# Patient Record
Sex: FEMALE | Race: WHITE | Employment: UNEMPLOYED | URBAN - METROPOLITAN AREA
[De-identification: names, ages, dates, MRNs, and addresses within clinical notes are randomized per-mention and may not be internally consistent; named-entity substitution may affect disease eponyms.]

---

## 2020-06-04 ENCOUNTER — APPOINTMENT (OUTPATIENT)
Dept: GENERAL RADIOLOGY | Age: 85
DRG: 689 | End: 2020-06-04
Payer: MEDICARE

## 2020-06-04 ENCOUNTER — HOSPITAL ENCOUNTER (INPATIENT)
Age: 85
LOS: 6 days | Discharge: HOME HEALTH | DRG: 689 | End: 2020-06-11
Attending: EMERGENCY MEDICINE | Admitting: INTERNAL MEDICINE
Payer: MEDICARE

## 2020-06-04 LAB
A/G RATIO: 1.3 (ref 1.1–2.2)
ALBUMIN SERPL-MCNC: 3.5 G/DL (ref 3.4–5)
ALP BLD-CCNC: 172 U/L (ref 40–129)
ALT SERPL-CCNC: 16 U/L (ref 10–40)
ANION GAP SERPL CALCULATED.3IONS-SCNC: 10 MMOL/L (ref 3–16)
AST SERPL-CCNC: 26 U/L (ref 15–37)
BACTERIA: ABNORMAL /HPF
BASOPHILS ABSOLUTE: 0 K/UL (ref 0–0.2)
BASOPHILS RELATIVE PERCENT: 0.6 %
BILIRUB SERPL-MCNC: 1 MG/DL (ref 0–1)
BILIRUBIN URINE: NEGATIVE
BLOOD, URINE: NEGATIVE
BUN BLDV-MCNC: 21 MG/DL (ref 7–20)
CALCIUM SERPL-MCNC: 9.6 MG/DL (ref 8.3–10.6)
CHLORIDE BLD-SCNC: 107 MMOL/L (ref 99–110)
CLARITY: ABNORMAL
CO2: 26 MMOL/L (ref 21–32)
COLOR: YELLOW
CREAT SERPL-MCNC: 1 MG/DL (ref 0.6–1.2)
EOSINOPHILS ABSOLUTE: 0.2 K/UL (ref 0–0.6)
EOSINOPHILS RELATIVE PERCENT: 2.5 %
EPITHELIAL CELLS, UA: 0 /HPF (ref 0–5)
GFR AFRICAN AMERICAN: >60
GFR NON-AFRICAN AMERICAN: 52
GLOBULIN: 2.7 G/DL
GLUCOSE BLD-MCNC: 106 MG/DL (ref 70–99)
GLUCOSE URINE: NEGATIVE MG/DL
HCT VFR BLD CALC: 39.4 % (ref 36–48)
HEMOGLOBIN: 12.4 G/DL (ref 12–16)
HYALINE CASTS: 7 /LPF (ref 0–8)
KETONES, URINE: NEGATIVE MG/DL
LACTIC ACID: 1.3 MMOL/L (ref 0.4–2)
LEUKOCYTE ESTERASE, URINE: ABNORMAL
LYMPHOCYTES ABSOLUTE: 1 K/UL (ref 1–5.1)
LYMPHOCYTES RELATIVE PERCENT: 15.3 %
MCH RBC QN AUTO: 27.7 PG (ref 26–34)
MCHC RBC AUTO-ENTMCNC: 31.6 G/DL (ref 31–36)
MCV RBC AUTO: 87.6 FL (ref 80–100)
MICROSCOPIC EXAMINATION: YES
MONOCYTES ABSOLUTE: 0.9 K/UL (ref 0–1.3)
MONOCYTES RELATIVE PERCENT: 13.7 %
NEUTROPHILS ABSOLUTE: 4.5 K/UL (ref 1.7–7.7)
NEUTROPHILS RELATIVE PERCENT: 67.9 %
NITRITE, URINE: NEGATIVE
PDW BLD-RTO: 18.6 % (ref 12.4–15.4)
PH UA: 7.5 (ref 5–8)
PLATELET # BLD: 218 K/UL (ref 135–450)
PMV BLD AUTO: 9.7 FL (ref 5–10.5)
POTASSIUM REFLEX MAGNESIUM: 4.1 MMOL/L (ref 3.5–5.1)
PRO-BNP: 4382 PG/ML (ref 0–449)
PROTEIN UA: NEGATIVE MG/DL
RBC # BLD: 4.5 M/UL (ref 4–5.2)
RBC UA: 3 /HPF (ref 0–4)
SODIUM BLD-SCNC: 143 MMOL/L (ref 136–145)
SPECIFIC GRAVITY UA: 1.02 (ref 1–1.03)
TOTAL PROTEIN: 6.2 G/DL (ref 6.4–8.2)
TROPONIN: <0.01 NG/ML
URINE REFLEX TO CULTURE: YES
URINE TYPE: ABNORMAL
UROBILINOGEN, URINE: 1 E.U./DL
WBC # BLD: 6.6 K/UL (ref 4–11)
WBC UA: 32 /HPF (ref 0–5)

## 2020-06-04 PROCEDURE — 71045 X-RAY EXAM CHEST 1 VIEW: CPT

## 2020-06-04 PROCEDURE — 99285 EMERGENCY DEPT VISIT HI MDM: CPT

## 2020-06-04 PROCEDURE — 87077 CULTURE AEROBIC IDENTIFY: CPT

## 2020-06-04 PROCEDURE — 84484 ASSAY OF TROPONIN QUANT: CPT

## 2020-06-04 PROCEDURE — U0002 COVID-19 LAB TEST NON-CDC: HCPCS

## 2020-06-04 PROCEDURE — 36415 COLL VENOUS BLD VENIPUNCTURE: CPT

## 2020-06-04 PROCEDURE — 83880 ASSAY OF NATRIURETIC PEPTIDE: CPT

## 2020-06-04 PROCEDURE — 96376 TX/PRO/DX INJ SAME DRUG ADON: CPT

## 2020-06-04 PROCEDURE — 96375 TX/PRO/DX INJ NEW DRUG ADDON: CPT

## 2020-06-04 PROCEDURE — 96374 THER/PROPH/DIAG INJ IV PUSH: CPT

## 2020-06-04 PROCEDURE — 87086 URINE CULTURE/COLONY COUNT: CPT

## 2020-06-04 PROCEDURE — 80053 COMPREHEN METABOLIC PANEL: CPT

## 2020-06-04 PROCEDURE — 81001 URINALYSIS AUTO W/SCOPE: CPT

## 2020-06-04 PROCEDURE — 6360000002 HC RX W HCPCS: Performed by: EMERGENCY MEDICINE

## 2020-06-04 PROCEDURE — 85025 COMPLETE CBC W/AUTO DIFF WBC: CPT

## 2020-06-04 PROCEDURE — 93005 ELECTROCARDIOGRAM TRACING: CPT | Performed by: EMERGENCY MEDICINE

## 2020-06-04 PROCEDURE — 83605 ASSAY OF LACTIC ACID: CPT

## 2020-06-04 PROCEDURE — 87186 SC STD MICRODIL/AGAR DIL: CPT

## 2020-06-04 PROCEDURE — U0003 INFECTIOUS AGENT DETECTION BY NUCLEIC ACID (DNA OR RNA); SEVERE ACUTE RESPIRATORY SYNDROME CORONAVIRUS 2 (SARS-COV-2) (CORONAVIRUS DISEASE [COVID-19]), AMPLIFIED PROBE TECHNIQUE, MAKING USE OF HIGH THROUGHPUT TECHNOLOGIES AS DESCRIBED BY CMS-2020-01-R: HCPCS

## 2020-06-04 PROCEDURE — 2580000003 HC RX 258: Performed by: EMERGENCY MEDICINE

## 2020-06-04 RX ORDER — FUROSEMIDE 20 MG/1
20 TABLET ORAL DAILY PRN
Status: ON HOLD | COMMUNITY
End: 2020-12-08 | Stop reason: SDUPTHER

## 2020-06-04 RX ORDER — FUROSEMIDE 10 MG/ML
40 INJECTION INTRAMUSCULAR; INTRAVENOUS ONCE
Status: COMPLETED | OUTPATIENT
Start: 2020-06-04 | End: 2020-06-04

## 2020-06-04 RX ORDER — MULTIVIT-MIN/IRON FUM/FOLIC AC 7.5 MG-4
1 TABLET ORAL DAILY
COMMUNITY

## 2020-06-04 RX ORDER — DIVALPROEX SODIUM 125 MG/1
125 CAPSULE, COATED PELLETS ORAL DAILY
Status: ON HOLD | COMMUNITY
End: 2020-11-28 | Stop reason: ALTCHOICE

## 2020-06-04 RX ORDER — CALCIUM CARBONATE 500(1250)
500 TABLET ORAL DAILY
COMMUNITY

## 2020-06-04 RX ORDER — METOPROLOL SUCCINATE 25 MG/1
25 TABLET, EXTENDED RELEASE ORAL DAILY
Status: ON HOLD | COMMUNITY
End: 2021-04-01 | Stop reason: HOSPADM

## 2020-06-04 RX ORDER — ALBUTEROL SULFATE 90 UG/1
2 AEROSOL, METERED RESPIRATORY (INHALATION) EVERY 6 HOURS PRN
COMMUNITY

## 2020-06-04 RX ORDER — LORAZEPAM 2 MG/ML
1 INJECTION INTRAMUSCULAR ONCE
Status: COMPLETED | OUTPATIENT
Start: 2020-06-04 | End: 2020-06-04

## 2020-06-04 RX ADMIN — LORAZEPAM 1 MG: 2 INJECTION INTRAMUSCULAR; INTRAVENOUS at 20:51

## 2020-06-04 RX ADMIN — LORAZEPAM 1 MG: 2 INJECTION INTRAMUSCULAR; INTRAVENOUS at 23:17

## 2020-06-04 RX ADMIN — CEFEPIME HYDROCHLORIDE 2 G: 2 INJECTION, POWDER, FOR SOLUTION INTRAVENOUS at 23:28

## 2020-06-04 RX ADMIN — FUROSEMIDE 40 MG: 10 INJECTION, SOLUTION INTRAMUSCULAR; INTRAVENOUS at 23:20

## 2020-06-04 NOTE — ED PROVIDER NOTES
eMERGENCY dEPARTMENT eNCOUnter      Pt Name: Contreras Murray  MRN: [id number]  [place] 5/9/1931  Date of evaluation: 6/4/2020  Provider: Kandis Castillo MD     [address] [place] [address]       Chief Complaint   Patient presents with    Shortness of Breath     Pt came in via EMS with \"shortness of breath for 3 days\". Patient's son reports that pt has been \"more lethargic than usual\". Pt has history of dementia.  Fatigue         HISTORY OF PRESENT ILLNESS   (Location/Symptom, Timing/Onset,Context/Setting, Quality, Duration, Modifying Factors, Severity) Note limiting factors. HPI    Contreras Murray is a 80 y.o. female who presents to the emergency department with shortness of breath and confusion. Has baseline dementia that is of son who called 911 tonight because she was getting more short of breath and fatigue. He has history of chronic A. fib with rate control and been short of breath today. There has been no chest pain. Patient has baseline dementia and getting more confused. There has been no fever. Patient has no cough according to the son. Patient is already had only 2 placed. Patient states she is in the hospital only but does not know where. Her son called 46 and her to be seen at [facility] ED however they EMS brought her to a [facility] facility. Patient son is very adamant that she needs to be transferred back to a [facility]-health facility. That is where her doctor and most of her care has been. Nursing Notes were reviewed. REVIEW OFSYSTEMS    (2+ for level 4; 10+ for level 5)   Review of Systems    Unable to obtain secondary to a patient's mental status. However son states there has been no fever. There has been no cough. Positive shortness of breath. No known exposure to COVID-19. PAST MEDICAL HISTORY   History reviewed. No pertinent past medical history. SURGICAL HISTORY     History reviewed. No pertinent surgical history.     CURRENT MEDICATIONS       Current Discharge Medication List CONTINUE these medications which have NOT CHANGED    Details   divalproex (DEPAKOTE SPRINKLES) 125 MG capsule Take 250 mg by mouth 2 times daily       albuterol sulfate HFA (VENTOLIN HFA) 108 (90 Base) MCG/ACT inhaler Inhale 2 puffs into the lungs every 6 hours as needed for Wheezing      furosemide (LASIX) 20 MG tablet Take 20 mg by mouth daily as needed      mometasone-formoterol (DULERA) 200-5 MCG/ACT inhaler Inhale 2 puffs into the lungs every 12 hours      metoprolol succinate (TOPROL XL) 50 MG extended release tablet Take 50 mg by mouth daily      Multiple Vitamins-Minerals (MULTIVITAMIN WITH MINERALS) tablet Take 1 tablet by mouth daily      rivaroxaban (XARELTO) 15 MG TABS tablet Take 15 mg by mouth daily (with breakfast)      Cholecalciferol (VITAMIN D3) 125 MCG (5000 UT) TABS Take 1 tablet by mouth daily      calcium carbonate (OSCAL) 500 MG TABS tablet Take 500 mg by mouth daily             ALLERGIES     Chlorpromazine and Hydrochlorothiazide    FAMILY HISTORY     No family history on file.      SOCIAL HISTORY       Social History     Socioeconomic History    Marital status: Single     Spouse name: None    Number of children: None    Years of education: None    Highest education level: None   Occupational History    None   Social Needs    Financial resource strain: None    Food insecurity     Worry: None     Inability: None    Transportation needs     Medical: None     Non-medical: None   Tobacco Use    Smoking status: None   Substance and Sexual Activity    Alcohol use: None    Drug use: None    Sexual activity: None   Lifestyle    Physical activity     Days per week: None     Minutes per session: None    Stress: None   Relationships    Social connections     Talks on phone: None     Gets together: None     Attends Mu-ism service: None     Active member of club or organization: None     Attends meetings of clubs or organizations: None     Relationship status: None    Intimate partner violence     Fear of current or ex partner: None     Emotionally abused: None     Physically abused: None     Forced sexual activity: None   Other Topics Concern    None   Social History Narrative    None       SCREENINGS    Ever Coma Scale  Eye Opening: Spontaneous  Best Verbal Response: Inappropriate words  Best Motor Response: Withdraws from pain  Ever Coma Scale Score: 11      PHYSICAL EXAM    (up to 7 for level 4, 8 or more for level 5)     ED Triage Vitals [06/04/20 1939]   BP Temp Temp src Pulse Resp SpO2 Height Weight   (!) 164/84 97.4 °F (36.3 °C) -- 73 28 95 % -- 218 lb 7.6 oz (99.1 kg)       Physical Exam    General: Alert chronically and pleasantly confused. Nontoxic appearance. Elderly 68-year-old white female in mild respiratory distress. HEENT: Normocephalic atraumatic. Neck is supple. Airway intact. No adenopathy  Cardiac: Regular rate and irregular rhythm with no murmurs rubs or gallops  Pulmonary: Lungs are clear in all lung fields. No wheezing. No Rales. However, there has been some crackles at the base of the lungs. Abdomen: Soft and nontender. Negative hepatosplenomegaly. Bowel sounds are active  Extremities: Moving all extremities. No calf tenderness. Peripheral pulses all intact  Skin: No skin lesions. No rashes  Neurologic: Cranial nerves II through XII was grossly intact. Nonfocal neurological exam  Psychiatric: Patient is pleasant. Slight confusion. Patient is unable to answer complicated questions. DIAGNOSTIC RESULTS     EKG (Per Emergency Physician):   Atrial fibrillation at a controlled rate of 73    RADIOLOGY (Per Emergency Physician):        Interpretation per the Radiologist below, if available at the time of this note:  Ct Head Wo Contrast    Result Date: 6/5/2020  EXAMINATION: CT OF THE HEAD WITHOUT CONTRAST  6/5/2020 11:39 am TECHNIQUE: CT of the head was performed without the administration of intravenous contrast. Dose modulation, iterative reconstruction, and/or weight based adjustment of the mA/kV was utilized to reduce the radiation dose to as low as reasonably achievable. COMPARISON: None. HISTORY: ORDERING SYSTEM PROVIDED HISTORY: acute encephalopathy TECHNOLOGIST PROVIDED HISTORY: Reason for exam:->acute encephalopathy Has a \"code stroke\" or \"stroke alert\" been called? ->No Reason for Exam: AMS FINDINGS: BRAIN/VENTRICLES: Mild generalized volume loss. Periventricular low-attenuation likely related to chronic small vessel disease. Ventricles are prominent, likely related to volume loss. Low-attenuation noted within the right parietal and right occipital lobe no significant sulcal effacement. No midline shift. No evidence of acute bleed. ORBITS: The visualized portion of the orbits demonstrate no acute abnormality. SINUSES: The visualized paranasal sinuses and mastoid air cells demonstrate no acute abnormality. SOFT TISSUES/SKULL:  No acute abnormality of the visualized skull or soft tissues. Generalized volume loss and scattered periventricular low-attenuation likely related to chronic small vessel disease. Wedge-shaped region of low-attenuation noted within the right parietal and right occipital lobe no significant sulcal effacement. Appearance is suggestive of subacute to remote infarct. If there are continued deficits, this can be further evaluated with MRI of the brain. Xr Chest Portable    Result Date: 6/4/2020  EXAMINATION: ONE XRAY VIEW OF THE CHEST 6/4/2020 5:32 pm COMPARISON: None. HISTORY: ORDERING SYSTEM PROVIDED HISTORY: Shortness of breath TECHNOLOGIST PROVIDED HISTORY: Reason for exam:->Shortness of breath Reason for Exam: sob FINDINGS: Cardial pericardial silhouette is prominent. There is increased central opacity, with pulmonary vascular congestion. Small bilateral pleural effusions are present, greater on the right. No pneumothorax is seen. No free air. No acute bony abnormality.      Findings most compatible with American 46 (*)     All other components within normal limits    Narrative:     Performed at:  [place] [address] [facility] [place] [employer] [address]   Phone [phone]   CBC WITH AUTO DIFFERENTIAL - Abnormal; Notable for the following components:    RDW 18.1 (*)     Lymphocytes Absolute 0.6 (*)     All other components within normal limits    Narrative:     Performed at:  [address] [facility] [place] [employer] [address]   Phone [phone]   BASIC METABOLIC PANEL - Abnormal; Notable for the following components:    Potassium 3.4 (*)     Glucose 102 (*)     GFR Non- 47 (*)     GFR  57 (*)     All other components within normal limits    Narrative:     Performed at:  [address] [facility] [place] [employer] [address]   Phone [phone]   VALPROIC ACID LEVEL, TOTAL - Abnormal; Notable for the following components:    Valproic Acid Lvl 22.7 (*)     All other components within normal limits    Narrative:     Performed at:  [address] [facility] [place] [employer] [address]   Phone [phone]   VITAMIN B12 & FOLATE - Abnormal; Notable for the following components:    Vitamin B-12 992 (*)     All other components within normal limits    Narrative:     Performed at:  [address] [facility] [place] [employer] [address]   Phone [phone]   CULTURE, URINE   CULTURE, BLOOD 1   CULTURE, BLOOD 2   CULTURE, URINE   TROPONIN    Narrative:     Performed at:  [address] [place] [address]   Phone [phone]   LACTIC ACID, PLASMA    Narrative:     Performed at:  [address] [facility] [place] [employer] [address]   Phone [phone]    Narrative:     Performed at:  [facility]  [address] [place], [place] [address]   Phone [phone]   MAGNESIUM    Narrative:     Performed at:  [facility]  [address] [place], [place] [address]   Phone [phone]   TSH WITHOUT REFLEX    Narrative:     Performed at:  [facility]  [address] [place], [place] [address]   Phone [phone]   T4, FREE    Narrative:     Performed at:  [facility]  [address] [place], [place] [address]   Phone [phone]   PROCALCITONIN    Narrative:     Performed at:  [facility]  [address] [place], [place] [address]   Phone [phone]   LACTIC ACID, PLASMA    Narrative:     Performed at:  [facility]  [address] [place], [place] [address]   Phone [phone]   BASIC METABOLIC PANEL   MAGNESIUM   LIPID PANEL   CBC WITH AUTO DIFFERENTIAL        All other labs were within normal range or not returned as of this dictation.       Procedures      EMERGENCY DEPARTMENT COURSE and DIFFERENTIAL DIAGNOSIS/MDM:   Vitals:    Vitals:    06/05/20 1500 06/05/20 1545 06/05/20 1745 06/05/20 1752   BP:    (!) 169/93   Pulse:    108   Resp:       Temp: 94.8 °F (34.9 °C) 94 °F (34.4 °C) 94.8 °F (34.9 °C)    TempSrc: Rectal Rectal Rectal    SpO2:   97% 95%   Weight:       Height:           Medications   ipratropium-albuterol (DUONEB) nebulizer solution 1 ampule (has no administration in time range)   calcium elemental (OSCAL) tablet 500 mg (500 mg Oral Given 6/5/20 0958)   vitamin D (CHOLECALCIFEROL) capsule 5,000 Units (5,000 Units Oral Given 6/5/20 1455)   divalproex (DEPAKOTE SPRINKLE) capsule 250 mg (250 mg Oral Given 6/5/20 0958)   metoprolol succinate (TOPROL XL) extended release tablet 50 mg (50 mg Oral Given 6/5/20 0958)   budesonide-formoterol (SYMBICORT) 80-4.5 MCG/ACT inhaler 2 puff (2 puffs Inhalation Not Given 6/5/20 0959)   therapeutic multivitamin-minerals 1 tablet (1 tablet Oral Given 6/5/20 0958)   rivaroxaban (XARELTO) tablet 15 mg (15 mg Oral Given 6/5/20 0957)   sodium chloride flush 0.9 % injection 10 mL (10 mLs Intravenous Given 6/5/20 1002)   sodium chloride flush 0.9 % injection 10 mL (has no administration in time range)   acetaminophen (TYLENOL) tablet 650 mg (has no administration in time range)     Or   acetaminophen (TYLENOL) suppository 650 mg (has no administration in time range)   magnesium hydroxide (MILK OF MAGNESIA) 400 MG/5ML suspension 30 mL (has no administration in time range)   promethazine (PHENERGAN) tablet 12.5 mg (has no administration in time range)     Or   ondansetron (ZOFRAN) injection 4 mg (has no administration in time range)   potassium chloride 10 mEq/100 mL IVPB (Peripheral Line) (has no administration in time range)   metOLazone (ZAROXOLYN) tablet 5 mg (5 mg Oral Not Given 6/5/20 0319)   ceFEPIme (MAXIPIME) 2 g in sterile water 20 mL IV syringe (2 g Intravenous Given 6/5/20 1747)   LORazepam (ATIVAN) injection 1 mg (1 mg Intravenous Given 6/4/20 2051)   LORazepam (ATIVAN) injection 1 mg (1 mg Intravenous Given 6/4/20 2317)   ceFEPIme (MAXIPIME) 2 g in sterile water 20 mL IV syringe (2 g Intravenous Given 6/4/20 2328)   furosemide (LASIX) injection 40 mg (40 mg Intravenous Given 6/4/20 2320)   furosemide (LASIX) injection 80 mg (80 mg Intravenous Given 6/5/20 0315)   OLANZapine (ZYPREXA) injection 10 mg (10 mg Intramuscular Given 6/5/20 0341)   potassium chloride 10 mEq/100 mL IVPB (Peripheral Line) (10 mEq Intravenous New Bag 6/5/20 1746)       MDM. This is a 71-year-old with altered mental status and congestive heart failure. Patient apparently has been progressively getting worse. At home. Patient has chronic A. fib rate is controlled. Also has a incidental UTI that is compounding her confusion. Patient got agitated throughout the night.   She required some Ativan for her

## 2020-06-05 ENCOUNTER — APPOINTMENT (OUTPATIENT)
Dept: CT IMAGING | Age: 85
DRG: 689 | End: 2020-06-05
Payer: MEDICARE

## 2020-06-05 PROBLEM — I10 ESSENTIAL HYPERTENSION: Chronic | Status: ACTIVE | Noted: 2020-06-05

## 2020-06-05 PROBLEM — Z79.01 CURRENT USE OF LONG TERM ANTICOAGULATION: Chronic | Status: ACTIVE | Noted: 2020-06-05

## 2020-06-05 PROBLEM — I50.43 ACUTE ON CHRONIC COMBINED SYSTOLIC AND DIASTOLIC CHF, NYHA CLASS 3 (HCC): Status: ACTIVE | Noted: 2020-06-05

## 2020-06-05 PROBLEM — F02.82 DEMENTIA IN ALZHEIMER'S DISEASE WITH DELIRIUM (HCC): Status: ACTIVE | Noted: 2020-06-05

## 2020-06-05 PROBLEM — I10 ESSENTIAL HYPERTENSION: Status: ACTIVE | Noted: 2020-06-05

## 2020-06-05 PROBLEM — N10 ACUTE PYELONEPHRITIS: Status: ACTIVE | Noted: 2020-06-05

## 2020-06-05 PROBLEM — G30.9 DEMENTIA IN ALZHEIMER'S DISEASE WITH DELIRIUM (HCC): Status: ACTIVE | Noted: 2020-06-05

## 2020-06-05 LAB
ANION GAP SERPL CALCULATED.3IONS-SCNC: 11 MMOL/L (ref 3–16)
ANION GAP SERPL CALCULATED.3IONS-SCNC: 15 MMOL/L (ref 3–16)
BASOPHILS ABSOLUTE: 0.1 K/UL (ref 0–0.2)
BASOPHILS RELATIVE PERCENT: 1.1 %
BUN BLDV-MCNC: 20 MG/DL (ref 7–20)
BUN BLDV-MCNC: 21 MG/DL (ref 7–20)
CALCIUM SERPL-MCNC: 10 MG/DL (ref 8.3–10.6)
CALCIUM SERPL-MCNC: 10.1 MG/DL (ref 8.3–10.6)
CHLORIDE BLD-SCNC: 102 MMOL/L (ref 99–110)
CHLORIDE BLD-SCNC: 105 MMOL/L (ref 99–110)
CO2: 25 MMOL/L (ref 21–32)
CO2: 32 MMOL/L (ref 21–32)
CREAT SERPL-MCNC: 1 MG/DL (ref 0.6–1.2)
CREAT SERPL-MCNC: 1.1 MG/DL (ref 0.6–1.2)
EKG ATRIAL RATE: 89 BPM
EKG DIAGNOSIS: NORMAL
EKG Q-T INTERVAL: 392 MS
EKG QRS DURATION: 100 MS
EKG QTC CALCULATION (BAZETT): 431 MS
EKG R AXIS: 19 DEGREES
EKG T AXIS: -27 DEGREES
EKG VENTRICULAR RATE: 73 BPM
EOSINOPHILS ABSOLUTE: 0.2 K/UL (ref 0–0.6)
EOSINOPHILS RELATIVE PERCENT: 3.4 %
FOLATE: >20 NG/ML (ref 4.78–24.2)
GFR AFRICAN AMERICAN: 57
GFR AFRICAN AMERICAN: >60
GFR NON-AFRICAN AMERICAN: 47
GFR NON-AFRICAN AMERICAN: 52
GLUCOSE BLD-MCNC: 102 MG/DL (ref 70–99)
GLUCOSE BLD-MCNC: 88 MG/DL (ref 70–99)
HCT VFR BLD CALC: 42.7 % (ref 36–48)
HEMOGLOBIN: 13.6 G/DL (ref 12–16)
LACTIC ACID: 1.6 MMOL/L (ref 0.4–2)
LYMPHOCYTES ABSOLUTE: 0.6 K/UL (ref 1–5.1)
LYMPHOCYTES RELATIVE PERCENT: 8.2 %
MAGNESIUM: 1.8 MG/DL (ref 1.8–2.4)
MCH RBC QN AUTO: 27.9 PG (ref 26–34)
MCHC RBC AUTO-ENTMCNC: 31.9 G/DL (ref 31–36)
MCV RBC AUTO: 87.3 FL (ref 80–100)
MONOCYTES ABSOLUTE: 1.1 K/UL (ref 0–1.3)
MONOCYTES RELATIVE PERCENT: 15.6 %
NEUTROPHILS ABSOLUTE: 5 K/UL (ref 1.7–7.7)
NEUTROPHILS RELATIVE PERCENT: 71.7 %
PDW BLD-RTO: 18.1 % (ref 12.4–15.4)
PLATELET # BLD: 230 K/UL (ref 135–450)
PMV BLD AUTO: 9.2 FL (ref 5–10.5)
POTASSIUM SERPL-SCNC: 3.4 MMOL/L (ref 3.5–5.1)
POTASSIUM SERPL-SCNC: 3.9 MMOL/L (ref 3.5–5.1)
PROCALCITONIN: 0.05 NG/ML (ref 0–0.15)
RBC # BLD: 4.89 M/UL (ref 4–5.2)
SARS-COV-2, PCR: NOT DETECTED
SODIUM BLD-SCNC: 145 MMOL/L (ref 136–145)
SODIUM BLD-SCNC: 145 MMOL/L (ref 136–145)
T4 FREE: 1.7 NG/DL (ref 0.9–1.8)
TSH SERPL DL<=0.05 MIU/L-ACNC: 2.67 UIU/ML (ref 0.27–4.2)
VALPROIC ACID LEVEL: 22.7 UG/ML (ref 50–100)
VITAMIN B-12: 992 PG/ML (ref 211–911)
WBC # BLD: 7 K/UL (ref 4–11)

## 2020-06-05 PROCEDURE — 80164 ASSAY DIPROPYLACETIC ACD TOT: CPT

## 2020-06-05 PROCEDURE — 83735 ASSAY OF MAGNESIUM: CPT

## 2020-06-05 PROCEDURE — 6370000000 HC RX 637 (ALT 250 FOR IP): Performed by: INTERNAL MEDICINE

## 2020-06-05 PROCEDURE — 6360000002 HC RX W HCPCS: Performed by: INTERNAL MEDICINE

## 2020-06-05 PROCEDURE — 93010 ELECTROCARDIOGRAM REPORT: CPT | Performed by: INTERNAL MEDICINE

## 2020-06-05 PROCEDURE — 87040 BLOOD CULTURE FOR BACTERIA: CPT

## 2020-06-05 PROCEDURE — 82746 ASSAY OF FOLIC ACID SERUM: CPT

## 2020-06-05 PROCEDURE — 84439 ASSAY OF FREE THYROXINE: CPT

## 2020-06-05 PROCEDURE — 84443 ASSAY THYROID STIM HORMONE: CPT

## 2020-06-05 PROCEDURE — 1200000000 HC SEMI PRIVATE

## 2020-06-05 PROCEDURE — 2580000003 HC RX 258: Performed by: INTERNAL MEDICINE

## 2020-06-05 PROCEDURE — 85025 COMPLETE CBC W/AUTO DIFF WBC: CPT

## 2020-06-05 PROCEDURE — 94640 AIRWAY INHALATION TREATMENT: CPT

## 2020-06-05 PROCEDURE — 36415 COLL VENOUS BLD VENIPUNCTURE: CPT

## 2020-06-05 PROCEDURE — 2500000003 HC RX 250 WO HCPCS: Performed by: INTERNAL MEDICINE

## 2020-06-05 PROCEDURE — 70450 CT HEAD/BRAIN W/O DYE: CPT

## 2020-06-05 PROCEDURE — 87086 URINE CULTURE/COLONY COUNT: CPT

## 2020-06-05 PROCEDURE — 84145 PROCALCITONIN (PCT): CPT

## 2020-06-05 PROCEDURE — 80048 BASIC METABOLIC PNL TOTAL CA: CPT

## 2020-06-05 PROCEDURE — 83605 ASSAY OF LACTIC ACID: CPT

## 2020-06-05 PROCEDURE — 82607 VITAMIN B-12: CPT

## 2020-06-05 RX ORDER — SODIUM CHLORIDE 0.9 % (FLUSH) 0.9 %
10 SYRINGE (ML) INJECTION PRN
Status: DISCONTINUED | OUTPATIENT
Start: 2020-06-05 | End: 2020-06-11 | Stop reason: HOSPADM

## 2020-06-05 RX ORDER — IPRATROPIUM BROMIDE AND ALBUTEROL SULFATE 2.5; .5 MG/3ML; MG/3ML
1 SOLUTION RESPIRATORY (INHALATION) EVERY 4 HOURS PRN
Status: DISCONTINUED | OUTPATIENT
Start: 2020-06-05 | End: 2020-06-11 | Stop reason: HOSPADM

## 2020-06-05 RX ORDER — DIVALPROEX SODIUM 125 MG/1
250 CAPSULE, COATED PELLETS ORAL 2 TIMES DAILY
Status: DISCONTINUED | OUTPATIENT
Start: 2020-06-05 | End: 2020-06-11 | Stop reason: HOSPADM

## 2020-06-05 RX ORDER — CALCIUM CARBONATE 500(1250)
500 TABLET ORAL DAILY
Status: DISCONTINUED | OUTPATIENT
Start: 2020-06-05 | End: 2020-06-11 | Stop reason: HOSPADM

## 2020-06-05 RX ORDER — ONDANSETRON 2 MG/ML
4 INJECTION INTRAMUSCULAR; INTRAVENOUS EVERY 6 HOURS PRN
Status: DISCONTINUED | OUTPATIENT
Start: 2020-06-05 | End: 2020-06-11 | Stop reason: HOSPADM

## 2020-06-05 RX ORDER — SODIUM CHLORIDE 0.9 % (FLUSH) 0.9 %
10 SYRINGE (ML) INJECTION EVERY 12 HOURS SCHEDULED
Status: DISCONTINUED | OUTPATIENT
Start: 2020-06-05 | End: 2020-06-11 | Stop reason: HOSPADM

## 2020-06-05 RX ORDER — PROMETHAZINE HYDROCHLORIDE 25 MG/1
12.5 TABLET ORAL EVERY 6 HOURS PRN
Status: DISCONTINUED | OUTPATIENT
Start: 2020-06-05 | End: 2020-06-11 | Stop reason: HOSPADM

## 2020-06-05 RX ORDER — ACETAMINOPHEN 650 MG/1
650 SUPPOSITORY RECTAL EVERY 6 HOURS PRN
Status: DISCONTINUED | OUTPATIENT
Start: 2020-06-05 | End: 2020-06-11 | Stop reason: HOSPADM

## 2020-06-05 RX ORDER — BUDESONIDE AND FORMOTEROL FUMARATE DIHYDRATE 80; 4.5 UG/1; UG/1
2 AEROSOL RESPIRATORY (INHALATION) 2 TIMES DAILY
Status: DISCONTINUED | OUTPATIENT
Start: 2020-06-05 | End: 2020-06-11 | Stop reason: HOSPADM

## 2020-06-05 RX ORDER — OLANZAPINE 10 MG/1
10 INJECTION, POWDER, LYOPHILIZED, FOR SOLUTION INTRAMUSCULAR
Status: COMPLETED | OUTPATIENT
Start: 2020-06-05 | End: 2020-06-05

## 2020-06-05 RX ORDER — POTASSIUM CHLORIDE 7.45 MG/ML
10 INJECTION INTRAVENOUS ONCE
Status: COMPLETED | OUTPATIENT
Start: 2020-06-05 | End: 2020-06-05

## 2020-06-05 RX ORDER — M-VIT,TX,IRON,MINS/CALC/FOLIC 27MG-0.4MG
1 TABLET ORAL DAILY
Status: DISCONTINUED | OUTPATIENT
Start: 2020-06-05 | End: 2020-06-11 | Stop reason: HOSPADM

## 2020-06-05 RX ORDER — METOLAZONE 5 MG/1
5 TABLET ORAL ONCE
Status: DISCONTINUED | OUTPATIENT
Start: 2020-06-05 | End: 2020-06-08

## 2020-06-05 RX ORDER — POTASSIUM CHLORIDE 7.45 MG/ML
10 INJECTION INTRAVENOUS PRN
Status: DISCONTINUED | OUTPATIENT
Start: 2020-06-05 | End: 2020-06-11 | Stop reason: HOSPADM

## 2020-06-05 RX ORDER — METOPROLOL SUCCINATE 50 MG/1
50 TABLET, EXTENDED RELEASE ORAL DAILY
Status: DISCONTINUED | OUTPATIENT
Start: 2020-06-05 | End: 2020-06-11 | Stop reason: HOSPADM

## 2020-06-05 RX ORDER — FUROSEMIDE 10 MG/ML
80 INJECTION INTRAMUSCULAR; INTRAVENOUS ONCE
Status: COMPLETED | OUTPATIENT
Start: 2020-06-05 | End: 2020-06-05

## 2020-06-05 RX ORDER — ACETAMINOPHEN 325 MG/1
650 TABLET ORAL EVERY 6 HOURS PRN
Status: DISCONTINUED | OUTPATIENT
Start: 2020-06-05 | End: 2020-06-11 | Stop reason: HOSPADM

## 2020-06-05 RX ADMIN — FUROSEMIDE 80 MG: 10 INJECTION, SOLUTION INTRAMUSCULAR; INTRAVENOUS at 03:15

## 2020-06-05 RX ADMIN — MULTIPLE VITAMINS W/ MINERALS TAB 1 TABLET: TAB at 09:58

## 2020-06-05 RX ADMIN — DIVALPROEX SODIUM 250 MG: 125 CAPSULE, COATED PELLETS ORAL at 09:58

## 2020-06-05 RX ADMIN — Medication 500 MG: at 09:58

## 2020-06-05 RX ADMIN — Medication 5000 UNITS: at 14:55

## 2020-06-05 RX ADMIN — CEFTRIAXONE 1 G: 1 INJECTION, POWDER, FOR SOLUTION INTRAMUSCULAR; INTRAVENOUS at 04:22

## 2020-06-05 RX ADMIN — OLANZAPINE 10 MG: 10 INJECTION, POWDER, FOR SOLUTION INTRAMUSCULAR at 03:41

## 2020-06-05 RX ADMIN — BUDESONIDE AND FORMOTEROL FUMARATE DIHYDRATE 2 PUFF: 80; 4.5 AEROSOL RESPIRATORY (INHALATION) at 21:01

## 2020-06-05 RX ADMIN — CEFEPIME HYDROCHLORIDE 2 G: 2 INJECTION, POWDER, FOR SOLUTION INTRAVENOUS at 17:47

## 2020-06-05 RX ADMIN — METOPROLOL SUCCINATE 50 MG: 50 TABLET, EXTENDED RELEASE ORAL at 09:58

## 2020-06-05 RX ADMIN — SODIUM CHLORIDE, PRESERVATIVE FREE 10 ML: 5 INJECTION INTRAVENOUS at 21:23

## 2020-06-05 RX ADMIN — Medication 10 MEQ: at 17:46

## 2020-06-05 RX ADMIN — SODIUM CHLORIDE, PRESERVATIVE FREE 10 ML: 5 INJECTION INTRAVENOUS at 10:02

## 2020-06-05 RX ADMIN — DIVALPROEX SODIUM 250 MG: 125 CAPSULE, COATED PELLETS ORAL at 21:22

## 2020-06-05 RX ADMIN — RIVAROXABAN 15 MG: 15 TABLET, FILM COATED ORAL at 09:57

## 2020-06-05 ASSESSMENT — PAIN SCALES - GENERAL
PAINLEVEL_OUTOF10: 0
PAINLEVEL_OUTOF10: 0

## 2020-06-05 NOTE — ED NOTES
Pt pulled her external catheter out and urinated all over bed. Pt was cleaned up changed adult diaper and replaced the external catheter. Put clean clothes on pt and changed sheets. Pt was put back into an adult diaper and covered up. External Catheter was working when pt was resting.      Bowden Ritchie  06/05/20 0101

## 2020-06-05 NOTE — CONSULTS
Neurology Consult Note  Reason for Consult: encephalopathy    Chief complaint: unable to obtain from the patient at this time    Dr Luli Arndt MD asked me to see Chloé Ross in consultation for evaluation of encephalopathy    History of Present Illness:  Chloé Ross is a 80 y.o. female who presents with altered mental status. I obtained my information via chart review and discussion w/ the patient's RN. The patient is unable to provide any information at this time. The patient has baseline dementia. Her day to day mental status is not well known at this time. Per chart, the patient had been having some shortness of breath for 3 days or so. She has become more lethargic and was not eating very much. Her confusion worsened. She ended up arriving to the ED early this morning in order to be evaluated. /84. No fever, though now hypothermic. CT head w/out any definitive acute abnormality (see below). She may have a UTI and some pulmonary edema. Currently, she is quite encephalopathic in bed w/ bear ciara applied.       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     Scheduled Meds:   calcium elemental  500 mg Oral Daily    vitamin D  5,000 Units Oral Daily    divalproex  250 mg Oral BID    metoprolol succinate  50 mg Oral Daily    budesonide-formoterol  2 puff Inhalation BID    therapeutic multivitamin-minerals  1 tablet Oral Daily    rivaroxaban  15 mg Oral Daily with breakfast    sodium chloride flush  10 mL Intravenous 2 times per day    metOLazone  5 mg Oral Once    cefTRIAXone (ROCEPHIN) IV  1 g Intravenous Q24H     Medications Prior to Admission:   divalproex (DEPAKOTE SPRINKLES) 125 MG capsule, Take 250 mg by mouth 2 times daily   albuterol sulfate HFA (VENTOLIN HFA) 108 (90 Base) MCG/ACT inhaler, Inhale 2 puffs into the lungs every 6 hours as needed for Wheezing  furosemide (LASIX) 20 MG tablet, Take 20 mg by mouth daily as needed  mometasone-formoterol (DULERA) 200-5 MCG/ACT inhaler  metoprolol succinate (TOPROL XL) 50 MG extended release tablet, Take 50 mg by mouth daily  Multiple Vitamins-Minerals (MULTIVITAMIN WITH MINERALS) tablet  rivaroxaban (XARELTO) 15 MG TABS tablet, Take 15 mg by mouth daily (with breakfast)  Cholecalciferol (VITAMIN D3) 125 MCG (5000 UT) TABS, Take 1 tablet by mouth daily  calcium carbonate (OSCAL) 500 MG TABS tablet, Take 500 mg by mouth daily    Allergies   Allergen Reactions    Chlorpromazine Other (See Comments)    Hydrochlorothiazide Other (See Comments)     Gout     No family history on file. Social History     Tobacco Use   Smoking Status Not on file     Social History     Substance and Sexual Activity   Drug Use Not on file     Social History     Substance and Sexual Activity   Alcohol Use None     ROS: unable to obtain from the patient at this time due to encephalopathy. Chart reviewed. Exam:  Blood pressure 130/84, pulse 71, temperature 94.2 °F (34.6 °C), temperature source Rectal, resp. rate 18, weight 208 lb 12.4 oz (94.7 kg), SpO2 96 %. Constitutional    Vital signs: BP, HR, and RR reviewed   General awake no distress  Eyes: unable to visualize the fundi  Cardiovascular: pulses symmetric in all 4 extremities. Psychiatric: uncooperative with examination  Neurologic  Mental status:   orientation vitor due to encephalopathy. General fund of knowledge dementia at baseline. Memory dementia at baseline. Attention poor   Language vitor due to encephalopathy. Comprehension not following commands well.     Cranial nerves:   CN2: blink to threat bilaterally. CN 3,4,6: extraocular muscles intact  CN5: facial sensation vitor  CN7: face symmetric, dysarthric. CN8: hearing grossly intact  CN9: palate elevation vitor. CN11: trap strength vitor. CN12: tongue midline with protrusion  Strength: no gross focal paresis w/in limitations of exam.    Deep tendon reflexes: diminished in all 4 extremities  Sensory: slight withdraw/grimace to nailbed pressure. Cerebellar/coordination: finger nose finger vitor  Tone: mildly increased tone throughout. Gait: deferred at this time due to encephalopathy. Labs  Glucose 88  Na 145  K 3.9   BUN 21  Creatinine 1.0  Mg 1.80  LFTs normal    BNP 4382    TSH 2.67  T4 Free 1.7    WBC 6.6K  Hg 12.4  Platelets 916    COVID negative  Blood cultures pending. UA moderate LE, 32 WBCs, 4+ bacteria. Culture pending. Studies  CT head w/o 6/5/20, independently reviewed  Wedge shaped region of low attenuation w/in the R parietal and R occipital lobe. Suggestive of subacute to remote infarct. EKG 6/4/20  AF. Impression  1. Acute encephalopathy in the setting of baseline dementia. Suspect metabolic related to UTI/sepsis. CT head findings appear to be chronic as they are previously reported, though I am unable to see those images. An acute component can't be fully excluded. 2.  Dementia. 3.  UTI. 4.  AF on anticoagulation. Recommendations  1. MRI brain w/o.    2.  EEG. 3.  VPA level. 4.  Treatment for UTI/sepsis underway.       Alena Driscoll NP  [address] [place] [address] [address] Neurology    A copy of this note was provided for Dr Ab Manning MD

## 2020-06-05 NOTE — PROGRESS NOTES
Rectal temp: 94.2. MD notified. Jacey urbina placed.      Electronically signed by Svetlana Romero RN on 6/5/2020 at 1:38 PM

## 2020-06-05 NOTE — CONSULTS
Nutrition Assessment    Type and Reason for Visit: Initial, Consult(per HF protocol)    Nutrition Recommendations:   Add Magic Cup tid to start  HF measures are in place along with written information, to be included in discharge paperwork, for son to reference    Nutrition Assessment: Pt with pmh of CHF, CKD, Dementia, adm r/t shortness of breath, increased confusion , lethargy with poor po x 3 days pta. Found to be with acute on chronic CHF as well as pyelonephritis. Neurology noted that curent mental status may be r/t sepsis. Diet adv to 2 gm Na/1800 ml per day. Intake currently poor. Will add Magic Cup tid to start. Unable to interview pt at this time.      Malnutrition Assessment:  · Malnutrition Status: Insufficient data    Nutrition Risk Level: High    Nutrient Needs:  · Estimated Daily Total Kcal: 4211-6539 (15-18 x ABW 95 kg)  · Estimated Daily Protein (g): 76-95 (.8-1 x ABW (adj for ckd)  · Estimated Daily Total Fluid (ml/day): 1800 per MD    Nutrition Diagnosis:   · Problem: Inadequate oral intake  · Etiology: related to Cognitive or neurological impairment     Signs and symptoms:  as evidenced by Intake 0-25%    Objective Information:  · Nutrition-Focused Physical Findings: Noted trace edema to BLE  · Wound Type: Skin Tears(left heel)  · Current Nutrition Therapies:  · Oral Diet Orders: 2gm Sodium, Fluid Restriction   · Oral Diet intake: 1-25%  · Anthropometric Measures:  · Ht: 5' 6\" (167.6 cm)(care everywhere dated 1/17/20)   · Current Body Wt: 209 lb (94.8 kg)(actual today)  · Ideal Body Wt: 130 lb (59 kg), % Ideal Body    · BMI Classification: BMI 30.0 - 34.9 Obese Class I    Nutrition Interventions:   Continue current diet, Start ONS  Continued Inpatient Monitoring, Education not appropriate at this time    Nutrition Evaluation:   · Evaluation: Goals set   · Goals: tolerate most appropriate form of nutrition    · Monitoring: Meal Intake, Supplement Intake, Skin Integrity, Mental Status/Confusion, Weight, Pertinent Labs, Diarrhea, Constipation      Electronically signed by Hector Gautam RD, CHIARA on 6/5/20 at 5:05 PM EDT    Contact Number: [phone]

## 2020-06-05 NOTE — PLAN OF CARE
Problem: OXYGENATION/RESPIRATORY FUNCTION  Goal: Patient will maintain patent airway  Outcome: Ongoing     Problem: OXYGENATION/RESPIRATORY FUNCTION  Goal: Patient will achieve/maintain normal respiratory rate/effort  Description: Respiratory rate and effort will be within normal limits for the patient  Outcome: Ongoing     Problem: HEMODYNAMIC STATUS  Goal: Patient has stable vital signs and fluid balance  Outcome: Ongoing     Problem: FLUID AND ELECTROLYTE IMBALANCE  Goal: Fluid and electrolyte balance are achieved/maintained  Outcome: Ongoing     Problem: ACTIVITY INTOLERANCE/IMPAIRED MOBILITY  Goal: Mobility/activity is maintained at optimum level for patient  Outcome: Ongoing     Problem: Nutrition  Goal: Optimal nutrition therapy  6/5/2020 1816 by Amrit Stephens RN  Outcome: Ongoing     Problem: Physical Regulation:  Goal: Ability to maintain a body temperature in the normal range will improve  Description: Ability to maintain a body temperature in the normal range will improve  Outcome: Ongoing     Problem: Physical Regulation:  Goal: Ability to maintain vital signs within normal range will improve  Description: Ability to maintain vital signs within normal range will improve  Outcome: Ongoing

## 2020-06-05 NOTE — H&P
Chlorpromazine and Hydrochlorothiazide    Social History:   TOBACCO:   has no history on file for tobacco.     ETOH:   has no history on file for alcohol. Family History:   No family history on file. ROS: As stated in the HPI , a complete 12 point review of system could not be obtained due to patient's abnormal mental status    PHYSICAL EXAM:  BP (!) 140/76   Pulse 84   Temp 97.6 °F (36.4 °C) (Axillary)   Resp 24   Wt 218 lb 7.6 oz (99.1 kg)   SpO2 96%     No results for input(s): POCGLU in the last 72 hours.     General appearance: Awake but withdrawn, moderate respiratory distress at rest with supplemental oxygen respiratory distress, obese body habitus  Eyes: Lids and conjunctiva normal, sclera anicteric, cornea clear, pupils equal and reactive to light,  Throat: Oral mucosa dry but pink and acyanotic,   Neck: JVD is present, trachea central, no goiter,  Lungs: Breath sounds symmetrically equal and bronchial in quality, expiratory wheezes are heard posteriorly, no crackles  Heart: Pulse rhythm is regular, volume and rate are normal, S1 and S2 are normal, no murmurs  Abdomen: soft, non-tender; bowel sounds normal; no masses,  no organomegaly  Extremities: Warm, 1+ leg edema, no clubbing, no cyanosis,  Skin: Warm, no jaundice, no rash, no wounds are ulcers  Lymph nodes: No cervical, axillary, or inguinal adenopathy  Neurologic: No dystonia, no dysarthria, her face is symmetric and she moves all 4 limbs with good strength, cannot assess further   MSK: No gross deformity of the spine or major joints, no effusions or tenderness,   Psychiatric: Fluctuating sensorium, her speech is impoverished and incoherent, cannot assess her mood,  no hallucinations evident      LABS:  Recent Labs     06/04/20 2009   WBC 6.6   HGB 12.4   HCT 39.4                                                                     Recent Labs     06/04/20 2009      K 4.1      CO2 26   BUN 21*   CREATININE 1.0   GLUCOSE 106*     Recent Labs     06/04/20 2009   AST 26   ALT 16   BILITOT 1.0   ALKPHOS 172*     Recent Labs     06/04/20 2009   TROPONINI <0.01     No results for input(s): BNP in the last 72 hours. No results found for: PHART, XKA4LGD, PO2ART  No results for input(s): INR in the last 72 hours. Recent Labs     06/04/20  2141   COLORU YELLOW   PHUR 7.5   WBCUA 32*   RBCUA 3   BACTERIA 4+*   CLARITYU CLOUDY*   SPECGRAV 1.023   LEUKOCYTESUR MODERATE*   UROBILINOGEN 1.0   BILIRUBINUR Negative   BLOODU Negative   GLUCOSEU Negative            PCXR Independently reviewed: Pulmonary edema          Assessment & Plan: Active Hospital Problems    Diagnosis Date Noted    Acute on chronic combined systolic and diastolic CHF, NYHA class 3 ([facility] [I50.43] 06/05/2020     Priority: High    Dementia in Alzheimer's disease with delirium ([facility] [G30.9, F02.80, F05] 06/05/2020    Acute pyelonephritis [N10] 06/05/2020    Essential hypertension [I10] 06/05/2020    Current use of long term anticoagulation [Z79.01] 06/05/2020           1. IV Lasix, topical nitrate, low-sodium diet, heart rate and blood pressure control, closely monitor I/O, body weight, serum electrolytes and creatinine  2. Blood and urine culture, empiric IV antibiotics, Rocephin  3. Continue inhaled and prn bronchodilators,  4. PRN Zyprexa, CT head without contrast stat, check TSH and B12  5. Continue Xarelto, pending CT head      The patient and / or the family were informed of the results of any tests, a time was given to answer questions, a plan was proposed and they agreed with plan. Thank you Dr. Martin primary care provider on file. for the opportunity to be involved in this patients care. If you have any questions or concerns please feel free to contact me at [phone].   Full Code    Jeffrey Ruvalcaba MD  6/5/2020

## 2020-06-05 NOTE — PROGRESS NOTES
Medication Reconciliation    List of medications patient is currently taking is complete. Source of information: 1. Conversation with patient's family [place]) over the phone                                      2. EPIC records         Notes regarding home medications:   1. Patient received all of her MORNING home medications prior to arrival to the emergency department. 2. I verified the medications with the patient's family who were at home and they deny any other OTC or herbal medication use.     Hillary Leal, Pharmacy Intern  6/4/2020 10:44 PM

## 2020-06-06 LAB
ANION GAP SERPL CALCULATED.3IONS-SCNC: 10 MMOL/L (ref 3–16)
BASOPHILS ABSOLUTE: 0 K/UL (ref 0–0.2)
BASOPHILS RELATIVE PERCENT: 0.4 %
BUN BLDV-MCNC: 24 MG/DL (ref 7–20)
CALCIUM SERPL-MCNC: 9.9 MG/DL (ref 8.3–10.6)
CHLORIDE BLD-SCNC: 103 MMOL/L (ref 99–110)
CHOLESTEROL, TOTAL: 124 MG/DL (ref 0–199)
CO2: 33 MMOL/L (ref 21–32)
CREAT SERPL-MCNC: 1.2 MG/DL (ref 0.6–1.2)
EOSINOPHILS ABSOLUTE: 0.2 K/UL (ref 0–0.6)
EOSINOPHILS RELATIVE PERCENT: 2.7 %
GFR AFRICAN AMERICAN: 51
GFR NON-AFRICAN AMERICAN: 42
GLUCOSE BLD-MCNC: 107 MG/DL (ref 70–99)
HCT VFR BLD CALC: 44 % (ref 36–48)
HDLC SERPL-MCNC: 59 MG/DL (ref 40–60)
HEMOGLOBIN: 13.8 G/DL (ref 12–16)
LDL CHOLESTEROL CALCULATED: 49 MG/DL
LYMPHOCYTES ABSOLUTE: 0.5 K/UL (ref 1–5.1)
LYMPHOCYTES RELATIVE PERCENT: 5.6 %
MAGNESIUM: 1.9 MG/DL (ref 1.8–2.4)
MCH RBC QN AUTO: 27.6 PG (ref 26–34)
MCHC RBC AUTO-ENTMCNC: 31.4 G/DL (ref 31–36)
MCV RBC AUTO: 87.8 FL (ref 80–100)
MONOCYTES ABSOLUTE: 1 K/UL (ref 0–1.3)
MONOCYTES RELATIVE PERCENT: 11.7 %
NEUTROPHILS ABSOLUTE: 6.5 K/UL (ref 1.7–7.7)
NEUTROPHILS RELATIVE PERCENT: 79.6 %
ORGANISM: ABNORMAL
PDW BLD-RTO: 18.1 % (ref 12.4–15.4)
PLATELET # BLD: 222 K/UL (ref 135–450)
PMV BLD AUTO: 9.4 FL (ref 5–10.5)
POTASSIUM SERPL-SCNC: 3.1 MMOL/L (ref 3.5–5.1)
RBC # BLD: 5.01 M/UL (ref 4–5.2)
SODIUM BLD-SCNC: 146 MMOL/L (ref 136–145)
TRIGL SERPL-MCNC: 82 MG/DL (ref 0–150)
URINE CULTURE, ROUTINE: ABNORMAL
URINE CULTURE, ROUTINE: NORMAL
VLDLC SERPL CALC-MCNC: 16 MG/DL
WBC # BLD: 8.2 K/UL (ref 4–11)

## 2020-06-06 PROCEDURE — 80048 BASIC METABOLIC PNL TOTAL CA: CPT

## 2020-06-06 PROCEDURE — 6370000000 HC RX 637 (ALT 250 FOR IP): Performed by: INTERNAL MEDICINE

## 2020-06-06 PROCEDURE — 94761 N-INVAS EAR/PLS OXIMETRY MLT: CPT

## 2020-06-06 PROCEDURE — 83735 ASSAY OF MAGNESIUM: CPT

## 2020-06-06 PROCEDURE — 6360000002 HC RX W HCPCS: Performed by: INTERNAL MEDICINE

## 2020-06-06 PROCEDURE — 2580000003 HC RX 258: Performed by: INTERNAL MEDICINE

## 2020-06-06 PROCEDURE — 36415 COLL VENOUS BLD VENIPUNCTURE: CPT

## 2020-06-06 PROCEDURE — 80061 LIPID PANEL: CPT

## 2020-06-06 PROCEDURE — 6360000002 HC RX W HCPCS: Performed by: HOSPITALIST

## 2020-06-06 PROCEDURE — 94640 AIRWAY INHALATION TREATMENT: CPT

## 2020-06-06 PROCEDURE — 1200000000 HC SEMI PRIVATE

## 2020-06-06 PROCEDURE — 85025 COMPLETE CBC W/AUTO DIFF WBC: CPT

## 2020-06-06 RX ORDER — POTASSIUM CHLORIDE 7.45 MG/ML
10 INJECTION INTRAVENOUS
Status: DISPENSED | OUTPATIENT
Start: 2020-06-06 | End: 2020-06-06

## 2020-06-06 RX ADMIN — BUDESONIDE AND FORMOTEROL FUMARATE DIHYDRATE 2 PUFF: 80; 4.5 AEROSOL RESPIRATORY (INHALATION) at 19:43

## 2020-06-06 RX ADMIN — POTASSIUM CHLORIDE 10 MEQ: 7.46 INJECTION, SOLUTION INTRAVENOUS at 17:34

## 2020-06-06 RX ADMIN — METOPROLOL SUCCINATE 50 MG: 50 TABLET, EXTENDED RELEASE ORAL at 09:27

## 2020-06-06 RX ADMIN — POTASSIUM CHLORIDE 10 MEQ: 7.46 INJECTION, SOLUTION INTRAVENOUS at 19:37

## 2020-06-06 RX ADMIN — CEFEPIME HYDROCHLORIDE 2 G: 2 INJECTION, POWDER, FOR SOLUTION INTRAVENOUS at 04:59

## 2020-06-06 RX ADMIN — RIVAROXABAN 15 MG: 15 TABLET, FILM COATED ORAL at 09:28

## 2020-06-06 RX ADMIN — Medication 500 MG: at 09:28

## 2020-06-06 RX ADMIN — DIVALPROEX SODIUM 250 MG: 125 CAPSULE, COATED PELLETS ORAL at 20:16

## 2020-06-06 RX ADMIN — PROMETHAZINE HYDROCHLORIDE 12.5 MG: 25 TABLET ORAL at 20:16

## 2020-06-06 RX ADMIN — CEFEPIME HYDROCHLORIDE 2 G: 2 INJECTION, POWDER, FOR SOLUTION INTRAVENOUS at 17:30

## 2020-06-06 RX ADMIN — SODIUM CHLORIDE, PRESERVATIVE FREE 10 ML: 5 INJECTION INTRAVENOUS at 09:29

## 2020-06-06 RX ADMIN — MULTIPLE VITAMINS W/ MINERALS TAB 1 TABLET: TAB at 09:27

## 2020-06-06 RX ADMIN — POTASSIUM CHLORIDE 10 MEQ: 7.46 INJECTION, SOLUTION INTRAVENOUS at 23:08

## 2020-06-06 RX ADMIN — Medication 5000 UNITS: at 09:27

## 2020-06-06 RX ADMIN — BUDESONIDE AND FORMOTEROL FUMARATE DIHYDRATE 2 PUFF: 80; 4.5 AEROSOL RESPIRATORY (INHALATION) at 08:12

## 2020-06-06 RX ADMIN — DIVALPROEX SODIUM 250 MG: 125 CAPSULE, COATED PELLETS ORAL at 09:27

## 2020-06-06 RX ADMIN — POTASSIUM CHLORIDE 10 MEQ: 7.46 INJECTION, SOLUTION INTRAVENOUS at 21:55

## 2020-06-06 SDOH — HEALTH STABILITY: MENTAL HEALTH: HOW OFTEN DO YOU HAVE A DRINK CONTAINING ALCOHOL?: NEVER

## 2020-06-06 ASSESSMENT — PAIN SCALES - GENERAL
PAINLEVEL_OUTOF10: 0
PAINLEVEL_OUTOF10: 0

## 2020-06-06 NOTE — PROGRESS NOTES
Hospitalist Progress Note  6/6/2020 12:17 PM    PCP: No primary care provider on file.     [id number]     Date of Admission: 6/4/2020                                                                                                                     HOSPITAL COURSE    Patient demographics:  The patient  Alessandro Herring is a 80 y.o. female     Significant past medical history:   Patient Active Problem List   Diagnosis    Acute on chronic combined systolic and diastolic [facility], [facility] class 3 [facility]    D[facility] in [facility] disease [facility]ium ([facility]    Acute pyelonephritis    Essential hypertension    Current use of long term anticoagulation         Presenting symptoms:      Diagnostic workup:      CONSULTS DURING ADMISSION :   IP CONSULT TO HEART FAILURE NURSE/COORDINATOR  IP CONSULT TO DIETITIAN  IP CONSULT TO NEUROLOGY      Patient was diagnosed with:        Treatment while inpatient:                                                                                         ----------------------------------------------------------      SUBJECTIVE COMPLAINTS- Shortness of breath    Diet: DIET LOW SODIUM 2 GM; 1800 ml  Dietary Nutrition Supplements: Frozen Oral Supplement      OBJECTIVE:   Patient Active Problem List   Diagnosis    Acute on chronic combined systolic and diastolic CHF, NYHA class 3 ([facility]    Dementia in Alzheimer's disease with delirium ([facility]    Acute pyelonephritis    Essential hypertension    Current use of long term anticoagulation       Allergies  Chlorpromazine and Hydrochlorothiazide    Medications    Scheduled Meds:   calcium elemental  500 mg Oral Daily    vitamin D  5,000 Units Oral Daily    divalproex  250 mg Oral BID    metoprolol succinate  50 mg Oral Daily    budesonide-formoterol  2 puff Inhalation BID    therapeutic multivitamin-minerals  1 tablet Oral Daily    rivaroxaban  15 mg Oral Daily with breakfast    sodium chloride flush  10 mL Intravenous 2 times per day    metOLazone  5 mg Oral Once    cefepime  2 g Intravenous Q12H     Continuous Infusions:  PRN Meds:  ipratropium-albuterol, sodium chloride flush, acetaminophen **OR** acetaminophen, magnesium hydroxide, promethazine **OR** ondansetron, potassium chloride    Vitals   Vitals /wt   Patient Vitals for the past 8 hrs:   BP Temp Temp src Pulse Resp SpO2 Weight   06/06/20 0816 97/63 97.4 °F (36.3 °C) -- 70 16 -- --   06/06/20 0815 -- -- -- -- 16 91 % --   06/06/20 0512 (!) 94/49 97.6 °F (36.4 °C) Axillary 74 16 91 % 210 lb 12.2 oz (95.6 kg)        72HR INTAKE/OUTPUT:      Intake/Output Summary (Last 24 hours) at 6/6/2020 1217  Last data filed at 6/6/2020 1051  Gross per 24 hour   Intake 460 ml   Output 700 ml   Net -240 ml       Exam:    Gen:   Alert and oriented ×1  Eyes: PERRL. No sclera icterus. No conjunctival injection. ENT: No discharge. Pharynx clear. External appearance of ears and nose normal.  Neck: Trachea midline. No obvious mass. Resp: No accessory muscle use. No crackles. No wheezes. No rhonchi. CV: Regular rate. Regular rhythm. No murmur or rub. No edema. GI: Non-tender. Non-distended. No hernia. Skin: Warm, dry, normal texture and turgor. Lymph: No cervical LAD. No supraclavicular LAD. M/S: / Ext. No cyanosis. No clubbing. No joint deformity. Neuro: CN 2-12 are intact,  no neurologic deficits noted. PT/INR: No results for input(s): PROTIME, INR in the last 72 hours. APTT: No results for input(s): APTT in the last 72 hours.     CBC:   Recent Labs     06/04/20 2009 06/05/20  1527 06/06/20  0603   WBC 6.6 7.0 8.2   HGB 12.4 13.6 13.8   HCT 39.4 42.7 44.0   MCV 87.6 87.3 87.8    230 222       BMP:   Recent Labs     06/04/20 2009 06/05/20  0310 06/05/20  1528 06/06/20  0603    145 145 146*   K 4.1 3.9 3.4* 3.1*    105 102 103   CO2 26 25 32 33*   BUN 21* 21* 20 24*   CREATININE 1.0 1.0 1.1 1.2       LIVER PROFILE:   Recent Labs     06/04/20 2009   ALKPHOS 172*   AST 26 ALT 16   BILITOT 1.0     No results for input(s): AMYLASE in the last 72 hours. No results for input(s): LIPASE in the last 72 hours. UA:  Recent Labs     06/04/20  2141   WBCUA 32*   RBCUA 3       TROPONIN:   Recent Labs     06/04/20 2009   TROPONINI <0.01       Lab Results   Component Value Date/Time    URRFLXCULT Yes 06/04/2020 09:41 PM       No results for input(s): TSHREFLEX in the last 72 hours. No components found for: [id number]  POC GLUCOSE:  No results for input(s): POCGLU in the last 72 hours. No results for input(s): LABA1C in the last 72 hours. No results found for: LABA1C      ASSESSMENT AND PLAN    Urinary tract infection  IV antibiotics and IV fluids    Acute metabolic encephalopathy  Likely due to infection    Essential primary hypertension I10  Continue current medication    Atrial fibrillation  Heart rate is controlled  Anticoagulation on Xarelto    Dementia    Chronic kidney disease Stage III  Continue to monitor          Code Status: Full Code        Dispo -cc        The patient and / or the family were informed of the results of any tests, a time was given to answer questions, a plan was proposed and they agreed with plan. Fred Fernandez MD    This note was transcribed using [id number] [employer]. Please disregard any translational errors.

## 2020-06-06 NOTE — CONSULTS
HF RN consult received from Dr Philly Berkowitz as part of HF order set. Chart reviewed. Pt presented to ED via EMS from home when son noted patient to be more lethargic. He reports a 3 day hx of increased SOB. Pt has dementia and currently altered. Pt's on upset that pt was not taken to a [facility] facility and is considering transfer. Pt follows with [facility] and was last seen in office on 1/14/2020 by Dr Lauren Portillo. He notes her AF and HTN but makes no specific mention of HF although pt had Rx for Lasix 10mg QD x 30 days then prn swelling. Weight at that time 215#. HF would be NEW diagnosis for pt. FProBNP on admission was 4382 with Cr 1.0. CXR showed pulm edema. Weight 218# (ED) and 208# (floor). Patient was treated with mulitple one time doses of IV bolus Lasix and dose of Metolazone on 6/5. Net ouput 1 liter to date. She is not on scheduled diuretic now. Further workup has revealed pt has UTI / pyelonephritis and is being treated with IV abx. She has acute encephalopathy \"likely infection driven\" and neuro consulted. Dr Gauri Anderson makes no mention of HF. HF measures were implemented based on H&P : daily weights, I/O, and sodium / fluid restricted diet (1800ml/day). HF careplan is current. HF instructions have been added to AVS (as pt does take Lasix prn). Pt is unable to participate in meaningful HF education and this writer unsure if diagnosis is even applicable at this point. Cardiology follow up was arranged for Tues June 16 at 4:45pm with Dr Lauren Portillo at the [place] office. I attempted to arrange a 7 day PCP appt but had considerable difficulty. Last PCP listed in Jan is a resident at [facility]. Their notes indicate pt's son wanted to continue care with NP Ifeanyi Myles. Contact number for LG NP was a Industrial Medicine and they did not have a provider by that name.  I googled  NP [place] and determined patient was a listed provider at [id number] PCP program. I called [phone] and had a projected wait time of 26 mins. I stayed on hold for 61 mins still with no answer and then call promptly disconnected. I attempted to call back but phones were off at that point. HF RNs will continue to follow peripherally but it appears HF is not issue for this patient. Please reconsult if situation changes.

## 2020-06-06 NOTE — PLAN OF CARE
Problem: OXYGENATION/RESPIRATORY FUNCTION  Goal: Patient will maintain patent airway  6/6/2020 1212 by Guadalupe Barraza RN  Outcome: Ongoing  6/6/2020 0152 by Luis A Abebe RN  Outcome: Met This Shift  Note: Has been wean of oxygen to RA. Saturations within normal limits. Problem: OXYGENATION/RESPIRATORY FUNCTION  Goal: Patient will maintain patent airway  6/6/2020 1212 by Guadalupe Barraza RN  Outcome: Ongoing  6/6/2020 0152 by Luis A Abebe RN  Outcome: Met This Shift  Note: Has been wean of oxygen to RA. Saturations within normal limits.      Problem: OXYGENATION/RESPIRATORY FUNCTION  Goal: Patient will achieve/maintain normal respiratory rate/effort  Description: Respiratory rate and effort will be within normal limits for the patient  6/6/2020 1212 by Guadalupe Barraza RN  Outcome: Ongoing  6/6/2020 0152 by Luis A Abebe RN  Outcome: Met This Shift     Problem: FLUID AND ELECTROLYTE IMBALANCE  Goal: Fluid and electrolyte balance are achieved/maintained  6/6/2020 1212 by Guadalupe Barraza RN  Outcome: Ongoing  6/6/2020 0152 by Luis A Abebe RN  Outcome: Ongoing     Problem: ACTIVITY INTOLERANCE/IMPAIRED MOBILITY  Goal: Mobility/activity is maintained at optimum level for patient  6/6/2020 1212 by Guadalupe Barraza RN  Outcome: Ongoing  6/6/2020 0152 by Luis A Abebe RN  Outcome: Ongoing

## 2020-06-07 ENCOUNTER — APPOINTMENT (OUTPATIENT)
Dept: GENERAL RADIOLOGY | Age: 85
DRG: 689 | End: 2020-06-07
Payer: MEDICARE

## 2020-06-07 LAB
AMMONIA: 48 UMOL/L (ref 11–51)
ANION GAP SERPL CALCULATED.3IONS-SCNC: 10 MMOL/L (ref 3–16)
BASE EXCESS VENOUS: 7.1 MMOL/L
BUN BLDV-MCNC: 23 MG/DL (ref 7–20)
CALCIUM SERPL-MCNC: 9.2 MG/DL (ref 8.3–10.6)
CARBOXYHEMOGLOBIN: 1.6 %
CHLORIDE BLD-SCNC: 105 MMOL/L (ref 99–110)
CO2: 28 MMOL/L (ref 21–32)
CREAT SERPL-MCNC: 1.1 MG/DL (ref 0.6–1.2)
GFR AFRICAN AMERICAN: 57
GFR NON-AFRICAN AMERICAN: 47
GLUCOSE BLD-MCNC: 91 MG/DL (ref 70–99)
HCO3 VENOUS: 32 MMOL/L (ref 23–29)
LACTIC ACID: 1.4 MMOL/L (ref 0.4–2)
MAGNESIUM: 1.9 MG/DL (ref 1.8–2.4)
METHEMOGLOBIN VENOUS: 0.4 %
O2 CONTENT, VEN: 15 ML/DL
O2 SAT, VEN: 79 %
O2 THERAPY: ABNORMAL
PCO2, VEN: 42.5 MMHG (ref 40–50)
PH VENOUS: 7.48 (ref 7.35–7.45)
PO2, VEN: 44 MMHG
POTASSIUM SERPL-SCNC: 3.6 MMOL/L (ref 3.5–5.1)
SODIUM BLD-SCNC: 143 MMOL/L (ref 136–145)
TCO2 CALC VENOUS: 33 MMOL/L

## 2020-06-07 PROCEDURE — 36415 COLL VENOUS BLD VENIPUNCTURE: CPT

## 2020-06-07 PROCEDURE — 6360000002 HC RX W HCPCS: Performed by: INTERNAL MEDICINE

## 2020-06-07 PROCEDURE — 82803 BLOOD GASES ANY COMBINATION: CPT

## 2020-06-07 PROCEDURE — 83735 ASSAY OF MAGNESIUM: CPT

## 2020-06-07 PROCEDURE — 82140 ASSAY OF AMMONIA: CPT

## 2020-06-07 PROCEDURE — 2580000003 HC RX 258: Performed by: HOSPITALIST

## 2020-06-07 PROCEDURE — 80048 BASIC METABOLIC PNL TOTAL CA: CPT

## 2020-06-07 PROCEDURE — 2580000003 HC RX 258: Performed by: INTERNAL MEDICINE

## 2020-06-07 PROCEDURE — 1200000000 HC SEMI PRIVATE

## 2020-06-07 PROCEDURE — 94640 AIRWAY INHALATION TREATMENT: CPT

## 2020-06-07 PROCEDURE — 71045 X-RAY EXAM CHEST 1 VIEW: CPT

## 2020-06-07 PROCEDURE — 83605 ASSAY OF LACTIC ACID: CPT

## 2020-06-07 PROCEDURE — 94761 N-INVAS EAR/PLS OXIMETRY MLT: CPT

## 2020-06-07 PROCEDURE — 6360000002 HC RX W HCPCS: Performed by: HOSPITALIST

## 2020-06-07 RX ADMIN — CEFTRIAXONE 1 G: 1 INJECTION, POWDER, FOR SOLUTION INTRAMUSCULAR; INTRAVENOUS at 23:40

## 2020-06-07 RX ADMIN — CEFEPIME HYDROCHLORIDE 2 G: 2 INJECTION, POWDER, FOR SOLUTION INTRAVENOUS at 05:52

## 2020-06-07 RX ADMIN — SODIUM CHLORIDE, PRESERVATIVE FREE 10 ML: 5 INJECTION INTRAVENOUS at 21:09

## 2020-06-07 RX ADMIN — BUDESONIDE AND FORMOTEROL FUMARATE DIHYDRATE 2 PUFF: 80; 4.5 AEROSOL RESPIRATORY (INHALATION) at 20:18

## 2020-06-07 RX ADMIN — CEFEPIME HYDROCHLORIDE 2 G: 2 INJECTION, POWDER, FOR SOLUTION INTRAVENOUS at 18:37

## 2020-06-07 ASSESSMENT — PAIN SCALES - WONG BAKER
WONGBAKER_NUMERICALRESPONSE: 0
WONGBAKER_NUMERICALRESPONSE: 0

## 2020-06-07 ASSESSMENT — PAIN SCALES - GENERAL: PAINLEVEL_OUTOF10: 0

## 2020-06-07 NOTE — PLAN OF CARE
Problem: OXYGENATION/RESPIRATORY FUNCTION  Goal: Patient will maintain patent airway  6/6/2020 2334 by Evelin Lyons RN  Outcome: Ongoing  6/6/2020 1212 by Gayle Bob RN  Outcome: Ongoing     Problem: HEMODYNAMIC STATUS  Goal: Patient has stable vital signs and fluid balance  6/6/2020 2334 by Evelin Lyons RN  Outcome: Ongoing  6/6/2020 1212 by Gayle Bob RN  Outcome: Ongoing     Problem: FLUID AND ELECTROLYTE IMBALANCE  Goal: Fluid and electrolyte balance are achieved/maintained  6/6/2020 2334 by Evelin Lyons RN  Outcome: Ongoing  6/6/2020 1212 by Gayle Bob RN  Outcome: Ongoing     Problem: ACTIVITY INTOLERANCE/IMPAIRED MOBILITY  Goal: Mobility/activity is maintained at optimum level for patient  6/6/2020 2334 by Evelin Lyons RN  Outcome: Ongoing  6/6/2020 1212 by Gayle Bob RN  Outcome: Ongoing

## 2020-06-07 NOTE — PROGRESS NOTES
Pt sleeping, combative during am care. Will defer meds until pt more alert. Son Araceli Coon called this am and update given.

## 2020-06-07 NOTE — PROGRESS NOTES
Hospitalist Progress Note  6/7/2020 8:52 AM    PCP: No primary care provider on file.     [id number]     Date of Admission: 6/4/2020                                                                                                                     HOSPITAL COURSE    Patient demographics:  The patient  Charlette Wade is a 80 y.o. female     Significant past medical history:   Patient Active Problem List   Diagnosis    Acute on chronic combined systolic and diastolic [facility], [facility] class [facility]    Dementia in [facility]'s disease with delirium ([facility]    Acute pyelonephritis    Essential hypertension    Current use of long term anticoagulation         Presenting symptoms:      Diagnostic workup:      CONSULTS DURING ADMISSION :   IP CONSULT TO HEART FAILURE NURSE/COORDINATOR  IP CONSULT TO DIETITIAN  IP CONSULT TO NEUROLOGY      Patient was diagnosed with:        Treatment while inpatient:                                                                                         ----------------------------------------------------------      SUBJECTIVE COMPLAINTS- Shortness of breath    Diet: DIET LOW SODIUM 2 GM; 1800 ml  Dietary Nutrition Supplements: Frozen Oral Supplement      OBJECTIVE:   Patient Active Problem List   Diagnosis    Acute on chronic combined systolic and diastolic CHF, NYHA class 3 ([facility]    Dementia in Alzheimer's disease with delirium ([facility]    Acute pyelonephritis    Essential hypertension    Current use of long term anticoagulation       Allergies  Chlorpromazine and Hydrochlorothiazide    Medications    Scheduled Meds:   calcium elemental  500 mg Oral Daily    vitamin D  5,000 Units Oral Daily    divalproex  250 mg Oral BID    metoprolol succinate  50 mg Oral Daily    budesonide-formoterol  2 puff Inhalation BID    therapeutic multivitamin-minerals  1 tablet Oral Daily    rivaroxaban  15 mg Oral Daily with breakfast    sodium chloride flush  10 mL Intravenous 2 times per day    metOLazone  5 mg Oral Once    cefepime  2 g Intravenous Q12H     Continuous Infusions:  PRN Meds:  ipratropium-albuterol, sodium chloride flush, acetaminophen **OR** acetaminophen, magnesium hydroxide, promethazine **OR** ondansetron, potassium chloride    Vitals   Vitals /wt   Patient Vitals for the past 8 hrs:   BP Temp Temp src Pulse Resp SpO2 Weight   06/07/20 0837 (!) 102/56 97.5 °F (36.4 °C) Axillary 71 16 94 % --   06/07/20 0457 -- -- -- -- -- -- 192 lb 3.9 oz (87.2 kg)        72HR INTAKE/OUTPUT:      Intake/Output Summary (Last 24 hours) at 6/7/2020 2036  Last data filed at 6/6/2020 1824  Gross per 24 hour   Intake 480 ml   Output --   Net 480 ml       Exam:    Gen:   Alert and oriented ×1  Eyes: PERRL. No sclera icterus. No conjunctival injection. ENT: No discharge. Pharynx clear. External appearance of ears and nose normal.  Neck: Trachea midline. No obvious mass. Resp: No accessory muscle use. No crackles. No wheezes. No rhonchi. CV: Regular rate. Regular rhythm. No murmur or rub. No edema. GI: Non-tender. Non-distended. No hernia. Skin: Warm, dry, normal texture and turgor. Lymph: No cervical LAD. No supraclavicular LAD. M/S: / Ext. No cyanosis. No clubbing. No joint deformity. Neuro: CN 2-12 are intact,  no neurologic deficits noted. PT/INR: No results for input(s): PROTIME, INR in the last 72 hours. APTT: No results for input(s): APTT in the last 72 hours. CBC:   Recent Labs     06/04/20 2009 06/05/20  1527 06/06/20  0603   WBC 6.6 7.0 8.2   HGB 12.4 13.6 13.8   HCT 39.4 42.7 44.0   MCV 87.6 87.3 87.8    230 222       BMP:   Recent Labs     06/04/20 2009 06/05/20  0310 06/05/20  1528 06/06/20  0603    145 145 146*   K 4.1 3.9 3.4* 3.1*    105 102 103   CO2 26 25 32 33*   BUN 21* 21* 20 24*   CREATININE 1.0 1.0 1.1 1.2       LIVER PROFILE:   Recent Labs     06/04/20 2009   ALKPHOS 172*   AST 26   ALT 16   BILITOT 1.0     No results for input(s):  AMYLASE in the last 72 hours. No results for input(s): LIPASE in the last 72 hours. UA:  Recent Labs     06/04/20  2141   WBCUA 32*   RBCUA 3       TROPONIN:   Recent Labs     06/04/20 2009   TROPONINI <0.01       Lab Results   Component Value Date/Time    URRFLXCULT Yes 06/04/2020 09:41 PM       No results for input(s): TSHREFLEX in the last 72 hours. No components found for: UTC2028  POC GLUCOSE:  No results for input(s): POCGLU in the last 72 hours. No results for input(s): LABA1C in the last 72 hours. No results found for: LABA1C      ASSESSMENT AND PLAN    Urinary tract infection  IV antibiotics and IV fluids  Change cefepime to ceftriaxone  E. Coli sensitive to ceftriaxone    Acute metabolic encephalopathy  Likely due to infection  Patient remains lethargic and confused    Essential primary hypertension I10  Continue current medication    Atrial fibrillation  Heart rate is controlled  Anticoagulation on Xarelto    Dementia    Chronic kidney disease Stage III  Continue to monitor          Code Status: Full Code        Dispo -cc        The patient and / or the family were informed of the results of any tests, a time was given to answer questions, a plan was proposed and they agreed with plan. Jaylen Tejeda MD    This note was transcribed using [id number] [employer]. Please disregard any translational errors.

## 2020-06-08 LAB
ANION GAP SERPL CALCULATED.3IONS-SCNC: 13 MMOL/L (ref 3–16)
BUN BLDV-MCNC: 28 MG/DL (ref 7–20)
CALCIUM SERPL-MCNC: 10 MG/DL (ref 8.3–10.6)
CHLORIDE BLD-SCNC: 106 MMOL/L (ref 99–110)
CO2: 27 MMOL/L (ref 21–32)
CREAT SERPL-MCNC: 1.1 MG/DL (ref 0.6–1.2)
GFR AFRICAN AMERICAN: 57
GFR NON-AFRICAN AMERICAN: 47
GLUCOSE BLD-MCNC: 92 MG/DL (ref 70–99)
MAGNESIUM: 1.9 MG/DL (ref 1.8–2.4)
POTASSIUM SERPL-SCNC: 3.7 MMOL/L (ref 3.5–5.1)
PRO-BNP: 3843 PG/ML (ref 0–449)
SODIUM BLD-SCNC: 146 MMOL/L (ref 136–145)

## 2020-06-08 PROCEDURE — 80048 BASIC METABOLIC PNL TOTAL CA: CPT

## 2020-06-08 PROCEDURE — 94760 N-INVAS EAR/PLS OXIMETRY 1: CPT

## 2020-06-08 PROCEDURE — 83880 ASSAY OF NATRIURETIC PEPTIDE: CPT

## 2020-06-08 PROCEDURE — 2580000003 HC RX 258: Performed by: INTERNAL MEDICINE

## 2020-06-08 PROCEDURE — 1200000000 HC SEMI PRIVATE

## 2020-06-08 PROCEDURE — 36415 COLL VENOUS BLD VENIPUNCTURE: CPT

## 2020-06-08 PROCEDURE — 83735 ASSAY OF MAGNESIUM: CPT

## 2020-06-08 PROCEDURE — 2580000003 HC RX 258: Performed by: NURSE PRACTITIONER

## 2020-06-08 RX ORDER — FUROSEMIDE 20 MG/1
20 TABLET ORAL DAILY
Status: DISCONTINUED | OUTPATIENT
Start: 2020-06-08 | End: 2020-06-08

## 2020-06-08 RX ORDER — FUROSEMIDE 20 MG/1
20 TABLET ORAL DAILY PRN
Status: DISCONTINUED | OUTPATIENT
Start: 2020-06-08 | End: 2020-06-11 | Stop reason: HOSPADM

## 2020-06-08 RX ORDER — TRAZODONE HYDROCHLORIDE 50 MG/1
50 TABLET ORAL NIGHTLY
Status: DISCONTINUED | OUTPATIENT
Start: 2020-06-08 | End: 2020-06-11 | Stop reason: HOSPADM

## 2020-06-08 RX ORDER — DEXTROSE, SODIUM CHLORIDE, SODIUM LACTATE, POTASSIUM CHLORIDE, AND CALCIUM CHLORIDE 5; .6; .31; .03; .02 G/100ML; G/100ML; G/100ML; G/100ML; G/100ML
INJECTION, SOLUTION INTRAVENOUS CONTINUOUS
Status: DISPENSED | OUTPATIENT
Start: 2020-06-08 | End: 2020-06-09

## 2020-06-08 RX ADMIN — SODIUM CHLORIDE, SODIUM LACTATE, POTASSIUM CHLORIDE, CALCIUM CHLORIDE AND DEXTROSE MONOHYDRATE: 5; 600; 310; 30; 20 INJECTION, SOLUTION INTRAVENOUS at 15:36

## 2020-06-08 RX ADMIN — SODIUM CHLORIDE, PRESERVATIVE FREE 10 ML: 5 INJECTION INTRAVENOUS at 23:51

## 2020-06-08 RX ADMIN — SODIUM CHLORIDE, PRESERVATIVE FREE 10 ML: 5 INJECTION INTRAVENOUS at 15:40

## 2020-06-08 ASSESSMENT — PAIN SCALES - WONG BAKER
WONGBAKER_NUMERICALRESPONSE: 0

## 2020-06-08 NOTE — PROGRESS NOTES
Started IVF on patient, tolerating well, son Dayna Martinez at bedside visiting with her, attempting to keep patient awake and watching tv so she can sleep tonight.  Electronically signed by Alaina Peralta RN on 6/8/2020 at 4:04 PM

## 2020-06-08 NOTE — CARE COORDINATION
INITIAL CASE MANAGEMENT ASSESSMENT    Reviewed chart, called patient's son Monisha Cooper ([phone])--patient sleeping/confused to assess possible discharge needs. Explained Case Management role/services. Son was receiving another call and plans to call Sw back; the following is also from chart review. Living Situation: Patient lives with son in one story with 4/5 steps to enter    ADLs: Son assists patient with adls; patient is able to feed herself. DME: 4 wheeled walker, cane, shower chair and grab bars in the bathroom    PT/OT Recs: pending/not ordered yet     Active Services: Patient recently at [facility] skilled (March 2020); Per [facility], son declined home care after dc due to not wanting others in the home due to COVID risks. Transportation: son transports     Medications: son gives to son    PCP: No primary care provider on file. PLAN/COMMENTS: Son was receiving a call and had to end call. He is to call Sw back/Sw will follow up with son regarding plan. SW/CM provided contact information for patient or family to call with any questions. SW/CM will follow and assist as needed.     Electronically signed by Fern Beth on 6/8/2020 at 3:26 PM

## 2020-06-08 NOTE — PLAN OF CARE
Problem: OXYGENATION/RESPIRATORY FUNCTION  Goal: Patient will maintain patent airway  Outcome: Ongoing  Goal: Patient will achieve/maintain normal respiratory rate/effort  Description: Respiratory rate and effort will be within normal limits for the patient  Outcome: Ongoing     Problem: HEMODYNAMIC STATUS  Goal: Patient has stable vital signs and fluid balance  Outcome: Ongoing     Problem: FLUID AND ELECTROLYTE IMBALANCE  Goal: Fluid and electrolyte balance are achieved/maintained  Outcome: Ongoing     Problem: ACTIVITY INTOLERANCE/IMPAIRED MOBILITY  Goal: Mobility/activity is maintained at optimum level for patient  Outcome: Ongoing     Problem: Nutrition  Goal: Optimal nutrition therapy  Outcome: Ongoing     Problem: Physical Regulation:  Goal: Ability to maintain a body temperature in the normal range will improve  Description: Ability to maintain a body temperature in the normal range will improve  Outcome: Ongoing  Goal: Ability to maintain vital signs within normal range will improve  Description: Ability to maintain vital signs within normal range will improve  Outcome: Ongoing     Problem: Falls - Risk of:  Goal: Will remain free from falls  Description: Will remain free from falls  Outcome: Ongoing  Goal: Absence of physical injury  Description: Absence of physical injury  Outcome: Ongoing     Problem: Confusion - Acute:  Goal: Absence of continued neurological deterioration signs and symptoms  Description: Absence of continued neurological deterioration signs and symptoms  Outcome: Ongoing  Goal: Mental status will be restored to baseline  Description: Mental status will be restored to baseline  Outcome: Ongoing     Problem: Discharge Planning:  Goal: Ability to perform activities of daily living will improve  Description: Ability to perform activities of daily living will improve  Outcome: Ongoing  Goal: Participates in care planning  Description: Participates in care planning  Outcome: Ongoing Problem: Injury - Risk of, Physical Injury:  Goal: Will remain free from falls  Description: Will remain free from falls  Outcome: Ongoing  Goal: Absence of physical injury  Description: Absence of physical injury  Outcome: Ongoing     Problem: Mood - Altered:  Goal: Mood stable  Description: Mood stable  Outcome: Ongoing  Goal: Absence of abusive behavior  Description: Absence of abusive behavior  Outcome: Ongoing  Goal: Verbalizations of feeling emotionally comfortable while being cared for will increase  Description: Verbalizations of feeling emotionally comfortable while being cared for will increase  Outcome: Ongoing     Problem: Psychomotor Activity - Altered:  Goal: Absence of psychomotor disturbance signs and symptoms  Description: Absence of psychomotor disturbance signs and symptoms  Outcome: Ongoing     Problem: Sensory Perception - Impaired:  Goal: Demonstrations of improved sensory functioning will increase  Description: Demonstrations of improved sensory functioning will increase  Outcome: Ongoing  Goal: Decrease in sensory misperception frequency  Description: Decrease in sensory misperception frequency  Outcome: Ongoing  Goal: Able to refrain from responding to false sensory perceptions  Description: Able to refrain from responding to false sensory perceptions  Outcome: Ongoing  Goal: Demonstrates accurate environmental perceptions  Description: Demonstrates accurate environmental perceptions  Outcome: Ongoing  Goal: Able to distinguish between reality-based and nonreality-based thinking  Description: Able to distinguish between reality-based and nonreality-based thinking  Outcome: Ongoing  Goal: Able to interrupt nonreality-based thinking  Description: Able to interrupt nonreality-based thinking  Outcome: Ongoing     Problem: Sleep Pattern Disturbance:  Goal: Appears well-rested  Description: Appears well-rested  Outcome: Ongoing

## 2020-06-08 NOTE — PROGRESS NOTES
Spoke with her son in law Keshia Biswas and was updated on the POC.     TASHI Ji - CNP 6/8/2020 1:33 PM

## 2020-06-08 NOTE — PROGRESS NOTES
Hospital Medicine Progress Note      Admit Date: 6/4/2020         Overnight Events: none    CC: F/U for confused state    Hospital Course: This is an 51-year-old female with a history of persistent atrial fibrillation, hypertension, Daphne dementia and gout who presented to the ED with complaints of shortness of breath. This was associated with increased lethargy and confusion. Patient found to have E. coli UTI. She was treated with 4 days of antibiotics. These were stopped. It was also thought to be in heart failure exacerbation. With patient's ongoing lethargy and confusion neurology was consulted. They thought that this confusion was related to possible infectious source. Recommending MRI if mentation does not improve. Still not improved today but has multiple reasons to be continued including hospital delirium, cefepime and mild azotemia. She has also stopped eating and drinking and refusing care from the nursing staff. Interval History/Subjective:   Unable to obtain d/t mentation    Review of Systems:     Comprehensive ROS negative except as mentioned above. Past Medical History:        Diagnosis Date    [facility]'s dementia ([facility] [address]     Atrial fibrillation ([facility] [address]     Benign essential HTN     Gout        Past Surgical History:    History reviewed. No pertinent surgical history. Allergies:  Chlorpromazine and Hydrochlorothiazide    Past medical and surgical history reviewed. Any changes have been noted.      Scheduled and prn Medications:    Scheduled Meds:   traZODone  50 mg Oral Nightly    calcium elemental  500 mg Oral Daily    vitamin D  5,000 Units Oral Daily    divalproex  250 mg Oral BID    metoprolol succinate  50 mg Oral Daily    budesonide-formoterol  2 puff Inhalation BID    therapeutic multivitamin-minerals  1 tablet Oral Daily    rivaroxaban  15 mg Oral Daily with breakfast    sodium chloride flush  10 mL Intravenous 2 times per day     Continuous Infusions:   dextrose 5% in lactated ringers 50 mL/hr at 06/08/20 1536     PRN Meds:.perflutren lipid microspheres, furosemide, ipratropium-albuterol, sodium chloride flush, acetaminophen **OR** acetaminophen, magnesium hydroxide, promethazine **OR** ondansetron, potassium chloride    PHYSICAL EXAM:  BP (!) 152/122   Pulse 86   Temp 96.6 °F (35.9 °C) (Oral)   Resp 16   Ht 5' 6\" (1.676 m) Comment: care everywhere dated 1/17/20  Wt 187 lb 6.4 oz (85 kg)   SpO2 94%   BMI 30.25 kg/m²       Intake/Output Summary (Last 24 hours) at 6/8/2020 1813  Last data filed at 6/8/2020 0430  Gross per 24 hour   Intake 10 ml   Output 0 ml   Net 10 ml       General: . Resting in bed in no apparent distress. Does not follow commands, HERRERA  HEENT: Normocephalic. Atraumatic. Pupils equal and reactive. EOM intact. Oral mucosa pink/moist/intact. Mouth is dry and has caked matter on her teeth and tongue- refusing mouth care  Neck: Supple. Symmetrical. Trachea midline. Lungs: Clear to auscultation bilaterally. Respirations even and unlabored. Chest: Exam unremarkable. Cardiac: S1/S2 noted. Regular Rhythm and rate. Abdomen/GI: Soft. Non-tender. Non-distended. BS+. Extremities: PP+. Atraumatic. No redness/cyanosis/edema noted. Brisk cap refill. Right knee with new bruising noted  Skin: Dry and intact. No lesions noted. Neuro: Grossly intact. No focal deficits noted.      LABS:    Lab Results   Component Value Date    WBC 8.2 06/06/2020    HGB 13.8 06/06/2020    HCT 44.0 06/06/2020    MCV 87.8 06/06/2020     06/06/2020    LYMPHOPCT 5.6 06/06/2020    RBC 5.01 06/06/2020    MCH 27.6 06/06/2020    MCHC 31.4 06/06/2020    RDW 18.1 (H) 06/06/2020       Lab Results   Component Value Date    CREATININE 1.1 06/08/2020    BUN 28 (H) 06/08/2020     (H) 06/08/2020    K 3.7 06/08/2020     06/08/2020    CO2 27 06/08/2020       Lab Results   Component Value Date    MG 1.90 06/08/2020       Lab Results   Component Value Date ALT 16 06/04/2020    AST 26 06/04/2020    ALKPHOS 172 (H) 06/04/2020    BILITOT 1.0 06/04/2020        No flowsheet data found. Imaging:  XR CHEST PORTABLE   Final Result   1. Constellation of findings appears similar to chest radiograph from June 4, 2020 and is favored to reflect pulmonary edema. Likely trace right effusion. CT HEAD WO CONTRAST   Final Result   Generalized volume loss and scattered periventricular low-attenuation likely   related to chronic small vessel disease. Wedge-shaped region of low-attenuation noted within the right parietal and   right occipital lobe no significant sulcal effacement. Appearance is   suggestive of subacute to remote infarct. If there are continued deficits,   this can be further evaluated with MRI of the brain. XR CHEST PORTABLE   Final Result   Findings most compatible with pulmonary edema. Correlate with any clinical   evidence of superimposed pneumonia.              Assessment & Plan:      E. coli UTI  -Had total of 4 days of IV antibiotics  -Mentation not improved but also did receive cefepime  -Infection treated, antibiotics stopped    Acute metabolic encephalopathy with underlying Alzheimer's dementia  -Likely an exacerbation of Alzheimer's secondary to acute infection related to #1  -Also received 2 doses of cefepime which likely did not help her mentation  -Lies calm in her bed per nursing staff but screams and yells when attempted to be changed  -We will discuss with family regarding her baseline mentation  -Canceled MRI for now and will follow closely for need to order this test further  - nd/w family, will hydrate for mild azotemia and attempt to sleep tonight to see if this improves    Acute diastolic heart failure- ruled out  -Likely related to persistent atrial fibrillation  -BNP slightly elevated  -Was hospitalized in February and had hydrochlorothiazide changed to Lasix daily, unsure if this was followed up on  -Resume Lasix 20 mg daily now prn only for increased weight gain  -Get echo  - she does not look fluid overloaded and actually looks dry    Persistent atrial fibrillation  -EKG reviewed  -Anticoagulated with Xarelto  -CT head negative  -Rate controlled with beta-blocker    Other comorbidities:  Alzheimer's dementia  Chronic anticoagulation  Gout  Essential hypertension  Obese  Body mass index is 30.25 kg/m². The patient and / or the family were informed of the results of any tests, a time was given to answer questions, a plan was proposed and they agreed with plan.     DVT prophylaxis: [] Lovenox  [] SQ Heparin  [] SCDs because of  [] warfarin/oral direct thrombin inhibitor [] Encourage ambulation    GI prophylaxis: [] PPI/[id number]  [x] not indicated    Probiotic if on abx: [] Yes [] No [x] Not Indicated    Diet: DIET LOW SODIUM 2 GM; 1800 ml  Dietary Nutrition Supplements: Frozen Oral Supplement    Consults:  IP CONSULT TO HEART FAILURE NURSE/COORDINATOR  IP CONSULT TO DIETITIAN  IP CONSULT TO NEUROLOGY    Disposition:  [] Home  [] Home with home health [] Rehab [] Psych [] SNF  [] LTAC  [] Long term nursing home or group home [] Transfer to ICU  [] Transfer to PCU [] Other:    Code Status: Full Code    ELOS: tbd      Comfort Brennan, TASHI - LAILA  06/08/20

## 2020-06-08 NOTE — PROGRESS NOTES
Pt not taking AM medications, attempted to do mouth care pt will not tolerate and closes mouth and turns away, attempted to crush pills and put in applesauce, pt would not take applesauce.  Electronically signed by Yolanda Arzate RN on 6/8/2020 at 4:03 PM

## 2020-06-08 NOTE — PLAN OF CARE
Mobility/activity is maintained at optimum level for patient  Outcome: Ongoing     Problem: Nutrition  Goal: Optimal nutrition therapy  Outcome: Ongoing     Problem: Physical Regulation:  Goal: Ability to maintain a body temperature in the normal range will improve  Description: Ability to maintain a body temperature in the normal range will improve  Outcome: Ongoing  Goal: Ability to maintain vital signs within normal range will improve  Description: Ability to maintain vital signs within normal range will improve  Outcome: Ongoing     Problem: Falls - Risk of:  Goal: Will remain free from falls  Description: Will remain free from falls  Outcome: Ongoing  Note: Fall risk assessment completed. Fall precautions in place. Call light within reach. Pt educated on calling for assistance before getting up. Walkway free of clutter. Will continue to monitor. Electronically signed by Trevor Eller RN on 6/8/2020 at 4:06 PM    Goal: Absence of physical injury  Description: Absence of physical injury  Outcome: Ongoing     Problem: Confusion - Acute:  Goal: Absence of continued neurological deterioration signs and symptoms  Description: Absence of continued neurological deterioration signs and symptoms  Outcome: Ongoing  Note: Pt continues to be confused not alert at all, responds to voice some.  Electronically signed by Trevor Eller RN on 6/8/2020 at 4:06 PM    Goal: Mental status will be restored to baseline  Description: Mental status will be restored to baseline  Outcome: Ongoing     Problem: Discharge Planning:  Goal: Ability to perform activities of daily living will improve  Description: Ability to perform activities of daily living will improve  Outcome: Ongoing  Goal: Participates in care planning  Description: Participates in care planning  Outcome: Ongoing     Problem: Injury - Risk of, Physical Injury:  Goal: Will remain free from falls  Description: Will remain free from falls  Outcome: Ongoing  Note: Fall risk assessment completed. Fall precautions in place. Call light within reach. Pt educated on calling for assistance before getting up. Walkway free of clutter. Will continue to monitor.    Electronically signed by Khadra Hong RN on 6/8/2020 at 4:06 PM    Goal: Absence of physical injury  Description: Absence of physical injury  Outcome: Ongoing     Problem: Mood - Altered:  Goal: Mood stable  Description: Mood stable  Outcome: Ongoing  Goal: Absence of abusive behavior  Description: Absence of abusive behavior  Outcome: Ongoing  Goal: Verbalizations of feeling emotionally comfortable while being cared for will increase  Description: Verbalizations of feeling emotionally comfortable while being cared for will increase  Outcome: Ongoing     Problem: Psychomotor Activity - Altered:  Goal: Absence of psychomotor disturbance signs and symptoms  Description: Absence of psychomotor disturbance signs and symptoms  Outcome: Ongoing     Problem: Sensory Perception - Impaired:  Goal: Demonstrations of improved sensory functioning will increase  Description: Demonstrations of improved sensory functioning will increase  Outcome: Ongoing  Goal: Decrease in sensory misperception frequency  Description: Decrease in sensory misperception frequency  Outcome: Ongoing  Goal: Able to refrain from responding to false sensory perceptions  Description: Able to refrain from responding to false sensory perceptions  Outcome: Ongoing  Goal: Demonstrates accurate environmental perceptions  Description: Demonstrates accurate environmental perceptions  Outcome: Ongoing  Goal: Able to distinguish between reality-based and nonreality-based thinking  Description: Able to distinguish between reality-based and nonreality-based thinking  Outcome: Ongoing  Goal: Able to interrupt nonreality-based thinking  Description: Able to interrupt nonreality-based thinking  Outcome: Ongoing     Problem: Sleep Pattern Disturbance:  Goal: Appears well-rested  Description: Appears well-rested  Outcome: Ongoing

## 2020-06-08 NOTE — PROGRESS NOTES
4 Eyes Skin Assessment     The patient is being assess for  Transfer to new unit    I agree that 2 RN's have performed a thorough Head to Toe Skin Assessment on the patient. ALL assessment sites listed below have been assessed. Areas assessed by both nurses:   [x]   Head, Face, and Ears   [x]   Shoulders, Back, and Chest  [x]   Arms, Elbows, and Hands   [x]   Coccyx, Sacrum, and IschIum  [x]   Legs, Feet, and Heels        Does the Patient have Skin Breakdown?   No         Eddie Prevention initiated:  Yes  Wound Care Orders initiated:  No      [facility] nurse consulted for Pressure Injury (Stage 3,4, Unstageable, DTI, NWPT, and Complex wounds), New and Established Ostomies:  No     Nurse 1 eSignature: Electronically signed by Kaleb Rosas RN on 8/0/7548 at 10:40 PM      **SHARE this note so that the co-signing nurse is able to place an eSignature**    Nurse 2 eSignature: Electronically signed by Concha George RN on 6/7/2020 at 10:40 PM

## 2020-06-08 NOTE — PROGRESS NOTES
Spoke to son Alona Barnhart over the phone about patient's baseline mentation status and abilities, Alona Barnhart reports that his mother can feed herself and walks with walker at home, she takes her pills well when placed in front of her, she normally knows her name and date of birth, and will hold conversations with them at home. Alona Barnhart is coming for lunch to see if he can help get his mother to eat.   Electronically signed by Les Severe, RN on 6/8/2020 at 9:38 AM

## 2020-06-09 PROBLEM — E44.1 MILD MALNUTRITION (HCC): Status: ACTIVE | Noted: 2020-06-09

## 2020-06-09 LAB
ALBUMIN SERPL-MCNC: 2.9 G/DL (ref 3.4–5)
ANION GAP SERPL CALCULATED.3IONS-SCNC: 14 MMOL/L (ref 3–16)
BACTERIA: ABNORMAL /HPF
BASOPHILS ABSOLUTE: 0.1 K/UL (ref 0–0.2)
BASOPHILS RELATIVE PERCENT: 0.9 %
BILIRUBIN URINE: ABNORMAL
BLOOD CULTURE, ROUTINE: NORMAL
BLOOD, URINE: ABNORMAL
BUN BLDV-MCNC: 29 MG/DL (ref 7–20)
CALCIUM SERPL-MCNC: 9.9 MG/DL (ref 8.3–10.6)
CHLORIDE BLD-SCNC: 106 MMOL/L (ref 99–110)
CLARITY: ABNORMAL
CO2: 25 MMOL/L (ref 21–32)
COLOR: ABNORMAL
COMMENT UA: ABNORMAL
CREAT SERPL-MCNC: 1 MG/DL (ref 0.6–1.2)
CRYSTALS, UA: ABNORMAL /HPF
CULTURE, BLOOD 2: NORMAL
EOSINOPHILS ABSOLUTE: 0.2 K/UL (ref 0–0.6)
EOSINOPHILS RELATIVE PERCENT: 2.4 %
EPITHELIAL CELLS, UA: 5 /HPF (ref 0–5)
GFR AFRICAN AMERICAN: >60
GFR NON-AFRICAN AMERICAN: 52
GLUCOSE BLD-MCNC: 103 MG/DL (ref 70–99)
GLUCOSE URINE: NEGATIVE MG/DL
HCT VFR BLD CALC: 43 % (ref 36–48)
HEMOGLOBIN: 13.8 G/DL (ref 12–16)
HYALINE CASTS: 0 /LPF (ref 0–8)
KETONES, URINE: 15 MG/DL
LEUKOCYTE ESTERASE, URINE: ABNORMAL
LYMPHOCYTES ABSOLUTE: 0.6 K/UL (ref 1–5.1)
LYMPHOCYTES RELATIVE PERCENT: 8.8 %
MAGNESIUM: 1.9 MG/DL (ref 1.8–2.4)
MCH RBC QN AUTO: 28 PG (ref 26–34)
MCHC RBC AUTO-ENTMCNC: 32 G/DL (ref 31–36)
MCV RBC AUTO: 87.5 FL (ref 80–100)
MICROSCOPIC EXAMINATION: YES
MONOCYTES ABSOLUTE: 1 K/UL (ref 0–1.3)
MONOCYTES RELATIVE PERCENT: 13.5 %
NEUTROPHILS ABSOLUTE: 5.3 K/UL (ref 1.7–7.7)
NEUTROPHILS RELATIVE PERCENT: 74.4 %
NITRITE, URINE: NEGATIVE
PDW BLD-RTO: 17.8 % (ref 12.4–15.4)
PH UA: 7.5 (ref 5–8)
PHOSPHORUS: 2.3 MG/DL (ref 2.5–4.9)
PLATELET # BLD: 206 K/UL (ref 135–450)
PMV BLD AUTO: 9.3 FL (ref 5–10.5)
POTASSIUM SERPL-SCNC: 3.1 MMOL/L (ref 3.5–5.1)
PROTEIN UA: 100 MG/DL
RBC # BLD: 4.91 M/UL (ref 4–5.2)
RBC UA: ABNORMAL /HPF (ref 0–4)
SODIUM BLD-SCNC: 145 MMOL/L (ref 136–145)
SPECIFIC GRAVITY UA: 1.02 (ref 1–1.03)
URINE TYPE: ABNORMAL
UROBILINOGEN, URINE: 1 E.U./DL
WBC # BLD: 7.2 K/UL (ref 4–11)
WBC UA: 4 /HPF (ref 0–5)

## 2020-06-09 PROCEDURE — 36415 COLL VENOUS BLD VENIPUNCTURE: CPT

## 2020-06-09 PROCEDURE — 1200000000 HC SEMI PRIVATE

## 2020-06-09 PROCEDURE — 2500000003 HC RX 250 WO HCPCS: Performed by: NURSE PRACTITIONER

## 2020-06-09 PROCEDURE — 6370000000 HC RX 637 (ALT 250 FOR IP): Performed by: INTERNAL MEDICINE

## 2020-06-09 PROCEDURE — 85025 COMPLETE CBC W/AUTO DIFF WBC: CPT

## 2020-06-09 PROCEDURE — 6370000000 HC RX 637 (ALT 250 FOR IP): Performed by: NURSE PRACTITIONER

## 2020-06-09 PROCEDURE — 94640 AIRWAY INHALATION TREATMENT: CPT

## 2020-06-09 PROCEDURE — 2580000003 HC RX 258: Performed by: NURSE PRACTITIONER

## 2020-06-09 PROCEDURE — 94760 N-INVAS EAR/PLS OXIMETRY 1: CPT

## 2020-06-09 PROCEDURE — 83735 ASSAY OF MAGNESIUM: CPT

## 2020-06-09 PROCEDURE — 2580000003 HC RX 258: Performed by: INTERNAL MEDICINE

## 2020-06-09 PROCEDURE — 80069 RENAL FUNCTION PANEL: CPT

## 2020-06-09 PROCEDURE — 81001 URINALYSIS AUTO W/SCOPE: CPT

## 2020-06-09 RX ORDER — DEXTROSE, SODIUM CHLORIDE, SODIUM LACTATE, POTASSIUM CHLORIDE, AND CALCIUM CHLORIDE 5; .6; .31; .03; .02 G/100ML; G/100ML; G/100ML; G/100ML; G/100ML
INJECTION, SOLUTION INTRAVENOUS CONTINUOUS
Status: DISPENSED | OUTPATIENT
Start: 2020-06-09 | End: 2020-06-09

## 2020-06-09 RX ADMIN — Medication 500 MG: at 12:59

## 2020-06-09 RX ADMIN — DIVALPROEX SODIUM 250 MG: 125 CAPSULE, COATED PELLETS ORAL at 21:09

## 2020-06-09 RX ADMIN — SODIUM CHLORIDE, SODIUM LACTATE, POTASSIUM CHLORIDE, CALCIUM CHLORIDE AND DEXTROSE MONOHYDRATE: 5; 600; 310; 30; 20 INJECTION, SOLUTION INTRAVENOUS at 10:35

## 2020-06-09 RX ADMIN — POTASSIUM PHOSPHATE, MONOBASIC AND POTASSIUM PHOSPHATE, DIBASIC 15 MMOL: 224; 236 INJECTION, SOLUTION, CONCENTRATE INTRAVENOUS at 10:35

## 2020-06-09 RX ADMIN — BUDESONIDE AND FORMOTEROL FUMARATE DIHYDRATE 2 PUFF: 80; 4.5 AEROSOL RESPIRATORY (INHALATION) at 19:45

## 2020-06-09 RX ADMIN — SODIUM CHLORIDE, PRESERVATIVE FREE 10 ML: 5 INJECTION INTRAVENOUS at 10:36

## 2020-06-09 RX ADMIN — METOPROLOL SUCCINATE 50 MG: 50 TABLET, EXTENDED RELEASE ORAL at 12:59

## 2020-06-09 RX ADMIN — TRAZODONE HYDROCHLORIDE 50 MG: 50 TABLET ORAL at 21:09

## 2020-06-09 RX ADMIN — DIVALPROEX SODIUM 250 MG: 125 CAPSULE, COATED PELLETS ORAL at 12:59

## 2020-06-09 RX ADMIN — SODIUM CHLORIDE, PRESERVATIVE FREE 10 ML: 5 INJECTION INTRAVENOUS at 21:09

## 2020-06-09 ASSESSMENT — PAIN SCALES - PAIN ASSESSMENT IN ADVANCED DEMENTIA (PAINAD)
TOTALSCORE: 0
FACIALEXPRESSION: 0
CONSOLABILITY: 0
BODYLANGUAGE: 0
NEGVOCALIZATION: 0
BREATHING: 0

## 2020-06-09 ASSESSMENT — PAIN SCALES - GENERAL
PAINLEVEL_OUTOF10: 0
PAINLEVEL_OUTOF10: 0

## 2020-06-09 NOTE — PROGRESS NOTES
Pt in bed confused. Pt was turned and cleaned up and was resisting this nurse and PCA at this time. Amaryllis Rack was placed in attempt to obtain a urine specimen, if doesn't work will straight cath. Not willing to open mouth to take pills or food. Cream was placed on patient's coccyx for redness and patient scratching at skin. Tele camera still in place. IV fluids are infusing now. Will call son about MRI. Will continue to monitor and reassess.   Electronically signed by Mili Logan RN on 6/9/2020 at 10:47 AM

## 2020-06-09 NOTE — PROGRESS NOTES
Nutrition Assessment    Type and Reason for Visit: Reassess    Nutrition Recommendations:   Continue Magic Cup tid  Add Ensure Enlive tid    Nutrition Assessment: Pt meets criteria for mild malnutrition AEB inadequate po intake & wt loss trend. Noted that mental status remains worse than usual, adversely affecting po. Noted some refusal of meals & meds. Family aware of inconsistent intake. Will add Ensure Enlive to every tray for hopeful improved po. Malnutrition Assessment:  · Malnutrition Status: Mild Malnutrition  · Context: Acute illness or injury  · Findings of the 6 clinical characteristics of malnutrition (Minimum of 2 out of 6 clinical characteristics is required to make the diagnosis of moderate or severe Protein Calorie Malnutrition based on AND/ASPEN Guidelines):  1. Energy Intake-Less than or equal to 50% of estimated energy requirement, Greater than or equal to 5 days    2. Weight Loss-5% loss or greater, unable to assess  3. Fat Loss-Unable to assess,    4. Muscle Loss-Unable to assess,    5. Fluid Accumulation-Mild fluid accumulation, Extremities  6.   Strength-Not measured    Nutrition Risk Level: High    Nutrient Needs:  · Estimated Daily Total Kcal: 2694-8462 (15-18 x ABW 95 kg)  · Estimated Daily Protein (g): 76-95 (.8-1 x ABW (adj for ckd)  · Estimated Daily Total Fluid (ml/day): 1800 per MD    Nutrition Diagnosis:   · Problem: Inadequate oral intake  · Etiology: related to Cognitive or neurological impairment     Signs and symptoms:  as evidenced by Intake 0-25%    Objective Information:  · Nutrition-Focused Physical Findings: Noted +1 edema to BLE  · Wound Type: Skin Tears(left heel)  · Current Nutrition Therapies:  · Oral Diet Orders: 2gm Sodium, Fluid Restriction   · Oral Diet intake: 0%, 1-25%  · Oral Nutrition Supplement (ONS) Orders: Frozen Oral Supplement  · Anthropometric Measures:  · Ht: 5' 6\" (167.6 cm)(care everywhere dated 1/17/20)   · Current Body Wt: 192 lb (87.1 kg)  · % Weight Change:  ,  8.1% loss in less than 1 week, likely due in part to fluid shifts as well as decreased po intake  · Ideal Body Wt: 130 lb (59 kg), % Ideal Body    · BMI Classification: BMI 30.0 - 34.9 Obese Class I    Nutrition Interventions:   Continue current diet, Modify current ONS  Continued Inpatient Monitoring    Nutrition Evaluation:   · Evaluation: No progress toward goals   · Goals: tolerate most appropriate form of nutrition    · Monitoring: Meal Intake, Supplement Intake, Skin Integrity, Mental Status/Confusion, Weight, Pertinent Labs, Diarrhea, Constipation      Electronically signed by Burt Gonzalez RD, LD on 6/9/20 at 3:01 PM EDT    Contact Number: [phone]

## 2020-06-09 NOTE — PROGRESS NOTES
Attempted to call [facility] to find out a time on when they will be getting the patient. No answer, will call back in a few. Will continue to monitor and reassess.   Electronically signed by Ade Wong RN on 6/9/2020 at 11:54 AM

## 2020-06-09 NOTE — PROGRESS NOTES
[facility] called and said that there were 2-3 people if front of patient and will be picked up this afternoon. Will update family on time once they arrive to hospital.   Will continue to monitor and reassess.   Electronically signed by eFlecia Quezada RN on 6/9/2020 at 12:17 PM

## 2020-06-09 NOTE — PLAN OF CARE
Problem: OXYGENATION/RESPIRATORY FUNCTION  Goal: Patient will maintain patent airway  6/9/2020 0737 by Torres Rodas RN  Outcome: Ongoing  Note: Patient has maintained a patent airway, repositions self, lung sounds bilaterally, cough noted, RT is involved in patient care and is providing care, patient has had adequate fluid intake, no need to suction airway at this time, educated patient to turn and cough and deep breathe every two hours and as needed, encouraged incentive spirometer and patient has been performing. Will continue to monitor and reassess. Problem: OXYGENATION/RESPIRATORY FUNCTION  Goal: Patient will achieve/maintain normal respiratory rate/effort  Description: Respiratory rate and effort will be within normal limits for the patient  6/9/2020 0737 by Torres Rodas RN  Outcome: Ongoing  Note: Patient will achieve/maintain normal respiratory rate/effort      Problem: HEMODYNAMIC STATUS  Goal: Patient has stable vital signs and fluid balance  6/9/2020 0737 by Torres Rodas RN  Outcome: Ongoing  Note: Monitoring pt's vital signs and fluid balance      Problem: FLUID AND ELECTROLYTE IMBALANCE  Goal: Fluid and electrolyte balance are achieved/maintained  6/9/2020 0737 by Torres Rodas RN  Outcome: Ongoing  Note: Fluid and electrolyte balance are achieved/maintained      Problem: ACTIVITY INTOLERANCE/IMPAIRED MOBILITY  Goal: Mobility/activity is maintained at optimum level for patient  6/9/2020 0737 by Torres Rodas RN  Outcome: Ongoing  Note: Mobility/activity being maintained at optimum level for patient   6/9/2020 0353 by Brandon Rogers RN  Outcome: Ongoing  Note: Early and frequent ambulqtion encouraged. Educated patient on importance of early ambulation. Patient assisted with ambulation. Will continue to monitor.          Problem: Nutrition  Goal: Optimal nutrition therapy  6/9/2020 0737 by Torres Rodas RN  Outcome: Ongoing  Note: Nutrition remains ongoing      Problem: Physical Regulation:  Goal: Ability to maintain a body temperature in the normal range will improve  Description: Ability to maintain a body temperature in the normal range will improve  6/9/2020 0737 by Vimal Zuleta RN  Outcome: Ongoing  Note: Ability to maintain a body temperature in the normal range will improve      Problem: Physical Regulation:  Goal: Ability to maintain vital signs within normal range will improve  Description: Ability to maintain vital signs within normal range will improve  6/9/2020 0737 by Vimal Zuleta RN  Outcome: Ongoing  Note: Ability to maintain vital signs within normal range will improve      Problem: Falls - Risk of:  Goal: Will remain free from falls  Description: Will remain free from falls  6/9/2020 0737 by Vimal Zuleta RN  Outcome: Ongoing  Note: Fall risk assessment completed. Fall precautions in place. Bed in lowest position, wheels locked, bed/chair exit alarm in place, call light within reach, and non skid footwear on. Walkway free of clutter. Pt alert and oriented and able to make needs known. Pt educated to use call light when needing to get up, and pt utilizes call light to make needs known. Will continue to monitor.         Problem: Falls - Risk of:  Goal: Absence of physical injury  Description: Absence of physical injury  6/9/2020 0737 by Vimal Zuleta RN  Outcome: Ongoing  Note: Pt absent from physical injury on this shift      Problem: Confusion - Acute:  Goal: Absence of continued neurological deterioration signs and symptoms  Description: Absence of continued neurological deterioration signs and symptoms  6/9/2020 0737 by Vimal Zuleta RN  Outcome: Ongoing  Note: Absence on continued neurological deterioration signs and symptoms      Problem: Confusion - Acute:  Goal: Mental status will be restored to baseline  Description: Mental status will be restored to baseline  6/9/2020 0737 by Vimal Zuleta RN  Outcome: Ongoing  Note: Monitoring pt for mental status to be restored     Problem: Discharge Planning:  Goal: Ability to perform activities of daily living will improve  Description: Ability to perform activities of daily living will improve  6/9/2020 0737 by Mary Dawkins RN  Outcome: Ongoing  Note: Ability to perform activities of daily living remain ongoing      Problem: Discharge Planning:  Goal: Participates in care planning  Description: Participates in care planning  6/9/2020 0737 by Mary Dawkins RN  Outcome: Ongoing  Note: Participat in care planning      Problem: Injury - Risk of, Physical Injury:  Goal: Will remain free from falls  Description: Will remain free from falls  6/9/2020 0737 by Mary Dawkins RN  Outcome: Ongoing  Note: Fall risk assessment completed. Fall precautions in place. Bed in lowest position, wheels locked, bed/chair exit alarm in place, call light within reach, and non skid footwear on. Walkway free of clutter. Pt alert and oriented and able to make needs known. Pt educated to use call light when needing to get up, and pt utilizes call light to make needs known. Will continue to monitor.         Problem: Injury - Risk of, Physical Injury:  Goal: Absence of physical injury  Description: Absence of physical injury  6/9/2020 0737 by Mary Dawkins RN  Outcome: Ongoing  Note: Pt absent from physical injury on this shift      Problem: Mood - Altered:  Goal: Mood stable  Description: Mood stable  6/9/2020 0737 by Mary Dawkins RN  Outcome: Ongoing  Note: Mood will stabilize      Problem: Mood - Altered:  Goal: Absence of abusive behavior  Description: Absence of abusive behavior  6/9/2020 0737 by Mary Dawkins RN  Outcome: Ongoing  Note: Monitoring pt for abusive behavior      Problem: Mood - Altered:  Goal: Verbalizations of feeling emotionally comfortable while being cared for will increase  Description: Verbalizations of feeling emotionally comfortable while being cared for will increase  6/9/2020 0737 by Paco Kincaid RN  Outcome: Ongoing  Note: Verbalizations of feeling emotionally comfortable while being cared for will increase      Problem: Psychomotor Activity - Altered:  Goal: Absence of psychomotor disturbance signs and symptoms  Description: Absence of psychomotor disturbance signs and symptoms  6/9/2020 0737 by Paco Kincaid RN  Outcome: Ongoing  Note: Pt absent from psychomotor disturbance signs and symptoms      Problem: Sensory Perception - Impaired:  Goal: Demonstrations of improved sensory functioning will increase  Description: Demonstrations of improved sensory functioning will increase  6/9/2020 0737 by Paco Kincaid RN  Outcome: Ongoing  Note: Remain ongoing      Problem: Sensory Perception - Impaired:  Goal: Decrease in sensory misperception frequency  Description: Decrease in sensory misperception frequency  6/9/2020 0737 by Paco Kincaid RN  Outcome: Ongoing  Note: Decrease in sensory misperception frequency      Problem: Sensory Perception - Impaired:  Goal: Able to refrain from responding to false sensory perceptions  Description: Able to refrain from responding to false sensory perceptions  6/9/2020 0737 by Paco Kincaid RN  Outcome: Ongoing  Note: Able to refrain from responding to false sensory perceptions      Problem: Sensory Perception - Impaired:  Goal: Demonstrates accurate environmental perceptions  Description: Demonstrates accurate environmental perceptions  6/9/2020 0737 by Paco Kincaid RN  Outcome: Ongoing  Note: Demonstrate accurate environmental perceptions remain ongoing      Problem: Sensory Perception - Impaired:  Goal: Able to distinguish between reality-based and nonreality-based thinking  Description: Able to distinguish between reality-based and nonreality-based thinking  6/9/2020 0737 by Paco Kincaid RN  Outcome: Ongoing  Note: Able to distinguish remains ongoing      Problem: Sensory Perception - Impaired:  Goal: Able to interrupt nonreality-based thinking  Description: Able to interrupt nonreality-based thinking  6/9/2020 0737 by Michel Mora RN  Outcome: Ongoing  Note: Remain ongoing      Problem: Sleep Pattern Disturbance:  Goal: Appears well-rested  Description: Appears well-rested  6/9/2020 0737 by Michel Mora RN  Outcome: Ongoing  Note: Appears well rested

## 2020-06-09 NOTE — CARE COORDINATION
Tay spoke with patient's son, Nancy Earl ([phone]). He confirmed that patient was able to walk around the house on her own; she used a walker occasionally. She was able to do things for herself, including take her own meds (family did set up the meds). Son reported patient's current condition is not how she usually is. He reported that she has just \"started to wake up and with a couple more days and richie therapy\" he is hopeful she will be able to return home. Tay spoke with son regarding snf verses home care. He confirmed patient was at [facility] in March but he does not want patient to go to snf due to [address] situation. He is also unsure of home care due to the same. Tay provided patient with  phone number.     Electronically signed by Moris Amador on 6/9/2020 at 3:17 PM

## 2020-06-09 NOTE — PLAN OF CARE
Nutrition Problem: Inadequate oral intake  Intervention: Food and/or Nutrient Delivery: Continue current diet, Modify current ONS  Nutritional Goals: tolerate most appropriate form of nutrition

## 2020-06-09 NOTE — PLAN OF CARE
Problem: OXYGENATION/RESPIRATORY FUNCTION  Goal: Patient will maintain patent airway  6/9/2020 0353 by Libia Jackson RN  Outcome: Met This Shift  Note: Patient remains free of significant airway secretions. Patient able to effectively cough and clear any secretions that need cleared. Will continue to monitor patient throughout shift. 6/8/2020 1604 by Trevor Eller RN  Outcome: Ongoing  Note: Patient has maintained a patent airway, repositions self, lung sounds bilaterally diminished, cough noted and is none, RT is involved in patient care and is providing care, patient has had adequate fluid intake and is on a 1800ml fluid restriction, no need to suction airway at this time, educated patient to turn and cough and deep breathe every two hours and as needed, encouraged incentive spirometer and patient has been performing. Will continue to monitor and reassess. Electronically signed by Trevor Eller RN on 6/8/2020 at 4:05 PM      Goal: Patient will achieve/maintain normal respiratory rate/effort  Description: Respiratory rate and effort will be within normal limits for the patient  6/9/2020 0353 by Libia Jackson RN  Outcome: Met This Shift  Note: Respiratory rate falls within specified parameters. Will continue to monitor patient throughout shift. 6/8/2020 1604 by Trevor Eller RN  Outcome: Ongoing     Problem: HEMODYNAMIC STATUS  Goal: Patient has stable vital signs and fluid balance  6/9/2020 0353 by Libia Jackson RN  Outcome: Met This Shift  Note:   Vitals:    06/08/20 2258   BP: (!) 141/64   Pulse: 81   Resp: 18   Temp: 96.5 °F (35.8 °C)   SpO2: 97%       6/8/2020 1604 by Lisa Cornell RN  Outcome: Ongoing  Note: Pt VSS and WDL. Pt is a daily weight, strict intake and output being monitored, pt extremity neuro checks WDL. Pt is encouraged to deep breathe and cough. Pt encouraged to monitor fluid intake to prevent fluid overload. Will continue to monitor and assess.    Electronically signed by Elie Hadley RN on 2020 at 4:05 PM       Problem: FLUID AND ELECTROLYTE IMBALANCE  Goal: Fluid and electrolyte balance are achieved/maintained  2020 0353 by Marjorie Frances RN  Outcome: Ongoing  Note: Patient monitored for fluid and electrolyte imbalance throughout the day by use of daily labs. Patient encouraged to drink fluids and eat a well balanced . Abnormal lab results will be reported to physician, per orders. Will continue to monitor. 2020 1604 by Elie Hadley RN  Outcome: Ongoing  Note: Educated to drink fluids within fluid restriction guidelines and to adequately hydrate, medications administered as ordered, abnormal lab values assessed and reported to physician if critical or pertinent to patient status, skin turgor, mucus membranes, and respiratory status assess this shift and lung sounds diminished. Patient and Family was included in plan of care. Will continue to monitor and reassess. Electronically signed by Elie Hadley RN on 2020 at 4:06 PM         Problem: ACTIVITY INTOLERANCE/IMPAIRED MOBILITY  Goal: Mobility/activity is maintained at optimum level for patient  2020 0353 by Marjorie Frances RN  Outcome: Ongoing  Note: Early and frequent ambulqtion encouraged. Educated patient on importance of early ambulation. Patient assisted with ambulation. Will continue to monitor. 2020 160 by Elie Hadley RN  Outcome: Ongoing     Problem: Nutrition  Goal: Optimal nutrition therapy  20203 by Marjorie Frances RN  Outcome: Not Met This Shift  Note: Patient educated on proper nutrition. Patients intake monitored and patient assessed to make sure no assistance with eating was required. Patient encouraged to eat a well balanced diet.      2020 1604 by Elie Hadley RN  Outcome: Ongoing     Problem: Physical Regulation:  Goal: Ability to maintain a body temperature in the normal range will improve  Description: Ability to maintain a body temperature in the normal range will improve  6/9/2020 0353 by Jero Simons RN  Outcome: Ongoing  Note:   Vitals:    06/08/20 2258   BP: (!) 141/64   Pulse: 81   Resp: 18   Temp: 96.5 °F (35.8 °C)   SpO2: 97%      6/8/2020 1604 by Ora Cornell RN  Outcome: Ongoing  Goal: Ability to maintain vital signs within normal range will improve  Description: Ability to maintain vital signs within normal range will improve  6/9/2020 0353 by Jero Simons RN  Outcome: Ongoing  Note: Patient has stable vital signs and fluid balance at this time. Will continue to monitor patient throughout shift and provide care for unstable vital signs or poor fluid balance as needed. 6/8/2020 1604 by Arcelia Young RN  Outcome: Ongoing     Problem: Falls - Risk of:  Goal: Will remain free from falls  Description: Will remain free from falls  6/9/2020 0353 by Jero Simons RN  Outcome: Met This Shift  Note: Patient educated on fall prevention. Call light is within reach, bed locked in lowest position, personal items within reach, and bed alarm is on. Will round on patient per unit guidelines. 6/8/2020 1604 by Arcelia Young RN  Outcome: Ongoing  Note: Fall risk assessment completed. Fall precautions in place. Call light within reach. Pt educated on calling for assistance before getting up. Walkway free of clutter. Will continue to monitor. Electronically signed by Arcelia Young RN on 6/8/2020 at 4:06 PM    Goal: Absence of physical injury  Description: Absence of physical injury  6/9/2020 0353 by Jero Simons RN  Outcome: Met This Shift  Note: Pt is free of injury. No injury noted. Fall precautions in place. Call light within reach. Will monitor.      6/8/2020 1604 by Arcelia Young RN  Outcome: Ongoing     Problem: Confusion - Acute:  Goal: Absence of continued neurological deterioration signs and symptoms  Description: Absence of continued neurological deterioration signs and symptoms  6/9/2020 0353 by Jero Simons RN  Outcome: Ongoing  6/8/2020 1604 by Duncan Rosenthal RN  Outcome: Ongoing  Note: Pt continues to be confused not alert at all, responds to voice some. Electronically signed by Duncan Rosenthal RN on 6/8/2020 at 4:06 PM    Goal: Mental status will be restored to baseline  Description: Mental status will be restored to baseline  6/9/2020 0353 by Chari Vang RN  Outcome: Ongoing  6/8/2020 1604 by Duncan Rosenthla RN  Outcome: Ongoing     Problem: Discharge Planning:  Goal: Ability to perform activities of daily living will improve  Description: Ability to perform activities of daily living will improve  6/9/2020 0353 by Chari Vang RN  Outcome: Met This Shift  Note: Checking on patient Q2H for nutrition needs, hygiene needs, comfort measures, mobility, fall risk interventions, and safe environment. All precautions and interventions in place. Educated patient on use of call light and telephone. Patient verbalizes understanding. Call light/telephone in reach. 6/8/2020 1604 by Duncan Rosenthal RN  Outcome: Ongoing  Goal: Participates in care planning  Description: Participates in care planning  6/9/2020 0353 by Chari Vang RN  Outcome: Not Met This Shift  Note: Patient not  involved in care planning. Patient not aware of diagnosis, problems, medications, and other factors that play an important role while in hospital. Will continue to involve patient throughout shift. 6/8/2020 1604 by Duncan Rosenthal RN  Outcome: Ongoing     Problem: Injury - Risk of, Physical Injury:  Goal: Will remain free from falls  Description: Will remain free from falls  6/9/2020 0353 by Chari Vang RN  Outcome: Met This Shift  Note: Patient educated on fall prevention. Call light is within reach, bed locked in lowest position, personal items within reach, and bed alarm is on. Will round on patient per unit guidelines. 6/8/2020 1604 by Dunacn Rosenthal RN  Outcome: Ongoing  Note: Fall risk assessment completed. Fall precautions in place.  Call light within reach. Pt educated on calling for assistance before getting up. Walkway free of clutter. Will continue to monitor. Electronically signed by Arcelia Young RN on 6/8/2020 at 4:06 PM    Goal: Absence of physical injury  Description: Absence of physical injury  6/9/2020 0353 by Jero Simons RN  Outcome: Met This Shift  Note: Pt is free of injury. No injury noted. Fall precautions in place. Call light within reach. Will monitor.      6/8/2020 1604 by Arcelia Young RN  Outcome: Ongoing     Problem: Sensory Perception - Impaired:  Goal: Demonstrations of improved sensory functioning will increase  Description: Demonstrations of improved sensory functioning will increase  6/9/2020 0353 by Jero Simons RN  Outcome: Ongoing  6/8/2020 1604 by Arcelia Young RN  Outcome: Ongoing  Goal: Decrease in sensory misperception frequency  Description: Decrease in sensory misperception frequency  6/9/2020 0353 by Jero Simons RN  Outcome: Ongoing  6/8/2020 1604 by Arcelia Young RN  Outcome: Ongoing  Goal: Able to refrain from responding to false sensory perceptions  Description: Able to refrain from responding to false sensory perceptions  6/9/2020 0353 by Jero Simons RN  Outcome: Ongoing  6/8/2020 1604 by Arcelia Young RN  Outcome: Ongoing  Goal: Demonstrates accurate environmental perceptions  Description: Demonstrates accurate environmental perceptions  6/9/2020 0353 by Jero Simons RN  Outcome: Ongoing  6/8/2020 1604 by Arcelia Young RN  Outcome: Ongoing  Goal: Able to distinguish between reality-based and nonreality-based thinking  Description: Able to distinguish between reality-based and nonreality-based thinking  6/9/2020 0353 by Jero Simons RN  Outcome: Ongoing  6/8/2020 1604 by Arcelia Young RN  Outcome: Ongoing  Goal: Able to interrupt nonreality-based thinking  Description: Able to interrupt nonreality-based thinking  6/9/2020 0353 by Jero Simons RN  Outcome: Ongoing  6/8/2020 1604 by Rosa Cornell RN  Outcome: Ongoing     Problem: Psychomotor Activity - Altered:  Goal: Absence of psychomotor disturbance signs and symptoms  Description: Absence of psychomotor disturbance signs and symptoms  6/9/2020 0353 by Gwendolyn Dominguez RN  Outcome: Ongoing  6/8/2020 1604 by Juanita Souza RN  Outcome: Ongoing

## 2020-06-09 NOTE — PROGRESS NOTES
Hospital Medicine Progress Note      Admit Date: 6/4/2020         Overnight Events: none    CC: F/U for confused state    Hospital Course: This is an 80-year-old female with a history of persistent atrial fibrillation, hypertension, Daphne dementia and gout who presented to the ED with complaints of shortness of breath. This was associated with increased lethargy and confusion. Patient found to have E. coli UTI. She was treated with 4 days of antibiotics. These were stopped. It was also thought to be in heart failure exacerbation. With patient's ongoing lethargy and confusion neurology was consulted. They thought that this confusion was related to possible infectious source. Recommending MRI if mentation does not improve. Discussed with nursing this morning as she was still confused however her mentation rapidly improved by the time I saw her around 1 PM.  Her nurse states that she was intermittently speaking however did not to me. She was feeding herself and able to follow simple commands. I called her son Florida Huston and updated him on the plan of care. Interval History/Subjective:   Did not speak to me and this was unable to be obtained. Review of Systems:     Comprehensive ROS negative except as mentioned above. Past Medical History:        Diagnosis Date    [facility]'s dementia ([facility] [address]     Atrial fibrillation ([facility] [address]     Benign essential HTN     Gout        Past Surgical History:    History reviewed. No pertinent surgical history. Allergies:  Chlorpromazine and Hydrochlorothiazide    Past medical and surgical history reviewed. Any changes have been noted.      Scheduled and prn Medications:    Scheduled Meds:   traZODone  50 mg Oral Nightly    calcium elemental  500 mg Oral Daily    vitamin D  5,000 Units Oral Daily    divalproex  250 mg Oral BID    metoprolol succinate  50 mg Oral Daily    budesonide-formoterol  2 puff Inhalation BID    therapeutic multivitamin-minerals  1 tablet Oral Daily    rivaroxaban  15 mg Oral Daily with breakfast    sodium chloride flush  10 mL Intravenous 2 times per day     Continuous Infusions:   dextrose 5% in lactated ringers 50 mL/hr at 06/09/20 1035     PRN Meds:.perflutren lipid microspheres, furosemide, ipratropium-albuterol, sodium chloride flush, acetaminophen **OR** acetaminophen, magnesium hydroxide, promethazine **OR** ondansetron, potassium chloride    PHYSICAL EXAM:  /78   Pulse 79   Temp 97.4 °F (36.3 °C) (Oral)   Resp 16   Ht 5' 6\" (1.676 m) Comment: care everywhere dated 1/17/20  Wt 192 lb 7.4 oz (87.3 kg)   SpO2 90%   BMI 31.06 kg/m²       Intake/Output Summary (Last 24 hours) at 6/9/2020 1533  Last data filed at 6/9/2020 1523  Gross per 24 hour   Intake 1264.17 ml   Output --   Net 1264.17 ml       General: . Resting in bed in no apparent distress. Follows simple commands today, being fed by nurse and then was eating by herself. nonverbal to me today  HEENT: Normocephalic. Atraumatic. Pupils equal and reactive. EOM intact. Oral mucosa pink/moist/intact. MOuth is less dry now that she is allowing mouth care  Neck: Supple. Symmetrical. Trachea midline. Lungs: Clear to auscultation bilaterally. Respirations even and unlabored. Chest: Exam unremarkable. Cardiac: S1/S2 noted. Irregular Rhythm and rate. Abdomen/GI: Soft. Non-tender. Non-distended. BS+. Extremities: PP+. Atraumatic. No redness/cyanosis/edema noted. Brisk cap refill. Right knee with new bruising noted  Skin: Dry and intact. No lesions noted. Neuro: Grossly intact. No focal deficits noted.      LABS:    Lab Results   Component Value Date    WBC 7.2 06/09/2020    HGB 13.8 06/09/2020    HCT 43.0 06/09/2020    MCV 87.5 06/09/2020     06/09/2020    LYMPHOPCT 8.8 06/09/2020    RBC 4.91 06/09/2020    MCH 28.0 06/09/2020    MCHC 32.0 06/09/2020    RDW 17.8 (H) 06/09/2020       Lab Results   Component Value Date    CREATININE 1.0 06/09/2020    BUN 29 (H) 06/09/2020     06/09/2020    K 3.1 (L) 06/09/2020     06/09/2020    CO2 25 06/09/2020       Lab Results   Component Value Date    MG 1.90 06/09/2020       Lab Results   Component Value Date    ALT 16 06/04/2020    AST 26 06/04/2020    ALKPHOS 172 (H) 06/04/2020    BILITOT 1.0 06/04/2020        No flowsheet data found. Imaging:  XR CHEST PORTABLE   Final Result   1. Constellation of findings appears similar to chest radiograph from June 4, 2020 and is favored to reflect pulmonary edema. Likely trace right effusion. CT HEAD WO CONTRAST   Final Result   Generalized volume loss and scattered periventricular low-attenuation likely   related to chronic small vessel disease. Wedge-shaped region of low-attenuation noted within the right parietal and   right occipital lobe no significant sulcal effacement. Appearance is   suggestive of subacute to remote infarct. If there are continued deficits,   this can be further evaluated with MRI of the brain. XR CHEST PORTABLE   Final Result   Findings most compatible with pulmonary edema. Correlate with any clinical   evidence of superimposed pneumonia.              Assessment & Plan:      Acute metabolic encephalopathy with underlying Alzheimer's dementia  -Likely an exacerbation of Alzheimer's secondary to acute infection related to #1  -Also received 2 doses of cefepime which likely did not help her mentation  -Lies calm in her bed per nursing staff but screams and yells when attempted to be changed  -We will discuss with family regarding her baseline mentation  -Canceled MRI for now and will follow closely for need to order this test further  - nd/w family, will hydrate for mild azotemia and attempt to sleep tonight to see if this improves    E. coli UTI  -Had total of 4 days of IV antibiotics  -Mentation not improved but also did receive cefepime  -Infection treated, antibiotics stopped    Persistent atrial fibrillation  -EKG reviewed  -Anticoagulated with Xarelto  -CT head negative  -Rate controlled with beta-blocker    Acute diastolic heart failure- ruled out  -Likely related to persistent atrial fibrillation  -BNP slightly elevated  -Was hospitalized in February and had hydrochlorothiazide changed to Lasix daily, unsure if this was followed up on  -Resume Lasix 20 mg daily now prn only for increased weight gain- as she is taking at home  - follow up with cards ouptpatient    Other comorbidities:  Alzheimer's dementia  Chronic anticoagulation  Gout  Essential hypertension  Obese  Body mass index is 31.06 kg/m². The patient and / or the family were informed of the results of any tests, a time was given to answer questions, a plan was proposed and they agreed with plan.     DVT prophylaxis: [] Lovenox  [] SQ Heparin  [] SCDs because of  [] warfarin/oral direct thrombin inhibitor [] Encourage ambulation    GI prophylaxis: [] PPI/[id number]  [x] not indicated    Probiotic if on abx: [] Yes [] No [x] Not Indicated    Diet: DIET LOW SODIUM 2 GM; 1800 ml  Dietary Nutrition Supplements: Frozen Oral Supplement  Dietary Nutrition Supplements: Standard High Calorie Oral Supplement    Consults:  IP CONSULT TO HEART FAILURE NURSE/COORDINATOR  IP CONSULT TO DIETITIAN  IP CONSULT TO NEUROLOGY    Disposition:  [] Home  [] Home with home health [] Rehab [] Psych [] SNF  [] LTAC  [] Long term nursing home or group home [] Transfer to ICU  [] Transfer to PCU [] Other:    Code Status: Full Code    ELOS: tbd      TASHI Tobias - LAILA  06/09/20

## 2020-06-10 PROCEDURE — 97162 PT EVAL MOD COMPLEX 30 MIN: CPT

## 2020-06-10 PROCEDURE — 94640 AIRWAY INHALATION TREATMENT: CPT

## 2020-06-10 PROCEDURE — 97166 OT EVAL MOD COMPLEX 45 MIN: CPT

## 2020-06-10 PROCEDURE — 2580000003 HC RX 258: Performed by: NURSE PRACTITIONER

## 2020-06-10 PROCEDURE — 2580000003 HC RX 258: Performed by: INTERNAL MEDICINE

## 2020-06-10 PROCEDURE — 6360000002 HC RX W HCPCS: Performed by: NURSE PRACTITIONER

## 2020-06-10 PROCEDURE — 97530 THERAPEUTIC ACTIVITIES: CPT

## 2020-06-10 PROCEDURE — 1200000000 HC SEMI PRIVATE

## 2020-06-10 PROCEDURE — 6370000000 HC RX 637 (ALT 250 FOR IP): Performed by: NURSE PRACTITIONER

## 2020-06-10 PROCEDURE — 6370000000 HC RX 637 (ALT 250 FOR IP): Performed by: INTERNAL MEDICINE

## 2020-06-10 RX ORDER — CEFUROXIME AXETIL 250 MG/1
250 TABLET ORAL 2 TIMES DAILY
Qty: 2 TABLET | Refills: 0 | Status: SHIPPED | OUTPATIENT
Start: 2020-06-10 | End: 2020-06-11

## 2020-06-10 RX ADMIN — SODIUM CHLORIDE, PRESERVATIVE FREE 10 ML: 5 INJECTION INTRAVENOUS at 08:16

## 2020-06-10 RX ADMIN — Medication 5000 UNITS: at 08:16

## 2020-06-10 RX ADMIN — Medication 500 MG: at 08:15

## 2020-06-10 RX ADMIN — BUDESONIDE AND FORMOTEROL FUMARATE DIHYDRATE 2 PUFF: 80; 4.5 AEROSOL RESPIRATORY (INHALATION) at 09:10

## 2020-06-10 RX ADMIN — CEFTRIAXONE 1 G: 1 INJECTION, POWDER, FOR SOLUTION INTRAMUSCULAR; INTRAVENOUS at 17:15

## 2020-06-10 RX ADMIN — METOPROLOL SUCCINATE 50 MG: 50 TABLET, EXTENDED RELEASE ORAL at 08:15

## 2020-06-10 RX ADMIN — DIVALPROEX SODIUM 250 MG: 125 CAPSULE, COATED PELLETS ORAL at 08:15

## 2020-06-10 RX ADMIN — SODIUM CHLORIDE, PRESERVATIVE FREE 10 ML: 5 INJECTION INTRAVENOUS at 20:15

## 2020-06-10 RX ADMIN — TRAZODONE HYDROCHLORIDE 50 MG: 50 TABLET ORAL at 20:15

## 2020-06-10 RX ADMIN — MULTIPLE VITAMINS W/ MINERALS TAB 1 TABLET: TAB at 08:16

## 2020-06-10 RX ADMIN — RIVAROXABAN 15 MG: 15 TABLET, FILM COATED ORAL at 08:15

## 2020-06-10 RX ADMIN — DIVALPROEX SODIUM 250 MG: 125 CAPSULE, COATED PELLETS ORAL at 20:15

## 2020-06-10 ASSESSMENT — PAIN SCALES - GENERAL
PAINLEVEL_OUTOF10: 0
PAINLEVEL_OUTOF10: 0

## 2020-06-10 ASSESSMENT — PAIN SCALES - WONG BAKER: WONGBAKER_NUMERICALRESPONSE: 0

## 2020-06-10 NOTE — PLAN OF CARE
Problem: OXYGENATION/RESPIRATORY FUNCTION  Goal: Patient will maintain patent airway  Outcome: Ongoing  Note: Patient remains free of significant airway secretions. Patient able to effectively cough and clear any secretions that need cleared. Will continue to monitor patient throughout shift. Goal: Patient will achieve/maintain normal respiratory rate/effort  Description: Respiratory rate and effort will be within normal limits for the patient  Outcome: Ongoing     Problem: HEMODYNAMIC STATUS  Goal: Patient has stable vital signs and fluid balance  Outcome: Ongoing  Note: Vitals signs are stable. Intake and output are being recorded, will continue to monitor       Problem: FLUID AND ELECTROLYTE IMBALANCE  Goal: Fluid and electrolyte balance are achieved/maintained  Outcome: Ongoing  Note: Vitals signs are stable. Intake and output are being recorded, will continue to monitor       Problem: ACTIVITY INTOLERANCE/IMPAIRED MOBILITY  Goal: Mobility/activity is maintained at optimum level for patient  Outcome: Ongoing  Note: Patient assisted with ambulation. Will continue to monitor. Problem: Nutrition  Goal: Optimal nutrition therapy  6/10/2020 0121 by Lachelle Miller RN  Outcome: Ongoing  6/9/2020 1503 by Shay Caballero RD, LD  Outcome: Ongoing     Problem: Physical Regulation:  Goal: Ability to maintain a body temperature in the normal range will improve  Description: Ability to maintain a body temperature in the normal range will improve  Outcome: Ongoing  Note: Vitals signs are stable. Intake and output are being recorded, will continue to monitor    Goal: Ability to maintain vital signs within normal range will improve  Description: Ability to maintain vital signs within normal range will improve  Outcome: Ongoing  Note: Vitals signs are stable.   Intake and output are being recorded, will continue to monitor       Problem: Falls - Risk of:  Goal: Will remain free from falls  Description: Will remain free from falls  Outcome: Ongoing  Note: Fall risk assessment completed . Fall precautions in place, bed/ chair alarm on, side rails 2/4 up, call light in reach, educated pt on calling for assistance when needed, room clear of clutter. Pt verbalized understanding. Goal: Absence of physical injury  Description: Absence of physical injury  Outcome: Ongoing     Problem: Confusion - Acute:  Goal: Absence of continued neurological deterioration signs and symptoms  Description: Absence of continued neurological deterioration signs and symptoms  Outcome: Ongoing  Note:   Promote rest  periods  Goal: Mental status will be restored to baseline  Description: Mental status will be restored to baseline  Outcome: Ongoing  Note:   Promote rest  periods     Problem: Discharge Planning:  Goal: Ability to perform activities of daily living will improve  Description: Ability to perform activities of daily living will improve  Outcome: Ongoing  Note: ADL's completed by patient to best of ability. Will continue to encourage patient to complete ADL's and offer and provide assistance when needed. Goal: Participates in care planning  Description: Participates in care planning  Outcome: Ongoing  Note: Assessed patients knowledge of discharge. Will continue to work with patient on discharge planning and answer patient questions. Will consult case management and  as necessary. Problem: Injury - Risk of, Physical Injury:  Goal: Will remain free from falls  Description: Will remain free from falls  Outcome: Ongoing  Note: Fall risk assessment completed . Fall precautions in place, bed/ chair alarm on, side rails 2/4 up, call light in reach, educated pt on calling for assistance when needed, room clear of clutter. Pt verbalized understanding.      Goal: Absence of physical injury  Description: Absence of physical injury  Outcome: Ongoing     Problem: Mood - Altered:  Goal: Mood stable  Description: Mood stable  Outcome: Ongoing  Note:   Assess signs of labile or escalating  emotions  Goal: Absence of abusive behavior  Description: Absence of abusive behavior  Outcome: Ongoing  Goal: Verbalizations of feeling emotionally comfortable while being cared for will increase  Description: Verbalizations of feeling emotionally comfortable while being cared for will increase  Outcome: Ongoing     Problem: Psychomotor Activity - Altered:  Goal: Absence of psychomotor disturbance signs and symptoms  Description: Absence of psychomotor disturbance signs and symptoms  Outcome: Ongoing     Problem: Sensory Perception - Impaired:  Goal: Demonstrations of improved sensory functioning will increase  Description: Demonstrations of improved sensory functioning will increase  Outcome: Ongoing  Goal: Decrease in sensory misperception frequency  Description: Decrease in sensory misperception frequency  Outcome: Ongoing  Goal: Able to refrain from responding to false sensory perceptions  Description: Able to refrain from responding to false sensory perceptions  Outcome: Ongoing  Goal: Demonstrates accurate environmental perceptions  Description: Demonstrates accurate environmental perceptions  Outcome: Ongoing  Goal: Able to distinguish between reality-based and nonreality-based thinking  Description: Able to distinguish between reality-based and nonreality-based thinking  Outcome: Ongoing  Goal: Able to interrupt nonreality-based thinking  Description: Able to interrupt nonreality-based thinking  Outcome: Ongoing     Problem: Sleep Pattern Disturbance:  Goal: Appears well-rested  Description: Appears well-rested  Outcome: Ongoing

## 2020-06-10 NOTE — DISCHARGE SUMMARY
Hospital Medicine Discharge Summary       Patient ID: Dave Feng      Patient's PCP: No primary care provider on file. Admit Date: 6/4/2020     Discharge Date: 6/11/2020      Admitting Physician: Magnolia Enciso MD     Discharge Provider: TASHI Daniels CNP     Consults: Neurology    Discharge Diagnoses: Active Hospital Problems    Diagnosis Date Noted    Mild malnutrition ([facility] [address] [E44.1] 06/09/2020    Acute on chronic combined systolic and diastolic CHF, NYHA class 3 ([facility] [address] [I50.43] 06/05/2020    Dementia in Alzheimer's disease with delirium ([facility] [address] [G30.9, F02.80, F05] 06/05/2020    Acute pyelonephritis [N10] 06/05/2020    Essential hypertension [I10] 06/05/2020    Current use of long term anticoagulation [Z79.01] 06/05/2020       Disposition:  [] Home  [] Home with home health [] Rehab [] Psych [x] SNF  [] LTAC  [] Long term nursing home or group home [] Transfer to ICU  [] Transfer to PCU [] Other:    Naveed Montanez MD    Go on 6/16/2020  Your hospital follow up is at 4:45pm with Dr Robina Shook at the [place] office.         Discharge Condition: stable      Code Status:  Full Code     Activity: activity as tolerated    Diet: DIET LOW SODIUM 2 GM; 1800 ml  Dietary Nutrition Supplements: Frozen Oral Supplement  Dietary Nutrition Supplements: Standard High Calorie Oral Supplement     Discharge Medications:     Discharge Medication List as of 6/11/2020  2:13 PM           Details   cefUROXime (CEFTIN) 250 MG tablet Take 1 tablet by mouth 2 times daily for 1 day, Disp-2 tablet, R-0Print              Details   divalproex (DEPAKOTE SPRINKLES) 125 MG capsule Take 250 mg by mouth 2 times daily Historical Med      albuterol sulfate HFA (VENTOLIN HFA) 108 (90 Base) MCG/ACT inhaler Inhale 2 puffs into the lungs every 6 hours as needed for WheezingHistorical Med      furosemide (LASIX) 20 MG tablet Take 20 mg by mouth daily as needed (for weight gain greater than 2 pounds in 2 days) Historical Med precert needed and family unable to take care of her at home. Exam:     BP (!) 122/56   Pulse 86   Temp 97.3 °F (36.3 °C) (Oral)   Resp 16   Ht 5' 6\" (1.676 m) Comment: care everywhere dated 1/17/20  Wt 198 lb 6.6 oz (90 kg)   SpO2 92%   BMI 32.02 kg/m²     General: Resting in chair in no apparent distress. follows commands, mentation remains stable  HEENT: Normocephalic. Atraumatic. Pupils equal and reactive. EOM intact. Oral mucosa pink/moist/intact. Neck: Supple. Symmetrical. Trachea midline. Lungs: Clear to auscultation bilaterally. Respirations even and unlabored. Chest: Exam unremarkable. Cardiac: S1/S2 noted. Iregular Rhythm and rate. Abdomen/GI: Soft. Non-tender. Non-distended. BS+. Extremities: PP+. Atraumatic. No redness/cyanosis/edema noted. Brisk cap refill. Skin: Dry and intact. No lesions noted. Neuro:  Unsteady, gait with some weakness, follows commands, HERRERA without difficulty, more verbal today        Significant Diagnostic Studies:   CT head, cxr      Labs: For convenience and continuity at follow-up the following most recent labs are provided:    CBC:    Lab Results   Component Value Date    WBC 7.2 06/09/2020    HGB 13.8 06/09/2020    HCT 43.0 06/09/2020     06/09/2020       Renal:    Lab Results   Component Value Date     06/09/2020    K 3.1 06/09/2020    K 4.1 06/04/2020     06/09/2020    CO2 25 06/09/2020    BUN 29 06/09/2020    CREATININE 1.0 06/09/2020    CALCIUM 9.9 06/09/2020    PHOS 2.3 06/09/2020       No future appointments. Time Spent on discharge is more than 30 minutes in the examination, evaluation, counseling and review of medications and discharge plan. RX monitoring reviewed     Signed:    TASHI Stevens - CNP   6/11/2020      Thank you No primary care provider on file. for the opportunity to be involved in this patient's care. If you have any questions or concerns please feel free to contact me at [phone].

## 2020-06-10 NOTE — PROGRESS NOTES
Occupational Therapy   Occupational Therapy Initial Assessment  Date: 6/10/2020   Patient Name: Kimberly Phillips  MRN: [id number]     : 1931    Date of Service: 6/10/2020    Assessment: Pt is 80 y.o. F who presents with worsening SOB, lethargy, and increased confusion. Pt with history of dementia. PTA pt lives with son, son reported to  that he assists with ADL needs, he completes homemaking responsibilities, and she completes functional mobility at baseline with occasional use of 4WW. Currently, pt presents with ROM/strength in [facility] for self-care and transfers. Pt completd bed mobility with min A and sit <>  diamond stedy with min A. Will trial ambulation next session. Anticipate pt is close to baseline with ADL tasks requiring assist for all - may require more physical assist or cueing than normal, unsure of specific PLOF. Continue to assess for appropriate d/c disposition. Son is hopeful for patient to return home - with continued progress pt may be safe to return home. Would require 24/ supervision/hands on assist.     Discharge Recommendations:  Continue to assess pending progress       Assessment   Performance deficits / Impairments: Decreased functional mobility ; Decreased safe awareness;Decreased strength;Decreased cognition;Decreased ADL status; Decreased endurance  Assessment: Pt is 80 y.o. F who presents with worsening SOB, lethargy, and increased confusion. Pt with history of dementia. PTA pt lives with son, son reported to  that he assists with ADL needs, he completes homemaking responsibilities, and she completes functional mobility at baseline with occasional use of 4WW. Currently, pt presents with ROM/strength in [facility] for self-care and transfers. Pt completd bed mobility with min A and sit <>  diamond stedy with min A. Will trial ambulation next session.  Anticipate pt is close to baseline with ADL tasks requiring assist for all - may require more physical assist or cueing than normal, unsure of specific PLOF. Continue to assess for appropriate d/c disposition. Son is hopeful for patient to return home - with continued progress pt may be safe to return home. Would require 24/7 supervision/hands on assist.   Prognosis: Fair  Decision Making: Medium Complexity  History: PMH: dementia, Afib  Exam: ADLs, transfers, bed mob  Assistance / Modification: min A for mobility, mod/max A for ADLs   OT Education: OT Role;Precautions;Plan of Care;ADL Adaptive Strategies;Transfer Training  Barriers to Learning: cognition  REQUIRES OT FOLLOW UP: Yes  Activity Tolerance  Activity Tolerance: Patient Tolerated treatment well;Treatment limited secondary to decreased cognition  Safety Devices  Safety Devices in place: Yes(RN [employer]) notified)  Type of devices: Left in chair;Call light within reach; Chair alarm in place;Gait belt;Nurse notified           Patient Diagnosis(es): The primary encounter diagnosis was Altered mental status, unspecified altered mental status type. Diagnoses of Acute congestive heart failure, unspecified heart failure type ([address] and Acute cystitis without hematuria were also pertinent to this visit. has a past medical history of Alzheimer's dementia ([facility] [address] Atrial fibrillation ([address] Benign essential HTN, and Gout.   has no past surgical history on file. Restrictions  Restrictions/Precautions  Restrictions/Precautions: Fall Risk    Subjective   General  Chart Reviewed: Yes  Patient assessed for rehabilitation services?: Yes  Additional Pertinent Hx: Raghav Mclaughlin, APRN - CNP: Pt is \"80year-old female with a history of persistent atrial fibrillation, hypertension, Daphne dementia and gout who presented to the ED with complaints of shortness of breath. This was associated with increased lethargy and confusion. Patient found to have E. coli UTI. She was treated with 4 days of antibiotics. These were stopped. It was also thought to be in heart failure exacerbation. With patient's ongoing lethargy and confusion neurology was consulted. They thought that this confusion was related to possible infectious source. Recommending MRI if mentation does not improve. Discussed with nursing this morning as she was still confused however her mentation rapidly improved by the time I saw her around 1 PM.  Her nurse states that she was intermittently speaking however did not to me. She was feeding herself and able to follow simple commands. I called her son Kristi Loza and updated him on the plan of care. \"  Family / Caregiver Present: No  Referring Practitioner: TASHI Casper CNP  Diagnosis: UTI, CHF   Subjective  Subjective: Pt met bedside, very [place] and typically reads lips. Pt with minimal verbal interactions with therapists however visually tracked and followed cues with prompting. Patient Currently in Pain: (No complaints of pain )  Pain Assessment  Pain Assessment: Advanced Dementia  Pain Level: 0  Patient's Stated Pain Goal: No pain  Vital Signs  Temp: 97.3 °F (36.3 °C)  Temp Source: Axillary  Pulse: 85  Heart Rate Source: Monitor  Resp: 18  BP: 109/73  BP Location: Right Arm  BP Upper/Lower: Upper  MAP (mmHg): 85  Level of Consciousness: Alert  MEWS Score: 1  Patient Currently in Pain: (No complaints of pain )  Oxygen Therapy  SpO2: 96 %  O2 Device: None (Room air)     Social/Functional History  Social/Functional History  Lives With: Son(Simultaneous filing. User may not have seen previous data.)  Type of Home: House(Simultaneous filing. User may not have seen previous data.)  Home Layout: One level  Home Access: Stairs to enter with rails(Simultaneous filing.  User may not have seen previous data.)  Entrance Stairs - Number of Steps: 4-5  Bathroom Toilet: Standard  Bathroom Equipment: Shower chair, Grab bars in shower  Home Equipment: 4 wheeled walker, Cane  ADL Assistance: (Son assists with bathing/dressing/toileting - patient feeds self)  Homemaking Assistance: (Son completes)  Ambulation Assistance: (occasional use of 4BO)  Transfer Assistance: Independent  Active : No       Objective        Orientation  Overall Orientation Status: Impaired(minimally verbal )     Balance  Sitting Balance: Stand by assistance  Standing Balance: Contact guard assistance(in stedy)  Standing Balance  Time: ~15 seconds  Activity: Transfers    Functional Mobility  Functional Mobility Comments: Use of diamond stedy for safety on first attempt d/t decreased cognition    ADL  Grooming: Setup  Toileting: (Depends were not soiled)  Additional Comments: PTA pt son assists with bathing/dressing/toileting needs. Anticipate pt is close to baseline - requiring mod/max A with cueing. Tone RUE  RUE Tone: Normotonic  Tone LUE  LUE Tone: Normotonic  Coordination  Movements Are Fluid And Coordinated: Yes     Bed mobility  Supine to Sit: Minimal assistance(HOB elevated, max cues )  Sit to Supine: Unable to assess(Up in chair at end of session)     Transfers  Sit to stand: Minimal assistance  Stand to sit: Minimal assistance  Transfer Comments: Min A for sit <> stand from EOB to diamond stedy and sit to recliner chair      Cognition  Overall Cognitive Status: Exceptions  Arousal/Alertness: Delayed responses to stimuli  Following Commands: Follows one step commands with increased time; Follows one step commands with repetition  Memory: Decreased recall of recent events;Decreased short term memory  Safety Judgement: Decreased awareness of need for assistance;Decreased awareness of need for safety  Problem Solving: Assistance required to generate solutions;Assistance required to implement solutions  Insights: Decreased awareness of deficits  Initiation: Requires cues for some  Sequencing: Requires cues for some        Sensation  Overall Sensation Status: (Appears WFL to light touch )        LUE AROM (degrees)  LUE AROM : WFL  Left Hand AROM (degrees)  Left Hand AROM: WFL  RUE AROM (degrees)  RUE AROM : WFL  Right Hand AROM (degrees)  Right Hand AROM: WFL     LUE Strength  Gross LUE Strength: WFL  RUE Strength  Gross RUE Strength: WFL          Plan   Plan  Times per week: 3-5  Current Treatment Recommendations: Strengthening, Functional Mobility Training, Endurance Training, Balance Training, Safety Education & Training, Equipment Evaluation, Education, & procurement, Patient/Caregiver Education & Training, Self-Care / ADL    AM-PAC Score   Continue to assess for appropriate discharge disposition. AM-PAC Inpatient Daily Activity Raw Score: 13 (06/10/20 0837)  AM-PAC Inpatient ADL T-Scale Score : 32.03 (06/10/20 0837)  ADL Inpatient CMS 0-100% Score: 63.03 (06/10/20 6397)  ADL Inpatient CMS G-Code Modifier : CL (06/10/20 3009)    Goals  Short term goals  Time Frame for Short term goals: prior to d/c  Short term goal 1: Pt will complete sit <> stand transfers with SBA   Short term goal 2: Pt will complete functional mobility with/without device with SBA   Short term goal 3: Pt will complete toileting with SBA   Short term goal 4: Pt will complete grooming with setup   Short term goal 5: Pt will tolerate 4+ minutes of standing activity to increase strength and activity tolerance for self-care tasks. Patient Goals   Patient goals : Pt unable to formulate goal        Therapy Time   Individual Concurrent Group Co-treatment   Time In       0800   Time Out       0830   Minutes       30   Timed Code Treatment Minutes: 15 Minutes(15 min eval )     If pt is discharged prior to next OT session, this note will serve as the discharge summary.     BILLIE Sauer/MELITON#[id number]

## 2020-06-10 NOTE — DISCHARGE INSTR - COC
Continuity of Care Form    Patient Name: Juan R Sidhu   :  1931  MRN:  [id number]    Admit date:  2020  Discharge date:  20    Code Status Order: Full Code   Advance Directives:   [facility] Documentation     Date/Time Healthcare Directive Type of Healthcare Directive Copy in [facility] [address] Agent's Name Healthcare Agent's Phone Number    [phone]  No, patient does not have an advance directive for healthcare treatment -- -- -- -- --          Admitting Physician:  Jeffrey Ruvalcaba MD  PCP: No primary care provider on file. Discharging Nurse: [facility] Unit/Room#: F9K-8323/3130-01  Discharging Unit Phone Number: ***    Emergency Contact:   Extended Emergency Contact Information  Primary Emergency Contact: Nagi Martinez  Home Phone: [phone]  Mobile Phone: [phone]  Relation: Child   needed? No  Secondary Emergency Contact: Kelle [place]  Mobile Phone: [phone]  Relation: Child    Past Surgical History:  History reviewed. No pertinent surgical history. Immunization History: There is no immunization history on file for this patient.     Active Problems:  Patient Active Problem List   Diagnosis Code    Acute on chronic combined systolic and diastolic CHF, NYHA class 3 ([facility]) I50.43    Dementia in Alzheimer's disease with delirium ([facility] [address] G30.9, F02.80, F05    Acute pyelonephritis N10    Essential hypertension I10    Current use of long term anticoagulation Z79.01    Mild malnutrition ([facility] [address] E44.1       Isolation/Infection:   Isolation          No Isolation        Patient Infection Status     Infection Onset Added Last Indicated Last Indicated By Review Planned Expiration Resolved Resolved By    None active    Resolved    COVID-19 Rule Out 20 COVID-19 (Ordered)   20 Rule-Out Test Resulted          Nurse Assessment:  Last Vital Signs: BP 99/60   Pulse 83   Temp 97.3 °F (36.3 °C) (Oral)   Resp 18   Ht 5' 6\" (1.676 m) Comment: care everywhere dated 1/17/20  Wt 194 lb 10.7 oz (88.3 kg)   SpO2 96%   BMI 31.42 kg/m²     Last documented pain score (0-10 scale): Pain Level: 0  Last Weight:   Wt Readings from Last 1 Encounters:   06/10/20 194 lb 10.7 oz (88.3 kg)     Mental Status:  alert    IV Access:  - None    Nursing Mobility/ADLs:  Walking   Dependent  Transfer  Dependent  Bathing  Dependent  Dressing  Dependent  Toileting  Dependent  Feeding  Assisted  Med Admin  Dependent  Med Delivery   crushed and with applesauce    Wound Care Documentation and Therapy:        Elimination:  Continence:   · Bowel: No  · Bladder: No  Urinary Catheter: None   Colostomy/Ileostomy/Ileal Conduit: No       Date of Last BM: ***    Intake/Output Summary (Last 24 hours) at 6/10/2020 1235  Last data filed at 6/10/2020 1000  Gross per 24 hour   Intake 850 ml   Output --   Net 850 ml     I/O last 3 completed shifts: In: 1264.2 [P.O.:240; I.V.:774.2; IV Piggyback:250]  Out: -     Safety Concerns: At Risk for Falls    Impairments/Disabilities:      [address] [place] [address] Impairments/Disabilities:[id number]}    Nutrition Therapy:  Current Nutrition Therapy:   - Oral Diet:  Low Sodium (2gm)    Routes of Feeding: Oral  Liquids: No Restrictions  Daily Fluid Restriction: yes - amount 1800 ml  Last Modified Barium Swallow with Video (Video Swallowing Test): not done    Treatments at the Time of Hospital Discharge:   Respiratory Treatments:   Oxygen Therapy:  is not on home oxygen therapy. Ventilator:    - No ventilator support     Heart Failure Instructions for Daily Management  Patient was treated for acute on chronic diastolic heart failure. she  will require the following:     Please weigh daily on the same scale and approximately the same time of day. Report weight gain of 3 pounds/day or 5 pounds/week to : [facility] [phone].  Please use hospital discharge weight as baseline reference.     Please monitor for signs and symptoms of and report to MD:  o Worsening Heart Failure: sudden weight gain, shortness of breath, lower extremity or general edema/swelling, abdominal bloating/swelling, inability to lie flat, intolerance to usual activity, or cough (especially at night). Report these finding even if no increase in weight.  o Dehydration:  having difficulty or a decrease in urination, dizziness, worsening fatigue, or new onset/worsening of generalized weakness.  Please continue a LOW SODIUM diet and LIMIT fluid intake to 48 - 64 ounces ( 1.5 - 2 liters) per day. Tino Morrison Call [facility] Cardiology [phone] and/or Patsy Carpenter @ [phone] with any questions or concerns.  Please continue heart failure education to patient and family/support system.  See After Visit Summary for hospital follow up appointment details.  Consider spiritual care referral for support and/or completion of advance directives [phone].  Consider: [place] [address] telehealth program if patient agreeable and able to participate, palliative care consult for ongoing goals of care, end of life, and/or chronic disease management discussions and referral to [facility] [phone]) once SNF/HHC complete.       Rehab Therapies: {THERAPEUTIC INTERVENTION:[id number]}  Weight Bearing Status/Restrictions: [address] [facility] Weight Bearin}  Other Medical Equipment (for information only, NOT a DME order):  {EQUIPMENT:[id number]}  Other Treatments: ***    Patient's personal belongings (please select all that are sent with patient):  {[facility] DME Belongings:[id number]}    RN SIGNATURE:  Electronically signed by Mando Sanabria RN on 2020 at 9:39 AM    CASE MANAGEMENT/SOCIAL WORK SECTION    Inpatient Status Date: 20    Readmission Risk Assessment Score:15%  Readmission Risk              Risk of Unplanned Readmission:        14           Discharging to Facility/ Agency   · Name: [facility]  · Address:  · Phone:[phone]  · Fax:    Dialysis Facility (if applicable)   · Name:  · Address:  · Dialysis Schedule:  · Phone:  · Fax:    / signature:Electronically signed by Chinedu Carr on 6/11/2020 at 9:52 AM     PHYSICIAN SECTION    Prognosis: Fair    Condition at Discharge: Stable    Rehab Potential (if transferring to Rehab): Good    Recommended Labs or Other Treatments After Discharge: pt/ot    Physician Certification: I certify the above information and transfer of Joann Moncada  is necessary for the continuing treatment of the diagnosis listed and that she requires [place] for less 30 days.      Update Admission H&P: No change in H&P    PHYSICIAN SIGNATURE:  Electronically signed by TASHI Ward CNP on 6/11/20 at 11:50 AM EDT/Dr. Lindsay Arroyo

## 2020-06-10 NOTE — PLAN OF CARE
Problem: OXYGENATION/RESPIRATORY FUNCTION  Goal: Patient will maintain patent airway  6/10/2020 0713 by Shannan Anthony RN  Outcome: Ongoing  Note: Patient will maintain patent airway      Problem: OXYGENATION/RESPIRATORY FUNCTION  Goal: Patient will achieve/maintain normal respiratory rate/effort  Description: Respiratory rate and effort will be within normal limits for the patient  6/10/2020 3733 by Shannan Anthony RN  Outcome: Ongoing  Note: Patient will achieve/maintain normal respiratory rate/effort        Problem: HEMODYNAMIC STATUS  Goal: Patient has stable vital signs and fluid balance  6/10/2020 0713 by Shannan Anthony RN  Outcome: Ongoing  Note: Patient will have stable vital signs and fluid balance      Problem: FLUID AND ELECTROLYTE IMBALANCE  Goal: Fluid and electrolyte balance are achieved/maintained  6/10/2020 0713 by Shannan Anthony RN  Outcome: Ongoing  Note: Fluid and electrolyte balance are achieved/maintained      Problem: ACTIVITY INTOLERANCE/IMPAIRED MOBILITY  Goal: Mobility/activity is maintained at optimum level for patient  6/10/2020 9497 by Shannan Anthony RN  Outcome: Ongoing  Note: Mobility remains ongoing      Problem: Nutrition  Goal: Optimal nutrition therapy  6/10/2020 0713 by Shannan Anthony RN  Outcome: Ongoing  Note: Nutrition remains ongoing      Problem: Physical Regulation:  Goal: Ability to maintain a body temperature in the normal range will improve  Description: Ability to maintain a body temperature in the normal range will improve  6/10/2020 0713 by Shannan Anthony RN  Outcome: Ongoing  Note: Ability to maintain a body temperature in the normal range will improve      Problem: Physical Regulation:  Goal: Ability to maintain vital signs within normal range will improve  Description: Ability to maintain vital signs within normal range will improve  6/10/2020 0713 by Shannan Anthony RN  Outcome: Ongoing  Note: Ability to maintain vital signs within normal falls  Description: Will remain free from falls  6/10/2020 0713 by Ginette Opitz, RN  Outcome: Ongoing  Note: Fall risk assessment completed. Fall precautions in place. Bed in lowest position, wheels locked, bed/chair exit alarm in place, call light within reach, and non skid footwear on. Walkway free of clutter. Pt alert and oriented and able to make needs known. Pt educated to use call light when needing to get up, and pt utilizes call light to make needs known. Will continue to monitor.         Problem: Injury - Risk of, Physical Injury:  Goal: Absence of physical injury  Description: Absence of physical injury  6/10/2020 0713 by Ginette Opitz, RN  Outcome: Ongoing  Note: Pt absent from physical injury on this shift      Problem: Mood - Altered:  Goal: Mood stable  Description: Mood stable  6/10/2020 0713 by Ginette Opitz, RN  Outcome: Ongoing  Note: Mood remains stable on this shift      Problem: Mood - Altered:  Goal: Absence of abusive behavior  Description: Absence of abusive behavior  6/10/2020 0713 by Ginette Opitz, RN  Outcome: Ongoing  Note: Monitoring pt for absence of abusive behavior      Problem: Mood - Altered:  Goal: Verbalizations of feeling emotionally comfortable while being cared for will increase  Description: Verbalizations of feeling emotionally comfortable while being cared for will increase  6/10/2020 0713 by Ginette Opitz, RN  Outcome: Ongoing  Note: Verbalization of feeling emotionally comfortable while being cared for will increase      Problem: Psychomotor Activity - Altered:  Goal: Absence of psychomotor disturbance signs and symptoms  Description: Absence of psychomotor disturbance signs and symptoms  6/10/2020 0713 by Ginette Opitz, RN  Outcome: Ongoing  Note: Monitoring pt for psychomotor disturbance signs      Problem: Sensory Perception - Impaired:  Goal: Demonstrations of improved sensory functioning will increase  Description: Demonstrations of improved sensory functioning will increase  6/10/2020 0713 by Shannan Anthony RN  Outcome: Ongoing  Note: Pt will demonstration improved sensory functioning will increase      Problem: Sensory Perception - Impaired:  Goal: Decrease in sensory misperception frequency  Description: Decrease in sensory misperception frequency  6/10/2020 0713 by Shannan Anthony RN  Outcome: Ongoing  Note: Decrease in sensory misperception frequency      Problem: Sensory Perception - Impaired:  Goal: Able to refrain from responding to false sensory perceptions  Description: Able to refrain from responding to false sensory perceptions  6/10/2020 0713 by Shannan Anthony RN  Outcome: Ongoing  Note: Able to refrain from responding to false sensory perceptions      Problem: Sensory Perception - Impaired:  Goal: Demonstrates accurate environmental perceptions  Description: Demonstrates accurate environmental perceptions  6/10/2020 0713 by Shannan Anthony RN  Outcome: Ongoing  Note: Pt will demonstrate accurate environmental perceptions      Problem: Sensory Perception - Impaired:  Goal: Able to distinguish between reality-based and nonreality-based thinking  Description: Able to distinguish between reality-based and nonreality-based thinking  6/10/2020 0713 by Shannan Anthony RN  Outcome: Ongoing  Note: Remains ongoing     Problem: Sensory Perception - Impaired:  Goal: Able to interrupt nonreality-based thinking  Description: Able to interrupt nonreality-based thinking  6/10/2020 0713 by Shannan Anthony RN  Outcome: Ongoing  Note: Remains ongoing      Problem: Sleep Pattern Disturbance:  Goal: Appears well-rested  Description: Appears well-rested  6/10/2020 0713 by Shannan Anthony RN  Outcome: Ongoing  Note: Pt appears well rested

## 2020-06-10 NOTE — PROGRESS NOTES
Unable to give symbicort inhaler at this time. When I explained to pt she was getting her tx she moved her head abruptly and shook her head \"no\". Pt on RA sat 97%.  Will continue to monitor    Electronically signed by Kade Burton RCP on 6/10/2020 at 7:51 PM

## 2020-06-10 NOTE — PLAN OF CARE
Problem: OXYGENATION/RESPIRATORY FUNCTION  Goal: Patient will maintain patent airway  6/10/2020 1949 by Cathy Olivo RN  Outcome: Ongoing  Note: Will monitor airway. 6/10/2020 0713 by Sommer Young RN  Outcome: Ongoing  Note: Patient will maintain patent airway   Goal: Patient will achieve/maintain normal respiratory rate/effort  Description: Respiratory rate and effort will be within normal limits for the patient  6/10/2020 1949 by Cathy Olivo RN  Outcome: Ongoing  6/10/2020 0713 by Sommer Young RN  Outcome: Ongoing  Note: Patient will achieve/maintain normal respiratory rate/effort        Problem: HEMODYNAMIC STATUS  Goal: Patient has stable vital signs and fluid balance  6/10/2020 1949 by Cathy Olivo RN  Outcome: Ongoing  Note: Will monitor VS.  6/10/2020 0713 by Sommer Young RN  Outcome: Ongoing  Note: Patient will have stable vital signs and fluid balance      Problem: FLUID AND ELECTROLYTE IMBALANCE  Goal: Fluid and electrolyte balance are achieved/maintained  6/10/2020 1949 by Cathy Olivo RN  Outcome: Ongoing  Note: Will monitor labs.   6/10/2020 0713 by Sommer Young RN  Outcome: Ongoing  Note: Fluid and electrolyte balance are achieved/maintained      Problem: ACTIVITY INTOLERANCE/IMPAIRED MOBILITY  Goal: Mobility/activity is maintained at optimum level for patient  6/10/2020 1949 by Cathy Olivo RN  Outcome: Ongoing  6/10/2020 0713 by Sommer Young RN  Outcome: Ongoing  Note: Mobility remains ongoing      Problem: Nutrition  Goal: Optimal nutrition therapy  6/10/2020 1949 by Cathy Olivo RN  Outcome: Ongoing  6/10/2020 0713 by Sommer Young RN  Outcome: Ongoing  Note: Nutrition remains ongoing      Problem: Physical Regulation:  Goal: Ability to maintain a body temperature in the normal range will improve  Description: Ability to maintain a body temperature in the normal range will improve  6/10/2020 1949 by Cathy Olivo RN  Outcome: Ongoing  Note: Will monitor temperature. 6/10/2020 0713 by Sommer Young RN  Outcome: Ongoing  Note: Ability to maintain a body temperature in the normal range will improve   Goal: Ability to maintain vital signs within normal range will improve  Description: Ability to maintain vital signs within normal range will improve  6/10/2020 1949 by Cathy Olivo RN  Outcome: Ongoing  6/10/2020 0713 by Sommer Young RN  Outcome: Ongoing  Note: Ability to maintain vital signs within normal range will improve      Problem: Falls - Risk of:  Goal: Will remain free from falls  Description: Will remain free from falls  6/10/2020 1949 by Cathy Olivo RN  Outcome: Ongoing  Note: Fall risk assessment completed. Fall precautions in place. Call light within reach. Pt educated on calling for assistance before getting up. Walkway free of clutter. Will continue to monitor. 6/10/2020 0713 by Sommer Young RN  Outcome: Ongoing  Note: Fall risk assessment completed. Fall precautions in place. Bed in lowest position, wheels locked, bed/chair exit alarm in place, call light within reach, and non skid footwear on. Walkway free of clutter. Pt alert and oriented and able to make needs known. Pt educated to use call light when needing to get up, and pt utilizes call light to make needs known. Will continue to monitor. Goal: Absence of physical injury  Description: Absence of physical injury  6/10/2020 1949 by Cathy Olivo RN  Outcome: Ongoing  6/10/2020 0713 by Sommer Young RN  Outcome: Ongoing  Note: Pt absent from physical injury on this shift      Problem: Confusion - Acute:  Goal: Absence of continued neurological deterioration signs and symptoms  Description: Absence of continued neurological deterioration signs and symptoms  6/10/2020 1949 by Cathy Olivo RN  Outcome: Ongoing  Note: Will monitor neuro status.   6/10/2020 0713 by Sommer Young RN  Outcome: Ongoing  Note: Monitoring pt for neurological deterioration signs   Goal: Mental status will be restored to baseline  Description: Mental status will be restored to baseline  6/10/2020 1949 by Karli Lozano RN  Outcome: Ongoing  6/10/2020 0713 by Katie Blevins RN  Outcome: Ongoing  Note: Mental status remains ongoing      Problem: Discharge Planning:  Goal: Ability to perform activities of daily living will improve  Description: Ability to perform activities of daily living will improve  6/10/2020 1949 by Karli Lozano RN  Outcome: Ongoing  6/10/2020 0713 by Katie Blevins RN  Outcome: Ongoing  Note: Ability to perform activities of daily living remain ongoing   Goal: Participates in care planning  Description: Participates in care planning  6/10/2020 1949 by Karli Lozano RN  Outcome: Ongoing  6/10/2020 0713 by Katie Blevins RN  Outcome: Ongoing  Note: Pt and family will participates in care planning      Problem: Injury - Risk of, Physical Injury:  Goal: Will remain free from falls  Description: Will remain free from falls  6/10/2020 1949 by Karli Lozano RN  Outcome: Ongoing  Note: Fall risk assessment completed. Fall precautions in place. Call light within reach. Pt educated on calling for assistance before getting up. Walkway free of clutter. Will continue to monitor. 6/10/2020 0713 by Katie Blevins RN  Outcome: Ongoing  Note: Fall risk assessment completed. Fall precautions in place. Bed in lowest position, wheels locked, bed/chair exit alarm in place, call light within reach, and non skid footwear on. Walkway free of clutter. Pt alert and oriented and able to make needs known. Pt educated to use call light when needing to get up, and pt utilizes call light to make needs known. Will continue to monitor.      Goal: Absence of physical injury  Description: Absence of physical injury  6/10/2020 1949 by Karli Lozano RN  Outcome: Ongoing  6/10/2020 0713 by Katie Blevins RN  Outcome: Ongoing  Note: Pt absent from physical injury on this shift      Problem: Mood - Altered:  Goal: Mood stable  Description: Mood stable  6/10/2020 1949 by Alek Maldonado RN  Outcome: Ongoing  6/10/2020 0713 by Katerin Stevens RN  Outcome: Ongoing  Note: Mood remains stable on this shift   Goal: Absence of abusive behavior  Description: Absence of abusive behavior  6/10/2020 1949 by Alek Maldonado RN  Outcome: Ongoing  6/10/2020 0713 by Katerin Stevens RN  Outcome: Ongoing  Note: Monitoring pt for absence of abusive behavior   Goal: Verbalizations of feeling emotionally comfortable while being cared for will increase  Description: Verbalizations of feeling emotionally comfortable while being cared for will increase  6/10/2020 1949 by Alek Maldonado RN  Outcome: Ongoing  6/10/2020 0713 by Katerin Stevens RN  Outcome: Ongoing  Note: Verbalization of feeling emotionally comfortable while being cared for will increase      Problem: Psychomotor Activity - Altered:  Goal: Absence of psychomotor disturbance signs and symptoms  Description: Absence of psychomotor disturbance signs and symptoms  6/10/2020 1949 by Alek Maldonado RN  Outcome: Ongoing  6/10/2020 0713 by Katerin Stevens RN  Outcome: Ongoing  Note: Monitoring pt for psychomotor disturbance signs      Problem: Sensory Perception - Impaired:  Goal: Demonstrations of improved sensory functioning will increase  Description: Demonstrations of improved sensory functioning will increase  6/10/2020 1949 by Alek Maldonado RN  Outcome: Ongoing  6/10/2020 0713 by Katerin Stevens RN  Outcome: Ongoing  Note: Pt will demonstration improved sensory functioning will increase   Goal: Decrease in sensory misperception frequency  Description: Decrease in sensory misperception frequency  6/10/2020 1949 by Alek Maldonado RN  Outcome: Ongoing  6/10/2020 0713 by Katerin Stevens RN  Outcome: Ongoing  Note: Decrease in sensory misperception frequency   Goal: Able to refrain from responding to false sensory perceptions  Description: Able to refrain from responding to false sensory perceptions  6/10/2020 1949 by Dave Pierson RN  Outcome: Ongoing  6/10/2020 0713 by Tri Arndt RN  Outcome: Ongoing  Note: Able to refrain from responding to false sensory perceptions   Goal: Demonstrates accurate environmental perceptions  Description: Demonstrates accurate environmental perceptions  6/10/2020 1949 by Dave Pierson RN  Outcome: Ongoing  6/10/2020 0713 by Tri Arndt RN  Outcome: Ongoing  Note: Pt will demonstrate accurate environmental perceptions   Goal: Able to distinguish between reality-based and nonreality-based thinking  Description: Able to distinguish between reality-based and nonreality-based thinking  6/10/2020 1949 by Dave Pierson RN  Outcome: Ongoing  6/10/2020 0713 by Tri Arndt RN  Outcome: Ongoing  Note: Remains ongoing  Goal: Able to interrupt nonreality-based thinking  Description: Able to interrupt nonreality-based thinking  6/10/2020 1949 by Dave Pierson RN  Outcome: Ongoing  6/10/2020 0713 by Tri Arndt RN  Outcome: Ongoing  Note: Remains ongoing      Problem: Sleep Pattern Disturbance:  Goal: Appears well-rested  Description: Appears well-rested  6/10/2020 1949 by Dave Pierson RN  Outcome: Ongoing  6/10/2020 0713 by Tri Arndt RN  Outcome: Ongoing  Note: Pt appears well rested

## 2020-06-10 NOTE — CARE COORDINATION
Noted d/c order and the fact that patient is currently unable to ambulate on her own as prior to admit. Son is having outpatient surgery in the am and will not be able to help much with her mobility. He is now in agreement with SNF for rehab an prefers [address] [place] [address] where she has been several times before. Referred via epic; awaiting call back re: bed availability and patients acceptance for admit.  Will arrange for transfer in the am.   Electronically signed by Marissa Jorge on 6/10/2020 at 5:41 PM

## 2020-06-10 NOTE — PROGRESS NOTES
Physical Therapy    Facility/Department: [address] [facility]  Initial Assessment    NAME: Joel Granados  : 1931  MRN: [id number]    Date of Service: 6/10/2020    Assessment / Discharge Recommendations:  -continue to assess  -if she has strict 24 hour assist and supervision may be able to manage at home     Body structures, Functions, Activity limitations: Decreased functional mobility ; Decreased endurance;Decreased cognition  Prognosis: Good  Decision Making: Medium Complexity  REQUIRES PT FOLLOW UP: Yes  Activity Tolerance  Activity Tolerance: Patient Tolerated treatment well;Patient limited by cognitive status       Patient Diagnosis(es): The primary encounter diagnosis was Altered mental status, unspecified altered mental status type. Diagnoses of Acute congestive heart failure, unspecified heart failure type ([facility] and Acute cystitis without hematuria were also pertinent to this visit. has a past medical history of Alzheimer's dementia ([facility] Atrial fibrillation ([facility] Benign essential HTN, and Gout.   has no past surgical history on file.     Restrictions  Restrictions/Precautions  Restrictions/Precautions: Fall Risk  Vision/Hearing  Vision: Within Functional Limits  Hearing: Exceptions to [facility]  Hearing Exceptions: Hard of hearing/hearing concerns     Subjective  General  Chart Reviewed: Yes  Patient assessed for rehabilitation services?: Yes  Additional Pertinent Hx: here due to AMS from baseline dementia functioning at home with assist of her son  Response To Previous Treatment: Not applicable  Family / Caregiver Present: No  Follows Commands: (overall yes with some repetition due to [address])  Subjective  Subjective: arrived to room along with OT- nursing in room completing medications - patient sitting up in bed eating breakfast-   Pain Screening  Patient Currently in Pain: (No complaints of pain )  Orientation  Orientation  Overall Orientation Status: Impaired  Social/Functional History  Social/Functional History  Lives With: Son(Simultaneous filing. User may not have seen previous data.)  Type of Home: House(Simultaneous filing. User may not have seen previous data.)  Home Layout: One level  Home Access: Stairs to enter with rails(Simultaneous filing. User may not have seen previous data.)  Entrance Stairs - Number of Steps: 4-5  Bathroom Toilet: Standard  Bathroom Equipment: Shower chair, Grab bars in shower  Home Equipment: 4 wheeled walker, Cane  ADL Assistance: (Son assists with bathing/dressing/toileting - patient feeds self)  Homemaking Assistance: (Son completes)  Ambulation Assistance: (occasional use of 1PF)  Transfer Assistance: Independent  Active : No  Cognition   Cognition  Overall Cognitive Status: (impaired)  Objective  ROM and strength grossly functional  Motor Control  Gross Motor?: WFL     Bed mobility  Supine to Sit: Minimal assistance  Sit to Supine: Unable to assess  Transfers  Sit to Stand: Minimal Assistance  Stand to sit: Minimal Assistance  Ambulation  Ambulation?: No  Stairs/Curb  Stairs?: No  Balance  Comments: midline in sitting at the EOB and in static stance in the stedy  Plan   Plan  Times per week: 3-5  Current Treatment Recommendations: Functional Mobility Training  Safety Devices  Type of devices:  All fall risk precautions in place, Call light within reach, Chair alarm in place, Left in chair, Nurse notified(bette)    AM-PAC Score  AM-PAC Inpatient Mobility Raw Score : 13 (06/10/20 0842)  AM-PAC Inpatient T-Scale Score : 36.74 (06/10/20 0842)  Mobility Inpatient CMS 0-100% Score: 64.91 (06/10/20 6523)  Mobility Inpatient CMS G-Code Modifier : CL (06/10/20 0788)    Goals  Short term goals  Time Frame for Short term goals: 1-2 days   Short term goal 1: bed mobility at [address] [place] [address] term goal 2: transfers at [address] [place] [address] term goal 3: ambulation at [facility] rolling walker or hand in hand for room distances   Patient Goals   Patient goals : not able to assess       Therapy Time

## 2020-06-11 VITALS
WEIGHT: 198.41 LBS | BODY MASS INDEX: 31.89 KG/M2 | HEART RATE: 86 BPM | TEMPERATURE: 97.3 F | OXYGEN SATURATION: 92 % | RESPIRATION RATE: 16 BRPM | SYSTOLIC BLOOD PRESSURE: 122 MMHG | HEIGHT: 66 IN | DIASTOLIC BLOOD PRESSURE: 56 MMHG

## 2020-06-11 PROCEDURE — 6360000002 HC RX W HCPCS: Performed by: NURSE PRACTITIONER

## 2020-06-11 PROCEDURE — 6370000000 HC RX 637 (ALT 250 FOR IP): Performed by: INTERNAL MEDICINE

## 2020-06-11 PROCEDURE — 2580000003 HC RX 258: Performed by: INTERNAL MEDICINE

## 2020-06-11 PROCEDURE — 2580000003 HC RX 258: Performed by: NURSE PRACTITIONER

## 2020-06-11 PROCEDURE — 94760 N-INVAS EAR/PLS OXIMETRY 1: CPT

## 2020-06-11 RX ADMIN — METOPROLOL SUCCINATE 50 MG: 50 TABLET, EXTENDED RELEASE ORAL at 09:05

## 2020-06-11 RX ADMIN — CEFTRIAXONE 1 G: 1 INJECTION, POWDER, FOR SOLUTION INTRAMUSCULAR; INTRAVENOUS at 11:59

## 2020-06-11 RX ADMIN — SODIUM CHLORIDE, PRESERVATIVE FREE 10 ML: 5 INJECTION INTRAVENOUS at 09:06

## 2020-06-11 RX ADMIN — RIVAROXABAN 15 MG: 15 TABLET, FILM COATED ORAL at 09:05

## 2020-06-11 RX ADMIN — DIVALPROEX SODIUM 250 MG: 125 CAPSULE, COATED PELLETS ORAL at 09:05

## 2020-06-11 RX ADMIN — MULTIPLE VITAMINS W/ MINERALS TAB 1 TABLET: TAB at 09:05

## 2020-06-11 RX ADMIN — Medication 5000 UNITS: at 09:05

## 2020-06-11 RX ADMIN — Medication 500 MG: at 09:05

## 2020-06-11 ASSESSMENT — PAIN SCALES - PAIN ASSESSMENT IN ADVANCED DEMENTIA (PAINAD)
CONSOLABILITY: 0
BREATHING: 0
FACIALEXPRESSION: 0
BODYLANGUAGE: 0
NEGVOCALIZATION: 0
TOTALSCORE: 0

## 2020-06-11 ASSESSMENT — PAIN SCALES - WONG BAKER: WONGBAKER_NUMERICALRESPONSE: 0

## 2020-06-11 ASSESSMENT — PAIN SCALES - GENERAL: PAINLEVEL_OUTOF10: 0

## 2020-06-11 NOTE — CARE COORDINATION
Patient accepted for admit by [address] [place] [address] Arranged for transfer at 2pm via [employer]. Son aware and in agreement with plan. Hens completed.    Electronically signed by Jean Garcia on 6/11/2020 at 11:46 AM

## 2020-06-11 NOTE — PROGRESS NOTES
Pharmacy Heart Failure Medication Reconciliation Note    Pt discharged from [facility] today after admission for SOB and fatigue for 3 days. Jay Hadley had a diagnosis of acute heart failure ruled out this admission (last EF = 60-65% on 4/1/19). Pertinent Labs:  BMP:  Lab Results   Component Value Date     06/09/2020    K 3.1 06/09/2020    K 4.1 06/04/2020     06/09/2020    CO2 25 06/09/2020    BUN 29 06/09/2020    MG 1.90 06/09/2020     BNP: No components found for: PRO-BNP    Patient taking an ACEI / ARB / Entresto:  No: EF is over 40%  Patient taking a BETA BLOCKER:  Yes. Toprol XL 50 mg  Patient taking a LOOP DIURETIC: Yes. Lasix 20 mg daily as needed for weight gain > 2 lbs in 2 days, home medication prescribed prior to admission  Patient taking a ALDOSTERONE RECEPTOR ANTAGONIST: No: EF is greater than 35%    Patient has a diagnosis of Atrial Fibrillation: Yes. If yes, patient is on appropriate anticoagulation: Yes. Xarelto 15 mg    Patient has a diagnosis of type 2 diabetes:  No. If yes, patient prescribed the following anti-hyperglycemic medication at discharge: n/a      Corrections to discharge medications include: Added as needed indication for Lasix.     Discharge Medications:         Medication List        START taking these medications      cefUROXime 250 MG tablet  Commonly known as:  Ceftin  Take 1 tablet by mouth 2 times daily for 1 day            CONTINUE taking these medications      calcium carbonate 500 MG Tabs tablet  Commonly known as:  OSCAL     Depakote Sprinkles 125 MG capsule  Generic drug:  divalproex     Dulera 200-5 MCG/ACT inhaler  Generic drug:  mometasone-formoterol     furosemide 20 MG tablet  Commonly known as:  LASIX     metoprolol succinate 50 MG extended release tablet  Commonly known as:  TOPROL XL     multivitamin with minerals tablet     rivaroxaban 15 MG Tabs tablet  Commonly known as:  XARELTO     Ventolin  (90 Base) MCG/ACT inhaler  Generic drug:  albuterol sulfate HFA     Vitamin D3 125 MCG (5000 UT) Tabs               Where to Get Your Medications        You can get these medications from any pharmacy    Bring a paper prescription for each of these medications  cefUROXime 250 MG tablet         Celia Contreras, [address]  Heart Failure Discharge Medication Reconciliation Program  [phone]

## 2020-06-11 NOTE — PROGRESS NOTES
Patient in bed, A&O to self and place only. Tele camera in place. Patient tolerating PO intake well. PM medications given without complications. Patient denies pain, nausea or vomiting. Call light within reach, able to make needs known, fall precautions in place. Will monitor.       Electronically signed by Teresita Fregoso RN on 6/10/2020 at 8:27 PM

## 2020-06-11 NOTE — PROGRESS NOTES
Patient resting comfortably in bed at this time. Tolerating PO. Tele cam in place. Call light within reach. Will continue to monitor and reassess.  Electronically signed by Yaquelin Johnson RN on 6/11/2020

## 2020-06-11 NOTE — PROGRESS NOTES
Pt discharged to [facility]. Transportation here with stretcher. Transported in ambulance. Discharge instructions and personal belongings given to transport. Explanation of discharge medications and instructions understood by verbal statement given via report to GO Rey, at [facility]. No questions, comments or concerns at this time.  Electronically signed by Patrick Peng RN on 6/11/2020

## 2020-06-11 NOTE — PLAN OF CARE
neurological deterioration signs and symptoms  6/11/2020 0935 by Sherlyn Mulligan RN  Outcome: Ongoing     Problem: Confusion - Acute:  Goal: Mental status will be restored to baseline  Description: Mental status will be restored to baseline  6/11/2020 0935 by Sherlyn Mulligan RN  Outcome: Ongoing     Problem: Discharge Planning:  Goal: Ability to perform activities of daily living will improve  Description: Ability to perform activities of daily living will improve  6/11/2020 0935 by Sherlyn Mulligan RN  Outcome: Ongoing     Problem: Discharge Planning:  Goal: Participates in care planning  Description: Participates in care planning  6/11/2020 0935 by Shrelyn Mulligan RN  Outcome: Ongoing     Problem: Injury - Risk of, Physical Injury:  Goal: Will remain free from falls  Description: Will remain free from falls  6/11/2020 0935 by Sherlyn Mulligan RN  Outcome: Ongoing     Problem: Injury - Risk of, Physical Injury:  Goal: Absence of physical injury  Description: Absence of physical injury  6/11/2020 0935 by Sherlyn Mulligan RN  Outcome: Ongoing     Problem: Mood - Altered:  Goal: Mood stable  Description: Mood stable  6/11/2020 0935 by Sherlyn Mulligan RN  Outcome: Ongoing     Problem: Mood - Altered:  Goal: Absence of abusive behavior  Description: Absence of abusive behavior  6/11/2020 0935 by Sherlyn Mulligan RN  Outcome: Ongoing     Problem: Mood - Altered:  Goal: Verbalizations of feeling emotionally comfortable while being cared for will increase  Description: Verbalizations of feeling emotionally comfortable while being cared for will increase  6/11/2020 0935 by Sherlyn Mulligan RN  Outcome: Ongoing     Problem: Psychomotor Activity - Altered:  Goal: Absence of psychomotor disturbance signs and symptoms  Description: Absence of psychomotor disturbance signs and symptoms  6/11/2020 0935 by Sherlyn Mulligan RN  Outcome: Ongoing     Problem: Sensory Perception - Impaired:  Goal: Demonstrations of improved sensory functioning will increase  Description: Demonstrations of improved sensory functioning will increase  6/11/2020 0935 by Sherlyn Mulligan RN  Outcome: Ongoing     Problem: Sensory Perception - Impaired:  Goal: Decrease in sensory misperception frequency  Description: Decrease in sensory misperception frequency  6/11/2020 0935 by Sherlyn Mulligan RN  Outcome: Ongoing     Problem: Sensory Perception - Impaired:  Goal: Able to refrain from responding to false sensory perceptions  Description: Able to refrain from responding to false sensory perceptions  6/11/2020 0935 by Sherlyn Mulligan RN  Outcome: Ongoing     Problem: Sensory Perception - Impaired:  Goal: Able to distinguish between reality-based and nonreality-based thinking  Description: Able to distinguish between reality-based and nonreality-based thinking  6/11/2020 0935 by Sherlyn Mulligan RN  Outcome: Ongoing     Problem: Sensory Perception - Impaired:  Goal: Able to interrupt nonreality-based thinking  Description: Able to interrupt nonreality-based thinking  6/11/2020 0935 by Sherlyn Mulligan RN  Outcome: Ongoing     Problem: Sleep Pattern Disturbance:  Goal: Appears well-rested  Description: Appears well-rested  6/11/2020 0935 by Sherlyn Mulligan RN  Outcome: Ongoing

## 2020-11-28 ENCOUNTER — APPOINTMENT (OUTPATIENT)
Dept: CT IMAGING | Age: 85
DRG: 291 | End: 2020-11-28
Payer: MEDICARE

## 2020-11-28 ENCOUNTER — HOSPITAL ENCOUNTER (INPATIENT)
Age: 85
LOS: 8 days | Discharge: HOME | DRG: 291 | End: 2020-12-08
Attending: STUDENT IN AN ORGANIZED HEALTH CARE EDUCATION/TRAINING PROGRAM | Admitting: INTERNAL MEDICINE
Payer: MEDICARE
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
## 2020-11-28 LAB
A/G RATIO: 1.6 (ref 1.1–2.2)
ALBUMIN SERPL-MCNC: 3.8 G/DL (ref 3.4–5)
ALP BLD-CCNC: 180 U/L (ref 40–129)
ALT SERPL-CCNC: 22 U/L (ref 10–40)
ANION GAP SERPL CALCULATED.3IONS-SCNC: 10 MMOL/L (ref 3–16)
AST SERPL-CCNC: 29 U/L (ref 15–37)
BASOPHILS ABSOLUTE: 0 K/UL (ref 0–0.2)
BASOPHILS RELATIVE PERCENT: 0.9 %
BILIRUB SERPL-MCNC: 0.8 MG/DL (ref 0–1)
BILIRUBIN URINE: NEGATIVE
BLOOD, URINE: NEGATIVE
BUN BLDV-MCNC: 22 MG/DL (ref 7–20)
CALCIUM SERPL-MCNC: 10.1 MG/DL (ref 8.3–10.6)
CHLORIDE BLD-SCNC: 109 MMOL/L (ref 99–110)
CLARITY: CLEAR
CO2: 25 MMOL/L (ref 21–32)
COLOR: YELLOW
CREAT SERPL-MCNC: 0.8 MG/DL (ref 0.6–1.2)
EOSINOPHILS ABSOLUTE: 0.1 K/UL (ref 0–0.6)
EOSINOPHILS RELATIVE PERCENT: 2.3 %
EPITHELIAL CELLS, UA: 0 /HPF (ref 0–5)
GFR AFRICAN AMERICAN: >60
GFR NON-AFRICAN AMERICAN: >60
GLOBULIN: 2.4 G/DL
GLUCOSE BLD-MCNC: 104 MG/DL (ref 70–99)
GLUCOSE URINE: NEGATIVE MG/DL
HCT VFR BLD CALC: 39.5 % (ref 36–48)
HEMOGLOBIN: 12.4 G/DL (ref 12–16)
HYALINE CASTS: 4 /LPF (ref 0–8)
KETONES, URINE: NEGATIVE MG/DL
LEUKOCYTE ESTERASE, URINE: NEGATIVE
LYMPHOCYTES ABSOLUTE: 0.7 K/UL (ref 1–5.1)
LYMPHOCYTES RELATIVE PERCENT: 14 %
MCH RBC QN AUTO: 28.1 PG (ref 26–34)
MCHC RBC AUTO-ENTMCNC: 31.5 G/DL (ref 31–36)
MCV RBC AUTO: 89.1 FL (ref 80–100)
MICROSCOPIC EXAMINATION: YES
MONOCYTES ABSOLUTE: 0.9 K/UL (ref 0–1.3)
MONOCYTES RELATIVE PERCENT: 16.9 %
NEUTROPHILS ABSOLUTE: 3.4 K/UL (ref 1.7–7.7)
NEUTROPHILS RELATIVE PERCENT: 65.9 %
NITRITE, URINE: NEGATIVE
PDW BLD-RTO: 15.6 % (ref 12.4–15.4)
PH UA: 6.5 (ref 5–8)
PLATELET # BLD: 190 K/UL (ref 135–450)
PMV BLD AUTO: 10.1 FL (ref 5–10.5)
POTASSIUM REFLEX MAGNESIUM: 4.1 MMOL/L (ref 3.5–5.1)
PRO-BNP: 2601 PG/ML (ref 0–449)
PROTEIN UA: 30 MG/DL
RBC # BLD: 4.43 M/UL (ref 4–5.2)
RBC UA: 1 /HPF (ref 0–4)
SODIUM BLD-SCNC: 144 MMOL/L (ref 136–145)
SPECIFIC GRAVITY UA: >1.03 (ref 1–1.03)
TOTAL PROTEIN: 6.2 G/DL (ref 6.4–8.2)
TROPONIN: <0.01 NG/ML
URINE REFLEX TO CULTURE: YES
URINE TYPE: ABNORMAL
UROBILINOGEN, URINE: 1 E.U./DL
WBC # BLD: 5.1 K/UL (ref 4–11)
WBC UA: 45 /HPF (ref 0–5)

## 2020-11-28 PROCEDURE — G0378 HOSPITAL OBSERVATION PER HR: HCPCS

## 2020-11-28 PROCEDURE — 36415 COLL VENOUS BLD VENIPUNCTURE: CPT

## 2020-11-28 PROCEDURE — 80053 COMPREHEN METABOLIC PANEL: CPT

## 2020-11-28 PROCEDURE — 87077 CULTURE AEROBIC IDENTIFY: CPT

## 2020-11-28 PROCEDURE — 74177 CT ABD & PELVIS W/CONTRAST: CPT

## 2020-11-28 PROCEDURE — 72125 CT NECK SPINE W/O DYE: CPT

## 2020-11-28 PROCEDURE — 6360000004 HC RX CONTRAST MEDICATION: Performed by: STUDENT IN AN ORGANIZED HEALTH CARE EDUCATION/TRAINING PROGRAM

## 2020-11-28 PROCEDURE — 84484 ASSAY OF TROPONIN QUANT: CPT

## 2020-11-28 PROCEDURE — 87186 SC STD MICRODIL/AGAR DIL: CPT

## 2020-11-28 PROCEDURE — 87086 URINE CULTURE/COLONY COUNT: CPT

## 2020-11-28 PROCEDURE — 81001 URINALYSIS AUTO W/SCOPE: CPT

## 2020-11-28 PROCEDURE — 93005 ELECTROCARDIOGRAM TRACING: CPT | Performed by: STUDENT IN AN ORGANIZED HEALTH CARE EDUCATION/TRAINING PROGRAM

## 2020-11-28 PROCEDURE — 72131 CT LUMBAR SPINE W/O DYE: CPT

## 2020-11-28 PROCEDURE — 70450 CT HEAD/BRAIN W/O DYE: CPT

## 2020-11-28 PROCEDURE — 72128 CT CHEST SPINE W/O DYE: CPT

## 2020-11-28 PROCEDURE — 85025 COMPLETE CBC W/AUTO DIFF WBC: CPT

## 2020-11-28 PROCEDURE — 83880 ASSAY OF NATRIURETIC PEPTIDE: CPT

## 2020-11-28 PROCEDURE — 99284 EMERGENCY DEPT VISIT MOD MDM: CPT

## 2020-11-28 RX ORDER — ONDANSETRON 2 MG/ML
4 INJECTION INTRAMUSCULAR; INTRAVENOUS EVERY 6 HOURS PRN
Status: DISCONTINUED | OUTPATIENT
Start: 2020-11-28 | End: 2020-12-08 | Stop reason: HOSPADM

## 2020-11-28 RX ORDER — DIVALPROEX SODIUM 500 MG/1
500 TABLET, DELAYED RELEASE ORAL NIGHTLY
Status: DISCONTINUED | OUTPATIENT
Start: 2020-11-28 | End: 2020-11-30

## 2020-11-28 RX ORDER — METOPROLOL SUCCINATE 50 MG/1
50 TABLET, EXTENDED RELEASE ORAL DAILY
Status: DISCONTINUED | OUTPATIENT
Start: 2020-11-29 | End: 2020-12-08 | Stop reason: HOSPADM

## 2020-11-28 RX ORDER — ACETAMINOPHEN 325 MG/1
650 TABLET ORAL EVERY 6 HOURS PRN
Status: DISCONTINUED | OUTPATIENT
Start: 2020-11-28 | End: 2020-12-08 | Stop reason: HOSPADM

## 2020-11-28 RX ORDER — DIVALPROEX SODIUM 500 MG/1
500 TABLET, DELAYED RELEASE ORAL NIGHTLY
Status: ON HOLD | COMMUNITY
End: 2020-11-29

## 2020-11-28 RX ORDER — BUDESONIDE AND FORMOTEROL FUMARATE DIHYDRATE 160; 4.5 UG/1; UG/1
2 AEROSOL RESPIRATORY (INHALATION) 2 TIMES DAILY
Status: DISCONTINUED | OUTPATIENT
Start: 2020-11-28 | End: 2020-12-01

## 2020-11-28 RX ORDER — PROMETHAZINE HYDROCHLORIDE 25 MG/1
12.5 TABLET ORAL EVERY 6 HOURS PRN
Status: DISCONTINUED | OUTPATIENT
Start: 2020-11-28 | End: 2020-12-08 | Stop reason: HOSPADM

## 2020-11-28 RX ORDER — SODIUM CHLORIDE 0.9 % (FLUSH) 0.9 %
10 SYRINGE (ML) INJECTION PRN
Status: DISCONTINUED | OUTPATIENT
Start: 2020-11-28 | End: 2020-12-08 | Stop reason: HOSPADM

## 2020-11-28 RX ORDER — ACETAMINOPHEN 650 MG/1
650 SUPPOSITORY RECTAL EVERY 6 HOURS PRN
Status: DISCONTINUED | OUTPATIENT
Start: 2020-11-28 | End: 2020-12-08 | Stop reason: HOSPADM

## 2020-11-28 RX ORDER — DIVALPROEX SODIUM 125 MG/1
125 CAPSULE, COATED PELLETS ORAL DAILY
Status: DISCONTINUED | OUTPATIENT
Start: 2020-11-29 | End: 2020-11-28 | Stop reason: ALTCHOICE

## 2020-11-28 RX ORDER — SODIUM CHLORIDE 0.9 % (FLUSH) 0.9 %
10 SYRINGE (ML) INJECTION EVERY 12 HOURS SCHEDULED
Status: DISCONTINUED | OUTPATIENT
Start: 2020-11-28 | End: 2020-12-08 | Stop reason: HOSPADM

## 2020-11-28 RX ADMIN — IOPAMIDOL 75 ML: 755 INJECTION, SOLUTION INTRAVENOUS at 18:55

## 2020-11-28 ASSESSMENT — PAIN SCALES - WONG BAKER: WONGBAKER_NUMERICALRESPONSE: 0

## 2020-11-28 NOTE — ED NOTES
Patient had large BM prior to writer taking patient to CT. Writer attempted to clean patient up, however patient continuing to scream and yell. New attends placed on patient at this time. Spoke with Dr. Elva Gutierrez regarding taking patient to CT prior to labs resulting d/t trauma. Per Dr. Elva Gutierrez, do not wait on the labs for CT d/t trauma and patient being on blood thinners.       Jacquelyn Tavarez, DANIA  11/28/20 2073

## 2020-11-28 NOTE — ED NOTES
Bed: A-14  Expected date: 11/28/20  Expected time: 5:25 PM  Means of arrival: [facility]  Comments:  [address] RN  11/28/20 1629

## 2020-11-28 NOTE — ED NOTES
Patient taken to CT on stretcher and cardiac monitor assisted by writer. Patient continuing to yell \"hurry up! \" and \"Get this off of me! \" patient refusing to answer questions asked by Nellie Jolley.       Philippe Jackson RN  11/28/20 1991

## 2020-11-28 NOTE — ED PROVIDER NOTES
Primary Care Physician: No primary care provider on file. Attending Physician: Deandre Roman MD     History   Chief Complaint   Patient presents with   Ghassan Jackson     pt brought to ED via EMS from home after falling down 2 stairs backwards. + LOC per bystanders. pt. has hx dementia with baseline confusion. per EMS pt. is on blood thinners (per family). LATOYA Sidhu is a 80 y.o. female of history Alzheimer's, A. fib on Xarelto, hypertension presenting this evening brought by EMS after a fall. She cannot provide history but per EMS she fell 2 flights of steps landing on her head with LOC and was actively vomiting when she presented. Events leading to her fall at this time unknown. Patient was moaning in pain when she presented with a large bowel movement all over her rectum. Past Medical History:   Diagnosis Date    [facility]     Atrial fibrillation [facility]     Benign essential HTN     Gout         History reviewed. No pertinent surgical history. History reviewed. No pertinent family history.      Social History     Socioeconomic History    Marital status: Single     Spouse name: None    Number of children: None    Years of education: None    Highest education level: None   Occupational History    None   Social Needs    Financial resource strain: None    Food insecurity     Worry: None     Inability: None    Transportation needs     Medical: None     Non-medical: None   Tobacco Use    Smoking status: Unknown If Ever Smoked    Smokeless tobacco: Never Used   Substance and Sexual Activity    Alcohol use: Never     Frequency: Never     Binge frequency: Never    Drug use: Never    Sexual activity: Not Currently   Lifestyle    Physical activity     Days per week: None     Minutes per session: None    Stress: None   Relationships    Social connections     Talks on phone: None     Gets together: None     Attends Restorationism service: None     Active member of club or organization: None     Attends meetings of clubs or organizations: None     Relationship status: None    Intimate partner violence     Fear of current or ex partner: None     Emotionally abused: None     Physically abused: None     Forced sexual activity: None   Other Topics Concern    None   Social History Narrative    None        Review of Systems   10 total systems reviewed but unable to obtain secondary to mental status    Physical Exam   BP (!) 162/74   Pulse 75   Temp 97.4 °F (36.3 °C) (Oral)   Resp 18   Ht 5' 6\" (1.676 m)   Wt 208 lb 1.8 oz (94.4 kg)   SpO2 96%   BMI 33.59 kg/m²      CONSTITUTIONAL: Well appearing, in no acute distress   HEAD: atraumatic, normocephalic   EYES: PERRL, No injection, discharge or scleral icterus. ENT: Moist mucous membranes. NECK: Normal ROM, NO LAD   CARDIOVASCULAR: Regular rate and rhythm. No murmurs or gallop. PULMONARY/CHEST: Airway patent. No retractions. Breath sounds clear with good air entry bilaterally. ABDOMEN: Soft, Non-distended and non-tender, without guarding or rebound. SKIN: Acyanotic, warm, dry   MUSCULOSKELETAL: No swelling, tenderness or deformity   NEUROLOGICAL: Awake and alert. Pulses intact. Grossly nonfocal   Nursing note and vitals reviewed.      ED Course & Medical Decision Making   Medications   budesonide-formoterol (SYMBICORT) 160-4.5 MCG/ACT inhaler 2 puff (2 puffs Inhalation Given 11/29/20 2048)   metoprolol succinate (TOPROL XL) extended release tablet 50 mg (50 mg Oral Given 11/29/20 0936)   sodium chloride flush 0.9 % injection 10 mL (10 mLs Intravenous Given 11/29/20 2033)   sodium chloride flush 0.9 % injection 10 mL (has no administration in time range)   acetaminophen (TYLENOL) tablet 650 mg (has no administration in time range)     Or   acetaminophen (TYLENOL) suppository 650 mg (has no administration in time range)   promethazine (PHENERGAN) tablet 12.5 mg (has no administration in time range)     Or   ondansetron [facility]) injection 4 mg (has no administration in time range)   enoxaparin (LOVENOX) injection 40 mg (40 mg Subcutaneous Given 11/29/20 0936)   divalproex (DEPAKOTE) DR tablet 500 mg (500 mg Oral Not Given 11/29/20 2034)   cefTRIAXone (ROCEPHIN) 1 g IVPB in 50 mL D5W minibag (0 g Intravenous Stopped 11/29/20 1041)   iopamidol (ISOVUE-370) 76 % injection 75 mL (75 mLs Intravenous Given 11/28/20 1855)      Labs Reviewed   CBC WITH AUTO DIFFERENTIAL - Abnormal; Notable for the following components:       Result Value    RDW 15.6 (*)     Lymphocytes Absolute 0.7 (*)     All other components within normal limits    Narrative:     Performed at:  [address] [place] [address]   Phone [phone]   COMPREHENSIVE METABOLIC PANEL W/ REFLEX TO MG FOR LOW K - Abnormal; Notable for the following components:    Glucose 104 (*)     BUN 22 (*)     Total Protein 6.2 (*)     Alkaline Phosphatase 180 (*)     All other components within normal limits    Narrative:     Performed at:  [address] [place] [address]   Phone [phone]   URINE RT REFLEX TO CULTURE - Abnormal; Notable for the following components:    Protein, UA 30 (*)     All other components within normal limits    Narrative:     Performed at:  [address] [place] [address]   Phone [phone]   BRAIN NATRIURETIC PEPTIDE - Abnormal; Notable for the following components:    Pro-BNP 2,601 (*)     All other components within normal limits    Narrative:     Performed at:  [address] [place] [address]   Phone [phone]   MICROSCOPIC URINALYSIS - Abnormal; Notable for the following components:    WBC, UA 45 (*)     All other components within normal limits    Narrative:     Performed at:  [facility]  [address] [facility]) [place], [place] [address]   Phone [phone]   VALPROIC ACID LEVEL, TOTAL - Abnormal; Notable for the following components:    Valproic Acid Lvl 8.2 (*)     All other components within normal limits    Narrative:     Performed at:  [facility]  [address] [place], [place] [address]   Phone [phone]   CULTURE, URINE   TROPONIN    Narrative:     Performed at:  [facility]  [address] [place], [place] [address]   Phone [phone]   COVID-19      CT Head WO Contrast   Final Result   1. No evidence of acute intracranial trauma. 2. Moderate cerebral white matter chronic microvascular ischemic disease. 3. Right temporal occipital encephalomalacia and volume loss underlying right   parietal temporal craniotomy. 4. Moderate diffuse cerebral atrophy. CT Cervical Spine WO Contrast   Final Result   1. No evidence of acute intracranial trauma. 2. Moderate cerebral white matter chronic microvascular ischemic disease. 3. Right temporal occipital encephalomalacia and volume loss underlying right   parietal temporal craniotomy. 4. Moderate diffuse cerebral atrophy. CT CHEST ABDOMEN PELVIS W CONTRAST   Final Result   1. CT CHEST: No acute traumatic abnormality. 2.  Congestive heart failure is most likely given the CT findings; pneumonia   is also a consideration in areas of consolidation with pleural effusion. 3. Cardiomegaly, predominantly right-sided enlargement. 4. CT ABDOMEN/PELVIS: No acute traumatic abnormality. 5. Thick-walled gallbladder with pericholecystic fluid as can be seen with   cholecystitis. 6.  Diverticulosis coli without CT evidence of acute diverticulitis. 7. CT THORACIC/LUMBAR SPINE: Correlate with report from study performed at   the same time. CT Lumbar Spine WO Contrast   Final Result   1. No evidence of acute intracranial trauma.    2. Moderate cerebral white matter chronic microvascular ischemic disease. 3. Right temporal occipital encephalomalacia and volume loss underlying right   parietal temporal craniotomy. 4. Moderate diffuse cerebral atrophy. CT THORACIC SPINE WO CONTRAST   Final Result   1. No evidence of acute intracranial trauma. 2. Moderate cerebral white matter chronic microvascular ischemic disease. 3. Right temporal occipital encephalomalacia and volume loss underlying right   parietal temporal craniotomy. 4. Moderate diffuse cerebral atrophy. No results found. EKG INTERPRETATION:  EKG by my preliminary interpretation shows for with a rate of 59, normal axis, normal intervals, with no ST changes indicative of ischemia at this time. PROCEDURES:   Procedures    ASSESSMENT AND PLAN:  Hema Cuellar is a 80 y.o. female of history Alzheimer's, A. fib on Xarelto, hypertension presenting this evening brought by EMS after a fall. She cannot provide history but per EMS she fell 2 flights of steps landing on her head with LOC and was actively vomiting when she presented. Events leading to her fall at this time unknown. On exam patient actively vomiting and moaning in pain as well as confused. Her vitals were within normal limits and afebrile. Given her fall 2 flights of steps CTs head, spine, and chest abdomen pelvic were obtained and results showed no abnormal traumatic findings. However, CT abdomen and pelvic concerning for cholecystitis. Labs with no significant abnormalities apart from elevated alk phos. because of a fall at this time is unknown. She will need at least a syncope evaluation. Given the findings of cholecystitis in the abdomen, surgery was consulted. At this time, they are recommending no emergent intervention. When the patient is medically cleared she can be seen by them and will probably do an outpatient follow-up. Thank you. [facility] [id number]. ClINICAL IMPRESSION:  1. Fall, initial encounter    2.

## 2020-11-29 LAB
EKG ATRIAL RATE: 82 BPM
EKG DIAGNOSIS: NORMAL
EKG Q-T INTERVAL: 520 MS
EKG QRS DURATION: 90 MS
EKG QTC CALCULATION (BAZETT): 514 MS
EKG R AXIS: 55 DEGREES
EKG T AXIS: 17 DEGREES
EKG VENTRICULAR RATE: 59 BPM
VALPROIC ACID LEVEL: 8.2 UG/ML (ref 50–100)

## 2020-11-29 PROCEDURE — 6370000000 HC RX 637 (ALT 250 FOR IP): Performed by: INTERNAL MEDICINE

## 2020-11-29 PROCEDURE — 96372 THER/PROPH/DIAG INJ SC/IM: CPT

## 2020-11-29 PROCEDURE — 96365 THER/PROPH/DIAG IV INF INIT: CPT

## 2020-11-29 PROCEDURE — 2580000003 HC RX 258: Performed by: INTERNAL MEDICINE

## 2020-11-29 PROCEDURE — 2580000003 HC RX 258: Performed by: HOSPITALIST

## 2020-11-29 PROCEDURE — 6360000002 HC RX W HCPCS: Performed by: INTERNAL MEDICINE

## 2020-11-29 PROCEDURE — 94760 N-INVAS EAR/PLS OXIMETRY 1: CPT

## 2020-11-29 PROCEDURE — U0002 COVID-19 LAB TEST NON-CDC: HCPCS

## 2020-11-29 PROCEDURE — 6360000002 HC RX W HCPCS: Performed by: HOSPITALIST

## 2020-11-29 PROCEDURE — 80164 ASSAY DIPROPYLACETIC ACD TOT: CPT

## 2020-11-29 PROCEDURE — G0378 HOSPITAL OBSERVATION PER HR: HCPCS

## 2020-11-29 PROCEDURE — U0003 INFECTIOUS AGENT DETECTION BY NUCLEIC ACID (DNA OR RNA); SEVERE ACUTE RESPIRATORY SYNDROME CORONAVIRUS 2 (SARS-COV-2) (CORONAVIRUS DISEASE [COVID-19]), AMPLIFIED PROBE TECHNIQUE, MAKING USE OF HIGH THROUGHPUT TECHNOLOGIES AS DESCRIBED BY CMS-2020-01-R: HCPCS

## 2020-11-29 PROCEDURE — 94640 AIRWAY INHALATION TREATMENT: CPT

## 2020-11-29 PROCEDURE — 93010 ELECTROCARDIOGRAM REPORT: CPT | Performed by: INTERNAL MEDICINE

## 2020-11-29 RX ORDER — DIVALPROEX SODIUM 125 MG/1
125 TABLET, DELAYED RELEASE ORAL NIGHTLY
COMMUNITY
End: 2021-03-29

## 2020-11-29 RX ADMIN — BUDESONIDE AND FORMOTEROL FUMARATE DIHYDRATE 2 PUFF: 160; 4.5 AEROSOL RESPIRATORY (INHALATION) at 08:45

## 2020-11-29 RX ADMIN — METOPROLOL SUCCINATE 50 MG: 50 TABLET, EXTENDED RELEASE ORAL at 09:36

## 2020-11-29 RX ADMIN — CEFTRIAXONE 1 G: 1 INJECTION, POWDER, FOR SOLUTION INTRAMUSCULAR; INTRAVENOUS at 09:35

## 2020-11-29 RX ADMIN — ENOXAPARIN SODIUM 40 MG: 40 INJECTION SUBCUTANEOUS at 09:36

## 2020-11-29 RX ADMIN — SODIUM CHLORIDE, PRESERVATIVE FREE 10 ML: 5 INJECTION INTRAVENOUS at 20:33

## 2020-11-29 RX ADMIN — SODIUM CHLORIDE, PRESERVATIVE FREE 10 ML: 5 INJECTION INTRAVENOUS at 09:37

## 2020-11-29 RX ADMIN — BUDESONIDE AND FORMOTEROL FUMARATE DIHYDRATE 2 PUFF: 160; 4.5 AEROSOL RESPIRATORY (INHALATION) at 20:48

## 2020-11-29 ASSESSMENT — PAIN SCALES - GENERAL
PAINLEVEL_OUTOF10: 0
PAINLEVEL_OUTOF10: 0

## 2020-11-29 NOTE — ED NOTES
Patient transferred back to room from radiology at this time via stretcher accompanied by Roxann Borjas.       Jose Encinas RN  11/28/20 7164

## 2020-11-29 NOTE — H&P
[address] [place] [address]                              HISTORY AND PHYSICAL    PATIENT NAME: Murali Aragon                       :        1931  MED REC NO:   [id number]                          ROOM:       3130  ACCOUNT NO:   [de-identified]                           ADMIT DATE: 2020  PROVIDER:     Alisson Wynn MD    I obtained the history and performed the physical exam on the patient on  the medical floor on 2020. SOURCE OF HISTORY:  ER documentation. The patient has got significantly  advanced dementia and is unable to provide any history. CHIEF COMPLAINT:  \"Shut the windows down. \"    HISTORY OF PRESENT ILLNESS:  An 80-year-old  female with  advanced dementia of Alzheimer's type who is unable to provide any  history, keeps repeating the same thing again and again and asked me to  shut the windows down, presents to the hospital with what appears to be  a fall at home with two flights of stairs. Patient apparently is  actively vomiting and had lots of consciousness when she presented to  the hospital.  At the time of my exam, the patient is awake but not  oriented to place or person. PAST MEDICAL/PAST SURGICAL HISTORY:  1. Dementia. 2.  Atrial fibrillation. 3.  Hypertension. 4.  Gout. PAST SURGICAL HISTORY:  No major surgical procedures. ALLERGIC HISTORY:  The patient is allergic to HYDROCHLOROTHIAZIDE and  CHLORPROMAZINE. FAMILY HISTORY:  Reviewed by me and is currently noncontributory. SOCIAL HISTORY:  Lives at home. No illicit substance use. MEDICATIONS:  Home medication list reviewed. It includes Depakote,  Toprol XL, Dulera, vitamin D3, Os-Raji, Lasix, albuterol, and  rivaroxaban. REVIEW OF SYSTEMS:  Unable to provide a good review of systems because  of her mentation.     PHYSICAL EXAMINATION:  VITAL SIGNS:  Temperature is 97.2, respiratory rate 18, pulse is 54,  blood pressure 185/94, saturating 99%. CNS:  Patient is awake, but orientation to time, place, and person  cannot be tested. PSYCH:  The patient is mildly agitated but is not trying to climb out of  the bed. EYES:  Pupils are bilaterally equal.  ENT:  No oral mucosal lesions. RESPIRATORY SYSTEM:  No obvious rales or rhonchi are audible. CVS:  Nontachycardic. ABDOMEN:  Nondistended. MUSCULOSKELETAL EXAM:  No acute obvious deformities. SKIN:  Without obvious rashes or lesions. DIAGNOSTIC DATA:  UA negative for infection. BNP 2601. CT chest abdomen and pelvis shows no acute traumatic abnormality with  possibility of congestive heart failure. CT C-spine shows no evidence  of acute trauma. Thoracic spine does not show any evidence of trauma. CT head does not show any intracranial anomalies. CBC shows a white count of 5.1, platelets 562, lymphocytes 0.7. Comprehensive metabolic panel showed a BUN of 22, creatinine 0.8. CONSULTATIONS:  ER requested a consultation to General Surgery. ASSESSMENT:  1. Syncope. 2.  Advanced dementia. 3.  Lymphopenia. 4.  Paroxysmal atrial fibrillation. PLAN OF CARE:  The patient is admitted to Observation. Telemetry will  be continued. COVID testing has been requested given the lymphopenia  with the advanced age and the fall. The patient's rivaroxaban will be  withheld at this point in time. N.p.o. status will be continued until  the patient's mentation can be more carefully assess to prevent the risk  of aspiration. CODE STATUS:  Full. EXPECTED LENGTH OF STAY:  Less than two midnights based on the plan of  care above. RISK:  High due to the patient's presentation with the fall which could  be because of a syncopal event. DISPOSITION:  Observation telemetry.         Franck Carpenter MD    D: 11/29/2020 5:45:31       T: 11/29/2020 6:27:54     [email or web address]  Job#: [id number]     Doc#: [id number]    CC:

## 2020-11-29 NOTE — PROGRESS NOTES
[place]   HEENT: Pupils equal, round, and reactive to light. anicteric  . Neck: Supple, with full range of motion. No jugular venous distention. Trachea midline. Respiratory:  Normal respiratory effort. Clear to auscultation,   Cardiovascular: Regular rate and rhythm  S1/S2 without murmurs, rubs or gallops. Abdomen: Soft, non-tender, non-distended with normal bowel sounds. Musculoskeletal: No clubbing, cyanosis, trace edema bilaterally. Onychomycosis   Skin: Skin color, texture, turgor normal.  No rashes or lesions. Neurologic: nonfocal Cranial nerves: II-XII intact, except [place] grossly non-focal.  Psychiatric: Alert disoriented wide eyed doesn't know why here or where she is. Peripheral Pulses: +2 palpable, equal bilaterally onychomycosis trace LE edema bilaterally       Labs:   Recent Labs     11/28/20 1823   WBC 5.1   HGB 12.4   HCT 39.5        Recent Labs     11/28/20  1823      K 4.1      CO2 25   BUN 22*   CREATININE 0.8   CALCIUM 10.1     Recent Labs     11/28/20  1823   AST 29   ALT 22   BILITOT 0.8   ALKPHOS 180*     No results for input(s): INR in the last 72 hours. Recent Labs     11/28/20  1823   TROPONINI <0.01       Urinalysis:      Lab Results   Component Value Date    NITRU Negative 11/28/2020    WBCUA 45 11/28/2020    BACTERIA RARE 06/09/2020    RBCUA 1 11/28/2020    BLOODU Negative 11/28/2020    SPECGRAV >1.030 11/28/2020    GLUCOSEU Negative 11/28/2020       Radiology:  CT Head WO Contrast   Final Result   1. No evidence of acute intracranial trauma. 2. Moderate cerebral white matter chronic microvascular ischemic disease. 3. Right temporal occipital encephalomalacia and volume loss underlying right   parietal temporal craniotomy. 4. Moderate diffuse cerebral atrophy. CT Cervical Spine WO Contrast   Final Result   1. No evidence of acute intracranial trauma. 2. Moderate cerebral white matter chronic microvascular ischemic disease.    3. Right temporal --follow up urine cx  --start rocephin 1Gm IV daily     CT suggests some pericholecystic fluid possible cholecystitis  --general surgeon was consulted . She is asking about eating. Still on resp iso until CV19 test returns neg     DVT Prophylaxis: lovenox 40mg subq daily  Diet: Diet NPO Effective Now  Code Status: Full Code     PT/OT Eval Status: PT to see     Dispo - return home with family. CM to see ?  Home situation   45 minutes time spent   Miguelina Hassan MD

## 2020-11-29 NOTE — PROGRESS NOTES
4 Eyes Admission Assessment     I agree as the admission nurse that 2 RN's have performed a thorough Head to Toe Skin Assessment on the patient. ALL assessment sites listed below have been assessed on admission. Areas assessed by both nurses:   [x]   Head, Face, and Ears   [x]   Shoulders, Back, and Chest  [x]   Arms, Elbows, and Hands   [x]   Coccyx, Sacrum, and Ischum  [x]   Legs, Feet, and Heels        Does the Patient have Skin Breakdown?   No         Eddie Prevention initiated:  No   Wound Care Orders initiated:  No      [facility] nurse consulted for Pressure Injury (Stage 3,4, Unstageable, DTI, NWPT, and Complex wounds):  No      Nurse 1 eSignature: Electronically signed by Rachna Green RN on 11/28/20 at 11:17 PM EST    **SHARE this note so that the co-signing nurse is able to place an eSignature**    Nurse 2 eSignature: Electronically signed by Janetta Cogan, RN on 11/29/20 at 4:44 AM EST

## 2020-11-29 NOTE — PROGRESS NOTES
H/p dictation id [id number]. Date of service 11/29/20. Fall. Advanced dementia. Lymphopenia. Paf. Htn. COVID testing ordered.

## 2020-11-29 NOTE — PROGRESS NOTES
Pt to room 3130 from ED. Oriented to room and call light. No complaints at this time. Fall precautions in place, call light and bedside table within reach.

## 2020-11-29 NOTE — PROGRESS NOTES
Patient alert to self only. Denies pain. Morning medications given without difficulty. Had large urine occurrence. Complete bed change done. Patient with temperature of 94.2. Tolerating room air. PCR Covid swab done and sent to lab. Assessment completed. Patient follows some commands. Spoke with son, Shellie Doe, via phone. Requesting Dr. Jane Galindo to call him. MD made aware. Bed alarm on. Call light and belongings within reach. Will continue to monitor.      Electronically signed by Asuncion Bell RN on 11/29/2020 at 10:33 AM

## 2020-11-29 NOTE — PLAN OF CARE
Problem: Pain:  Goal: Pain level will decrease  Description: Pain level will decrease  Outcome: Ongoing  Note: Pt assessed for pain. Pt denies any pain at this time. Will continue to monitor pt and assess for pain throughout rest of shift. Goal: Control of acute pain  Description: Control of acute pain  Outcome: Ongoing  Note: Pt assessed for pain. Pt denies any pain at this time. Will continue to monitor pt and assess for pain throughout rest of shift. Goal: Control of chronic pain  Description: Control of chronic pain  Outcome: Ongoing  Note: Pt assessed for pain. Pt denies any pain at this time. Will continue to monitor pt and assess for pain throughout rest of shift. Problem: Skin Integrity:  Goal: Will show no infection signs and symptoms  Description: Will show no infection signs and symptoms  Outcome: Ongoing  Note: Pt is free of signs and symptoms of infection. Incision and dressing are clean, dry and intact. Vital signs stable. Will monitor. Goal: Absence of new skin breakdown  Description: Absence of new skin breakdown  Outcome: Ongoing     Problem: Falls - Risk of:  Goal: Will remain free from falls  Description: Will remain free from falls  Outcome: Ongoing  Note: Fall risk assessment completed. Fall precautions in place. Call light within reach. Pt educated on calling for assistance before getting up. Walkway free of clutter. Will continue to monitor. Goal: Absence of physical injury  Description: Absence of physical injury  Outcome: Ongoing  Note: Pt is free of injury. No injury noted. Fall precautions in place. Call light within reach. Will monitor.

## 2020-11-29 NOTE — PLAN OF CARE
Problem: Pain:  Goal: Pain level will decrease  Description: Pain level will decrease  11/29/2020 1030 by Sanket Echevarria RN  Outcome: Ongoing  Note: Educated patient on pain management. Will assess patients pain level per unit protocol, and provide pain management measures as needed. Electronically signed by Sanket Echevarria RN on 11/29/2020 at 10:30 AM      Problem: Skin Integrity:  Goal: Will show no infection signs and symptoms  Description: Will show no infection signs and symptoms  11/29/2020 1030 by Sanket Echevarria RN  Outcome: Ongoing  Note: Will monitor skin and mucous membranes. Will turn patient every 2 hours, monitor for friction and sheering, and change dressings as needed. Will preform skin assessment every shift. Electronically signed by Sanket Echevarria RN on 11/29/2020 at 10:30 AM      Problem: Falls - Risk of:  Goal: Will remain free from falls  Description: Will remain free from falls  11/29/2020 1030 by Sanket Echevarria RN  Outcome: Ongoing  Note: Fall risk assessment completed. Fall precautions in place. Call light within reach. Pt educated on calling for assistance before getting up. Walkway free of clutter. Will continue to monitor.

## 2020-11-29 NOTE — PROGRESS NOTES
General Surgery    Asked to see for gallbladder wall thickening and pericholecystic edema    Case was discussed with ER team last night and no treatment or intervention was recommended    Patient presented after falling down steps    No complaints of abdominal pain prior to her presentation or during her evaluation    CT shows bilateral pleural effusions from CHF    Her gallbladder findings are almost certainly secondary to her other medical issues    Low probability this is actually cholecystitis    No treatment required from my standpoint   At most a trial of antibiotics could be considered but I doubt this will change anything    OK to resume PO    No surgical plans.  Will stop by in AM if patient is still admitted    Electronically signed by Carli Mirza MD on 11/29/2020 at 12:09 PM

## 2020-11-30 PROBLEM — G93.41 ACUTE METABOLIC ENCEPHALOPATHY: Status: ACTIVE | Noted: 2020-11-30

## 2020-11-30 LAB
LV EF: 50 %
LVEF MODALITY: NORMAL
SARS-COV-2, PCR: NOT DETECTED

## 2020-11-30 PROCEDURE — 2580000003 HC RX 258: Performed by: INTERNAL MEDICINE

## 2020-11-30 PROCEDURE — 96372 THER/PROPH/DIAG INJ SC/IM: CPT

## 2020-11-30 PROCEDURE — 94640 AIRWAY INHALATION TREATMENT: CPT

## 2020-11-30 PROCEDURE — 1200000000 HC SEMI PRIVATE

## 2020-11-30 PROCEDURE — 93306 TTE W/DOPPLER COMPLETE: CPT

## 2020-11-30 PROCEDURE — 6370000000 HC RX 637 (ALT 250 FOR IP): Performed by: HOSPITALIST

## 2020-11-30 PROCEDURE — 96366 THER/PROPH/DIAG IV INF ADDON: CPT

## 2020-11-30 PROCEDURE — 94760 N-INVAS EAR/PLS OXIMETRY 1: CPT

## 2020-11-30 RX ORDER — QUETIAPINE FUMARATE 25 MG/1
25 TABLET, FILM COATED ORAL NIGHTLY
Status: DISCONTINUED | OUTPATIENT
Start: 2020-11-30 | End: 2020-12-03

## 2020-11-30 RX ORDER — DIVALPROEX SODIUM 125 MG/1
125 CAPSULE, COATED PELLETS ORAL
Status: DISCONTINUED | OUTPATIENT
Start: 2020-11-30 | End: 2020-12-08 | Stop reason: HOSPADM

## 2020-11-30 RX ORDER — HALOPERIDOL 5 MG/ML
2 INJECTION INTRAMUSCULAR EVERY 6 HOURS PRN
Status: DISCONTINUED | OUTPATIENT
Start: 2020-11-30 | End: 2020-12-05

## 2020-11-30 RX ADMIN — SODIUM CHLORIDE, PRESERVATIVE FREE 10 ML: 5 INJECTION INTRAVENOUS at 22:43

## 2020-11-30 RX ADMIN — DIVALPROEX SODIUM 125 MG: 125 CAPSULE ORAL at 17:31

## 2020-11-30 RX ADMIN — BUDESONIDE AND FORMOTEROL FUMARATE DIHYDRATE 2 PUFF: 160; 4.5 AEROSOL RESPIRATORY (INHALATION) at 08:58

## 2020-11-30 ASSESSMENT — PAIN SCALES - PAIN ASSESSMENT IN ADVANCED DEMENTIA (PAINAD)
BODYLANGUAGE: 0
BREATHING: 0
TOTALSCORE: 0
FACIALEXPRESSION: 0
CONSOLABILITY: 0
NEGVOCALIZATION: 0

## 2020-11-30 ASSESSMENT — PAIN SCALES - GENERAL
PAINLEVEL_OUTOF10: 0

## 2020-11-30 NOTE — PROGRESS NOTES
General and Vascular Surgery                                                           Daily Progress Note                                                             Vic Lux PA-C     Pt Name: Contreras Murray  Medical Record Number: [id number]  Date of Birth 5/9/1931   Today's Date: 11/30/2020    ASSESSMENT/PLAN  1. Gallbladder wall thickening and pericholecystic edema incidental finding when getting a CT of her chest and abdomen yesterday. No abdominal pain prior to admission or during evaluation. Findings likely associated with her other medical issues  2. WBC count WNL  3. CT also showed Bilateral pleural effusions from CHF. 4. Present to hospital after falling at home  5. Alzheimer's dementia  6. Paroxysmal A. Fib  7. Continue general diet as tolerated  8. No general surgery needs. Will sign off    EDUCATION  Patient educated about their illness/diagnosis, stated above, and all questions answered. We discussed the importance of nutrition, medications they are taking, and healthy lifestyle. OBJECTIVE  VITALS:  height is 5' 6\" (1.676 m) and weight is 216 lb 14.9 oz (98.4 kg). Her oral temperature is 97.3 °F (36.3 °C). Her blood pressure is 153/87 (abnormal) and her pulse is 80. Her respiration is 18 and oxygen saturation is 91%. VITALS:  BP (!) 153/87   Pulse 80   Temp 97.3 °F (36.3 °C) (Oral)   Resp 18   Ht 5' 6\" (1.676 m)   Wt 216 lb 14.9 oz (98.4 kg)   SpO2 91%   BMI 35.01 kg/m²   GENERAL: confused  ABDOMEN: soft, non-tender   No intake/output data recorded. No intake/output data recorded.     LABS  Recent Labs     11/28/20 1823 11/28/20 2152   WBC 5.1  --    HGB 12.4  --    HCT 39.5  --      --      --    K 4.1  --      --    CO2 25  --    BUN 22*  --    CREATININE 0.8  --    CALCIUM 10.1  --    AST 29  --    ALT 22  --    BILITOT 0.8  --    NITRU  --  Negative   COLORU  --  YELLOW     CBC:   Lab Results

## 2020-11-30 NOTE — PROGRESS NOTES
Patient disoriented x4. Pulls at tubes, IV removed by pt w/o injury. IV replaced for AM IVPB ceftriaxone. Pt in bed, resting quietly, pleasant demeanor this shift.  Electronically signed by Lala Agustin RN on 11/30/2020 at 4:41 AM

## 2020-11-30 NOTE — PROGRESS NOTES
4 Eyes Skin Assessment     NAME:  Contreras Murray  YOB: 1931  MEDICAL RECORD NUMBER:  [id number]    The patient is being assess for  Admission    I agree that 2 RN's have performed a thorough Head to Toe Skin Assessment on the patient. ALL assessment sites listed below have been assessed. Areas assessed by both nurses:    Head, Face, Ears, Shoulders, Back, Chest, Arms, Elbows, Hands, Sacrum. Buttock, Coccyx, Ischium and Legs. Feet and Heels        Does the Patient have a Wound? Yes wound(s) were present on assessment.  LDA wound assessment was Initiated and completed        Eddie Prevention initiated:  Yes   Wound Care Orders initiated:  Yes    Pressure Injury (Stage 3,4, Unstageable, DTI, NWPT, and Complex wounds) if present place consult order under [de-identified] No    New and Established Ostomies if present place consult order under : No      Nurse 1 eSignature: Electronically signed by Chelsea Drummond RN on 11/29/20 at 7:52 PM EST    **SHARE this note so that the co-signing nurse is able to place an eSignature**    Nurse 2 eSignature: Electronically signed by Bradly Dugan RN on 11/30/20 at 2:13 AM EST

## 2020-11-30 NOTE — PLAN OF CARE
Problem: Pain:  Goal: Pain level will decrease  Description: Pain level will decrease  11/30/2020 0215 by Yvrose Brown RN  Outcome: Ongoing  11/30/2020 0215 by Yvrose Brown RN  Outcome: Ongoing  Goal: Control of acute pain  Description: Control of acute pain  11/30/2020 0215 by Yvrose Brown RN  Outcome: Ongoing  11/30/2020 0215 by Yvrose Brown RN  Outcome: Ongoing  Goal: Control of chronic pain  Description: Control of chronic pain  11/30/2020 0215 by Yvrose Brown RN  Outcome: Ongoing  11/30/2020 0215 by Yvrose Brown RN  Outcome: Ongoing     Problem: Skin Integrity:  Goal: Will show no infection signs and symptoms  Description: Will show no infection signs and symptoms  11/30/2020 0215 by Yvrose Brown RN  Outcome: Ongoing  11/30/2020 0215 by Yvrose Brown RN  Outcome: Ongoing  Goal: Absence of new skin breakdown  Description: Absence of new skin breakdown  11/30/2020 0215 by Yvrose Brown RN  Outcome: Ongoing  11/30/2020 0215 by Yvrose Brown RN  Outcome: Ongoing     Problem: Falls - Risk of:  Goal: Will remain free from falls  Description: Will remain free from falls  11/30/2020 0215 by Yvrose Brown RN  Outcome: Ongoing  11/30/2020 0215 by Yvrose Brown RN  Outcome: Ongoing  Goal: Absence of physical injury  Description: Absence of physical injury  11/30/2020 0215 by Yvrose Brown RN  Outcome: Ongoing  11/30/2020 0215 by Yvrose Brown RN  Outcome: Ongoing     Problem: Mental Status - Impaired:  Goal: Mental status will improve  Description: Mental status will improve  11/30/2020 0215 by Yvrose Brown RN  Outcome: Ongoing  11/30/2020 0215 by Yvrose Brown RN  Outcome: Ongoing

## 2020-11-30 NOTE — PROGRESS NOTES
Report called to [facility], RN. Awaiting transport to move pt to [address] [place] [address]. Pt's son Lissa Vaughn updated.   Electronically signed by Ranjeet Juárez RN on 11/30/2020 at 3:07 PM

## 2020-11-30 NOTE — PROGRESS NOTES
Hospitalist Progress Note      PCP: No primary care provider on file. Date of Admission: 11/28/2020    Chief Complaint:   advanced dementia and is unable to provide any history. Hospital Course: 79-year-old white female with  advanced Alzheimer's dementia atrial fibrillation, HTN and gout who is unable to provide any  history, keeps repeating the same thing again and again and asked me to shut the windows down, presents to the hospital with a fall at home with two flights of stairs. Patient is actively vomiting and had loss of consciousness when she presented to  the hospital.  At the time of my exam, the patient is awake but not  oriented to place or person. CT brain no acute changes old right temporoparietal craniotomy. LFT alk phos 180. CT abdomen GB thick walled with trace pericholecystic fluid. Kept NPO consult to general surgery not suggestive of acute cholecystitis . No abd pain, diet started.      Subjective: very [place], confused took a few sips orange juice All other systems neg         Medications:  Reviewed    Infusion Medications   Scheduled Medications    divalproex  125 mg Oral Dinner    cefTRIAXone (ROCEPHIN) IV  1 g Intravenous Daily    budesonide-formoterol  2 puff Inhalation BID    metoprolol succinate  50 mg Oral Daily    sodium chloride flush  10 mL Intravenous 2 times per day    enoxaparin  40 mg Subcutaneous Daily     PRN Meds: haloperidol lactate, sodium chloride flush, acetaminophen **OR** acetaminophen, promethazine **OR** ondansetron      Intake/Output Summary (Last 24 hours) at 11/30/2020 1616  Last data filed at 11/30/2020 1449  Gross per 24 hour   Intake 0 ml   Output --   Net 0 ml       Physical Exam Performed:    BP (!) 149/75   Pulse 83   Temp 98.1 °F (36.7 °C) (Oral)   Resp 18   Ht 5' 6\" (1.676 m)   Wt 216 lb 14.9 oz (98.4 kg)   SpO2 92%   BMI 35.01 kg/m²     General appearance: [place]   Very [place] refusing treatments   HEENT: Pupils equal, round, and reactive to light.anicteric  . Neck: Supple, with full range of motion. No jugular venous distention. Trachea midline. Respiratory:  Clear to auscultation,   Cardiovascular: Regular rate and rhythm  S1/S2 without murmurs, rubs or gallops. Abdomen: Soft, non-tender, non-distended with normal bowel sounds. Musculoskeletal: No clubbing, cyanosis, trace edema bilaterally. Onychomycosis   Skin: Skin color, texture, turgor normal.  No rashes or lesions. Neurologic: nonfocal Cranial nerves: II-XII intact, except [place] grossly non-focal.  Psychiatric: Alert disoriented to place time easily agitated   Peripheral Pulses: +2 palpable, equal bilaterally onychomycosis trace LE edema bilaterally       Labs:   Recent Labs     11/28/20 1823   WBC 5.1   HGB 12.4   HCT 39.5        Recent Labs     11/28/20 1823      K 4.1      CO2 25   BUN 22*   CREATININE 0.8   CALCIUM 10.1     Recent Labs     11/28/20 1823   AST 29   ALT 22   BILITOT 0.8   ALKPHOS 180*     No results for input(s): INR in the last 72 hours. Recent Labs     11/28/20 1823   TROPONINI <0.01       Urinalysis:      Lab Results   Component Value Date    NITRU Negative 11/28/2020    WBCUA 45 11/28/2020    BACTERIA RARE 06/09/2020    RBCUA 1 11/28/2020    BLOODU Negative 11/28/2020    SPECGRAV >1.030 11/28/2020    GLUCOSEU Negative 11/28/2020       Radiology:  CT Head WO Contrast   Final Result   1. No evidence of acute intracranial trauma. 2. Moderate cerebral white matter chronic microvascular ischemic disease. 3. Right temporal occipital encephalomalacia and volume loss underlying right   parietal temporal craniotomy. 4. Moderate diffuse cerebral atrophy. CT Cervical Spine WO Contrast   Final Result   1. No evidence of acute intracranial trauma. 2. Moderate cerebral white matter chronic microvascular ischemic disease. 3. Right temporal occipital encephalomalacia and volume loss underlying right   parietal temporal craniotomy.    4.

## 2020-11-30 NOTE — PLAN OF CARE
Problem: Pain:  Goal: Pain level will decrease  Description: Pain level will decrease  11/30/2020 1308 by Nemo Brunson RN  Outcome: Ongoing     Problem: Pain:  Goal: Control of acute pain  Description: Control of acute pain  11/30/2020 1308 by Nemo Brunson RN  Outcome: Ongoing     Problem: Pain:  Goal: Control of chronic pain  Description: Control of chronic pain  11/30/2020 1308 by Nemo Brunson RN  Outcome: Ongoing     Problem: Skin Integrity:  Goal: Will show no infection signs and symptoms  Description: Will show no infection signs and symptoms  11/30/2020 1308 by Nemo Brunson RN  Outcome: Ongoing     Problem: Skin Integrity:  Goal: Absence of new skin breakdown  Description: Absence of new skin breakdown  11/30/2020 1308 by Nemo Brunson RN  Outcome: Ongoing     Problem: Falls - Risk of:  Goal: Will remain free from falls  Description: Will remain free from falls  11/30/2020 1308 by Nemo Brunson RN  Outcome: Ongoing     Problem: Falls - Risk of:  Goal: Absence of physical injury  Description: Absence of physical injury  11/30/2020 1308 by Nemo Brunson RN  Outcome: Ongoing     Problem: Mental Status - Impaired:  Goal: Mental status will improve  Description: Mental status will improve  11/30/2020 1308 by Nemo Brunson RN  Outcome: Ongoing

## 2020-12-01 LAB
ORGANISM: ABNORMAL
URINE CULTURE, ROUTINE: ABNORMAL

## 2020-12-01 PROCEDURE — 97116 GAIT TRAINING THERAPY: CPT

## 2020-12-01 PROCEDURE — 6370000000 HC RX 637 (ALT 250 FOR IP): Performed by: INTERNAL MEDICINE

## 2020-12-01 PROCEDURE — 97530 THERAPEUTIC ACTIVITIES: CPT

## 2020-12-01 PROCEDURE — 94760 N-INVAS EAR/PLS OXIMETRY 1: CPT

## 2020-12-01 PROCEDURE — 6360000002 HC RX W HCPCS: Performed by: INTERNAL MEDICINE

## 2020-12-01 PROCEDURE — 6370000000 HC RX 637 (ALT 250 FOR IP): Performed by: HOSPITALIST

## 2020-12-01 PROCEDURE — 97162 PT EVAL MOD COMPLEX 30 MIN: CPT

## 2020-12-01 PROCEDURE — 97166 OT EVAL MOD COMPLEX 45 MIN: CPT

## 2020-12-01 PROCEDURE — 1200000000 HC SEMI PRIVATE

## 2020-12-01 PROCEDURE — 92610 EVALUATE SWALLOWING FUNCTION: CPT

## 2020-12-01 PROCEDURE — 97535 SELF CARE MNGMENT TRAINING: CPT

## 2020-12-01 RX ADMIN — QUETIAPINE FUMARATE 25 MG: 25 TABLET ORAL at 20:23

## 2020-12-01 RX ADMIN — ENOXAPARIN SODIUM 40 MG: 40 INJECTION SUBCUTANEOUS at 10:26

## 2020-12-01 RX ADMIN — DIVALPROEX SODIUM 125 MG: 125 CAPSULE ORAL at 18:33

## 2020-12-01 ASSESSMENT — PAIN SCALES - WONG BAKER
WONGBAKER_NUMERICALRESPONSE: 0

## 2020-12-01 NOTE — PROGRESS NOTES
Pt awake in bed, restless. Attempted to recheck BP. Pt states \"you already checked it\" and is uncooperative. No acute signs of distress noted Bed alarm on, call light within reach.  Will continue to monitor

## 2020-12-01 NOTE — PROGRESS NOTES
Speech Language Pathology  Facility/Department: [facility] MED SURG   CLINICAL BEDSIDE SWALLOW EVALUATION    NAME: Hema Cuellar  : 1931  MRN: [id number]    ADMISSION DATE: 2020  ADMITTING DIAGNOSIS: has Acute on chronic combined systolic and diastolic CHF, NYHA class 3 ([address] Dementia in Alzheimer's disease with delirium ([address] Acute pyelonephritis; Essential hypertension; Current use of long term anticoagulation; Mild malnutrition ([address] Fall at home, initial encounter; and Acute metabolic encephalopathy on their problem list.   PMHX:  Alzheimer's dementia ([address] Atrial fibrillation ([address] Benign essential HTN, and Gout. Has no past surgical history on file. ONSET DATE: 2020    Recent CT of Chest/abdomen/pelvis 2020  Impression    1.  CT CHEST: No acute traumatic abnormality. 2.  Congestive heart failure is most likely given the CT findings; pneumonia    is also a consideration in areas of consolidation with pleural effusion. 3. Cardiomegaly, predominantly right-sided enlargement. 4. CT ABDOMEN/PELVIS: No acute traumatic abnormality. 5. Thick-walled gallbladder with pericholecystic fluid as can be seen with    cholecystitis. 6.  Diverticulosis coli without CT evidence of acute diverticulitis. 7. CT THORACIC/LUMBAR SPINE: Correlate with report from study performed at    the same time. Hospital Course: 42-year-old white female with advanced Alzheimer's dementia atrial fibrillation, HTN and gout who is unable to provide any history, keeps repeating the same thing again and again and asked me to shut the windows down, presents to the hospital with a fall at home with two flights of stairs.  Patient is actively vomiting and had loss of consciousness when she presented to the hospital.  At the time of my exam, the patient is awake but not oriented to place or person. CT brain no acute changes old right temporoparietal craniotomy. LFT alk phos 180.  CT abdomen GB thick walled with trace pericholecystic fluid. Kept NPO consult to general surgery not suggestive of acute cholecystitis . No abd pain, diet started. Admitted with fall, CHF, UTI    Date of Eval: 12/1/2020  Evaluating Therapist: Emile Bianchi    Current Diet level:  Current Diet : Regular  Current Liquid Diet : Thin    Primary Complaint  Patient Complaint: nurse reports coughing with AM meds with water; decreased cognition    Pain:  Pain Assessment  Pain Assessment: Advanced Dementia  Pain Level: 0  Purdy-Baker Pain Rating: No hurt  Patient's Stated Pain Goal: No pain  PAINAD (Pain Assessment in Advance Dementia)  Breathing: normal  Negative Vocalization: none  Facial Expression: smiling or inexpressive  Body Language: relaxed  Consolability: no need to console  PAINAD Score: 0    Reason for Referral  Cindy Quiroz was referred for a bedside swallow evaluation to assess the efficiency of her swallow function, identify signs and symptoms of aspiration and make recommendations regarding safe dietary consistencies, effective compensatory strategies, and safe eating environment. Impression  Dysphagia Diagnosis: Moderate oral stage dysphagia;Mild to moderate pharyngeal stage dysphagia  Dysphagia Impression :   Pt cooperative, alert, but requires consistent cueing for task recognition and functional participation with PO. Verbally responds to simple questions <20% of trials, and follows simple directions with cueing ~25% of trials. Pt indicates desire to self-feed but needs assist.    Moderate oropharyngeal dysphagia characterized by prolonged perseverative mastication with poor oral clearance of soft solid and minced/moist texture; lingual discoordination affecting bolus formation and control; disorganized AP transit; delayed swallow initiation; decreased laryngeal elevation. Suspected premature bolus loss with liquids, potentially with solids.     · Trace oral residue with puree, increased to mod/severe with minced/moist and soft/bite size texture. · Cough post swallow with thin liquid via cup. No overt s/s of aspiration with mildly nectar thick via cup/straw across multiple trials, including consecutive boluses, although multiple swallows noted with all liquid viscosities. · Recommend downgrade to puree diet texture with mildly nectar thick liquids; crush meds or whole 1 at a time in puree if pt does not tolerate meds crushed; assist feed with close monitor, feed only when awake/alert to PO.    · Hopeful for diet upgrade as cognition stabilizes. Dysphagia Outcome Severity Scale: Level 3: Moderate dysphagia- Total assisstance, supervision or strategies. Two or more diet consistencies restricted     Treatment Plan  Requires SLP Intervention: Yes  Duration/Frequency of Treatment: 3-5x/wk while on acute unit  D/C Recommendations: To be determined     Recommended Diet and Intervention  Diet Solids Recommendation: Dysphagia Pureed (Dysphagia I)  Liquid Consistency Recommendation: Mildly Thick (Nectar)  Recommended Form of Meds: (crushed v whole one at a time with puree)  Recommendations: Dysphagia treatment  Therapeutic Interventions: Diet tolerance monitoring;Patient/Family education; Therapeutic PO trials with SLP    Compensatory Swallowing Strategies  Compensatory Swallowing Strategies: Upright as possible for all oral intake;Remain upright for 30-45 minutes after meals;Eat/Feed slowly; Assist feed;Small bites/sips    Treatment/Goals  Dysphagia Goals: The patient will tolerate recommended diet without observed clinical signs of aspiration; The patient/caregiver will demonstrate understanding of compensatory strategies for improved swallowing safety. (Pt will tolerate skilled trials of minced/moist texture and/or thin liquids 10/10 without overt s/s)    General  Chart Reviewed: Yes  Behavior/Cognition: Alert; Requires cueing;Doesn't follow directions  Respiratory Status: Room air  Breath Sounds: Wheezing  Communication Observation: (minimal response to simple questions; does respond when asked name, birthdate, location)  Follows Directions: (max cues ~50%)  Dentition: Poor dental/oral hygeine  Patient Positioning: Upright in bed  Baseline Vocal Quality: Normal  Volitional Cough: Absent  Volitional Swallow: Absent  Consistencies Administered: Dysphagia Soft and Bite-Sized (Dysphagia III); Dysphagia Minced and Moist (Dysphagia II); Dysphagia Pureed (Dysphagia I); Honey - cup;Nectar - cup;Nectar - straw; Thin - cup; Thin - straw     Vision/Hearing  Vision  Vision: Within Functional Limits  Hearing  Hearing: Exceptions to [facility]  Hearing Exceptions: Hard of hearing/hearing concerns    Oral Motor Deficits  Oral/Motor  Oral Motor: Exceptions to WFL(limited 2/2 inconsistent ability to follow commands)  Labial Symmetry: (symmetrical at rest)  Lingual ROM: Reduced left; Reduced right  Lingual Strength: (DNT)  Lingual Coordination: (DNT)  Gag: Unable to assess    Oral Phase Dysfunction  Oral Phase  Oral Phase: Exceptions  Oral Phase Dysfunction  Impaired Mastication: Soft Solid;Mechanical soft;Puree  Decreased Anterior to Posterior Transit: All  Suspected Premature Bolus Loss: All  Lingual/Palatal Residue: Soft solid;Mechanical soft;Puree     Indicators of Pharyngeal Phase Dysfunction   Pharyngeal Phase  Pharyngeal Phase: Exceptions  Indicators of Pharyngeal Phase Dysfunction  Delayed Swallow: All  Decreased Laryngeal Elevation: All  Cough - Immediate:  Thin - cup    Prognosis  Prognosis  Prognosis for safe diet advancement: fair  Barriers to reach goals: age;cognitive deficits  Individuals consulted  Consulted and agree with results and recommendations: Patient;RN    Education  Patient Education: results, recommendations  Patient Education Response: No evidence of learning        Therapy Time  SLP Individual Minutes  Time In: 1330  Time Out: 4309  Minutes: 45     This note serves as a D/C Summary in the event that this patient is discharged prior to the next therapy session.     Zay Marquez MS, CCC-SLP #[id number]  Speech Language Pathologist  12/1/2020 2:18 PM

## 2020-12-01 NOTE — PROGRESS NOTES
Occupational Therapy   Occupational Therapy Initial Assessment  Date: 2020   Patient Name: Joce Gutierrez  MRN: [id number]     : 1931    Date of Service: 2020    Discharge Recommendations:  3-5 sessions per week, Patient would benefit from continued therapy after discharge     Joce Gutierrez scored a  on the  [address] form. Current research shows that an AM-PAC score of 17 or less is typically not associated with a discharge to the patient's home setting. Based on the patient's AM-PAC score and their current ADL deficits, it is recommended that the patient have 3-5 sessions per week of Occupational Therapy at d/c to increase the patient's independence. Please see assessment section for further patient specific details. If patient discharges prior to next session this note will serve as a discharge summary. Please see below for the latest assessment towards goals. Assessment   Performance deficits / Impairments: Decreased functional mobility ; Decreased ADL status; Decreased cognition;Decreased safe awareness;Decreased balance;Decreased endurance;Decreased strength  Assessment: Pt is an 80 y.o. female admitted with fall, CHF, UTI. At baseline, pt lives with son who assists pt with ADLs and pt completes fxl mobility with walker. Pt currently functioning below baseline d/t the above deficits, today requiring min/mod A fxl transfers, mod A fxl mobility with walker limited to short distances, total A toileting and LB dressing, min A feeding, and anticipate pt would require overall max A for ADLs. At current status, pt's AM-PAC score is not associated with homebound d/c and indicates need for ongoing skilled OT at d/c to maximize pt's safety, independence, and to decrease caregiver burden.   Prognosis: Fair  Decision Making: Medium Complexity  History: see above  Exam: self-care, ROM, cognition, fxl mobility/balance  Assistance / Modification: anticipate overall max A for ADLs  OT Education: OT Role;Plan of Care;ADL Adaptive Strategies;Orientation;Transfer Training  Barriers to Learning: cognition, hearing  REQUIRES OT FOLLOW UP: Yes  Activity Tolerance  Activity Tolerance: Treatment limited secondary to decreased cognition;Patient limited by fatigue  Safety Devices  Safety Devices in place: Yes  Type of devices: Call light within reach; Bed alarm in place;Gait belt;Left in bed;Nurse notified           Patient Diagnosis(es): The primary encounter diagnosis was Fall, initial encounter. A diagnosis of Syncope and collapse was also pertinent to this visit. has a past medical history of Alzheimer's dementia ([facility] [address] Atrial fibrillation ([facility] [address] Benign essential HTN, and Gout.   has no past surgical history on file. Restrictions  Restrictions/Precautions  Restrictions/Precautions: Fall Risk  Position Activity Restriction  Other position/activity restrictions: very [place] and h/o of advanced dementia    Subjective   General  Chart Reviewed: Yes  Additional Pertinent Hx: Pt is an 80 y.o. female presenting to ED after falling down steps. CT shows bilateral pleural effusions from CHF. Family / Caregiver Present: No  Referring Practitioner: Chandler Machuca MD  Diagnosis: fall, CHF, UTI  Subjective  Subjective: Pt met b/s for OT eval/cotx with PT. Pt restless in bed on arrival, agreeable to participate in therapy. Pt did not report or indicate any pain, but appears to be distressed.     Social/Functional History  Social/Functional History  Lives With: Son  Type of Home: House  Home Layout: One level  Home Access: Stairs to enter with rails  Entrance Stairs - Number of Steps: 4-5  Bathroom Toilet: Standard  Bathroom Equipment: Shower chair, Grab bars in shower  Home Equipment: 4 wheeled walker, Cane  ADL Assistance: Needs assistance(Son assists with bathing/dressing/toileting - patient feeds self)  Homemaking Assistance: (son does all)  Homemaking Responsibilities: No  Ambulation Assistance: Independent(occasionally uses rollator)  Transfer Assistance: Independent  Active : No  Additional Comments: above info per last admission in June, 2020; the pt unable to confirm or add to info due to level of dementia       Objective   Vision: Within Functional Limits  Hearing: Exceptions to [facility]  Hearing Exceptions: Hard of hearing/hearing concerns      Orientation  Overall Orientation Status: Impaired  Orientation Level: Oriented to person;Oriented to place; Disoriented to time;Disoriented to situation     Balance  Sitting Balance: (seated EOB ~10 minutes with SBA)  Standing Balance: Minimal assistance  Functional Mobility  Functional - Mobility Device: Rolling Walker  Assist Level: Moderate assistance  Functional Mobility Comments: Pt completed fxl mobility ~10 ft to BR with RW and Mod A with chair follow for safety. Cortéslatia davis used BR-bed d/t fatigue. ADL  Feeding: Minimal assistance(min A initilaly progressing to SBA)  Grooming: Setup;Stand by assistance(seated EOB to wash face/hands)  LE Dressing: Dependent/Total(depends saturated with urine, total A to change depends)  Toileting: Dependent/Total(to void urine at toilet, assist to manage depends and hygiene)  Additional Comments: Anticipate pt is max A bathing and dressing based on ROM, balance, endurance, cognition. Bed mobility  Supine to Sit: Moderate assistance  Sit to Supine: Moderate assistance     Transfers  Sit to stand: Minimal assistance; Moderate assistance(min A EOB>RW, mod A toilet>diamond stedy)  Stand to sit: Minimal assistance   Toilet Transfers  Equipment Used: Grab bars(comfort height toilet)  Toilet Transfer: Moderate assistance  Toilet Transfers Comments: min A RW>toilet, mod A toilet>diamond stedy    Cognition  Overall Cognitive Status: Exceptions  Following Commands: Follows one step commands with repetition; Follows one step commands with increased time  Attention Span: Difficulty attending to directions; Difficulty dividing attention  Memory: Decreased short term memory;Decreased recall of recent events;Decreased long term memory  Safety Judgement: Decreased awareness of need for safety  Problem Solving: Decreased awareness of errors;Assistance required to identify errors made;Assistance required to generate solutions  Insights: Decreased awareness of deficits  Initiation: Requires cues for all  Sequencing: Requires cues for all     LUE AROM (degrees)  LUE AROM : WFL  LUE General AROM: not formally assessed, but observed to be grossly [facility] for purpose of ADLs as observed when using diamond stedy, able to reach/grasp bar without difficulty  RUE AROM (degrees)  RUE AROM : WFL  RUE General AROM: not formally assessed, but observed to be grossly [facility] for purpose of ADLs as observed when using diamond stedy, able to reach/grasp bar without difficulty  LUE Strength  LUE Strength Comment: pt unable to follow commands for MMT  RUE Strength  RUE Strength Comment: pt unable to follow commands for MMT                   Plan   Plan  Times per week: 3-5  Times per day: Daily  Current Treatment Recommendations: Balance Training, Functional Mobility Training, Safety Education & Training, Strengthening, Endurance Training, Cognitive Reorientation, Cognitive/Perceptual Training, Self-Care / ADL, ROM      AM-PAC Score        AM-[facility] Inpatient Daily Activity Raw Score: 12 (12/01/20 1014)  AM-PAC Inpatient ADL T-Scale Score : 30.6 (12/01/20 1014)  ADL Inpatient CMS 0-100% Score: 66.57 (12/01/20 1014)  ADL Inpatient CMS G-Code Modifier : CL (12/01/20 1014)    Goals  Short term goals  Time Frame for Short term goals: prior to d/c:  Short term goal 1: Pt will complete UB bathing/dressing with min A. Short term goal 2: Pt will complete fxl mobility and fxl transfers to/from ADL surfaces with SBA using AD. Short term goal 3: Pt will complete bed mobility with SBA in prep for ADL task. Short term goal 4: Pt will complete grooming in stance at sink with SBA.   Short term goal 5: Pt will tolerate standing >3 minutes for functional task with SBA. Long term goals  Time Frame for Long term goals : STG=LTG  Patient Goals   Patient goals : pt unable to verbalize personal therapy goal d/t decreased cognition.        Therapy Time   Individual Concurrent Group Co-treatment   Time In 0920         Time Out 1010         Minutes 50         Timed Code Treatment Minutes: [address] [facility], OTR/L 0224

## 2020-12-01 NOTE — PROGRESS NOTES
Pt lethargic, responds to verbal stimuli with movement of body and moaning. No acute signs of distress. Respirations even and unlabored. Unable to give night time meds due to being lethargic. Bed alarm on, call light within reach. Will continue to monitor.

## 2020-12-01 NOTE — PROGRESS NOTES
Physical Therapy    Facility/Department: [address] MED SURG  Initial Assessment  (co-eval)  If patient discharges prior to next session this note will serve as a discharge summary. Please see below for the latest assessment towards goals. NAME: Cecile Peck  : 1931  MRN: [id number]    Date of Service: 2020    Discharge Recommendations:  Patient would benefit from continued therapy after discharge, 3-5 sessions per week   Cecile Peck scored a  on the AM-PAC short mobility form. Current research shows that an AM-PAC score of 17 or less is typically not associated with a discharge to the patient's home setting. Based on the patient's AM-PAC score and their current functional mobility deficits, it is recommended that the patient have 3-5 sessions per week of Physical Therapy at d/c to increase the patient's independence. Please see assessment section for further patient specific details. PT Equipment Recommendations  Equipment Needed: No  Other: defer to next level of care    Assessment   Assessment: The pt is an 79 yo female who presented to the ED after a fall with nausea and vomiting  and LOC. The pt has advanced dementia at baseline and is a poor historian and per RN reports can become agitated. Current level of assist is unknown due to the pt being unable to give any info due to her dementia. From past admission, the pt was living with her son who is her caregiver. He was asissting with all self-care other than feeding and assisting her with mobility with a wheeled walker. PMHx: dementia,a-fib, HTN, gout     Today, the pt presented as confused but not agitated. She had difficulty following directions due to being very [place]. She required at least mod A for bed mobility and ambulation to the commode with a walker and min A for sit <> stand transfers. The pt seems restless today and most likely functioning below her baseline.  She would benefit from con't skilled PT while on acute floor and at d/c. At last admission the pt went for rehab at [facility] ([facility]). Will con't to follow. Prognosis: Fair  Decision Making: Medium Complexity  History: see above  Exam: see above  Clinical Presentation: evolving  PT Education: PT Role;Orientation;General Safety  Barriers to Learning: cog, [place]  REQUIRES PT FOLLOW UP: Yes  Activity Tolerance  Activity Tolerance: Patient limited by fatigue;Patient limited by endurance; Patient limited by cognitive status       Patient Diagnosis(es): The primary encounter diagnosis was Fall, initial encounter. A diagnosis of Syncope and collapse was also pertinent to this visit. has a past medical history of Alzheimer's dementia ([facility] [address] Atrial fibrillation ([facility] [address] Benign essential HTN, and Gout.   has no past surgical history on file. Restrictions  Restrictions/Precautions  Restrictions/Precautions: Fall Risk  Position Activity Restriction  Other position/activity restrictions: very [place] and h/o of advanced dementia  Vision/Hearing  Vision: Within Functional Limits  Hearing: Exceptions to [facility]  Hearing Exceptions: Hard of hearing/hearing concerns     Subjective  General  Chart Reviewed: Yes  Additional Pertinent Hx: Per H&P on 11- of Corry Wilkins MD: The pt is an 81 yo female who presented to the ED after a fall with nausea and vomiting  and LOC. The pt has advanced dementia at baseline and is a poor historian and per RN reports can become agitated.        PMHx: dementia,a-fib, HTN, gout  Response To Previous Treatment: Not applicable  Family / Caregiver Present: No  Referring Practitioner: Georgi Camejo MD  Referral Date : 11/30/20  Diagnosis: fall, syncope, Acute metabolic encephalopathy  Follows Commands: Impaired  Subjective  Subjective: the pt was found to be in the bed; she was fidgety but was agreeable to movement; the pt very [place] and was unable to follow some directions; unable to vocalize pain but appeared to be in distress

## 2020-12-01 NOTE — PROGRESS NOTES
Hospitalist Progress Note      PCP: No primary care provider on file. Date of Admission: 11/28/2020    Chief Complaint:   advanced dementia and is unable to provide any history. Hospital Course: 40-year-old white female with  advanced Alzheimer's dementia atrial fibrillation, HTN and gout who is unable to provide any  history, keeps repeating the same thing again and again and asked me to shut the windows down, presents to the hospital with a fall at home with two flights of stairs. Patient is actively vomiting and had loss of consciousness when she presented to  the hospital.  At the time of my exam, the patient is awake but not  oriented to place or person. CT brain no acute changes old right temporoparietal craniotomy. LFT alk phos 180. CT abdomen GB thick walled with trace pericholecystic fluid. Kept NPO consult to general surgery not suggestive of acute cholecystitis . No abd pain, diet started.      Subjective: very [place], confused took a few sips orange juice All other systems neg         Medications:  Reviewed    Infusion Medications   Scheduled Medications    divalproex  125 mg Oral Dinner    QUEtiapine  25 mg Oral Nightly    cefTRIAXone (ROCEPHIN) IV  1 g Intravenous Daily    metoprolol succinate  50 mg Oral Daily    sodium chloride flush  10 mL Intravenous 2 times per day    enoxaparin  40 mg Subcutaneous Daily     PRN Meds: haloperidol lactate, sodium chloride flush, acetaminophen **OR** acetaminophen, promethazine **OR** ondansetron      Intake/Output Summary (Last 24 hours) at 12/1/2020 1750  Last data filed at 12/1/2020 1629  Gross per 24 hour   Intake 360 ml   Output --   Net 360 ml       Physical Exam Performed:    BP (!) 155/63   Pulse 75   Temp 97.9 °F (36.6 °C) (Axillary)   Resp 22   Ht 5' 6\" (1.676 m)   Wt 208 lb 15.9 oz (94.8 kg)   SpO2 90%   BMI 33.73 kg/m²     General appearance: Benedetta Ou  Very [place] refusing treatments   HEENT: Pupils equal, round, and reactive to light.anicteric  . Neck: Supple, with full range of motion. No jugular venous distention. Trachea midline. Respiratory:  Clear to auscultation,   Cardiovascular: Regular rate and rhythm  S1/S2 without murmurs, rubs or gallops. Abdomen: Soft, non-tender, non-distended with normal bowel sounds. Musculoskeletal: No clubbing, cyanosis, trace edema bilaterally. Onychomycosis   Skin: Skin color, texture, turgor normal.  No rashes or lesions. Neurologic: nonfocal Cranial nerves: II-XII intact, except [place] grossly non-focal.  Psychiatric: Alert disoriented to place time easily agitated   Peripheral Pulses: +2 palpable, equal bilaterally onychomycosis trace LE edema bilaterally       Labs:   Recent Labs     11/28/20 1823   WBC 5.1   HGB 12.4   HCT 39.5        Recent Labs     11/28/20 1823      K 4.1      CO2 25   BUN 22*   CREATININE 0.8   CALCIUM 10.1     Recent Labs     11/28/20 1823   AST 29   ALT 22   BILITOT 0.8   ALKPHOS 180*     No results for input(s): INR in the last 72 hours. Recent Labs     11/28/20 1823   TROPONINI <0.01       Urinalysis:      Lab Results   Component Value Date    NITRU Negative 11/28/2020    WBCUA 45 11/28/2020    BACTERIA RARE 06/09/2020    RBCUA 1 11/28/2020    BLOODU Negative 11/28/2020    SPECGRAV >1.030 11/28/2020    GLUCOSEU Negative 11/28/2020       Radiology:  CT Head WO Contrast   Final Result   1. No evidence of acute intracranial trauma. 2. Moderate cerebral white matter chronic microvascular ischemic disease. 3. Right temporal occipital encephalomalacia and volume loss underlying right   parietal temporal craniotomy. 4. Moderate diffuse cerebral atrophy. CT Cervical Spine WO Contrast   Final Result   1. No evidence of acute intracranial trauma. 2. Moderate cerebral white matter chronic microvascular ischemic disease. 3. Right temporal occipital encephalomalacia and volume loss underlying right   parietal temporal craniotomy.    4. Moderate diffuse cerebral atrophy. CT CHEST ABDOMEN PELVIS W CONTRAST   Final Result   1. CT CHEST: No acute traumatic abnormality. 2.  Congestive heart failure is most likely given the CT findings; pneumonia   is also a consideration in areas of consolidation with pleural effusion. 3. Cardiomegaly, predominantly right-sided enlargement. 4. CT ABDOMEN/PELVIS: No acute traumatic abnormality. 5. Thick-walled gallbladder with pericholecystic fluid as can be seen with   cholecystitis. 6.  Diverticulosis coli without CT evidence of acute diverticulitis. 7. CT THORACIC/LUMBAR SPINE: Correlate with report from study performed at   the same time. CT Lumbar Spine WO Contrast   Final Result   1. No evidence of acute intracranial trauma. 2. Moderate cerebral white matter chronic microvascular ischemic disease. 3. Right temporal occipital encephalomalacia and volume loss underlying right   parietal temporal craniotomy. 4. Moderate diffuse cerebral atrophy. CT THORACIC SPINE WO CONTRAST   Final Result   1. No evidence of acute intracranial trauma. 2. Moderate cerebral white matter chronic microvascular ischemic disease. 3. Right temporal occipital encephalomalacia and volume loss underlying right   parietal temporal craniotomy. 4. Moderate diffuse cerebral atrophy. Assessment/Plan:    Active Hospital Problems    Diagnosis    Acute metabolic encephalopathy [V55.25]    Fall at home, initial encounter [W19. Florence Padilla, Y04.151]     Syncope  Fall  --PT   --treating for possible UTI but no other obvious cause  Need to mobilize her OOB recommend chair for meals     Atrial fibrillation  --held rivaroxaban on admit due to fall with LOC we can probably resume will restart 15mg daily tomorrow   --continue toprol 50mg daily     Chronic combined Congestive heart failure  -per CT chest . BNP 2601,  trop neg   --2D echo severe NEHEMIAH severe TVR severe LAE EF 50    Summary   Ejection fraction is visually estimated to be   50%. Moderate to severe mitral regurgitation. The left atrium is severely dilated. Moderate aortic regurgitation. Severly dilated right ventricle. Right ventricular systolic function is mildly reduced . Severe tricuspid regurgitation. The right atrium is severely dilated. Acute urinary tract infection  --UA 45 WBC   --urine cx 50,000 staph   -- rocephin 1Gm IV daily     CT suggests some pericholecystic fluid possible cholecystitis. general surgeon was consulted . not acute cholecystitis . CV19 neg dc'd isolation     Dementia with behavioral disturbance  --takes depakote for mood stabilization per son  --has prn haldol and will try a seroquel tonight   --her prior dose was BID they decreased it to daily then even changed it to HS he felt like she was sleeping better through night   Was off the med Sat Sun ? Mon  --CT brain non acute     Dysphagia  --seen by speech therapy  Downgrade diet to pureed with nectar thick     She is still delirous have added Hs seroquel reassess tomorrow reorient her. Encourage OOB   Not sure what elese to try to get her mentation to improve     DVT Prophylaxis: johanna olga restart her apixaban   Diet: DIET GENERAL; Dysphagia Pureed; Mildly Thick (Nectar)  Code Status: Full Code     PT/OT Eval Status: PT to see . Uses a walker per son     Dispo - return home with family.    45 minutes time spent   Miguelina Hassan MD

## 2020-12-02 LAB
A/G RATIO: 1.7 (ref 1.1–2.2)
ALBUMIN SERPL-MCNC: 3.3 G/DL (ref 3.4–5)
ALP BLD-CCNC: 148 U/L (ref 40–129)
ALT SERPL-CCNC: 20 U/L (ref 10–40)
ANION GAP SERPL CALCULATED.3IONS-SCNC: 9 MMOL/L (ref 3–16)
AST SERPL-CCNC: 29 U/L (ref 15–37)
BILIRUB SERPL-MCNC: 1.7 MG/DL (ref 0–1)
BUN BLDV-MCNC: 24 MG/DL (ref 7–20)
CALCIUM SERPL-MCNC: 9.2 MG/DL (ref 8.3–10.6)
CHLORIDE BLD-SCNC: 110 MMOL/L (ref 99–110)
CO2: 24 MMOL/L (ref 21–32)
CREAT SERPL-MCNC: 1 MG/DL (ref 0.6–1.2)
GFR AFRICAN AMERICAN: >60
GFR NON-AFRICAN AMERICAN: 52
GLOBULIN: 1.9 G/DL
GLUCOSE BLD-MCNC: 124 MG/DL (ref 70–99)
HCT VFR BLD CALC: 37.1 % (ref 36–48)
HEMOGLOBIN: 11.7 G/DL (ref 12–16)
MCH RBC QN AUTO: 27.8 PG (ref 26–34)
MCHC RBC AUTO-ENTMCNC: 31.5 G/DL (ref 31–36)
MCV RBC AUTO: 88.1 FL (ref 80–100)
PDW BLD-RTO: 15.7 % (ref 12.4–15.4)
PLATELET # BLD: 170 K/UL (ref 135–450)
PMV BLD AUTO: 10.2 FL (ref 5–10.5)
POTASSIUM SERPL-SCNC: 4 MMOL/L (ref 3.5–5.1)
PRO-BNP: 3974 PG/ML (ref 0–449)
RBC # BLD: 4.22 M/UL (ref 4–5.2)
SODIUM BLD-SCNC: 143 MMOL/L (ref 136–145)
TOTAL PROTEIN: 5.2 G/DL (ref 6.4–8.2)
WBC # BLD: 9.2 K/UL (ref 4–11)

## 2020-12-02 PROCEDURE — 6370000000 HC RX 637 (ALT 250 FOR IP): Performed by: HOSPITALIST

## 2020-12-02 PROCEDURE — 83880 ASSAY OF NATRIURETIC PEPTIDE: CPT

## 2020-12-02 PROCEDURE — 80053 COMPREHEN METABOLIC PANEL: CPT

## 2020-12-02 PROCEDURE — 97535 SELF CARE MNGMENT TRAINING: CPT

## 2020-12-02 PROCEDURE — 6370000000 HC RX 637 (ALT 250 FOR IP): Performed by: INTERNAL MEDICINE

## 2020-12-02 PROCEDURE — 97530 THERAPEUTIC ACTIVITIES: CPT

## 2020-12-02 PROCEDURE — 85027 COMPLETE CBC AUTOMATED: CPT

## 2020-12-02 PROCEDURE — 1200000000 HC SEMI PRIVATE

## 2020-12-02 PROCEDURE — 92526 ORAL FUNCTION THERAPY: CPT

## 2020-12-02 PROCEDURE — 6360000002 HC RX W HCPCS: Performed by: HOSPITALIST

## 2020-12-02 PROCEDURE — 2580000003 HC RX 258: Performed by: HOSPITALIST

## 2020-12-02 PROCEDURE — 94760 N-INVAS EAR/PLS OXIMETRY 1: CPT

## 2020-12-02 PROCEDURE — 2580000003 HC RX 258: Performed by: INTERNAL MEDICINE

## 2020-12-02 RX ORDER — FUROSEMIDE 10 MG/ML
20 INJECTION INTRAMUSCULAR; INTRAVENOUS ONCE
Status: COMPLETED | OUTPATIENT
Start: 2020-12-02 | End: 2020-12-02

## 2020-12-02 RX ADMIN — FUROSEMIDE 20 MG: 10 INJECTION, SOLUTION INTRAMUSCULAR; INTRAVENOUS at 18:36

## 2020-12-02 RX ADMIN — FUROSEMIDE 20 MG: 10 INJECTION, SOLUTION INTRAMUSCULAR; INTRAVENOUS at 15:37

## 2020-12-02 RX ADMIN — CEFTRIAXONE 1 G: 1 INJECTION, POWDER, FOR SOLUTION INTRAMUSCULAR; INTRAVENOUS at 16:57

## 2020-12-02 RX ADMIN — SODIUM CHLORIDE, PRESERVATIVE FREE 10 ML: 5 INJECTION INTRAVENOUS at 21:35

## 2020-12-02 RX ADMIN — DIVALPROEX SODIUM 125 MG: 125 CAPSULE ORAL at 18:35

## 2020-12-02 RX ADMIN — QUETIAPINE FUMARATE 25 MG: 25 TABLET ORAL at 21:33

## 2020-12-02 RX ADMIN — RIVAROXABAN 15 MG: 15 TABLET, FILM COATED ORAL at 18:35

## 2020-12-02 RX ADMIN — METOPROLOL SUCCINATE 50 MG: 50 TABLET, EXTENDED RELEASE ORAL at 09:36

## 2020-12-02 ASSESSMENT — PAIN SCALES - WONG BAKER
WONGBAKER_NUMERICALRESPONSE: 0
WONGBAKER_NUMERICALRESPONSE: 2
WONGBAKER_NUMERICALRESPONSE: 0

## 2020-12-02 ASSESSMENT — PAIN SCALES - GENERAL
PAINLEVEL_OUTOF10: 0
PAINLEVEL_OUTOF10: 3

## 2020-12-02 NOTE — PROGRESS NOTES
OCCUPATIONAL THERAPY  Progress Note   Second Session    Patient Name: Hernesto Peralta  Medical Record Number: [id number]     D/C recommendations remain for 3-5 sessions per week. Son and son-in-law may reconsider taking pt home and to consider ECF now, based on observations from today's session. General  Chart Reviewed: Yes, Orders, Progress Notes  Additional Pertinent Hx: Pt is an 80 y.o. female presenting to ED after falling down steps. CT shows bilateral pleural effusions from CHF. Family / Caregiver Present: No  Referring Practitioner: Linn Andrew MD  Diagnosis: fall, CHF, UTI     Restrictions/Precautions  Restrictions/Precautions: Fall Risk        Position Activity Restriction  Other position/activity restrictions: very [place] and h/o of advanced dementia    Subjective: Returned to room for attempt. Pt in bed, still less alert, eyes closed majority of tx and easily agitated, especially with attempts to move her in bed for hygiene needs. Pt's son and son-in-law present to observe session. Objective/Asssessment:  Pt refusing to attempt sitting up to EOB, and resistive to rolling in bed for hygiene needs (noted to be incontinent of BM and Max A x2 for bed mobility again, dependent for all hygiene). Son-in-law attempting talk w/pt to attempt sitting up also, and pt continued to refuse. Pt assisted for bed mob, hygiene needs and repositioning up in bed, Max A x2. Pt resistive to all movements and especially appeared in pain, crying out, when donna/posterior hygiene care completed (pt attempting to swat at [place]). Pt very SOB, wheezing with all tasks. 02 sats varied from 91% to 93% on room air. Initially son stating wanting to take pt home (stating they assisted her with all ADL's and mobility at times, stating pt used a rollator type walker and that pt would sometimes walk without a device).  Pt's son did state he would have difficulty being able to help pt in her current functional status, due to his \"bad back\". Pt's son-in-law stated he would be able to assist pt(yet not successful as stated above, to attempt to have pt sit up, or to assist pt with bed mobility). RN notified of pt's wheezing and in to see pt, talk with son, son-in-law.     See recommendations for d/c above        Safety Device - Type of devices:  [x]  All fall risk precautions in place [x] Bed alarm in place  [x] Call light within reach [] Chair alarm in place [] Positioning belt [] Gait belt [] Patient at risk for falls [x] Left in bed [] Left in chair [] Telesitter in use [] Sitter present [x] Nurse notified []  None      Therapy Time   Individual Co-treatment   Time In 1314     Time Out 1352     Minutes [address] [id number]

## 2020-12-02 NOTE — CARE COORDINATION
INITIAL CASE MANAGEMENT ASSESSMENT    Reviewed chart, met with patient to assess possible discharge needs. Explained Case Management role/services.      Living Situation: Patient lives with son Betsey Gifford) in one story with 4/5 steps to enter     ADLs: Son assists patient with adls; patient is able to feed herself.      DME: 4 wheeled walker, cane, shower chair and grab bars in the bathroom     PT/OT Recs:    Discharge Recommendations:  Rosanne Coyle scored a 6/24 on the AM-PAC short mobility form. Current research shows that an AM-PAC score of 17 or less is typically not associated with a discharge to the patient's home setting. Based on the patient's AM-PAC score and their current functional mobility deficits, it is recommended that the patient have 3-5 sessions per week of Physical Therapy  And Occupational therapy at d/c to increase the patient's independence     Active Services:Lalit Pearson is her primary caregiver     Transportation: son transports     Medications: CVS/No barriers     PCP: TASHI Sahu CNP     PLAN/COMMENTS: Call placed to Catrachitaleonarda Barnes. He wants his Mother to return to home. He states that he can continue to care for her and refuses SNF and HHC. SW/CM provided contact information for patient or family to call with any questions. SW/CM will follow and assist as needed. Electronically signed by Obi Rogers RN on 12/2/2020 at 12:38 PM

## 2020-12-02 NOTE — PROGRESS NOTES
Hospitalist Progress Note      PCP: No primary care provider on file. Date of Admission: 11/28/2020    Chief Complaint:   advanced dementia and is unable to provide any history. Hospital Course: 72-year-old white female with  advanced Alzheimer's dementia atrial fibrillation, HTN and gout who is unable to provide any  history, keeps repeating the same thing again and again and asked me to shut the windows down, presents to the hospital with a fall at home with two flights of stairs. Patient is actively vomiting and had loss of consciousness when she presented to  the hospital.  At the time of my exam, the patient is awake but not  oriented to place or person. CT brain no acute changes old right temporoparietal craniotomy. LFT alk phos 180. CT abdomen GB thick walled with trace pericholecystic fluid. Kept NPO consult to general surgery not suggestive of acute cholecystitis . No abd pain, diet started.      Subjective: very [place], confused took a few sips orange juice All other systems neg         Medications:  Reviewed    Infusion Medications   Scheduled Medications    rivaroxaban  15 mg Oral Daily    divalproex  125 mg Oral Dinner    QUEtiapine  25 mg Oral Nightly    cefTRIAXone (ROCEPHIN) IV  1 g Intravenous Daily    metoprolol succinate  50 mg Oral Daily    sodium chloride flush  10 mL Intravenous 2 times per day     PRN Meds: haloperidol lactate, sodium chloride flush, acetaminophen **OR** acetaminophen, promethazine **OR** ondansetron      Intake/Output Summary (Last 24 hours) at 12/2/2020 1808  Last data filed at 12/1/2020 1850  Gross per 24 hour   Intake 120 ml   Output --   Net 120 ml       Physical Exam Performed:    BP (!) 148/78   Pulse 70   Temp 98.2 °F (36.8 °C) (Oral)   Resp 20   Ht 5' 6\" (1.676 m)   Wt 198 lb 3.1 oz (89.9 kg)   SpO2 95%   BMI 31.99 kg/m²     General appearance: Archie Rathke Very [place] refusing treatments   HEENT: Pupils equal, round, and reactive to light. anicteric  .   Neck: Supple, with full range of motion. No jugular venous distention. Trachea midline. Respiratory:  Clear to auscultation,   Cardiovascular: Regular rate and rhythm  S1/S2 without murmurs, rubs or gallops. Abdomen: Soft, non-tender, non-distended with normal bowel sounds. Musculoskeletal: No clubbing, cyanosis, trace edema bilaterally. Onychomycosis   Skin: Skin color, texture, turgor normal.  No rashes or lesions. Neurologic: nonfocal Cranial nerves: II-XII intact, except [place] grossly non-focal.  Psychiatric: Alert disoriented to place time easily agitated   Peripheral Pulses: +2 palpable, equal bilaterally onychomycosis trace LE edema bilaterally       Labs:   Recent Labs     12/02/20  1200   WBC 9.2   HGB 11.7*   HCT 37.1        Recent Labs     12/02/20  1200      K 4.0      CO2 24   BUN 24*   CREATININE 1.0   CALCIUM 9.2     Recent Labs     12/02/20  1200   AST 29   ALT 20   BILITOT 1.7*   ALKPHOS 148*     No results for input(s): INR in the last 72 hours. No results for input(s): [place] in the last 72 hours. Urinalysis:      Lab Results   Component Value Date    NITRU Negative 11/28/2020    WBCUA 45 11/28/2020    BACTERIA RARE 06/09/2020    RBCUA 1 11/28/2020    BLOODU Negative 11/28/2020    SPECGRAV >1.030 11/28/2020    GLUCOSEU Negative 11/28/2020       Radiology:  CT Head WO Contrast   Final Result   1. No evidence of acute intracranial trauma. 2. Moderate cerebral white matter chronic microvascular ischemic disease. 3. Right temporal occipital encephalomalacia and volume loss underlying right   parietal temporal craniotomy. 4. Moderate diffuse cerebral atrophy. CT Cervical Spine WO Contrast   Final Result   1. No evidence of acute intracranial trauma. 2. Moderate cerebral white matter chronic microvascular ischemic disease. 3. Right temporal occipital encephalomalacia and volume loss underlying right   parietal temporal craniotomy.    4. Moderate diffuse cerebral atrophy. CT CHEST ABDOMEN PELVIS W CONTRAST   Final Result   1. CT CHEST: No acute traumatic abnormality. 2.  Congestive heart failure is most likely given the CT findings; pneumonia   is also a consideration in areas of consolidation with pleural effusion. 3. Cardiomegaly, predominantly right-sided enlargement. 4. CT ABDOMEN/PELVIS: No acute traumatic abnormality. 5. Thick-walled gallbladder with pericholecystic fluid as can be seen with   cholecystitis. 6.  Diverticulosis coli without CT evidence of acute diverticulitis. 7. CT THORACIC/LUMBAR SPINE: Correlate with report from study performed at   the same time. CT Lumbar Spine WO Contrast   Final Result   1. No evidence of acute intracranial trauma. 2. Moderate cerebral white matter chronic microvascular ischemic disease. 3. Right temporal occipital encephalomalacia and volume loss underlying right   parietal temporal craniotomy. 4. Moderate diffuse cerebral atrophy. CT THORACIC SPINE WO CONTRAST   Final Result   1. No evidence of acute intracranial trauma. 2. Moderate cerebral white matter chronic microvascular ischemic disease. 3. Right temporal occipital encephalomalacia and volume loss underlying right   parietal temporal craniotomy. 4. Moderate diffuse cerebral atrophy. Assessment/Plan:    Active Hospital Problems    Diagnosis    Acute metabolic encephalopathy [S86.67]    Fall at home, initial encounter [W19. [place], T97.502]     Syncope  Fall  --PT   --treating for possible UTI as cause     Atrial fibrillation  --rivaroxaban 15mg daily   --continue toprol 50mg daily     Acute on Chronic combined Congestive heart failure  -per CT chest . BNP 2601,  trop neg   --2D echo severe NEHEMIAH severe TVR severe LAE EF 50    Summary   Ejection fraction is visually estimated to be   50%. Moderate to severe mitral regurgitation. The left atrium is severely dilated. Moderate aortic regurgitation. Severly dilated right ventricle. Right ventricular systolic function is mildly reduced . Severe tricuspid regurgitation. The right atrium is severely dilated    She was very winded today even with speech will dilizette gave lasix 20mg IV this am. Little urien output recorded so will repeat dose tonight and reassess mobility tolerance tomorrow  She was more alert and knew in hospital today seemed almost more frustrated     If not SOB and if able to ambulate then could consider dc. I dont think son would want to consider SNF but still needing assist I see OT note today that family may be realizing she at least at this stage needs some rehab        Acute urinary tract infection  --UA 45 WBC   --urine cx 50,000 staph   -- rocephin 1Gm IV daily day 4 will dc after tomorrow am dose     CT suggests some pericholecystic fluid possible cholecystitis. general surgeon was consulted . not acute cholecystitis .     CV19 neg dc'd isolation     Dementia with behavioral disturbance  --depakote for mood stabilization   --has prn haldol   --seroquel HS once mentation improves could dc this    --CT brain non acute     Dysphagia  --seen by speech therapy pureed with nectar thick    Her mentation was better today seroquel was ordered last night try again tonight      DVT Prophylaxis: apixaban   Diet: DIET GENERAL; Dysphagia Pureed; Mildly Thick (Nectar)  Code Status: Full Code     PT/OT Eval Status:  Uses a walker per son     Dispo - return home with family versus SNF for rehab   45 minutes time spent   Negro Chinchilla MD

## 2020-12-02 NOTE — PLAN OF CARE
Problem: Pain:  Goal: Pain level will decrease  Description: Pain level will decrease  Outcome: Ongoing  Goal: Control of acute pain  Description: Control of acute pain  Outcome: Ongoing  Goal: Control of chronic pain  Description: Control of chronic pain  Outcome: Ongoing     Problem: Skin Integrity:  Goal: Will show no infection signs and symptoms  Description: Will show no infection signs and symptoms  Outcome: Ongoing  Goal: Absence of new skin breakdown  Description: Absence of new skin breakdown  Outcome: Ongoing     Problem: Falls - Risk of:  Goal: Will remain free from falls  Description: Will remain free from falls  Outcome: Ongoing  Goal: Absence of physical injury  Description: Absence of physical injury  Outcome: Ongoing     Problem: Mental Status - Impaired:  Goal: Mental status will improve  Description: Mental status will improve  Outcome: Ongoing

## 2020-12-02 NOTE — PLAN OF CARE
Problem: Pain:  Goal: Pain level will decrease  Description: Pain level will decrease  Outcome: Ongoing     Problem: Pain:  Goal: Control of acute pain  Description: Control of acute pain  Outcome: Ongoing     Problem: Pain:  Goal: Control of chronic pain  Description: Control of chronic pain  Outcome: Ongoing     Problem: Skin Integrity:  Goal: Will show no infection signs and symptoms  Description: Will show no infection signs and symptoms  Outcome: Ongoing     Problem: Skin Integrity:  Goal: Absence of new skin breakdown  Description: Absence of new skin breakdown  Outcome: Ongoing     Problem: Falls - Risk of:  Goal: Will remain free from falls  Description: Will remain free from falls  Outcome: Ongoing     Problem: Falls - Risk of:  Goal: Absence of physical injury  Description: Absence of physical injury  Outcome: Ongoing     Problem: Mental Status - Impaired:  Goal: Mental status will improve  Description: Mental status will improve  Outcome: Ongoing

## 2020-12-02 NOTE — PROGRESS NOTES
Physical Therapy  Facility/Department: [address] MED SURG  Daily Treatment Note (See Second Session below)  NAME: [place]  : 1931  MRN: [id number]    Date of Service: 2020    Discharge Recommendations:  [place] scored a 6/24 on the AM-PAC short mobility form. Current research shows that an AM-PAC score of 17 or less is typically not associated with a discharge to the patient's home setting. Based on the patient's AM-PAC score and their current functional mobility deficits, it is recommended that the patient have 3-5 sessions per week of Physical Therapy at d/c to increase the patient's independence. Please see assessment section for further patient specific details. If patient discharges prior to next session this note will serve as a discharge summary. Please see below for the latest assessment towards goals. Patient would benefit from continued therapy after discharge, 3-5 sessions per week   PT Equipment Recommendations  Equipment Needed: No  Other: defer to next level of care    Assessment   Body structures, Functions, Activity limitations: Decreased functional mobility ; Increased pain;Decreased balance;Decreased ROM; Decreased strength;Decreased safe awareness;Decreased cognition;Decreased endurance  Assessment: Pt limited this session by agitation and cognitive status. Pt found to be saturated with urine upon PT/OT arrival.  Pt required MaxAx2 for rolling in bed and pericare, with little initiation noted due to agitation. Pt will continue to benefit from skilled PT while on acute floor and at d/c. Will continue to follow. Patient Education: Importance of being cleaned up after having an accident, will require reinforcement  REQUIRES PT FOLLOW UP: Yes  Activity Tolerance  Activity Tolerance: Patient limited by fatigue;Patient limited by pain; Patient limited by cognitive status  Activity Tolerance: Per PCA, pt is more alert and cooperative in the afternoons vs mornings     Patient Diagnosis(es): The primary encounter diagnosis was Fall, initial encounter. A diagnosis of Syncope and collapse was also pertinent to this visit. has a past medical history of Alzheimer's dementia ([address] Atrial fibrillation ([address] Benign essential HTN, and Gout.   has no past surgical history on file. Restrictions  Restrictions/Precautions  Restrictions/Precautions: Fall Risk  Position Activity Restriction  Other position/activity restrictions: very [place] and h/o of advanced dementia  Subjective   General  Chart Reviewed: Yes  Additional Pertinent Hx: Per H&P on 11- of Jj Boss MD: The pt is an 81 yo female who presented to the ED after a fall with nausea and vomiting  and LOC. The pt has advanced dementia at baseline and is a poor historian and per RN reports can become agitated. PMHx: dementia,a-fib, HTN, gout  Family / Caregiver Present: No  Referring Practitioner: Negro Chinchilla MD  Subjective  Subjective: \"Leave me alone! \"  General Comment  Comments: Pt found supine in bed upon PT arrival.  Pt asking to be left alone, however pt was found to be saturated with urine. PT/OT assisting with pericare and brief/sheet management. Pt extremely hard of hearing. Pt becoming agitated throughout session, requiring redirection and education of importance of being cleaned up. Per PCA, pt tends to be more alert and more participatory in the afternoons.   Pain Screening  Patient Currently in Pain: (pt yelling out during session, appearing to be in pain, however pt not providing rating or pain location)  Vital Signs  Patient Currently in Pain: (pt yelling out during session, appearing to be in pain, however pt not providing rating or pain location)       Orientation     Cognition      Objective   Bed mobility  Rolling to Left: Dependent/Total(MaxAx2 for multiple trials, for pericare, brief/sheet management, pt with little initiation)  Rolling to Right: Dependent/Total(MaxAx2 for multiple trials, for pericare, brief/sheet management, pt with little initiation)  Scooting: Dependent/Total(MaxAx2 to scoot up in bed in supine for repositioning)  Transfers  Comment: pt not agreeable to OOB activity this session  Ambulation  Ambulation?: No(unsafe to attempt)  Stairs/Curb  Stairs?: No                  Comment: Pt required totalA for pericare and brief management. Pt requiring frequent redirection as she became increasingly agitated while being assisted with cleanup. Attempted to assist pt with eating breakfast at end of session. Pt becoming agitated and closing eyes, refusing to eat despite telling PT/OT that she would like to. PCA notified and stated that he will assist pt with feeding. 2nd Session: Pt found supine in bed upon PT arrival.  Pt's son and son-in-law present upon arrival.  Pt's son stating that he would like to take the pt home vs SNF. Family members stating that prior to admission, pt was intermittently incontinent and would require assist for pericare. Pt uses a rollator for mobility in home, but occasionally \"will move around without anything. \"  Pt's son stating that the pt looked like she was doing well when he last saw her on Monday, but seems different now. PT asking pt's son if they are able to transfer her throughout her home with use of a w/c or rollator. They do not have a w/c, but state they will use a rollator intermittently to assist with transfers. PT asking how they plan to assist pt on stairs. Pt's son stating that he is concerned about the stairs at this time. Family may need to consider a ramp if d/c home. PT/OT asking if pt or son in law is able to demonstrate a safe transfer at this time. Son in law agreeable to attempt, but son stating that he would like to protect his back at this time. Pt's son in law attempting to encourage pt to sit EOB, however pt repeating that she \"just went to sleep. \"  Son in law repeatedly reorienting pt to time of day, however he was unable to get pt to sit EOB. Pt noted to be incontinent of stool. MaxAx2 to roll to R and L for pericare. TotalA for pericare. Pt becoming agitated during pericare, nearly striking AMEZCUA, requiring frequent redirection from PT/AMEZCUA/family. MaxAx2 to scoot up in bed in supine for repositioning. Pt's son stating that pt \"gets like this during this time of the year because of her seasonal depression. \"  PT/OT opening blinds in pt's room, elevating pt's HOB, and turning on lights in room. Family educated to attempt to keep room lit during the day to maintain pt's circadian rhythm. At end of session, son and son-in-law expressing that they do not feel they will be able to take the pt home at this current functional level. PT/OT noting pt with increased wheezing and labored breathing throughout session. SpO2 91-93% on room air throughout session. RN notified. Pt left supine in bed at end of session with bed alarm set and call light/needs within reach. G-Code     OutComes Score                                                     AM-PAC Score  AM-PAC Inpatient Mobility Raw Score : 6 (12/02/20 0847)  AM-PAC Inpatient T-Scale Score : 23.55 (12/02/20 0847)  Mobility Inpatient CMS 0-100% Score: 100 (12/02/20 0847)  Mobility Inpatient CMS G-Code Modifier : CN (12/02/20 0847)          Goals  Short term goals  Time Frame for Short term goals: upon d/c  Short term goal 1: Bed mobility with min A. -ongoing  Short term goal 2: Transfer sit <> stand with CGA -ongoing  Short term goal 3: Ambulate with wheeled walker 25 feet with CGA/min A. -ongoing  Patient Goals   Patient goals : thept unable to give a goal    Plan    Plan  Times per week: 3-5x/week  Current Treatment Recommendations: Functional Mobility Training, Transfer Training, Gait Training, ROM, Strengthening  Safety Devices  Type of devices:  All fall risk precautions in place, Bed alarm in place, Call light within reach, Left in bed, Nurse notified Therapy Time   Individual Concurrent Group Co-treatment   Time In 0811         Time Out 0834         Minutes 23            Second Session Therapy Time:   Individual Concurrent Group Co-treatment   Time In 1314         Time Out 1352         Minutes 38           Timed Code Treatment Minutes:  23 + 38    Total Treatment Minutes:  Zach 68, PT    This note to serve as discharge summary if patient discharged before next session.

## 2020-12-02 NOTE — PROGRESS NOTES
Occupational Therapy  Facility/Department: [facility] MED SURG  Daily Treatment Note  NAME: Mick Colindres  : 1931  MRN: [id number]    Date of Service: 2020    Discharge Recommendations:  3-5 sessions per week, Patient would benefit from continued therapy after discharge     Mick Colindres scored a 11 on the AM-PAC ADL Inpatient form. Current research shows that an AM-PAC score of 17 or less is typically not associated with a discharge to the patient's home setting. Based on the patient's AM-PAC score and their current ADL deficits, it is recommended that the patient have 3-5 sessions per week of Occupational Therapy at d/c to increase the patient's independence. Please see assessment section for further patient specific details. If patient discharges prior to next session this note will serve as a discharge summary. Please see below for the latest assessment towards goals. Assessment   Performance deficits / Impairments: Decreased functional mobility ; Decreased ADL status; Decreased cognition;Decreased safe awareness;Decreased balance;Decreased endurance;Decreased strength  Assessment: Discussed with OTR: Pt seen in am and less alert, cooperative for tx. Pt seen with PT and required up to max A x2 for bed mob, rolling side/side for hygiene needs after noted to be incontinent of urine in and thru depends onto bed sheet. Pt Dep x2 for repositioning up in bed. Pt declining OOB this session. Pt's [facility] refects non homebound d/c. Cont poc and re-attempt later as schedule permits, to see if pt more alert, cooperative. Prognosis: Fair  History: Pt is an 80 y.o. female admitted with fall, CHF, UTI. At baseline, pt lives with son who assists pt with ADLs and pt completes fxl mobility with walker.   OT Education: OT Role;Plan of Care  Barriers to Learning: cognition, hearing  REQUIRES OT FOLLOW UP: Yes  Activity Tolerance  Activity Tolerance: Treatment limited secondary to decreased cognition;Patient limited by fatigue;Treatment limited secondary to agitation  Activity Tolerance: Lethargic, declining OOB. Per PCA, pt more alert in pm  Safety Devices  Safety Devices in place: Yes  Type of devices: Call light within reach; Bed alarm in place;Gait belt;Left in bed;Nurse notified         Patient Diagnosis(es): The primary encounter diagnosis was Fall, initial encounter. A diagnosis of Syncope and collapse was also pertinent to this visit. has a past medical history of Alzheimer's dementia ([facility] Atrial fibrillation ([facility] Benign essential HTN, and Gout.   has no past surgical history on file. Restrictions  Restrictions/Precautions  Restrictions/Precautions: Fall Risk  Position Activity Restriction  Other position/activity restrictions: very [place] and h/o of advanced dementia  Subjective   General  Chart Reviewed: Yes, Orders, Progress Notes  Additional Pertinent Hx: Pt is an 80 y.o. female presenting to ED after falling down steps. CT shows bilateral pleural effusions from CHF. Family / Caregiver Present: No  Referring Practitioner: Hi Steward MD  Diagnosis: fall, CHF, UTI  Subjective  Subjective: kPt met BS, seen with PT for co-tx. Pt lethargic, declining to sit up to EOB. Pt noted to be incontinent of urine in depends and onto sheet. Per PCA, pt more alert in pm.  Pt without complaints of pain, except with movement (bed mob)-not specified and no pain number given. Orientation  Orientation  Overall Orientation Status: Impaired  Orientation Level: Oriented to person;Oriented to place; Disoriented to time;Disoriented to situation  Objective    ADL  Toileting: Dependent/Total(Pt incontinent of urine in and thru depends.  Dependent x2 for hygiene, changing depends, bed sheet, pads.)           Bed mobility  Bridging: Maximum assistance;2 Person assistance  Rolling to Left: Maximum assistance;2 Person assistance      Cognition  Overall Cognitive Status: Exceptions  Cognition Comment: Lethargic today, easily agitated and not wanting to get OOB. Plan   Plan  Times per week: 3-5  Times per day: Daily  Current Treatment Recommendations: Balance Training, Functional Mobility Training, Safety Education & Training, Strengthening, Endurance Training, Cognitive Reorientation, Cognitive/Perceptual Training, Self-Care / ADL, ROM    AM-PAC Score        AM-PAC Inpatient Daily Activity Raw Score: 11 (12/02/20 0845)  AM-PAC Inpatient ADL T-Scale Score : 29.04 (12/02/20 0845)  ADL Inpatient CMS 0-100% Score: 70.42 (12/02/20 0845)  ADL Inpatient CMS G-Code Modifier : CL (12/02/20 0845)    Goals  Short term goals  Time Frame for Short term goals: prior to d/c:  Status:  Short term goal 1: Pt will complete UB bathing/dressing with min A. Short term goal 2: Pt will complete fxl mobility and fxl transfers to/from ADL surfaces with SBA using AD. Short term goal 3: Pt will complete bed mobility with SBA in prep for ADL task. Short term goal 4: Pt will complete grooming in stance at sink with SBA. Short term goal 5: Pt will tolerate standing >3 minutes for functional task with SBA. Long term goals  Time Frame for Long term goals : STG=LTG  Patient Goals   Patient goals : pt unable to verbalize personal therapy goal d/t decreased cognition.        Therapy Time   Individual Concurrent Group Co-treatment   Time In 0811         Time Out 0834         Minutes [address] [facility] [id number]

## 2020-12-02 NOTE — PROGRESS NOTES
[facility]   Speech Therapy  Daily Dysphagia Treatment Note        Rayne Mendiola  AGE: 80 y.o. GENDER: female  : 1931  [id number]  EPISODE DATE:  2020  Patient Active Problem List   Diagnosis    Acute on chronic combined systolic and diastolic CHF, NYHA class 3 ([address]    Dementia in Alzheimer's disease with delirium ([address]    Acute pyelonephritis    Essential hypertension    Current use of long term anticoagulation    Mild malnutrition ([address]    Fall at home, initial encounter    Acute metabolic encephalopathy     Allergies   Allergen Reactions    Chlorpromazine Other (See Comments)    Hydrochlorothiazide Other (See Comments)     Gout     Treatment Diagnosis: Dysphagia       Chart review:   Recent CT of Chest/abdomen/pelvis 2020  Impression    1.  CT CHEST: No acute traumatic abnormality. 2.  Congestive heart failure is most likely given the CT findings; pneumonia    is also a consideration in areas of consolidation with pleural effusion. 3. Cardiomegaly, predominantly right-sided enlargement. 4. CT ABDOMEN/PELVIS: No acute traumatic abnormality. 5. Thick-walled gallbladder with pericholecystic fluid as can be seen with    cholecystitis. 6.  Diverticulosis coli without CT evidence of acute diverticulitis. 7. CT THORACIC/LUMBAR SPINE: Correlate with report from study performed at    the same time.       Hospital Course: 19-year-old white female with advanced Alzheimer's dementia atrial fibrillation, HTN and gout who is unable to provide any history, keeps repeating the same thing again and again and asked me to shut the windows down, presents to the hospital with a fall at home with two flights of stairs.  Patient is actively vomiting and had loss of consciousness when she presented to the hospital.  At the time of my exam, the patient is awake but not oriented to place or person. CT brain no acute changes old right temporoparietal craniotomy.  LFT alk phos 180. CT abdomen GB thick walled with trace pericholecystic fluid. Kept NPO consult to general surgery not suggestive of acute cholecystitis . No abd pain, diet started. Admitted with fall, CHF, UTI    Subjective:     Current diet  Dysphagia Pureed  Mildly thick nectar liquids    Comments regarding tolerating Current Diet:   Nurse reports good toleration of AM meds and breakfast meal w assist feed    Objective:     Pain   Patient Currently in Pain: none stated    Cognitive/Behavior   Behavior/Cognition: Alert, Requires cueing, Doesn't follow directions    Presentations   Consistencies Administered: Dysphagia Soft and Bite-Sized (Dysphagia III), Dysphagia Minced and Moist (Dysphagia II), Dysphagia Pureed (Dysphagia I), Honey - cup, Nectar - cup, Nectar - straw, Thin - cup, Thin - straw    Positioning   Upright in bed    PO Trials: seen w lunch tray of puree beef/gravy, potatoes/gravy, peas; trial of minced cracker in berries; thin and nectar via cup  · Thin Liquids cup x4:  lingual discoordination affecting bolus formation and control; disorganized AP transit; delayed swallow initiation; decreased laryngeal elevation. Suspected premature bolus loss. Increased wheeze post swallow, but no cough or throat clear   · Nectar thick liquids cup: lingual discoordination affecting bolus formation and control; disorganized AP transit; delayed swallow initiation; decreased laryngeal elevation. Suspected premature bolus loss. No change in breathing quality post swallow  · Honey Thick liquids NT  · Puree prolonged oral transit; lingual discoordination affecting bolus formation and control; disorganized AP transit; delayed swallow initiation; decreased laryngeal elevation. Suspected premature bolus loss.    · Minced/moist x1: prolonged mastication with reduced oral clearance; lingual discoordination affecting bolus formation and control; disorganized AP transit; delayed swallow initiation; decreased laryngeal elevation. Concern for fatigue and reduced intake  · Soft food NT  · Regular food NT    Dysphagia Tx:  · Alert, cooperative for PO with lunch tray; assist feed for solids with utensils, but able to hold cup and self-administer liquids. · Minimally verbal, and inconsistently follows simple commands. Does request to be repositioned x1 when uncomfortable. · PO trials: tolerating current diet of puree and mildly thick liquids via cup without overt s/s. Thin trials result in increased wheeze post swallow but no cough/throat clear. Textured solids result in prolonged mastication and oral residue. · Cue dependent for small sips and general swallow precautions. Goals:   Dysphagia Goals: The patient will tolerate recommended diet without observed clinical signs of aspiration continue   The patient/caregiver will demonstrate understanding of compensatory strategies for improved swallowing safety. Continue; cue dependent   Pt will tolerate skilled trials of minced/moist texture and/or thin liquids 10/10 without overt s/s continue, prolonged mastication with oral residue    Assessment:   Impressions:   Dysphagia Diagnosis: Moderate oral stage dysphagia, Mild to moderate pharyngeal stage dysphagia   Moderate oropharyngeal dysphagia characterized by prolonged perseverative mastication with poor oral clearance of soft solid and minced/moist texture; lingual discoordination affecting bolus formation and control; disorganized AP transit; delayed swallow initiation; decreased laryngeal elevation. Suspected premature bolus loss with liquids, potentially with solids. · Trace oral residue with puree, increased to mod with minced/moist   · Increased wheeze post swallow with thin liquid via cup, although no cough or throat clear. · Multiple swallows noted with all liquid viscosities; no wheeze after nectar liquids.     · Continue puree diet texture with mildly nectar thick liquids; crush meds or whole 1 at a time in puree if pt does not tolerate meds crushed; assist feed with close monitor, feed only when awake/alert to PO.    · Hopeful for diet upgrade as cognition stabilizes. Diet Recommendations:  Puree diet texture  Mildly nectar thick liquids   Recommended Form of Meds: (crushed v whole one at a time with puree)    Strategies:   Compensatory Swallowing Strategies: Upright as possible for all oral intake, Remain upright for 30-45 minutes after meals, Eat/Feed slowly, Assist feed, Small bites/sips    Education:  Consulted and agree with results and recommendations: Patient, RN  Patient Education: results, recommendations  Patient Education Response: No evidence of learning    Prognosis:   Good for diet tolerance; fair for advancement    Plan:     Continue Dysphagia Therapy: yes  Interventions: Therapeutic Interventions: Diet tolerance monitoring, Patient/Family education, Therapeutic PO trials with SLP  Duration/Frequency of therapy while on unit: Duration/Frequency of Treatment  Duration/Frequency of Treatment: 3-5x/wk while on acute unit  Discharge Instructions:   Anticipate Yes for further skilled Speech Therapy for Dysphagia at discharge    This note serves as a D/C Summary in the event that this patient is discharged prior to the next therapy session.     Coded treatment time:0  Total treatment time: 30    Electronically signed by  Rama Delaney MS, CCC-SLP #[id number]  Speech Language Pathologist   on 12/2/2020 at 12:30 PM

## 2020-12-03 PROCEDURE — 97129 THER IVNTJ 1ST 15 MIN: CPT

## 2020-12-03 PROCEDURE — 97530 THERAPEUTIC ACTIVITIES: CPT

## 2020-12-03 PROCEDURE — 2580000003 HC RX 258: Performed by: HOSPITALIST

## 2020-12-03 PROCEDURE — 1200000000 HC SEMI PRIVATE

## 2020-12-03 PROCEDURE — 97116 GAIT TRAINING THERAPY: CPT

## 2020-12-03 PROCEDURE — 6370000000 HC RX 637 (ALT 250 FOR IP): Performed by: HOSPITALIST

## 2020-12-03 PROCEDURE — 2580000003 HC RX 258: Performed by: INTERNAL MEDICINE

## 2020-12-03 PROCEDURE — 6370000000 HC RX 637 (ALT 250 FOR IP): Performed by: INTERNAL MEDICINE

## 2020-12-03 PROCEDURE — 92526 ORAL FUNCTION THERAPY: CPT

## 2020-12-03 PROCEDURE — 6360000002 HC RX W HCPCS: Performed by: HOSPITALIST

## 2020-12-03 PROCEDURE — 94760 N-INVAS EAR/PLS OXIMETRY 1: CPT

## 2020-12-03 RX ORDER — FUROSEMIDE 10 MG/ML
40 INJECTION INTRAMUSCULAR; INTRAVENOUS ONCE
Status: COMPLETED | OUTPATIENT
Start: 2020-12-04 | End: 2020-12-04

## 2020-12-03 RX ADMIN — SODIUM CHLORIDE, PRESERVATIVE FREE 10 ML: 5 INJECTION INTRAVENOUS at 20:43

## 2020-12-03 RX ADMIN — CEFTRIAXONE 1 G: 1 INJECTION, POWDER, FOR SOLUTION INTRAMUSCULAR; INTRAVENOUS at 10:53

## 2020-12-03 RX ADMIN — METOPROLOL SUCCINATE 50 MG: 50 TABLET, EXTENDED RELEASE ORAL at 10:44

## 2020-12-03 RX ADMIN — DIVALPROEX SODIUM 125 MG: 125 CAPSULE ORAL at 17:19

## 2020-12-03 RX ADMIN — SODIUM CHLORIDE, PRESERVATIVE FREE 10 ML: 5 INJECTION INTRAVENOUS at 10:45

## 2020-12-03 RX ADMIN — RIVAROXABAN 15 MG: 15 TABLET, FILM COATED ORAL at 17:19

## 2020-12-03 ASSESSMENT — PAIN SCALES - GENERAL
PAINLEVEL_OUTOF10: 0

## 2020-12-03 ASSESSMENT — PAIN SCALES - WONG BAKER
WONGBAKER_NUMERICALRESPONSE: 0

## 2020-12-03 NOTE — PROGRESS NOTES
[facility]   Speech Therapy  Daily Dysphagia Treatment Note        Joce Gutierrez  AGE: 80 y.o. 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  CT CHEST: No acute traumatic abnormality. 2.  Congestive heart failure is most likely given the CT findings; pneumonia    is also a consideration in areas of consolidation with pleural effusion. 3. Cardiomegaly, predominantly right-sided enlargement. 4. CT ABDOMEN/PELVIS: No acute traumatic abnormality. 5. Thick-walled gallbladder with pericholecystic fluid as can be seen with    cholecystitis. 6.  Diverticulosis coli without CT evidence of acute diverticulitis. 7. CT THORACIC/LUMBAR SPINE: Correlate with report from study performed at    the same time.       Hospital Course: 77-year-old white female with advanced Alzheimer's dementia atrial fibrillation, HTN and gout who is unable to provide any history, keeps repeating the same thing again and again and asked me to shut the windows down, presents to the hospital with a fall at home with two flights of stairs.  Patient is actively vomiting and had loss of consciousness when she presented to the hospital.  At the time of my exam, the patient is awake but not oriented to place or person. CT brain no acute changes old right temporoparietal craniotomy.  LFT alk phos 180. CT abdomen GB thick walled with trace pericholecystic fluid. Kept NPO consult to general surgery not suggestive of acute cholecystitis . No abd pain, diet started. Admitted with fall, CHF, UTI    Subjective:     Current diet  Dysphagia Pureed  Mildly thick nectar liquids    Comments regarding tolerating Current Diet:   Nurse reports good toleration of AM meds when crushed; difficulty taking whole pill, which is reportedly the family's preference    Objective:     Pain   Patient Currently in Pain: none stated    Cognitive/Behavior   Behavior/Cognition: Alert, Requires cueing, confused, rarely follows directions    Presentations   Consistencies Administered: Dysphagia Soft and Bite-Sized (Dysphagia III), Dysphagia Minced and Moist (Dysphagia II), Dysphagia Pureed (Dysphagia I), Honey - cup, Nectar - cup, Nectar - straw, Thin - cup, Thin - straw    Positioning   Upright in chair after assist from PT/OT    PO Trials:   · Thin Liquids straw:  lingual discoordination affecting bolus formation and control; disorganized AP transit; delayed swallow initiation; decreased laryngeal elevation. Suspected premature bolus loss. Delayed cough. · Nectar thick liquids straw: lingual discoordination affecting bolus formation and control; disorganized AP transit; delayed swallow initiation; decreased laryngeal elevation. Suspected premature bolus loss. No change in breathing quality post swallow, no overt s/s  · Honey Thick liquids NT  · Puree prolonged oral transit; lingual discoordination affecting bolus formation and control; disorganized AP transit; delayed swallow initiation; decreased laryngeal elevation. Suspected premature bolus loss. · Minced/moist x5: prolonged mastication with variable oral clearance, increasing in severity with fatigue; lingual discoordination affecting bolus formation and control; disorganized AP transit; delayed swallow initiation; decreased laryngeal elevation.   Concern for fatigue and reduced intake  · Soft food NT  · Regular food NT    Dysphagia Tx:  · Alert, cooperative for PO upright in chair. Self-feed with S/U assist and occasional feed with spoon as fatigue increases with PO trial progression  · Minimally verbal, and inconsistently follows simple commands. · PO trials: tolerating current diet of puree and mildly thick liquids via straw without overt s/s. Thin trials result in delayed cough. Textured solids result in prolonged mastication and increasing oral residue as pt fatigues, though improved compared to 12/2  · Cue dependent for small sips and general swallow precautions; variable task recognition for self-feeding, with increased need for assist as PO progresses and she fatigues. Goals:   Dysphagia Goals: The patient will tolerate recommended diet without observed clinical signs of aspiration continue   The patient/caregiver will demonstrate understanding of compensatory strategies for improved swallowing safety. Continue; cue dependent   Pt will tolerate skilled trials of minced/moist texture and/or thin liquids 10/10 without overt s/s continue, prolonged mastication with oral residue    Assessment:   Impressions:   Dysphagia Diagnosis: Moderate oral stage dysphagia, Mild to moderate pharyngeal stage dysphagia   Moderate oropharyngeal dysphagia characterized by prolonged perseverative mastication with poor oral clearance of soft solid and variable oral clearance with minced/moist texture which increases in severity with fatigue of mechanism; lingual discoordination affecting bolus formation and control; disorganized AP transit; delayed swallow initiation; decreased laryngeal elevation. Suspected premature bolus loss with liquids, potentially with solids.     · Mild increasing to mod oral residue with minced/moist   · Cough thin liquid via straw  · Multiple swallows noted with all liquid viscosities  · Continue puree diet texture with mildly nectar thick liquids; crush meds or whole 1 at a time in puree if pt does not tolerate meds crushed; assist feed with close monitor, feed only when awake/alert to PO.    · Potential for diet upgrade as cognition stabilizes. Diet Recommendations:  Puree diet texture  Mildly nectar thick liquids   Recommended Form of Meds: (crushed v whole one at a time with puree)    Strategies:   Compensatory Swallowing Strategies: Upright as possible for all oral intake, Remain upright for 30-45 minutes after meals, Eat/Feed slowly, Assist feed, Small bites/sips    Education:  Consulted and agree with results and recommendations: Patient, RN  Patient Education: results, recommendations  Patient Education Response: No evidence of learning    Prognosis:   Good for diet tolerance; fair for advancement    Plan:     Continue Dysphagia Therapy: yes  Interventions: Therapeutic Interventions: Diet tolerance monitoring, Patient/Family education, Therapeutic PO trials with SLP  Duration/Frequency of therapy while on unit: Duration/Frequency of Treatment  Duration/Frequency of Treatment: 3-5x/wk while on acute unit  Discharge Instructions:   Anticipate Yes for further skilled Speech Therapy for Dysphagia at discharge    This note serves as a D/C Summary in the event that this patient is discharged prior to the next therapy session.     Coded treatment time:10  Total treatment time: 25    Electronically signed by  Ina Arriola MS, CCC-SLP #[id number]  Speech Language Pathologist   on 12/3/2020 at 12:30 PM

## 2020-12-03 NOTE — PROGRESS NOTES
Hospitalist Progress Note      PCP: No primary care provider on file. Date of Admission: 11/28/2020    Chief Complaint:   advanced dementia and is unable to provide any history. Hospital Course: 59-year-old white female with  advanced Alzheimer's dementia atrial fibrillation, HTN and gout who is unable to provide any  history, keeps repeating the same thing again and again and asked me to shut the windows down, presents to the hospital with a fall at home with two flights of stairs. Patient is actively vomiting and had loss of consciousness when she presented to  the hospital.  At the time of my exam, the patient is awake but not  oriented to place or person. CT brain no acute changes old right temporoparietal craniotomy. LFT alk phos 180. CT abdomen GB thick walled with trace pericholecystic fluid. Kept NPO consult to general surgery not suggestive of acute cholecystitis . No abd pain, diet started. She has been slow to progress due to agitation confusion underlying dementia and some CHF exacerbation. Dysphagia seen by speech and weak needing PT OT but family has planned to take her home      Subjective: jesus manuel MCINTOSH knew in hospital when I tried to ask her year she got aggravated and said Oh dont do that to me . Seen by speech pathology still needs pills crushed they tried yesterday with whole pill and pt wasn't able to process what to do with it .  Reportedly family didn't want us to crush the pills All other systems neg         Medications:  Reviewed    Infusion Medications   Scheduled Medications    rivaroxaban  15 mg Oral Daily    divalproex  125 mg Oral Dinner    QUEtiapine  25 mg Oral Nightly    cefTRIAXone (ROCEPHIN) IV  1 g Intravenous Daily    metoprolol succinate  50 mg Oral Daily    sodium chloride flush  10 mL Intravenous 2 times per day     PRN Meds: haloperidol lactate, sodium chloride flush, acetaminophen **OR** acetaminophen, promethazine **OR** ondansetron      Intake/Output Summary (Last 24 hours) at 12/3/2020 1251  Last data filed at 12/3/2020 0947  Gross per 24 hour   Intake 240 ml   Output 1200 ml   Net -960 ml       Physical Exam Performed:    BP (!) 157/84   Pulse 86   Temp 97.5 °F (36.4 °C) (Axillary)   Resp 24   Ht 5' 6\" (1.676 m)   Wt 189 lb 9.5 oz (86 kg)   SpO2 93%   BMI 30.60 kg/m²     General appearance: . [place] then  dozes off    HEENT: Pupils equal, round, and reactive to light. anicteric  . Neck: Supple, with full range of motion. No jugular venous distention. Trachea midline. Respiratory:  Clear to auscultation,on RA but she just looks winded    Cardiovascular: Regular rate and rhythm  S1/S2 without murmurs, rubs or gallops. Abdomen: Soft, non-tender, non-distended with normal bowel sounds. Musculoskeletal: No clubbing, cyanosis, trace edema bilaterally. Onychomycosis   Skin: Skin color, texture, turgor normal.  No rashes or lesions. Neurologic: nonfocal Cranial nerves: II-XII intact, except [place] grossly non-focal.  Psychiatric: Alert arousable knows in a hospital easily agitated   Peripheral Pulses: +2 palpable, equal bilaterally onychomycosis trace LE edema bilaterally       Labs:   Recent Labs     12/02/20  1200   WBC 9.2   HGB 11.7*   HCT 37.1        Recent Labs     12/02/20  1200      K 4.0      CO2 24   BUN 24*   CREATININE 1.0   CALCIUM 9.2     Recent Labs     12/02/20  1200   AST 29   ALT 20   BILITOT 1.7*   ALKPHOS 148*     No results for input(s): INR in the last 72 hours. No results for input(s): Philly Soumya in the last 72 hours. Urinalysis:      Lab Results   Component Value Date    NITRU Negative 11/28/2020    WBCUA 45 11/28/2020    BACTERIA RARE 06/09/2020    RBCUA 1 11/28/2020    BLOODU Negative 11/28/2020    SPECGRAV >1.030 11/28/2020    GLUCOSEU Negative 11/28/2020       Radiology:  CT Head WO Contrast   Final Result   1. No evidence of acute intracranial trauma.    2. Moderate cerebral white matter chronic microvascular ischemic disease. 3. Right temporal occipital encephalomalacia and volume loss underlying right   parietal temporal craniotomy. 4. Moderate diffuse cerebral atrophy. CT Cervical Spine WO Contrast   Final Result   1. No evidence of acute intracranial trauma. 2. Moderate cerebral white matter chronic microvascular ischemic disease. 3. Right temporal occipital encephalomalacia and volume loss underlying right   parietal temporal craniotomy. 4. Moderate diffuse cerebral atrophy. CT CHEST ABDOMEN PELVIS W CONTRAST   Final Result   1. CT CHEST: No acute traumatic abnormality. 2.  Congestive heart failure is most likely given the CT findings; pneumonia   is also a consideration in areas of consolidation with pleural effusion. 3. Cardiomegaly, predominantly right-sided enlargement. 4. CT ABDOMEN/PELVIS: No acute traumatic abnormality. 5. Thick-walled gallbladder with pericholecystic fluid as can be seen with   cholecystitis. 6.  Diverticulosis coli without CT evidence of acute diverticulitis. 7. CT THORACIC/LUMBAR SPINE: Correlate with report from study performed at   the same time. CT Lumbar Spine WO Contrast   Final Result   1. No evidence of acute intracranial trauma. 2. Moderate cerebral white matter chronic microvascular ischemic disease. 3. Right temporal occipital encephalomalacia and volume loss underlying right   parietal temporal craniotomy. 4. Moderate diffuse cerebral atrophy. CT THORACIC SPINE WO CONTRAST   Final Result   1. No evidence of acute intracranial trauma. 2. Moderate cerebral white matter chronic microvascular ischemic disease. 3. Right temporal occipital encephalomalacia and volume loss underlying right   parietal temporal craniotomy. 4. Moderate diffuse cerebral atrophy.                  Assessment/Plan:    Active Hospital Problems    Diagnosis    Acute metabolic encephalopathy [W59.27]    Fall at home, initial

## 2020-12-03 NOTE — PROGRESS NOTES
Occupational Therapy  Facility/Department: [facility] MED SURG  Daily Treatment Note  NAME: Megan Tracy  : 1931  MRN: [id number]    Date of Service: 12/3/2020    Discharge Recommendations:  3-5 sessions per week, Patient would benefit from continued therapy after discharge    Megan Tracy scored a  on the AM-PAC ADL Inpatient form. Current research shows that an AM-PAC score of 17 or less is typically not associated with a discharge to the patient's home setting. Based on the patient's AM-PAC score and their current ADL deficits, it is recommended that the patient have 3-5 sessions per week of Occupational Therapy at d/c to increase the patient's independence. Please see assessment section for further patient specific details. If patient discharges prior to next session this note will serve as a discharge summary. Please see below for the latest assessment towards goals. Assessment   Performance deficits / Impairments: Decreased functional mobility ; Decreased ADL status; Decreased cognition;Decreased safe awareness;Decreased balance;Decreased endurance;Decreased strength  Assessment: Discussed with OTR: Pt seen in late am today, more alert and able to participate in therapy, requiring up to Mod A x2 for bed mob, supine to sit to EOB. Pt Mod A for sit><stands and stand-pivot txf bed to chair (hand held assist, Mod A x2). Recommend further therapy at d/c at low-mod frequency, prior to returning home, as requiring up to 2 person assist for transers, bed mob. Cont poc. Prognosis: Fair  History: Pt is an 80 y.o. female admitted with fall, CHF, UTI. At baseline, pt lives with son who assists pt with ADLs and pt completes fxl mobility with walker.   OT Education: OT Role;Plan of Care  Barriers to Learning: cognition, hearing  REQUIRES OT FOLLOW UP: Yes  Activity Tolerance  Activity Tolerance: Treatment limited secondary to decreased cognition  Activity Tolerance: More alert, able to be directed to participate in tasks, not agitated today. Safety Devices  Safety Devices in place: Yes(RN aware)  Type of devices: Call light within reach;Gait belt;Nurse notified; Left in chair;Chair alarm in place(Speech therapy with pt)         Patient Diagnosis(es): The primary encounter diagnosis was Fall, initial encounter. A diagnosis of Syncope and collapse was also pertinent to this visit. has a past medical history of Alzheimer's dementia ([facility] [address] Atrial fibrillation ([address] Benign essential HTN, and Gout.   has no past surgical history on file. Restrictions  Restrictions/Precautions  Restrictions/Precautions: Fall Risk  Position Activity Restriction  Other position/activity restrictions: very [place] and h/o of advanced dementia; IV  Subjective   General  Chart Reviewed: Yes, Orders, Progress Notes  Patient assessed for rehabilitation services?: Yes  Additional Pertinent Hx: Pt is an 80 y.o. female presenting to ED after falling down steps. CT shows bilateral pleural effusions from CHF. Family / Caregiver Present: No  Referring Practitioner: Nico Dawson MD  Diagnosis: fall, CHF, UTI  Subjective  Subjective: Pt met BS, in bed. Pt initially a little groggy, and restless, shaking, jerking movement. Pt seen with PT for co-tx for OOB to chair and Speech therapy persent to work with pt. Orientation  Orientation  Orientation Level: Oriented to person;Oriented to place; Disoriented to time;Disoriented to situation  Objective    ADL  Feeding: (Pt observed with speech therapy, holding pudding cup, using utensil to feed self; and observed pt holding cup to take drink, with cues to initiate task)        Balance  Sitting Balance: Contact guard assistance(at EOB)  Standing Balance  Activity: Mod a x2 for static balance (hand held assistance) and pivot transfer bed to chair  Bed mobility  Supine to Sit: Moderate assistance;2 Person assistance(HOB raised)  Transfers  Sit to stand:  Moderate assistance;2 Person pt unable to verbalize personal therapy goal d/t decreased cognition.        Therapy Time     Individual Co-treatment   Time In 1145     Time Out 1209     Minutes [address] [id number]

## 2020-12-03 NOTE — PROGRESS NOTES
Physical Therapy  Facility/Department: [address] MED SURG  Daily Treatment Note  NAME: Tiffany Ponce  : 1931  MRN: [id number]    Date of Service: 12/3/2020    Discharge Recommendations:  Patient would benefit from continued therapy after discharge, 3-5 sessions per week   PT Equipment Recommendations  Equipment Needed: No  Tiffany Ponce scored a  on the AM-PAC short mobility form. Current research shows that an AM-PAC score of 17 or less is typically not associated with a discharge to the patient's home setting. Based on the patient's AM-PAC score and their current functional mobility deficits, it is recommended that the patient have 3-5 sessions per week of Physical Therapy at d/c to increase the patient's independence. Please see assessment section for further patient specific details. If patient discharges prior to next session this note will serve as a discharge summary. Please see below for the latest assessment towards goals. Assessment   Body structures, Functions, Activity limitations: Decreased functional mobility ; Increased pain;Decreased balance;Decreased ROM; Decreased strength;Decreased safe awareness;Decreased cognition;Decreased endurance  Assessment: Pt limited this session by cognitive status. Pt participated in bed mobility and transfer to chair without resisting but could not follow cues for exercises. Pt will continue to benefit from skilled PT while on acute floor and at d/c. Will continue to follow. Prognosis: Fair  PT Education: PT Role;Orientation;General Safety  REQUIRES PT FOLLOW UP: Yes  Activity Tolerance  Activity Tolerance: Patient limited by fatigue;Patient limited by pain; Patient limited by cognitive status  Activity Tolerance: Per PCA, pt is more alert and cooperative in the afternoons vs mornings     Patient Diagnosis(es): The primary encounter diagnosis was Fall, initial encounter. A diagnosis of Syncope and collapse was also pertinent to this visit.      has a

## 2020-12-03 NOTE — PLAN OF CARE
Problem: Pain:  Goal: Pain level will decrease  Description: Pain level will decrease  12/2/2020 2354 by Fab Mccoy RN  Outcome: Ongoing  12/2/2020 1631 by Gage Stokes RN  Outcome: Ongoing  Goal: Control of acute pain  Description: Control of acute pain  12/2/2020 2354 by Fab Mccoy RN  Outcome: Ongoing  12/2/2020 1631 by Gage Stokes RN  Outcome: Ongoing  Goal: Control of chronic pain  Description: Control of chronic pain  12/2/2020 2354 by Fab Mccoy RN  Outcome: Ongoing  12/2/2020 1631 by Gage Stokes RN  Outcome: Ongoing     Problem: Skin Integrity:  Goal: Will show no infection signs and symptoms  Description: Will show no infection signs and symptoms  12/2/2020 2354 by Fab Mccoy RN  Outcome: Ongoing  12/2/2020 1631 by Gage Stokes RN  Outcome: Ongoing  Goal: Absence of new skin breakdown  Description: Absence of new skin breakdown  12/2/2020 2354 by Fab Mccoy RN  Outcome: Ongoing  12/2/2020 1631 by Gage Stokes RN  Outcome: Ongoing     Problem: Falls - Risk of: Bed alarm in place and call light within reach  Goal: Will remain free from falls  Description: Will remain free from falls  12/2/2020 2354 by Fab Mccoy RN  Outcome: Ongoing  12/2/2020 1631 by Gage Stokes RN  Outcome: Ongoing  Goal: Absence of physical injury  Description: Absence of physical injury  12/2/2020 2354 by Fab Mccoy RN  Outcome: Ongoing  12/2/2020 1631 by Gage Stokes RN  Outcome: Ongoing

## 2020-12-04 LAB
ANION GAP SERPL CALCULATED.3IONS-SCNC: 9 MMOL/L (ref 3–16)
BUN BLDV-MCNC: 27 MG/DL (ref 7–20)
CALCIUM SERPL-MCNC: 9.5 MG/DL (ref 8.3–10.6)
CHLORIDE BLD-SCNC: 105 MMOL/L (ref 99–110)
CO2: 27 MMOL/L (ref 21–32)
CREAT SERPL-MCNC: 0.9 MG/DL (ref 0.6–1.2)
GFR AFRICAN AMERICAN: >60
GFR NON-AFRICAN AMERICAN: 59
GLUCOSE BLD-MCNC: 90 MG/DL (ref 70–99)
POTASSIUM SERPL-SCNC: 3.6 MMOL/L (ref 3.5–5.1)
SODIUM BLD-SCNC: 141 MMOL/L (ref 136–145)

## 2020-12-04 PROCEDURE — 36415 COLL VENOUS BLD VENIPUNCTURE: CPT

## 2020-12-04 PROCEDURE — 97530 THERAPEUTIC ACTIVITIES: CPT

## 2020-12-04 PROCEDURE — 6370000000 HC RX 637 (ALT 250 FOR IP): Performed by: INTERNAL MEDICINE

## 2020-12-04 PROCEDURE — 92526 ORAL FUNCTION THERAPY: CPT

## 2020-12-04 PROCEDURE — 97129 THER IVNTJ 1ST 15 MIN: CPT

## 2020-12-04 PROCEDURE — 97116 GAIT TRAINING THERAPY: CPT

## 2020-12-04 PROCEDURE — 1200000000 HC SEMI PRIVATE

## 2020-12-04 PROCEDURE — 97535 SELF CARE MNGMENT TRAINING: CPT

## 2020-12-04 PROCEDURE — 6360000002 HC RX W HCPCS: Performed by: HOSPITALIST

## 2020-12-04 PROCEDURE — 80048 BASIC METABOLIC PNL TOTAL CA: CPT

## 2020-12-04 PROCEDURE — 6370000000 HC RX 637 (ALT 250 FOR IP): Performed by: HOSPITALIST

## 2020-12-04 PROCEDURE — 2580000003 HC RX 258: Performed by: INTERNAL MEDICINE

## 2020-12-04 RX ORDER — QUETIAPINE FUMARATE 25 MG/1
25 TABLET, FILM COATED ORAL NIGHTLY
Status: DISCONTINUED | OUTPATIENT
Start: 2020-12-04 | End: 2020-12-05

## 2020-12-04 RX ADMIN — METOPROLOL SUCCINATE 50 MG: 50 TABLET, EXTENDED RELEASE ORAL at 08:24

## 2020-12-04 RX ADMIN — FUROSEMIDE 40 MG: 10 INJECTION, SOLUTION INTRAMUSCULAR; INTRAVENOUS at 04:51

## 2020-12-04 RX ADMIN — SODIUM CHLORIDE, PRESERVATIVE FREE 10 ML: 5 INJECTION INTRAVENOUS at 22:47

## 2020-12-04 RX ADMIN — SODIUM CHLORIDE, PRESERVATIVE FREE 10 ML: 5 INJECTION INTRAVENOUS at 08:25

## 2020-12-04 RX ADMIN — RIVAROXABAN 15 MG: 15 TABLET, FILM COATED ORAL at 18:10

## 2020-12-04 RX ADMIN — DIVALPROEX SODIUM 125 MG: 125 CAPSULE ORAL at 18:10

## 2020-12-04 ASSESSMENT — PAIN SCALES - GENERAL: PAINLEVEL_OUTOF10: 0

## 2020-12-04 NOTE — PLAN OF CARE
Family came to visit and were encouraged by her progress towards baseline. Patient had two large bowel movements during shift.        Problem: Pain:  Goal: Pain level will decrease  Description: Pain level will decrease  12/4/2020 1817 by Allison Ann RN  Outcome: Ongoing  12/4/2020 0549 by Nikolas Bertrand RN  Outcome: Ongoing  Goal: Control of acute pain  Description: Control of acute pain  12/4/2020 1817 by Allison Ann RN  Outcome: Ongoing  12/4/2020 0549 by Nikolas Bertrand RN  Outcome: Ongoing  Goal: Control of chronic pain  Description: Control of chronic pain  12/4/2020 1817 by Allison Ann RN  Outcome: Ongoing  12/4/2020 0549 by Nikolas Bertrand RN  Outcome: Ongoing     Problem: Skin Integrity:  Goal: Will show no infection signs and symptoms  Description: Will show no infection signs and symptoms  12/4/2020 1817 by Allison Ann RN  Outcome: Ongoing  12/4/2020 0549 by Nikolas Bertrand RN  Outcome: Ongoing  Goal: Absence of new skin breakdown  Description: Absence of new skin breakdown  12/4/2020 1817 by Allison Ann RN  Outcome: Ongoing  12/4/2020 0549 by Nikolas Bertrand RN  Outcome: Ongoing     Problem: Falls - Risk of:  Goal: Will remain free from falls  Description: Will remain free from falls  12/4/2020 1817 by Allison Ann RN  Outcome: Ongoing  12/4/2020 0549 by Nikolas Bertrand RN  Outcome: Ongoing  Goal: Absence of physical injury  Description: Absence of physical injury  12/4/2020 1817 by Allison Ann RN  Outcome: Ongoing  12/4/2020 0549 by Nikolas Bertrand RN  Outcome: Ongoing     Problem: Mental Status - Impaired:  Goal: Mental status will improve  Description: Mental status will improve  12/4/2020 1817 by Allison Ann RN  Outcome: Ongoing  12/4/2020 0549 by Nikolas Bertrand RN  Outcome: Ongoing

## 2020-12-04 NOTE — PROGRESS NOTES
Patient alert to self only. Pt assessed for pain. Pt denies any pain at this time. Will continue to monitor pt and assess for pain throughout rest of shift. Patient incontent of both urine and stool, complete linen changed performed. Patient on Room air. Pure wick in place.  Electronically signed by Nae Gallardo RN on 12/4/2020 at 5:44 AM

## 2020-12-04 NOTE — PROGRESS NOTES
Impaired  Orientation Level: Disoriented to time;Disoriented to situation;Oriented to person;Disoriented to place(able to state name, said \"hell\" for place, and \"I don't give a damn\" for date)  Objective    ADL  Feeding: Contact guard assistance; Thickened liquids(set up, positioning, occasional scoop assist)  Grooming: Minimal assistance(OT combed pt's hair)  LE Dressing: Maximum assistance(required max A of 2 to don pants; heavy posterior lean)  Additional Comments: Anticipate pt is max A bathing and dressing based on ROM, balance, endurance, cognition. Total A w/ toileting, using pure wick        Balance  Sitting Balance: Stand by assistance(close SBA to sit on EOB x 5-8)  Standing Balance: Moderate assistance  Standing Balance  Time: @ 1 minute  Activity: Mod a x2 for static balance (hand held assistance) and transfer bed to chair  Comment: given mod A x 2, posterior leaning  Functional Mobility  Functional - Mobility Device: No device  Activity: Other  Assist Level: Moderate assistance  Functional Mobility Comments: completed fxl mob @ 4-5' using HHA/mod A of 2, heavy posterior lean, given tactile cues for wt shifting  Wheelchair Bed Transfers  Wheelchair/Bed - Technique: Ambulating  Equipment Used: Bed;Other  Level of Asssistance: Moderate assistance;2 Person assistance  Wheelchair Transfers Comments: given HHA/mod A of 2 from bed to recliner, poor motor planning, fearful of falling  Bed mobility  Supine to Sit: Moderate assistance;2 Person assistance(HOB elevated, rails; poor initiation)  Sit to Supine: Unable to assess(pt in recliner at end of session.)  Scooting: Moderate assistance(scoot forward to EOB)  Comment: semi reclined hospital bed, attempted to guide hand to rail but wouldn't use  Transfers  Sit to stand: Moderate assistance;2 Person assistance  Stand to sit: Moderate assistance;2 Person assistance  Transfer Comments: sit>stand and pivot transfer, bed to chair with Mod A x2.  Pt cued, assisted for hand placement (reaching for chair)                    Vision  Patient Visual Report: No visual complaint reported. Vision Comment: unable to accurately assess d/t cognitive impairments  Cognition  Overall Cognitive Status: Exceptions  Following Commands: Inconsistently follows commands; Follows one step commands with repetition  Attention Span: Difficulty attending to directions  Memory: Decreased recall of recent events;Decreased short term memory;Decreased long term memory  Safety Judgement: Decreased awareness of need for safety  Problem Solving: Decreased awareness of errors;Assistance required to identify errors made;Assistance required to generate solutions  Insights: Not aware of deficits  Initiation: Requires cues for all  Sequencing: Requires cues for all  Cognition Comment: pt is [place],  limited verbalizations, easily agitated                                         Plan   Plan  Times per week: 3-5  Times per day: Daily  Specific instructions for Next Treatment: co-tx  Current Treatment Recommendations: Balance Training, Functional Mobility Training, Safety Education & Training, Strengthening, Endurance Training, Cognitive Reorientation, Cognitive/Perceptual Training, Self-Care / ADL, ROM  Plan Comment: will need 24 hr care upon DC        AM-PAC Score        AM-[facility] Inpatient Daily Activity Raw Score: 12 (12/04/20 09)  AM-PAC Inpatient ADL T-Scale Score : 30.6 (12/04/20 0935)  ADL Inpatient CMS 0-100% Score: 66.57 (12/04/20 09)  ADL Inpatient CMS G-Code Modifier : CL (12/04/20 0935)    Goals  Short term goals  Time Frame for Short term goals: prior to d/c:  Status: goals ongoing  Short term goal 1: Pt will complete UB bathing/dressing with min A. Short term goal 2: Pt will complete fxl mobility and fxl transfers to/from ADL surfaces with SBA using AD. Short term goal 3: Pt will complete bed mobility with SBA in prep for ADL task.   Short term goal 4: Pt will complete grooming in stance at sink with SBA.  Short term goal 5: Pt will tolerate standing >3 minutes for functional task with SBA. Long term goals  Time Frame for Long term goals : STG=LTG  Patient Goals   Patient goals : pt unable to verbalize personal therapy goal d/t decreased cognition.        Therapy Time   Individual Concurrent Group Co-treatment   Time In 0830         Time Out 0925         Minutes 55         Timed Code Treatment Minutes: MELITON Bains [address] [place], [place] [address]

## 2020-12-04 NOTE — PROGRESS NOTES
Purewick tolerating well by patient. Bed alarm on. Patient resting with eyes closed, no signs of distress or discomfort. Blood pressure (!) 141/62, pulse 81, temperature 98 °F (36.7 °C), temperature source Oral, resp. rate 17, height 5' 6\" (1.676 m), weight 194 lb 14.2 oz (88.4 kg), SpO2 93 %.   Electronically signed by Ry Ramírez RN on 12/4/2020 at 5:44 AM

## 2020-12-04 NOTE — PROGRESS NOTES
Physician Progress Note      PATIENTMaudmarky Banegas  CSN #:                  [id number]  :                       1931  ADMIT DATE:       2020 5:43 PM  [address] [place] [place] DATE:  RESPONDING  PROVIDER #:        Armstead Siemens MD          QUERY TEXT:    Pt admitted with UTI and possible syncope after fall down stairs and has CHF   documented. No hx prior CHF noted in [address]. If possible, please document in the   progress notes and discharge summary further specificity regarding the type   and acuity of CHF:    The medical record reflects the following:  Risk Factors: mitral, aortic, and tricuspid regurg noted on ECHO, HTN, afib  Clinical Indicators: BNP 2601, CT of chest shows possible CHF, ECHO with EF   50%  Treatment: Metoprolol, list of home meds includes Lasix prn    Thank you,  Jil Loja RN, BSN, CCDS  [email or web address]  Options provided:  -- Acute on Chronic Diastolic CHF/HFpEF  -- Acute on Chronic Systolic and Diastolic CHF  -- Acute Diastolic CHF/HFpEF  -- Acute Systolic and Diastolic CHF  -- Chronic Diastolic CHF/HFpEF  -- Chronic Systolic and Diastolic CHF  -- Other - I will add my own diagnosis  -- Disagree - Not applicable / Not valid  -- Disagree - Clinically unable to determine / Unknown  -- Refer to Clinical Documentation Reviewer    PROVIDER RESPONSE TEXT:    This patient is in acute on chronic systolic and diastolic CHF.     Query created by: Rosemary Lema on 2020 1:04 PM      Electronically signed by:  Armstead Siemens MD 2020 12:58 PM

## 2020-12-04 NOTE — PROGRESS NOTES
Physical Therapy  Facility/Department: [facility]  Daily Treatment Note  NAME: Karlos Kuar  : 1931  MRN: [id number]    Date of Service: 2020    Discharge Recommendations:  Patient would benefit from continued therapy after discharge, 3-5 sessions per week   PT Equipment Recommendations  Equipment Needed: No  Other: defer to next level of care    Karlos Kaur scored a 8/24 on the AM-PAC short mobility form. Current research shows that an AM-PAC score of 17 or less is typically not associated with a discharge to the patient's home setting. Based on the patient's AM-PAC score and their current functional mobility deficits, it is recommended that the patient have 3-5 sessions per week of Physical Therapy at d/c to increase the patient's independence. Please see assessment section for further patient specific details. If patient discharges prior to next session this note will serve as a discharge summary. Please see below for the latest assessment towards goals. Assessment   Body structures, Functions, Activity limitations: Decreased functional mobility ; Increased pain;Decreased balance;Decreased ROM; Decreased strength;Decreased safe awareness;Decreased cognition;Decreased endurance  Assessment: Today pt continued to be limited by cognitive status, gave minimal verbal responses throughout session and was hallucinating- reported seeing someone outside her window walking. Pt participated in bed mobility and transfers with encouragement, and mod-max cues. She required modAx2 for bed mobility and static standing balance. She was able to ambulate approx 5' to the recliner with HHA and modAx2- pt had heavy UE reliance on therapists hand held assist and did have some SOB after ambulation that resolved with seated rest. Pt was unable to follow cues for exercises this date.  Pt continues to be limited by endurance and cognition, and would benefit from continued skilled therapy to improve activity tolerance at 3-5x/week at low-mod intensity upon d/c. Will cont to follow. Prognosis: Fair  PT Education: PT Role;Orientation;General Safety;Gait Training  Barriers to Learning: cog, [place]  REQUIRES PT FOLLOW UP: Yes  Activity Tolerance  Activity Tolerance: Patient limited by cognitive status; Patient limited by endurance  Activity Tolerance: Per PCA, pt is more alert and cooperative in the afternoons vs mornings (pt limited by fear (per pt) this session-12/4)     Patient Diagnosis(es): The primary encounter diagnosis was Fall, initial encounter. A diagnosis of Syncope and collapse was also pertinent to this visit. has a past medical history of Alzheimer's dementia ([facility] [address] [facility] Atrial fibrillation ([facility] [address] Benign essential HTN, and Gout.   has no past surgical history on file. Restrictions  Restrictions/Precautions  Restrictions/Precautions: Fall Risk(Simultaneous filing. User may not have seen previous data.)  Position Activity Restriction  Other position/activity restrictions: very [place] and h/o of advanced dementia,     Social/Functional History  Lives With: Son  Type of Home: House  Home Layout: One level  Home Access: Stairs to enter with rails  Entrance Stairs - Number of Steps: 4-5  Bathroom Toilet: Standard  Bathroom Equipment: Shower chair, Grab bars in [address] [place]: 4 wheeled walker, Cane  ADL Assistance: Needs assistance(Son assists with bathing/dressing/toileting - patient feeds self)  Homemaking Assistance: (son does all)  Homemaking Responsibilities: No  Ambulation Assistance: Independent(occasionally uses rollator)  Transfer Assistance: Independent  Active : No  Additional Comments: above info per last admission in June, 2020; the pt unable to confirm or add to info due to level of dementia    Subjective   General  Chart Reviewed:  Yes  Additional Pertinent Hx: Per H&P on 11- of Renaye Meigs, MD: The pt is an 79 yo female who presented to the ED after a fall with nausea and vomiting  and LOC. The pt has advanced dementia at baseline and is a poor historian and per RN reports can become agitated. PMHx: dementia,a-fib, HTN, gout  Response To Previous Treatment: Patient unable to report, no changes reported from family or staff  Family / Caregiver Present: No  Referring Practitioner: Cher Montez MD  Subjective  Subjective: Pt in bed eating breakfast upon arrival, says she is \"tired of being checked\". Orientation  Orientation  Overall Orientation Status: Impaired  Orientation Level: Oriented to person;Disoriented to place; Disoriented to time;Disoriented to situation     Objective   Bed mobility  Supine to Sit: Moderate assistance;2 Person assistance(HOB elevated, rails; poor initiation)  Sit to Supine: Unable to assess(pt in recliner at end of session.)  Scooting: Moderate assistance(scoot forward to EOB)     Transfers  Sit to Stand: Moderate Assistance;2 Person Assistance  Stand to sit: Moderate Assistance;2 Person Assistance     Ambulation  Ambulation?: Yes  More Ambulation?: No  Ambulation 1  Surface: level tile  Device: No Device;Hand-Held Assist  Assistance: Moderate assistance;2 Person assistance  Quality of Gait: flexed posture, posterior lean, heavy reliance on hand held assist  Gait Deviations: Decreased step length;Decreased step height  Distance: 5' bed to chair  Comments: pt was SOB but recovered with seated rest  Stairs/Curb  Stairs?: No     Balance  Posture: Good  Sitting - Static: Good  Sitting - Dynamic: Good  Standing - Static: Poor  Standing - Dynamic: Poor  Comments: Pt sat EOB for 5-8 min with SBA while OT worked with pt on putting on lounge pants (maxA), and then student PTA attempted exercises EOB but pt unable to follow commands. Static standing balance required mod-maxAx2 for less than 60sec due to posterior lean and general unsteadiness.      Exercises  Knee Long Arc Quad: attemped, pt unable to follow commands/initiate this date (seated EOB) Other Activities:   Pt performed feeding with OT during session. See OT note for assist levels. *Session took extra time due to pt cognition and poor initiation. AM-PAC Score  AM-PAC Inpatient Mobility Raw Score : 8 (12/04/20 0945)  AM-PAC Inpatient T-Scale Score : 28.52 (12/04/20 0945)  Mobility Inpatient CMS 0-100% Score: 86.62 (12/04/20 0945)  Mobility Inpatient CMS G-Code Modifier : CM (12/04/20 0945)          Goals  Short term goals  Time Frame for Short term goals: upon d/c (goals ongoing as of 12/4 unless noted below)  Short term goal 1: Bed mobility with min A. -ongoing  Short term goal 2: Transfer sit <> stand with CGA -ongoing  Short term goal 3: Ambulate with wheeled walker 25 feet with CGA/min A. -ongoing  Patient Goals   Patient goals : the pt unable to give a goal    Plan    Plan  Times per week: 3-5x/week  Current Treatment Recommendations: Functional Mobility Training, Transfer Training, Gait Training, ROM, Strengthening  Safety Devices  Type of devices: All fall risk precautions in place, Call light within reach, Chair alarm in place, Gait belt, Left in chair(DANIA Gallego notified)     Therapy Time   Individual Concurrent Group Co-treatment   Time In 0830         Time Out 0925         Minutes 55         Timed Code Treatment Minutes: [address], Student PTA  Therapist was present, directed the patient's care, made skilled judgement, and was responsible for assessment and treatment of the patient.          Electronically signed by Alonso Wolfe, PT [id number] on 12/4/2020 at 11:44 AM

## 2020-12-04 NOTE — PROGRESS NOTES
Hospitalist Progress Note      PCP: No primary care provider on file. Date of Admission: 11/28/2020    Chief Complaint:   advanced dementia and is unable to provide any history. Hospital Course: 80-year-old white female with  advanced Alzheimer's dementia atrial fibrillation, HTN and gout who is unable to provide any  history, keeps repeating the same thing again and again and asked me to shut the windows down, presents to the hospital with a fall at home with two flights of stairs. Patient is actively vomiting and had loss of consciousness when she presented to  the hospital.  At the time of my exam, the patient is awake but not  oriented to place or person. CT brain no acute changes old right temporoparietal craniotomy. LFT alk phos 180. CT abdomen GB thick walled with trace pericholecystic fluid. Kept NPO consult to general surgery not suggestive of acute cholecystitis . No abd pain, diet started. She has been slow to progress due to agitation confusion underlying dementia and some CHF exacerbation. Dysphagia seen by speech and weak needing PT OT but family has planned to take her home      Subjective: jesus manuel MCINTOSH knew in hospital when I tried to ask her year she got aggravated and said Oh dont do that to me . Seen by speech pathology still needs pills crushed they tried yesterday with whole pill and pt wasn't able to process what to do with it .  Reportedly family didn't want us to crush the pills All other systems neg         Medications:  Reviewed    Infusion Medications   Scheduled Medications    QUEtiapine  25 mg Oral Nightly    rivaroxaban  15 mg Oral Daily    divalproex  125 mg Oral Dinner    metoprolol succinate  50 mg Oral Daily    sodium chloride flush  10 mL Intravenous 2 times per day     PRN Meds: haloperidol lactate, sodium chloride flush, acetaminophen **OR** acetaminophen, promethazine **OR** ondansetron      Intake/Output Summary (Last 24 hours) at 12/4/2020 1621  Last data filed at 12/4/2020 0704  Gross per 24 hour   Intake --   Output 475 ml   Net -475 ml       Physical Exam Performed:    BP (!) 103/54   Pulse 83   Temp 97.5 °F (36.4 °C) (Oral)   Resp 18   Ht 5' 6\" (1.676 m)   Wt 194 lb 14.2 oz (88.4 kg)   SpO2 94%   BMI 31.46 kg/m²     General appearance: . [place] was more awake but grabbing at covers thinking there is a pack of gum   HEENT: Pupils equal, round, and reactive to light. anicteric  . Neck: Supple, with full range of motion. No jugular venous distention. Trachea midline. Respiratory:  Clear auscultation,on RA    Cardiovascular: Regular rate and rhythm  S1/S2 without murmurs, rubs or gallops. Abdomen: Soft, non-tender, non-distended with normal bowel sounds. Musculoskeletal: No clubbing, cyanosis, trace edema bilaterally. Onychomycosis   Skin: Skin pink warm dry   Neurologic: nonfocal Cranial nerves: II-XII intact, except [place] grossly non-focal.  Psychiatric: Alert made eye contact knows in hospital   Peripheral Pulses: +2 palpable, equal bilaterally onychomycosis no edema         Labs:   Recent Labs     12/02/20  1200   WBC 9.2   HGB 11.7*   HCT 37.1        Recent Labs     12/02/20  1200 12/04/20  0529    141   K 4.0 3.6    105   CO2 24 27   BUN 24* 27*   CREATININE 1.0 0.9   CALCIUM 9.2 9.5     Recent Labs     12/02/20  1200   AST 29   ALT 20   BILITOT 1.7*   ALKPHOS 148*     No results for input(s): INR in the last 72 hours. No results for input(s): Lauren Gillespie in the last 72 hours. Urinalysis:      Lab Results   Component Value Date    NITRU Negative 11/28/2020    WBCUA 45 11/28/2020    BACTERIA RARE 06/09/2020    RBCUA 1 11/28/2020    BLOODU Negative 11/28/2020    SPECGRAV >1.030 11/28/2020    GLUCOSEU Negative 11/28/2020       Radiology:  CT Head WO Contrast   Final Result   1. No evidence of acute intracranial trauma. 2. Moderate cerebral white matter chronic microvascular ischemic disease.    3. Right temporal occipital encephalomalacia and volume loss underlying right   parietal temporal craniotomy. 4. Moderate diffuse cerebral atrophy. CT Cervical Spine WO Contrast   Final Result   1. No evidence of acute intracranial trauma. 2. Moderate cerebral white matter chronic microvascular ischemic disease. 3. Right temporal occipital encephalomalacia and volume loss underlying right   parietal temporal craniotomy. 4. Moderate diffuse cerebral atrophy. CT CHEST ABDOMEN PELVIS W CONTRAST   Final Result   1. CT CHEST: No acute traumatic abnormality. 2.  Congestive heart failure is most likely given the CT findings; pneumonia   is also a consideration in areas of consolidation with pleural effusion. 3. Cardiomegaly, predominantly right-sided enlargement. 4. CT ABDOMEN/PELVIS: No acute traumatic abnormality. 5. Thick-walled gallbladder with pericholecystic fluid as can be seen with   cholecystitis. 6.  Diverticulosis coli without CT evidence of acute diverticulitis. 7. CT THORACIC/LUMBAR SPINE: Correlate with report from study performed at   the same time. CT Lumbar Spine WO Contrast   Final Result   1. No evidence of acute intracranial trauma. 2. Moderate cerebral white matter chronic microvascular ischemic disease. 3. Right temporal occipital encephalomalacia and volume loss underlying right   parietal temporal craniotomy. 4. Moderate diffuse cerebral atrophy. CT THORACIC SPINE WO CONTRAST   Final Result   1. No evidence of acute intracranial trauma. 2. Moderate cerebral white matter chronic microvascular ischemic disease. 3. Right temporal occipital encephalomalacia and volume loss underlying right   parietal temporal craniotomy. 4. Moderate diffuse cerebral atrophy. Assessment/Plan:    Active Hospital Problems    Diagnosis    Acute metabolic encephalopathy [U84.90]    Fall at home, initial encounter [W19. 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 BNP 2601,  trop neg   --2D echo severe NEHEMIAH severe TVR severe LAE EF 50    Summary   Ejection fraction is visually estimated to be   50%. Moderate to severe mitral regurgitation. The left atrium is severely dilated. Moderate aortic regurgitation. Severly dilated right ventricle. Right ventricular systolic function is mildly reduced . Severe tricuspid regurgitation. The right atrium is severely dilated    Zuly given lasix at least 2 doses  her resp status appears less labored today     Acute urinary tract infection  --UA 45 WBC   --urine cx 50,000 staph. Dc rocephin received 4 days      Seen by surgery no cholecystitis. CV19 neg dc'd isolation     Dementia with behavioral disturbance  Psychosis, visual hallucinations   --depakote sprinkle 125mg daily for mood stabilization  Son had changed her dose I will increase her to BID dosing , her level was low so there is room to increase  --has prn haldol   --CT brain non acute  I had held the seroquel to try to make sure pt maximally awake and could assess her mentation but clearly nursing documenting hallucinations  --restart seroquel 25mg po QHS    Dysphagia  --speech therapy pureed with nectar thick      DVT Prophylaxis: apixaban   Diet: DIET DYSPHAGIA MINCED AND MOIST; Dysphagia Pureed; Mildly Thick (Nectar)  Code Status: Full Code     PT/OT Eval Status:  Uses a walker per son . She is still needing max assist 2 people . Check with PT OT tomorrow do they feel she is capable of cooperating at SNF level for therapy. Need to talk with son ?  If they want her home can they really handle her in this state     Dispo - return home with family versus SNF for rehab   45 minutes time spent   Peggy Pelayo MD

## 2020-12-04 NOTE — PROGRESS NOTES
[facility]   Speech Therapy  Daily Dysphagia Treatment Note        [place]  AGE: 80 y.o. GENDER: female  : 1931  [id number]  EPISODE DATE:  2020  Patient Active Problem List   Diagnosis    Acute on chronic combined systolic and diastolic CHF, NYHA class 3 ([address]    Dementia in Alzheimer's disease with delirium ([address]    Acute pyelonephritis    Essential hypertension    Current use of long term anticoagulation    Mild malnutrition ([address]    Fall at home, initial encounter    Acute metabolic encephalopathy     Allergies   Allergen Reactions    Chlorpromazine Other (See Comments)    Hydrochlorothiazide Other (See Comments)     Gout     Treatment Diagnosis: Dysphagia       Chart review:   Recent CT of Chest/abdomen/pelvis 2020  Impression    1.  CT CHEST: No acute traumatic abnormality. 2.  Congestive heart failure is most likely given the CT findings; pneumonia    is also a consideration in areas of consolidation with pleural effusion. 3. Cardiomegaly, predominantly right-sided enlargement. 4. CT ABDOMEN/PELVIS: No acute traumatic abnormality. 5. Thick-walled gallbladder with pericholecystic fluid as can be seen with    cholecystitis. 6.  Diverticulosis coli without CT evidence of acute diverticulitis. 7. CT THORACIC/LUMBAR SPINE: Correlate with report from study performed at    the same time.       Hospital Course: 63-year-old white female with advanced Alzheimer's dementia atrial fibrillation, HTN and gout who is unable to provide any history, keeps repeating the same thing again and again and asked me to shut the windows down, presents to the hospital with a fall at home with two flights of stairs.  Patient is actively vomiting and had loss of consciousness when she presented to the hospital.  At the time of my exam, the patient is awake but not oriented to place or person. CT brain no acute changes old right temporoparietal craniotomy.  LFT alk phos 180. CT abdomen GB thick walled with trace pericholecystic fluid. Kept NPO consult to general surgery not suggestive of acute cholecystitis . No abd pain, diet started. Admitted with fall, CHF, UTI    Subjective:     Current diet  Dysphagia Pureed  Mildly thick nectar liquids    Comments regarding tolerating Current Diet:   Nurse reports good toleration of AM med taken whole; improved ability to self-feed with assist d/t cognition  Son reports to this SLP via phone concern re: puree diet and that pt is now requesting textured/'normal' food; son desires upgrade     Objective:     Pain   Patient Currently in Pain: none stated    Cognitive/Behavior   Behavior/Cognition: Alert, Requires cueing, confused, intermittently follows directions    Presentations   Consistencies Administered: Dysphagia Soft and Bite-Sized (Dysphagia III), Dysphagia Minced and Moist (Dysphagia II), Dysphagia Pureed (Dysphagia I), Honey - cup, Nectar - cup, Nectar - straw, Thin - cup, Thin - straw    Positioning   Upright in chair     PO Trials:   · Thin Liquids straw:  lingual discoordination affecting bolus formation and control; disorganized AP transit; delayed swallow initiation; decreased laryngeal elevation. Suspected premature bolus loss. Immediate prolonged cough. · Nectar thick liquids straw: lingual discoordination affecting bolus formation and control; disorganized AP transit; delayed swallow initiation; decreased laryngeal elevation. Suspected premature bolus loss. No change in breathing quality post swallow, no overt s/s  · Honey Thick liquids NT  · Puree prolonged oral transit; lingual discoordination affecting bolus formation and control; disorganized AP transit; delayed swallow initiation; decreased laryngeal elevation. Suspected premature bolus loss.    · Minced/moist (grahams in pudding): prolonged but effective mastication; trace oral residue; lingual discoordination affecting bolus formation and control; disorganized AP transit; delayed swallow initiation; decreased laryngeal elevation. Improved endurance noted  · Soft food (turkey/pina on bun, grahams): prolonged but effective mastication; variable mild-mod oral residue; lingual discoordination affecting bolus formation and control; disorganized AP transit; delayed swallow initiation; decreased laryngeal elevation. Improved endurance noted, but concern for fatigue with larger sample/full meal  · Regular food NT    Dysphagia Tx:  · Alert, cooperative for PO upright in chair. Self-feed with S/U assist and occasional tactile/visual cues  · Minimally verbal, and inconsistently follows simple commands. · PO trials: tolerating current diet of puree and mildly thick liquids via straw without overt s/s. · Thin liquid trials result in immediate prolonged cough. · Minced/moist solids result in prolonged but effective mastication and only trace oral residue with larger PO sample (improved from 12/3). · Soft/bite size solids result in prolonged but effective mastication and variable mild-moderate oral residue. · Cue dependent for small sips and general swallow precautions; improved task recognition for self-feeding, though remains variable but slowly and steadily improving each day. · SLP spoke with son prior to PO trials re: rationale for diet recommendations and impact of cognition/medical status on PO intake and safety, as well as suspected improvement with swallowing as pt medically/cognitively clears. Son stated he planned to be here for lunch session today, but was not present at time of treatment/lunch. · Educated nurse on recommendation to upgrade to minced/moist and continue nectar liquids. Goals:   Dysphagia Goals:    The patient will tolerate recommended diet without observed clinical signs of aspiration continue; recommend upgrade to minced/moist texture; continue nectar liquids   The patient/caregiver will demonstrate understanding of compensatory strategies for improved swallowing safety. Continue; cue dependent but improved self-feeding  Pt will tolerate skilled trials of minced/moist texture and/or thin liquids 10/10 without overt s/s continue, upgrade to minced moist; pt has overt s/s with thin liquids. Assessment:   Impressions:   Dysphagia Diagnosis: Moderate oral stage dysphagia, Mild to moderate pharyngeal stage dysphagia   Mild-moderate oropharyngeal dysphagia characterized by prolonged but effective mastication with minced and soft textures and increased oral residue with soft/bite size texture, with concern for fatigue of mechanism; lingual discoordination affecting bolus formation and control; disorganized AP transit; delayed swallow initiation; decreased laryngeal elevation. Suspected premature bolus loss with liquids, potentially with solids. · Trace oral residue with minced/moist; mild-mod with soft/bite sized. This is improved from previous sessions this week  · Persistent cough thin liquid via straw  · Multiple swallows noted with all liquid viscosities  · Recommend upgrade to minced/moist texture. Continue mildly nectar thick liquids; crush meds or whole 1 at a time in puree depending on cognition/task recognition; assist feed with close monitor, upright in chair. Encourage self-feeding. · Potential for additional diet upgrade as cognition stabilizes.     Diet Recommendations:  UPGRADE TO MINCED/MOIST TEXTURE  Mildly nectar thick liquids   Recommended Form of Meds: (crushed v whole one at a time with puree)    Strategies:   Compensatory Swallowing Strategies: Upright as possible for all oral intake, Remain upright for 30-45 minutes after meals, Eat/Feed slowly, Assist feed, Small bites/sips    Education:  Consulted and agree with results and recommendations: Patient, RN, son via phone  Patient Education: results, recommendations  Patient Education Response: No evidence of learning    Prognosis:   Good for diet tolerance; fair for advancement    Plan:     Continue Dysphagia Therapy: yes  Interventions: Therapeutic Interventions: Diet tolerance monitoring, Patient/Family education, Therapeutic PO trials with SLP  Duration/Frequency of therapy while on unit: Duration/Frequency of Treatment  Duration/Frequency of Treatment: 3-5x/wk while on acute unit  Discharge Instructions:   Anticipate Yes for further skilled Speech Therapy for Dysphagia at discharge    This note serves as a D/C Summary in the event that this patient is discharged prior to the next therapy session.     Coded treatment time:10  Total treatment time: 36    Electronically signed by  Carlos Gonzalez MS, CCC-SLP #[id number]  Speech Language Pathologist   on 12/4/2020 at 200 PM

## 2020-12-05 LAB — PRO-BNP: 2919 PG/ML (ref 0–449)

## 2020-12-05 PROCEDURE — 6370000000 HC RX 637 (ALT 250 FOR IP): Performed by: HOSPITALIST

## 2020-12-05 PROCEDURE — 1200000000 HC SEMI PRIVATE

## 2020-12-05 PROCEDURE — 6370000000 HC RX 637 (ALT 250 FOR IP): Performed by: INTERNAL MEDICINE

## 2020-12-05 PROCEDURE — 2580000003 HC RX 258: Performed by: INTERNAL MEDICINE

## 2020-12-05 PROCEDURE — 83880 ASSAY OF NATRIURETIC PEPTIDE: CPT

## 2020-12-05 PROCEDURE — 36415 COLL VENOUS BLD VENIPUNCTURE: CPT

## 2020-12-05 RX ADMIN — RIVAROXABAN 15 MG: 15 TABLET, FILM COATED ORAL at 17:52

## 2020-12-05 RX ADMIN — SODIUM CHLORIDE, PRESERVATIVE FREE 10 ML: 5 INJECTION INTRAVENOUS at 23:39

## 2020-12-05 RX ADMIN — SODIUM CHLORIDE, PRESERVATIVE FREE 10 ML: 5 INJECTION INTRAVENOUS at 12:20

## 2020-12-05 RX ADMIN — METOPROLOL SUCCINATE 50 MG: 50 TABLET, EXTENDED RELEASE ORAL at 12:20

## 2020-12-05 RX ADMIN — DIVALPROEX SODIUM 125 MG: 125 CAPSULE ORAL at 17:52

## 2020-12-05 RX ADMIN — QUETIAPINE FUMARATE 25 MG: 25 TABLET ORAL at 00:23

## 2020-12-05 ASSESSMENT — PAIN SCALES - GENERAL: PAINLEVEL_OUTOF10: 0

## 2020-12-05 NOTE — PROGRESS NOTES
Hospitalist Progress Note      PCP: No primary care provider on file. Date of Admission: 11/28/2020    Chief Complaint:   advanced dementia and is unable to provide any history. Hospital Course: 63-year-old white female with  advanced Alzheimer's dementia atrial fibrillation, HTN and gout who is unable to provide any  history, keeps repeating the same thing again and again and asked me to shut the windows down, presents to the hospital with a fall at home with two flights of stairs. Patient is actively vomiting and had loss of consciousness when she presented to the hospital.  At the time of my exam, the patient is awake but not oriented to place or person. CT brain no acute changes old right temporoparietal craniotomy. LFT alk phos 180. CT abdomen GB thick walled with trace pericholecystic fluid. Kept NPO consult to general surgery not suggestive of acute cholecystitis . No abd pain, diet started. She has been slow to progress due to agitation confusion underlying dementia and some CHF exacerbation. Dysphagia seen by speech and weak needing PT OT but family has planned to take her home      Subjective: [place], arousable but confused drowsy ate breakbreakfast  All other systems neg         Medications:  Reviewed    Infusion Medications   Scheduled Medications    rivaroxaban  15 mg Oral Daily    divalproex  125 mg Oral Dinner    metoprolol succinate  50 mg Oral Daily    sodium chloride flush  10 mL Intravenous 2 times per day     PRN Meds: sodium chloride flush, acetaminophen **OR** acetaminophen, promethazine **OR** ondansetron    No intake or output data in the 24 hours ending 12/05/20 1202    Physical Exam Performed:    /69   Pulse 85   Temp 98.2 °F (36.8 °C) (Oral)   Resp 15   Ht 5' 6\" (1.676 m)   Wt 188 lb 4.4 oz (85.4 kg)   SpO2 92%   BMI 30.39 kg/m²     General appearance: . [place] opened eyes to touch but then back to sleep  HEENT: Pupils equal, round, and reactive to light. anicteric .  Neck: Supple, full range of motion. No JVD. Trachea midline. Respiratory:  Clear auscultation,on RA    Cardiovascular: Regular rate and rhythm  S1/S2 without murmurs, rubs or gallops. Abdomen: Soft, non-tender, non-distended with normal bowel sounds. Musculoskeletal: No clubbing, cyanosis, trace edema bilaterally. Onychomycosis   Skin: Skin pink warm dry   Neurologic: nonfocal Cranial nerves: II-XII intact, except [place]   Psychiatric: Aroused to touch then back to sleep not highly alerti interactive responsive    Peripheral Pulses: +2 palpable, equal bilaterally onychomycosis no edema         Labs:   No results for input(s): WBC, HGB, HCT, PLT in the last 72 hours. Recent Labs     12/04/20  0529      K 3.6      CO2 27   BUN 27*   CREATININE 0.9   CALCIUM 9.5     No results for input(s): AST, ALT, BILIDIR, BILITOT, ALKPHOS in the last 72 hours. No results for input(s): INR in the last 72 hours. No results for input(s): Filipe Seed in the last 72 hours. Urinalysis:      Lab Results   Component Value Date    NITRU Negative 11/28/2020    WBCUA 45 11/28/2020    BACTERIA RARE 06/09/2020    RBCUA 1 11/28/2020    BLOODU Negative 11/28/2020    SPECGRAV >1.030 11/28/2020    GLUCOSEU Negative 11/28/2020       Radiology:  CT Head WO Contrast   Final Result   1. No evidence of acute intracranial trauma. 2. Moderate cerebral white matter chronic microvascular ischemic disease. 3. Right temporal occipital encephalomalacia and volume loss underlying right   parietal temporal craniotomy. 4. Moderate diffuse cerebral atrophy. CT Cervical Spine WO Contrast   Final Result   1. No evidence of acute intracranial trauma. 2. Moderate cerebral white matter chronic microvascular ischemic disease. 3. Right temporal occipital encephalomalacia and volume loss underlying right   parietal temporal craniotomy. 4. Moderate diffuse cerebral atrophy.          CT CHEST ABDOMEN PELVIS W CONTRAST   Final severe TVR severe LAE EF 50  On BB defer ACEI as mild CHF and dont want to lower BP     Acute urinary tract infection  --UA 45 WBC . urine cx 50,000 staph. txd rocephin  4 days      Seen by surgery no cholecystitis. CV19 neg dc'd isolation     Dementia with behavioral disturbance  Psychosis, visual hallucinations   --CT brain non acute  --depakote sprinkle 125mg increased to BID due to hallucinations   she seems overly sedated today so stop seroquel  Dc prn haldol   Reassess mentation tomorrow     Dysphagia  --speech therapy pureed with nectar thick      DVT Prophylaxis: apixaban   Diet: DIET DYSPHAGIA MINCED AND MOIST; Dysphagia Pureed; Mildly Thick (Nectar)  Code Status: Full Code     PT/OT Eval Status:  Uses a walker per son . I see nurses note family was in and seemed pleased with her progress .  Likely they will want her home    Reassess mentation tomorrow am off all the other psych meds except the depakote BID   Dispo - return home with family   35 minutes time spent   Rossi Alaniz MD

## 2020-12-06 PROCEDURE — 6370000000 HC RX 637 (ALT 250 FOR IP): Performed by: HOSPITALIST

## 2020-12-06 PROCEDURE — 1200000000 HC SEMI PRIVATE

## 2020-12-06 PROCEDURE — 2580000003 HC RX 258: Performed by: INTERNAL MEDICINE

## 2020-12-06 PROCEDURE — 94760 N-INVAS EAR/PLS OXIMETRY 1: CPT

## 2020-12-06 PROCEDURE — 6370000000 HC RX 637 (ALT 250 FOR IP): Performed by: INTERNAL MEDICINE

## 2020-12-06 RX ORDER — FUROSEMIDE 20 MG/1
10 TABLET ORAL DAILY
Status: DISCONTINUED | OUTPATIENT
Start: 2020-12-07 | End: 2020-12-07

## 2020-12-06 RX ADMIN — CARBAMIDE PEROXIDE 6.5% 5 DROP: 6.5 LIQUID AURICULAR (OTIC) at 21:51

## 2020-12-06 RX ADMIN — RIVAROXABAN 15 MG: 15 TABLET, FILM COATED ORAL at 17:46

## 2020-12-06 RX ADMIN — SODIUM CHLORIDE, PRESERVATIVE FREE 10 ML: 5 INJECTION INTRAVENOUS at 10:43

## 2020-12-06 RX ADMIN — DIVALPROEX SODIUM 125 MG: 125 CAPSULE ORAL at 17:46

## 2020-12-06 RX ADMIN — SODIUM CHLORIDE, PRESERVATIVE FREE 10 ML: 5 INJECTION INTRAVENOUS at 21:51

## 2020-12-06 RX ADMIN — METOPROLOL SUCCINATE 50 MG: 50 TABLET, EXTENDED RELEASE ORAL at 09:37

## 2020-12-06 NOTE — PLAN OF CARE
Denies pain. Cooperative and pleasantly confused. Up to chair with min assist FWW. Meds given whole with puree, patient chewed medications. Able to feed self with supervision. No sign of aspiration.

## 2020-12-06 NOTE — PLAN OF CARE
Problem: Pain:  Goal: Pain level will decrease  Description: Pain level will decrease  Outcome: Ongoing  Goal: Control of acute pain  Description: Control of acute pain  Outcome: Ongoing  Goal: Control of chronic pain  Description: Control of chronic pain  Outcome: Ongoing   Patient denies pain    Problem: Skin Integrity:  Goal: Will show no infection signs and symptoms  Description: Will show no infection signs and symptoms  Outcome: Ongoing  Goal: Absence of new skin breakdown  Description: Absence of new skin breakdown  Outcome: Ongoing   Skin assessment performed each shift per protocol. Patient turned and repositioned every two hours and prn with pillow support. Patient checked for incontence every two hours. Problem: Falls - Risk of:  Goal: Will remain free from falls  Description: Will remain free from falls  Outcome: Ongoing  Goal: Absence of physical injury  Description: Absence of physical injury  Outcome: Ongoing   Patient free from falls this shift. Fall precautions in place at all times. Bed alarm set. Bed in lowest position with two side rails up and wheels locked. Call light within reach. Problem: Mental Status - Impaired:  Goal: Mental status will improve  Description: Mental status will improve  Outcome: Ongoing   Patient alert to self. Believes she is at home in her bedroom. Princess Acosta at staff to get out of her room or she will call police. Patient in bed watching TV and calm when observed through window.

## 2020-12-06 NOTE — PROGRESS NOTES
Shift assessment completed. VSS. Alert to self only. She did allow this RN to assess her but became increasingly agitated and asking why was I in her room then yelled \" get out of my room. \"  Bed in lowest position with brakes locked. Bed alarm set. Call light and personal belongings within reach.

## 2020-12-06 NOTE — PROGRESS NOTES
Hospitalist Progress Note      PCP: No primary care provider on file. Date of Admission: 11/28/2020    Chief Complaint:   advanced dementia and is unable to provide any history. Hospital Course: 29-year-old white female with  advanced Alzheimer's dementia atrial fibrillation, HTN and gout who is unable to provide any  history, keeps repeating the same thing again and again and asked me to shut the windows down, presents to the hospital with a fall at home with two flights of stairs. Patient is actively vomiting and had loss of consciousness when she presented to the hospital.  At the time of my exam, the patient is awake but not oriented to place or person. CT brain no acute changes old right temporoparietal craniotomy. LFT alk phos 180. CT abdomen GB thick walled with trace pericholecystic fluid. Kept NPO consult to general surgery not suggestive of acute cholecystitis . No abd pain, diet started. She has been slow to progress due to agitation confusion underlying dementia and some CHF exacerbation. Dysphagia seen by speech and weak needing PT OT but family has planned to take her home      Subjective: [place], sons are at bedside we agree can go home tomorrow they need to find a ride bc currently only have truck she cant step in to. She is up in chair. Per nursing she walked with [address] standby assist all she needed was some help initially to get OOB.  Sons now stste she takes 0.5 tab lasix daily they are wondering if she has wax in her ears [facility] we dont have otoscope at bedside so we sill debrox tonight  All other systems neg         Medications:  Reviewed    Infusion Medications   Scheduled Medications    [START ON 12/7/2020] furosemide  10 mg Oral Daily    rivaroxaban  15 mg Oral Daily    divalproex  125 mg Oral Dinner    metoprolol succinate  50 mg Oral Daily    sodium chloride flush  10 mL Intravenous 2 times per day     PRN Meds: sodium chloride flush, acetaminophen **OR** acetaminophen, promethazine **OR** ondansetron      Intake/Output Summary (Last 24 hours) at 12/6/2020 1832  Last data filed at 12/6/2020 1439  Gross per 24 hour   Intake 320 ml   Output --   Net 320 ml       Physical Exam Performed:    /63   Pulse 79   Temp 97.6 °F (36.4 °C) (Oral)   Resp 18   Ht 5' 6\" (1.676 m)   Wt 180 lb 8.9 oz (81.9 kg)   SpO2 97%   BMI 29.14 kg/m²     General appearance: . [place] up in chair   HEENT: Pupils equal, round, and reactive to light. anicteric  . Neck: Supple, full range of motion. No JVD. Trachea midline. Respiratory:  Clear auscultation,on RA    Cardiovascular: Regular rate and rhythm  S1/S2 without murmurs, rubs or gallops. Abdomen: Soft, non-tender, non-distended with normal bowel sounds. Musculoskeletal: No clubbing, cyanosis, trace edema bilaterally. Onychomycosis   Skin: Skin pink warm dry   Neurologic: nonfocal Cranial nerves: II-XII intact, except [place]   Psychiatric: responds to sons every time I try talk to her (loudly) she makes face like 'what\"  Peripheral Pulses: +2 palpable, equal bilaterally       Labs:   No results for input(s): WBC, HGB, HCT, PLT in the last 72 hours. Recent Labs     12/04/20  0529      K 3.6      CO2 27   BUN 27*   CREATININE 0.9   CALCIUM 9.5     No results for input(s): AST, ALT, BILIDIR, BILITOT, ALKPHOS in the last 72 hours. No results for input(s): INR in the last 72 hours. No results for input(s): Jonah Oiler in the last 72 hours. Urinalysis:      Lab Results   Component Value Date    NITRU Negative 11/28/2020    WBCUA 45 11/28/2020    BACTERIA RARE 06/09/2020    RBCUA 1 11/28/2020    BLOODU Negative 11/28/2020    SPECGRAV >1.030 11/28/2020    GLUCOSEU Negative 11/28/2020       Radiology:  CT Head WO Contrast   Final Result   1. No evidence of acute intracranial trauma. 2. Moderate cerebral white matter chronic microvascular ischemic disease.    3. Right temporal occipital encephalomalacia and volume loss underlying right   parietal temporal craniotomy. 4. Moderate diffuse cerebral atrophy. CT Cervical Spine WO Contrast   Final Result   1. No evidence of acute intracranial trauma. 2. Moderate cerebral white matter chronic microvascular ischemic disease. 3. Right temporal occipital encephalomalacia and volume loss underlying right   parietal temporal craniotomy. 4. Moderate diffuse cerebral atrophy. CT CHEST ABDOMEN PELVIS W CONTRAST   Final Result   1. CT CHEST: No acute traumatic abnormality. 2.  Congestive heart failure is most likely given the CT findings; pneumonia   is also a consideration in areas of consolidation with pleural effusion. 3. Cardiomegaly, predominantly right-sided enlargement. 4. CT ABDOMEN/PELVIS: No acute traumatic abnormality. 5. Thick-walled gallbladder with pericholecystic fluid as can be seen with   cholecystitis. 6.  Diverticulosis coli without CT evidence of acute diverticulitis. 7. CT THORACIC/LUMBAR SPINE: Correlate with report from study performed at   the same time. CT Lumbar Spine WO Contrast   Final Result   1. No evidence of acute intracranial trauma. 2. Moderate cerebral white matter chronic microvascular ischemic disease. 3. Right temporal occipital encephalomalacia and volume loss underlying right   parietal temporal craniotomy. 4. Moderate diffuse cerebral atrophy. CT THORACIC SPINE WO CONTRAST   Final Result   1. No evidence of acute intracranial trauma. 2. Moderate cerebral white matter chronic microvascular ischemic disease. 3. Right temporal occipital encephalomalacia and volume loss underlying right   parietal temporal craniotomy. 4. Moderate diffuse cerebral atrophy. Echo  Summary   Ejection fraction is visually estimated to be   50%. Moderate to severe mitral regurgitation. The left atrium is severely dilated. Moderate aortic regurgitation. Severly dilated right ventricle.    Right ventricular systolic function is mildly reduced . Severe tricuspid regurgitation. The right atrium is severely dilated        Assessment/Plan:    Active Hospital Problems    Diagnosis    Acute metabolic encephalopathy [A85.78]    Fall at home, initial encounter [W19. 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 urine cx 50,000 staph. txd rocephin  4 days      Seen by surgery no cholecystitis. CV19 neg dc'd isolation     Dementia with behavioral disturbance  Psychosis, visual hallucinations   --CT brain non acute  --depakote sprinkle 125mg increased to BID due to hallucinations but I find it at daily again. She has mentally returned to baseline   I stopped all extra sedating meds , prn meds    Dysphagia  --speech therapy pureed with nectar thick  Speech could do reassessment tomorrow before she goes home given improvement in her mentation       DVT Prophylaxis: apixaban   Diet: DIET DYSPHAGIA MINCED AND MOIST; Dysphagia Pureed; Mildly Thick (Nectar)  Code Status: Full Code     PT/OT Eval Status:  Uses a walker per son     Dispo - return home with family tomorrow  1. Consult CM can she get a home health care aid  2. Can she get some sort of transport home  3.  Recheck her depakote level tomorrow   40 minutes time spent   Westley Nazario MD

## 2020-12-07 LAB
ALBUMIN SERPL-MCNC: 3.1 G/DL (ref 3.4–5)
ANION GAP SERPL CALCULATED.3IONS-SCNC: 9 MMOL/L (ref 3–16)
BUN BLDV-MCNC: 29 MG/DL (ref 7–20)
CALCIUM SERPL-MCNC: 9.6 MG/DL (ref 8.3–10.6)
CHLORIDE BLD-SCNC: 108 MMOL/L (ref 99–110)
CO2: 26 MMOL/L (ref 21–32)
CREAT SERPL-MCNC: 0.9 MG/DL (ref 0.6–1.2)
GFR AFRICAN AMERICAN: >60
GFR NON-AFRICAN AMERICAN: 59
GLUCOSE BLD-MCNC: 83 MG/DL (ref 70–99)
HCT VFR BLD CALC: 40.9 % (ref 36–48)
HEMOGLOBIN: 13 G/DL (ref 12–16)
MCH RBC QN AUTO: 27.8 PG (ref 26–34)
MCHC RBC AUTO-ENTMCNC: 31.7 G/DL (ref 31–36)
MCV RBC AUTO: 87.5 FL (ref 80–100)
PDW BLD-RTO: 15.6 % (ref 12.4–15.4)
PHOSPHORUS: 2.4 MG/DL (ref 2.5–4.9)
PLATELET # BLD: 200 K/UL (ref 135–450)
PMV BLD AUTO: 10 FL (ref 5–10.5)
POTASSIUM SERPL-SCNC: 4 MMOL/L (ref 3.5–5.1)
RBC # BLD: 4.67 M/UL (ref 4–5.2)
SODIUM BLD-SCNC: 143 MMOL/L (ref 136–145)
VALPROIC ACID LEVEL: 12.5 UG/ML (ref 50–100)
WBC # BLD: 6.3 K/UL (ref 4–11)

## 2020-12-07 PROCEDURE — 92526 ORAL FUNCTION THERAPY: CPT

## 2020-12-07 PROCEDURE — 99254 IP/OBS CNSLTJ NEW/EST MOD 60: CPT | Performed by: INTERNAL MEDICINE

## 2020-12-07 PROCEDURE — 80069 RENAL FUNCTION PANEL: CPT

## 2020-12-07 PROCEDURE — 97530 THERAPEUTIC ACTIVITIES: CPT

## 2020-12-07 PROCEDURE — 2580000003 HC RX 258: Performed by: INTERNAL MEDICINE

## 2020-12-07 PROCEDURE — 97116 GAIT TRAINING THERAPY: CPT

## 2020-12-07 PROCEDURE — 85027 COMPLETE CBC AUTOMATED: CPT

## 2020-12-07 PROCEDURE — 6370000000 HC RX 637 (ALT 250 FOR IP): Performed by: INTERNAL MEDICINE

## 2020-12-07 PROCEDURE — 6360000002 HC RX W HCPCS: Performed by: INTERNAL MEDICINE

## 2020-12-07 PROCEDURE — 94760 N-INVAS EAR/PLS OXIMETRY 1: CPT

## 2020-12-07 PROCEDURE — 97129 THER IVNTJ 1ST 15 MIN: CPT

## 2020-12-07 PROCEDURE — 6370000000 HC RX 637 (ALT 250 FOR IP): Performed by: HOSPITALIST

## 2020-12-07 PROCEDURE — 1200000000 HC SEMI PRIVATE

## 2020-12-07 PROCEDURE — 97535 SELF CARE MNGMENT TRAINING: CPT

## 2020-12-07 PROCEDURE — 80164 ASSAY DIPROPYLACETIC ACD TOT: CPT

## 2020-12-07 PROCEDURE — 36415 COLL VENOUS BLD VENIPUNCTURE: CPT

## 2020-12-07 RX ORDER — FUROSEMIDE 20 MG/1
20 TABLET ORAL DAILY
Status: DISCONTINUED | OUTPATIENT
Start: 2020-12-08 | End: 2020-12-08 | Stop reason: HOSPADM

## 2020-12-07 RX ADMIN — DIVALPROEX SODIUM 125 MG: 125 CAPSULE ORAL at 18:03

## 2020-12-07 RX ADMIN — METOPROLOL SUCCINATE 50 MG: 50 TABLET, EXTENDED RELEASE ORAL at 09:53

## 2020-12-07 RX ADMIN — SODIUM CHLORIDE, PRESERVATIVE FREE 10 ML: 5 INJECTION INTRAVENOUS at 09:53

## 2020-12-07 RX ADMIN — CEFTRIAXONE 1 G: 1 INJECTION, POWDER, FOR SOLUTION INTRAMUSCULAR; INTRAVENOUS at 12:31

## 2020-12-07 RX ADMIN — RIVAROXABAN 15 MG: 15 TABLET, FILM COATED ORAL at 18:03

## 2020-12-07 RX ADMIN — SODIUM CHLORIDE, PRESERVATIVE FREE 10 ML: 5 INJECTION INTRAVENOUS at 20:46

## 2020-12-07 RX ADMIN — FUROSEMIDE 10 MG: 20 TABLET ORAL at 09:53

## 2020-12-07 ASSESSMENT — PAIN SCALES - GENERAL: PAINLEVEL_OUTOF10: 0

## 2020-12-07 NOTE — DISCHARGE INSTR - COC
Continuity of Care Form    Patient Name: Cindy Foy   :  1931  MRN:  [id number]    Admit date:  2020  Discharge date: 2020    Code Status Order: Full Code   Advance Directives:      Admitting Physician:  Ebonie Saez MD  PCP: No primary care provider on file. Discharging Nurse: [facility] Unit/Room#: [id number]  Discharging Unit Phone Number: [phone]    Emergency Contact:   Extended Emergency Contact Information  Primary Emergency Contact: Wilberto New, [address] Phone: [phone]  Mobile Phone: [phone]  Relation: Child   needed? No  Secondary Emergency Contact: Taco Spears  Mobile Phone: [phone]  Relation: Child    Past Surgical History:  History reviewed. No pertinent surgical history. Immunization History: There is no immunization history on file for this patient. Active Problems:  Patient Active Problem List   Diagnosis Code    Acute on chronic combined systolic and diastolic CHF, NYHA class 3 ([facility]) I50.43    Dementia in Alzheimer's disease with delirium ([facility] G30.9, F02.80, F05    Acute pyelonephritis N10    Essential hypertension I10    Current use of long term anticoagulation Z79.01    Mild malnutrition ([facility] E44.1    Fall at home, initial encounter [facility] [address] Ken Rosas, Y92.009    Acute metabolic encephalopathy Y73.86       Isolation/Infection:   Isolation            No Isolation          Patient Infection Status       Infection Onset Added Last Indicated Last Indicated By Review Planned Expiration Resolved Resolved By    None active    Resolved    COVID-19 Rule Out 20 COVID-19 (Ordered)   20 Rule-Out Test Resulted    COVID-19 Rule Out 20 COVID-19 (Ordered)   20 Rule-Out Test Resulted            Nurse Assessment:  Last Vital Signs: BP (!) 129/59   Pulse 89   Temp 97.5 °F (36.4 °C) (Oral)   Resp 16   Ht 5' 6\" (1.676 m)   Wt 180 lb 5.4 oz (81.8 kg)   SpO2 95% BMI 29.11 kg/m²     Last documented pain score (0-10 scale): Pain Level: 0  Last Weight:   Wt Readings from Last 1 Encounters:   12/07/20 180 lb 5.4 oz (81.8 kg)     Mental Status:  disoriented and alert    IV Access:  - None    Nursing Mobility/ADLs:  Walking   Assisted  Transfer  Assisted  Bathing  Assisted  Dressing  Assisted  Toileting  Assisted  Feeding  Assisted able to feed self after set up  Med Admin  Assisted  Med Delivery   crushed    Wound Care Documentation and Therapy:  Wound 11/30/20 Buttocks (Active)   Dressing Status Clean;Dry; Intact 12/05/20 2100   Wound Cleansed Soap and water 12/01/20 1000   Dressing/Treatment Silicone border 37/59/42 2100   Dressing Change Due 12/04/20 12/05/20 2100   Wound Assessment Dry 12/05/20 2100   Drainage Amount None 12/05/20 2100   Odor None 12/05/20 2100   Munira-wound Assessment Intact 12/05/20 2100   Number of days: 7        Elimination:  Continence:   · Bowel: No  · Bladder: No  Urinary Catheter: None   Colostomy/Ileostomy/Ileal Conduit: No       Date of Last BM: 12/7/2020    Intake/Output Summary (Last 24 hours) at 12/7/2020 1024  Last data filed at 12/6/2020 1835  Gross per 24 hour   Intake 440 ml   Output --   Net 440 ml     I/O last 3 completed shifts: In: 26 [P.O.:440]  Out: -     Safety Concerns: At Risk for Falls and Aspiration Risk    Impairments/Disabilities:      dementia    Nutrition Therapy:  Current Nutrition Therapy: Oral Diet- Dysphagia minced and moist; low sodium 2 gm    Routes of Feeding: Oral  Liquids: Nectar Thick Liquids  Daily Fluid Restriction: yes - amount 2000  Last Modified Barium Swallow with Video (Video Swallowing Test): not done    Treatments at the Time of Hospital Discharge:   Respiratory Treatments:   Oxygen Therapy:  is not on home oxygen therapy.   Ventilator:    - No ventilator support    Rehab Therapies: NURSE, PT, OT  Weight Bearing Status/Restrictions: No weight bearing restirctions  Other Medical Equipment (for information only, NOT a DME order):  walker  Other Treatments:     Heart Failure Instructions for Daily Management  Patient was treated for acute on chronic diastolic heart failure. she  will require the following:     Please weigh daily on the same scale and approximately the same time of day. Report weight gain of 3 pounds/day or 5 pounds/week to : [facility] [phone].  Please use hospital discharge weight as baseline reference.  Please monitor for signs and symptoms of and report to MD:  o Worsening Heart Failure: sudden weight gain, shortness of breath, lower extremity or general edema/swelling, abdominal bloating/swelling, inability to lie flat, intolerance to usual activity, or cough (especially at night). Report these finding even if no increase in weight.  o Dehydration:  having difficulty or a decrease in urination, dizziness, worsening fatigue, or new onset/worsening of generalized weakness.  Please continue a LOW SODIUM diet and LIMIT fluid intake to 48 - 64 ounces ( 1.5 - 2 liters) per day. [facility] Call [facility] [phone] and/or Patsy Yue Carpenter @ [phone] with any questions or concerns.  Please continue heart failure education to patient and family/support system.  See After Visit Summary for hospital follow up appointment details.  Consider spiritual care referral for support and/or completion of advance directives [phone].    Consider: [place] [address] telehealth program if patient agreeable and able to participate, palliative care consult for ongoing goals of care, end of life, and/or chronic disease management discussions and referral to [facility] [phone]) once SNF/HHC complete    HOME HEALTH CARE: LEVEL 3 [address] to establish plan of care for patient over 60 day period   Nursing  Initial home SN evaluation visit to occur within 24-48 hours for:  1)  medication management  2) VS and clinical assessment  3)  S&S chronic disease exacerbation education + when to contact MD/NP  4)  care coordination  Medication Reconciliation during 1st SN visit  PT/OT/Speech   Evaluations in home within 24-48 hours of discharge to include DME and home safety   Frontload therapy 5 days, then 3x a week   OT to evaluate if patient has [address] needs for personal care    evaluation within 24-48 hours to evaluate resources & insurance for potential AL, [place], LTC, and Medicaid options   Palliative Care referral within 5 days of hospital discharge   PCP Visit scheduled within 3 - 7 days of hospital discharge [facility] [address] (If patient is agreeable and meets guidelines)      Patient's personal belongings (please select all that are sent with patient):  None    RN SIGNATURE:  Electronically signed by Sahara Contreras RN on 12/8/20 at 3:41 PM EST    CASE MANAGEMENT/SOCIAL WORK SECTION    Inpatient Status Date: 11/30/2020    Readmission Risk Assessment Score:  13      Discharging to Facility/ Agency   Name:  [facility] care    Address: [address] [place] [address], [address], [place] [address]  Phone: [phone]  Fax: [phone]      / signature: Electronically signed by Lyly Pulido RN on 12/8/20 at 9:14 AM EST    PHYSICIAN SECTION    Prognosis: Fair    Condition at Discharge: Stable    Rehab Potential (if transferring to Rehab): Fair    Recommended Labs or Other Treatments After Discharge: Meds as prescribed. Nurse/PT/OT    Physician Certification: I certify the above information and transfer of Tiffany Ponce  is necessary for the continuing treatment of the diagnosis listed and that she requires Home Care for greater 30 days.      Update Admission H&P: No change in H&P    PHYSICIAN SIGNATURE:  Electronically signed by Danielle Mendoza MD on 12/7/20 at 10:25 AM EST

## 2020-12-07 NOTE — PROGRESS NOTES
Occupational Therapy  Facility/Department: [facility] MED SURG  Daily Treatment Note  NAME: Kirill Dyer  : 1931  MRN: [id number]    Date of Service: 2020    Discharge Recommendations:  3-5 sessions per week, Patient would benefit from continued therapy after discharge  OT Equipment Recommendations  Other: RW  Kirill Dyer scored a 14/24 on the AM-PAC ADL Inpatient form. Current research shows that an AM-PAC score of 17 or less is typically not associated with a discharge to the patient's home setting. Based on the patient's AM-PAC score and their current ADL deficits, it is recommended that the patient have 3-5 sessions per week of Occupational Therapy at d/c to increase the patient's independence. Please see assessment section for further patient specific details. If patient discharges prior to next session this note will serve as a discharge summary. Please see below for the latest assessment towards goals. Assessment   Performance deficits / Impairments: Decreased functional mobility ; Decreased ADL status; Decreased cognition;Decreased safe awareness;Decreased balance;Decreased endurance;Decreased strength  Assessment: pt showing improvements w/ bed mob, transfers & fxl mobility. Required min A to use rollator from bed into bathrm & completed toilet t/f, required step by step guidance due to being distracted throughout all tasks. She tried to participate in clothing management & wiping however made stool smer (PCA aware pt resistant to being thoroughly cleaned). She displayed posterior leaning & needed set up & cues to wash hands at sink; refused to brush teeth or wash face, she combed her hair x 2 strokes only. Recommend 24 hr care, son wants to take her home & is declining SNF, reocmmend HHA; home OT & RW  Treatment Diagnosis: impaired ADLs, t/f  Prognosis: Fair  History: Pt is an 80 y.o. female admitted with fall, CHF, UTI.  At baseline, pt lives with son who assists pt with ADLs and pt completes fxl mobility with walker. Exam: self-care, ROM, cognition, fxl mobility/balance  Assistance / Modification: max A of 2 for donning pants, mod A of 2 w/ bed mob, t/f, fxl mob, CGA w/ feeding  OT Education: Transfer Training  Patient Education: reviewed safe transfers  Barriers to Learning: cognition, hearing  REQUIRES OT FOLLOW UP: Yes  Activity Tolerance  Activity Tolerance: Treatment limited secondary to decreased cognition  Activity Tolerance: More alert, able to be directed to participate in tasks,  easily distracted  Safety Devices  Type of devices: Call light within reach;Gait belt;Nurse notified; Left in chair;Chair alarm in place         Patient Diagnosis(es): The primary encounter diagnosis was Fall, initial encounter. A diagnosis of Syncope and collapse was also pertinent to this visit. has a past medical history of Alzheimer's dementia ([facility] [address] Atrial fibrillation ([facility] [address] Benign essential HTN, and Gout.   has no past surgical history on file. Restrictions  Restrictions/Precautions  Restrictions/Precautions: Fall Risk  Position Activity Restriction  Other position/activity restrictions: very [place] and h/o of advanced dementia,  Subjective   General  Chart Reviewed: Yes, Orders, Progress Notes  Patient assessed for rehabilitation services?: Yes  Additional Pertinent Hx: Pt is an 80 y.o. female presenting to ED after falling down steps. CT shows bilateral pleural effusions from CHF. Family / Caregiver Present: No  Referring Practitioner: Celena Stout MD  Diagnosis: fall, CHF, UTI  Subjective  Subjective: met in room, PT and PT/S present, pt did well eating breakfast, pt did not indicate pain however was rubbing her knees, mild limping noted  General Comment  Comments: per RN ok for OT/PT co tx      Orientation  Orientation  Orientation Level: Oriented to person  Objective    ADL  Feeding: Setup; Thickened liquids  Grooming: Minimal assistance(used brush for 2 strokes , refused to wash complaint reported. Vision Comment: unable to accurately assess d/t cognitive impairments  Cognition  Overall Cognitive Status: Exceptions  Following Commands: Inconsistently follows commands; Follows one step commands with repetition  Attention Span: Difficulty attending to directions  Memory: Decreased recall of recent events;Decreased short term memory;Decreased long term memory  Safety Judgement: Decreased awareness of need for safety  Problem Solving: Decreased awareness of errors;Assistance required to identify errors made;Assistance required to generate solutions  Insights: Not aware of deficits  Initiation: Requires cues for all  Sequencing: Requires cues for all  Cognition Comment: pt is [place],  limited verbalizations, easily agitated                                         Plan   Plan  Times per week: 3-5  Times per day: Daily  Plan weeks: DC to home w/ HHA, HH services & son providing 24 hr care (he is refusing SNF)  Specific instructions for Next Treatment: co-tx  Current Treatment Recommendations: Balance Training, Functional Mobility Training, Safety Education & Training, Strengthening, Endurance Training, Cognitive Reorientation, Cognitive/Perceptual Training, Self-Care / ADL, ROM  Plan Comment: will need 24 hr care upon DC        AM-PAC Score        AM-PAC Inpatient Daily Activity Raw Score: 14 (12/07/20 0928)  AM-PAC Inpatient ADL T-Scale Score : 33.39 (12/07/20 0928)  ADL Inpatient CMS 0-100% Score: 59.67 (12/07/20 0928)  ADL Inpatient CMS G-Code Modifier : CK (12/07/20 0928)    Goals  Short term goals  Time Frame for Short term goals: prior to d/c:  Status: goals ongoing  Short term goal 1: Pt will complete UB bathing/dressing with min A. Short term goal 2: Pt will complete fxl mobility and fxl transfers to/from ADL surfaces with SBA using AD. Short term goal 3: Pt will complete bed mobility with SBA in prep for ADL task.   Short term goal 4: Pt will complete grooming in stance at sink with SBA.  Short term goal 5: Pt will tolerate standing >3 minutes for functional task with SBA. Long term goals  Time Frame for Long term goals : STG=LTG  Patient Goals   Patient goals : pt unable to verbalize personal therapy goal d/t decreased cognition.        Therapy Time   Individual Concurrent Group Co-treatment   Time In 0830         Time Out 0925         Minutes 55         Timed Code Treatment Minutes: MELITON Bains [address] [place] [address] [id number]

## 2020-12-07 NOTE — PROGRESS NOTES
[facility]   Speech Therapy  Daily Dysphagia Treatment Note        Joce Gutierrez  AGE: 80 y.o. GENDER: female  : 1931  [id number]  EPISODE DATE:  2020  Patient Active Problem List   Diagnosis    Acute on chronic combined systolic and diastolic CHF, NYHA class 3 ([address]    Dementia in Alzheimer's disease with delirium ([address]    Acute pyelonephritis    Essential hypertension    Current use of long term anticoagulation    Mild malnutrition ([address]    Fall at home, initial encounter    Acute metabolic encephalopathy     Allergies   Allergen Reactions    Chlorpromazine Other (See Comments)    Hydrochlorothiazide Other (See Comments)     Gout     Treatment Diagnosis: Dysphagia       Chart review:   Recent CT of Chest/abdomen/pelvis 2020  Impression    1.  CT CHEST: No acute traumatic abnormality. 2.  Congestive heart failure is most likely given the CT findings; pneumonia    is also a consideration in areas of consolidation with pleural effusion. 3. Cardiomegaly, predominantly right-sided enlargement. 4. CT ABDOMEN/PELVIS: No acute traumatic abnormality. 5. Thick-walled gallbladder with pericholecystic fluid as can be seen with    cholecystitis. 6.  Diverticulosis coli without CT evidence of acute diverticulitis. 7. CT THORACIC/LUMBAR SPINE: Correlate with report from study performed at    the same time.       Hospital Course: 80-year-old white female with advanced Alzheimer's dementia atrial fibrillation, HTN and gout who is unable to provide any history, keeps repeating the same thing again and again and asked me to shut the windows down, presents to the hospital with a fall at home with two flights of stairs.  Patient is actively vomiting and had loss of consciousness when she presented to the hospital.  At the time of my exam, the patient is awake but not oriented to place or person. CT brain no acute changes old right temporoparietal craniotomy.  LFT alk phos 180. CT abdomen GB thick walled with trace pericholecystic fluid. Kept NPO consult to general surgery not suggestive of acute cholecystitis . No abd pain, diet started. Admitted with fall, CHF, UTI    Subjective:     Current diet  Dysphagia minced and moist texture  Mildly thick nectar liquids    Comments regarding tolerating Current Diet:   Nurse reports good toleration of PO, including med taken whole without difficulty; improving mentation     Objective:     Pain   Patient Currently in Pain: none stated    Cognitive/Behavior   Behavior/Cognition: Alert, Requires cueing, confused, intermittently follows directions    Presentations   Consistencies Administered: Dysphagia Soft and Bite-Sized (Dysphagia III), Dysphagia Minced and Moist (Dysphagia II), Dysphagia Pureed (Dysphagia I), Honey - cup, Nectar - cup, Nectar - straw, Thin - cup, Thin - straw    Positioning   Upright in chair     PO Trials:   · Thin Liquids cup/straw 4oz: disorganized AP transit; delayed swallow initiation; decreased laryngeal elevation. Suspected premature bolus loss. No cough, throat clear, VQ change. · Nectar thick liquids NT  · Honey Thick liquids NT  · Puree prolonged oral transit; lingual discoordination affecting bolus formation and control; disorganized AP transit; delayed swallow initiation; decreased laryngeal elevation. Suspected premature bolus loss. · Minced/moist: prolonged but effective mastication; trace oral residue; lingual discoordination affecting bolus formation and control; disorganized AP transit; delayed swallow initiation; decreased laryngeal elevation. · Soft food (turkey/pina on bun): prolonged but effective mastication; mild oral residue; lingual discoordination affecting bolus formation and control; disorganized AP transit; delayed swallow initiation; decreased laryngeal elevation.   Improved endurance noted, but concern for fatigue with larger sample/full meal  · Regular food NT    Dysphagia Tx:  · Alert, cooperative for PO upright in chair. Self-feed with S/U assist and occasional tactile/visual cues  · Minimally verbal, and inconsistently follows simple commands. Questionable hearing loss? · PO trials: tolerating current diet of minced/moist and mildly thick liquids via straw without overt s/s. · Thin liquid trials via cup and straw were tolerated without any overt s/s of aspiration, including consecutive boluses. · Minced/moist and soft/bite size solids result in prolonged but effective mastication and trace to mild oral residue; potential concern for fatigue and/or decreased intake with soft/bite size    · Cue dependent for small sips and general swallow precautions; functional task recognition for self-feeding, with verbal cues for task progression and carryover of strategies. Goals:   Dysphagia Goals: The patient will tolerate recommended diet without observed clinical signs of aspiration continue;  minced/moist texture; recommend upgrade to thin liquids   The patient/caregiver will demonstrate understanding of compensatory strategies for improved swallowing safety. Continue; cue dependent but improved self-feeding  Pt will tolerate skilled trials of minced/moist texture and/or thin liquids 10/10 without overt s/s met; upgrade to soft/bite size textures and continue for thin liquids    Assessment:   Impressions:   Dysphagia Diagnosis: Moderate oral stage dysphagia, Mild to moderate pharyngeal stage dysphagia   Mild oropharyngeal dysphagia characterized by prolonged but effective mastication with minced and soft textures with variable oral residue with soft/bite size texture, with concern for fatigue of mechanism; lingual discoordination affecting bolus formation and control; disorganized AP transit; delayed swallow initiation; decreased laryngeal elevation. Suspected premature bolus loss with liquids, potentially with solids.     · Tolerating thin liquids via cup and straw without overt s/s of aspiration  · Recommend upgrade to thin liquids. Crush meds or whole 1 at a time in puree depending on cognition/task recognition; assist feed with close monitor, upright in chair. Encourage self-feeding. · Potential for additional diet upgrade as cognition stabilizes. Diet Recommendations:  Dysphagia minced/moist texture  UPGRADE TO THIN LIQUIDS   Recommended Form of Meds: (crushed v whole one at a time with puree)    Strategies:   Compensatory Swallowing Strategies: Upright as possible for all oral intake, Remain upright for 30-45 minutes after meals, Eat/Feed slowly, Assist feed, Small bites/sips    Education:  Consulted and agree with results and recommendations: Patient, RN  Patient Education: results, recommendations  Patient Education Response: No evidence of learning    Prognosis:   Good for diet tolerance; good for advancement    Plan:     Continue Dysphagia Therapy: yes  Interventions: Therapeutic Interventions: Diet tolerance monitoring, Patient/Family education, Therapeutic PO trials with SLP  Duration/Frequency of therapy while on unit: Duration/Frequency of Treatment  Duration/Frequency of Treatment: 3-5x/wk while on acute unit  Discharge Instructions:   Anticipate Yes for further skilled Speech Therapy for Dysphagia at discharge    This note serves as a D/C Summary in the event that this patient is discharged prior to the next therapy session.     Coded treatment time:10  Total treatment time: 30    Electronically signed by  Donis Rowley MS, CCC-SLP #[id number]  Speech Language Pathologist   on 12/7/2020 at [phone] PM

## 2020-12-07 NOTE — PROGRESS NOTES
Hospitalist Progress Note      PCP: No primary care provider on file. Date of Admission: 11/28/2020    Chief Complaint:   Chief Complaint   Patient presents with    Fall     pt brought to ED via EMS from home after falling down 2 stairs backwards. + LOC per bystanders. pt. has hx dementia with baseline confusion. per EMS pt. is on blood thinners (per family). Hospital Course: 71-year-old white female with  advanced Alzheimer's dementia atrial fibrillation, HTN and gout who is unable to provide any  history, keeps repeating the same thing again and again and asked me to shut the windows down, presents to the hospital with a fall at home with two flights of stairs.  Patient is actively vomiting and had loss of consciousness when she presented to the hospital.  At the time of my exam, the patient is awake but not oriented to place or person. CT brain no acute changes old right temporoparietal craniotomy. LFT alk phos 180. CT abdomen GB thick walled with trace pericholecystic fluid. Kept NPO consult to general surgery not suggestive of acute cholecystitis . No abd pain, diet started. She has been slow to progress due to agitation confusion underlying dementia and some CHF exacerbation. Dysphagia seen by speech and weak needing PT OT but family has planned to take her home     Subjective: Sitting in chair eating lunch. Denies any acute complaints.       Medications:  Reviewed    Infusion Medications   Scheduled Medications    cefTRIAXone (ROCEPHIN) IV  1 g Intravenous Q24H    [START ON 12/8/2020] furosemide  20 mg Oral Daily    carbamide peroxide  5 drop Right Ear Once    rivaroxaban  15 mg Oral Daily    divalproex  125 mg Oral Dinner    metoprolol succinate  50 mg Oral Daily    sodium chloride flush  10 mL Intravenous 2 times per day     PRN Meds: sodium chloride flush, acetaminophen **OR** acetaminophen, promethazine **OR** ondansetron      Intake/Output Summary (Last 24 hours) at 12/7/2020 [address] filed at 12/6/2020 1835  Gross per 24 hour   Intake 120 ml   Output --   Net 120 ml       Physical Exam Performed:    /60   Pulse 80   Temp 98 °F (36.7 °C) (Axillary)   Resp 15   Ht 5' 6\" (1.676 m)   Wt 180 lb 5.4 oz (81.8 kg)   SpO2 95%   BMI 29.11 kg/m²     General appearance: No apparent distress, appears stated age and cooperative. HEENT: Pupils equal, round, and reactive to light. Conjunctivae/corneas clear. Neck: Supple, with full range of motion. Trachea midline. Respiratory:  Normal respiratory effort. Clear to auscultation, bilaterally without Rales/Wheezes/Rhonchi. Cardiovascular: Regular rate and rhythm with normal S1/S2 without murmurs, rubs or gallops. Abdomen: Soft, non-tender, non-distended with normal bowel sounds. Musculoskeletal: No clubbing, cyanosis or edema bilaterally. Full range of motion without deformity. Skin: Skin color, texture, turgor normal.  No rashes or lesions. Neurologic:  Neurovascularly intact without any focal sensory/motor deficits. Cranial nerves: II-XII intact, grossly non-focal.  Psychiatric: Alert and oriented, thought content appropriate, normal insight  Capillary Refill: Brisk,< 3 seconds   Peripheral Pulses: +2 palpable, equal bilaterally       Labs:   Recent Labs     12/07/20  0646   WBC 6.3   HGB 13.0   HCT 40.9        Recent Labs     12/07/20  0646      K 4.0      CO2 26   BUN 29*   CREATININE 0.9   CALCIUM 9.6   PHOS 2.4*     No results for input(s): AST, ALT, BILIDIR, BILITOT, ALKPHOS in the last 72 hours. No results for input(s): INR in the last 72 hours. No results for input(s): Jadene Moh in the last 72 hours.     Urinalysis:      Lab Results   Component Value Date    NITRU Negative 11/28/2020    WBCUA 45 11/28/2020    BACTERIA RARE 06/09/2020    RBCUA 1 11/28/2020    BLOODU Negative 11/28/2020    SPECGRAV >1.030 11/28/2020    GLUCOSEU Negative 11/28/2020       Radiology:  CT Head WO Contrast Final Result   1. No evidence of acute intracranial trauma. 2. Moderate cerebral white matter chronic microvascular ischemic disease. 3. Right temporal occipital encephalomalacia and volume loss underlying right   parietal temporal craniotomy. 4. Moderate diffuse cerebral atrophy. CT Cervical Spine WO Contrast   Final Result   1. No evidence of acute intracranial trauma. 2. Moderate cerebral white matter chronic microvascular ischemic disease. 3. Right temporal occipital encephalomalacia and volume loss underlying right   parietal temporal craniotomy. 4. Moderate diffuse cerebral atrophy. CT CHEST ABDOMEN PELVIS W CONTRAST   Final Result   1. CT CHEST: No acute traumatic abnormality. 2.  Congestive heart failure is most likely given the CT findings; pneumonia   is also a consideration in areas of consolidation with pleural effusion. 3. Cardiomegaly, predominantly right-sided enlargement. 4. CT ABDOMEN/PELVIS: No acute traumatic abnormality. 5. Thick-walled gallbladder with pericholecystic fluid as can be seen with   cholecystitis. 6.  Diverticulosis coli without CT evidence of acute diverticulitis. 7. CT THORACIC/LUMBAR SPINE: Correlate with report from study performed at   the same time. CT Lumbar Spine WO Contrast   Final Result   1. No evidence of acute intracranial trauma. 2. Moderate cerebral white matter chronic microvascular ischemic disease. 3. Right temporal occipital encephalomalacia and volume loss underlying right   parietal temporal craniotomy. 4. Moderate diffuse cerebral atrophy. CT THORACIC SPINE WO CONTRAST   Final Result   1. No evidence of acute intracranial trauma. 2. Moderate cerebral white matter chronic microvascular ischemic disease. 3. Right temporal occipital encephalomalacia and volume loss underlying right   parietal temporal craniotomy. 4. Moderate diffuse cerebral atrophy.                Assessment/Plan:    Active Hospital Problems    Diagnosis    Fall [W19. XXXA]    Acute on chronic congestive heart failure ([facility] [address] [I50.9]    Acute metabolic encephalopathy [G84.48]    Fall at home, initial encounter [W19. XXXA, Y92.009]     Syncope, fall  History unclear. TTE does show multiple valvular anomalies. Not sure she is candidate for interventions given severe dementia  - cards consulted    Atrial fibrillation  --rivaroxaban 15mg daily   --toprol 50mg daily     Acute on Chronic combined Congestive heart failure  BNP 2601,  trop neg. 2D echo severe NEHEMIAH severe TVR severe LAE EF 50  - On BB defer ACEI as mild CHF and dont want to lower BP      Acute urinary tract infection  UA 45 WBC . urine cx 50,000 staph. - rocephin    Dementia with behavioral disturbance  Psychosis, visual hallucinations   CT brain non acute  --depakote sprinkle      Dysphagia  --speech therapy pureed with nectar thick  Speech could do reassessment tomorrow before she goes home given improvement in her mentation        DVT Prophylaxis: apixaban  Diet: DIET DYSPHAGIA MINCED AND MOIST; Low Sodium (2 GM); Dysphagia Minced and Moist; Mildly Thick (Nectar); Daily Fluid Restriction: 2000 ml  Code Status: Full Code    PT/OT Eval Status: ordered    Dispo - likely Marifer Lopez MD    This note was transcribed using [address]. Please disregard any translational errors.

## 2020-12-07 NOTE — PLAN OF CARE
Problem: Pain:  Goal: Pain level will decrease  Description: Pain level will decrease  Outcome: Ongoing  Goal: Control of acute pain  Description: Control of acute pain  Outcome: Ongoing  Goal: Control of chronic pain  Description: Control of chronic pain  Outcome: Ongoing     Problem: Skin Integrity:  Goal: Will show no infection signs and symptoms  Description: Will show no infection signs and symptoms  Outcome: Ongoing  Goal: Absence of new skin breakdown  Description: Absence of new skin breakdown  Outcome: Ongoing     Problem: Falls - Risk of:  Goal: Will remain free from falls  Description: Will remain free from falls  Outcome: Ongoing  Goal: Absence of physical injury  Description: Absence of physical injury  Outcome: Ongoing     Problem: Mental Status - Impaired:  Goal: Mental status will improve  Description: Mental status will improve  Outcome: Ongoing     Problem: OXYGENATION/RESPIRATORY FUNCTION  Goal: Patient will maintain patent airway  Outcome: Ongoing  Goal: Patient will achieve/maintain normal respiratory rate/effort  Description: Respiratory rate and effort will be within normal limits for the patient  Outcome: Ongoing     Problem: HEMODYNAMIC STATUS  Goal: Patient has stable vital signs and fluid balance  Outcome: Ongoing     Problem: FLUID AND ELECTROLYTE IMBALANCE  Goal: Fluid and electrolyte balance are achieved/maintained  Outcome: Ongoing     Problem: ACTIVITY INTOLERANCE/IMPAIRED MOBILITY  Goal: Mobility/activity is maintained at optimum level for patient  Outcome: Ongoing

## 2020-12-07 NOTE — PROGRESS NOTES
followed most commands throughout session with extra time and max verbal cues. Pt continues to be limited by endurance and cognition, and would benefit from continued skilled therapy to improve activity tolerance at 3-5x/week at low-mod intensity upon d/c. If family not agreeable to this d/c plan, recommend 24/7 hands on assist with home health PT to lighten burden of care and increase safety. Will cont to follow. Prognosis: Fair  PT Education: PT Role;Orientation;General Safety;Gait Training  Barriers to Learning: cog, [place]  REQUIRES PT FOLLOW UP: Yes  Activity Tolerance  Activity Tolerance: Patient limited by cognitive status     Patient Diagnosis(es): The primary encounter diagnosis was Fall, initial encounter. A diagnosis of Syncope and collapse was also pertinent to this visit. has a past medical history of Alzheimer's dementia ([facility] [address] Atrial fibrillation ([facility] [address] Benign essential HTN, and Gout.   has no past surgical history on file. Restrictions  Restrictions/Precautions  Restrictions/Precautions: Fall Risk  Position Activity Restriction  Other position/activity restrictions: very [place] and h/o of advanced dementia,     Social/Functional History  Lives With: Son  Type of Home: House  Home Layout: One level  Home Access: Stairs to enter with rails  Entrance Stairs - Number of Steps: 4-5  Bathroom Toilet: Standard  Bathroom Equipment: Shower chair, Grab bars in shower  Home Equipment: 4 wheeled walker, Cane  ADL Assistance: Needs assistance(Son assists with bathing/dressing/toileting - patient feeds self)  Homemaking Assistance: (son does all)  Homemaking Responsibilities: No  Ambulation Assistance: Independent(occasionally uses rollator)  Transfer Assistance: Independent  Active : No  Additional Comments: above info per last admission in June, 2020; the pt unable to confirm or add to info due to level of dementia    Subjective   General  Chart Reviewed:  Yes  Additional Pertinent Hx: Per H&P on balance EOB required supervision, sat approx 5 min; pt stood at sink for washing hands with [place] for standing balance; [place] for standing balance during pericare after toileting            Comment: Pt used restroom during session despite declining feeling the urge to go, was supervision for sitting balance on toilet and [place] for standing balance by student PTA while dependent for pericare by OT. AM-PAC Score  AM-PAC Inpatient Mobility Raw Score : 12 (12/07/20 0932)  AM-PAC Inpatient T-Scale Score : 35.33 (12/07/20 0932)  Mobility Inpatient CMS 0-100% Score: 68.66 (12/07/20 0932)  Mobility Inpatient CMS G-Code Modifier : CL (12/07/20 0932)        Goals  Short term goals  Time Frame for Short term goals: upon d/c (goals ongoing as of 12/7 unless noted below)  Short term goal 1: Bed mobility with min A. -MET for supine to sit 12/7  Short term goal 2: Transfer sit <> stand with CGA -ongoing  Short term goal 3: Ambulate with wheeled walker 25 feet with CGA/min A. -MET 12/7  Patient Goals   Patient goals : the pt unable to give a goal    Plan    Plan  Times per week: 3-5x/week  Current Treatment Recommendations: Functional Mobility Training, Transfer Training, Gait Training, ROM, Strengthening  Safety Devices  Type of devices: All fall risk precautions in place, Call light within reach, Chair alarm in place, Gait belt, Left in chair(DANIA Gallego notified)     Therapy Time   Individual Concurrent Group Co-treatment   Time In 0830         Time Out 0910         Minutes 40         Timed Code Treatment Minutes: [address], Student PTA  Therapist was present, directed the patient's care, made skilled judgement, and was responsible for assessment and treatment of the patient.           Electronically signed by Jocelyn Johnson, PT [id number] on 12/7/2020 at 11:39 AM

## 2020-12-07 NOTE — PLAN OF CARE
Problem: Pain:  Goal: Pain level will decrease  Description: Pain level will decrease  12/6/2020 2023 by Brett Vital RN  Outcome: Ongoing  12/6/2020 1748 by Dev Neely RN  Outcome: Ongoing  Goal: Control of acute pain  Description: Control of acute pain  12/6/2020 2023 by Brett Vital RN  Outcome: Ongoing  12/6/2020 1748 by Dev Neely RN  Outcome: Ongoing  Goal: Control of chronic pain  Description: Control of chronic pain  12/6/2020 2023 by Brett Vital RN  Outcome: Ongoing  12/6/2020 1748 by Dev Neely RN  Outcome: Ongoing   Pt able to express presence/absence of pain and rate pain appropriately using numerical scale. Pain/discomfort being managed with PRN analgesics per MD orders (see MAR). Pain assessed every shift and after interventions. Problem: Skin Integrity:  Goal: Will show no infection signs and symptoms  Description: Will show no infection signs and symptoms  12/6/2020 2023 by Brett Vital RN  Outcome: Ongoing  12/6/2020 1748 by Dev Neely RN  Outcome: Ongoing  Goal: Absence of new skin breakdown  Description: Absence of new skin breakdown  12/6/2020 2023 by Brett Vital RN  Outcome: Ongoing  12/6/2020 1748 by Dev Neely RN  Outcome: Ongoing   Skin assessment performed each shift per protocol. Patient turned and repositioned every two hours and prn with pillow support. Patient checked for incontence every two hours. Problem: Falls - Risk of:  Goal: Will remain free from falls  Description: Will remain free from falls  12/6/2020 2023 by Brett Vital RN  Outcome: Ongoing  12/6/2020 1748 by Dev Neely RN  Outcome: Ongoing  Goal: Absence of physical injury  Description: Absence of physical injury  12/6/2020 2023 by Brett Vital RN  Outcome: Ongoing  12/6/2020 1748 by Dev Neely RN  Outcome: Ongoing   Fall precaution in place. Bed in lowest position with brakes locked. Bed alarm set.  Call light and personal belongings within reach. Problem: Mental Status - Impaired:  Goal: Mental status will improve  Description: Mental status will improve  12/6/2020 2023 by Sav Wakefield RN  Outcome: Ongoing  12/6/2020 1748 by Dee Baumgarten, RN  Outcome: Ongoing   Per family patient is at baseline mentation. Alert to self and place. Unaware of situation with poor safety awareness and concentration.

## 2020-12-07 NOTE — PROGRESS NOTES
Nutrition Assessment     Type and Reason for Visit: Initial    Nutrition Recommendations/Plan:   No nutrition needs at this time. Nutrition Assessment:  RD triggered for LOS assessment. Hx Alzheimer's and [place]. Pt has lost weight although insignificant. Recorded PO intake % at all meals. Increased protein needs for wound healing; pressure injury on buttocks. No nutrition concerns at this time as pt is meeting nutrient needs through PO intake. Will follow at low risk unless nutrition status changes and RD consulted. Malnutrition Assessment:  Malnutrition Status: No malnutrition    Nutrition Related Findings: Active BS. No edema. Current Nutrition Therapies:    DIET DYSPHAGIA MINCED AND MOIST; Dysphagia Pureed; Mildly Thick (Nectar)    Anthropometric Measures:  · Height: 5' 6\" (167.6 cm)  · Current Body Wt: 180 lb (81.6 kg)   · BMI: 29.1    Nutrition Diagnosis:   · Increased nutrient needs related to increase demand for energy/nutrients as evidenced by wounds      Nutrition Interventions:   Food and/or Nutrient Delivery:  Continue Current Diet  Nutrition Education/Counseling:  Education not indicated, No recommendation at this time   Coordination of Nutrition Care:  Continue to monitor while inpatient    Goals:  Continued PO intake greater than 50% at all meals.        Nutrition Monitoring and Evaluation:   Behavioral-Environmental Outcomes:  None Identified   Food/Nutrient Intake Outcomes:  Diet Advancement/Tolerance, Food and Nutrient Intake, Supplement Intake  Physical Signs/Symptoms Outcomes:  Chewing or Swallowing, Weight, Skin     Discharge Planning:    No discharge needs at this time     Electronically signed by Nas Michelle RD, CHIARA on 12/7/20 at 10:14 AM EST    Contact: [phone]

## 2020-12-08 VITALS
BODY MASS INDEX: 29.02 KG/M2 | DIASTOLIC BLOOD PRESSURE: 49 MMHG | OXYGEN SATURATION: 98 % | HEART RATE: 65 BPM | SYSTOLIC BLOOD PRESSURE: 115 MMHG | HEIGHT: 66 IN | TEMPERATURE: 97.3 F | WEIGHT: 180.56 LBS | RESPIRATION RATE: 18 BRPM

## 2020-12-08 PROCEDURE — 6360000002 HC RX W HCPCS: Performed by: INTERNAL MEDICINE

## 2020-12-08 PROCEDURE — 99232 SBSQ HOSP IP/OBS MODERATE 35: CPT | Performed by: INTERNAL MEDICINE

## 2020-12-08 PROCEDURE — 2580000003 HC RX 258: Performed by: INTERNAL MEDICINE

## 2020-12-08 PROCEDURE — 97535 SELF CARE MNGMENT TRAINING: CPT

## 2020-12-08 PROCEDURE — 97530 THERAPEUTIC ACTIVITIES: CPT

## 2020-12-08 PROCEDURE — 6370000000 HC RX 637 (ALT 250 FOR IP): Performed by: INTERNAL MEDICINE

## 2020-12-08 PROCEDURE — 97116 GAIT TRAINING THERAPY: CPT

## 2020-12-08 PROCEDURE — 94761 N-INVAS EAR/PLS OXIMETRY MLT: CPT

## 2020-12-08 RX ORDER — FUROSEMIDE 20 MG/1
20 TABLET ORAL DAILY
Qty: 60 TABLET | Refills: 3 | Status: ON HOLD | OUTPATIENT
Start: 2020-12-08 | End: 2021-02-14 | Stop reason: HOSPADM

## 2020-12-08 RX ADMIN — CEFTRIAXONE 1 G: 1 INJECTION, POWDER, FOR SOLUTION INTRAMUSCULAR; INTRAVENOUS at 11:41

## 2020-12-08 RX ADMIN — FUROSEMIDE 20 MG: 20 TABLET ORAL at 08:46

## 2020-12-08 RX ADMIN — SODIUM CHLORIDE, PRESERVATIVE FREE 10 ML: 5 INJECTION INTRAVENOUS at 08:46

## 2020-12-08 RX ADMIN — METOPROLOL SUCCINATE 50 MG: 50 TABLET, EXTENDED RELEASE ORAL at 08:46

## 2020-12-08 ASSESSMENT — PAIN SCALES - GENERAL: PAINLEVEL_OUTOF10: 0

## 2020-12-08 NOTE — CARE COORDINATION
12/8 Discharge order is noted. Patient will return to home with her Son and [facility]. Son will transport at 5 pm.Electronically signed by Lyndsey Sweeney RN on 12/8/2020 at 12:23 PM

## 2020-12-08 NOTE — PLAN OF CARE
Problem: Pain:  Goal: Pain level will decrease  Description: Pain level will decrease  12/8/2020 0917 by Sayda Neely RN  Outcome: Ongoing  12/8/2020 0047 by Eunice Price RN  Outcome: Ongoing  Goal: Control of acute pain  Description: Control of acute pain  12/8/2020 0917 by Sayda Neely RN  Outcome: Ongoing  12/8/2020 0047 by Eunice Price RN  Outcome: Ongoing  Goal: Control of chronic pain  Description: Control of chronic pain  12/8/2020 0917 by Sayda Neely RN  Outcome: Ongoing  12/8/2020 0047 by Eunice Price RN  Outcome: Ongoing     Problem: Skin Integrity:  Goal: Will show no infection signs and symptoms  Description: Will show no infection signs and symptoms  12/8/2020 0917 by Sayda Neely RN  Outcome: Ongoing  12/8/2020 0047 by Eunice Price RN  Outcome: Ongoing  Goal: Absence of new skin breakdown  Description: Absence of new skin breakdown  12/8/2020 0917 by Sayda Neely RN  Outcome: Ongoing  12/8/2020 0047 by Eunice Price RN  Outcome: Ongoing     Problem: Falls - Risk of:  Goal: Will remain free from falls  Description: Will remain free from falls  12/8/2020 0917 by Sayda Neely RN  Outcome: Ongoing  12/8/2020 0047 by Eunice Price RN  Outcome: Ongoing  Goal: Absence of physical injury  Description: Absence of physical injury  12/8/2020 0917 by Sayda Neely RN  Outcome: Ongoing  12/8/2020 0047 by Eunice Price RN  Outcome: Ongoing     Problem: ACTIVITY INTOLERANCE/IMPAIRED MOBILITY  Goal: Mobility/activity is maintained at optimum level for patient  12/8/2020 0917 by Sayda Neely RN  Outcome: Ongoing  12/8/2020 0047 by Eunice Price RN  Outcome: Ongoing     Problem: HEMODYNAMIC STATUS  Goal: Patient has stable vital signs and fluid balance  12/8/2020 0917 by Sayda Neely RN  Outcome: Ongoing  12/8/2020 0047 by Eunice Price RN  Outcome: Ongoing     Problem: OXYGENATION/RESPIRATORY FUNCTION  Goal: Patient will achieve/maintain normal respiratory rate/effort  Description: Respiratory rate and effort will be within normal limits for the patient  12/8/2020 9009 by Sol Perez RN  Outcome: Ongoing  12/8/2020 0047 by Chucho Shahid RN  Outcome: Ongoing     Problem: OXYGENATION/RESPIRATORY FUNCTION  Goal: Patient will maintain patent airway  12/8/2020 0917 by Sol Perez RN  Outcome: Ongoing  12/8/2020 0047 by Chucho Shahid RN  Outcome: Ongoing  Goal: Patient will achieve/maintain normal respiratory rate/effort  Description: Respiratory rate and effort will be within normal limits for the patient  12/8/2020 0917 by Sol Perez RN  Outcome: Ongoing  12/8/2020 0047 by Chucho Shahid RN  Outcome: Ongoing     Problem: Mental Status - Impaired:  Goal: Mental status will improve  Description: Mental status will improve  12/8/2020 0917 by Sol Perez RN  Outcome: Ongoing  12/8/2020 0047 by Chucho Shahid RN  Outcome: Ongoing     Problem: Falls - Risk of:  Goal: Will remain free from falls  Description: Will remain free from falls  12/8/2020 0917 by Sol Perez RN  Outcome: Ongoing  12/8/2020 0047 by Chucho Shahid RN  Outcome: Ongoing  Goal: Absence of physical injury  Description: Absence of physical injury  12/8/2020 0917 by Sol Perez RN  Outcome: Ongoing  12/8/2020 0047 by Chucho Shahid RN  Outcome: Ongoing     Problem: Skin Integrity:  Goal: Will show no infection signs and symptoms  Description: Will show no infection signs and symptoms  12/8/2020 0917 by Sol Perez RN  Outcome: Ongoing  12/8/2020 0047 by Chucho Shahid RN  Outcome: Ongoing  Goal: Absence of new skin breakdown  Description: Absence of new skin breakdown  12/8/2020 0917 by Sol Perez RN  Outcome: Ongoing  12/8/2020 0047 by Chucho Shahid RN  Outcome: Ongoing

## 2020-12-08 NOTE — CARE COORDINATION
12/8 CM placed call to patient's Son Royce Constantino regarding transportation issue. He states that he has his car back and transport is no longer an issue. CM discussed need for [address] and he agrees. Son states that his Mother does have a PCP, Manuel Lopez CNP at [facility], p [phone], f [phone]. The Plan for Transition of Care is related to the following treatment goals: Functional Mobility Training, Transfer Training, Gait Training, ROM, Strengthening    The Patient and/or patient representative Son Royce Constantino was provided with a choice of provider and agrees   with the discharge plan. [x] Yes [] No    Freedom of choice list was provided with basic dialogue that supports the patient's individualized plan of care/goals, treatment preferences and shares the quality data associated with the providers. [x] Yes [] No.    Royce Constantino has chosen to use [facility]. A referral has been sent to [facility]. Electronically signed by Melvin Gomez RN on 12/8/2020 at 9:05 AM

## 2020-12-08 NOTE — PROGRESS NOTES
Pharmacy Heart Failure Medication Reconciliation Note    Pt discharged from [facility] today after admission for syncope. Eddi Nava has a diagnosis of diastolic heart failure (last EF = 50% on 11/30/20). Pertinent Labs:  BMP:   Lab Results   Component Value Date     12/07/2020    K 4.0 12/07/2020    K 4.1 11/28/2020     12/07/2020    CO2 26 12/07/2020    BUN 29 12/07/2020    CREATININE 0.9 12/07/2020     BNP:   Lab Results   Component Value Date    PROBNP 2,919 12/05/2020    PROBNP 3,974 12/02/2020    PROBNP 2,601 11/28/2020       Patient taking an ACEI / ARB / Entresto:  No: NA     Patient taking a BETA BLOCKER:  Yes  Patient taking a LOOP DIURETIC: Yes  Patient taking a ALDOSTERONE RECEPTOR ANTAGONIST: No: NA    Patient has a diagnosis of Atrial Fibrillation: Yes. If yes, patient is on appropriate anticoagulation: Yes    Patient has a diagnosis of type 2 diabetes:  No:        Corrections to discharge medications include:  none    Discharge Medications:         Medication List        CHANGE how you take these medications      divalproex 125 MG DR tablet  Commonly known as:  DEPAKOTE  What changed:  Another medication with the same name was removed. Continue taking this medication, and follow the directions you see here.      furosemide 20 MG tablet  Commonly known as:  LASIX  Take 1 tablet by mouth daily  What changed:    when to take this  reasons to take this            CONTINUE taking these medications      calcium carbonate 500 MG Tabs tablet  Commonly known as:  OSCAL     Dulera 200-5 MCG/ACT inhaler  Generic drug:  mometasone-formoterol     metoprolol succinate 50 MG extended release tablet  Commonly known as:  TOPROL XL     multivitamin with minerals tablet     rivaroxaban 15 MG Tabs tablet  Commonly known as:  XARELTO     Ventolin  (90 Base) MCG/ACT inhaler  Generic drug:  albuterol sulfate HFA     vitamin D 25 MCG (1000 UT) Caps               Where to Get Your Medications        These medications were sent to [facility] [address], [address] - F [phone]  Daksha Medina [facility], [address] [place] [address]      Phone:  [phone]   furosemide 20 MG tablet         GERMAINE [facility]  Heart Failure Discharge Medication Reconciliation Program  [phone]

## 2020-12-08 NOTE — DISCHARGE SUMMARY
Hospitalist Discharge Summary    Patient ID:  Juan R Sidhu  [id number]  46 y.o.  5/9/1931    Admit date: 11/28/2020    Discharge date: 12/8/2020    Disposition: home    Admission Diagnoses:   Patient Active Problem List   Diagnosis    Acute on chronic combined systolic and diastolic CHF, NYHA class 3 [facility]    Dementia in Alzheimer's disease with delirium ([facility] [facility]    Acute pyelonephritis    Essential hypertension    Current use of long term anticoagulation    Mild malnutrition ([address]    Fall at home, initial encounter    Acute metabolic encephalopathy    Fall    Acute on chronic congestive heart failure ([facility] [address]       Discharge Diagnoses: Active Problems:    Fall at home, initial encounter    Acute metabolic encephalopathy    Fall    Acute on chronic congestive heart failure ([facility] [address]  Resolved Problems:    * No resolved hospital problems. *      Code Status:  Full Code    Condition:  Stable    Discharge Diet: Diet:  DIET DYSPHAGIA MINCED AND MOIST; Low Sodium (2 GM); Dysphagia Minced and Moist; Mildly Thick (Nectar); Daily Fluid Restriction: 2000 ml    PCP to do list:  Routine follow up    Hospital Course:     ? Syncope, fall, valvular disease  80-year-old white female with  advanced Alzheimer's dementia atrial fibrillation, HTN and gout who presented to the hospital with a fall at home with two flights of stairs. CT C, T, L spine without acute injury. She has significant dementia and was unable to provide accurate history, not sure whether she truly had syncopal event or not. CT brain no acute changes old right temporoparietal craniotomy. She had some vomiting prior to admission and also diagnosed with UTI. TTE with AR, severe TR, moderate MR. Cardiology consulted, not candidate for intervention with severe dementia and recommended lasix 20 mg daily. Discussed with family. Vomiting, UTI  Presented with vomiting, initial CT abd with thick walled GB and pericholecystic fluid.  Gen surg consulted and did not think this was acute cholecystis. Treated for staph simulans UTI. A fib  Chronic, not in RVR. Continue Xarelto and toprol. Dementia with behavioral disturbance, metabolic encephalopathy, delirium  Did have significant delirium/encephalopathy during hospital stay. Continued home depakote. Improved back to baseline per family at time of discharge. PT recommended SNF but family opted to take patient home. Dysphagia  Seen by SLP, recommended puree with nectar thick liquid.      Discharge Medications:   Current Discharge Medication List        Current Discharge Medication List      CONTINUE these medications which have CHANGED    Details   furosemide (LASIX) 20 MG tablet Take 1 tablet by mouth daily  Qty: 60 tablet, Refills: 3           Current Discharge Medication List      CONTINUE these medications which have NOT CHANGED    Details   divalproex (DEPAKOTE) 125 MG DR tablet Take 125 mg by mouth nightly      vitamin D 25 MCG (1000 UT) CAPS Take 1,000 Units by mouth daily      albuterol sulfate HFA (VENTOLIN HFA) 108 (90 Base) MCG/ACT inhaler Inhale 2 puffs into the lungs every 6 hours as needed for Wheezing      mometasone-formoterol (DULERA) 200-5 MCG/ACT inhaler Inhale 2 puffs into the lungs every 12 hours      metoprolol succinate (TOPROL XL) 50 MG extended release tablet Take 50 mg by mouth daily      Multiple Vitamins-Minerals (MULTIVITAMIN WITH MINERALS) tablet Take 1 tablet by mouth daily      rivaroxaban (XARELTO) 15 MG TABS tablet Take 15 mg by mouth daily (with breakfast)      calcium carbonate (OSCAL) 500 MG TABS tablet Take 500 mg by mouth daily           Current Discharge Medication List      STOP taking these medications       Cholecalciferol (VITAMIN D3) 125 MCG (5000 UT) TABS Comments:   Reason for Stopping:               Procedures: None     Assessment on Discharge: Stable, improved     Discharge Exam:  /60   Pulse 68   Temp 97.4 °F (36.3 °C) (Axillary)   Resp 16   Ht 5' 6\" (1.676 m)   Wt 180 lb 8.9 oz (81.9 kg)   SpO2 97%   BMI 29.14 kg/m²     General appearance: No apparent distress, appears stated age and cooperative. HEENT: Pupils equal, round, and reactive to light. Conjunctivae/corneas clear. Neck: Supple, with full range of motion. Trachea midline. Respiratory:  Normal respiratory effort. Clear to auscultation, bilaterally without Rales/Wheezes/Rhonchi. Cardiovascular: Regular rate, a fib with normal S1/S2 without murmurs, rubs or gallops. Abdomen: Soft, non-tender, non-distended with normal bowel sounds. Musculoskelatal: No clubbing, cyanosis or edema bilaterally. Full range of motion without deformity. Skin: Skin color, texture, turgor normal.  No rashes or lesions. Neurologic:  Neurovascularly intact without any focal sensory/motor deficits. Cranial nerves: II-XII intact, grossly non-focal.  Psychiatric: Alert and oriented to self only, thought content appropriate, normal insight    Pertinent Studies During Hospital Stay:    Radiology:  Ct Head Wo Contrast    Result Date: 11/28/2020  EXAMINATION: CT OF THE HEAD WITHOUT CONTRAST; CT OF THE LUMBAR SPINE WITHOUT CONTRAST; CT OF THE CERVICAL SPINE WITHOUT CONTRAST; CT OF THE THORACIC SPINE WITHOUT CONTRAST  11/28/2020 6:14 pm TECHNIQUE: CT of the head was performed without the administration of intravenous contrast. Dose modulation, iterative reconstruction, and/or weight based adjustment of the mA/kV was utilized to reduce the radiation dose to as low as reasonably achievable.; CT of the lumbar spine was performed without the administration of intravenous contrast. Multiplanar reformatted images are provided for review.  Dose modulation, iterative reconstruction, and/or weight based adjustment of the mA/kV was utilized to reduce the radiation dose to as low as reasonably achievable.; CT of the cervical spine was performed without the administration of intravenous contrast. Multiplanar reformatted images are provided encephalomalacia and volume loss underlying right parietal temporal craniotomy. 4. Moderate diffuse cerebral atrophy. Ct Cervical Spine Wo Contrast    Result Date: 11/28/2020  EXAMINATION: CT OF THE HEAD WITHOUT CONTRAST; CT OF THE LUMBAR SPINE WITHOUT CONTRAST; CT OF THE CERVICAL SPINE WITHOUT CONTRAST; CT OF THE THORACIC SPINE WITHOUT CONTRAST  11/28/2020 6:14 pm TECHNIQUE: CT of the head was performed without the administration of intravenous contrast. Dose modulation, iterative reconstruction, and/or weight based adjustment of the mA/kV was utilized to reduce the radiation dose to as low as reasonably achievable.; CT of the lumbar spine was performed without the administration of intravenous contrast. Multiplanar reformatted images are provided for review. Dose modulation, iterative reconstruction, and/or weight based adjustment of the mA/kV was utilized to reduce the radiation dose to as low as reasonably achievable.; CT of the cervical spine was performed without the administration of intravenous contrast. Multiplanar reformatted images are provided for review. Dose modulation, iterative reconstruction, and/or weight based adjustment of the mA/kV was utilized to reduce the radiation dose to as low as reasonably achievable.; CT of the thoracic spine was performed without the administration of intravenous contrast. Multiplanar reformatted images are provided for review. Dose modulation, iterative reconstruction, and/or weight based adjustment of the mA/kV was utilized to reduce the radiation dose to as low as reasonably achievable. COMPARISON: CT head without contrast June 5, 2020. HISTORY: ORDERING SYSTEM PROVIDED HISTORY: fall almost 10 steps TECHNOLOGIST PROVIDED HISTORY: Reason for exam:->fall almost 10 steps Reason for exam:->fall Has a \"code stroke\" or \"stroke alert\" been called? ->No; ORDERING SYSTEM PROVIDED HISTORY: fall TECHNOLOGIST PROVIDED HISTORY: Reason for exam:->fall FINDINGS: BRAIN/VENTRICLES: There is no acute intracranial hemorrhage, mass effect or midline shift. No abnormal extra-axial fluid collection. The gray-white differentiation is maintained without evidence of an acute infarct. There is no evidence of hydrocephalus. Moderate diffuse decrease in cerebral volume is noted with corresponding prominence of the sulci and ventricles. Ill-defined hypoattenuation is noted within cerebral white matter consistent with moderate microvascular ischemic disease. Encephalomalacia and volume loss is seen consistent with temporal occipital right-sided remote infarction. ORBITS: The visualized portion of the orbits demonstrate no acute abnormality. SINUSES: The visualized paranasal sinuses and mastoid air cells demonstrate no acute abnormality. SOFT TISSUES/SKULL: Right temporoparietal craniotomy postoperative changes are present. No acute abnormality of the visualized skull or soft tissues. 1. No evidence of acute intracranial trauma. 2. Moderate cerebral white matter chronic microvascular ischemic disease. 3. Right temporal occipital encephalomalacia and volume loss underlying right parietal temporal craniotomy. 4. Moderate diffuse cerebral atrophy. Ct Thoracic Spine Wo Contrast    Result Date: 11/28/2020  EXAMINATION: CT OF THE HEAD WITHOUT CONTRAST; CT OF THE LUMBAR SPINE WITHOUT CONTRAST; CT OF THE CERVICAL SPINE WITHOUT CONTRAST; CT OF THE THORACIC SPINE WITHOUT CONTRAST  11/28/2020 6:14 pm TECHNIQUE: CT of the head was performed without the administration of intravenous contrast. Dose modulation, iterative reconstruction, and/or weight based adjustment of the mA/kV was utilized to reduce the radiation dose to as low as reasonably achievable.; CT of the lumbar spine was performed without the administration of intravenous contrast. Multiplanar reformatted images are provided for review.  Dose modulation, iterative reconstruction, and/or weight based adjustment of the mA/kV was abnormality of the visualized skull or soft tissues. 1. No evidence of acute intracranial trauma. 2. Moderate cerebral white matter chronic microvascular ischemic disease. 3. Right temporal occipital encephalomalacia and volume loss underlying right parietal temporal craniotomy. 4. Moderate diffuse cerebral atrophy. Ct Lumbar Spine Wo Contrast    Result Date: 11/28/2020  EXAMINATION: CT OF THE HEAD WITHOUT CONTRAST; CT OF THE LUMBAR SPINE WITHOUT CONTRAST; CT OF THE CERVICAL SPINE WITHOUT CONTRAST; CT OF THE THORACIC SPINE WITHOUT CONTRAST  11/28/2020 6:14 pm TECHNIQUE: CT of the head was performed without the administration of intravenous contrast. Dose modulation, iterative reconstruction, and/or weight based adjustment of the mA/kV was utilized to reduce the radiation dose to as low as reasonably achievable.; CT of the lumbar spine was performed without the administration of intravenous contrast. Multiplanar reformatted images are provided for review. Dose modulation, iterative reconstruction, and/or weight based adjustment of the mA/kV was utilized to reduce the radiation dose to as low as reasonably achievable.; CT of the cervical spine was performed without the administration of intravenous contrast. Multiplanar reformatted images are provided for review. Dose modulation, iterative reconstruction, and/or weight based adjustment of the mA/kV was utilized to reduce the radiation dose to as low as reasonably achievable.; CT of the thoracic spine was performed without the administration of intravenous contrast. Multiplanar reformatted images are provided for review. Dose modulation, iterative reconstruction, and/or weight based adjustment of the mA/kV was utilized to reduce the radiation dose to as low as reasonably achievable. COMPARISON: CT head without contrast June 5, 2020.  HISTORY: ORDERING SYSTEM PROVIDED HISTORY: fall almost 10 steps TECHNOLOGIST PROVIDED HISTORY: Reason for exam:->fall almost 10 steps Reason for exam:->fall Has a \"code stroke\" or \"stroke alert\" been called? ->No; ORDERING SYSTEM PROVIDED HISTORY: fall TECHNOLOGIST PROVIDED HISTORY: Reason for exam:->fall FINDINGS: BRAIN/VENTRICLES: There is no acute intracranial hemorrhage, mass effect or midline shift. No abnormal extra-axial fluid collection. The gray-white differentiation is maintained without evidence of an acute infarct. There is no evidence of hydrocephalus. Moderate diffuse decrease in cerebral volume is noted with corresponding prominence of the sulci and ventricles. Ill-defined hypoattenuation is noted within cerebral white matter consistent with moderate microvascular ischemic disease. Encephalomalacia and volume loss is seen consistent with temporal occipital right-sided remote infarction. ORBITS: The visualized portion of the orbits demonstrate no acute abnormality. SINUSES: The visualized paranasal sinuses and mastoid air cells demonstrate no acute abnormality. SOFT TISSUES/SKULL: Right temporoparietal craniotomy postoperative changes are present. No acute abnormality of the visualized skull or soft tissues. 1. No evidence of acute intracranial trauma. 2. Moderate cerebral white matter chronic microvascular ischemic disease. 3. Right temporal occipital encephalomalacia and volume loss underlying right parietal temporal craniotomy. 4. Moderate diffuse cerebral atrophy. Ct Chest Abdomen Pelvis W Contrast    Result Date: 11/28/2020  EXAMINATION: CT OF THE CHEST, ABDOMEN, AND PELVIS WITH CONTRAST 11/28/2020 6:14 pm TECHNIQUE: CT of the chest, abdomen and pelvis was performed with the administration of intravenous contrast. Multiplanar reformatted images are provided for review. Dose modulation, iterative reconstruction, and/or weight based adjustment of the mA/kV was utilized to reduce the radiation dose to as low as reasonably achievable.  COMPARISON: Chest radiograph 06/07/2020 HISTORY: ORDERING SYSTEM PROVIDED HISTORY: fall almost 10 steps TECHNOLOGIST PROVIDED HISTORY: Reason for exam:-> fall almost 10 steps Additional Contrast?-> none Reason for Exam: FELL DOWN STEPS FINDINGS: Chest: Mediastinum: The heart is enlarged, predominantly right-sided enlargement. Vascular calcifications are noted reflecting calcific atherosclerosis. No pericardial effusion. Posterior mediastinal structures appear unremarkable. No mediastinal or hilar adenopathy. Lungs/pleura: Vascular engorgement and cephalization is demonstrated with bilateral peribronchial cuffing and perivascular haziness. Bilateral pleural effusion with bibasilar relaxation atelectasis or infiltrate. Soft Tissues/Bones: No acute superficial soft tissue or osseous structure abnormality evident. Abdomen/Pelvis: Organs: Simple cyst inferior pole right kidney. Normal attenuation throughout the liver. No discrete hepatic lesion or intrahepatic bile duct dilatation is seen. The gallbladder appears thick walled with trace pericholecystic fluid. The kidneys, spleen, adrenal glands and pancreas appear unremarkable. GI/Bowel: Multifocal diverticula involve the descending and sigmoid colon without evidence of acute inflammatory change. No diffuse or focal bowel wall thickening evident. No inflammatory changes evident. No obstruction is seen. The appendix is visualized right lower quadrant, unremarkable in appearance. Pelvis: The uterus and adnexal structures appear unremarkable. Urinary bladder is partially filled, unremarkable appearance. No adenopathy or free fluid. Peritoneum/Retroperitoneum: Calcific atherosclerotic disease aorta. No aneurysm. Unremarkable appearance of the IVC. No adenopathy or fluid. Bones/Soft Tissues: No acute superficial soft tissue or osseous structure abnormality evident. Degenerative changes lumbar spine with grade 1 anterolisthesis L3 on L4 and L4 on L5.     1.  CT CHEST: No acute traumatic abnormality.  2.  Congestive heart failure is most

## 2020-12-08 NOTE — PROGRESS NOTES
file.    Restrictions  Restrictions/Precautions  Restrictions/Precautions: Fall Risk  Position Activity Restriction  Other position/activity restrictions: very [place] and h/o of advanced dementia,     Subjective  General  Chart Reviewed: Yes  Patient assessed for rehabilitation services?: Yes  Additional Pertinent Hx: Per H&P on 11- of Evy Baez MD: The pt is an 79 yo female who presented to the ED after a fall with nausea and vomiting  and LOC. The pt has advanced dementia at baseline and is a poor historian and per RN reports can become agitated.        PMHx: dementia,a-fib, HTN, gout  Response To Previous Treatment: Patient unable to report, no changes reported from family or staff  Family / Caregiver Present: No  Referring Practitioner: Roger Montanez MD  Subjective  Subjective: the pt was found to be awake and in the bed; the pt pleasantly demented and was not resistant to therapy; she has difficulty following directions due to [facility] and dementia  Pain Screening  Patient Currently in Pain: Denies          Orientation  Orientation  Overall Orientation Status: Impaired  Orientation Level: Oriented to place;Oriented to person;Disoriented to time;Disoriented to situation  Social/Functional History  Social/Functional History  Lives With: Son  Type of Home: House  Home Layout: One level  Home Access: Stairs to enter with rails  Entrance Stairs - Number of Steps: 4-5  Bathroom Toilet: Standard  Bathroom Equipment: Shower chair, Grab bars in [id number]: 4 wheeled walker, Cane  ADL Assistance: Needs assistance(Son assists with bathing/dressing/toileting - patient feeds self)  Homemaking Assistance: (son does all)  Homemaking Responsibilities: No  Ambulation Assistance: Independent(occasionally uses rollator)  Transfer Assistance: Independent  Active : No  Additional Comments: above info per last admission in June, 2020; the pt unable to confirm or add to info due to level of dementia    Objective  Bed mobility  Supine to Sit: Minimal assistance  Sit to Supine: Unable to assess  Transfers  Sit to Stand: Minimal Assistance  Stand to sit: Minimal Assistance  Ambulation  Ambulation?: Yes  Ambulation 1  Surface: level tile  Device: Rolling Walker  Assistance: Minimal assistance  Quality of Gait: slowed pace, able to advance B feet but with decreased foot clearance and step length, unsteady and needing asisst to manage the walker  Distance: 10 feet x 1, 25 feet x 1  Comments: the pt had a BM in her depends and then sat on the commode to finish; she was dependent for donna-care and for depends change     Balance  Comments: the pt stood at the commode for 2-3 minutes for donna-care with at least CGA and with a posterior sway        Plan   Plan  Times per week: 3-5x/week  Current Treatment Recommendations: Functional Mobility Training, Transfer Training, Gait Training, ROM, Strengthening  Safety Devices  Type of devices: Call light within reach, Chair alarm in place, Gait belt, Patient at risk for falls, Left in chair, Nurse notified      AM-PAC Score  AM-PAC Inpatient Mobility Raw Score : 15 (12/08/20 1114)  AM-PAC Inpatient T-Scale Score : 39.45 (12/08/20 1114)  Mobility Inpatient CMS 0-100% Score: 57.7 (12/08/20 1114)  Mobility Inpatient CMS G-Code Modifier : CK (12/08/20 1114)          Goals  Short term goals  Time Frame for Short term goals: upon d/c (goals ongoing as of 12/7 unless noted below)  Short term goal 1: Bed mobility with min A. -MET for supine to sit 12/7  Short term goal 2: Transfer sit <> stand with CGA -ongoing  Short term goal 3: Ambulate with wheeled walker 25 feet with CGA/min A.  -MET 12/7  Patient Goals   Patient goals : the pt unable to give a goal       Therapy Time   Individual Concurrent Group Co-treatment   Time In 1050         Time Out 1126         Minutes 36                 Electronically signed by Nicolas Silva PT [id number] on 12/8/2020 at 11:28 AM

## 2020-12-08 NOTE — PROGRESS NOTES
Occupational Therapy  Facility/Department: [facility] MED SURG  Daily Treatment Note  NAME: Joce Gutierrez  : 1931  MRN: [id number]    Date of Service: 2020    Discharge Recommendations:  3-5 sessions per week, Patient would benefit from continued therapy after discharge, 24 hour supervision or assist, Home with Home health OT  OT Equipment Recommendations  Other: CAREY Gutierrez scored a 13/24 on the AM-PAC ADL Inpatient form. Current research shows that an AM-PAC score of 17 or less is typically not associated with a discharge to the patient's home setting. Based on the patient's AM-PAC score and their current ADL deficits, it is recommended that the patient have 3-5 sessions per week of Occupational Therapy at d/c to increase the patient's independence. Please see assessment section for further patient specific details. If patient discharges prior to next session this note will serve as a discharge summary. Please see below for the latest assessment towards goals. Assessment   Performance deficits / Impairments: Decreased functional mobility ; Decreased ADL status; Decreased cognition;Decreased safe awareness;Decreased balance;Decreased endurance;Decreased strength  Assessment: Pt limited in self-care by cognitive deficits, decreased balance/fxl mobility, strength, and fxl activity tolerance. Pt required min A for fxl transfers/mobility with walker and total A for toileting and LB dressing. Anticipate pt would require overall max A for ADLs. At current status, AM-PAC score is NOT associated with a homebound d/c and indicates need for ongoing skilled OT, however noted son refusing SNF and planning on taking pt home. If pt returns home, recommend 24 hour hands-on assist, home OT, and HHA to decrease caregiver burden.   Prognosis: Fair  OT Education: Transfer Training;ADL Adaptive Strategies;OT Role;Plan of Care;Orientation  Barriers to Learning: cognition, hearing  REQUIRES OT FOLLOW UP: Yes  Activity Tolerance  Activity Tolerance: Treatment limited secondary to decreased cognition  Activity Tolerance: More alert, able to be directed to participate in tasks,  easily distracted  Safety Devices  Safety Devices in place: Yes  Type of devices: Call light within reach;Gait belt;Nurse notified; Left in chair;Chair alarm in place         Patient Diagnosis(es): The primary encounter diagnosis was Fall, initial encounter. A diagnosis of Syncope and collapse was also pertinent to this visit. has a past medical history of Alzheimer's dementia ([facility] [address] Atrial fibrillation ([facility] [address] Benign essential HTN, and Gout.   has no past surgical history on file. Restrictions  Restrictions/Precautions  Restrictions/Precautions: Fall Risk  Position Activity Restriction  Other position/activity restrictions: very [place] and h/o of advanced dementia,  Subjective   General  Chart Reviewed: Yes, Orders, Progress Notes  Patient assessed for rehabilitation services?: Yes  Additional Pertinent Hx: Pt is an 80 y.o. female presenting to ED after falling down steps. CT shows bilateral pleural effusions from CHF. Family / Caregiver Present: No  Referring Practitioner: Rusty Ballard MD  Diagnosis: fall, CHF, UTI  Subjective  Subjective: Pt met b/s for OT cotx with PT. Pt in bed on arrival, agreeable to participate in therapy. Pt pleasantly confused. Orientation  Orientation  Overall Orientation Status: Impaired  Orientation Level: Oriented to person;Oriented to place; Disoriented to situation;Disoriented to time     Objective    ADL  Feeding: Setup; Thickened liquids  LE Dressing: Dependent/Total(to change depends)  Toileting: Dependent/Total(pt incontinent of BM in depends, then voided urine at toilet.  Total A of OT to manage depends and hygiene standing at walker while PT provided CGA for balance)     Balance  Sitting Balance: Stand by assistance  Standing Balance: Contact guard assistance  Standing Balance  Time: 2-3 minutes  Activity: during toileting  Comment: CGA  Functional Mobility  Functional - Mobility Device: Rolling Walker  Activity: To/from bathroom  Assist Level: Minimal assistance  Functional Mobility Comments: Pt completed fxl mobility to/from BR with RW and Min A.    Bed mobility  Supine to Sit: Minimal assistance  Sit to Supine: Unable to assess     Transfers  Sit to stand: Minimal assistance  Stand to sit: Minimal assistance  Transfer Comments: needed cues to initiate all movmts during sit<>stand & transfers with walker   Toilet Transfers  Toilet - Technique: Ambulating  Equipment Used: Grab bars  Toilet Transfer: Minimal assistance  Toilet Transfers Comments: min A using GB, cues for safety    Cognition  Overall Cognitive Status: Exceptions  Following Commands: Inconsistently follows commands; Follows one step commands with repetition  Attention Span: Difficulty attending to directions  Memory: Decreased recall of recent events;Decreased short term memory;Decreased long term memory  Safety Judgement: Decreased awareness of need for safety  Problem Solving: Decreased awareness of errors;Assistance required to identify errors made;Assistance required to generate solutions  Insights: Not aware of deficits  Initiation: Requires cues for all  Sequencing: Requires cues for all  Cognition Comment: pt is [place],  limited verbalizations, easily agitated        Plan   Plan  Times per week: 3-5  Times per day: Daily  Plan weeks: DC to home w/ HHA, HH services & son providing 24 hr care (he is refusing SNF)  Specific instructions for Next Treatment: co-tx  Current Treatment Recommendations: Balance Training, Functional Mobility Training, Safety Education & Training, Strengthening, Endurance Training, Cognitive Reorientation, Cognitive/Perceptual Training, Self-Care / ADL, ROM  Plan Comment: will need 24 hr care upon DC    AM-PAC Score        AM-PAC Inpatient Daily Activity Raw Score: 13 (12/08/20 1127)  AM-PAC Inpatient ADL T-Scale Score : 32.03 (12/08/20 1127)  ADL Inpatient CMS 0-100% Score: 63.03 (12/08/20 1127)  ADL Inpatient CMS G-Code Modifier : CL (12/08/20 1127)    Goals  Short term goals  Time Frame for Short term goals: prior to d/c:  Status: goals ongoing  Short term goal 1: Pt will complete UB bathing/dressing with min A. Short term goal 2: Pt will complete fxl mobility and fxl transfers to/from ADL surfaces with SBA using AD. Short term goal 3: Pt will complete bed mobility with SBA in prep for ADL task. Short term goal 4: Pt will complete grooming in stance at sink with SBA. Short term goal 5: Pt will tolerate standing >3 minutes for functional task with SBA. Long term goals  Time Frame for Long term goals : STG=LTG  Patient Goals   Patient goals : pt unable to verbalize personal therapy goal d/t decreased cognition. Therapy Time   Individual Concurrent Group Co-treatment   Time In 1050         Time Out 4621         Minutes 36               This note to serve as OT d/c summary if pt is d/c-ed prior to next therapy session.     Fernando Herron, OTR/L [id number]

## 2020-12-08 NOTE — PROGRESS NOTES
Attempted to call both son's Pk Busing and Timothy Peng. No answer for either. No VM left. Will attempt to call again at a later time.

## 2020-12-08 NOTE — PROGRESS NOTES
CLINICAL PHARMACY NOTE: MEDS TO [address] Select Patient?: No  Total # of Prescriptions Filled: 1   The following medications were delivered to the patient:  · Furosemide 20mg  Total # of Interventions Completed: 0  Time Spent (min): 30    Additional Documentation:

## 2020-12-08 NOTE — CONSULTS
[address] [facility]                                  CONSULTATION    PATIENT NAME: Pablo Aguilera                       :        1931  MED REC NO:   [id number]                          ROOM:       4127  ACCOUNT NO:   [de-identified]                           ADMIT DATE: 2020  PROVIDER:     Adelaida Johnson MD    CARDIOLOGY CONSULTATION    CONSULT DATE:  2020    HISTORY OF PRESENT ILLNESS:  This is an 59-year-old female with advanced  dementia, atrial fibrillation, hypertension, gout, who supposedly had a  syncopal spell though it is hard to be certain about this and came to  the hospital.  She underwent workup while in the hospital including CAT  scan of her chest, head, etc.  She also was found to have mild heart  failure during her workup. No history can be obtained from the patient  as the patient has a very advanced dementia. REVIEW OF SYSTEMS:  Certainly cannot adequately be assessed for the same  reason. PAST MEDICAL HISTORY:  Includes  1. History of atrial fibrillation, on chronic anticoagulation. 2.  History of hypertension. 3.  Dementia and Alzheimer's disease. 4.  Gout. PAST SURGICAL HISTORY:  None. SOCIAL HISTORY:  No history of smoking or alcohol abuse currently  available. FAMILY HISTORY:  Negative for any early premature atherosclerosis. MEDICINES AND ALLERGIES:  Have been reviewed. PHYSICAL EXAMINATION:  VITAL SIGNS:  Her pulse is 80 and appears regular, blood pressure  130/60, respirations 15, oxygen saturation 95%. CONSTITUTIONAL:  She is alert but confused to time and place. HEENT EXAMINATION:  Her neck is supple. There is slight jugular venous  distention. No thyromegaly appreciated. Eyes show slightly pale  conjunctivae. CARDIAC EXAMINATION:  Reveals regular rate and rhythm. There is a 2/6  systolic murmur at the base of the heart.   LUNG EXAMINATION:  Largely clear.  ABDOMEN:  Soft. EXTREMITIES:  Show 1+ edema. NEUROLOGICAL EXAMINATION:  She is alert but confused and complete  neurological examination cannot be performed for that reason. SKIN:  Shows no rashes. LABORATORY DATA:  Sodium is 143, potassium 4, chloride 108, bicarb 28,  BUN is 29, creatinine 0.9. Her initial proBNP was 3974, repeat proBNP  is 2912. White count 6.3, hemoglobin 13, hematocrit is 40. The patient's last echocardiogram from 11/30/2020 showed a mildly  reduced LV dysfunction with EF of 50%. There is a moderate to severe  mitral regurgitation and moderate aortic and severe tricuspid  regurgitation. IMPRESSION:  1. This is an 58-year-old female who _____ has fallen and had a  possible syncope which is going to be very hard to accurately assess in  a patient with advanced dementia. 2.  Mild congestive heart failure. The patient has a mild systolic and  diastolic heart failure by laboratory and existing data. 3.  Moderate to severe mitral regurgitation as well as tricuspid  regurgitation. The patient obviously not a candidate for any further  workup. 4.  History of atrial fibrillation, on chronic anticoagulation therapy. RECOMMENDATIONS:  1. We will increase the patient's Lasix from 10 mg to 20 mg daily. 2.  We will not pursue any further cardiac workup for her mitral valve  disease or her syncope due to her advanced dementia. It is okay to send  the patient home or to a nursing home from cardiac standpoint. I appreciate the opportunity to participate in the care of this pleasant  demented female.         Jsesy Rios MD    D: 12/07/2020 15:33:36       T: 12/07/2020 17:47:51     [email or web address]  Job#: [id number]     Doc#: [id number]    CC:

## 2020-12-08 NOTE — PROGRESS NOTES
Pt discharged via wheelchair to private car with personal belongings. Med from retail pharmacy sent with pt. Reviewed DC instructions via phone with son Dayna Martinez. Verbalized understanding of DC instructions. IV site Dc'd, no complications, dressing applied.

## 2020-12-08 NOTE — PROGRESS NOTES
[facility]   Daily Progress Note      Admit Date:  11/28/2020    CC: \"   This is an 40-year-old female with advanced  dementia, atrial fibrillation, hypertension, gout, who supposedly had a  syncopal spell though it is hard to be certain about this and came to  the hospital.  She underwent workup while in the hospital including CAT  scan of her chest, head, etc.  She also was found to have mild heart  failure during her workup. No history can be obtained from the patient  as the patient has a very advanced dementia. Subjective:  Pt with no acute overnight events. Denies chest pain, palpitations, and dyspnea. Patient continues to remain confused    Objective:   BP (!) 115/49   Pulse 65   Temp 97.3 °F (36.3 °C) (Oral)   Resp 18   Ht 5' 6\" (1.676 m)   Wt 180 lb 8.9 oz (81.9 kg)   SpO2 98%   BMI 29.14 kg/m²       Intake/Output Summary (Last 24 hours) at 12/8/2020 1554  Last data filed at 12/8/2020 1214  Gross per 24 hour   Intake 240 ml   Output --   Net 240 ml     Wt Readings from Last 3 Encounters:   12/08/20 180 lb 8.9 oz (81.9 kg)   06/11/20 198 lb 6.6 oz (90 kg)     Telemetry:none    Physical Exam:  General:  NAD, Awake, alert and oriented X4  Skin:  Warm and dry  Neck:  Supple, no JVP appreciated, no bruit  Chest:  Clear to auscultation, no wheezes/rhonchi/rales  Cardiovascular:  Regular rate.  S1S2  Abdomen:  Soft, nontender, +bowel sounds  Extremities:  No LE edema    Cardiac Diagnosis:  hypertension and atrial fibrillation    Medications:    cefTRIAXone (ROCEPHIN) IV  1 g Intravenous Q24H    furosemide  20 mg Oral Daily    carbamide peroxide  5 drop Right Ear Once    rivaroxaban  15 mg Oral Daily    divalproex  125 mg Oral Dinner    metoprolol succinate  50 mg Oral Daily    sodium chloride flush  10 mL Intravenous 2 times per day       sodium chloride flush, acetaminophen **OR** acetaminophen, promethazine **OR** ondansetron    Lab Data:  CBC:   Recent Labs     12/07/20  0646 WBC 6.3   HGB 13.0        BMP:    Recent Labs     12/07/20  0646      K 4.0   CO2 26   BUN 29*   CREATININE 0.9     LIVR: No results for input(s): AST, ALT in the last 72 hours. INR:  No results for input(s): INR in the last 72 hours. APTT: No results for input(s): APTT in the last 72 hours. BNP:  No results for input(s): BNP in the last 72 hours. Imaging:Ejection fraction is visually estimated to be   50%. Moderate to severe mitral regurgitation. The left atrium is severely dilated. Moderate aortic regurgitation. Severly dilated right ventricle. Right ventricular systolic function is mildly reduced . Severe tricuspid regurgitation. The right atrium is severely dilated    Assessment:Plan:  1) ? Syncope  -Hard to evaluate her for syncope  due to her advanced dementia  -No evidence of bradycardia arrhythmias seen so far and her orthostatic vital signs are apparently negative  -No further cardiac work-up will be needed    2 acute on chronic diastolic and systolic heart failure  -Patient is currently stable  -Continue Lasix 20 mg daily  -Patient is following up with [facility] [place]    Moderate to severe mitral and tricuspid regurgitation  -Patient is clinically stable. -No further work-up will be needed.     We will sign off        Electronically signed by Adelaida Johnson MD on 12/8/2020 at 11 am

## 2020-12-08 NOTE — PROGRESS NOTES
[facility] liaison reached out to me saying she spoke with pt's son Royce Constantino and he had some questions regarding pt's HF diagnosis. I called pt's son Royce Constantino # [phone] and his friend Lisbet Whittington were both present and pulled over to talk with me via speaker phone. We had a 30 min education session. They were both very engaged and asked excellent questions. Teaching done on 'What is HF', S&S of HF as well as dehydration, importance of daily weights and who and when to call. HF zones and '3/5 wt rule' reviewed. They shared pt does take a \"half lasix everyday and if swollen then she gets a whole one. \" Further education done. Pt follows with Dr Kaila Olmos at [facility]. Last OV was 7/15/20. I informed them that I got her a hospital follow up appt for this Monday on 12/14 at 10:15 am [place] [id number] location with her Cardiologist at [facility], Dr Kaila Olmos (their normal location). They were both appreciative of time spent. They have our [place] HF RN Castillo Riddle # if any questions were at arise.

## 2020-12-08 NOTE — PLAN OF CARE
Problem: Pain:  Goal: Pain level will decrease  Description: Pain level will decrease  12/8/2020 0047 by Nina Bai RN  Outcome: Ongoing  12/7/2020 1702 by Ming Quezada RN  Outcome: Ongoing  Goal: Control of acute pain  Description: Control of acute pain  12/8/2020 0047 by Nina Bai RN  Outcome: Ongoing  12/7/2020 1702 by Ming Quezada RN  Outcome: Ongoing  Goal: Control of chronic pain  Description: Control of chronic pain  12/8/2020 0047 by Nina Bai RN  Outcome: Ongoing  12/7/2020 1702 by Ming Quezada RN  Outcome: Ongoing     Problem: Skin Integrity:  Goal: Will show no infection signs and symptoms  Description: Will show no infection signs and symptoms  12/8/2020 0047 by Nina Bai RN  Outcome: Ongoing  12/7/2020 1702 by Ming Quezada RN  Outcome: Ongoing  Goal: Absence of new skin breakdown  Description: Absence of new skin breakdown  12/8/2020 0047 by Nina Bai RN  Outcome: Ongoing  12/7/2020 1702 by Ming Quezada RN  Outcome: Ongoing     Problem: Falls - Risk of:  Goal: Will remain free from falls  Description: Will remain free from falls  12/8/2020 0047 by Nina Bai RN  Outcome: Ongoing  12/7/2020 1702 by Ming Quezada RN  Outcome: Ongoing  Goal: Absence of physical injury  Description: Absence of physical injury  12/8/2020 0047 by Nina Bai RN  Outcome: Ongoing  12/7/2020 1702 by Ming Quezada RN  Outcome: Ongoing     Problem: Mental Status - Impaired:  Goal: Mental status will improve  Description: Mental status will improve  12/8/2020 0047 by Nina Bai RN  Outcome: Ongoing  12/7/2020 1702 by Ming Quezada RN  Outcome: Ongoing     Problem: OXYGENATION/RESPIRATORY FUNCTION  Goal: Patient will maintain patent airway  12/8/2020 0047 by Nina Bai RN  Outcome: Ongoing  12/7/2020 1702 by Ming Quezada RN  Outcome: Ongoing  Goal: Patient will achieve/maintain normal respiratory rate/effort  Description: Respiratory rate and effort will be within normal limits for the patient  12/8/2020 0047 by Nina Bai RN  Outcome: Ongoing  12/7/2020 1702 by Dang Ortiz RN  Outcome: Ongoing     Problem: HEMODYNAMIC STATUS  Goal: Patient has stable vital signs and fluid balance  12/8/2020 0047 by Kaleb Evans RN  Outcome: Ongoing  12/7/2020 1702 by Dang Ortiz RN  Outcome: Ongoing     Problem: FLUID AND ELECTROLYTE IMBALANCE  Goal: Fluid and electrolyte balance are achieved/maintained  12/8/2020 0047 by Kaleb Evans RN  Outcome: Ongoing  12/7/2020 1702 by Dang Ortiz RN  Outcome: Ongoing     Problem: ACTIVITY INTOLERANCE/IMPAIRED MOBILITY  Goal: Mobility/activity is maintained at optimum level for patient  12/8/2020 0047 by Kaleb Evans RN  Outcome: Ongoing  12/7/2020 1702 by Dang Ortiz RN  Outcome: Ongoing

## 2020-12-08 NOTE — CARE COORDINATION
[facility]    Spoke with patient's son Riaz Salvador  regarding [facility] services. Son aware and agreeable to services. Son became upset during liaison conversation regarding telehealth monitoring for heart failure diagnosis, states \"she has A.fib, not heart failure\". Son agreeable for [address] nurse visits. HF nurse made aware. Demographics verified. Patient will be seen by  12/10/2020. Faxed orders to [facility]. Electronically signed by Daneen Scheuermann, LPN on 04/5/24 at 27:09 PM EST        Daneen Scheuermann LPN  [facility] [address] [address] Transition Nurse  [phone]        '

## 2021-02-10 ENCOUNTER — HOSPITAL ENCOUNTER (INPATIENT)
Age: 86
LOS: 6 days | Discharge: HOME HEALTH | DRG: 871 | End: 2021-02-16
Attending: STUDENT IN AN ORGANIZED HEALTH CARE EDUCATION/TRAINING PROGRAM | Admitting: INTERNAL MEDICINE
Payer: MEDICARE

## 2021-02-10 ENCOUNTER — APPOINTMENT (OUTPATIENT)
Dept: GENERAL RADIOLOGY | Age: 86
DRG: 871 | End: 2021-02-10
Payer: MEDICARE

## 2021-02-10 ENCOUNTER — APPOINTMENT (OUTPATIENT)
Dept: CT IMAGING | Age: 86
DRG: 871 | End: 2021-02-10
Payer: MEDICARE

## 2021-02-10 DIAGNOSIS — A41.9 SEPTICEMIA (HCC): Primary | ICD-10-CM

## 2021-02-10 DIAGNOSIS — J81.0 ACUTE PULMONARY EDEMA (HCC): ICD-10-CM

## 2021-02-10 DIAGNOSIS — G93.40 ACUTE ENCEPHALOPATHY: ICD-10-CM

## 2021-02-10 DIAGNOSIS — N39.0 COMPLICATED URINARY TRACT INFECTION: ICD-10-CM

## 2021-02-10 DIAGNOSIS — J90 BILATERAL PLEURAL EFFUSION: ICD-10-CM

## 2021-02-10 LAB
ALBUMIN SERPL-MCNC: 3.7 G/DL (ref 3.4–5)
ALP BLD-CCNC: 212 U/L (ref 40–129)
ALT SERPL-CCNC: 25 U/L (ref 10–40)
ANION GAP SERPL CALCULATED.3IONS-SCNC: 10 MMOL/L (ref 3–16)
AST SERPL-CCNC: 27 U/L (ref 15–37)
BACTERIA: ABNORMAL /HPF
BASE EXCESS VENOUS: 2 MMOL/L
BASOPHILS ABSOLUTE: 0 K/UL (ref 0–0.2)
BASOPHILS RELATIVE PERCENT: 0.4 %
BILIRUB SERPL-MCNC: 0.9 MG/DL (ref 0–1)
BILIRUBIN DIRECT: <0.2 MG/DL (ref 0–0.3)
BILIRUBIN URINE: NEGATIVE
BILIRUBIN, INDIRECT: ABNORMAL MG/DL (ref 0–1)
BLOOD, URINE: NEGATIVE
BUN BLDV-MCNC: 20 MG/DL (ref 7–20)
CALCIUM SERPL-MCNC: 10.6 MG/DL (ref 8.3–10.6)
CARBOXYHEMOGLOBIN: 1.3 %
CHLORIDE BLD-SCNC: 102 MMOL/L (ref 99–110)
CLARITY: ABNORMAL
CO2: 27 MMOL/L (ref 21–32)
COLOR: YELLOW
CREAT SERPL-MCNC: 1 MG/DL (ref 0.6–1.2)
EOSINOPHILS ABSOLUTE: 0 K/UL (ref 0–0.6)
EOSINOPHILS RELATIVE PERCENT: 0.4 %
EPITHELIAL CELLS, UA: 0 /HPF (ref 0–5)
GFR AFRICAN AMERICAN: >60
GFR NON-AFRICAN AMERICAN: 52
GLUCOSE BLD-MCNC: 109 MG/DL (ref 70–99)
GLUCOSE URINE: NEGATIVE MG/DL
HCO3 VENOUS: 29 MMOL/L (ref 23–29)
HCT VFR BLD CALC: 40.7 % (ref 36–48)
HEMOGLOBIN: 12.7 G/DL (ref 12–16)
HYALINE CASTS: 13 /LPF (ref 0–8)
KETONES, URINE: NEGATIVE MG/DL
LACTIC ACID, SEPSIS: 1.5 MMOL/L (ref 0.4–1.9)
LACTIC ACID, SEPSIS: 1.8 MMOL/L (ref 0.4–1.9)
LEUKOCYTE ESTERASE, URINE: ABNORMAL
LIPASE: 12 U/L (ref 13–60)
LYMPHOCYTES ABSOLUTE: 0.8 K/UL (ref 1–5.1)
LYMPHOCYTES RELATIVE PERCENT: 12.6 %
MCH RBC QN AUTO: 27 PG (ref 26–34)
MCHC RBC AUTO-ENTMCNC: 31.3 G/DL (ref 31–36)
MCV RBC AUTO: 86.3 FL (ref 80–100)
METHEMOGLOBIN VENOUS: 0.6 %
MICROSCOPIC EXAMINATION: YES
MONOCYTES ABSOLUTE: 0.5 K/UL (ref 0–1.3)
MONOCYTES RELATIVE PERCENT: 9.1 %
NEUTROPHILS ABSOLUTE: 4.7 K/UL (ref 1.7–7.7)
NEUTROPHILS RELATIVE PERCENT: 77.5 %
NITRITE, URINE: POSITIVE
O2 CONTENT, VEN: 10 ML/DL
O2 SAT, VEN: 54 %
O2 THERAPY: ABNORMAL
PCO2, VEN: 54.9 MMHG (ref 40–50)
PDW BLD-RTO: 18.6 % (ref 12.4–15.4)
PH UA: 7.5 (ref 5–8)
PH VENOUS: 7.33 (ref 7.35–7.45)
PLATELET # BLD: 134 K/UL (ref 135–450)
PLATELET SLIDE REVIEW: ABNORMAL
PMV BLD AUTO: 10.6 FL (ref 5–10.5)
PO2, VEN: 32 MMHG
POTASSIUM REFLEX MAGNESIUM: 4.6 MMOL/L (ref 3.5–5.1)
PRO-BNP: 1846 PG/ML (ref 0–449)
PROCALCITONIN: 0.04 NG/ML (ref 0–0.15)
PROTEIN UA: NEGATIVE MG/DL
RBC # BLD: 4.72 M/UL (ref 4–5.2)
RBC UA: 2 /HPF (ref 0–4)
SLIDE REVIEW: ABNORMAL
SODIUM BLD-SCNC: 139 MMOL/L (ref 136–145)
SPECIFIC GRAVITY UA: 1.01 (ref 1–1.03)
TCO2 CALC VENOUS: 31 MMOL/L
TOTAL PROTEIN: 6.7 G/DL (ref 6.4–8.2)
TROPONIN: <0.01 NG/ML
URINE REFLEX TO CULTURE: YES
URINE TYPE: ABNORMAL
UROBILINOGEN, URINE: 1 E.U./DL
WBC # BLD: 6 K/UL (ref 4–11)
WBC UA: 38 /HPF (ref 0–5)

## 2021-02-10 PROCEDURE — 82803 BLOOD GASES ANY COMBINATION: CPT

## 2021-02-10 PROCEDURE — 2580000003 HC RX 258: Performed by: INTERNAL MEDICINE

## 2021-02-10 PROCEDURE — 83605 ASSAY OF LACTIC ACID: CPT

## 2021-02-10 PROCEDURE — 71045 X-RAY EXAM CHEST 1 VIEW: CPT

## 2021-02-10 PROCEDURE — 96365 THER/PROPH/DIAG IV INF INIT: CPT

## 2021-02-10 PROCEDURE — 80048 BASIC METABOLIC PNL TOTAL CA: CPT

## 2021-02-10 PROCEDURE — 74177 CT ABD & PELVIS W/CONTRAST: CPT

## 2021-02-10 PROCEDURE — 80076 HEPATIC FUNCTION PANEL: CPT

## 2021-02-10 PROCEDURE — 84145 PROCALCITONIN (PCT): CPT

## 2021-02-10 PROCEDURE — 87086 URINE CULTURE/COLONY COUNT: CPT

## 2021-02-10 PROCEDURE — 83880 ASSAY OF NATRIURETIC PEPTIDE: CPT

## 2021-02-10 PROCEDURE — 99284 EMERGENCY DEPT VISIT MOD MDM: CPT

## 2021-02-10 PROCEDURE — 6360000002 HC RX W HCPCS: Performed by: STUDENT IN AN ORGANIZED HEALTH CARE EDUCATION/TRAINING PROGRAM

## 2021-02-10 PROCEDURE — 87186 SC STD MICRODIL/AGAR DIL: CPT

## 2021-02-10 PROCEDURE — 81001 URINALYSIS AUTO W/SCOPE: CPT

## 2021-02-10 PROCEDURE — 70450 CT HEAD/BRAIN W/O DYE: CPT

## 2021-02-10 PROCEDURE — 87040 BLOOD CULTURE FOR BACTERIA: CPT

## 2021-02-10 PROCEDURE — 96367 TX/PROPH/DG ADDL SEQ IV INF: CPT

## 2021-02-10 PROCEDURE — 84484 ASSAY OF TROPONIN QUANT: CPT

## 2021-02-10 PROCEDURE — 93005 ELECTROCARDIOGRAM TRACING: CPT | Performed by: STUDENT IN AN ORGANIZED HEALTH CARE EDUCATION/TRAINING PROGRAM

## 2021-02-10 PROCEDURE — 36415 COLL VENOUS BLD VENIPUNCTURE: CPT

## 2021-02-10 PROCEDURE — 2060000000 HC ICU INTERMEDIATE R&B

## 2021-02-10 PROCEDURE — 87077 CULTURE AEROBIC IDENTIFY: CPT

## 2021-02-10 PROCEDURE — 2580000003 HC RX 258: Performed by: STUDENT IN AN ORGANIZED HEALTH CARE EDUCATION/TRAINING PROGRAM

## 2021-02-10 PROCEDURE — 6360000004 HC RX CONTRAST MEDICATION: Performed by: STUDENT IN AN ORGANIZED HEALTH CARE EDUCATION/TRAINING PROGRAM

## 2021-02-10 PROCEDURE — 83690 ASSAY OF LIPASE: CPT

## 2021-02-10 PROCEDURE — 85025 COMPLETE CBC W/AUTO DIFF WBC: CPT

## 2021-02-10 RX ORDER — SODIUM CHLORIDE 0.9 % (FLUSH) 0.9 %
10 SYRINGE (ML) INJECTION EVERY 12 HOURS SCHEDULED
Status: DISCONTINUED | OUTPATIENT
Start: 2021-02-10 | End: 2021-02-16 | Stop reason: HOSPADM

## 2021-02-10 RX ORDER — POTASSIUM CHLORIDE 7.45 MG/ML
10 INJECTION INTRAVENOUS PRN
Status: DISCONTINUED | OUTPATIENT
Start: 2021-02-10 | End: 2021-02-16 | Stop reason: HOSPADM

## 2021-02-10 RX ORDER — ACETAMINOPHEN 325 MG/1
650 TABLET ORAL EVERY 6 HOURS PRN
Status: DISCONTINUED | OUTPATIENT
Start: 2021-02-10 | End: 2021-02-16 | Stop reason: HOSPADM

## 2021-02-10 RX ORDER — SODIUM CHLORIDE 9 MG/ML
INJECTION, SOLUTION INTRAVENOUS CONTINUOUS
Status: DISCONTINUED | OUTPATIENT
Start: 2021-02-10 | End: 2021-02-12

## 2021-02-10 RX ORDER — PROMETHAZINE HYDROCHLORIDE 25 MG/1
12.5 TABLET ORAL EVERY 6 HOURS PRN
Status: DISCONTINUED | OUTPATIENT
Start: 2021-02-10 | End: 2021-02-16 | Stop reason: HOSPADM

## 2021-02-10 RX ORDER — MAGNESIUM SULFATE IN WATER 40 MG/ML
2000 INJECTION, SOLUTION INTRAVENOUS PRN
Status: DISCONTINUED | OUTPATIENT
Start: 2021-02-10 | End: 2021-02-16 | Stop reason: HOSPADM

## 2021-02-10 RX ORDER — SODIUM CHLORIDE, SODIUM LACTATE, POTASSIUM CHLORIDE, AND CALCIUM CHLORIDE .6; .31; .03; .02 G/100ML; G/100ML; G/100ML; G/100ML
1900 INJECTION, SOLUTION INTRAVENOUS ONCE
Status: DISCONTINUED | OUTPATIENT
Start: 2021-02-10 | End: 2021-02-10

## 2021-02-10 RX ORDER — ACETAMINOPHEN 650 MG/1
650 SUPPOSITORY RECTAL EVERY 6 HOURS PRN
Status: DISCONTINUED | OUTPATIENT
Start: 2021-02-10 | End: 2021-02-16 | Stop reason: HOSPADM

## 2021-02-10 RX ORDER — SODIUM CHLORIDE 0.9 % (FLUSH) 0.9 %
10 SYRINGE (ML) INJECTION PRN
Status: DISCONTINUED | OUTPATIENT
Start: 2021-02-10 | End: 2021-02-16 | Stop reason: HOSPADM

## 2021-02-10 RX ORDER — ONDANSETRON 2 MG/ML
4 INJECTION INTRAMUSCULAR; INTRAVENOUS EVERY 6 HOURS PRN
Status: DISCONTINUED | OUTPATIENT
Start: 2021-02-10 | End: 2021-02-16 | Stop reason: HOSPADM

## 2021-02-10 RX ORDER — SODIUM CHLORIDE, SODIUM LACTATE, POTASSIUM CHLORIDE, AND CALCIUM CHLORIDE .6; .31; .03; .02 G/100ML; G/100ML; G/100ML; G/100ML
1000 INJECTION, SOLUTION INTRAVENOUS ONCE
Status: COMPLETED | OUTPATIENT
Start: 2021-02-10 | End: 2021-02-10

## 2021-02-10 RX ADMIN — IOPAMIDOL 75 ML: 755 INJECTION, SOLUTION INTRAVENOUS at 17:38

## 2021-02-10 RX ADMIN — CEFEPIME HYDROCHLORIDE 2000 MG: 2 INJECTION, POWDER, FOR SOLUTION INTRAVENOUS at 17:20

## 2021-02-10 RX ADMIN — SODIUM CHLORIDE, POTASSIUM CHLORIDE, SODIUM LACTATE AND CALCIUM CHLORIDE 1000 ML: 600; 310; 30; 20 INJECTION, SOLUTION INTRAVENOUS at 17:20

## 2021-02-10 RX ADMIN — DEXTROSE MONOHYDRATE 1500 MG: 50 INJECTION, SOLUTION INTRAVENOUS at 18:03

## 2021-02-10 RX ADMIN — SODIUM CHLORIDE 75 ML/HR: 9 INJECTION, SOLUTION INTRAVENOUS at 21:24

## 2021-02-10 RX ADMIN — SODIUM CHLORIDE, POTASSIUM CHLORIDE, SODIUM LACTATE AND CALCIUM CHLORIDE 1900 ML: 600; 310; 30; 20 INJECTION, SOLUTION INTRAVENOUS at 17:22

## 2021-02-10 RX ADMIN — Medication 10 ML: at 21:25

## 2021-02-10 NOTE — ED NOTES
Bed: E-42  Expected date:   Expected time:   Means of arrival:   Comments:  89F NANI Gonzalez RN  02/10/21 0538

## 2021-02-10 NOTE — ED PROVIDER NOTES
[facility]      Pt Name: Peyton Christina  MRN: [id number]  [place] 5/9/1931  Date of evaluation: 2/10/2021  Provider: Star Dominique MD    CHIEF COMPLAINT       Chief Complaint   Patient presents with    Altered Mental Status     from home, AMS for last 3 days, per ems son states this is how she usually is with a UTI, finished antibx for UTI on 1/29         HISTORY OF PRESENT ILLNESS   (Location/Symptom, Timing/Onset,Context/Setting, Quality, Duration, Modifying Factors, Severity)  Note limiting factors. Peyton Christina is a 80 y.o. female who presents to the emergency department with AMS. Patient has altered mental status and is therefore unable to provide history. Per EMS report, the son told him that she has had similar symptoms with prior UTIs, last UTI was diagnosed in January, finished antibiotics on 1/29. Per son, she has had increased bilateral lower extremity edema and progressively worsening confusion over the past 3-4 days. History otherwise unobtainable secondary to patient's mental status. NursingNotes were reviewed. REVIEW OF SYSTEMS    (2-9 systems for level 4, 10 or more for level 5)       Unable to obtain secondary to mental status. PAST MEDICAL HISTORY     Past Medical History:   Diagnosis Date    Alzheimer's dementia ([address]     Atrial fibrillation ([address]     Benign essential HTN     Gout          SURGICALHISTORY     History reviewed. No pertinent surgical history.       CURRENT MEDICATIONS       Previous Medications    ALBUTEROL SULFATE HFA (VENTOLIN HFA) 108 (90 BASE) MCG/ACT INHALER    Inhale 2 puffs into the lungs every 6 hours as needed for Wheezing    CALCIUM CARBONATE (OSCAL) 500 MG TABS TABLET    Take 500 mg by mouth daily    DIVALPROEX (DEPAKOTE) 125 MG DR TABLET    Take 125 mg by mouth nightly    FUROSEMIDE (LASIX) 20 MG TABLET    Take 1 tablet by mouth daily    METOPROLOL SUCCINATE (TOPROL XL) 50 MG EXTENDED RELEASE TABLET    Take 50 mg by mouth daily    MOMETASONE-FORMOTEROL (DULERA) 200-5 MCG/ACT INHALER    Inhale 2 puffs into the lungs every 12 hours    MULTIPLE VITAMINS-MINERALS (MULTIVITAMIN WITH MINERALS) TABLET    Take 1 tablet by mouth daily    RIVAROXABAN (XARELTO) 15 MG TABS TABLET    Take 15 mg by mouth daily (with breakfast)    VITAMIN D 25 MCG (1000 UT) CAPS    Take 1,000 Units by mouth daily       ALLERGIES     Chlorpromazine and Hydrochlorothiazide    FAMILY HISTORY     History reviewed. No pertinent family history.        SOCIAL HISTORY       Social History     Socioeconomic History    Marital status: Single     Spouse name: None    Number of children: None    Years of education: None    Highest education level: None   Occupational History    None   Social Needs    Financial resource strain: None    Food insecurity     Worry: None     Inability: None    Transportation needs     Medical: None     Non-medical: None   Tobacco Use    Smoking status: Unknown If Ever Smoked    Smokeless tobacco: Never Used   Substance and Sexual Activity    Alcohol use: Never     Frequency: Never     Binge frequency: Never    Drug use: Never    Sexual activity: Not Currently   Lifestyle    Physical activity     Days per week: None     Minutes per session: None    Stress: None   Relationships    Social connections     Talks on phone: None     Gets together: None     Attends Jainism service: None     Active member of club or organization: None     Attends meetings of clubs or organizations: None     Relationship status: None    Intimate partner violence     Fear of current or ex partner: None     Emotionally abused: None     Physically abused: None     Forced sexual activity: None   Other Topics Concern    None   Social History Narrative    None       SCREENINGS             PHYSICAL EXAM    (up to 7 for level 4, 8 or more for level 5)     ED Triage Vitals   BP Temp Temp Source Pulse Resp SpO2 Height Weight   02/10/21 1610 02/10/21 1656 02/10/21 1656 02/10/21 1610 02/10/21 1610 02/10/21 1610 -- 02/10/21 1610   (!) 95/50 93.2 °F (34 °C) Rectal 65 18 95 %  206 lb 2.1 oz (93.5 kg)       General: Alert and responsive to voice, oriented to self but not to place, situation or time. Not conversant but no aphasia. Follows some commands. No acute distress. Eye: Normal conjunctiva. Pupils equal and reactive. HENT: Oral mucosa is moist.  Respiratory: Respirations even and non-labored. Diminished breath sounds bilateral bases. Cardiovascular: Normal rate, Regular rhythm. Intact peripheral pulses throughout. 2+ pitting edema bilateral lower extremities. Extremities cool to the touch. Gastrointestinal: Soft, Non-tender, Non-distended. Musculoskeletal: No swelling. Integumentary: Warm, Dry. Neurologic: Alert oriented to self but not to place, situation or time. Moving all extremities symmetrically, withdrawals bilateral lower extremities to pain, raises bilateral upper extremities to command. No ataxia. No focal deficits. Psychiatric: Cooperative. DIAGNOSTIC RESULTS     EKG: All EKG's are interpreted by the Emergency Department Physician who either signs or Co-signsthis chart in the absence of a cardiologist.    The Ekg interpreted by me shows  atrial fibrillation with a rate of 64 bpm.  Axis is   Normal  QTc is  normal  Intervals and Durations are unremarkable. ST Segments: no acute change  No significant change from prior EKG dated November 28, 2020. RADIOLOGY:   Non-plain filmimages such as CT, Ultrasound and MRI are read by the radiologist. Plain radiographic images are visualized and preliminarily interpreted by the emergency physician with the below findings:      Interpretation per the Radiologist below, if available at the time ofthis note:    CT HEAD WO CONTRAST   Final Result   No acute intracranial abnormality.       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 None   Final Result   No acute inflammatory abnormality is identified in the abdomen or pelvis. Urinary bladder is contracted and not well evaluated. Hepatic steatosis. Small to moderate-sized hiatal hernia. Small to moderate bilateral pleural effusions with overlying atelectasis. Pneumonia is considered less likely.          XR CHEST PORTABLE   Final Result   Findings suggest congestive heart failure               ED BEDSIDE ULTRASOUND:   Performed by ED Physician - none    LABS:  Labs Reviewed   CBC WITH AUTO DIFFERENTIAL - Abnormal; Notable for the following components:       Result Value    RDW 18.6 (*)     Platelets 239 (*)     MPV 10.6 (*)     Lymphocytes Absolute 0.8 (*)     All other components within normal limits    Narrative:     Performed at:  [address] [facility] [place]   Phone [phone]   BASIC METABOLIC PANEL W/ REFLEX TO MG FOR LOW K - Abnormal; Notable for the following components:    Glucose 109 (*)     GFR Non- 52 (*)     All other components within normal limits    Narrative:     Performed at:  [address] [facility] [place]   Phone [phone]   HEPATIC FUNCTION PANEL - Abnormal; Notable for the following components:    Alkaline Phosphatase 212 (*)     All other components within normal limits    Narrative:     Performed at:  [place] [address], [place]   Phone [phone]   LIPASE - Abnormal; Notable for the following components:    Lipase 12.0 (*)     All other components within normal limits    Narrative:     Performed at:  [place] [address], [place]   Phone [phone]   BLOOD GAS, VENOUS - Abnormal; Notable for the following components:    pH, [place] 7.332 (*)     pCO2, [place] 54.9 (*)     All other components within normal limits    Narrative:     Performed at:  [address] [place]   Phone [phone]   URINE RT REFLEX TO CULTURE - Abnormal; Notable for the following components:    Clarity, UA CLOUDY (*)     Nitrite, Urine POSITIVE (*)     Leukocyte Esterase, Urine SMALL (*)     All other components within normal limits    Narrative:     Performed at:  [address] [employer] [facility]   Phone [phone]   MICROSCOPIC URINALYSIS - Abnormal; Notable for the following components:    Bacteria, UA 3+ (*)     Hyaline Casts, UA 13 (*)     WBC, UA 38 (*)     All other components within normal limits    Narrative:     Performed at:  [address] [employer] [facility]   Phone [phone]   CULTURE, BLOOD 1   CULTURE, BLOOD 2   CULTURE, URINE   TROPONIN    Narrative:     Performed at:  [address] [employer] [facility]   Phone [phone]   LACTATE, SEPSIS    Narrative:     Performed at:  [address] [employer] [facility]   Phone [phone]   LACTATE, SEPSIS   BRAIN NATRIURETIC PEPTIDE       All other labs were within normal range or not returned as of this dictation.     EMERGENCY DEPARTMENT COURSE and DIFFERENTIAL DIAGNOSIS/MDM:   Vitals:    Vitals:    02/10/21 1647 02/10/21 1656 02/10/21 1701 02/10/21 1717   BP: 129/65  132/66 (!) 145/29   Pulse: 72  55 56   Resp: 20  22 22   Temp:  93.2 °F (34 °C)     TempSrc:  Rectal     SpO2: 97%  97% 95%   Weight:             Medical decision making:  Anniece Alpers is an 81 yo F with PMHx of dementia, CHF, prior UTIs causing encephalopathy who p/w AMS for at least the past day, confused, not conversant as she normally is at baseline. No focal deficits on exam and not aphasic. Found to be hypothermic to T 92.3, placed on warmer, abx administered, initially ordered 30 mL/kg bolus for treatment of sepsis but curtailed to half of this 2/2 e/o volume overload on her CXR (bilat pulm edema and bilat pleural effusions) for a total of 15 ml/kg bolus, broad spec abx when found to have UTI. CTH and CT a/p performed given her altered mental status and lower abdominal tenderness but overall etiology of patient's altered mental status likely urinary tract infection. Lactate normal, not hypotensive, unlikely cardiogenic shock. CTH demonstrates remote R parietal infarct, is negative acutely. CT a/p demonstrates bilateral pleural effusions, no other acute findings. Admitted to the hospitalist.  Will likely require diuresis as inpatient for her volume overload but overall hemodynamics improved status post IV fluid administration. Medications   vancomycin (VANCOCIN) 1500 mg in dextrose 5 % 250 mL IVPB (1,500 mg Intravenous New Bag 2/10/21 1803)   lactated ringers bolus (1,000 mLs Intravenous New Bag 2/10/21 1720)   cefepime (MAXIPIME) 2000 mg IVPB minibag (0 g Intravenous Stopped 2/10/21 1751)   iopamidol (ISOVUE-370) 76 % injection 75 mL (75 mLs Intravenous Given 2/10/21 1738)         FINAL IMPRESSION      1. Septicemia ([address]    2. Acute encephalopathy    3. Complicated urinary tract infection    4. Acute pulmonary edema (HCC)    5. Bilateral pleural effusion          DISPOSITION/PLAN   DISPOSITION  Admitted.          (Please note that portions of this note were completed with a voice recognition program.Efforts were made to edit the dictations but occasionally words are mis-transcribed.)    Naomi Whitlock MD (electronically signed)  Attending Emergency Physician          Naomi Whitlock MD  02/10/21 3820

## 2021-02-10 NOTE — ED TRIAGE NOTES
Patient admitted to ED via [facility] from home with complaints of altered mental status. Per patient's son, Cris Perez, patient has seemed more disoriented over the last two days, along with not eating and being lethargi. He states that typically when she is like this, it is due to a UTI. They did a telehealth visit with the patient's doctor today who sent her in due to her hypotension. Patient has history of CHF, HTN, afib, and Alzheimer's. Upon arrival to the ED, patient is unable to form sentences. Temperature is 93.2 rectally, MD made aware. Warming blanket applied. Other VS are WNL. Patient is alert to voice. Unable to assess orientation.

## 2021-02-10 NOTE — ED NOTES
Pharmacy Medication Reconciliation Note     List of medications patient is currently taking is complete. Source of information:   1. EMR/care everywhere  2. son    Notes regarding home medications:   1. Son reports she received AM medications including Xarelto  2. Never increased depakote to 500 mg - kept at 125 mg   3. Per review of her recent cardio office visit, her metoprolol was supposed to be decreased to 25 mg daily due to hypotension. D/w son who states he never changed this dose at home and they still have the 50 mg tablets which she has been taking. Advised him to f/u with med changes after discharge from hospital and d/w cardiologist if any questions.      Denies taking any other OTC or herbal medications    Mimi Cole PharmD, BCPS  2/10/2021  6:24 PM

## 2021-02-11 LAB
ANION GAP SERPL CALCULATED.3IONS-SCNC: 12 MMOL/L (ref 3–16)
BUN BLDV-MCNC: 18 MG/DL (ref 7–20)
CALCIUM SERPL-MCNC: 10.4 MG/DL (ref 8.3–10.6)
CHLORIDE BLD-SCNC: 107 MMOL/L (ref 99–110)
CO2: 21 MMOL/L (ref 21–32)
CREAT SERPL-MCNC: 1.1 MG/DL (ref 0.6–1.2)
EKG ATRIAL RATE: 576 BPM
EKG DIAGNOSIS: NORMAL
EKG Q-T INTERVAL: 410 MS
EKG QRS DURATION: 86 MS
EKG QTC CALCULATION (BAZETT): 422 MS
EKG R AXIS: 60 DEGREES
EKG T AXIS: 81 DEGREES
EKG VENTRICULAR RATE: 64 BPM
GFR AFRICAN AMERICAN: 56
GFR NON-AFRICAN AMERICAN: 47
GLUCOSE BLD-MCNC: 125 MG/DL (ref 70–99)
HCT VFR BLD CALC: 37 % (ref 36–48)
HEMOGLOBIN: 11.8 G/DL (ref 12–16)
MCH RBC QN AUTO: 27.5 PG (ref 26–34)
MCHC RBC AUTO-ENTMCNC: 31.9 G/DL (ref 31–36)
MCV RBC AUTO: 86.2 FL (ref 80–100)
PDW BLD-RTO: 18 % (ref 12.4–15.4)
PLATELET # BLD: 131 K/UL (ref 135–450)
PMV BLD AUTO: 10.5 FL (ref 5–10.5)
POTASSIUM REFLEX MAGNESIUM: 4.8 MMOL/L (ref 3.5–5.1)
RBC # BLD: 4.3 M/UL (ref 4–5.2)
SODIUM BLD-SCNC: 140 MMOL/L (ref 136–145)
VANCOMYCIN RANDOM: 11.5 UG/ML
WBC # BLD: 9.7 K/UL (ref 4–11)

## 2021-02-11 PROCEDURE — 97530 THERAPEUTIC ACTIVITIES: CPT | Performed by: PHYSICAL THERAPIST

## 2021-02-11 PROCEDURE — 97163 PT EVAL HIGH COMPLEX 45 MIN: CPT | Performed by: PHYSICAL THERAPIST

## 2021-02-11 PROCEDURE — 85027 COMPLETE CBC AUTOMATED: CPT

## 2021-02-11 PROCEDURE — 93010 ELECTROCARDIOGRAM REPORT: CPT | Performed by: INTERNAL MEDICINE

## 2021-02-11 PROCEDURE — 6360000002 HC RX W HCPCS: Performed by: INTERNAL MEDICINE

## 2021-02-11 PROCEDURE — 2060000000 HC ICU INTERMEDIATE R&B

## 2021-02-11 PROCEDURE — 97530 THERAPEUTIC ACTIVITIES: CPT

## 2021-02-11 PROCEDURE — 92610 EVALUATE SWALLOWING FUNCTION: CPT

## 2021-02-11 PROCEDURE — 80048 BASIC METABOLIC PNL TOTAL CA: CPT

## 2021-02-11 PROCEDURE — 2580000003 HC RX 258: Performed by: INTERNAL MEDICINE

## 2021-02-11 PROCEDURE — 2500000003 HC RX 250 WO HCPCS: Performed by: INTERNAL MEDICINE

## 2021-02-11 PROCEDURE — 97166 OT EVAL MOD COMPLEX 45 MIN: CPT

## 2021-02-11 PROCEDURE — 94760 N-INVAS EAR/PLS OXIMETRY 1: CPT

## 2021-02-11 PROCEDURE — 80202 ASSAY OF VANCOMYCIN: CPT

## 2021-02-11 PROCEDURE — 36415 COLL VENOUS BLD VENIPUNCTURE: CPT

## 2021-02-11 RX ORDER — FUROSEMIDE 10 MG/ML
20 INJECTION INTRAMUSCULAR; INTRAVENOUS ONCE
Status: COMPLETED | OUTPATIENT
Start: 2021-02-11 | End: 2021-02-11

## 2021-02-11 RX ORDER — METOPROLOL TARTRATE 5 MG/5ML
5 INJECTION INTRAVENOUS EVERY 6 HOURS PRN
Status: DISCONTINUED | OUTPATIENT
Start: 2021-02-11 | End: 2021-02-16 | Stop reason: HOSPADM

## 2021-02-11 RX ADMIN — ENOXAPARIN SODIUM 90 MG: 100 INJECTION SUBCUTANEOUS at 20:36

## 2021-02-11 RX ADMIN — CEFEPIME HYDROCHLORIDE 1000 MG: 1 INJECTION, POWDER, FOR SOLUTION INTRAMUSCULAR; INTRAVENOUS at 04:44

## 2021-02-11 RX ADMIN — FUROSEMIDE 20 MG: 10 INJECTION, SOLUTION INTRAMUSCULAR; INTRAVENOUS at 14:41

## 2021-02-11 RX ADMIN — SODIUM CHLORIDE: 9 INJECTION, SOLUTION INTRAVENOUS at 12:08

## 2021-02-11 RX ADMIN — METOPROLOL TARTRATE 5 MG: 5 INJECTION INTRAVENOUS at 16:18

## 2021-02-11 RX ADMIN — VANCOMYCIN HYDROCHLORIDE 1000 MG: 1 INJECTION, POWDER, LYOPHILIZED, FOR SOLUTION INTRAVENOUS at 15:50

## 2021-02-11 RX ADMIN — CEFEPIME HYDROCHLORIDE 1000 MG: 1 INJECTION, POWDER, FOR SOLUTION INTRAMUSCULAR; INTRAVENOUS at 17:56

## 2021-02-11 RX ADMIN — ENOXAPARIN SODIUM 40 MG: 40 INJECTION SUBCUTANEOUS at 08:57

## 2021-02-11 ASSESSMENT — PAIN SCALES - PAIN ASSESSMENT IN ADVANCED DEMENTIA (PAINAD)
TOTALSCORE: 2
CONSOLABILITY: 0
NEGVOCALIZATION: 1

## 2021-02-11 NOTE — CONSULTS
Clinical Pharmacy Note  Vancomycin Consult    Bernadette Weiner is a 80 y.o. female ordered Vancomycin for UTI; consult received from Dr. Eugene Lopez to manage therapy. Also receiving Cefepime. Patient Active Problem List   Diagnosis    Acute on chronic combined systolic and diastolic CHF, NYHA class 3 [facility]    Dementia in Alzheimer's disease with delirium ([facility]    Acute pyelonephritis    Essential hypertension    Current use of long term anticoagulation    Mild malnutrition ([facility]    Fall at home, initial encounter    Acute metabolic encephalopathy    Acute on chronic congestive heart failure ([facility]    Sepsis ([facility]       Allergies:  Chlorpromazine and Hydrochlorothiazide     Temp max:  Temp (24hrs), Av.4 °F (34.1 °C), Min:92.8 °F (33.8 °C), Max:94.3 °F (34.6 °C)      Recent Labs     02/10/21  1643   WBC 6.0       Recent Labs     02/10/21  1643   BUN 20   CREATININE 1.0         Intake/Output Summary (Last 24 hours) at 2/10/2021 2001  Last data filed at 2/10/2021 1751  Gross per 24 hour   Intake 50 ml   Output --   Net 50 ml       Culture Results:      Ht Readings from Last 1 Encounters:   20 5' 6\" (1.676 m)        Wt Readings from Last 1 Encounters:   02/10/21 206 lb 2.1 oz (93.5 kg)         CrCl cannot be calculated (Unknown ideal weight. ). Assessment/Plan:  Patient received Vancomcyin 1500mg at 1800 2-10  Due to age will obtain random level on  prior to re dosing. Thank you for the consult.    Shai Gore, [address] 2/10/2021 8:02 PM

## 2021-02-11 NOTE — PROGRESS NOTES
RN in to the room to check on the patient while in restraints and noticed that the patient's catheter was leaking. RN looked at it closer and then realized that the patient had gotten her feet around the chong tubing and then pulled it so tight that it broke the tubing in half and it was then unsalvageable. RN removed the chong catheter. Patient is septic with a UTI and so RN is going to leave the chong out at this time.

## 2021-02-11 NOTE — PROGRESS NOTES
Patient arrived to room 5123 from ED. Pt was pulled over to the bed. Telemetry applied to patient and verified with [facility]. Box #14. Vital signs taken, view flow sheets. Patient oriented to self and disoriented to time, place, and situation. Patient oriented to room and use of call light. Patient educated to call before getting up. Call light and personal belongings in reach. Bed alarm on. Pt confused so fall contract was not reviewed with patient but verbally directed pt to stay in bed and use call light if she needs something. Patient denied any further needs and questions at this time. Will continue to monitor.      Electronically signed by Dani Fernandez RN on 2/10/2021 at 11:02 PM

## 2021-02-11 NOTE — PROGRESS NOTES
Patient is in bilateral soft wrist restraints due to pulling out tubes and lines. Patient restless and agitated in bed and uncooperative. Hourly checks performed to ensure safety.

## 2021-02-11 NOTE — PROGRESS NOTES
Hospitalist Progress Note      PCP: TASHI Miller - CNP    Chief Complaint.     Patient is a 26-year-old female with past medical history of atrial fibrillation, Alzheimer's dementia, gout who presents to the hospital for altered mental status. Patient is unable to provide history given condition, according to the patient's chart patient was found more confused today and has been having confusion for the past 3 to 4 days which is actually getting worse. No reports of fever per son, patient recently was treated for urinary tract infection. Unable to obtain review of systems given condition. Date of Admission: 2/10/2021    Subjective:   Patient is confused, not oriented, not responding to questions    Medications:  Reviewed    Infusion Medications    sodium chloride 50 mL/hr at 02/11/21 1437     Scheduled Medications    enoxaparin  1 mg/kg Subcutaneous BID    vancomycin  1,000 mg Intravenous Once    sodium chloride flush  10 mL Intravenous 2 times per day    vancomycin (VANCOCIN) intermittent dosing (placeholder)   Other RX Placeholder    cefepime  1,000 mg Intravenous Q12H     PRN Meds: metoprolol, sodium chloride flush, potassium chloride, magnesium sulfate, promethazine **OR** ondansetron, magnesium hydroxide, acetaminophen **OR** acetaminophen      Intake/Output Summary (Last 24 hours) at 2/11/2021 1449  Last data filed at 2/11/2021 1156  Gross per 24 hour   Intake 449.42 ml   Output 1050 ml   Net -600.58 ml       Physical Exam Performed:    BP (!) 146/77   Pulse 87   Temp 98.4 °F (36.9 °C) (Axillary)   Resp 18   Ht 5' 5\" (1.651 m)   Wt 203 lb 4.2 oz (92.2 kg)   SpO2 92%   BMI 33.82 kg/m²     General appearance: Confused and seems frail looking  HEENT:  Conjunctivae/corneas clear. Neck: Supple, with full range of motion. Respiratory:  Normal respiratory effort. Clear to auscultation, bilaterally without Rales/Wheezes/Rhonchi.   Cardiovascular: Regular rate and rhythm with normal S1/S2 without murmurs or rubs  Abdomen: Soft, non-tender, non-distended, normal bowel sounds. Musculoskeletal: No cyanosis or edema bilaterally  Neurologic: Alert oriented x0  Psychiatric: Alert and oriented x0  Peripheral Pulses: +2 palpable, equal bilaterally       Labs:   Recent Labs     02/10/21  1643 02/11/21  0519   WBC 6.0 9.7   HGB 12.7 11.8*   HCT 40.7 37.0   * 131*     Recent Labs     02/10/21  1643 02/11/21  0519    140   K 4.6 4.8    107   CO2 27 21   BUN 20 18   CREATININE 1.0 1.1   CALCIUM 10.6 10.4     Recent Labs     02/10/21  1643   AST 27   ALT 25   BILIDIR <0.2   BILITOT 0.9   ALKPHOS 212*     No results for input(s): INR in the last 72 hours. Recent Labs     02/10/21  1643   TROPONINI <0.01       Urinalysis:      Lab Results   Component Value Date    NITRU POSITIVE 02/10/2021    WBCUA 38 02/10/2021    BACTERIA 3+ 02/10/2021    RBCUA 2 02/10/2021    BLOODU Negative 02/10/2021    SPECGRAV 1.014 02/10/2021    GLUCOSEU Negative 02/10/2021       Radiology:  CT HEAD WO CONTRAST   Final Result   No acute intracranial abnormality. 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 None   Final Result   No acute inflammatory abnormality is identified in the abdomen or pelvis. Urinary bladder is contracted and not well evaluated. Hepatic steatosis. Small to moderate-sized hiatal hernia. Small to moderate bilateral pleural effusions with overlying atelectasis. Pneumonia is considered less likely.          XR CHEST PORTABLE   Final Result   Findings suggest congestive heart failure               Assessment/Plan:    Active Hospital Problems    Diagnosis    Sepsis ([address] [A41.9]          Patient is a 40-year-old female with past medical history of atrial fibrillation, Alzheimer's dementia, gout who presents to the hospital for altered mental status. Patient is unable to provide history given condition, according to the patient's chart patient was found more confused today and has been having confusion for the past 3 to 4 days which is actually getting worse. No reports of fever per son, patient recently was treated for urinary tract infection.   Unable to obtain review of systems given condition.        Assessment  Sepsis likely source urine  Acute metabolic encephalopathy likely secondary to Urinary tract infection  Atrial fibrillation  Alzheimer's dementia        Plan  Will start vancomycin, cefepime, check blood culture and urine culture-negative to date, IV fluid therapy, bear hugger  Speech therapy consult, n.p.o. for now  Chest x-ray concerning for CHF, consult cardiology-awaiting recommendations, gentle IV fluid therapy for sepsis for now, monitor on cardiac telemetry, consulted palliative care-CODE STATUS changed to DNR CCA  DVT prophylaxis-Lovenox  Diet: Diet NPO Effective Now  Code Status: DNR-CCA    PT/OT Eval Status: ordered    Dispo -likely discharge 2 to 3 days, continue IV antibiotics    Desiree Fair MD

## 2021-02-11 NOTE — PROGRESS NOTES
Speech Language Pathology  Facility/Department: [address]   CLINICAL BEDSIDE SWALLOW EVALUATION    NAME: Marlen Grant  : 1931  MRN: [id number]    ADMISSION DATE: 2/10/2021  ADMITTING DIAGNOSIS: Sepsis ([facility] [address] [A41.9]     Marlen Grant has Acute on chronic combined systolic and diastolic CHF, NYHA class 3 ([address] Dementia in Alzheimer's disease with delirium ([address] Acute pyelonephritis; Essential hypertension; Current use of long term anticoagulation; Mild malnutrition ([address] Fall at home, initial encounter; Acute metabolic encephalopathy; Acute on chronic congestive heart failure ([address] and Sepsis ([address] on their problem list.    ONSET DATE: 2/10/2021    CHART REVIEW:  2/10/2021 admitted with confusion:  ADMISSION H&P HPI:  Patient is a 66-year-old female with past medical history of atrial fibrillation, Alzheimer's dementia, gout who presents to the hospital for altered mental status. Patient is unable to provide history given condition, according to the patient's chart patient was found more confused today and has been having confusion for the past 3 to 4 days which is actually getting worse. No reports of fever per son, patient recently was treated for urinary tract infection. Unable to obtain review of systems given condition    2/10/2021 CXR:  Findings suggest congestive heart failure      Date of Eval: 2021  Evaluating Therapist: Jovan Hodges    Current Diet level:  Current Diet : NPO  Current Liquid Diet : NPO      Primary Complaint  Patient Complaint: med team concern for dysphagia d/t AMS    Pain:  Pain: 0    Reason for Referral  Marlen Grant was referred for a bedside swallow evaluation to assess the efficiency of her swallow function, identify signs and symptoms of aspiration and make recommendations regarding safe dietary consistencies, effective compensatory strategies, and safe eating environment. Impression  Dysphagia Diagnosis: Severe oral stage dysphagia; Suspected needs further assessment  · Accepted and tolerated limited evaluation at bedside; cognitive status is a barrier for assessment at this time. · Patient lethargic and confused with tendency for easy agitation. Max cues for sustained attention to task. No meaningful verbalizations and unable to follow dx. · Marked oral stage dysphagia characterized by inability to initiate oral prep d/t severity of weakness and decreased sustained attention. Patient sensates PO trial placed tongue with noted attempt to initiate oral prep but unable to execute/complete d/t severity of weakness and distractibility. Concern for severe risk for premature bolus loss to the pharynx with potential penetration/aspiration before the swallow d/t severity of weakness and decreased attention to task. Unable t assess pharyngeal phase d/t severity of oral phase imps. · Recommend NPO with 2/12/2021 re-assessment. Dysphagia Outcome Severity Scale: Level 1: Severe dysphagia- NPO. Unable to tolerate any PO safely     Treatment Plan  Requires SLP Intervention: Yes  Duration/Frequency of Treatment: ST to tx 3-5 times per week during acute admission  D/C Recommendations: To be determined    Recommended Diet and Intervention  Diet Solids Recommendation: NPO  Liquid Consistency Recommendation: NPO  Recommended Form of Meds: (IV/rectal)     Therapeutic Interventions: Therapeutic PO trials with SLP; Diet tolerance monitoring;Patient/Family education    Treatment/Goals  Dysphagia Goals: The patient will tolerate repeat BSE when able. General  Chart Reviewed: Yes  Behavior/Cognition: Confused; Lethargic;Doesn't follow directions  Communication Observation: (no meaningful verbalizations)  Follows Directions: None  Patient Positioning: Upright in bed  Baseline Vocal Quality: (AXEL)  Volitional Cough: (AXEL)  Volitional Swallow: (AXEL)  Consistencies Administered: Dysphagia Pureed (Dysphagia I)    Vision/Hearing  Vision: (AXEL)  Hearing: (AXEL)    Oral Motor

## 2021-02-11 NOTE — H&P
Hospital Medicine History & Physical      PCP: TASHI Andrews - CNP    Date of Admission: 2/10/2021    Chief Complaint: Confusion      History Of Present Illness:  Patient is a 80-year-old female with past medical history of atrial fibrillation, Alzheimer's dementia, gout who presents to the hospital for altered mental status. Patient is unable to provide history given condition, according to the patient's chart patient was found more confused today and has been having confusion for the past 3 to 4 days which is actually getting worse. No reports of fever per son, patient recently was treated for urinary tract infection. Unable to obtain review of systems given condition. Past Medical History:          Diagnosis Date    Alzheimer's dementia ([facility] [address]     Atrial fibrillation ([facility] [address]     Benign essential HTN     Gout        Past Surgical History:      History reviewed. No pertinent surgical history. Medications Prior to Admission:      Prior to Admission medications    Medication Sig Start Date End Date Taking?  Authorizing Provider   furosemide (LASIX) 20 MG tablet Take 1 tablet by mouth daily 12/8/20  Yes Stefanie Jimenez MD   divalproex (DEPAKOTE) 125 MG DR tablet Take 125 mg by mouth nightly    Yes Historical Provider, MD   vitamin D 25 MCG (1000 UT) CAPS Take 1,000 Units by mouth daily   Yes Historical Provider, MD   mometasone-formoterol (DULERA) 200-5 MCG/ACT inhaler Inhale 2 puffs into the lungs 2 times daily as needed (shortness of breath)    Yes Historical Provider, MD   metoprolol succinate (TOPROL XL) 25 MG extended release tablet Take 25 mg by mouth daily    Yes Historical Provider, MD   Multiple Vitamins-Minerals (MULTIVITAMIN WITH MINERALS) tablet Take 1 tablet by mouth daily   Yes Historical Provider, MD   rivaroxaban (XARELTO) 15 MG TABS tablet Take 15 mg by mouth daily (with breakfast)   Yes Historical Provider, MD   calcium carbonate (OSCAL) 500 MG TABS tablet Take 500 mg by mouth daily   Yes Historical Provider, MD   albuterol sulfate HFA (VENTOLIN HFA) 108 (90 Base) MCG/ACT inhaler Inhale 2 puffs into the lungs every 6 hours as needed for Wheezing    Historical Provider, MD       Allergies:  Chlorpromazine and Hydrochlorothiazide    Social History:      TOBACCO:   has an unknown smoking status. She has never used smokeless tobacco.  ETOH:   reports no history of alcohol use. Family History:       Reviewed in detail and non contributory      History reviewed. No pertinent family history. REVIEW OF SYSTEMS:   Pertinent positives as noted in the HPI. All other systems reviewed and negative. PHYSICAL EXAM PERFORMED:    /72   Pulse 87   Temp 92.8 °F (33.8 °C) (Rectal)   Resp 19   Wt 206 lb 2.1 oz (93.5 kg)   SpO2 100%   BMI 33.27 kg/m²     General appearance: Not following commands, confused  HEENT:  Normal cephalic, atraumatic without obvious deformity. Conjunctivae/corneas clear. Neck: Supple, with full range of motion. No cervical lymphadenopathy  Respiratory: Decreased breathing sounds  Cardiovascular:  Regular rate and rhythm with normal S1/S2 without murmurs, rubs or gallops. Abdomen: Soft, non-tender, non-distended, normal bowel sounds. Musculoskeletal:  No edema noted bilaterally. No tenderness on palpation   Skin: no rash visible  Neurologic: Not following commands, confused  Psychiatric: Not following commands  Peripheral Pulses: +2 palpable, equal bilaterally       Labs:     Recent Labs     02/10/21  1643   WBC 6.0   HGB 12.7   HCT 40.7   *     Recent Labs     02/10/21  1643      K 4.6      CO2 27   BUN 20   CREATININE 1.0   CALCIUM 10.6     Recent Labs     02/10/21  1643   AST 27   ALT 25   BILIDIR <0.2   BILITOT 0.9   ALKPHOS 212*     No results for input(s): INR in the last 72 hours.   Recent Labs     02/10/21  1643   TROPONINI <0.01       Urinalysis:      Lab Results   Component Value Date    NITRU POSITIVE 02/10/2021    WBCUA 38 02/10/2021    BACTERIA 3+ 02/10/2021    RBCUA 2 02/10/2021    BLOODU Negative 02/10/2021    SPECGRAV 1.014 02/10/2021    GLUCOSEU Negative 02/10/2021       Radiology:       CT HEAD WO CONTRAST   Final Result   No acute intracranial abnormality. 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 None   Final Result   No acute inflammatory abnormality is identified in the abdomen or pelvis. Urinary bladder is contracted and not well evaluated. Hepatic steatosis. Small to moderate-sized hiatal hernia. Small to moderate bilateral pleural effusions with overlying atelectasis. Pneumonia is considered less likely. XR CHEST PORTABLE   Final Result   Findings suggest congestive heart failure                 Active Hospital Problems    Diagnosis Date Noted    Sepsis [facility]) [A41.9] 02/10/2021     Patient is a 51-year-old female with past medical history of atrial fibrillation, Alzheimer's dementia, gout who presents to the hospital for altered mental status. Patient is unable to provide history given condition, according to the patient's chart patient was found more confused today and has been having confusion for the past 3 to 4 days which is actually getting worse. No reports of fever per son, patient recently was treated for urinary tract infection. Unable to obtain review of systems given condition.       Assessment  Sepsis likely source urine  Acute metabolic encephalopathy likely secondary to Urinary tract infection  Atrial fibrillation  Alzheimer's dementia      Plan  Will start vancomycin, cefepime, check blood culture, urine culture, IV fluid therapy, bear hugger  Speech therapy consult, n.p.o. for now  Chest x-ray concerning for CHF, consult cardiology, gentle IV fluid therapy for sepsis for now, monitor on cardiac telemetry, consulted palliative care  DVT prophylaxis-Lovenox  Diet: N.p.o.  Code Status: Full code, consult palliative care    PT/OT Eval Status: ordered    Dispo - pending clinical improvement       Sabra Sanders MD    The note was completed using EMR and Dragon dictation system. Every effort was made to ensure accuracy; however, inadvertent computerized transcription errors may be present. Thank you TASHI Lea CNP for the opportunity to be involved in this patient's care. If you have any questions or concerns please feel free to contact me at [phone].     Sabra Sanders MD

## 2021-02-11 NOTE — PROGRESS NOTES
Speech Language Pathology    8:35 AM:  Evaluation attempted. Patient with limited level of alertness for assessment at this time per RN. ST to re-attempt as schedule permits unless otherwise notified. 10:50 AM: Evaluation re-attempted. Patient with continued limited level of alertness for assessment at this time per RN. ST to re-attempt as schedule permits unless otherwise notified. Thank you. Israel Ring, #[id number]  Speech-Language Pathologist  Portable phone: [phone]

## 2021-02-11 NOTE — PROGRESS NOTES
Occupational Therapy   Occupational Therapy Initial Assessment  Date: 2021   Patient Name: Diana Rico  MRN: [id number]     : 1931    Date of Service: 2021    Discharge Recommendations:  Continue to assess pending progress  OT Equipment Recommendations  Other: will continue to assess    Assessment   Performance deficits / Impairments: Decreased functional mobility ; Decreased endurance;Decreased strength;Decreased ADL status; Decreased safe awareness;Decreased high-level IADLs;Decreased balance;Decreased cognition  Assessment: 79 y/o female admitted 2/10/2021 with AMS. Pt being treated for sepsis, acute metabolic encephalopathy likely secondary to Urinary tract infection, afib, and Alzheimer's dementia. Today, pt presents with eyes closed and does not follow commands. Dependently sat pt EOB in attempts to engage/awake pt to participate in therapy. Pt tolerated sitting EOB ~ 1 min with min-max A as pt pushing back and attempting to lay back down. Pt positioned in bed for comfort and wrist retraints tied at end of session. Pt will benefit from skilled therapy while in hospital. Will continue to assess for best discharge plan as pt tolerates therapy and family support/assistance available at home. Prognosis: Fair  Decision Making: Medium Complexity  OT Education: OT Role;Transfer Training;Plan of Care  Barriers to Learning: dementia/cognition  REQUIRES OT FOLLOW UP: Yes  Activity Tolerance  Activity Tolerance: Treatment limited secondary to decreased cognition  Activity Tolerance: eyes closed, does not follow commands  Safety Devices  Safety Devices in place: Yes  Type of devices: Nurse notified; Bed alarm in place; Left in bed;Call light within reach           Patient Diagnosis(es): The primary encounter diagnosis was Septicemia [facility]). Diagnoses of Acute encephalopathy, Complicated urinary tract infection, Acute pulmonary edema ([address] and Bilateral pleural effusion were also pertinent to this visit. has a past medical history of Alzheimer's dementia ([facility] [address] Atrial fibrillation ([facility] [address] Benign essential HTN, and Gout.   has no past surgical history on file. Restrictions  Restrictions/Precautions  Restrictions/Precautions: Fall Risk  Position Activity Restriction  Other position/activity restrictions: hx Dementia    Subjective   General  Chart Reviewed: Yes  Patient assessed for rehabilitation services?: Yes  Additional Pertinent Hx: 79 y/o female admitted 2/10/2021 with AMS. Pt being treated for sepsis, acute metabolic encephalopathy likely secondary to Urinary tract infection, afib, and Alzheimer's dementia. Family / Caregiver Present: No  Referring Practitioner: Dr. Le Marinelli: Pt seen bedside. Pt with eyes closed and does not follow commands.   General Comment  Comments: Per RN ok for therapy  Vital Signs  Temp: 98.4 °F (36.9 °C)  Temp Source: Oral  Pulse: 109  Heart Rate Source: Monitor  BP: 119/81  BP Location: Left Arm  MAP (mmHg): 94  Oxygen Therapy  SpO2: 93 %  O2 Device: None (Room air)     Social/Functional History  Social/Functional History  Lives With: Son  Type of Home: House  Home Layout: One level  Home Access: Stairs to enter with rails  Entrance Stairs - Number of Steps: 4-5  Bathroom Shower/Tub: Walk-in shower  Bathroom Toilet: Standard  Bathroom Equipment: Grab bars in shower, Shower chair  Home Equipment: 4 wheeled walker, Cane  ADL Assistance: Needs assistance(Son assists with bathing/dressing/toileting - patient feeds self)  Homemaking Assistance: (son completes all IADLs)  Ambulation Assistance: Independent(occasionally with 6LC)  Transfer Assistance: Independent  Active : No  Additional Comments: above info per last admission in Dec, 2020; the pt unable to confirm or add to info due to level of dementia       Objective   Hearing: Exceptions to [facility]  Hearing Exceptions: Hard of hearing/hearing concerns    Orientation  Overall Orientation Status: Impaired  Orientation Level: Disoriented X4     Balance  Sitting Balance: (sat EOB ~ 1 min in attempts to awake/engage patient. Brief periods of CGA/min A but mostly max/dependent to maintain static sitting- Pt attempting to lay back down)  Functional Mobility  Functional Mobility Comments: anticipate pt will require maxi move for OOB transfers based on cognition     ADL  Toileting: Dependent/Total(chong)  Additional Comments: Anticipate pt will be dependent for all ADLs based on cognition this date        Bed mobility  Supine to Sit: Dependent/Total;2 Person assistance  Sit to Supine: 2 Person assistance;Dependent/Total  Scooting: Dependent/Total;2 Person assistance        Cognition  Overall Cognitive Status: Exceptions  Arousal/Alertness: Inconsistent responses to stimuli  Following Commands: Does not follow commands  Attention Span: Unable to maintain attention        Sensation  Overall Sensation Status: (N/T)         Plan   Plan  Times per week: 3-5  Current Treatment Recommendations: Strengthening, Endurance Training, Neuromuscular Re-education, Balance Training, Gait Training, Functional Mobility Training, Self-Care / ADL    AM-PAC Score  AM-[facility] Inpatient Daily Activity Raw Score: 9 (02/11/21 0812)  AM-PAC Inpatient ADL T-Scale Score : 25.33 (02/11/21 0812)  ADL Inpatient CMS 0-100% Score: 79.59 (02/11/21 5837)  ADL Inpatient CMS G-Code Modifier : CL (02/11/21 3059)    Goals  Short term goals  Time Frame for Short term goals: Prior to DC:   Short term goal 1: Pt will complete bed mobility in prep for ADL transfers with min A  Short term goal 2: Pt will complete ADL transfers wth min A  Short term goal 3: Pt will complete functional mobility with min A  Short term goal 4: Pt will tolerate standing > 2 min for functional task with min A  Patient Goals   Patient goals : no goal stated       Therapy Time   Individual Concurrent Group Co-treatment   Time In 0737         Time Out 0811         Minutes 34         Timed Code Treatment Minutes: 15 Minutes     This note to serve as OT d/c summary if pt is d/c-ed prior to next therapy session.     Jefry Hall, OTR/L

## 2021-02-11 NOTE — CARE COORDINATION
INITIAL CASE MANAGEMENT ASSESSMENT    Reviewed chart, met with patient and son Murphy Arrington to assess possible discharge needs. Explained Case Management role/services. Living Situation: lives with son Murphy Arrington and his partner Cecilia Krishna, 4-5 steps to enter    ADLs: son assist with all, pt normally walks on her own with a walker     DME: 4 wheeled walker, cane, shower chair and grab bars in the bathroom     PT/OT Recs: not yet seeing     Active Services: none active with wants [facility] at [place]. Has had in the past     Transportation: son transports     Medications: son administers, uses [facility] pharmacy    PCP: [facility]      PLAN/COMMENTS: may want a transport chair for [place] if pt having difficulty walking. Son plans to bring pt home at [place] with [facility]. SW made referral to [address] [place] [address] for Transition of Care is related to the following treatment goals: home with home care  The Patient and/or patient representative Murphy Arrington was provided with a choice of provider and agrees   with the discharge plan. [x] Yes [] No    Freedom of choice list was provided with basic dialogue that supports the patient's individualized plan of care/goals, treatment preferences and shares the quality data associated with the providers. [x] Yes [] No    SW/CM provided contact information for patient or family to call with any questions. SW/CM will follow and assist as needed.   Electronically signed by [id number] LE Mckeon on 2/11/2021 at 2:55 PM

## 2021-02-11 NOTE — PROGRESS NOTES
Physical Therapy    Facility/Department: [address] PROGRESSIVE CARE  Initial Assessment    NAME: Claudine Galeazzi  : 1931  MRN: [id number]    Date of Service: 2021    Discharge Recommendations:  Patient would benefit from continued therapy after discharge, 24 hour supervision or assist   PT Equipment Recommendations  Equipment Needed: No  Claudine Galeazzi scored a 7/24 on the AM-PAC short mobility form. Current research shows that an AM-PAC score of 17 or less is typically not associated with a discharge to the patient's home setting. If patient discharges prior to next session this note will serve as a discharge summary. Please see below for the latest assessment towards goals. Assessment   Body structures, Functions, Activity limitations: Decreased functional mobility   Assessment: pt is an 79 yo female who was adm to hosp with complicated UTI, sepsis and metabolic encephalopathy. At baseline pt has dementia but lives with son who is able to provide assist pt needs. Currently pt is not following any commands and needing max/dep A of 2 for bed mob. Pt in restraints due to pulling at lines. Will continue to assess for discharge needs. Anticipate if pt's confusion diminishes and son able to provide assist she needs then home with 24/7 assist and home PT however if son unable to provide assist she needs than an alternate discharge disposition will be needed  Prognosis: Guarded; Fair  Decision Making: High Complexity  PT Education: PT Role  Patient Education: pt too confused to complete education  Barriers to Learning: cognition and metabolic encephalopathy  REQUIRES PT FOLLOW UP: Yes  Activity Tolerance  Activity Tolerance: Patient limited by cognitive status  Activity Tolerance: pt very confused and restless today       Patient Diagnosis(es): The primary encounter diagnosis was Septicemia ([facility] [address]  Diagnoses of Acute encephalopathy, Complicated urinary tract infection, Acute pulmonary edema ([facility] [address] and walker, Cane  ADL Assistance: Needs assistance(Son assists with bathing/dressing/toileting - patient feeds self)  Homemaking Assistance: (son completes all IADLs)  Ambulation Assistance: Independent(occasionally with 3QX)  Transfer Assistance: Independent  Active : No  Additional Comments: above info per last admission in Dec, 2020; the pt unable to confirm or add to info due to level of dementia  Cognition   Cognition  Overall Cognitive Status: Exceptions  Arousal/Alertness: Inconsistent responses to stimuli  Following Commands: Does not follow commands  Attention Span: Unable to maintain attention    Objective          AROM RLE (degrees)  RLE AROM: WFL  RLE General AROM: AAROM  AROM LLE (degrees)  LLE AROM : WFL  LLE General AROM: AAROM  Strength RLE  Comment: unable to assess as pt not following any commands  Strength LLE  Comment: unable to assess as pt not following any commands     Sensation  Overall Sensation Status: (N/T)  Bed mobility  Supine to Sit: Dependent/Total;2 Person assistance  Sit to Supine: 2 Person assistance;Dependent/Total  Scooting: Dependent/Total;2 Person assistance  Transfers  Sit to Stand: Unable to assess  Stand to sit: Unable to assess  Comment: pt restless and not following any commands        Balance  Comments: pt had varied sitting balance from max A to brief periods of min but pt too confused to process what tasks therapist were trying to complete        Plan   Plan  Times per week: 3-5  Current Treatment Recommendations: Functional Mobility Training  Safety Devices  Type of devices: Call light within reach, Bed alarm in place, Left in bed, Nurse notified, All fall risk precautions in place  Restraints  Initially in place: Yes  Restraints: wrist restraints      AM-PAC Score  AM-PAC Inpatient Mobility Raw Score : 7 (02/11/21 0815)  AM-PAC Inpatient T-Scale Score : 26.42 (02/11/21 0815)  Mobility Inpatient CMS 0-100% Score: 92.36 (02/11/21 0815)  Mobility Inpatient CMS G-Code Modifier : CM (02/11/21 0815)          Goals  Short term goals  Time Frame for Short term goals: by discharge  Short term goal 1: bed mob min A  Short term goal 2: transfers with min A  Short term goal 3: amb 22' with or without AD min/mod A  Patient Goals   Patient goals : pt unable to state a goal       Therapy Time   Individual Concurrent Group Co-treatment   Time In 0740         Time Out 0815         Minutes 35                 CELIA LEOS, PT    Celia Leos, PT, 1720

## 2021-02-11 NOTE — CONSULTS
PALLIATIVE MEDICINE CONSULTATION     Patient name:Shima Adorno   LVE:[id number]    :1931  Room/Bed:Q6E-2958/5123-01   LOS: 1 day         Date of consult:2021    Consult Information  Palliative Medicine Consult performed by: Joanne Cervantes CNP  Requested by: Dr Stas Umana  Reason for consult: Melany Mtz, code status    Number of admissions past 12 months: 3    ASSESSMENT/RECOMMENDATIONS     80 y.o. female with AMS started 3 days ago and debilitated. Symptom Management:  1. AMS- likely related to acute UTI on top of chronic dementia  2. Debility- pt having more difficulty walking the last several weeks  3. Goals of Care- talked to son Frederick Pérez regarding code status and C    Patient/Family Goals of Care :    Educated patient and family on CODE STATUS: Although in some cases it does save lives, CPR (cardiopulmonary resuscitation) frequently is not successful or does not benefit those who receive it, especially elderly people or those with serious medical conditions. Even if revived, the person can be left with painful injuries, or in a debilitated state, or with brain damage resulting from oxygen deprivation. Resuscitation can involve such things as drugs, forcefully pressing on the chest, giving electric shocks to restart the heart or placing a tube down the nose or throat to provide artificial breathing. People with terminal illnesses or other serious health conditions may prefer not to be resuscitated. [facility] has established two standardized DNR orders. DNR Comfort Care-Arrest Order you will receive all the appropriate medical treatment, including resuscitation, until the patient has a cardiac arrest (heart has stopped beating) or pulmonary arrest (breathing has stopped), at which point comfort care will be provided. DNR Comfort Care Order [facility]), a patient chooses other measures such as drugs to correct abnormal heart rhythms.  With this order, comfort care or other requested treatment is provided at a point before the heart or breathing stops. Comfort care involves keeping the patient comfortable with pain medication and providing palliative (supportive medical) care. A DNR-CC does not mean do not treat.      Talked to anna Duenas he wants pt to be [address]. He has HCPOA paperwork naming him as her decision maker he will try to find it and bring it in. We discussed downward trajectory of dementia and frequent UTIs each time pt does not return to prior level of function as being the course of the disease. Disposition/Discharge Plan:   pending    Advance Directives:  · Surrogate Decision Maker: Patricia Geovanni  · Code status:  DNR-CCA    Case discussed with: patient, floor RN  Thank you for allowing us to participate in the care of this patient. HISTORY     CC: AMS  HPI: The patient is a 80 y.o. female with past medical history of atrial fibrillation, Alzheimer's dementia, gout who presents to the hospital for altered mental status. Palliative Medicine SymptomScreening/ROS:  Review of Systems - History obtained from child, the patient and unobtainable from patient due to mental status  *  Patient unable to complete full ROS due to current cognitive status. Information that is obtained from nursing and chart.      Pain:  None     Home med list and hospital medications reviewed in chart as of 2/11/2021     EXAM     Vitals:    02/11/21 0744   BP: 119/81   Pulse: 109   Resp:    Temp: 98.4 °F (36.9 °C)   SpO2: 93%       Physical Examination: General appearance - chronically ill appearing and uncooperative  Mental status - drowsy  Mouth - dry mucous membranes  Neck - supple, no significant adenopathy  Chest - clear to auscultation, no wheezes, rales or rhonchi, symmetric air entry  Abdomen - soft, nontender, nondistended, no masses or organomegaly  Musculoskeletal - full range of motion without pain  Extremities - peripheral pulses normal, no pedal edema, no clubbing or cyanosis       Current labs in the epic chart reviewed as of 2/11/2021   Review of previous notes, admits, labs, radiology and testing relevant to this consult done in this chart today 2/11/2021    Signed By: Electronically signed by TASHI Cordova CNP on 2/11/2021 at 10:27 AM  Palliative Medicine   [phone]    February 11, 2021

## 2021-02-11 NOTE — PROGRESS NOTES
Clinical Pharmacy Note  Vancomycin Consult    Anoop Rivera is a 80 y.o. female ordered Vancomycin for UTI; consult received from Dr. Elie Meyer to manage therapy. Also receiving Cefepime. Patient Active Problem List   Diagnosis    Acute on chronic combined systolic and diastolic CHF, NYHA class 3 ([facility]    Dementia in Alzheimer's disease with delirium ([facility] [address] [facility]    Acute pyelonephritis    Essential hypertension    Current use of long term anticoagulation    Mild malnutrition ([facility] [address]    Fall at home, initial encounter    Acute metabolic encephalopathy    Acute on chronic congestive heart failure ([facility] [address]    Sepsis ([facility] [address]       Allergies:  Chlorpromazine and Hydrochlorothiazide     Temp max:  Temp (24hrs), Av.9 °F (35.5 °C), Min:92.8 °F (33.8 °C), Max:98.4 °F (36.9 °C)      Recent Labs     02/10/21  1643 21  0519   WBC 6.0 9.7       Recent Labs     02/10/21  1643 21  0519   BUN 20 18   CREATININE 1.0 1.1         Intake/Output Summary (Last 24 hours) at 2021 1418  Last data filed at 2021 1156  Gross per 24 hour   Intake 449.42 ml   Output 1050 ml   Net -600.58 ml       Culture Results:  Pending    Ht Readings from Last 1 Encounters:   21 5' 5\" (1.651 m)        Wt Readings from Last 1 Encounters:   21 203 lb 4.2 oz (92.2 kg)         Estimated Creatinine Clearance: 39 mL/min (based on SCr of 1.1 mg/dL). Assessment/Plan:  Vancomycin day #2. Random level today=11.5 mg/L post 1500 mg dose. Will give vancomycin 1000 mg x 1 today. Random 24 hour level to be drawn 21 at 1500. Thank you for the consult.    Chaney Severance, RPh 2021 2:18 PM

## 2021-02-11 NOTE — PROGRESS NOTES
4 Eyes Skin Assessment     NAME:  Paradise Fonseca  YOB: 1931  MEDICAL RECORD NUMBER:  [id number]    The patient is being assess for  Admission    I agree that 2 RN's have performed a thorough Head to Toe Skin Assessment on the patient. ALL assessment sites listed below have been assessed. Areas assessed by both nurses:    Head, Face, Ears, Shoulders, Back, Chest, Arms, Elbows, Hands, Sacrum. Buttock, Coccyx, Ischium and Legs. Feet and Heels        Does the Patient have a Wound?  No noted wound(s)       Eddie Prevention initiated:  Yes   Wound Care Orders initiated:  NA    Pressure Injury (Stage 3,4, Unstageable, DTI, NWPT, and Complex wounds) if present place consult order under [de-identified] No    New and Established Ostomies if present place consult order under : NA      Nurse 1 eSignature: Electronically signed by Bartolo Landeros RN on 2/10/21 at 11:02 PM EST    **SHARE this note so that the co-signing nurse is able to place an eSignature**    Nurse 2 eSignature: Electronically signed by Antonella Driscoll RN on 2/10/21 at 11:06 PM EST

## 2021-02-12 LAB
ANION GAP SERPL CALCULATED.3IONS-SCNC: 15 MMOL/L (ref 3–16)
BUN BLDV-MCNC: 18 MG/DL (ref 7–20)
CALCIUM SERPL-MCNC: 10.3 MG/DL (ref 8.3–10.6)
CHLORIDE BLD-SCNC: 105 MMOL/L (ref 99–110)
CO2: 21 MMOL/L (ref 21–32)
CREAT SERPL-MCNC: 1.2 MG/DL (ref 0.6–1.2)
GFR AFRICAN AMERICAN: 51
GFR NON-AFRICAN AMERICAN: 42
GLUCOSE BLD-MCNC: 95 MG/DL (ref 70–99)
HCT VFR BLD CALC: 40.3 % (ref 36–48)
HEMOGLOBIN: 12.6 G/DL (ref 12–16)
MCH RBC QN AUTO: 26.9 PG (ref 26–34)
MCHC RBC AUTO-ENTMCNC: 31.2 G/DL (ref 31–36)
MCV RBC AUTO: 86.3 FL (ref 80–100)
ORGANISM: ABNORMAL
PDW BLD-RTO: 18.4 % (ref 12.4–15.4)
PLATELET # BLD: 125 K/UL (ref 135–450)
PMV BLD AUTO: 10.7 FL (ref 5–10.5)
POTASSIUM REFLEX MAGNESIUM: 4.3 MMOL/L (ref 3.5–5.1)
POTASSIUM SERPL-SCNC: 4.3 MMOL/L (ref 3.5–5.1)
RBC # BLD: 4.67 M/UL (ref 4–5.2)
SODIUM BLD-SCNC: 141 MMOL/L (ref 136–145)
URINE CULTURE, ROUTINE: ABNORMAL
VANCOMYCIN RANDOM: 12.5 UG/ML
WBC # BLD: 9 K/UL (ref 4–11)

## 2021-02-12 PROCEDURE — 94760 N-INVAS EAR/PLS OXIMETRY 1: CPT

## 2021-02-12 PROCEDURE — 97530 THERAPEUTIC ACTIVITIES: CPT

## 2021-02-12 PROCEDURE — 80048 BASIC METABOLIC PNL TOTAL CA: CPT

## 2021-02-12 PROCEDURE — 36415 COLL VENOUS BLD VENIPUNCTURE: CPT

## 2021-02-12 PROCEDURE — 2060000000 HC ICU INTERMEDIATE R&B

## 2021-02-12 PROCEDURE — 2580000003 HC RX 258: Performed by: INTERNAL MEDICINE

## 2021-02-12 PROCEDURE — 85027 COMPLETE CBC AUTOMATED: CPT

## 2021-02-12 PROCEDURE — 6360000002 HC RX W HCPCS: Performed by: INTERNAL MEDICINE

## 2021-02-12 PROCEDURE — 99232 SBSQ HOSP IP/OBS MODERATE 35: CPT | Performed by: INTERNAL MEDICINE

## 2021-02-12 PROCEDURE — 97129 THER IVNTJ 1ST 15 MIN: CPT

## 2021-02-12 PROCEDURE — 80202 ASSAY OF VANCOMYCIN: CPT

## 2021-02-12 PROCEDURE — 97535 SELF CARE MNGMENT TRAINING: CPT

## 2021-02-12 PROCEDURE — 92526 ORAL FUNCTION THERAPY: CPT

## 2021-02-12 RX ORDER — FUROSEMIDE 10 MG/ML
20 INJECTION INTRAMUSCULAR; INTRAVENOUS ONCE
Status: COMPLETED | OUTPATIENT
Start: 2021-02-12 | End: 2021-02-12

## 2021-02-12 RX ADMIN — VANCOMYCIN HYDROCHLORIDE 1000 MG: 1 INJECTION, POWDER, LYOPHILIZED, FOR SOLUTION INTRAVENOUS at 15:43

## 2021-02-12 RX ADMIN — ENOXAPARIN SODIUM 90 MG: 100 INJECTION SUBCUTANEOUS at 20:54

## 2021-02-12 RX ADMIN — ENOXAPARIN SODIUM 90 MG: 100 INJECTION SUBCUTANEOUS at 08:31

## 2021-02-12 RX ADMIN — CEFEPIME HYDROCHLORIDE 1000 MG: 1 INJECTION, POWDER, FOR SOLUTION INTRAMUSCULAR; INTRAVENOUS at 04:30

## 2021-02-12 RX ADMIN — Medication 10 ML: at 20:54

## 2021-02-12 RX ADMIN — SODIUM CHLORIDE: 9 INJECTION, SOLUTION INTRAVENOUS at 12:23

## 2021-02-12 RX ADMIN — FUROSEMIDE 20 MG: 10 INJECTION, SOLUTION INTRAMUSCULAR; INTRAVENOUS at 12:20

## 2021-02-12 RX ADMIN — CEFEPIME HYDROCHLORIDE 1000 MG: 1 INJECTION, POWDER, FOR SOLUTION INTRAMUSCULAR; INTRAVENOUS at 16:51

## 2021-02-12 ASSESSMENT — PAIN SCALES - PAIN ASSESSMENT IN ADVANCED DEMENTIA (PAINAD)
BODYLANGUAGE: 1
NEGVOCALIZATION: 1
BODYLANGUAGE: 0
TOTALSCORE: 0
BREATHING: 0
NEGVOCALIZATION: 0
NEGVOCALIZATION: 0
FACIALEXPRESSION: 0
BODYLANGUAGE: 0
BODYLANGUAGE: 0

## 2021-02-12 NOTE — PROGRESS NOTES
Patient's son's partner is at the bedside and showing the patient pictures and the patient instantly opened her eyes and smiled at them and took them from his hands. Patient is still not speaking but is able to respond to the patient's family with her eyes.

## 2021-02-12 NOTE — PLAN OF CARE
Problem: Restraint Use - Nonviolent/Non-Self-Destructive Behavior:  Goal: Absence of restraint-related injury  Description: Absence of restraint-related injury  2/11/2021 2321 by Haider Dickson RN  Outcome: Ongoing     Problem: Restraint Use - Nonviolent/Non-Self-Destructive Behavior:  Goal: Absence of restraint indications  Description: Absence of restraint indications  Outcome: Ongoing     Problem: Skin Integrity:  Goal: Absence of new skin breakdown  Description: Absence of new skin breakdown  2/11/2021 2321 by Haider Dickson RN  Outcome: Ongoing     Problem: Falls - Risk of:  Goal: Will remain free from falls  Description: Will remain free from falls  2/11/2021 2321 by Haider Dickson RN  Outcome: Ongoing     Problem: Falls - Risk of:  Goal: Absence of physical injury  Description: Absence of physical injury  Outcome: Ongoing

## 2021-02-12 NOTE — PROGRESS NOTES
Physical Therapy  Facility/Department: [address] PROGRESSIVE CARE  Daily Treatment Note- COTX with OT for safety  NAME: Mirlande Newberry  : 1931  MRN: [id number]    Date of Service: 2021    Discharge Recommendations:  3-5 sessions per week, Patient would benefit from continued therapy after discharge   PT Equipment Recommendations  Equipment Needed: No  Other: will continue to assess     Mirlande Newberry scored a 7 on the AM-PAC short mobility form. Current research shows that an AM-PAC score of 17 or less is typically not associated with a discharge to the patient's home setting. Based on the patient's AM-PAC score and their current functional mobility deficits, it is recommended that the patient have 3-5 sessions per week of Physical Therapy at d/c to increase the patient's independence. Please see assessment section for further patient specific details. If patient discharges prior to next session this note will serve as a discharge summary. Please see below for the latest assessment towards goals. Assessment   Body structures, Functions, Activity limitations: Decreased functional mobility   Assessment: pt is an 79 yo female who was adm to hosp with complicated UTI, sepsis and metabolic encephalopathy. At baseline pt has dementia but lives with son who is able to provide assist pt needs. Currently pt is not following any commands and resisted therapist throughout the session. She was very restless and required total assist of 2 for bed mobility and for changing soiled brief and pericare in bed. Pt is far below her functional baseline. Will continue to assess pending progress, but pt is currently unsafe to return home and will benefit from continued skilled inpatient PT at a low-moderate intensity upon d/c. Prognosis: Guarded; Fair  PT Education: PT Role  REQUIRES PT FOLLOW UP: Yes  Activity Tolerance  Activity Tolerance: Patient limited by cognitive status;Treatment limited secondary to agitation     Patient Diagnosis(es): The primary encounter diagnosis was Septicemia ([facility] [address] Diagnoses of Acute encephalopathy, Complicated urinary tract infection, Acute pulmonary edema ([facility] [address] and Bilateral pleural effusion were also pertinent to this visit. has a past medical history of Alzheimer's dementia ([facility] [address] Atrial fibrillation ([facility] [address] Benign essential HTN, and Gout.   has no past surgical history on file. Restrictions  Restrictions/Precautions  Restrictions/Precautions: Fall Risk  Position Activity Restriction  Other position/activity restrictions: hx Dementia     Social/Functional History  Lives With: Son  Type of Home: House  Home Layout: One level  Home Access: Stairs to enter with rails  Entrance Stairs - Number of Steps: 4-5  Bathroom Shower/Tub: Walk-in shower  Bathroom Toilet: Standard  Bathroom Equipment: Grab bars in shower, Shower chair  Home Equipment: 4 wheeled walker, Cane  ADL Assistance: Needs assistance(Son assists with bathing/dressing/toileting - patient feeds self)  Homemaking Assistance: (son completes all IADLs)  Ambulation Assistance: Independent(occasionally with 8JR)  Transfer Assistance: Independent  Active : No  Additional Comments: above info per last admission in Dec, 2020; the pt unable to confirm or add to info due to level of dementia    Subjective   General  Chart Reviewed: Yes  Additional Pertinent Hx: Patient is a 59-year-old female with past medical history of atrial fibrillation, Alzheimer's dementia, gout who presents to the hospital for altered mental status. Patient is unable to provide history given condition, according to the patient's chart patient was found more confused today and has been having confusion for the past 3 to 4 days which is actually getting worse. No reports of fever per son, patient recently was treated for urinary tract infection. Unable to obtain review of systems given condition.   Response To Previous Treatment: Patient unable to report, no changes reported from family or staff  Family / Caregiver Present: No  Subjective  Subjective: Pt with eyes closed and non-verbal throughout session. Very restless in bed upon arrival putting her legs over the bed rail. In diandra wrist restraints.                 Objective   Bed mobility  Rolling to Left: Dependent/Total;2 Person assistance  Rolling to Right: Dependent/Total;2 Person assistance  Supine to Sit: Dependent/Total;2 Person assistance  Sit to Supine: 2 Person assistance;Dependent/Total  Scooting: Dependent/Total;2 Person assistance  Comment: pt resistant throughout     Transfers  Sit to Stand: Unable to assess  Stand to sit: Unable to assess  Bed to Chair: Unable to assess  Comment: pt restless and not following any commands, would require maxi-move     Ambulation  Ambulation?: No     Balance  Sitting - Static: Good;-  Comments: sitting balance EOB SBA-CGA; pt sat EOB for approx 5 min; did not follow cues to lift head and did not open eyes            Other Activities: Other (see comment)  Comment: total assist for brushing hair sitting EOB; total assist for pericare and changing soiled brief rolling back and forth in bed; pt resisting throughout; pt positioned for comfort in bed at end of session with wedge and pillow placed for pressure relief            AM-PAC Score  AM-PAC Inpatient Mobility Raw Score : 7 (02/12/21 1034)  AM-PAC Inpatient T-Scale Score : 26.42 (02/12/21 1034)  Mobility Inpatient CMS 0-100% Score: 92.36 (02/12/21 1034)  Mobility Inpatient CMS G-Code Modifier : CM (02/12/21 1034)          Goals  Short term goals  Time Frame for Short term goals: by discharge (goals ongoing as of 2/12)  Short term goal 1: bed mob min A  Short term goal 2: transfers with min A  Short term goal 3: amb 22' with or without AD min/mod A  Patient Goals   Patient goals : pt unable to state a goal    Plan    Plan  Times per week: 3-5  Current Treatment Recommendations: Functional Mobility Training  Safety Devices  Type of devices: Call light within reach, Bed alarm in place, Left in bed, Nurse notified, All fall risk precautions in place, Telesitter in use(RN Caio notified)  Restraints  Initially in place: Yes  Restraints: wrist restraints in place at beginning and end of session     Therapy Time   Individual Concurrent Group Co-treatment   Time In 0955         Time Out 1035         Minutes 40         Timed Code Treatment Minutes: 40 Minutes         Electronically signed by Rome Dominguez on 2/12/2021 at 10:38 AM

## 2021-02-12 NOTE — PROGRESS NOTES
Patient continues to be restless and confused in the bed. Patient is in bilateral wrist restraints and continues to kick her feet around in the bed. Safety camera is also in the room to monitor and make sure patient does not hurt herself. Patient has a UTI and is being treated with antibiotics. Patient no longer has chong catheter in and is incontinent with a depend diaper on. Son Cheril Gosselin has been spoken with on the phone and given update on patient. All questions answered at this time. Bed in lowest position, call light within reach. Bed alarm is on.

## 2021-02-12 NOTE — PLAN OF CARE
Problem: Skin Integrity:  Goal: Will show no infection signs and symptoms  Description: Will show no infection signs and symptoms  2/12/2021 1023 by Cris Souza RN  Outcome: Ongoing   Patient's skin has been assessed per unit protocol and the patient is being repositioned or encouraged to turn every two hours to prevent skin breakdown and promote healing. Problem: Falls - Risk of:  Goal: Will remain free from falls  Description: Will remain free from falls  2/12/2021 1023 by Cris Souza RN  Outcome: Ongoing   Fall risk assessment completed per unit protocol. Patient's bed is in the lowest position, call light is within reach and the patient's room is free of clutter. The patient has been instructed to call for assistance before getting out of bed or the chair.      Problem: OXYGENATION/RESPIRATORY FUNCTION  Goal: Patient will maintain patent airway  2/12/2021 1023 by Cris Souza RN  Outcome: Ongoing   Patient is able to breathe comfortably on room air     Problem: HEMODYNAMIC STATUS  Goal: Patient has stable vital signs and fluid balance  2/12/2021 1023 by Cris Souza RN  Outcome: Ongoing   VSS

## 2021-02-12 NOTE — PROGRESS NOTES
Hospitalist Progress Note      PCP: TASHI Friend - CNP    Chief Complaint.     Patient is a 70-year-old female with past medical history of atrial fibrillation, Alzheimer's dementia, gout who presents to the hospital for altered mental status. Patient is unable to provide history given condition, according to the patient's chart patient was found more confused today and has been having confusion for the past 3 to 4 days which is actually getting worse. No reports of fever per son, patient recently was treated for urinary tract infection. Unable to obtain review of systems given condition. Date of Admission: 2/10/2021    Subjective: Patient seen and evaluated at the bedside, patient is confused, not oriented, not responding to questions however opens her eyes and is spontaneously moving in bed    Medications:  Reviewed    Infusion Medications    sodium chloride 50 mL/hr at 02/12/21 1223     Scheduled Medications    [START ON 2/13/2021] cefTRIAXone (ROCEPHIN) IV  1,000 mg Intravenous Q24H    enoxaparin  1 mg/kg Subcutaneous BID    sodium chloride flush  10 mL Intravenous 2 times per day     PRN Meds: metoprolol, sodium chloride flush, potassium chloride, magnesium sulfate, promethazine **OR** ondansetron, magnesium hydroxide, acetaminophen **OR** acetaminophen      Intake/Output Summary (Last 24 hours) at 2/12/2021 1829  Last data filed at 2/12/2021 1223  Gross per 24 hour   Intake 1226 ml   Output --   Net 1226 ml       Physical Exam Performed:    BP (!) 163/77   Pulse 80   Temp 93.2 °F (34 °C) (Oral)   Resp 18   Ht 5' 5\" (1.651 m)   Wt 195 lb 5.2 oz (88.6 kg)   SpO2 95%   BMI 32.50 kg/m²     General appearance: Confused and seems frail looking, lying in bed comfortably, confused-moving all over spontaneously  HEENT:  Conjunctivae/corneas clear. Neck: Supple, with full range of motion. Respiratory:  Normal respiratory effort.  Clear to auscultation, bilaterally without Rales/Wheezes/Rhonchi. Cardiovascular: Regular rate and rhythm with normal S1/S2 without murmurs or rubs  Abdomen: Soft, non-tender, non-distended, normal bowel sounds. Musculoskeletal: No cyanosis or edema bilaterally  Neurologic: Alert oriented x0  Psychiatric: Alert and oriented x0  Peripheral Pulses: +2 palpable, equal bilaterally       Labs:   Recent Labs     02/10/21  1643 02/11/21  0519 02/12/21  0457   WBC 6.0 9.7 9.0   HGB 12.7 11.8* 12.6   HCT 40.7 37.0 40.3   * 131* 125*     Recent Labs     02/10/21  1643 02/11/21  0519 02/12/21  0457    140 141   K 4.6 4.8 4.3  4.3    107 105   CO2 27 21 21   BUN 20 18 18   CREATININE 1.0 1.1 1.2   CALCIUM 10.6 10.4 10.3     Recent Labs     02/10/21  1643   AST 27   ALT 25   BILIDIR <0.2   BILITOT 0.9   ALKPHOS 212*     No results for input(s): INR in the last 72 hours. Recent Labs     02/10/21  1643   TROPONINI <0.01       Urinalysis:      Lab Results   Component Value Date    NITRU POSITIVE 02/10/2021    WBCUA 38 02/10/2021    BACTERIA 3+ 02/10/2021    RBCUA 2 02/10/2021    BLOODU Negative 02/10/2021    SPECGRAV 1.014 02/10/2021    GLUCOSEU Negative 02/10/2021       Radiology:  CT HEAD WO CONTRAST   Final Result   No acute intracranial abnormality. 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 None   Final Result   No acute inflammatory abnormality is identified in the abdomen or pelvis. Urinary bladder is contracted and not well evaluated. Hepatic steatosis. Small to moderate-sized hiatal hernia. Small to moderate bilateral pleural effusions with overlying atelectasis. Pneumonia is considered less likely.          XR CHEST PORTABLE   Final Result   Findings suggest congestive heart failure               Assessment/Plan:    Active Hospital Problems    Diagnosis    Sepsis ([address] [A41.9]          Patient is a 66-year-old female with past medical history of atrial fibrillation, Alzheimer's dementia, gout who presents to the hospital for altered mental status. Patient is unable to provide history given condition, according to the patient's chart patient was found more confused today and has been having confusion for the past 3 to 4 days which is actually getting worse. No reports of fever per son, patient recently was treated for urinary tract infection.   Unable to obtain review of systems given condition.        Assessment  Sepsis likely source urine  Acute metabolic encephalopathy likely secondary to Urinary tract infection  Atrial fibrillation on Xarelto  Alzheimer's dementia        Plan  Urine culture positive for E. coli sensitive to Rocephin, will discontinue vancomycin, cefepime, start Rocephin  Speech therapy consult, n.p.o. for now  Chest x-ray concerning for CHF, consult cardiology-awaiting recommendations, gentle IV fluid therapy for sepsis for now, monitor on cardiac telemetry, consulted palliative care-CODE STATUS changed to DNR CCA  Cardiology was consulted for suspected CHF, pleural effusions-recommends 1 dose of Lasix, patient's Xarelto on hold, bridging with therapeutic dose of Lovenox  DVT prophylaxis-Lovenox  Diet: Diet NPO Effective Now  Code Status: DNR-CCA    PT/OT Eval Status: ordered    Dispo -likely discharge 2 to 3 days, continue IV antibiotics    Nicola Hubbard MD

## 2021-02-12 NOTE — PROGRESS NOTES
[facility]   Speech Therapy  Daily Dysphagia Treatment Note    Mirlande Newberry  AGE: 80 y.o. 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 ([address]    Acute pyelonephritis    Essential hypertension    Current use of long term anticoagulation    Mild malnutrition ([address]    Fall at home, initial encounter    Acute metabolic encephalopathy    Acute on chronic congestive heart failure (HCC)    Sepsis ([address]     Allergies   Allergen Reactions    Chlorpromazine Other (See Comments)    Hydrochlorothiazide Other (See Comments)     Gout     Treatment Diagnosis: Dysphagia     Chart review:   2/10/2021 admitted with confusion:  ADMISSION H&P HPI:  Patient is a 70-year-old female with past medical history of atrial fibrillation, Alzheimer's dementia, gout who presents to the hospital for altered mental status.  Patient is unable to provide history given condition, according to the patient's chart patient was found more confused today and has been having confusion for the past 3 to 4 days which is actually getting worse. No reports of fever per son, patient recently was treated for urinary tract infection.  Unable to obtain review of systems given condition     2/10/2021 CXR:  Findings suggest congestive heart failure    Subjective:     Current diet  Current Diet : NPO  Current Liquid Diet : NPO    Comments regarding tolerating Current Diet:   RN reports suspicion for decreased PO readiness d/t persistent altered mental status      Objective:     Pain   Patient Currently in Pain: No    Cognitive/Behavior   Behavior/Cognition: Confused, Lethargic, Doesn't follow directions    Presentations   Consistencies Administered: Honey - teaspoon    Positioning   Upright in bed    PO Trials:  · Thin Liquids  · Nectar thick liquids  · Honey Thick liquids: removes PO via teaspoon with decreased but adequate labial closure; after initiation of oral prep, patient unable to continue to execute oral prep d/t severity of lingual weakness; patient noted to attempt to execute bolus prep without success  · Puree   · Soft food  · Regular food    Dysphagia Tx:   JEFFERY trials: remains unsafe for PO d/t severity of weakness; poor sustained attention to tasks compounds dysphagia imps    Goals:   Dysphagia Goals: The patient will tolerate repeat BSE when able. continue NPO    Assessment:   Impressions:   Dysphagia Diagnosis: Severe oral stage dysphagia, Suspected needs further assessment   · Accepted and tolerated limited re-evaluation at bedside; cognitive status remains a barrier for assessment at this time. · Patient lethargic and confused with tendency for easy agitation. Max cues for sustained attention to task. No meaningful verbalizations and unable to follow dx. · Marked oral stage dysphagia characterized by poor ability to complete oral prep d/t severity of weakness and decreased sustained attention. Patient sensates PO trial placed tongue with noted attempt to initiate oral prep but unable to execute/complete d/t severity of weakness and distractibility. Concern for severe risk for premature bolus loss to the pharynx with potential penetration/aspiration before the swallow d/t severity of weakness and decreased attention to task. Unable t assess pharyngeal phase d/t severity of oral phase imps. · Recommend NPO with continued  re-assessment as tolerated.     Diet Recommendations:  NPO  Recommended Form of Meds: (IV/rectal)    Strategies:    routine oral care    Education:  Consulted and agree with results and recommendations: Patient, RN, Family member  Patient Education: Attempted on resutls/recs/plan  Patient Education Response: No evidence of learning    Prognosis:   Poor-guarded for dysphagia d/t severity of imps and comorbidities    Plan:     Continue Dysphagia Therapy: YES  Interventions: Therapeutic Interventions: Therapeutic PO trials with SLP, Diet tolerance monitoring, Patient/Family education  Duration/Frequency of therapy while on unit: Duration/Frequency of Treatment  Duration/Frequency of Treatment: ST to tx 3-5 times per week during acute admission  Discharge Instructions:   Anticipate potential need for further skilled Speech Therapy for Dysphagia at discharge    This note serves as a D/C Summary in the event that this patient is discharged prior to the next therapy session.     Coded treatment time: 10  Total treatment time: 25    Electronically signed by RALF Maguire on 2/12/2021 at 2:30 PM

## 2021-02-12 NOTE — DISCHARGE INSTR - COC
Continuity of Care Form    Patient Name: Padmaja Odom   :  1931  MRN:  [id number]    Admit date:  2/10/2021  Discharge date: 21    Code Status Order: DNR-CCA   Advance Directives:   [place] Directive Type of Healthcare Directive Copy in [address] Agent's Name Healthcare Agent's Phone Number    02/10/21 4619  Other (Comment) pt confused -- -- -- -- --            Admitting Physician:  Reyna Nyhan, MD  PCP: TASHI Jacob CNP    Discharging Nurse: [facility]IR[facility] Unit/Room#: F8F-1116/5123-01  Discharging Unit Phone Number: [phone]    Emergency Contact:   Extended Emergency Contact Information  Primary Emergency Contact: Isis Zafar, [address] Phone: [phone]  Mobile Phone: [phone]  Relation: Child   needed? No  Secondary Emergency Contact: Lamont Perez  Mobile Phone: [phone]  Relation: Child    Past Surgical History:  History reviewed. No pertinent surgical history. Immunization History: There is no immunization history on file for this patient. 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 Poonam Mcwilliams, Y92.009    Acute metabolic encephalopathy D22.68    Acute on chronic congestive heart failure (HCC) I50.9    Sepsis ([address] A41.9       Isolation/Infection:   Isolation            No Isolation          Patient Infection Status       Infection Onset Added Last Indicated Last Indicated By Review Planned Expiration Resolved Resolved By    None active    Resolved    COVID-19 Rule Out 20 COVID-19 (Ordered)   11/30/20 Rule-Out Test Resulted    COVID-19 Rule Out 06/04/20 06/04/20 06/04/20 COVID-19 (Ordered)   06/05/20 Rule-Out Test Resulted            Nurse Assessment:  Last Vital Signs: BP (!) 163/77   Pulse 80   Temp 93.2 °F (34 °C) (Oral)   Resp 18   Ht 5' 5\" (1.651 m)   Wt 195 lb 5.2 oz (88.6 kg)   SpO2 95%   BMI 32.50 kg/m²     Last documented pain score (0-10 scale):    Last Weight:   Wt Readings from Last 1 Encounters:   02/12/21 195 lb 5.2 oz (88.6 kg)     Mental Status:  disoriented and alert    IV Access:  - None    Nursing Mobility/ADLs:  Walking   Assisted  Transfer  Assisted  Bathing Dependent  Dressing  Dependent  Toileting  Dependent  Feeding  Assisted  Med Admin  Dependent  Med Delivery   crushed and mixed with applesauce or pudding    Wound Care Documentation and Therapy:  Wound 11/30/20 Buttocks (Active)   Number of days: 74        Elimination:  Continence:   · Bowel: No  · Bladder: No  Urinary Catheter: Insertion Date: 2/10 and Removal Date 2/11   Colostomy/Ileostomy/Ileal Conduit: No       Date of Last BM: 2/16/21    Intake/Output Summary (Last 24 hours) at 2/12/2021 1755  Last data filed at 2/12/2021 1223  Gross per 24 hour   Intake 1226 ml   Output 500 ml   Net 726 ml     I/O last 3 completed shifts: In: 2158.8 [I.V.:2108.8; IV Piggyback:50]  Out: 1350 [JVZ:4921]    Safety Concerns: At Risk for Falls and Aspiration Risk    Impairments/Disabilities:      None    Nutrition Therapy:  Current Nutrition Therapy:   - Oral Diet:  Low Sodium (2gm); Dysphagia Minced and Moist    Routes of Feeding: Oral  Liquids: Thin Liquids  Daily Fluid Restriction: yes - amount 2,000  Last Modified Barium Swallow with Video (Video Swallowing Test): not done    Treatments at the Time of Hospital Discharge:   Respiratory Treatments:   Oxygen Therapy:  is not on home oxygen therapy.   Ventilator:    - No ventilator support     Heart Failure Instructions for Daily Management  Patient was treated for chronic diastolic heart failure. she  will require the following:     Please weigh daily on the same scale and approximately the same time of day. Report weight gain of 3 pounds/day or 5 pounds/week to : [facility] [phone].  Please use hospital discharge weight as baseline reference.  Please monitor for signs and symptoms of and report to MD:  o Worsening Heart Failure: sudden weight gain, shortness of breath, lower extremity or general edema/swelling, abdominal bloating/swelling, inability to lie flat, intolerance to usual activity, or cough (especially at night). Report these finding even if no increase in weight.  o Dehydration:  having difficulty or a decrease in urination, dizziness, worsening fatigue, or new onset/worsening of generalized weakness.  Please continue a LOW SODIUM diet and LIMIT fluid intake to 48 - 64 ounces ( 1.5 - 2 liters) per day. [place] Re Call [facility] [phone] and/or Patsy Carpenter @ [phone] with any questions or concerns.  Please continue heart failure education to patient and family/support system.  See After Visit Summary for hospital follow up appointment details.  Consider spiritual care referral for support and/or completion of advance directives [phone].  Consider: [place] [address] telehealth program if patient agreeable and able to participate, palliative care consult for ongoing goals of care, end of life, and/or chronic disease management discussions and referral to [facility] [phone]) once SNF/HHC complete.   Dr Kesha Hernandez is pt's primary cardiologist.       Rehab Therapies: Physical Therapy, Occupational Therapy, Skilled Nurse, HHA, MSW  Weight Bearing Status/Restrictions: No weight bearing restirctions  Other Medical Equipment (for information only, NOT a DME order):  walker  Other Treatments:     Patient's personal belongings (please select all that are sent with patient):  Dentures upper and lower, Footwear, Pants, Sweater    RN SIGNATURE:  Electronically signed by Melly Cerrato RN on 2/16/21 at 10:11 AM EST    CASE MANAGEMENT/SOCIAL WORK SECTION    Inpatient Status Date: 2/10/21    Readmission Risk Assessment Score:  Readmission Risk              Risk of Unplanned Readmission:        17           Discharging to Facility/ Agency   · Name:  [facility]    · Address: [address] [place] [address], [address] [place] [address]., [place] [address]  · Phone: [phone]  · Fax: [phone]          / signature: Electronically signed by Claire Max on 2/16/2021 at 9:36 AM      PHYSICIAN SECTION    Prognosis: Poor    Condition at Discharge: Stable    Rehab Potential (if transferring to Rehab): Poor    Recommended Labs or Other Treatments After Discharge:     Home with Home care services. I think she will need 24hr care. Family refused placement - please arrange as much assistance as we can for home care. I think she will benefit from ongoing PT efforts. VNS. Home health aid. Home social work services and COA may be helpful as well. Physician Certification: I certify the above information and transfer of Anoop Rivera  is necessary for the continuing treatment of the diagnosis listed and that she requires [facility] for greater 30 days.      Update Admission H&P: No change in H&P    PHYSICIAN SIGNATURE:  Electronically signed by Surjit Jacques MD on 2/14/21 at 9:31 AM EST, addendum Surjit Jacques 2/16/2021 8:37 AM

## 2021-02-12 NOTE — PROGRESS NOTES
Clinical Pharmacy Note  Vancomycin Consult    Sylvia Neely is a 80 y.o. female ordered Vancomycin for UTI; consult received from Dr. Antonella Russ to manage therapy. Also receiving Cefepime. 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 ([facility]    Acute pyelonephritis    Essential hypertension    Current use of long term anticoagulation    Mild malnutrition ([facility]    Fall at home, initial encounter    Acute metabolic encephalopathy    Acute on chronic congestive heart failure (HCC)    Sepsis ([facility]       Allergies:  Chlorpromazine and Hydrochlorothiazide     Temp max:  Temp (24hrs), Av °F (36.7 °C), Min:97.7 °F (36.5 °C), Max:98.4 °F (36.9 °C)      Recent Labs     02/10/21  1643 21  0519 21  0457   WBC 6.0 9.7 9.0       Recent Labs     02/10/21  1643 21  0519 21  0457   BUN 20 18 18   CREATININE 1.0 1.1 1.2         Intake/Output Summary (Last 24 hours) at 2021 1532  Last data filed at 2021 1223  Gross per 24 hour   Intake 2158.8 ml   Output 1350 ml   Net 808.8 ml       Culture Results:  Pending    Ht Readings from Last 1 Encounters:   21 5' 5\" (1.651 m)        Wt Readings from Last 1 Encounters:   21 195 lb 5.2 oz (88.6 kg)         Estimated Creatinine Clearance: 35 mL/min (based on SCr of 1.2 mg/dL). Assessment/Plan:  Vancomycin day #3. Random level today=12.5 ug/mL. Will order Vancomycin 1,000 mg IVPB Q24H at this time. Thank you for the consult. Will continue to follow.     Alley Prater RPh 2021 3:32 PM

## 2021-02-12 NOTE — PROGRESS NOTES
PALLIATIVE MEDICINE PROGRESS NOTE     Patient name:Shima Adorno    XKF:[id number] :1931  Room/Bed:J8P-6074/5123-01    LOS: 2 days        ASSESSMENT/RECOMMENDATIONS   80 y.o. female with AMS started 3 days ago and debilitated.         Symptom Management:  1. AMS- likely related to acute UTI on top of chronic dementia  2. Debility- pt having more difficulty walking the last several weeks  3. Goals of Care- talked to son Naseem Davison regarding code status and [facility]     Patient/Family Goals of Care :    Educated patient and family on CODE STATUS: Although in some cases it does save lives, CPR (cardiopulmonary resuscitation) frequently is not successful or does not benefit those who receive it, especially elderly people or those with serious medical conditions. Even if revived, the person can be left with painful injuries, or in a debilitated state, or with brain damage resulting from oxygen deprivation. Resuscitation can involve such things as drugs, forcefully pressing on the chest, giving electric shocks to restart the heart or placing a tube down the nose or throat to provide artificial breathing. People with terminal illnesses or other serious health conditions may prefer not to be resuscitated.   [address] has established two standardized DNR orders. DNR Comfort Care-Arrest Order you will receive all the appropriate medical treatment, including resuscitation, until the patient has a cardiac arrest (heart has stopped beating) or pulmonary arrest (breathing has stopped), at which point comfort care will be provided. DNR Comfort Care Order [facility]), a patient chooses other measures such as drugs to correct abnormal heart rhythms. With this order, comfort care or other requested treatment is provided at a point before the heart or breathing stops. Comfort care involves keeping the patient comfortable with pain medication and providing palliative (supportive medical) care.  A DNR-CC does not mean do not treat.       Talked to son Aaron Officer he wants pt to be [facility]. He has HCPOA paperwork naming him as her decision maker he will try to find it and bring it in. We discussed downward trajectory of dementia and frequent UTIs each time pt does not return to prior level of function as being the course of the disease. 2/12  Family meeting planned with Aaron Officer at 3pm. Waited until 46 and son did not come for meeting. Pt would benefit from Hospice support at home with her frequent UTIs and dementia. SUBJECTIVE     Chief Complaint: AMS    Last 24 hours:   Pt increasingly confused, not responding and restless. ROS:  Review of Systems -   History obtained from child, the patient and unobtainable from patient due to mental status    Patient unable to complete full ROS due to current cognitive status. Information that is obtained from nursing and chart. OBJECTIVE   /62   Pulse 86   Temp 98.4 °F (36.9 °C) (Axillary)   Resp 18   Ht 5' 5\" (1.651 m)   Wt 195 lb 5.2 oz (88.6 kg)   SpO2 92%   BMI 32.50 kg/m²   I/O last 3 completed shifts: In: 1582.6 [I.V.:1532.6; IV Piggyback:50]  Out: 1700 [Urine:1700]  No intake/output data recorded.       Physical Examination:   General appearance - chronically ill appearing and uncooperative  Mental status - drowsy  Mouth - dry mucous membranes  Neck - supple, no significant adenopathy  Chest - clear to auscultation, no wheezes, rales or rhonchi, symmetric air entry  Abdomen - soft, nontender, nondistended, no masses or organomegaly  Musculoskeletal - full range of motion without pain  Extremities - peripheral pulses normal, no pedal edema, no clubbing or cyanosis          Signed By: Electronically signed by TASHI Rivera CNP on 2/12/2021 at 12:18 PM   Palliative Medicine   [phone]    February 12, 2021

## 2021-02-12 NOTE — PROGRESS NOTES
Occupational Therapy  Facility/Department: [facility] PROGRESSIVE CARE  Daily Treatment Note  NAME: Caleb Apple  : 1931  MRN: [id number]    Date of Service: 2021    Discharge Recommendations:  Continue to assess pending progress, 3-5 sessions per week     Caleb Apple scored a 9/24 on the AM-PAC ADL Inpatient form. Current research shows that an AM-PAC score of 17 or less is typically not associated with a discharge to the patient's home setting. Based on the patient's AM-PAC score and their current ADL deficits, it is recommended that the patient have 3-5 sessions per week of Occupational Therapy at d/c to increase the patient's independence. Please see assessment section for further patient specific details. If patient discharges prior to next session this note will serve as a discharge summary. Please see below for the latest assessment towards goals. Assessment: Discussed with OTR am pac score is 9 which indicates need for continued skilled OT to increase [place] and decrease caregiver burden. Patient is unable to follow directions, dependent for all BADL tasks. Sat on edge of bed for 5 minutes with SBA to CGA. Rolling right and left, sit<>supine dependent. Patient is unable to return home at this time due to assist level requirements. Will continue with POC. Patient Diagnosis(es): The primary encounter diagnosis was Septicemia ([address] Diagnoses of Acute encephalopathy, Complicated urinary tract infection, Acute pulmonary edema ([address] and Bilateral pleural effusion were also pertinent to this visit. has a past medical history of Alzheimer's dementia ([address] Atrial fibrillation ([address] Benign essential HTN, and Gout.   has no past surgical history on file.     Restrictions  Restrictions/Precautions  Restrictions/Precautions: Fall Risk  Position Activity Restriction  Other position/activity restrictions: hx Dementia  Subjective   General  Chart Reviewed: Yes  Patient assessed for rehabilitation services?: Yes  Additional Pertinent Hx: 81 y/o female admitted 2/10/2021 with AMS. Pt being treated for sepsis, acute metabolic encephalopathy likely secondary to Urinary tract infection, afib, and Alzheimer's dementia. Response to previous treatment: Patient with no complaints from previous session  Family / Caregiver Present: No  Referring Practitioner: Dr. Nesha Cox: Pt seen bedside. Pt with eyes closed and does not follow commands.   General Comment  Comments: Per RN ok for therapy      Orientation  Orientation  Overall Orientation Status: Impaired  Orientation Level: Disoriented X4  Objective    ADL  Grooming: Dependent/Total(dependent to brush hair, pushed this writer's hand away when attempting to wash face)  LE Dressing: Dependent/Total(change briefs)  Toileting: Dependent/Total(clean donna area and buttocks)        Balance  Sitting Balance: Contact guard assistance(seated on edge of bed for 5 minutes with SBA to CGA for balance)  Standing Balance: Unable to assess(comment)  Standing Balance  Comment: unsafe to attempt due to decreased cog  Functional Mobility  Functional Mobility Comments: unsafe to attempt due to decreased cog  Bed mobility  Rolling to Left: Dependent/Total;2 Person assistance  Rolling to Right: Dependent/Total;2 Person assistance  Supine to Sit: Dependent/Total;2 Person assistance  Sit to Supine: 2 Person assistance;Dependent/Total  Scooting: Dependent/Total;2 Person assistance  Comment: patient resistant throughout  Transfers  Transfer Comments: unsafe to attempt due to decreased cog, unable to follow directions       Cognition  Overall Cognitive Status: Exceptions  Arousal/Alertness: Inconsistent responses to stimuli  Following Commands: Does not follow commands  Attention Span: Unable to maintain attention  Insights: Not aware of deficits  Initiation: Requires cues for all  Sequencing: Requires cues for all     Assessment   Performance deficits / Co-treatment   Time In 5436         Time Out 1035         Minutes 40               Electronically signed by Marcus Yang, [id number] on 2/12/2021 at 10:38 AM

## 2021-02-12 NOTE — PROGRESS NOTES
[facility] 81   Daily Progress Note      Admit Date:  2/10/2021    CC: Patient remains confused    Subjective:    Patient is restless and confused. Does not appear to have significant shortness of breath and oxygenating well on room air  Objective:   /62   Pulse 86   Temp 98.4 °F (36.9 °C) (Axillary)   Resp 18   Ht 5' 5\" (1.651 m)   Wt 195 lb 5.2 oz (88.6 kg)   SpO2 92%   BMI 32.50 kg/m²       Intake/Output Summary (Last 24 hours) at 2/12/2021 1131  Last data filed at 2/12/2021 0920  Gross per 24 hour   Intake 1582.6 ml   Output 1700 ml   Net -117.4 ml     Wt Readings from Last 3 Encounters:   02/12/21 195 lb 5.2 oz (88.6 kg)   12/08/20 180 lb 8.9 oz (81.9 kg)   06/11/20 198 lb 6.6 oz (90 kg)     Telemetry A fib    Physical Exam:  General: Confused disoriented  Skin:  Warm and dry  Neck:  Supple, no JVP appreciated, no bruit  Chest:  Clear to auscultation, no wheezes/rhonchi/rales  Cardiovascular: Irregularly irregular.  S1S2  Abdomen:  Soft, nontender, +bowel sounds  Extremities:  No LE edema    Cardiac Diagnosis:  hypertension, CHF and atrial fibrillation    Medications:    enoxaparin  1 mg/kg Subcutaneous BID    sodium chloride flush  10 mL Intravenous 2 times per day    vancomycin (VANCOCIN) intermittent dosing (placeholder)   Other RX Placeholder    cefepime  1,000 mg Intravenous Q12H      sodium chloride 50 mL/hr at 02/11/21 1437     metoprolol, sodium chloride flush, potassium chloride, magnesium sulfate, promethazine **OR** ondansetron, magnesium hydroxide, acetaminophen **OR** acetaminophen    Lab Data:  CBC:   Recent Labs     02/10/21  1643 02/11/21  0519 02/12/21  0457   WBC 6.0 9.7 9.0   HGB 12.7 11.8* 12.6   * 131* 125*     BMP:    Recent Labs     02/10/21  1643 02/11/21  0519 02/12/21  0457    140 141   K 4.6 4.8 4.3  4.3   CO2 27 21 21   BUN 20 18 18   CREATININE 1.0 1.1 1.2     LIVR:   Recent Labs     02/10/21  1643   AST 27   ALT 25     INR:  No results for input(s): INR in the last 72 hours. APTT: No results for input(s): APTT in the last 72 hours. BNP:  No results for input(s): BNP in the last 72 hours. Imaging:    Assessment:Plan:  1)Acute on chronic diastolic HF. NYHA class III  -Patient had good diuresis and has lost 8 pounds since yesterday  -She appears compensated from cardiac standpoint  -Since she is unable to take oral medications, keep her on Lasix 20 mg IV daily and switch her to Lasix 40 mg daily when she is able to tolerate p.o. A fib  -Rate is currently controlled  -Currently on therapeutic Lovenox since she is unable to take her oral anticoagulation  -Switch her to Xarelto 15 mg daily when she is able to to tolerate p.o.    UTI  -Being managed by  primary team      We will sign off.   Call us if you have any further cardiac issues      Electronically signed by Monty Riggs MD on 2/12/2021 at 11:31 AM

## 2021-02-12 NOTE — CONSULTS
[address] [facility]                                  CONSULTATION    PATIENT NAME: Lisandro Vargas                       :        1931  MED REC NO:   [id number]                          ROOM:       5  ACCOUNT NO:   [de-identified]                           ADMIT DATE: 02/10/2021  PROVIDER:     Ratna Benjamin MD    CARDIAC CONSULTATION    CONSULT DATE:  2021    HISTORY OF PRESENT ILLNESS:  This is an 51-year-old female with a  history of chronic atrial fibrillation, advanced dementia from Alzheimer  disease, hypertension, who presented to the hospital with further  deterioration of her mental status. According to the patient's son, she  was more confused and was brought to the hospital.  In the emergency  room, her workup indicated urinary tract infection, and also x-ray and  the blood test suggested heart failure which led to this cardiac  consultation. She does not appear to be in significant respiratory  distress, but her chest x-ray and BNP are suggestive of heart failure. REVIEW OF SYSTEMS:  Cannot adequately be assessed due to the patient's  altered mental status. PAST MEDICAL HISTORY:  1. History of atrial fibrillation, chronic, on chronic anticoagulation  therapy. 2.  Hypertension. 3.  History of heart failure. Last echocardiogram from 2020 showed  LVEF of 50%, moderately high; dilated left and right atrium. 4.  History of dementia. 5.  History of gout. SOCIAL HISTORY:  She lives with her son. Denied any alcohol usage or  smoking. FAMILY HISTORY:  Noncontributory. PAST SURGICAL HISTORY:  No surgical history on file. MEDICATIONS:  Have been reviewed. ALLERGIES:  Have been reviewed. PHYSICAL EXAMINATION:  VITAL SIGNS:  Her pulse is 86 and irregular, blood pressure is 118/62,  respirations are 18, oxygen saturation is 92%. CONSTITUTIONAL:  She is confused and nonverbal though she is alert. HEENT:  Her neck is supple. I could not appreciate any jugular venous  distention. No carotid bruits appreciated. No thyromegaly. Eyes show  slightly pale conjunctivae. CARDIAC:  Irregularly irregular heart rhythm. No gallop, murmur, or rub  appreciated. LUNGS:  A few crackles. ABDOMEN:  Soft, nontender. No organomegaly appreciated. EXTREMITIES:  No edema. NEUROLOGICAL:  Examination cannot be performed due to the patient's  current neurological status. LABORATORY DATA:  Her sodium is 140, potassium 4.8, chloride 107, bicarb  21, BUN is 18, creatinine 1.1, glucose is 125. ProBNP was 1846. Troponin less than 0.01. White count 9.7, hemoglobin 11.3, hematocrit  is 37. Blood cultures are pending. Urinalysis raises the possibility  of urinary tract infection. Her chest x-ray was consistent with pulmonary congestion. EKG shows chronic atrial fibrillation. IMPRESSION:  1. An 59-year-old female who has presented with more confused mental  status, likely due to UTI and also advanced dementia. 2.  Chronic diastolic heart failure per BNP and the chest x-ray. The  patient does not seem to be in significant distress. This is secondary  to her AFib, longstanding hypertension. 3.  AFib. Controlled on Eliquis therapy, currently on hold since the  patient is n.p.o.  4.  Advanced dementia. RECOMMENDATION:  1. I will give the patient 1 dose of IV Lasix and follow her I's and  O's, daily weight, electrolytes closely. 2.  We will institute fluid and sodium restriction whenever she is able  to take things orally. 3.  Due to her advanced dementia, no aggressive measurements will be  required at the present time. I appreciate the opportunity to participate in the care of this elderly  female.         Danny Hall MD    D: 02/12/2021 11:53:07       T: 02/12/2021 16:09:21     [email or web address]  Job#: [id number]     Doc#: [id number]    CC:

## 2021-02-13 LAB
ANION GAP SERPL CALCULATED.3IONS-SCNC: 12 MMOL/L (ref 3–16)
BUN BLDV-MCNC: 18 MG/DL (ref 7–20)
CALCIUM SERPL-MCNC: 9.9 MG/DL (ref 8.3–10.6)
CHLORIDE BLD-SCNC: 106 MMOL/L (ref 99–110)
CO2: 25 MMOL/L (ref 21–32)
CREAT SERPL-MCNC: 1.1 MG/DL (ref 0.6–1.2)
GFR AFRICAN AMERICAN: 56
GFR NON-AFRICAN AMERICAN: 47
GLUCOSE BLD-MCNC: 122 MG/DL (ref 70–99)
GLUCOSE BLD-MCNC: 128 MG/DL (ref 70–99)
GLUCOSE BLD-MCNC: 157 MG/DL (ref 70–99)
GLUCOSE BLD-MCNC: 59 MG/DL (ref 70–99)
GLUCOSE BLD-MCNC: 62 MG/DL (ref 70–99)
GLUCOSE BLD-MCNC: 89 MG/DL (ref 70–99)
HCT VFR BLD CALC: 41.5 % (ref 36–48)
HEMOGLOBIN: 13.3 G/DL (ref 12–16)
MAGNESIUM: 1.6 MG/DL (ref 1.8–2.4)
MCH RBC QN AUTO: 27.4 PG (ref 26–34)
MCHC RBC AUTO-ENTMCNC: 32.1 G/DL (ref 31–36)
MCV RBC AUTO: 85.6 FL (ref 80–100)
PDW BLD-RTO: 17.6 % (ref 12.4–15.4)
PERFORMED ON: ABNORMAL
PERFORMED ON: NORMAL
PLATELET # BLD: 104 K/UL (ref 135–450)
PMV BLD AUTO: 10.2 FL (ref 5–10.5)
POTASSIUM REFLEX MAGNESIUM: 3.5 MMOL/L (ref 3.5–5.1)
RBC # BLD: 4.85 M/UL (ref 4–5.2)
SODIUM BLD-SCNC: 143 MMOL/L (ref 136–145)
WBC # BLD: 6.9 K/UL (ref 4–11)

## 2021-02-13 PROCEDURE — 6360000002 HC RX W HCPCS: Performed by: INTERNAL MEDICINE

## 2021-02-13 PROCEDURE — 2580000003 HC RX 258: Performed by: INTERNAL MEDICINE

## 2021-02-13 PROCEDURE — 36415 COLL VENOUS BLD VENIPUNCTURE: CPT

## 2021-02-13 PROCEDURE — 2060000000 HC ICU INTERMEDIATE R&B

## 2021-02-13 PROCEDURE — 97129 THER IVNTJ 1ST 15 MIN: CPT

## 2021-02-13 PROCEDURE — 83735 ASSAY OF MAGNESIUM: CPT

## 2021-02-13 PROCEDURE — 80048 BASIC METABOLIC PNL TOTAL CA: CPT

## 2021-02-13 PROCEDURE — 6370000000 HC RX 637 (ALT 250 FOR IP): Performed by: INTERNAL MEDICINE

## 2021-02-13 PROCEDURE — 92526 ORAL FUNCTION THERAPY: CPT

## 2021-02-13 PROCEDURE — 85027 COMPLETE CBC AUTOMATED: CPT

## 2021-02-13 RX ORDER — DEXTROSE MONOHYDRATE 25 G/50ML
12.5 INJECTION, SOLUTION INTRAVENOUS PRN
Status: DISCONTINUED | OUTPATIENT
Start: 2021-02-13 | End: 2021-02-16 | Stop reason: HOSPADM

## 2021-02-13 RX ORDER — QUETIAPINE FUMARATE 25 MG/1
25 TABLET, FILM COATED ORAL EVERY EVENING
Status: DISCONTINUED | OUTPATIENT
Start: 2021-02-13 | End: 2021-02-15

## 2021-02-13 RX ORDER — DEXTROSE MONOHYDRATE 50 MG/ML
100 INJECTION, SOLUTION INTRAVENOUS PRN
Status: DISCONTINUED | OUTPATIENT
Start: 2021-02-13 | End: 2021-02-16 | Stop reason: HOSPADM

## 2021-02-13 RX ORDER — NICOTINE POLACRILEX 4 MG
15 LOZENGE BUCCAL PRN
Status: DISCONTINUED | OUTPATIENT
Start: 2021-02-13 | End: 2021-02-16 | Stop reason: HOSPADM

## 2021-02-13 RX ADMIN — ENOXAPARIN SODIUM 90 MG: 100 INJECTION SUBCUTANEOUS at 07:52

## 2021-02-13 RX ADMIN — MAGNESIUM SULFATE HEPTAHYDRATE 2000 MG: 40 INJECTION, SOLUTION INTRAVENOUS at 07:53

## 2021-02-13 RX ADMIN — DEXTROSE MONOHYDRATE 12.5 G: 25 INJECTION, SOLUTION INTRAVENOUS at 07:52

## 2021-02-13 RX ADMIN — APIXABAN 2.5 MG: 2.5 TABLET, FILM COATED ORAL at 21:14

## 2021-02-13 RX ADMIN — CEFTRIAXONE 1000 MG: 1 INJECTION, POWDER, FOR SOLUTION INTRAMUSCULAR; INTRAVENOUS at 03:47

## 2021-02-13 RX ADMIN — Medication 10 ML: at 21:14

## 2021-02-13 RX ADMIN — QUETIAPINE FUMARATE 25 MG: 25 TABLET ORAL at 18:22

## 2021-02-13 RX ADMIN — Medication 10 ML: at 07:53

## 2021-02-13 ASSESSMENT — PAIN SCALES - PAIN ASSESSMENT IN ADVANCED DEMENTIA (PAINAD)
BREATHING: 0
FACIALEXPRESSION: 0
CONSOLABILITY: 0
BODYLANGUAGE: 0
NEGVOCALIZATION: 0

## 2021-02-13 NOTE — PROGRESS NOTES
Hospitalist Progress Note      PCP: TASHI Santiago - CNP    Chief Complaint.     Patient is a 78-year-old female with past medical history of atrial fibrillation, Alzheimer's dementia, gout who presents to the hospital for altered mental status. Patient is unable to provide history given condition, according to the patient's chart patient was found more confused today and has been having confusion for the past 3 to 4 days which is actually getting worse. No reports of fever per son, patient recently was treated for urinary tract infection. Unable to obtain review of systems given condition. Date of Admission: 2/10/2021    Subjective:     Awake alert and disoriented. Offers on complaint today. Medications:  Reviewed    Infusion Medications    dextrose       Scheduled Medications    apixaban  2.5 mg Oral BID    QUEtiapine  25 mg Oral QPM    cefTRIAXone (ROCEPHIN) IV  1,000 mg Intravenous Q24H    sodium chloride flush  10 mL Intravenous 2 times per day     PRN Meds: glucose, dextrose, glucagon (rDNA), dextrose, metoprolol, sodium chloride flush, potassium chloride, magnesium sulfate, promethazine **OR** ondansetron, magnesium hydroxide, acetaminophen **OR** acetaminophen      Intake/Output Summary (Last 24 hours) at 2/13/2021 1331  Last data filed at 2/12/2021 2200  Gross per 24 hour   Intake 10 ml   Output 0 ml   Net 10 ml       Physical Exam Performed:    /87   Pulse 71   Temp 94.5 °F (34.7 °C) (Axillary)   Resp 20   Ht 5' 5\" (1.651 m)   Wt 187 lb 9.8 oz (85.1 kg)   SpO2 92%   BMI 31.22 kg/m²     General appearance: Confused and seems frail looking, lying in bed comfortably, confused-moving all over spontaneously  HEENT:  Conjunctivae/corneas clear. Neck: Supple, with full range of motion. Respiratory:  Normal respiratory effort. Clear to auscultation, bilaterally without Rales/Wheezes/Rhonchi.   Cardiovascular: Regular rate and rhythm with normal S1/S2 without murmurs or rubs  Abdomen: Soft, non-tender, non-distended, normal bowel sounds. Musculoskeletal: No cyanosis or edema bilaterally  Neurologic: Alert oriented x0  Psychiatric: Alert and oriented x0  Peripheral Pulses: +2 palpable, equal bilaterally       Labs:   Recent Labs     02/11/21  0519 02/12/21  0457 02/13/21  0447   WBC 9.7 9.0 6.9   HGB 11.8* 12.6 13.3   HCT 37.0 40.3 41.5   * 125* 104*     Recent Labs     02/11/21  0519 02/12/21  0457 02/13/21  0447    141 143   K 4.8 4.3  4.3 3.5    105 106   CO2 21 21 25   BUN 18 18 18   CREATININE 1.1 1.2 1.1   CALCIUM 10.4 10.3 9.9     Recent Labs     02/10/21  1643   AST 27   ALT 25   BILIDIR <0.2   BILITOT 0.9   ALKPHOS 212*     No results for input(s): INR in the last 72 hours. Recent Labs     02/10/21  1643   TROPONINI <0.01       Urinalysis:      Lab Results   Component Value Date    NITRU POSITIVE 02/10/2021    WBCUA 38 02/10/2021    BACTERIA 3+ 02/10/2021    RBCUA 2 02/10/2021    BLOODU Negative 02/10/2021    SPECGRAV 1.014 02/10/2021    GLUCOSEU Negative 02/10/2021       Radiology:  CT HEAD WO CONTRAST   Final Result   No acute intracranial abnormality. 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 None   Final Result   No acute inflammatory abnormality is identified in the abdomen or pelvis. Urinary bladder is contracted and not well evaluated. Hepatic steatosis. Small to moderate-sized hiatal hernia. Small to moderate bilateral pleural effusions with overlying atelectasis. Pneumonia is considered less likely.          XR CHEST PORTABLE   Final Result   Findings suggest congestive heart failure               Assessment/Plan:    Active Hospital Problems    Diagnosis    Sepsis ([facility] [address] [A41.9]          Patient is a 49-year-old female with past medical history of atrial fibrillation, Alzheimer's dementia, gout who presents to the hospital for altered mental status. Patient is unable to provide history given condition, according to the patient's chart patient was found more confused today and has been having confusion for the past 3 to 4 days which is actually getting worse. No reports of fever per son, patient recently was treated for urinary tract infection. Unable to obtain review of systems given condition.        Assessment  Sepsis likely source urine  Acute metabolic encephalopathy likely secondary to Urinary tract infection  Atrial fibrillation on Xarelto  Alzheimer's dementia        Plan  Urine culture positive for E. coli sensitive to Rocephin    Speech therapy consult - advance diet. Consulted palliative care-CODE STATUS changed to DNR CCA    Cardiology was consulted for suspected CHF, pleural effusions-recommends 1 dose of Lasix, patient's Xarelto on hold, bridging with therapeutic dose of   Lovenox      DVT prophylaxis-Lovenox  Diet: DIET DYSPHAGIA PUREED; Dysphagia Pureed;  Extremely Thick (Spoon-Pudding)  Code Status: DNR-CCA    PT/OT Eval Status: ordered    Dispo - likely discharge 2 to 3 days, continue IV antibiotics    Zacarias Callahan MD

## 2021-02-13 NOTE — PLAN OF CARE
Problem: Skin Integrity:  Goal: Will show no infection signs and symptoms  Description: Will show no infection signs and symptoms  Outcome: Ongoing  Goal: Absence of new skin breakdown  Description: Absence of new skin breakdown  Outcome: Ongoing     Problem: Restraint Use - Nonviolent/Non-Self-Destructive Behavior:  Goal: Absence of restraint indications  Description: Absence of restraint indications  Outcome: Ongoing  Goal: Absence of restraint-related injury  Description: Absence of restraint-related injury  Outcome: Ongoing     Problem: Discharge Planning:  Goal: Discharged to appropriate level of care  Description: Discharged to appropriate level of care  Outcome: Ongoing     Problem: Gas Exchange - Impaired:  Goal: Levels of oxygenation will improve  Description: Levels of oxygenation will improve  Outcome: Ongoing     Problem: Infection, Septic Shock:  Goal: Will show no infection signs and symptoms  Description: Will show no infection signs and symptoms  Outcome: Ongoing     Problem: Infection - Ventilator-Associated Pneumonia:  Goal: Absence of pulmonary infection  Description: Absence of pulmonary infection  Outcome: Ongoing     Problem: Serum Glucose Level - Abnormal:  Goal: Ability to maintain appropriate glucose levels will improve  Description: Ability to maintain appropriate glucose levels will improve  Outcome: Ongoing     Problem: Tissue Perfusion, Altered:  Goal: Circulatory function within specified parameters  Description: Circulatory function within specified parameters  Outcome: Ongoing     Problem: Venous Thromboembolism:  Goal: Will show no signs or symptoms of venous thromboembolism  Description: Will show no signs or symptoms of venous thromboembolism  Outcome: Ongoing  Goal: Absence of signs or symptoms of impaired coagulation  Description: Absence of signs or symptoms of impaired coagulation  Outcome: Ongoing     Problem: Falls - Risk of:  Goal: Will remain free from falls  Description: Will remain free from falls  Outcome: Ongoing  Goal: Absence of physical injury  Description: Absence of physical injury  Outcome: Ongoing     Problem: OXYGENATION/RESPIRATORY FUNCTION  Goal: Patient will maintain patent airway  Outcome: Ongoing  Goal: Patient will achieve/maintain normal respiratory rate/effort  Description: Respiratory rate and effort will be within normal limits for the patient  Outcome: Ongoing     Problem: HEMODYNAMIC STATUS  Goal: Patient has stable vital signs and fluid balance  Outcome: Ongoing     Problem: FLUID AND ELECTROLYTE IMBALANCE  Goal: Fluid and electrolyte balance are achieved/maintained  Outcome: Ongoing     Problem: ACTIVITY INTOLERANCE/IMPAIRED MOBILITY  Goal: Mobility/activity is maintained at optimum level for patient  Outcome: Ongoing     Problem: Skin Integrity:  Goal: Will show no infection signs and symptoms  Description: Will show no infection signs and symptoms  Outcome: Ongoing  Goal: Absence of new skin breakdown  Description: Absence of new skin breakdown  Outcome: Ongoing     Problem: Restraint Use - Nonviolent/Non-Self-Destructive Behavior:  Goal: Absence of restraint indications  Description: Absence of restraint indications  Outcome: Ongoing  Goal: Absence of restraint-related injury  Description: Absence of restraint-related injury  Outcome: Ongoing     Problem: Discharge Planning:  Goal: Discharged to appropriate level of care  Description: Discharged to appropriate level of care  Outcome: Ongoing     Problem: Gas Exchange - Impaired:  Goal: Levels of oxygenation will improve  Description: Levels of oxygenation will improve  Outcome: Ongoing     Problem: Infection, Septic Shock:  Goal: Will show no infection signs and symptoms  Description: Will show no infection signs and symptoms  Outcome: Ongoing     Problem: Infection - Ventilator-Associated Pneumonia:  Goal: Absence of pulmonary infection  Description: Absence of pulmonary infection  Outcome: Ongoing

## 2021-02-13 NOTE — PLAN OF CARE
Problem: Skin Integrity:  Goal: Will show no infection signs and symptoms  Description: Will show no infection signs and symptoms  Outcome: Ongoing  Goal: Absence of new skin breakdown  Description: Absence of new skin breakdown  Outcome: Ongoing     Problem: Restraint Use - Nonviolent/Non-Self-Destructive Behavior:  Goal: Absence of restraint indications  Description: Absence of restraint indications  Outcome: Ongoing  Goal: Absence of restraint-related injury  Description: Absence of restraint-related injury  Outcome: Ongoing

## 2021-02-13 NOTE — PROGRESS NOTES
[facility]   Speech Therapy  Daily Dysphagia Treatment Note    Mike Trinidad  AGE: 80 y.o. GENDER: female  : 1931  [id number]  EPISODE DATE:  2/10/2021     Patient Active Problem List   Diagnosis    Acute on chronic combined systolic and diastolic CHF, NYHA class 3 ([address]    Dementia in Alzheimer's disease with delirium ([address]    Acute pyelonephritis    Essential hypertension    Current use of long term anticoagulation    Mild malnutrition ([address]    Fall at home, initial encounter    Acute metabolic encephalopathy    Acute on chronic congestive heart failure (HCC)    Sepsis ([address]     Allergies   Allergen Reactions    Chlorpromazine Other (See Comments)    Hydrochlorothiazide Other (See Comments)     Gout     Treatment Diagnosis: Dysphagia     Chart review:   2/10/2021 admitted with confusion:  ADMISSION H&P HPI:  Patient is a 69-year-old female with past medical history of atrial fibrillation, Alzheimer's dementia, gout who presents to the hospital for altered mental status.  Patient is unable to provide history given condition, according to the patient's chart patient was found more confused today and has been having confusion for the past 3 to 4 days which is actually getting worse. No reports of fever per son, patient recently was treated for urinary tract infection.  Unable to obtain review of systems given condition     2/10/2021 CXR:  Findings suggest congestive heart failure    Subjective:     Current diet  Current Diet : NPO  Current Liquid Diet : NPO    Comments regarding tolerating Current Diet:   RN reports pt did arouse appropriately to name this AM and said 'Hi'      Objective:     Pain   Patient Currently in Pain: No    Cognitive/Behavior   Behavior/Cognition: Confused, poor attention, Doesn't follow directions    Presentations   Consistencies Administered: Honey - teaspoon, cup; puree    Positioning   Upright in bed    PO Trials:  · Thin Liquids DNT  · Nectar thick liquids DNT  · Honey Thick liquids tsp x1, cup x5: functional lip rounding for bolus removal from tsp and cup; decreased lingual coordination; decreased bolus control; decreased AP transit with high suspicion for premature bolus loss; delayed swallow; decreased laryngeal elevation; inconsistent cough; multiple swallows   · Puree 4oz: functional lip rounding for bolus removal from tsp; decreased lingual coordination; decreased bolus control; decreased AP transit with suspicion for premature bolus loss; delayed swallow; decreased laryngeal elevation; no immediate or delayed overt s/s of aspiration  · Soft food DNT  · Regular food DNT    Dysphagia Tx:   Pt asleep upon arrival but arouses and maintains alertness and attention for safe PO trials. Nonverbal but follows procedural commands for PO. PO trials: tolerated 4oz applesauce without immediate or delayed overt s/s of aspiration; no cueing necessary for task recognition or swallow initiation. Honey liquid trials via tsp and cup resulted in inconsistent weak cough. Goals:   Dysphagia Goals: The patient will tolerate repeat BSE when able.  met; recommend initiating puree/pudding liquids  New Goals: The pt will tolerate recommended diet without overt s/s of aspiration  The pt will tolerate honey liquids 10/10 via cup without overt s/s   The pt will tolerate minced/moist texture 10/10 without difficulty    Assessment:   Impressions:   Dysphagia Diagnosis: Severe oral stage dysphagia, Suspected needs further assessment   · Accepted and tolerated bedside treatment feed/PO reassessment. No meaningful verbalizations but follows simple procedural commands for PO acceptance.    · Moderate oral stage dysphagia characterized by functional lip rounding for bolus removal from tsp and cup; decreased lingual coordination; decreased bolus control; decreased AP transit with high suspicion for premature bolus loss  · Moderate pharyngeal phase dysphagia characterized by delayed swallow; decreased laryngeal elevation; inconsistent cough with moderately honey thick liquids; multiple swallows with all; no overt s/s with 4oz puree. · Recommend initiating puree diet texture with spoon/pudding thick liquids; only feed when awake/alert and accepting of PO. Diet Recommendations:  Initiate puree diet texture with spoon/pudding thick liquids  Recommended Form of Meds: crushed in puree    Strategies:    routine oral care; total feed; fully upright during and 30 min after PO intake; small bites/sips    Education:  Consulted and agree with results and recommendations: Patient, RN, Family member  Patient Education: Attempted on resutls/recs/plan  Patient Education Response: No evidence of learning    Prognosis:   guarded for dysphagia d/t severity of imps and comorbidities    Plan:     Continue Dysphagia Therapy: YES  Interventions: Therapeutic Interventions: Therapeutic PO trials with SLP, Diet tolerance monitoring, Patient/Family education  Duration/Frequency of therapy while on unit: Duration/Frequency of Treatment  Duration/Frequency of Treatment: ST to tx 3-5 times per week during acute admission  Discharge Instructions:   Anticipate potential need for further skilled Speech Therapy for Dysphagia at discharge    This note serves as a D/C Summary in the event that this patient is discharged prior to the next therapy session.     Coded treatment time: 10  Total treatment time: 25    Electronically signed by  Kanu Salas MS, CCC-SLP #[id number]  Speech Language Pathologist   on 2/13/2021 at 5AM

## 2021-02-14 LAB
ANION GAP SERPL CALCULATED.3IONS-SCNC: 8 MMOL/L (ref 3–16)
BLOOD CULTURE, ROUTINE: NORMAL
BUN BLDV-MCNC: 25 MG/DL (ref 7–20)
CALCIUM SERPL-MCNC: 9.3 MG/DL (ref 8.3–10.6)
CHLORIDE BLD-SCNC: 109 MMOL/L (ref 99–110)
CO2: 27 MMOL/L (ref 21–32)
CREAT SERPL-MCNC: 1 MG/DL (ref 0.6–1.2)
CULTURE, BLOOD 2: NORMAL
GFR AFRICAN AMERICAN: >60
GFR NON-AFRICAN AMERICAN: 52
GLUCOSE BLD-MCNC: 129 MG/DL (ref 70–99)
GLUCOSE BLD-MCNC: 142 MG/DL (ref 70–99)
GLUCOSE BLD-MCNC: 68 MG/DL (ref 70–99)
GLUCOSE BLD-MCNC: 75 MG/DL (ref 70–99)
GLUCOSE BLD-MCNC: 78 MG/DL (ref 70–99)
GLUCOSE BLD-MCNC: 81 MG/DL (ref 70–99)
GLUCOSE BLD-MCNC: 89 MG/DL (ref 70–99)
HCT VFR BLD CALC: 38.9 % (ref 36–48)
HEMOGLOBIN: 12.5 G/DL (ref 12–16)
MAGNESIUM: 2 MG/DL (ref 1.8–2.4)
MCH RBC QN AUTO: 27.2 PG (ref 26–34)
MCHC RBC AUTO-ENTMCNC: 32 G/DL (ref 31–36)
MCV RBC AUTO: 85.2 FL (ref 80–100)
PDW BLD-RTO: 18 % (ref 12.4–15.4)
PERFORMED ON: ABNORMAL
PERFORMED ON: NORMAL
PLATELET # BLD: 108 K/UL (ref 135–450)
PMV BLD AUTO: 9.9 FL (ref 5–10.5)
POTASSIUM REFLEX MAGNESIUM: 3.3 MMOL/L (ref 3.5–5.1)
POTASSIUM SERPL-SCNC: 3.9 MMOL/L (ref 3.5–5.1)
RBC # BLD: 4.57 M/UL (ref 4–5.2)
SODIUM BLD-SCNC: 144 MMOL/L (ref 136–145)
WBC # BLD: 6.6 K/UL (ref 4–11)

## 2021-02-14 PROCEDURE — 84132 ASSAY OF SERUM POTASSIUM: CPT

## 2021-02-14 PROCEDURE — 2060000000 HC ICU INTERMEDIATE R&B

## 2021-02-14 PROCEDURE — 6360000002 HC RX W HCPCS: Performed by: INTERNAL MEDICINE

## 2021-02-14 PROCEDURE — 6370000000 HC RX 637 (ALT 250 FOR IP): Performed by: INTERNAL MEDICINE

## 2021-02-14 PROCEDURE — 36415 COLL VENOUS BLD VENIPUNCTURE: CPT

## 2021-02-14 PROCEDURE — 85027 COMPLETE CBC AUTOMATED: CPT

## 2021-02-14 PROCEDURE — 80048 BASIC METABOLIC PNL TOTAL CA: CPT

## 2021-02-14 PROCEDURE — 83735 ASSAY OF MAGNESIUM: CPT

## 2021-02-14 PROCEDURE — 2580000003 HC RX 258: Performed by: INTERNAL MEDICINE

## 2021-02-14 RX ORDER — CEPHALEXIN 500 MG/1
500 CAPSULE ORAL 3 TIMES DAILY
Qty: 15 CAPSULE | Refills: 0 | Status: SHIPPED | OUTPATIENT
Start: 2021-02-14 | End: 2021-02-16 | Stop reason: SDUPTHER

## 2021-02-14 RX ORDER — QUETIAPINE FUMARATE 25 MG/1
25 TABLET, FILM COATED ORAL EVERY EVENING
Qty: 60 TABLET | Refills: 3 | Status: SHIPPED | OUTPATIENT
Start: 2021-02-14 | End: 2021-02-16

## 2021-02-14 RX ADMIN — POTASSIUM CHLORIDE 10 MEQ: 7.46 INJECTION, SOLUTION INTRAVENOUS at 07:51

## 2021-02-14 RX ADMIN — CEFTRIAXONE 1000 MG: 1 INJECTION, POWDER, FOR SOLUTION INTRAMUSCULAR; INTRAVENOUS at 04:18

## 2021-02-14 RX ADMIN — APIXABAN 2.5 MG: 2.5 TABLET, FILM COATED ORAL at 12:05

## 2021-02-14 RX ADMIN — POTASSIUM CHLORIDE 10 MEQ: 7.46 INJECTION, SOLUTION INTRAVENOUS at 12:05

## 2021-02-14 RX ADMIN — Medication 10 ML: at 21:00

## 2021-02-14 RX ADMIN — Medication 10 ML: at 08:05

## 2021-02-14 RX ADMIN — POTASSIUM CHLORIDE 10 MEQ: 7.46 INJECTION, SOLUTION INTRAVENOUS at 13:18

## 2021-02-14 RX ADMIN — POTASSIUM CHLORIDE 10 MEQ: 7.46 INJECTION, SOLUTION INTRAVENOUS at 10:25

## 2021-02-14 RX ADMIN — APIXABAN 2.5 MG: 2.5 TABLET, FILM COATED ORAL at 20:59

## 2021-02-14 RX ADMIN — DEXTROSE MONOHYDRATE 12.5 G: 25 INJECTION, SOLUTION INTRAVENOUS at 08:05

## 2021-02-14 ASSESSMENT — PAIN SCALES - PAIN ASSESSMENT IN ADVANCED DEMENTIA (PAINAD)
TOTALSCORE: 0
BODYLANGUAGE: 0
NEGVOCALIZATION: 0
CONSOLABILITY: 0

## 2021-02-14 NOTE — CARE COORDINATION
02/14/21 1436   Documentation for Discharge Appeal   Discharge Appealed by Family   Date notifed by FARZANEH of appeal request: 02/14/21   Time notified by Ruby Albert of appeal request: [phone]   Detailed Notice of Discharge given to: Family   Date Notice of Discharge given: 02/14/21   Time Notice of Discharge given: [phone]   Date records sent to Ruby Albert 02/14/21   Time records sent to [facility] appeal documents submitted and saved per protocol. Appeal #[id number].    Electronically signed by Sonal Garcia RN  Case Management on 2/14/2021 at 2:37 PM

## 2021-02-14 NOTE — PROGRESS NOTES
[A41.9]          Patient is a 51-year-old female with past medical history of atrial fibrillation, End Stage Alzheimer's dementia, gout who presents to the hospital for altered mental status. Patient is unable to provide history given condition, according to the patient's chart patient was found more confused today and has been having confusion for the past 3 to 4 days which is actually getting worse. No reports of fever per son, patient recently was treated for urinary tract infection. Unable to obtain review of systems given condition.        Assessment  Sepsis likely source - urine - sepsis now resolved. Acute metabolic encephalopathy - in setting of advanced Alzheimer's Dementia. Possibly due to Urinary tract infection, though I am concerned about progressing dementia. Atrial fibrillation on Xarelto  Alzheimer's dementia - severe end stage.         Plan  Urine culture positive for E. coli sensitive to Rocephin    Speech therapy consult - advance diet. Consulted palliative care - CODE STATUS changed to DNR CCA    Cardiology was consulted for suspected CHF, pleural effusions-recommends 1 dose of Lasix, patient's Xarelto on hold, bridging with therapeutic dose of   Lovenox      DVT prophylaxis - Lovenox to eliquis BID  Diet: DIET DYSPHAGIA PUREED; Dysphagia Pureed; Extremely Thick (Spoon-Pudding)  Code Status: DNR-CCA      PT/OT Eval Status: ordered      Dispo - she is medically stable for discharge. Son appealed discharge.        Tarun Marti MD

## 2021-02-14 NOTE — CARE COORDINATION
SW received phone call from [facility] regarding appeal. It has been filed. Appeal number is [id number]. They will fax us momentarily regarding instructions on how to submit paperwork for their review. Respectfully submitted,    ZECHARIAH Guillory  [facility]   [phone]    Electronically signed by ZECHARIAH Singh on 2/14/2021 at 12:00 PM

## 2021-02-14 NOTE — CARE COORDINATION
Discharge noted. Spoke to patient's son Ricky Marco about PT/OT recs for SNF. He initially mentioned [address] at discharge but if not visitation would want to bring her home vs going to SNF. Discussed that no nursing facilities are allowing in person visitation at this time. He feels like this discharge is being brought on too soon. Wants to speak with physician in person. He will be here around noon. Messaged Dr Mary Blair to see if he could speak with Ricky Cordovao around then. MD says he'll talk with son then.  Electronically signed by Merlinda Endow, RN Case Management [phone] on 2/14/2021 at 9:44 AM

## 2021-02-14 NOTE — CARE COORDINATION
SW received phone call back from patient's son stating that he was uncomfortable with discharge to home today. He stated that he felt mom needs more time to recover at the hospital.     He stated that he thought about SNF placment at [facility] ([facility]), however, visitors are not allowed so he really doesn't want her to go there. He stated that he did want us to know that supervision can be provided in the home setting and she used to work with [employer] in the summer of 2020. He stated that he would be comfortable with her going back to them for PT/OT needs. SW asked about equipment needs and he stated that there aren't any at this time. He stated that she has most everything she needs at home. SW transferred son to Medicare appeal line so he could start the process and thanked him for calling in. We will follow up as more info comes available regarding the care of the patient. Respectfully submitted,    ZECHARIAH Calles  [facility]   [phone]    Electronically signed by ZECHARIAH Ford on 2/14/2021 at 11:13 AM

## 2021-02-15 LAB
ANION GAP SERPL CALCULATED.3IONS-SCNC: 8 MMOL/L (ref 3–16)
BUN BLDV-MCNC: 26 MG/DL (ref 7–20)
CALCIUM SERPL-MCNC: 9.5 MG/DL (ref 8.3–10.6)
CHLORIDE BLD-SCNC: 111 MMOL/L (ref 99–110)
CO2: 25 MMOL/L (ref 21–32)
CREAT SERPL-MCNC: 0.9 MG/DL (ref 0.6–1.2)
GFR AFRICAN AMERICAN: >60
GFR NON-AFRICAN AMERICAN: 59
GLUCOSE BLD-MCNC: 100 MG/DL (ref 70–99)
GLUCOSE BLD-MCNC: 91 MG/DL (ref 70–99)
GLUCOSE BLD-MCNC: 93 MG/DL (ref 70–99)
GLUCOSE BLD-MCNC: 96 MG/DL (ref 70–99)
HCT VFR BLD CALC: 39.4 % (ref 36–48)
HEMOGLOBIN: 12.6 G/DL (ref 12–16)
MCH RBC QN AUTO: 27.4 PG (ref 26–34)
MCHC RBC AUTO-ENTMCNC: 32 G/DL (ref 31–36)
MCV RBC AUTO: 85.5 FL (ref 80–100)
PDW BLD-RTO: 18.5 % (ref 12.4–15.4)
PERFORMED ON: ABNORMAL
PERFORMED ON: NORMAL
PERFORMED ON: NORMAL
PLATELET # BLD: 123 K/UL (ref 135–450)
PMV BLD AUTO: 9.8 FL (ref 5–10.5)
POTASSIUM REFLEX MAGNESIUM: 3.9 MMOL/L (ref 3.5–5.1)
RBC # BLD: 4.61 M/UL (ref 4–5.2)
SODIUM BLD-SCNC: 144 MMOL/L (ref 136–145)
WBC # BLD: 6.4 K/UL (ref 4–11)

## 2021-02-15 PROCEDURE — 2580000003 HC RX 258: Performed by: INTERNAL MEDICINE

## 2021-02-15 PROCEDURE — 97535 SELF CARE MNGMENT TRAINING: CPT

## 2021-02-15 PROCEDURE — 36415 COLL VENOUS BLD VENIPUNCTURE: CPT

## 2021-02-15 PROCEDURE — 80048 BASIC METABOLIC PNL TOTAL CA: CPT

## 2021-02-15 PROCEDURE — 99232 SBSQ HOSP IP/OBS MODERATE 35: CPT | Performed by: NURSE PRACTITIONER

## 2021-02-15 PROCEDURE — 6360000002 HC RX W HCPCS: Performed by: INTERNAL MEDICINE

## 2021-02-15 PROCEDURE — 97129 THER IVNTJ 1ST 15 MIN: CPT

## 2021-02-15 PROCEDURE — 92526 ORAL FUNCTION THERAPY: CPT

## 2021-02-15 PROCEDURE — 2060000000 HC ICU INTERMEDIATE R&B

## 2021-02-15 PROCEDURE — 94760 N-INVAS EAR/PLS OXIMETRY 1: CPT

## 2021-02-15 PROCEDURE — 97530 THERAPEUTIC ACTIVITIES: CPT | Performed by: PHYSICAL THERAPIST

## 2021-02-15 PROCEDURE — 85027 COMPLETE CBC AUTOMATED: CPT

## 2021-02-15 PROCEDURE — 97530 THERAPEUTIC ACTIVITIES: CPT

## 2021-02-15 PROCEDURE — 97116 GAIT TRAINING THERAPY: CPT | Performed by: PHYSICAL THERAPIST

## 2021-02-15 PROCEDURE — 6370000000 HC RX 637 (ALT 250 FOR IP): Performed by: INTERNAL MEDICINE

## 2021-02-15 RX ORDER — QUETIAPINE FUMARATE 25 MG/1
25 TABLET, FILM COATED ORAL NIGHTLY PRN
Status: DISCONTINUED | OUTPATIENT
Start: 2021-02-15 | End: 2021-02-16 | Stop reason: HOSPADM

## 2021-02-15 RX ADMIN — CEFTRIAXONE 1000 MG: 1 INJECTION, POWDER, FOR SOLUTION INTRAMUSCULAR; INTRAVENOUS at 04:48

## 2021-02-15 RX ADMIN — Medication 10 ML: at 20:45

## 2021-02-15 RX ADMIN — APIXABAN 2.5 MG: 2.5 TABLET, FILM COATED ORAL at 20:45

## 2021-02-15 ASSESSMENT — PAIN SCALES - PAIN ASSESSMENT IN ADVANCED DEMENTIA (PAINAD)
BREATHING: 0
FACIALEXPRESSION: 0
BODYLANGUAGE: 0

## 2021-02-15 NOTE — PROGRESS NOTES
Pt in bed, sitting. Vitals, shift assessment and medications completed. Pt refusing to take PO eliquis at this time. Pt not answering questions or following commands. Pt remains in bed, in lowest position, call light within reach and bed alarm on. Speech in room to evaluate.

## 2021-02-15 NOTE — PROGRESS NOTES
RN updated Son Angel Reed) on pt's care. Son expressed that he wanted to change attending physicians. He also does not want seroquel to be given to his mother, but rather is requesting depakote. Dr. Edi Mcclellan MD messaged about son's wishes. Awaiting response.

## 2021-02-15 NOTE — CARE COORDINATION
[facility] decision received via fax agreeing that patient no longer needs to be inpatient. Services are no longer covered beginning 2/16/2021 at noon. Spoke with patient's son, Clancy Osler, they were not aware of the decision. They requested transportation established for tomorrow afternoon.      Gerardo Petersen RN, BSN, Case Management  Phone: [phone]  Electronically signed by Gerardo Petersen RN on 2/15/2021 at 4:11 PM

## 2021-02-15 NOTE — PROGRESS NOTES
PALLIATIVE MEDICINE PROGRESS NOTE     Patient name:Shima Adorno    SIB:[id number] :1931  Room/Bed:H2F-0744/5123-01    LOS: 5 days        ASSESSMENT/RECOMMENDATIONS     80 y.o. female with AMS started 3 days ago and debilitated.         Symptom Management:  1. AMS- likely related to acute UTI on top of chronic dementia  2. Debility- pt having more difficulty walking the last several weeks  3. Goals of Care- talked to anna Starr regarding code status and [facility]     Patient/Family Goals of Care :    Educated patient and family on CODE STATUS: Although in some cases it does save lives, CPR (cardiopulmonary resuscitation) frequently is not successful or does not benefit those who receive it, especially elderly people or those with serious medical conditions. Even if revived, the person can be left with painful injuries, or in a debilitated state, or with brain damage resulting from oxygen deprivation. Resuscitation can involve such things as drugs, forcefully pressing on the chest, giving electric shocks to restart the heart or placing a tube down the nose or throat to provide artificial breathing. People with terminal illnesses or other serious health conditions may prefer not to be resuscitated.   [address] has established two standardized DNR orders. DNR Comfort Care-Arrest Order you will receive all the appropriate medical treatment, including resuscitation, until the patient has a cardiac arrest (heart has stopped beating) or pulmonary arrest (breathing has stopped), at which point comfort care will be provided. DNR Comfort Care Order [facility]), a patient chooses other measures such as drugs to correct abnormal heart rhythms. With this order, comfort care or other requested treatment is provided at a point before the heart or breathing stops. Comfort care involves keeping the patient comfortable with pain medication and providing palliative (supportive medical) care.  A DNR-CC does not mean do not treat.       Talked to son Hair Amaya he wants pt to be [facility]. He has HCPOA paperwork naming him as her decision maker he will try to find it and bring it in. We discussed downward trajectory of dementia and frequent UTIs each time pt does not return to prior level of function as being the course of the disease. 2/15  Left message for Hair Amaya that Will be asking out patient palliative care to get involved to help monitor for UTIs and manage her care at home.      Disposition/Discharge Plan:   Referral to outpt [facility] faxed to follow-up with pt at home.      Advance Directives:  · Surrogate Decision Maker: Izabella Castanonkirill  · Code status:  DNR-CCA     Case discussed with: patient, floor RN  Thank you for allowing us to participate in the care of this patient. SUBJECTIVE     Chief Complaint: AMS    Last 24 hours:   Pt is more awake and alert today. Attempted to call Hair Amaya with update had to leave a message. ROS:  Review of Systems -   History obtained from child, the patient and unobtainable from patient due to mental status  Patient unable to complete full ROS due to current cognitive status. Information that is obtained from nursing and chart. OBJECTIVE   BP (!) 95/58   Pulse 62   Temp 97.5 °F (36.4 °C) (Oral)   Resp 18   Ht 5' 5\" (1.651 m)   Wt 190 lb 0.6 oz (86.2 kg)   SpO2 93%   BMI 31.62 kg/m²   I/O last 3 completed shifts: In: 410 [P.O.:10; IV Piggyback:400]  Out: 200 [Urine:200]  No intake/output data recorded.       Physical Examination:  General appearance - chronically ill appearing and uncooperative  Mental status - drowsy  Mouth - dry mucous membranes  Neck - supple, no significant adenopathy  Chest - clear to auscultation, no wheezes, rales or rhonchi, symmetric air entry  Abdomen - soft, nontender, nondistended, no masses or organomegaly  Musculoskeletal - full range of motion without pain  Extremities - peripheral pulses normal, no pedal edema, no clubbing or cyanosis Signed By: Electronically signed by TASHI Christine CNP on 2/15/2021 at 10:51 AM   Palliative Medicine   [phone]    February 15, 2021

## 2021-02-15 NOTE — PROGRESS NOTES
[facility]   Speech Therapy  Daily Dysphagia Treatment Note    Danny Cee  AGE: 80 y.o. GENDER: female  : 1931  [id number]  EPISODE DATE:  2/10/2021     Patient Active Problem List   Diagnosis    Acute on chronic combined systolic and diastolic CHF, NYHA class 3 ([address]    Dementia in Alzheimer's disease with delirium ([address]    Acute pyelonephritis    Essential hypertension    Current use of long term anticoagulation    Mild malnutrition ([address]    Fall at home, initial encounter    Acute metabolic encephalopathy    Acute on chronic congestive heart failure (HCC)    Sepsis ([address]     Allergies   Allergen Reactions    Chlorpromazine Other (See Comments)    Hydrochlorothiazide Other (See Comments)     Gout     Treatment Diagnosis: Dysphagia     Chart review:   2/10/2021 admitted with confusion:  ADMISSION H&P HPI:  Patient is a 80-year-old female with past medical history of atrial fibrillation, Alzheimer's dementia, gout who presents to the hospital for altered mental status.  Patient is unable to provide history given condition, according to the patient's chart patient was found more confused today and has been having confusion for the past 3 to 4 days which is actually getting worse. No reports of fever per son, patient recently was treated for urinary tract infection.  Unable to obtain review of systems given condition     2/10/2021 CXR:  Findings suggest congestive heart failure    Subjective:     Current diet  Current Diet : NPO  Current Liquid Diet : NPO    Comments regarding tolerating Current Diet:   RN reports pt did arouse appropriately to name this AM and said 'Hi'      Objective:     Pain   Patient Currently in Pain: No    Cognitive/Behavior   Behavior/Cognition: Confused, poor attention, Doesn't follow directions    Presentations   Consistencies Administered: Honey - teaspoon, cup; puree    Positioning   Upright in bed    PO Trials:  · Thin Liquids - straw:  decreased bolus control; decreased AP transit with high suspicion for premature bolus loss; delayed swallow; decreased laryngeal elevation; no overt sign/symptoms of penetration/aspiration  · Nectar thick liquids - straw:  decreased bolus control; decreased AP transit with high suspicion for premature bolus loss; delayed swallow; decreased laryngeal elevation; no overt sign/symptoms of penetration/aspiration  · Honey Thick liquids - straw: decreased bolus control; decreased AP transit with high suspicion for premature bolus loss; delayed swallow; decreased laryngeal elevation; no overt sign/symptoms of penetration/aspiration  · Puree:  decreased bolus control; decreased AP transit with high suspicion for premature bolus loss; delayed swallow; decreased laryngeal elevation; no overt sign/symptoms of penetration/aspiration  · Moist food: prolonged but adequate mastication; decreased bolus control; decreased AP transit with high suspicion for premature bolus loss; delayed swallow; decreased laryngeal elevation; no overt sign/symptoms of penetration/aspiration  · Soft food: prolonged, labored mastication; decreased bolus control; decreased AP transit with high suspicion for premature bolus loss; delayed swallow; decreased laryngeal elevation; no overt sign/symptoms of penetration/aspiration  · Regular food: DNT    Dysphagia Tx:   Patient alert and agreeable to PO trials when presented with opportunity for self-administration. Noted to prefer to self-administer via straw as patient noted to not initiate self-feeding when given cup only  PO trials: appears safe to consider upgrade to minced/thin; will continue monitor for tolerance and continued upgrade readiness. Goals:   Dysphagia Goals: The patient will tolerate repeat BSE when able.   met  The pt will tolerate recommended diet without overt s/s of aspiration met/maintain  The pt will tolerate honey liquids 10/10 via cup without overt s/s met/ exceeded - advance goal to thin  The pt will tolerate minced/moist texture 10/10 without difficulty  met - advance goal to soft    Assessment:   Impressions:   Dysphagia Diagnosis: Moderate oral stage dysphagia, Mild pharyngeal stage dysphagia  · Accepted and tolerated bedside treatment feed/PO reassessment. Occasional meaningful verbalizations but follows simple procedural commands for PO acceptance. Noted to be most agreeable to self-feeding PO trials with preference for liquids via straw. · Moderate oral stage dysphagia characterized by decreased mastication; decreased lingual coordination; decreased bolus control; decreased AP transit with high suspicion for premature bolus loss. Concern for excess labor with soft solid prep. · Mild pharyngeal phase dysphagia characterized by delayed swallow; decreased laryngeal elevation; no overt signs/symptoms of penetration/aspiration  · Dysphagia status appears to be improving/resolving as cog status improves.     Diet Recommendations:  Upgrade to minced/moist  Upgrade to thin  Recommended Form of Meds: crushed in pudding  Strategies: fully upright during and 30 min after PO intake; small bites/sips    Education:  Consulted and agree with results and recommendations: Patient, RN, Family member  Patient Education: Attempted on resutls/recs/plan  Patient Education Response: No evidence of learning    Prognosis:   Good for dysphagia as cognitive status continues to improve    Plan:     Continue Dysphagia Therapy: YES  Interventions: Therapeutic Interventions: Therapeutic PO trials with SLP, Diet tolerance monitoring, Patient/Family education  Duration/Frequency of therapy while on unit: Duration/Frequency of Treatment  Duration/Frequency of Treatment: ST to tx 3-5 times per week during acute admission  Discharge Instructions:   Anticipate potential need for further skilled Speech Therapy for Dysphagia at discharge    This note serves as a D/C Summary in the event that this patient is discharged prior to the next therapy session. Coded treatment time: 15  Total treatment time: 45    Electronically signed by  Ginna Goldberg.  Jairo Barr, [id number] [facility], [id number]  Speech-Language Pathologist  on 2/15/2021 at 9:55 AM

## 2021-02-15 NOTE — PROGRESS NOTES
Occupational Therapy  Facility/Department: [facility] 5W PROGRESSIVE CARE  Daily Treatment Note  NAME: Yulissa Boland  : 1931  MRN: [id number]    Date of Service: 2/15/2021    Discharge Recommendations:  Continue to assess pending progress, 3-5 sessions per week  OT Equipment Recommendations  Other: will continue to assess    Yulissa Boland scored a  on the AM-PAC ADL Inpatient form. Current research shows that an AM-PAC score of 17 or less is typically not associated with a discharge to the patient's home setting. Based on the patient's AM-PAC score and their current ADL deficits, it is recommended that the patient have 3-5 sessions per week of Occupational Therapy at d/c to increase the patient's independence. Please see assessment section for further patient specific details. If patient discharges prior to next session this note will serve as a discharge summary. Please see below for the latest assessment towards goals. Assessment   Performance deficits / Impairments: Decreased functional mobility ; Decreased endurance;Decreased strength;Decreased ADL status; Decreased safe awareness;Decreased high-level IADLs;Decreased balance;Decreased cognition  Assessment: pt is an 81 yo female who was adm to [facility] with complicated UTI, sepsis and metabolic encephalopathy. At baseline pt has dementia but lives with son who is able to provide assist pt needs. Pt is very [place] and inconsistently follows commands. Today, pt required min/mod A x 2 to stand and ambulate to bathroom with RW. Pt incontinent of stool and required 2 assist to cleanse 2/2 posterior lean when standing. pt is functioning below baseline and benefit from skilled therapy.   Prognosis: Fair  OT Education: OT Role;Transfer Training;Plan of Care  Barriers to Learning: dementia/cognition  REQUIRES OT FOLLOW UP: Yes  Activity Tolerance  Activity Tolerance: Treatment limited secondary to decreased cognition  Safety Devices  Safety Devices in place: Yes  Type of devices: Nurse notified;Call light within reach; Left in chair;Chair alarm in place;Gait belt         Patient Diagnosis(es): The primary encounter diagnosis was Septicemia ([facility] Diagnoses of Acute encephalopathy, Complicated urinary tract infection, Acute pulmonary edema ([facility] and Bilateral pleural effusion were also pertinent to this visit. has a past medical history of Alzheimer's dementia ([facility] Atrial fibrillation ([facility] Benign essential HTN, and Gout.   has no past surgical history on file. Restrictions  Restrictions/Precautions  Restrictions/Precautions: Fall Risk  Position Activity Restriction  Other position/activity restrictions: hx Dementia, very [place]     Subjective   General  Chart Reviewed: Yes  Patient assessed for rehabilitation services?: Yes  Additional Pertinent Hx: 79 y/o female admitted 2/10/2021 with AMS. Pt being treated for sepsis, acute metabolic encephalopathy likely secondary to Urinary tract infection, afib, and Alzheimer's dementia. Response to previous treatment: Patient with no complaints from previous session  Family / Caregiver Present: No  Referring Practitioner: Dr. Cathleen Vázquez: Pt seen bedside. pt very [place]; she will follow simple commands if she can hear well enough  General Comment  Comments: Per RN ok for therapy      Orientation  Orientation  Orientation Level: Disoriented X4     Objective    ADL  Toileting: Dependent/Total(incontinent of stool- 2 assist to cleanse and change depends d/t posterior lean standing)        Balance  Standing Balance: Moderate assistance  Standing Balance  Time: stood ~3-4 for hygiene 2x. Posterior lean  Functional Mobility  Functional Mobility Comments: few steps bed > chair. Chair <> bathroom with Rw and min/mod A x 2  Toilet Transfers  Toilet - Technique: Ambulating  Equipment Used: Standard toilet  Toilet Transfer:  Moderate assistance;2 Person assistance  Toilet Transfers Comments: posterior lean when standing     Bed mobility  Supine to Sit: Moderate assistance  Sit to Supine: Moderate assistance     Transfers  Sit to stand: Minimal assistance; Moderate assistance;2 Person assistance  Stand to sit: Minimal assistance; Moderate assistance;2 Person assistance  Transfer Comments: to/from RW        Cognition  Overall Cognitive Status: Exceptions  Arousal/Alertness: Inconsistent responses to stimuli  Following Commands: Follows one step commands with repetition; Follows one step commands with increased time  Insights: Decreased awareness of deficits  Initiation: Requires cues for all  Sequencing: Requires cues for all  Cognition Comment: very [address]  Times per week: 3-5  Current Treatment Recommendations: Strengthening, Endurance Training, Neuromuscular Re-education, Balance Training, Gait Training, Functional Mobility Training, Self-Care / ADL    AM-PAC Score  AM-PAC Inpatient Daily Activity Raw Score: 9 (02/15/21 1318)  AM-PAC Inpatient ADL T-Scale Score : 25.33 (02/15/21 1318)  ADL Inpatient CMS 0-100% Score: 79.59 (02/15/21 1318)  ADL Inpatient CMS G-Code Modifier : CL (02/15/21 1318)    Goals  Short term goals  Time Frame for Short term goals: Prior to DC: all goals ongoing  Short term goal 1: Pt will complete bed mobility in prep for ADL transfers with min A  Short term goal 2: Pt will complete ADL transfers wth min A  Short term goal 3: Pt will complete functional mobility with min A  Short term goal 4: Pt will tolerate standing > 2 min for functional task with min A  Patient Goals   Patient goals : no goal stated       Therapy Time   Individual Concurrent Group Co-treatment   Time In 1220         Time Out 1300         Minutes 40         Timed Code Treatment Minutes: 40 Minutes      This note to serve as OT d/c summary if pt is d/c-ed prior to next therapy session.       Krish Setting, OTR/L

## 2021-02-15 NOTE — PROGRESS NOTES
Hospitalist Progress Note      PCP: TASHI Garay - CNP    Chief Complaint.     Patient is a 71-year-old female with past medical history of atrial fibrillation, Alzheimer's dementia, gout who presents to the hospital for altered mental status. Patient is unable to provide history given condition, according to the patient's chart patient was found more confused today and has been having confusion for the past 3 to 4 days which is actually getting worse. No reports of fever per son, patient recently was treated for urinary tract infection. Unable to obtain review of systems given condition. Date of Admission: 2/10/2021    Subjective:     Awake and disoriented this am.     She is calm and cooperative. Family refused seroquel last night. Discharge appeal in process. Medications:  Reviewed    Infusion Medications    dextrose       Scheduled Medications    apixaban  2.5 mg Oral BID    cefTRIAXone (ROCEPHIN) IV  1,000 mg Intravenous Q24H    sodium chloride flush  10 mL Intravenous 2 times per day     PRN Meds: QUEtiapine, glucose, dextrose, glucagon (rDNA), dextrose, metoprolol, sodium chloride flush, potassium chloride, magnesium sulfate, promethazine **OR** ondansetron, magnesium hydroxide, acetaminophen **OR** acetaminophen      Intake/Output Summary (Last 24 hours) at 2/15/2021 1315  Last data filed at 2/14/2021 1355  Gross per 24 hour   Intake 110 ml   Output 200 ml   Net -90 ml       Physical Exam Performed:    BP (!) 109/53   Pulse 79   Temp 97.3 °F (36.3 °C) (Oral)   Resp 18   Ht 5' 5\" (1.651 m)   Wt 190 lb 0.6 oz (86.2 kg)   SpO2 94%   BMI 31.62 kg/m²     General appearance: Confused and seems frail looking, lying in bed comfortably, confused-moving all over spontaneously  HEENT:  Conjunctivae/corneas clear. Neck: Supple, with full range of motion. Respiratory:  Normal respiratory effort.  Clear to auscultation, bilaterally without Rales/Wheezes/Rhonchi. Cardiovascular: Regular rate and rhythm with normal S1/S2 without murmurs or rubs  Abdomen: Soft, non-tender, non-distended, normal bowel sounds. Musculoskeletal: No cyanosis or edema bilaterally  Neurologic: Alert oriented x0  Psychiatric: Alert and oriented x0  Peripheral Pulses: +2 palpable, equal bilaterally       Labs:   Recent Labs     02/13/21 0447 02/14/21  0436 02/15/21  0525   WBC 6.9 6.6 6.4   HGB 13.3 12.5 12.6   HCT 41.5 38.9 39.4   * 108* 123*     Recent Labs     02/13/21 0447 02/14/21  0436 02/14/21  1753 02/15/21  0525    144  --  144   K 3.5 3.3* 3.9 3.9    109  --  111*   CO2 25 27  --  25   BUN 18 25*  --  26*   CREATININE 1.1 1.0  --  0.9   CALCIUM 9.9 9.3  --  9.5     No results for input(s): AST, ALT, BILIDIR, BILITOT, ALKPHOS in the last 72 hours. No results for input(s): INR in the last 72 hours. No results for input(s): Lukasz Jamila in the last 72 hours. Urinalysis:      Lab Results   Component Value Date    NITRU POSITIVE 02/10/2021    WBCUA 38 02/10/2021    BACTERIA 3+ 02/10/2021    RBCUA 2 02/10/2021    BLOODU Negative 02/10/2021    SPECGRAV 1.014 02/10/2021    GLUCOSEU Negative 02/10/2021       Radiology:  CT HEAD WO CONTRAST   Final Result   No acute intracranial abnormality. 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 None   Final Result   No acute inflammatory abnormality is identified in the abdomen or pelvis. Urinary bladder is contracted and not well evaluated. Hepatic steatosis. Small to moderate-sized hiatal hernia. Small to moderate bilateral pleural effusions with overlying atelectasis. Pneumonia is considered less likely.          XR CHEST PORTABLE   Final Result   Findings suggest congestive heart failure               Assessment/Plan:    Active Hospital Problems    Diagnosis    Bilateral pleural effusion [J90]    Chronic atrial fibrillation (HCC) [I48.20]    [facility] [A41.9]          Patient is a 80-year-old female with past medical history of atrial fibrillation, End Stage Alzheimer's dementia, gout who presents to the hospital for altered mental status. Patient is unable to provide history given condition, according to the patient's chart patient was found more confused today and has been having confusion for the past 3 to 4 days which is actually getting worse. No reports of fever per son, patient recently was treated for urinary tract infection. Unable to obtain review of systems given condition.        Assessment  Sepsis likely source - urine - sepsis now resolved. Ecoli sensitive to rocephin on culture. Acute metabolic encephalopathy - in setting of advanced Alzheimer's Dementia. Possibly due to Urinary tract infection, though I am concerned about progressing dementia. - stopped her depakote due to oversedation. - started nightly seroquel - with very good effect.     - family concerned about seroquel. I will switch this to as needed dosing. She is calm and cooperative this morning. Atrial fibrillation on eliquis. Alzheimer's dementia - severe end stage. See med discussion as above.        Consulted palliative care - CODE STATUS changed to DNR CCA      Cardiology was consulted for suspected CHF - diastolic with severe valvular disease. She has mod to severe MR, mod AR and severe TR. She is a very poor candidate for aggressive intervention. Cardiology team signed off. Euvolemic. DVT prophylaxis - Lovenox to eliquis BID  Diet: DIET GENERAL; Dysphagia Minced and Moist  Code Status: DNR-CCA      PT/OT Eval Status: ordered      Dispo -     - she remains medically stable for discharge. Son appealed discharge.     - son requested a different attending since I will not prescribe his mother medications that he wants her to have. - I advised family that we would not switch attendings at this time, as this patient has been discharged, and is in the middle of the appeal process. - family has every right and is welcome to request and obtain second medical opinion on Ms Adorno's condition and prognosis after she has been discharged.        Rachna Sow MD

## 2021-02-15 NOTE — PROGRESS NOTES
Physical Therapy  Facility/Department: [facility] 5W PROGRESSIVE CARE  Daily Treatment Note  NAME: Joel Hoyt  : 1931  MRN: [id number]    Date of Service: 2/15/2021    Discharge Recommendations:  3-5 sessions per week, Patient would benefit from continued therapy after discharge   PT Equipment Recommendations  Equipment Needed: No  Other: will continue to assess  Joel Hoyt scored a  on the AM-PAC short mobility form. Current research shows that an AM-PAC score of 17 or less is typically not associated with a discharge to the patient's home setting. Based on the patient's AM-PAC score and their current functional mobility deficits, it is recommended that the patient have 3-5 sessions per week of Physical Therapy at d/c to increase the patient's independence. Please see assessment section for further patient specific details. If patient discharges prior to next session this note will serve as a discharge summary. Please see below for the latest assessment towards goals. Assessment   Body structures, Functions, Activity limitations: Decreased functional mobility   Assessment: pt is an 79 yo female who was adm to hosp with complicated UTI, sepsis and metabolic encephalopathy. At baseline pt has dementia but lives with son who is able to provide assist pt needs. Currently pt needing mod A for bed mob and min/mod A of 2 for transfers and gait; pt has posterior lean with standing needing mod A for balance while getting cleansed after being incontinent of stool and assist to cleanse her.   Pt currently needing more assist than son can likely provide at home however continues to slowly improve  Prognosis: Fair  PT Education: General Safety  Patient Education: reviewed call light  Barriers to Learning: very [place]; cognition  REQUIRES PT FOLLOW UP: Yes  Activity Tolerance  Activity Tolerance: Patient limited by endurance  Activity Tolerance: limited by being very [place]     Patient Diagnosis(es): The primary encounter diagnosis was Septicemia ([facility] Diagnoses of Acute encephalopathy, Complicated urinary tract infection, Acute pulmonary edema ([facility] and Bilateral pleural effusion were also pertinent to this visit. has a past medical history of Alzheimer's dementia ([facility] Atrial fibrillation ([facility] Benign essential HTN, and Gout.   has no past surgical history on file. Social/Functional History  Lives With: Son  Type of Home: House  Home Layout: One level  Home Access: Stairs to enter with rails  Entrance Stairs - Number of Steps: 4-5  Bathroom Shower/Tub: Walk-in shower  Bathroom Toilet: Standard  Bathroom Equipment: Grab bars in shower, Shower chair  Home Equipment: 4 wheeled walker, Cane  ADL Assistance: Needs assistance(Son assists with bathing/dressing/toileting - patient feeds self)  Homemaking Assistance: (son completes all IADLs)  Ambulation Assistance: Independent(occasionally with 0VK)  Transfer Assistance: Independent  Active : No  Additional Comments: above info per last admission in Dec, 2020; the pt unable to confirm or add to info due to level of dementia  Restrictions  Restrictions/Precautions  Restrictions/Precautions: Fall Risk  Position Activity Restriction  Other position/activity restrictions: hx Dementia  Subjective   General  Chart Reviewed: Yes  Additional Pertinent Hx: Patient is a 27-year-old female with past medical history of atrial fibrillation, Alzheimer's dementia, gout who presents to the hospital for altered mental status. Patient is unable to provide history given condition, according to the patient's chart patient was found more confused today and has been having confusion for the past 3 to 4 days which is actually getting worse. No reports of fever per son, patient recently was treated for urinary tract infection. Unable to obtain review of systems given condition.   Response To Previous Treatment: Patient unable to report, no changes reported from family or staff  Family / A  Short term goal 2: transfers with min A  Short term goal 3: amb 22' with or without AD min/mod A  Patient Goals   Patient goals : pt unable to state a goal    Plan    Plan  Times per week: 3-5  Current Treatment Recommendations: Functional Mobility Training  Safety Devices  Type of devices: Call light within reach, Nurse notified, All fall risk precautions in place, Telesitter in use, Chair alarm in place, Left in chair, Gait belt  Restraints  Initially in place: Yes  Restraints: wrist restraints in place at beginning and end of session     Therapy Time   Individual Concurrent Group Co-treatment   Time In 1220         Time Out 1304         Minutes 44                 EUSEBIO DUKE, PT    [address] [place], [place], [address]

## 2021-02-15 NOTE — PLAN OF CARE
Problem: Skin Integrity:  Goal: Will show no infection signs and symptoms  Description: Will show no infection signs and symptoms  Outcome: Ongoing  Goal: Absence of new skin breakdown  Description: Absence of new skin breakdown  Outcome: Ongoing     Problem: Restraint Use - Nonviolent/Non-Self-Destructive Behavior:  Goal: Absence of restraint indications  Description: Absence of restraint indications  Outcome: Ongoing  Goal: Absence of restraint-related injury  Description: Absence of restraint-related injury  Outcome: Ongoing     Problem: Discharge Planning:  Goal: Discharged to appropriate level of care  Description: Discharged to appropriate level of care  Outcome: Ongoing     Problem: Gas Exchange - Impaired:  Goal: Levels of oxygenation will improve  Description: Levels of oxygenation will improve  Outcome: Ongoing     Problem: Infection, Septic Shock:  Goal: Will show no infection signs and symptoms  Description: Will show no infection signs and symptoms  Outcome: Ongoing     Problem: Infection - Ventilator-Associated Pneumonia:  Goal: Absence of pulmonary infection  Description: Absence of pulmonary infection  Outcome: Ongoing     Problem: Tissue Perfusion, Altered:  Goal: Circulatory function within specified parameters  Description: Circulatory function within specified parameters  Outcome: Ongoing     Problem: Venous Thromboembolism:  Goal: Will show no signs or symptoms of venous thromboembolism  Description: Will show no signs or symptoms of venous thromboembolism  Outcome: Ongoing  Goal: Absence of signs or symptoms of impaired coagulation  Description: Absence of signs or symptoms of impaired coagulation  Outcome: Ongoing     Problem: Falls - Risk of:  Goal: Will remain free from falls  Description: Will remain free from falls  Outcome: Ongoing  Goal: Absence of physical injury  Description: Absence of physical injury  Outcome: Ongoing     Problem: OXYGENATION/RESPIRATORY FUNCTION  Goal: Patient will maintain patent airway  Outcome: Ongoing  Goal: Patient will achieve/maintain normal respiratory rate/effort  Description: Respiratory rate and effort will be within normal limits for the patient  Outcome: Ongoing     Problem: HEMODYNAMIC STATUS  Goal: Patient has stable vital signs and fluid balance  Outcome: Ongoing     Problem: FLUID AND ELECTROLYTE IMBALANCE  Goal: Fluid and electrolyte balance are achieved/maintained  Outcome: Ongoing     Problem: ACTIVITY INTOLERANCE/IMPAIRED MOBILITY  Goal: Mobility/activity is maintained at optimum level for patient  Outcome: Ongoing
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
## 2021-02-16 VITALS
RESPIRATION RATE: 18 BRPM | DIASTOLIC BLOOD PRESSURE: 78 MMHG | BODY MASS INDEX: 31.33 KG/M2 | WEIGHT: 188.05 LBS | HEART RATE: 80 BPM | SYSTOLIC BLOOD PRESSURE: 146 MMHG | HEIGHT: 65 IN | OXYGEN SATURATION: 94 % | TEMPERATURE: 97.4 F

## 2021-02-16 LAB
GLUCOSE BLD-MCNC: 109 MG/DL (ref 70–99)
GLUCOSE BLD-MCNC: 74 MG/DL (ref 70–99)
PERFORMED ON: ABNORMAL
PERFORMED ON: NORMAL

## 2021-02-16 PROCEDURE — 97530 THERAPEUTIC ACTIVITIES: CPT

## 2021-02-16 PROCEDURE — 2580000003 HC RX 258: Performed by: INTERNAL MEDICINE

## 2021-02-16 PROCEDURE — 97129 THER IVNTJ 1ST 15 MIN: CPT

## 2021-02-16 PROCEDURE — 6360000002 HC RX W HCPCS: Performed by: INTERNAL MEDICINE

## 2021-02-16 PROCEDURE — 94760 N-INVAS EAR/PLS OXIMETRY 1: CPT

## 2021-02-16 PROCEDURE — 97535 SELF CARE MNGMENT TRAINING: CPT

## 2021-02-16 PROCEDURE — 6370000000 HC RX 637 (ALT 250 FOR IP): Performed by: INTERNAL MEDICINE

## 2021-02-16 PROCEDURE — 97530 THERAPEUTIC ACTIVITIES: CPT | Performed by: PHYSICAL THERAPIST

## 2021-02-16 PROCEDURE — 92526 ORAL FUNCTION THERAPY: CPT

## 2021-02-16 PROCEDURE — 97116 GAIT TRAINING THERAPY: CPT | Performed by: PHYSICAL THERAPIST

## 2021-02-16 RX ORDER — QUETIAPINE FUMARATE 25 MG/1
25 TABLET, FILM COATED ORAL NIGHTLY PRN
Qty: 60 TABLET | Refills: 1 | Status: ON HOLD | OUTPATIENT
Start: 2021-02-16 | End: 2021-04-01 | Stop reason: HOSPADM

## 2021-02-16 RX ORDER — CEPHALEXIN 500 MG/1
500 CAPSULE ORAL 3 TIMES DAILY
Qty: 15 CAPSULE | Refills: 0 | Status: SHIPPED | OUTPATIENT
Start: 2021-02-16 | End: 2021-02-21

## 2021-02-16 RX ADMIN — QUETIAPINE FUMARATE 25 MG: 25 TABLET ORAL at 01:51

## 2021-02-16 RX ADMIN — APIXABAN 2.5 MG: 2.5 TABLET, FILM COATED ORAL at 08:58

## 2021-02-16 RX ADMIN — Medication 10 ML: at 08:58

## 2021-02-16 RX ADMIN — CEFTRIAXONE 1000 MG: 1 INJECTION, POWDER, FOR SOLUTION INTRAMUSCULAR; INTRAVENOUS at 03:20

## 2021-02-16 ASSESSMENT — PAIN SCALES - PAIN ASSESSMENT IN ADVANCED DEMENTIA (PAINAD)
NEGVOCALIZATION: 0
CONSOLABILITY: 0
FACIALEXPRESSION: 0
TOTALSCORE: 0
TOTALSCORE: 0
BODYLANGUAGE: 0
BREATHING: 0
BREATHING: 0
TOTALSCORE: 0
BREATHING: 0
NEGVOCALIZATION: 0
CONSOLABILITY: 0
BODYLANGUAGE: 0
CONSOLABILITY: 0

## 2021-02-16 NOTE — CARE COORDINATION
Discharge Planning:  YFN received a call from Celia with [employer]. Celia stated that she sent a crew out to the sons home and no shoveling had been attempted. Celia stated that without any shoveling, the transportation cannot take place as it would be too dangerous.    Melody Santiago, [employer]  [phone]  Electronically signed by Jyoti Law on 2/16/2021 at 2:28 PM

## 2021-02-16 NOTE — PROGRESS NOTES
Physical Therapy  Facility/Department: [facility] 5 PROGRESSIVE CARE  Daily Treatment Note  NAME: Rodolfo Peterson  : 1931  MRN: [id number]    Date of Service: 2021    Discharge Recommendations:  3-5 sessions per week, Patient would benefit from continued therapy after discharge   PT Equipment Recommendations  Equipment Needed: No  Other: will continue to assess  Rodolfo Peterson scored a  on the AM-PAC short mobility form. Current research shows that an AM-PAC score of 17 or less is typically not associated with a discharge to the patient's home setting. Based on the patient's AM-PAC score and their current functional mobility deficits, it is recommended that the patient have 3-5 sessions per week of Physical Therapy at d/c to increase the patient's independence. Please see assessment section for further patient specific details. If patient discharges prior to next session this note will serve as a discharge summary. Please see below for the latest assessment towards goals. Assessment   Body structures, Functions, Activity limitations: Decreased functional mobility   Assessment: pt is an 81 yo female who was adm to hosp with complicated UTI, sepsis and metabolic encephalopathy. At baseline pt has dementia but lives with son who is able to provide assist pt needs. Currently pt needing mod A of 1-2 for bed mob and min/mod A of 2 for transfers and gait; pt has posterior lean with standing needing mod A for balance while getting cleansed after being incontinent of stool and assist to cleanse her. Pt currently appears to be needing more assist than son can likely provide at home however pt may do better in her own environment with her family?  Continue to recommend continued therapy in whatever setting family decides  Prognosis: Fair  Decision Making: High Complexity  Patient Education: reviewed call light  Barriers to Learning: very [place]; cognition  REQUIRES PT FOLLOW UP: Yes  Activity Tolerance  Activity Tolerance: Patient limited by cognitive status  Activity Tolerance: limited by being very [place]     Patient Diagnosis(es): The primary encounter diagnosis was Septicemia ([facility] Diagnoses of Acute encephalopathy, Complicated urinary tract infection, Acute pulmonary edema ([facility] and Bilateral pleural effusion were also pertinent to this visit. has a past medical history of Alzheimer's dementia ([facility] Atrial fibrillation ([facility] Benign essential HTN, and Gout.   has no past surgical history on file. Social/Functional History  Lives With: Son  Type of Home: House  Home Layout: One level  Home Access: Stairs to enter with rails  Entrance Stairs - Number of Steps: 4-5  Bathroom Shower/Tub: Walk-in shower  Bathroom Toilet: Standard  Bathroom Equipment: Grab bars in shower, Shower chair  Home Equipment: 4 wheeled walker, Cane  ADL Assistance: Needs assistance(Son assists with bathing/dressing/toileting - patient feeds self)  Homemaking Assistance: (son completes all IADLs)  Ambulation Assistance: Independent(occasionally with 6SD)  Transfer Assistance: Independent  Active : No  Additional Comments: above info per last admission in Dec, 2020; the pt unable to confirm or add to info due to level of dementia  Restrictions  Restrictions/Precautions  Restrictions/Precautions: Fall Risk  Position Activity Restriction  Other position/activity restrictions: hx Dementia, very [place]  Subjective   General  Chart Reviewed: Yes  Additional Pertinent Hx: Patient is a 80-year-old female with past medical history of atrial fibrillation, Alzheimer's dementia, gout who presents to the hospital for altered mental status. Patient is unable to provide history given condition, according to the patient's chart patient was found more confused today and has been having confusion for the past 3 to 4 days which is actually getting worse. No reports of fever per son, patient recently was treated for urinary tract infection.   Unable to obtain review of systems given condition. Response To Previous Treatment: Patient unable to report, no changes reported from family or staff  Family / Caregiver Present: No  Subjective  Subjective: pt very [place]; she  will follow simple commands if she can hear well enough          Orientation  Orientation  Overall Orientation Status: Impaired  Orientation Level: Oriented to person;Disoriented to place; Disoriented to time;Disoriented to situation  Cognition   Cognition  Overall Cognitive Status: Exceptions(Simultaneous filing. User may not have seen previous data.)  Arousal/Alertness: Inconsistent responses to stimuli(Simultaneous filing. User may not have seen previous data.)  Following Commands: Follows one step commands with repetition; Follows one step commands with increased time(Simultaneous filing. User may not have seen previous data.)  Insights: Decreased awareness of deficits(Simultaneous filing. User may not have seen previous data.)  Initiation: Requires cues for all(Simultaneous filing. User may not have seen previous data.)  Sequencing: Requires cues for all(Simultaneous filing. User may not have seen previous data.)  Cognition Comment: very [place](Simultaneous filing. User may not have seen previous data.)  Objective   Bed mobility  Supine to Sit: Moderate assistance;2 Person assistance(Simultaneous filing. User may not have seen previous data.)  Sit to Supine: Moderate assistance;2 Person assistance(Simultaneous filing. User may not have seen previous data.)  Transfers  Sit to Stand: Moderate Assistance;2 Person Assistance  Stand to sit: Moderate Assistance;Minimal Assistance;2 Person Assistance  Ambulation  Ambulation?: Yes  Ambulation 1  Surface: level tile  Device: Rolling Walker  Assistance: Minimal assistance; Moderate assistance;2 Person assistance  Quality of Gait: slow small steps with assist for balance and for maneuvering walker  Distance: 10' and 25'     Balance  Comments: CGA/SBA for sitting balance;

## 2021-02-16 NOTE — PROGRESS NOTES
Received a call from patient's son Cole Bosworth), and he will be coming to  the patient with his partner Lizette Tai). Per Isha Loza will come up to the patient's room to assist in getting her ready for discharge. Notified [facility] pharmacy tech Shahida Anne that patient would in fact be discharged today. Shahida Anne called back saying the patient has a large co-pay for her meds at discharge.  Shahida Anne will call  [facility] to discuss potential options

## 2021-02-16 NOTE — CARE COORDINATION
Rcvd call from [facility] 2/15/21 at 3:32 pm.  Physician reviewer agrees with termination of services. Leo Redding Sr.  Administrative Assist, Case Management  [phone]  Electronically signed by Leo Redding on 2/16/2021 at 7:54 AM

## 2021-02-16 NOTE — PROGRESS NOTES
Occupational Therapy  Facility/Department: [address] PROGRESSIVE CARE  Daily Treatment Note  NAME: Cele Cerrato  : 1931  MRN: [id number]    Date of Service: 2021    Discharge Recommendations:  3-5 sessions per week, Patient would benefit from continued therapy after discharge     Cele Crerato scored a 10/24 on the AM-PAC ADL Inpatient form. Current research shows that an AM-PAC score of 17 or less is typically not associated with a discharge to the patient's home setting. Based on the patient's AM-PAC score and their current ADL deficits, it is recommended that the patient have 3-5 sessions per week of Occupational Therapy at d/c to increase the patient's independence. Please see assessment section for further patient specific details. If patient discharges prior to next session this note will serve as a discharge summary. Please see below for the latest assessment towards goals. Assessment   Performance deficits / Impairments: Decreased functional mobility ; Decreased endurance;Decreased strength;Decreased ADL status; Decreased safe awareness;Decreased high-level IADLs;Decreased balance;Decreased cognition  Assessment: Discussed with OTR: Pt continues to be limited by decreased cognition, endurance, balance. Pt needing Mod A x2 for bed mob, transfers and Min/Mod A x2 for amb with RW. Pt dependent to LB dressing, toileting and grooming tasks. Pt would benefit from cont OT at d/c, [facility] reflecting non-home bound d/c; yet may function better in her own environment. Cont poc. Prognosis: Fair  OT Education: OT Role;Transfer Training;Plan of Care;ADL Adaptive Strategies  Barriers to Learning: dementia/cognition  REQUIRES OT FOLLOW UP: Yes  Activity Tolerance  Activity Tolerance: Treatment limited secondary to decreased cognition  Safety Devices  Safety Devices in place: Yes  Type of devices: Nurse notified;Call light within reach; Left in chair;Chair alarm in place;Gait belt         Patient Diagnosis(es): bars  Toilet Transfer: Moderate assistance;2 Person assistance  Toilet Transfers Comments: posterior lean when standing  Wheelchair Bed Transfers  Wheelchair/Bed - Technique: Ambulating  Equipment Used: Bed(recliner)  Level of Asssistance: Moderate assistance;2 Person assistance     Transfers  Sit to stand: Moderate assistance;2 Person assistance  Stand to sit: Moderate assistance;2 Person assistance  Transfer Comments: to/from          Cognition  Overall Cognitive Status: Exceptions(Simultaneous filing. User may not have seen previous data.)  Arousal/Alertness: Inconsistent responses to stimuli(Simultaneous filing. User may not have seen previous data.)  Following Commands: Follows one step commands with repetition; Follows one step commands with increased time(Simultaneous filing. User may not have seen previous data.)  Attention Span: Unable to maintain attention  Insights: Decreased awareness of deficits(Simultaneous filing. User may not have seen previous data.)  Initiation: Requires cues for all(Simultaneous filing. User may not have seen previous data.)  Sequencing: Requires cues for all(Simultaneous filing. User may not have seen previous data.)  Cognition Comment: very [place](Simultaneous filing.  User may not have seen previous data.)            Plan   Plan  Times per week: 3-5  Current Treatment Recommendations: Strengthening, Endurance Training, Neuromuscular Re-education, Balance Training, Gait Training, Functional Mobility Training, Self-Care / ADL    AM-PAC Score        AM-[facility] Inpatient Daily Activity Raw Score: 10 (02/16/21 0855)  AM-PAC Inpatient ADL T-Scale Score : 27.31 (02/16/21 0855)  ADL Inpatient CMS 0-100% Score: 74.7 (02/16/21 0855)  ADL Inpatient CMS G-Code Modifier : CL (02/16/21 0855)    Goals  Short term goals  Time Frame for Short term goals: Prior to DC: all goals ongoing  Short term goal 1: Pt will complete bed mobility in prep for ADL transfers with min A  Short term goal 2: Pt will complete ADL transfers wth min A  Short term goal 3: Pt will complete functional mobility with min A  Short term goal 4: Pt will tolerate standing > 2 min for functional task with min A  Patient Goals   Patient goals : no goal stated       Therapy Time   Individual Concurrent Group Co-treatment   Time In 0816         Time Out 0901         Minutes Marci 437 [id number]

## 2021-02-16 NOTE — CARE COORDINATION
[facility]    Referral received from CM to follow for home care services. DC order noted, all docs needed have been faxed to [facility] for home care services.       Patient to be seen by 2/18    Naseem Ellis mobile: [phone]  [facility] office: [phone]

## 2021-02-16 NOTE — PROGRESS NOTES
[facility]   Speech Therapy  Daily Dysphagia Treatment Note    Anniece Alpers  AGE: 80 y.o. GENDER: female  : 1931  [id number]  EPISODE DATE:  2/10/2021     Patient Active Problem List   Diagnosis    Acute on chronic combined systolic and diastolic CHF, NYHA class 3 ([address]    Dementia in Alzheimer's disease with delirium [facility]    Acute pyelonephritis    Essential hypertension    Current use of long term anticoagulation    Mild malnutrition ([address]    Fall at home, initial encounter    Acute metabolic encephalopathy    Acute on chronic congestive heart failure (HCC)    Sepsis (HCC)    Bilateral pleural effusion    Chronic atrial fibrillation (HCC)     Allergies   Allergen Reactions    Chlorpromazine Other (See Comments)    Hydrochlorothiazide Other (See Comments)     Gout     Treatment Diagnosis: Dysphagia     Chart review:   2/10/2021 admitted with confusion:  ADMISSION H&P HPI:  Patient is a 70-year-old female with past medical history of atrial fibrillation, Alzheimer's dementia, gout who presents to the hospital for altered mental status.  Patient is unable to provide history given condition, according to the patient's chart patient was found more confused today and has been having confusion for the past 3 to 4 days which is actually getting worse. No reports of fever per son, patient recently was treated for urinary tract infection.  Unable to obtain review of systems given condition     2/10/2021 CXR:  Findings suggest congestive heart failure    Subjective:     Current diet  Current Diet : Minced and moist  Current Liquid Diet : Thin    Comments regarding tolerating Current Diet:   RN reports pt currently in chair eating breakfast with assist, no difficulty    Objective:     Pain   Patient Currently in Pain: No    Cognitive/Behavior   Behavior/Cognition: Confused, poor attention, Doesn't follow directions    Presentations   Consistencies Administered: thin - straw; characterized by decreased mastication; decreased lingual coordination; decreased bolus control; decreased AP transit with high suspicion for premature bolus loss. Concern for excess labor with soft solid prep. · Stable mild pharyngeal phase dysphagia characterized by delayed swallow; decreased laryngeal elevation; no overt signs/symptoms of penetration/aspiration  · Dysphagia status continues to be stable/slowly resolving as cog status improves. Diet Recommendations:  Minced/moist  Thin  Recommended Form of Meds: crushed in pudding  Strategies: fully upright during and 30 min after PO intake; small bites/sips    Education:  Consulted and agree with results and recommendations: Patient, RN, Family member  Patient Education: Attempted on results/recs/plan  Patient Education Response: No evidence of learning    Prognosis:   Good for dysphagia as cognitive status continues to improve    Plan:     Continue Dysphagia Therapy: YES  Interventions: Therapeutic Interventions: Therapeutic PO trials with SLP, Diet tolerance monitoring, Patient/Family education  Duration/Frequency of therapy while on unit: Duration/Frequency of Treatment  Duration/Frequency of Treatment: ST to tx 3-5 times per week during acute admission  Discharge Instructions:   Anticipate potential need for further skilled Speech Therapy for Dysphagia at discharge    This note serves as a D/C Summary in the event that this patient is discharged prior to the next therapy session.     Coded treatment time: 10  Total treatment time: 25    Electronically signed by  Kelly Montano MS, CCC-SLP #[id number]  Speech Language Pathologist  on 2/16/2021 at 9:44 AM

## 2021-02-16 NOTE — CARE COORDINATION
YFN received phone call from [address]  stating that they aren't doing stairs today for safety and need to cancel 1:30pm transport. YFN notified primary  working with patient today and nursing staff. Patient has 7 stairs, a landing, then 4 more stairs to enter the home. Respectfully submitted,    Payton BECKETT, KENNETHW-S  [facility]   [phone]    Electronically signed by ZECHARIAH Simental on 2/16/2021 at 9:25 AM      YFN contacted [address] to see if [employer] was able to transport this pt home as [employer] canceled the trip. [facility] accepted the trip for 1400. Floor RN updated. YFN contacted pts son, Marylou Ding. Marylou Ding stated that he does have salt that he is able to put down and he will shovel that walkway and steps to the best of his ability so that they are clear for the transportation company. Plan: D/c to home via [employer] at [address].   [place]  [phone]  Itzel Payne, [address]  Electronically signed by Mitali Maza on 2/16/2021 at 9:58 AM

## 2021-02-16 NOTE — PROGRESS NOTES
IV and telemetry monitor removed. Telecam bleached and returned to [facility]. Discharge paperwork completed and discussed with the patient and patient's family member, Oscar Platt (at bedside). Patient's son Nate Quintana agreeable to patient continuing to take Xarelto at discharge, as well as new medication Keflex. Secure [employer] message sent to Dr. Melinda Malone requesting the Xarelto and Keflex be e-scribed to their preferred pharmacy [facility]). Dr. Aponte Rung back confirming this was complete. Updated patient's son and new AVS printed and given to patient's family member Oscar Platt. All belongings have been packed up and sent with the patient. Transported patient with [facility] Childs and family member Oscar Platt. Assisted patient into son's vehicle with no complications. All questions answered.

## 2021-02-16 NOTE — PLAN OF CARE
Problem: Skin Integrity:  Goal: Will show no infection signs and symptoms  Description: Will show no infection signs and symptoms  2/16/2021 0122 by Estrellita Rosales RN  Outcome: Ongoing     Problem: Skin Integrity:  Goal: Absence of new skin breakdown  Description: Absence of new skin breakdown  2/16/2021 0122 by Estrellita Rosales RN  Outcome: Ongoing     Problem: Restraint Use - Nonviolent/Non-Self-Destructive Behavior:  Goal: Absence of restraint indications  Description: Absence of restraint indications  2/16/2021 0122 by Estrellita Rosales RN  Outcome: Ongoing     Problem: Restraint Use - Nonviolent/Non-Self-Destructive Behavior:  Goal: Absence of restraint-related injury  Description: Absence of restraint-related injury  2/16/2021 0122 by Estrellita Rosales RN  Outcome: Ongoing     Problem: Discharge Planning:  Goal: Discharged to appropriate level of care  Description: Discharged to appropriate level of care  2/16/2021 0122 by Estrellita Rosales RN  Outcome: Ongoing     Problem: Gas Exchange - Impaired:  Goal: Levels of oxygenation will improve  Description: Levels of oxygenation will improve  2/16/2021 0122 by Estrellita Rsoales RN  Outcome: Ongoing     Problem: Infection, Septic Shock:  Goal: Will show no infection signs and symptoms  Description: Will show no infection signs and symptoms  2/16/2021 0122 by Estrellita Rosales RN  Outcome: Ongoing     Problem: Infection - Ventilator-Associated Pneumonia:  Goal: Absence of pulmonary infection  Description: Absence of pulmonary infection  2/16/2021 0122 by Estrellita Rosales RN  Outcome: Ongoing     Problem: Serum Glucose Level - Abnormal:  Goal: Ability to maintain appropriate glucose levels will improve  Description: Ability to maintain appropriate glucose levels will improve  2/16/2021 0122 by Estrellita Rosales RN  Outcome: Ongoing     Problem: Tissue Perfusion, Altered:  Goal: Circulatory function within specified parameters  Description: Circulatory function within specified parameters  2/16/2021 0122 by Jalyn Burnett RN  Outcome: Ongoing     Problem: Venous Thromboembolism:  Goal: Will show no signs or symptoms of venous thromboembolism  Description: Will show no signs or symptoms of venous thromboembolism  2/16/2021 0122 by Jalyn Burnett RN  Outcome: Ongoing     Problem: Venous Thromboembolism:  Goal: Absence of signs or symptoms of impaired coagulation  Description: Absence of signs or symptoms of impaired coagulation  2/16/2021 0122 by Jalyn Burnett RN  Outcome: Ongoing     Problem: Falls - Risk of:  Goal: Will remain free from falls  Description: Will remain free from falls  2/16/2021 0122 by Jalyn Burnett RN  Outcome: Ongoing     Problem: Falls - Risk of:  Goal: Absence of physical injury  Description: Absence of physical injury  2/16/2021 0122 by Jalyn Burnett RN  Outcome: Ongoing     Problem: OXYGENATION/RESPIRATORY FUNCTION  Goal: Patient will maintain patent airway  2/16/2021 0122 by Jalyn Burnett RN  Outcome: Ongoing     Problem: OXYGENATION/RESPIRATORY FUNCTION  Goal: Patient will achieve/maintain normal respiratory rate/effort  Description: Respiratory rate and effort will be within normal limits for the patient  2/16/2021 0122 by Jalyn Burnett RN  Outcome: Ongoing     Problem: HEMODYNAMIC STATUS  Goal: Patient has stable vital signs and fluid balance  2/16/2021 0122 by Jalyn Burnett RN  Outcome: Ongoing     Problem: FLUID AND ELECTROLYTE IMBALANCE  Goal: Fluid and electrolyte balance are achieved/maintained  2/16/2021 0122 by Jalyn Burnett RN  Outcome: Ongoing     Problem: ACTIVITY INTOLERANCE/IMPAIRED MOBILITY  Goal: Mobility/activity is maintained at optimum level for patient  2/16/2021 0122 by Jalyn Burnett RN  Outcome: Ongoing

## 2021-02-16 NOTE — CARE COORDINATION
Discharge Planning:  SW received a call from [facility]) stating that pt  Is not showing any prescription insurance. SW contacted pts son, Murphy Arrington. Murphy Arrington stated that this pt does have insurance but requested to know what was being filled. SW noted the three new medications. Murphy Arrington sated that he only agrees with the antibiotic and is requesting a paper script as he does not want this filled at [address] contacted pts bedside RN Renaldo Cuadra) who will discuss this with Dr. Chelsea Ross. Pts son stated that he wants pt to remain on Xarelto as she has been on and he doesn't feel that pt does well on Seroquel.     [facility]  [phone]  Electronically signed by Yesika Naylor on 2/16/2021 at 4:28 PM

## 2021-02-16 NOTE — CARE COORDINATION
YFN notified by charge nurse that [address] went to the home before picking up the patient and the stairs are not cleared for safety. They cancelled transport. Primary  working with this patient is aware. Respectfully submitted,    ALMAS White-S  [facility]   [phone]    Electronically signed by ZECHARIAH Garcia on 2/16/2021 at 2:22 PM

## 2021-02-16 NOTE — DISCHARGE SUMMARY
Hospital Medicine Discharge Summary    Patient ID: Sanjana Casiano      Patient's PCP: Alexy Curiel, APRN - CNP    Admit Date: 2/10/2021     Discharge Date:   2/16/2021    Admitting Physician: Christos Albert MD     Discharge Physician: Zacarias Callahan MD     Discharge Diagnoses: Active Hospital Problems    Diagnosis    Bilateral pleural effusion [J90]    Chronic atrial fibrillation (HCC) [I48.20]    Sepsis ([address] [A41.9]       The patient was seen and examined on day of discharge and this discharge summary is in conjunction with any daily progress note from day of discharge. Hospital Course: 59-year-old female with past medical history of atrial fibrillation, Alzheimer's dementia, gout who presents to the hospital with altered mental status.    Patient is unable to provide history given condition, according to the patient's chart patient was found more confused today and has been having confusion for the past 3 to 4 days which is actually getting worse.  No reports of fever per son, patient recently was treated for urinary tract infection. It appears she had a very similar admission back in December. Mental status appears to have been gradually declining over the past several months. Though it can in part be attributed to UTI, I am concerned that her chronic medical conditions are leading to inevitable progression of her Alzheimer's. Sepsis likely source - urine - sepsis now resolved. Ecoli sensitive to rocephin on culture. Will complete PO keflex on discharge        Acute metabolic encephalopathy - in setting of advanced Alzheimer's Dementia. Possibly due to Urinary tract infection, though I am concerned about progressing dementia. - stopped her depakote due to oversedation. - started nightly seroquel - with very good effect.                           - family concerned about seroquel. I will switch this to as needed dosing.  She is calm and cooperative. I would avoid depakote in the future.          Atrial fibrillation on eliquis.         Alzheimer's dementia - severe end stage. See med discussion as above.         Consulted palliative care - CODE STATUS changed to DNR CCA        Cardiology was consulted for suspected CHF - diastolic with severe valvular disease. She has mod to severe MR, mod AR and severe TR. She is a very poor candidate for aggressive intervention. Cardiology team signed off. Euvolemic.             Pt's son is either in denial of her present condition or not willing to accept the reality of the situation. He appealed discharge this admission. He refused ECF placement which is understandable, but I am concerned he will be struggling providing full care at home. Still despite out recommendations, plan is to discharge home with home care services.                Physical Exam Performed:     /65   Pulse 70   Temp 97.3 °F (36.3 °C) (Axillary)   Resp 16   Ht 5' 5\" (1.651 m)   Wt 188 lb 0.8 oz (85.3 kg)   SpO2 96%   BMI 31.29 kg/m²       General appearance: Confused and seems frail looking, lying in bed comfortably, confused-moving all over spontaneously  HEENT:  Conjunctivae/corneas clear. Neck: Supple, with full range of motion. Respiratory:  Normal respiratory effort. Clear to auscultation, bilaterally without Rales/Wheezes/Rhonchi. Cardiovascular: Regular rate and rhythm with normal S1/S2 without murmurs or rubs  Abdomen: Soft, non-tender, non-distended, normal bowel sounds. Musculoskeletal: No cyanosis or edema bilaterally  Neurologic: Alert oriented x0  Psychiatric: Alert and oriented x0  Peripheral Pulses: +2 palpable, equal bilaterally          Labs:  For convenience and continuity at follow-up the following most recent labs are provided:      CBC:    Lab Results   Component Value Date    WBC 6.4 02/15/2021    HGB 12.6 02/15/2021    HCT 39.4 02/15/2021     02/15/2021       Renal: Lab Results   Component Value Date     02/15/2021    K 3.9 02/15/2021     02/15/2021    CO2 25 02/15/2021    BUN 26 02/15/2021    CREATININE 0.9 02/15/2021    CALCIUM 9.5 02/15/2021    PHOS 2.4 12/07/2020         Significant Diagnostic Studies    Radiology:   CT HEAD WO CONTRAST   Final Result   No acute intracranial abnormality. 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 None   Final Result   No acute inflammatory abnormality is identified in the abdomen or pelvis. Urinary bladder is contracted and not well evaluated. Hepatic steatosis. Small to moderate-sized hiatal hernia. Small to moderate bilateral pleural effusions with overlying atelectasis. Pneumonia is considered less likely. XR CHEST PORTABLE   Final Result   Findings suggest congestive heart failure                Consults:     IP CONSULT TO CARDIOLOGY  IP CONSULT TO PALLIATIVE CARE  IP CONSULT TO HOME CARE NEEDS    Disposition: home with family and home care services. Son refused placement. Condition at Discharge: Stable    Discharge Instructions/Follow-up:  PCP 1 week.      Code Status:  DNR-CCA     Activity: activity as tolerated    Diet: regular diet      Discharge Medications:     Current Discharge Medication List           Details   apixaban (ELIQUIS) 2.5 MG TABS tablet Take 1 tablet by mouth 2 times daily  Qty: 60 tablet, Refills: 1      QUEtiapine (SEROQUEL) 25 MG tablet Take 1 tablet by mouth every evening  Qty: 60 tablet, Refills: 3      cephALEXin (KEFLEX) 500 MG capsule Take 1 capsule by mouth 3 times daily for 5 days  Qty: 15 capsule, Refills: 0              Details   divalproex (DEPAKOTE) 125 MG DR tablet Take 125 mg by mouth nightly       vitamin D 25 MCG (1000 UT) CAPS Take 1,000 Units by mouth daily      mometasone-formoterol (DULERA) 200-5 MCG/ACT inhaler Inhale 2 puffs into the lungs 2 times daily as needed (shortness of breath)       metoprolol succinate (TOPROL XL) 25 MG extended release tablet Take 25 mg by mouth daily       Multiple Vitamins-Minerals (MULTIVITAMIN WITH MINERALS) tablet Take 1 tablet by mouth daily      calcium carbonate (OSCAL) 500 MG TABS tablet Take 500 mg by mouth daily      albuterol sulfate HFA (VENTOLIN HFA) 108 (90 Base) MCG/ACT inhaler Inhale 2 puffs into the lungs every 6 hours as needed for Wheezing             Time Spent on discharge is more than 30 minutes in the examination, evaluation, counseling and review of medications and discharge plan. Signed:    Virginie Masters MD   2/16/2021      Thank you TASHI Banks - LAILA for the opportunity to be involved in this patient's care. If you have any questions or concerns please feel free to contact me at [phone].

## 2021-02-16 NOTE — CARE COORDINATION
SW received phone call from patient's son Tobias Paniagua regarding the care of the patient. For future reference his phone number is [phone]. He stated that he called in to let us know that they have quite a bit of snow on the stairs and he was worried about mom getting inside. SW informed that a transport company just called and cancelled due to safety but we will work on it throughout the day and let him know as soon as we know about a new transport. SW and family to follow up as more info comes available. Respectfully submitted,    ZECHARIAH Biggs  [facility]   [phone]    Electronically signed by ZECHARIAH Camarena on 2/16/2021 at 9:33 AM

## 2021-03-29 ENCOUNTER — APPOINTMENT (OUTPATIENT)
Dept: CT IMAGING | Age: 86
DRG: 689 | End: 2021-03-29
Payer: MEDICARE

## 2021-03-29 ENCOUNTER — HOSPITAL ENCOUNTER (INPATIENT)
Age: 86
LOS: 7 days | Discharge: HOME | DRG: 689 | End: 2021-04-05
Attending: STUDENT IN AN ORGANIZED HEALTH CARE EDUCATION/TRAINING PROGRAM | Admitting: INTERNAL MEDICINE
Payer: MEDICARE

## 2021-03-29 ENCOUNTER — APPOINTMENT (OUTPATIENT)
Dept: GENERAL RADIOLOGY | Age: 86
DRG: 689 | End: 2021-03-29
Payer: MEDICARE

## 2021-03-29 DIAGNOSIS — G93.40 ACUTE ENCEPHALOPATHY: Primary | ICD-10-CM

## 2021-03-29 DIAGNOSIS — N30.01 ACUTE CYSTITIS WITH HEMATURIA: ICD-10-CM

## 2021-03-29 DIAGNOSIS — I48.21 PERMANENT ATRIAL FIBRILLATION (HCC): ICD-10-CM

## 2021-03-29 LAB
A/G RATIO: 1.2 (ref 1.1–2.2)
ALBUMIN SERPL-MCNC: 3.7 G/DL (ref 3.4–5)
ALP BLD-CCNC: 220 U/L (ref 40–129)
ALT SERPL-CCNC: 23 U/L (ref 10–40)
ANION GAP SERPL CALCULATED.3IONS-SCNC: 9 MMOL/L (ref 3–16)
AST SERPL-CCNC: 38 U/L (ref 15–37)
BACTERIA: ABNORMAL /HPF
BASOPHILS ABSOLUTE: 0 K/UL (ref 0–0.2)
BASOPHILS RELATIVE PERCENT: 0.6 %
BILIRUB SERPL-MCNC: 0.9 MG/DL (ref 0–1)
BILIRUBIN URINE: NEGATIVE
BLOOD, URINE: NEGATIVE
BUN BLDV-MCNC: 25 MG/DL (ref 7–20)
CALCIUM SERPL-MCNC: 10.4 MG/DL (ref 8.3–10.6)
CHLORIDE BLD-SCNC: 106 MMOL/L (ref 99–110)
CLARITY: ABNORMAL
CO2: 26 MMOL/L (ref 21–32)
COLOR: YELLOW
CREAT SERPL-MCNC: 1.1 MG/DL (ref 0.6–1.2)
EKG ATRIAL RATE: 79 BPM
EKG DIAGNOSIS: NORMAL
EKG Q-T INTERVAL: 464 MS
EKG QRS DURATION: 104 MS
EKG QTC CALCULATION (BAZETT): 467 MS
EKG R AXIS: 23 DEGREES
EKG T AXIS: -19 DEGREES
EKG VENTRICULAR RATE: 61 BPM
EOSINOPHILS ABSOLUTE: 0 K/UL (ref 0–0.6)
EOSINOPHILS RELATIVE PERCENT: 0.1 %
EPITHELIAL CELLS, UA: 0 /HPF (ref 0–5)
GFR AFRICAN AMERICAN: 56
GFR NON-AFRICAN AMERICAN: 47
GLOBULIN: 3.2 G/DL
GLUCOSE BLD-MCNC: 133 MG/DL (ref 70–99)
GLUCOSE URINE: NEGATIVE MG/DL
HCT VFR BLD CALC: 39.3 % (ref 36–48)
HEMOGLOBIN: 12.8 G/DL (ref 12–16)
HYALINE CASTS: 13 /LPF (ref 0–8)
KETONES, URINE: NEGATIVE MG/DL
LACTIC ACID, SEPSIS: 2.2 MMOL/L (ref 0.4–1.9)
LACTIC ACID, SEPSIS: 2.7 MMOL/L (ref 0.4–1.9)
LEUKOCYTE ESTERASE, URINE: ABNORMAL
LYMPHOCYTES ABSOLUTE: 0.6 K/UL (ref 1–5.1)
LYMPHOCYTES RELATIVE PERCENT: 8.4 %
MCH RBC QN AUTO: 27.7 PG (ref 26–34)
MCHC RBC AUTO-ENTMCNC: 32.6 G/DL (ref 31–36)
MCV RBC AUTO: 85.1 FL (ref 80–100)
MICROSCOPIC EXAMINATION: YES
MONOCYTES ABSOLUTE: 0.5 K/UL (ref 0–1.3)
MONOCYTES RELATIVE PERCENT: 6.6 %
NEUTROPHILS ABSOLUTE: 5.9 K/UL (ref 1.7–7.7)
NEUTROPHILS RELATIVE PERCENT: 84.3 %
NITRITE, URINE: POSITIVE
PDW BLD-RTO: 19.6 % (ref 12.4–15.4)
PH UA: 6.5 (ref 5–8)
PLATELET # BLD: 143 K/UL (ref 135–450)
PMV BLD AUTO: 10.7 FL (ref 5–10.5)
POTASSIUM REFLEX MAGNESIUM: 5.1 MMOL/L (ref 3.5–5.1)
PROCALCITONIN: 0.04 NG/ML (ref 0–0.15)
PROTEIN UA: 30 MG/DL
RBC # BLD: 4.62 M/UL (ref 4–5.2)
RBC UA: 1 /HPF (ref 0–4)
SODIUM BLD-SCNC: 141 MMOL/L (ref 136–145)
SPECIFIC GRAVITY UA: 1.02 (ref 1–1.03)
TOTAL PROTEIN: 6.9 G/DL (ref 6.4–8.2)
TROPONIN: <0.01 NG/ML
URINE TYPE: ABNORMAL
UROBILINOGEN, URINE: 1 E.U./DL
VALPROIC ACID LEVEL: <2.8 UG/ML (ref 50–100)
WBC # BLD: 7 K/UL (ref 4–11)
WBC UA: 53 /HPF (ref 0–5)

## 2021-03-29 PROCEDURE — 87088 URINE BACTERIA CULTURE: CPT

## 2021-03-29 PROCEDURE — 96365 THER/PROPH/DIAG IV INF INIT: CPT

## 2021-03-29 PROCEDURE — 93010 ELECTROCARDIOGRAM REPORT: CPT | Performed by: INTERNAL MEDICINE

## 2021-03-29 PROCEDURE — 71045 X-RAY EXAM CHEST 1 VIEW: CPT

## 2021-03-29 PROCEDURE — 80164 ASSAY DIPROPYLACETIC ACD TOT: CPT

## 2021-03-29 PROCEDURE — 87040 BLOOD CULTURE FOR BACTERIA: CPT

## 2021-03-29 PROCEDURE — 1200000000 HC SEMI PRIVATE

## 2021-03-29 PROCEDURE — 93005 ELECTROCARDIOGRAM TRACING: CPT | Performed by: STUDENT IN AN ORGANIZED HEALTH CARE EDUCATION/TRAINING PROGRAM

## 2021-03-29 PROCEDURE — 84484 ASSAY OF TROPONIN QUANT: CPT

## 2021-03-29 PROCEDURE — 2580000003 HC RX 258: Performed by: STUDENT IN AN ORGANIZED HEALTH CARE EDUCATION/TRAINING PROGRAM

## 2021-03-29 PROCEDURE — 85025 COMPLETE CBC W/AUTO DIFF WBC: CPT

## 2021-03-29 PROCEDURE — 87086 URINE CULTURE/COLONY COUNT: CPT

## 2021-03-29 PROCEDURE — 6360000002 HC RX W HCPCS: Performed by: INTERNAL MEDICINE

## 2021-03-29 PROCEDURE — 6370000000 HC RX 637 (ALT 250 FOR IP): Performed by: INTERNAL MEDICINE

## 2021-03-29 PROCEDURE — 99283 EMERGENCY DEPT VISIT LOW MDM: CPT

## 2021-03-29 PROCEDURE — 6360000002 HC RX W HCPCS: Performed by: STUDENT IN AN ORGANIZED HEALTH CARE EDUCATION/TRAINING PROGRAM

## 2021-03-29 PROCEDURE — 80053 COMPREHEN METABOLIC PANEL: CPT

## 2021-03-29 PROCEDURE — 87186 SC STD MICRODIL/AGAR DIL: CPT

## 2021-03-29 PROCEDURE — 81001 URINALYSIS AUTO W/SCOPE: CPT

## 2021-03-29 PROCEDURE — 84145 PROCALCITONIN (PCT): CPT

## 2021-03-29 PROCEDURE — 2580000003 HC RX 258: Performed by: INTERNAL MEDICINE

## 2021-03-29 PROCEDURE — 94760 N-INVAS EAR/PLS OXIMETRY 1: CPT

## 2021-03-29 PROCEDURE — 83605 ASSAY OF LACTIC ACID: CPT

## 2021-03-29 PROCEDURE — 70450 CT HEAD/BRAIN W/O DYE: CPT

## 2021-03-29 RX ORDER — 0.9 % SODIUM CHLORIDE 0.9 %
1000 INTRAVENOUS SOLUTION INTRAVENOUS ONCE
Status: COMPLETED | OUTPATIENT
Start: 2021-03-29 | End: 2021-03-29

## 2021-03-29 RX ORDER — ACETAMINOPHEN 650 MG/1
650 SUPPOSITORY RECTAL EVERY 6 HOURS PRN
Status: DISCONTINUED | OUTPATIENT
Start: 2021-03-29 | End: 2021-04-05 | Stop reason: HOSPADM

## 2021-03-29 RX ORDER — ALBUTEROL SULFATE 2.5 MG/3ML
2.5 SOLUTION RESPIRATORY (INHALATION) EVERY 6 HOURS PRN
Status: DISCONTINUED | OUTPATIENT
Start: 2021-03-29 | End: 2021-04-05 | Stop reason: HOSPADM

## 2021-03-29 RX ORDER — BUDESONIDE AND FORMOTEROL FUMARATE DIHYDRATE 160; 4.5 UG/1; UG/1
2 AEROSOL RESPIRATORY (INHALATION) 2 TIMES DAILY
Status: DISCONTINUED | OUTPATIENT
Start: 2021-03-29 | End: 2021-04-05 | Stop reason: HOSPADM

## 2021-03-29 RX ORDER — ACETAMINOPHEN 325 MG/1
650 TABLET ORAL EVERY 6 HOURS PRN
Status: DISCONTINUED | OUTPATIENT
Start: 2021-03-29 | End: 2021-04-05 | Stop reason: HOSPADM

## 2021-03-29 RX ORDER — POLYETHYLENE GLYCOL 3350 17 G/17G
17 POWDER, FOR SOLUTION ORAL DAILY PRN
Status: DISCONTINUED | OUTPATIENT
Start: 2021-03-29 | End: 2021-04-05 | Stop reason: HOSPADM

## 2021-03-29 RX ORDER — DIVALPROEX SODIUM 125 MG/1
250 CAPSULE, COATED PELLETS ORAL 2 TIMES DAILY
Status: DISCONTINUED | OUTPATIENT
Start: 2021-03-29 | End: 2021-03-30

## 2021-03-29 RX ORDER — METOPROLOL SUCCINATE 25 MG/1
25 TABLET, EXTENDED RELEASE ORAL DAILY
Status: DISCONTINUED | OUTPATIENT
Start: 2021-03-30 | End: 2021-04-02

## 2021-03-29 RX ORDER — CALCIUM CARBONATE 500(1250)
500 TABLET ORAL DAILY
Status: DISCONTINUED | OUTPATIENT
Start: 2021-03-30 | End: 2021-04-05 | Stop reason: HOSPADM

## 2021-03-29 RX ORDER — SODIUM CHLORIDE 9 MG/ML
INJECTION, SOLUTION INTRAVENOUS CONTINUOUS
Status: DISCONTINUED | OUTPATIENT
Start: 2021-03-29 | End: 2021-03-31

## 2021-03-29 RX ORDER — QUETIAPINE FUMARATE 25 MG/1
25 TABLET, FILM COATED ORAL NIGHTLY PRN
Status: DISCONTINUED | OUTPATIENT
Start: 2021-03-29 | End: 2021-04-01

## 2021-03-29 RX ORDER — DIVALPROEX SODIUM 125 MG/1
250 CAPSULE, COATED PELLETS ORAL 2 TIMES DAILY
Status: ON HOLD | COMMUNITY
End: 2021-04-01 | Stop reason: HOSPADM

## 2021-03-29 RX ORDER — ONDANSETRON 2 MG/ML
4 INJECTION INTRAMUSCULAR; INTRAVENOUS EVERY 6 HOURS PRN
Status: DISCONTINUED | OUTPATIENT
Start: 2021-03-29 | End: 2021-04-05 | Stop reason: HOSPADM

## 2021-03-29 RX ORDER — SODIUM CHLORIDE 0.9 % (FLUSH) 0.9 %
10 SYRINGE (ML) INJECTION PRN
Status: DISCONTINUED | OUTPATIENT
Start: 2021-03-29 | End: 2021-04-05 | Stop reason: HOSPADM

## 2021-03-29 RX ORDER — VITAMIN B COMPLEX
1000 TABLET ORAL DAILY
Status: DISCONTINUED | OUTPATIENT
Start: 2021-03-30 | End: 2021-04-05 | Stop reason: HOSPADM

## 2021-03-29 RX ORDER — SODIUM CHLORIDE 0.9 % (FLUSH) 0.9 %
10 SYRINGE (ML) INJECTION EVERY 12 HOURS SCHEDULED
Status: DISCONTINUED | OUTPATIENT
Start: 2021-03-29 | End: 2021-04-05 | Stop reason: HOSPADM

## 2021-03-29 RX ORDER — SODIUM CHLORIDE 9 MG/ML
25 INJECTION, SOLUTION INTRAVENOUS PRN
Status: DISCONTINUED | OUTPATIENT
Start: 2021-03-29 | End: 2021-04-05 | Stop reason: HOSPADM

## 2021-03-29 RX ORDER — M-VIT,TX,IRON,MINS/CALC/FOLIC 27MG-0.4MG
1 TABLET ORAL DAILY
Status: DISCONTINUED | OUTPATIENT
Start: 2021-03-30 | End: 2021-04-05 | Stop reason: HOSPADM

## 2021-03-29 RX ORDER — PROMETHAZINE HYDROCHLORIDE 25 MG/1
12.5 TABLET ORAL EVERY 6 HOURS PRN
Status: DISCONTINUED | OUTPATIENT
Start: 2021-03-29 | End: 2021-04-05 | Stop reason: HOSPADM

## 2021-03-29 RX ADMIN — SODIUM CHLORIDE, PRESERVATIVE FREE 10 ML: 5 INJECTION INTRAVENOUS at 21:20

## 2021-03-29 RX ADMIN — SODIUM CHLORIDE: 9 INJECTION, SOLUTION INTRAVENOUS at 19:17

## 2021-03-29 RX ADMIN — CEFEPIME HYDROCHLORIDE 1000 MG: 1 INJECTION, POWDER, FOR SOLUTION INTRAMUSCULAR; INTRAVENOUS at 16:29

## 2021-03-29 RX ADMIN — DIVALPROEX SODIUM 250 MG: 125 CAPSULE, COATED PELLETS ORAL at 20:20

## 2021-03-29 RX ADMIN — SODIUM CHLORIDE 1000 ML: 9 INJECTION, SOLUTION INTRAVENOUS at 15:52

## 2021-03-29 RX ADMIN — QUETIAPINE FUMARATE 25 MG: 25 TABLET ORAL at 23:38

## 2021-03-29 RX ADMIN — VANCOMYCIN HYDROCHLORIDE 1000 MG: 1 INJECTION, POWDER, LYOPHILIZED, FOR SOLUTION INTRAVENOUS at 17:47

## 2021-03-29 RX ADMIN — CEFTRIAXONE 1000 MG: 1 INJECTION, POWDER, FOR SOLUTION INTRAMUSCULAR; INTRAVENOUS at 19:17

## 2021-03-29 NOTE — ED PROVIDER NOTES
Primary Care Physician: TASHI Encarnacion - CNP   Attending Physician: Anthony Ashraf DO     History   Chief Complaint   Patient presents with    Altered Mental Status     pt comes in via ems for AMS since saturday, not following commands. Pt lives at home with family. squad reports recent UTI per family. HPI   Elizabeth Cain is a 80 y.o. female of Alzheimer's, atrial fibrillation, hypertension, presenting brought by EMS with altered mental status that started since Saturday. She is unable to provide history and information was obtained from the son who stated that patient declined since Sunday and has not been following commands. Family is concerned that symptoms may be secondary to urinary tract infection. She is unable to provide any additional information. Past Medical History:   Diagnosis Date    [facility]     Atrial fibrillation ([facility]     Benign essential HTN     Gout         History reviewed. No pertinent surgical history. History reviewed. No pertinent family history.      Social History     Socioeconomic History    Marital status: Single     Spouse name: None    Number of children: None    Years of education: None    Highest education level: None   Occupational History    None   Social Needs    Financial resource strain: None    Food insecurity     Worry: None     Inability: None    Transportation needs     Medical: None     Non-medical: None   Tobacco Use    Smoking status: Unknown If Ever Smoked    Smokeless tobacco: Never Used   Substance and Sexual Activity    Alcohol use: Never     Frequency: Never     Binge frequency: Never    Drug use: Never    Sexual activity: Not Currently   Lifestyle    Physical activity     Days per week: None     Minutes per session: None    Stress: None   Relationships    Social connections     Talks on phone: None     Gets together: None     Attends Worship service: None     Active member of club or organization: None Attends meetings of clubs or organizations: None     Relationship status: None    Intimate partner violence     Fear of current or ex partner: None     Emotionally abused: None     Physically abused: None     Forced sexual activity: None   Other Topics Concern    None   Social History Narrative    None        Review of Systems   10 total systems reviewed but unable to obtain secondary to mental status. Physical Exam   /72   Pulse 75   Temp 97.6 °F (36.4 °C) (Axillary)   Resp 16   Wt 200 lb 6.4 oz (90.9 kg)   SpO2 97%   BMI 33.35 kg/m²      CONSTITUTIONAL: Ill looking and confused  HEAD: atraumatic, normocephalic   EYES: PERRL, No injection, discharge or scleral icterus. ENT: Moist mucous membranes. NECK: Normal ROM, NO LAD   CARDIOVASCULAR: Regular rate and rhythm. No murmurs or gallop. PULMONARY/CHEST: Airway patent. No retractions. Breath sounds clear with good air entry bilaterally. ABDOMEN: Soft, Non-distended and non-tender, without guarding or rebound. SKIN: Acyanotic, warm, dry   MUSCULOSKELETAL: No swelling, tenderness or deformity   NEUROLOGICAL: Awake and alert. Pulses intact. Grossly nonfocal   Nursing note and vitals reviewed.      ED Course & Medical Decision Making   Medications   albuterol (PROVENTIL) nebulizer solution 2.5 mg (has no administration in time range)   metoprolol succinate (TOPROL XL) extended release tablet 25 mg ( Oral Automatically Held 4/5/21 0900)   therapeutic multivitamin-minerals 1 tablet (1 tablet Oral Not Given 3/31/21 0957)   calcium elemental (OSCAL) tablet 500 mg (500 mg Oral Not Given 3/31/21 0956)   Vitamin D (CHOLECALCIFEROL) tablet 1,000 Units (1,000 Units Oral Not Given 3/31/21 0958)   rivaroxaban (XARELTO) tablet 15 mg (15 mg Oral Not Given 3/31/21 0957)   QUEtiapine (SEROQUEL) tablet 25 mg (25 mg Oral Given 3/29/21 2338)   budesonide-formoterol (SYMBICORT) 160-4.5 MCG/ACT inhaler 2 puff (2 puffs Inhalation Not Given 3/31/21 0910) sodium chloride flush 0.9 % injection 10 mL (10 mLs Intravenous Not Given 3/31/21 0931)   sodium chloride flush 0.9 % injection 10 mL (has no administration in time range)   0.9 % sodium chloride infusion (has no administration in time range)   promethazine (PHENERGAN) tablet 12.5 mg (has no administration in time range)     Or   ondansetron (ZOFRAN) injection 4 mg (has no administration in time range)   polyethylene glycol (GLYCOLAX) packet 17 g (has no administration in time range)   acetaminophen (TYLENOL) tablet 650 mg (has no administration in time range)     Or   acetaminophen (TYLENOL) suppository 650 mg (has no administration in time range)   cefTRIAXone (ROCEPHIN) 1000 mg IVPB in 50 mL D5W minibag (1,000 mg Intravenous New Bag 3/31/21 1827)   QUEtiapine (SEROQUEL) tablet 12.5 mg (12.5 mg Oral Given 3/30/21 2056)   0.9 % sodium chloride bolus (0 mLs Intravenous Stopped 3/29/21 1753)   cefepime (MAXIPIME) 1000 mg IVPB minibag (0 mg Intravenous Stopped 3/29/21 1753)   vancomycin 1000 mg IVPB in 250 mL D5W addavial (0 mg Intravenous Stopped 3/29/21 1858)   haloperidol lactate (HALDOL) injection 2 mg (2 mg Intramuscular Given 3/30/21 0922)      Labs Reviewed   CULTURE, URINE - Abnormal; Notable for the following components:       Result Value    Organism Escherichia coli (*)     All other components within normal limits    Narrative:     ORDER#: [id number]                          ORDERED BY: KEITH PEREZ  SOURCE: Urine Clean Catch                  COLLECTED:  03/29/21 16:28  ANTIBIOTICS AT JOSSELINE.:                      RECEIVED :  03/29/21 17:04  Performed at:  [address] [address]   Phone [phone]   CBC WITH AUTO DIFFERENTIAL - Abnormal; Notable for the following components:    RDW 19.6 (*)     MPV 10.7 (*)     Lymphocytes Absolute 0.6 (*)     All other components within normal limits    Narrative:     Performed at:  [facility]) - other components within normal limits    Narrative:     Performed at:  [facility]  [address] [address]   Phone [phone]   BASIC METABOLIC PANEL W/ REFLEX TO MG FOR LOW K - Abnormal; Notable for the following components:    Chloride 111 (*)     CO2 20 (*)     GFR Non- 59 (*)     All other components within normal limits    Narrative:     Performed at:  [facility]  [address] [address]   Phone [phone]   CBC WITH AUTO DIFFERENTIAL - Abnormal; Notable for the following components:    RDW 20.1 (*)     Platelets 759 (*)     Lymphocytes Absolute 0.3 (*)     All other components within normal limits    Narrative:     Performed at:  [facility]  [address] [address]   Phone [phone]   BLOOD GAS, VENOUS - Abnormal; Notable for the following components:    pH, Albino 7.316 (*)     All other components within normal limits    Narrative:     Performed at:  [facility]  [address] [address]   Phone [phone]   LACTIC ACID, PLASMA - Abnormal; Notable for the following components:    Lactic Acid 2.1 (*)     All other components within normal limits    Narrative:     Collection has been rescheduled by [facility] at 03/31/2021 17:45 Reason:   Failed attempt at venipuncture No suitable vein for venipuncture  Performed at:  [facility]  [address] [address]   Phone [phone]   CULTURE, BLOOD 1    Narrative:     ORDER#: [id number]                          ORDERED BY: KEITH PEREZ  SOURCE: Blood                              COLLECTED:  03/29/21 15:17  ANTIBIOTICS AT JOSSELINE.:                      RECEIVED :  03/29/21 15:24  If child <=2 yrs old please draw pediatric bottle. ~Blood Culture 1  Performed at:  [address] include separately billable procedures and treating other patients. Critical care was necessary to treat or prevent imminent or life-threatening deterioration of the following conditions: Acute encephalopathy secondary to urosepsis. Pt treated urgently in the ED with antibiotics. Case discussed with consultants.       _________________________________________________________________________________________  This record is transcribed utilizing voice recognition technology. There are inherent limitations in this technology. In addition, there may be limitations in editing of this report. If there are any discrepancies, please contact me directly.         Marian Parks MD  03/31/21 9957

## 2021-03-29 NOTE — CARE COORDINATION
INITIAL CASE MANAGEMENT ASSESSMENT    Reviewed chart, met with patient to assess possible discharge needs. Explained Case Management role/services. Living Situation: lives with son Ramsey Mcdermott and his partner Aditi Verma, 4-5 steps to enter. ADLs: Assisted by son and Aditi Carlosn as needed. DME: 4 wheeled walker, cane, shower chair and grab bars    PT/OT Recs: TBD     Active Services: Active with [address], resume care as needed. Transportation: Son to Transport     Medications: Benita Samuels- on [address], APRN - CNP       HD/PD: N/A    PLAN/COMMENTS: Saturday patient got hair and nails done. 1st dosage of vaccine for Covid March 13, 2nd dosage April 10,2021. at [facility]. SW spoke with patient's son Ramsey Mcdermott- he is her [facility]. Discharge plan is to return home and resume [facility]. Advanced Care Planning completed with son Gemma [facility] still active. The Plan for Transition of Care is related to the following treatment goals: return home. The Patient and/or patient representative- anna Mcdermott  was provided with a choice of provider and agrees   with the discharge plan. [x] Yes [] No    Freedom of choice list was provided with basic dialogue that supports the patient's individualized plan of care/goals, treatment preferences and shares the quality data associated with the providers. [x] Yes [] No      SW/CM provided contact information for patient or family to call with any questions. SW/CM will follow and assist as needed. Spoke with son- DNR-CC is still active.

## 2021-03-29 NOTE — ED NOTES
Multiple attempts at IV access and obtaining labs were unsuccessful. Radha Christopher RN to attempt.      Sandy Vazquez RN  03/29/21 4794

## 2021-03-29 NOTE — H&P
Hospital Medicine History & Physical      PCP: TASHI Farrar - CNP    Date of Admission: 3/29/2021    Date of Service: Pt seen/examined on 3/29/2021    Chief Complaint:      Chief Complaint   Patient presents with    Altered Mental Status     pt comes in via ems for AMS since saturday, not following commands. Pt lives at home with family. squad reports recent UTI per family. History Of Present Illness:   80-year-old female with past medical history of hypertension, thyroid disease, atrial fibrillation presented to hospital altered mental status since Saturday. Patient is unable to provide any history and mostly does not answer any questions. According to the son apparently patient had some haircut on Saturday and she was doing well at that time. Later in the day he started becoming confused and is carried over onto Sunday. Since her confusion is getting worse she was brought into the hospital.  On arrival she was noted to be hemodynamically stable. Lab work showed lactic acidosis of 2.7. Patient had a UA performed which was concerning for urinary tract infection. CT of the head was unremarkable chest x-ray showed possible bilateral pleural effusions. Patient was admitted to the hospital for further management      Past Medical History:        Diagnosis Date    [facility] ([facility] [address]     Atrial fibrillation ([facility] [address]     Benign essential HTN     Gout        Past Surgical History:    History reviewed. No pertinent surgical history. Medications Prior to Admission:    Prior to Admission medications    Medication Sig Start Date End Date Taking?  Authorizing Provider   divalproex (DEPAKOTE SPRINKLE) 125 MG capsule Take 250 mg by mouth 2 times daily   Yes Historical Provider, MD   rivaroxaban (XARELTO) 15 MG TABS tablet Take 1 tablet by mouth daily (with breakfast) 2/16/21  Yes Carrie Bonilla MD   QUEtiapine (SEROQUEL) 25 MG tablet Take 1 tablet by mouth nightly as needed for Agitation (sleep) 2/16/21  Yes Ghulam Yip MD   vitamin D 25 MCG (1000 UT) CAPS Take 1,000 Units by mouth daily   Yes Historical Provider, MD   albuterol sulfate HFA (VENTOLIN HFA) 108 (90 Base) MCG/ACT inhaler Inhale 2 puffs into the lungs every 6 hours as needed for Wheezing   Yes Historical Provider, MD   mometasone-formoterol (DULERA) 200-5 MCG/ACT inhaler Inhale 2 puffs into the lungs 2 times daily as needed (shortness of breath)    Yes Historical Provider, MD   metoprolol succinate (TOPROL XL) 25 MG extended release tablet Take 25 mg by mouth daily    Yes Historical Provider, MD   Multiple Vitamins-Minerals (MULTIVITAMIN WITH MINERALS) tablet Take 1 tablet by mouth daily   Yes Historical Provider, MD   calcium carbonate (OSCAL) 500 MG TABS tablet Take 500 mg by mouth daily   Yes Historical Provider, MD       Allergies:  Chlorpromazine and Hydrochlorothiazide    Social History:       has an unknown smoking status. She has never used smokeless tobacco. She reports that she does not drink alcohol or use drugs. Family History:  Reviewed in detail and Positive as follows:    History reviewed. No pertinent family history. REVIEW OF SYSTEMS:   Positive review  noted in the HPI. All other systems reviewed and negative.     PHYSICAL EXAM:    /76   Pulse 56   Temp 97 °F (36.1 °C) (Temporal)   Resp 17   Wt 205 lb 14.6 oz (93.4 kg)   SpO2 97%   BMI 34.27 kg/m²   General Appearance: Alert, confused, does not speak much, unable to provide any history  Skin: warm and dry, no rash or erythema  Head: normocephalic and atraumatic  Eyes: pupils equal, round, and reactive to light, extraocular eye movements intact, conjunctivae normal  ENT: tympanic membrane, external ear and ear canal normal bilaterally, nose without deformity, nasal mucosa and turbinates normal without polyps  Neck: supple and non-tender without mass, no thyromegaly or thyroid nodules, no cervical lymphadenopathy  Pulmonary/Chest: clear to auscultation bilaterally- no wheezes, rales or rhonchi, normal air movement, no respiratory distress  Cardiovascular: normal rate, regular rhythm, normal S1 and S2, no murmurs, rubs, clicks, or gallops, Peripheral pulses good, Cap refill <3 sec, no carotid bruits  Abdomen: soft, non-tender, non-distended, normal bowel sounds, no masses or organomegaly  Extremities: no cyanosis, clubbing or edema  Musculoskeletal: normal range of motion, no joint swelling, deformity or tenderness  Neurologic: Generalized weakness without any focal motor deficits      LABS:      CBC   Recent Labs     03/29/21  1517   WBC 7.0   HGB 12.8   HCT 39.3         RENAL  Recent Labs     03/29/21  1517      K 5.1      CO2 26   BUN 25*   CREATININE 1.1     LFT'S  Recent Labs     03/29/21  1517   AST 38*   ALT 23   BILITOT 0.9   ALKPHOS 220*     COAG  No results for input(s): INR in the last 72 hours. CARDIAC ENZYMES  Recent Labs     03/29/21  1517   TROPONINI <0.01       U/A:    Lab Results   Component Value Date    COLORU YELLOW 03/29/2021    WBCUA 53 03/29/2021    RBCUA 1 03/29/2021    BACTERIA 4+ 03/29/2021    CLARITYU TURBID 03/29/2021    SPECGRAV 1.023 03/29/2021    LEUKOCYTESUR SMALL 03/29/2021    BLOODU Negative 03/29/2021    GLUCOSEU Negative 03/29/2021       ABG  No results found for: QBH1YEC, BEART, S7NTOWFR, PHART, THGBART, LDJ9NVM, PO2ART, TYQ9RMG    UA:  Recent Labs     03/29/21  1628   COLORU YELLOW   PHUR 6.5   WBCUA 48*   RBCUA 1   BACTERIA 4+*   CLARITYU TURBID*   SPECGRAV 1.023   LEUKOCYTESUR SMALL*   UROBILINOGEN 1.0   BILIRUBINUR Negative   BLOODU Negative   GLUCOSEU Negative   KETUA Negative       Microbiology:  No results for input(s): LABGRAM, LABANAE, ORG, CXSURG in the last 72 hours. Nasal Culture: No results for input(s): ORG, MRSAPCR in the last 72 hours. Blood Culture: No results for input(s): BC, BLOODCULT2, ORG in the last 72 hours.   Fungal Culture:   No results for input(s): [employer] in the last 72 hours. No results for input(s): FUNCXBLD in the last 72 hours. CSF Culture:  No results for input(s): COLORCSF, APPEARCSF, CFTUBE, CLOTCSF, WBCCSF, RBCCSF, NEUTCSF, NUMCELLSCSF, LYMPHSCSF, MONOCSF, GLUCCSF, VOLCSF in the last 72 hours. Respiratory Culture:  No results for input(s): Zaheer Men in the last 72 hours. AFB:No results for input(s): AFBSMEAR in the last 72 hours. Urine Culture  No results for input(s): LABURIN in the last 72 hours. RADIOLOGY:    CT Head WO Contrast   Final Result   1. No acute intracranial abnormality. XR CHEST PORTABLE   Final Result   Bilateral pleural effusions and bibasilar opacities could represent   atelectasis or pneumonia             Previous medical records personally reviewed and analyzed         PHYSICIAN CERTIFICATION    I certify that Jarred Moy is expected to be hospitalized for >2 midnights based on the following assessment and plan:    ASSESSMENT/PLAN:    Acute encephalopathy likely secondary to urinary tract infection   -Continue IV Rocephin  -Urine culture have been ordered    Lactic acidosis  -Continue IV fluids  -Recheck in the morning    Atrial fibrillation  -Continue Xarelto, metoprolol    Alzheimer's disease  -Stable, continue to monitor    DVT Prophylaxis: Xarelto  Diet: General  Code Status: Full Code-discussed with son    Yanet Lewis -pending clinical improvement       Dg Stephens MD  The note was completed using EMR. Every effort was made to ensure accuracy; however, inadvertent computerized transcription errors may be present. Thank you TASHI Rosales CNP for the opportunity to be involved in this patient's care. If you have any questions or concerns please feel free to contact me at [phone].

## 2021-03-29 NOTE — ED NOTES
Bed: B-07  Expected date:   Expected time:   Means of arrival:   Comments:  briana Friedman  03/29/21 5694

## 2021-03-29 NOTE — ACP (ADVANCE CARE PLANNING)
Advance Care Planning     Advance Care Planning Activator (Inpatient)  Conversation Note      Date of ACP Conversation: 3/29/2021    Conversation Conducted with: Patient with Decision Making Capacity   Healthcare Decision Maker: Named in Advance Directive or Healthcare Power of  (name) Kenn Gr    ACP Activator: Karime Paredes:     Current Designated Health Care Decision Maker:     Primary Decision Maker: Don Salguero - Child - [phone]    Today we documented Decision Maker(s) consistent with ACP documents on file. Care Preferences    Ventilation: \"If you were in your present state of health and suddenly became very ill and were unable to breathe on your own, what would your preference be about the use of a ventilator (breathing machine) if it were available to you? \"      Would the patient desire the use of ventilator (breathing machine)?: no    \"If your health worsens and it becomes clear that your chance of recovery is unlikely, what would your preference be about the use of a ventilator (breathing machine) if it were available to you? \"     Would the patient desire the use of ventilator (breathing machine)?: No      Resuscitation  \"CPR works best to restart the heart when there is a sudden event, like a heart attack, in someone who is otherwise healthy. Unfortunately, CPR does not typically restart the heart for people who have serious health conditions or who are very sick. \"    \"In the event your heart stopped as a result of an underlying serious health condition, would you want attempts to be made to restart your heart (answer \"yes\" for attempt to resuscitate) or would you prefer a natural death (answer \"no\" for do not attempt to resuscitate)? \" no    Patient has DNR-CC in EHR   [x] Yes   [] No   Educated Patient / Joseph Villatoro regarding differences between Advance Directives and portable DNR orders.     Length of ACP Conversation in minutes: 10    Conversation Outcomes:  [x] ACP discussion completed  [x] Existing advance directive reviewed with patient; no changes to patient's previously recorded wishes  [] New Advance Directive completed  [] Portable Do Not Rescitate prepared for Provider review and signature  [] POLST/POST/MOLST/MOST prepared for Provider review and signature      Follow-up plan:    [] Schedule follow-up conversation to continue planning  [] Referred individual to Provider for additional questions/concerns   [] Advised patient/agent/surrogate to review completed ACP document and update if needed with changes in condition, patient preferences or care setting    [x] This note routed to one or more involved healthcare providers

## 2021-03-30 LAB
ANION GAP SERPL CALCULATED.3IONS-SCNC: 11 MMOL/L (ref 3–16)
BASOPHILS ABSOLUTE: 0 K/UL (ref 0–0.2)
BASOPHILS RELATIVE PERCENT: 0.5 %
BUN BLDV-MCNC: 20 MG/DL (ref 7–20)
CALCIUM SERPL-MCNC: 9.9 MG/DL (ref 8.3–10.6)
CHLORIDE BLD-SCNC: 111 MMOL/L (ref 99–110)
CO2: 20 MMOL/L (ref 21–32)
CREAT SERPL-MCNC: 0.9 MG/DL (ref 0.6–1.2)
EOSINOPHILS ABSOLUTE: 0.1 K/UL (ref 0–0.6)
EOSINOPHILS RELATIVE PERCENT: 1.2 %
GFR AFRICAN AMERICAN: >60
GFR NON-AFRICAN AMERICAN: 59
GLUCOSE BLD-MCNC: 98 MG/DL (ref 70–99)
HCT VFR BLD CALC: 39 % (ref 36–48)
HEMOGLOBIN: 12.3 G/DL (ref 12–16)
LACTIC ACID: 1.4 MMOL/L (ref 0.4–2)
LYMPHOCYTES ABSOLUTE: 0.3 K/UL (ref 1–5.1)
LYMPHOCYTES RELATIVE PERCENT: 3.9 %
MCH RBC QN AUTO: 28 PG (ref 26–34)
MCHC RBC AUTO-ENTMCNC: 31.6 G/DL (ref 31–36)
MCV RBC AUTO: 88.6 FL (ref 80–100)
MONOCYTES ABSOLUTE: 0.5 K/UL (ref 0–1.3)
MONOCYTES RELATIVE PERCENT: 7 %
NEUTROPHILS ABSOLUTE: 6.7 K/UL (ref 1.7–7.7)
NEUTROPHILS RELATIVE PERCENT: 87.4 %
PDW BLD-RTO: 20.1 % (ref 12.4–15.4)
PLATELET # BLD: 111 K/UL (ref 135–450)
PMV BLD AUTO: 10.4 FL (ref 5–10.5)
POTASSIUM REFLEX MAGNESIUM: 4.5 MMOL/L (ref 3.5–5.1)
RBC # BLD: 4.41 M/UL (ref 4–5.2)
SODIUM BLD-SCNC: 142 MMOL/L (ref 136–145)
WBC # BLD: 7.7 K/UL (ref 4–11)

## 2021-03-30 PROCEDURE — 36415 COLL VENOUS BLD VENIPUNCTURE: CPT

## 2021-03-30 PROCEDURE — 80048 BASIC METABOLIC PNL TOTAL CA: CPT

## 2021-03-30 PROCEDURE — 83605 ASSAY OF LACTIC ACID: CPT

## 2021-03-30 PROCEDURE — 97166 OT EVAL MOD COMPLEX 45 MIN: CPT

## 2021-03-30 PROCEDURE — 1200000000 HC SEMI PRIVATE

## 2021-03-30 PROCEDURE — 97162 PT EVAL MOD COMPLEX 30 MIN: CPT

## 2021-03-30 PROCEDURE — 6360000002 HC RX W HCPCS: Performed by: INTERNAL MEDICINE

## 2021-03-30 PROCEDURE — 2580000003 HC RX 258: Performed by: INTERNAL MEDICINE

## 2021-03-30 PROCEDURE — 97530 THERAPEUTIC ACTIVITIES: CPT

## 2021-03-30 PROCEDURE — 97535 SELF CARE MNGMENT TRAINING: CPT

## 2021-03-30 PROCEDURE — 6370000000 HC RX 637 (ALT 250 FOR IP): Performed by: INTERNAL MEDICINE

## 2021-03-30 PROCEDURE — 94760 N-INVAS EAR/PLS OXIMETRY 1: CPT

## 2021-03-30 PROCEDURE — 85025 COMPLETE CBC W/AUTO DIFF WBC: CPT

## 2021-03-30 RX ORDER — DIVALPROEX SODIUM 125 MG/1
125 CAPSULE, COATED PELLETS ORAL NIGHTLY
Status: DISCONTINUED | OUTPATIENT
Start: 2021-03-30 | End: 2021-03-31

## 2021-03-30 RX ORDER — HALOPERIDOL 5 MG/ML
2 INJECTION INTRAMUSCULAR ONCE
Status: COMPLETED | OUTPATIENT
Start: 2021-03-30 | End: 2021-03-30

## 2021-03-30 RX ORDER — QUETIAPINE FUMARATE 25 MG/1
12.5 TABLET, FILM COATED ORAL NIGHTLY
Status: DISCONTINUED | OUTPATIENT
Start: 2021-03-30 | End: 2021-04-01

## 2021-03-30 RX ADMIN — CEFTRIAXONE 1000 MG: 1 INJECTION, POWDER, FOR SOLUTION INTRAMUSCULAR; INTRAVENOUS at 18:30

## 2021-03-30 RX ADMIN — QUETIAPINE FUMARATE 12.5 MG: 25 TABLET ORAL at 20:56

## 2021-03-30 RX ADMIN — HALOPERIDOL LACTATE 2 MG: 5 INJECTION, SOLUTION INTRAMUSCULAR at 09:22

## 2021-03-30 RX ADMIN — SODIUM CHLORIDE: 9 INJECTION, SOLUTION INTRAVENOUS at 08:36

## 2021-03-30 RX ADMIN — DIVALPROEX SODIUM 125 MG: 125 CAPSULE ORAL at 20:56

## 2021-03-30 ASSESSMENT — PAIN SCALES - GENERAL: PAINLEVEL_OUTOF10: 0

## 2021-03-30 NOTE — PLAN OF CARE
Problem: Falls - Risk of: Bed alarm in place and call light within reach  Goal: Will remain free from falls  Description: Will remain free from falls  Outcome: Ongoing  Goal: Absence of physical injury  Description: Absence of physical injury  Outcome: Ongoing

## 2021-03-30 NOTE — PROGRESS NOTES
Pt becoming very agitated. Trying to get out of bed and won't let us touch her. Pt refusing to let us put pulse ox on her finger. Pt took out her purewick and refusing to let us put it back in. Dr. Ward Troncoso made aware. Will continue to monitor.   Electronically signed by Gael Milian RN on 3/30/2021 at 6:48 PM

## 2021-03-30 NOTE — CARE COORDINATION
INITIAL CASE MANAGEMENT ASSESSMENT (follow up)     PT/OT Recs: Home with home care and 24 hour supervision.     Active Services: Active with [facility]. We will obtain new orders prior to discharge.      PLAN/COMMENTS:   1) Home with home care. Active with [facility]. 2) Discharge to home with family.      Respectfully submitted,    Payton Silveira3ZECHARIAH  [facility]   [phone]       Electronically signed by ZECHARIAH Cotton on 3/30/2021 at 10:17 AM

## 2021-03-30 NOTE — CONSULTS
[facility]  Palliative Care   Consult Note    NAME:  Rosario Coker  MEDICAL RECORD NUMBER:  [id number]  AGE: 80 y.o. GENDER: female  : 1931  TODAY'S DATE:  3/30/2021    Subjective     Reason for Consult:  goals of care  Visit Type: Initial Consult      Rosario Coker is a 80 y.o. female referred by:   [x] Physician    PAST MEDICAL HISTORY      Diagnosis Date    [facility]'s dementia ([facility] [address]     Atrial fibrillation ([facility] [address]     Benign essential HTN     Gout        PAST SURGICAL HISTORY  History reviewed. No pertinent surgical history. FAMILY HISTORY  History reviewed. No pertinent family history. SOCIAL HISTORY  Social History     Tobacco Use    Smoking status: Unknown If Ever Smoked    Smokeless tobacco: Never Used   Substance Use Topics    Alcohol use: Never     Frequency: Never     Binge frequency: Never    Drug use: Never       ALLERGIES  Allergies   Allergen Reactions    Chlorpromazine Other (See Comments)    Hydrochlorothiazide Other (See Comments)     Gout       MEDICATIONS  No current facility-administered medications on file prior to encounter.       Current Outpatient Medications on File Prior to Encounter   Medication Sig Dispense Refill    divalproex (DEPAKOTE SPRINKLE) 125 MG capsule Take 250 mg by mouth 2 times daily      rivaroxaban (XARELTO) 15 MG TABS tablet Take 1 tablet by mouth daily (with breakfast) 90 tablet 1    QUEtiapine (SEROQUEL) 25 MG tablet Take 1 tablet by mouth nightly as needed for Agitation (sleep) 60 tablet 1    vitamin D 25 MCG (1000 UT) CAPS Take 1,000 Units by mouth daily      albuterol sulfate HFA (VENTOLIN HFA) 108 (90 Base) MCG/ACT inhaler Inhale 2 puffs into the lungs every 6 hours as needed for Wheezing      mometasone-formoterol (DULERA) 200-5 MCG/ACT inhaler Inhale 2 puffs into the lungs 2 times daily as needed (shortness of breath)       metoprolol succinate (TOPROL XL) 25 MG extended release tablet Take 25 mg by mouth daily       seroquel at home on a regular basis. Referral to palliative care for Andrés Wilkerson, Dr Sandeep Abad informed of above. The Patient and/or patient representative son Viet Larkin was provided with a choice of provider and agrees   with the discharge plan. [x] Yes [] No    Freedom of choice list was provided with basic dialogue that supports the patient's individualized plan of care/goals, treatment preferences and shares the quality data associated with the providers. [x] Yes [] No    I will continue to follow Ms. Adorno's care as needed. Thank you for allowing me to participate in the care of Ms. Adorno .      Electronically signed by Carlos Enrique Mitchell RN, [address] [place] [address] on 3/30/2021 at 3:06 PM  [address] [place] [address]  Office: [phone]

## 2021-03-30 NOTE — PROGRESS NOTES
Pt is disoriented x4 and unable to follow commands. I am unable to give pt PO meds at this time. Dr. Sandeep Abad made aware. Will continue to monitor.   Electronically signed by Laron Russell RN on 3/30/2021 at 8:47 AM

## 2021-03-30 NOTE — PROGRESS NOTES
Physical Therapy    Facility/Department: [address] MED SURG  Initial Assessment    NAME: Yessenia Hernandez  : 1931  MRN: [id number]    Date of Service: 3/30/2021    Discharge Recommendations:  24 hour supervision or assist, Patient would benefit from continued therapy after discharge, S Level 1, Home with Home health PT     Yessenia Hernandez scored a 10/ on the AM-PAC short mobility form. Current research shows that an AM-PAC score of 18 or greater is typically associated with a discharge to the patient's home setting. Based on the patient's AM-PAC score and their current functional mobility deficits, it is recommended that the patient have 2-3 sessions per week of Physical Therapy at d/c to increase the patient's independence. At this time, this patient demonstrates the endurance and safety to discharge home with HHPT and a follow up treatment frequency of 2-3x/wk. Please see assessment section for further patient specific details. If patient discharges prior to next session this note will serve as a discharge summary. Please see below for the latest assessment towards goals. HOME HEALTH CARE: LEVEL 1 STANDARD     -Initial home health evaluation to occur within 24-48 hours, in patient home    -Home health agency to establish plan of care for patient over 60 day period    -Medication Reconciliation    -PCP Visit scheduled within seven days of discharge    -PT/OT to evaluate with goal of regaining prior level of functioning    -OT to evaluate if patient has [address] needs for personal care         Assessment   Body structures, Functions, Activity limitations: Decreased functional mobility ; Decreased strength;Decreased ADL status; Decreased cognition  Assessment: 66-year-old female with past medical history of hypertension, thyroid disease, atrial fibrillation presented to hospital on 3/29/21 altered mental status since Saturday.  Pt reportedly requires assist from son with almost all ADLs, however is diamond davis)        Plan   Plan  Times per week: 3-5x/week  Current Treatment Recommendations: Functional Mobility Training, Strengthening, Transfer Training, Balance Training, Gait Training, Patient/Caregiver Education & Training, Safety Education & Training, Neuromuscular Re-education  Safety Devices  Type of devices:  All fall risk precautions in place, Call light within reach, Gait belt, Patient at risk for falls, Left in chair, Nurse notified, Thersa Landsman in use, Chair alarm in place      AM-PAC Score  AM-PAC Inpatient Mobility Raw Score : 10 (03/30/21 0824)  AM-PAC Inpatient T-Scale Score : 32.29 (03/30/21 0824)  Mobility Inpatient CMS 0-100% Score: 76.75 (03/30/21 0824)  Mobility Inpatient CMS G-Code Modifier : CL (03/30/21 3533)          Goals  Short term goals  Time Frame for Short term goals: by acute discharge  Short term goal 1: bed mobility with min A  Short term goal 2: sit<>stand with CGA to RW  Short term goal 3: ambulate > 10' with RW and CGA  Patient Goals   Patient goals : unable to assess       Therapy Time   Individual Concurrent Group Co-treatment   Time In 0740         Time Out 0820         Minutes 40         Timed Code Treatment Minutes: 25 Minutes       Yg Madden, PT

## 2021-03-30 NOTE — PROGRESS NOTES
Intravenous Q24H     PRN Meds: albuterol, QUEtiapine, sodium chloride flush, sodium chloride, promethazine **OR** ondansetron, polyethylene glycol, acetaminophen **OR** acetaminophen      Intake/Output Summary (Last 24 hours) at 3/30/2021 1131  Last data filed at 3/30/2021 0446  Gross per 24 hour   Intake --   Output 300 ml   Net -300 ml       Physical Exam Performed:    BP (!) 140/58   Pulse 63   Temp 97.7 °F (36.5 °C) (Axillary)   Resp 18   Wt 207 lb 3.7 oz (94 kg)   SpO2 94%   BMI 34.49 kg/m²     General appearance: Confused and frail looking, sitting at bedside chair comfortably  HEENT:  Conjunctivae/corneas clear. Neck: Supple, with full range of motion. Respiratory:  Normal respiratory effort. Clear to auscultation, bilaterally without Rales/Wheezes/Rhonchi. Cardiovascular: Regular rate and rhythm with normal S1/S2 without murmurs or rubs  Abdomen: Soft, non-tender, non-distended, normal bowel sounds. Musculoskeletal: No cyanosis or edema bilaterally  Neurologic: Alert oriented x1 - knows she is in the hospital. Moves all extremities. Peripheral Pulses: +2 palpable, equal bilaterally       Labs:   Recent Labs     03/29/21  1517 03/30/21  0712   WBC 7.0 7.7   HGB 12.8 12.3   HCT 39.3 39.0    111*     Recent Labs     03/29/21  1517 03/30/21  0712    142   K 5.1 4.5    111*   CO2 26 20*   BUN 25* 20   CREATININE 1.1 0.9   CALCIUM 10.4 9.9     Recent Labs     03/29/21  1517   AST 38*   ALT 23   BILITOT 0.9   ALKPHOS 220*     No results for input(s): INR in the last 72 hours. Recent Labs     03/29/21  1517   TROPONINI <0.01       Urinalysis:      Lab Results   Component Value Date    NITRU POSITIVE 03/29/2021    WBCUA 53 03/29/2021    BACTERIA 4+ 03/29/2021    RBCUA 1 03/29/2021    BLOODU Negative 03/29/2021    SPECGRAV 1.023 03/29/2021    GLUCOSEU Negative 03/29/2021       Radiology:  CT Head WO Contrast   Final Result   1. No acute intracranial abnormality.          XR CHEST PORTABLE   Final Result   Bilateral pleural effusions and bibasilar opacities could represent   atelectasis or pneumonia                 Assessment/Plan:    Active Hospital Problems    Diagnosis    Acute encephalopathy [G93.40]          Acute metabolic encephalopathy - in setting of progressive advanced Alzheimer's Dementia. Possibly worsened by Urinary tract infection, though I am concerned about progressing dementia.               - again - decrease depakote to nightly dosing only due to oversedation.               - haldol IM today. - start seroquel in the evenings (she is actively hallucinating) - very low dose. - if family agrees - I would avoid increasing depakote dose in the future        Chronic Atrial fibrillation on eliquis.         Alzheimer's dementia - severe end stage.   See med discussion as above.         Consulted palliative care - CODE STATUS changed to DNR CCA last admission. Will reconsult again.         Cardiology was consulted last admission for suspected CHF - diastolic with severe valvular disease. She has mod to severe MR, mod AR and severe TR. She was deemed very poor candidate for aggressive intervention. She actually appears dehydrated - Will continue with IVF and monitor closely - stop IVF when tolerates diet better.        DVT Prophylaxis: on eliquis  Diet: DIET GENERAL; Low Sodium (2 GM); Daily Fluid Restriction: 2000 ml; Cardiac  Code Status: DNR-CCA    PTOT as tolerates. Dispo - cc. From my last note on 2/16/21 -     \"Pt's son is either in denial of her present condition or not willing to accept the reality of the situation. He appealed discharge this admission.   [facility] refused ECF placement which is understandable, but I am concerned he will be struggling providing full care at home. \"        Antwon Mcdaniels MD

## 2021-03-30 NOTE — PROGRESS NOTES
Occupational Therapy   Occupational Therapy Initial Assessment  Date: 3/30/2021   Patient Name: Magi Banegas  MRN: [id number]     : 1931    Date of Service: 3/30/2021    Discharge Recommendations:  Patient would benefit from continued therapy after discharge, 2-3 sessions per week, Home with Home health OT, 24 hour supervision or assist  OT Equipment Recommendations  Equipment Needed: No    Magi Banegas scored a  on the AM-PAC ADL Inpatient form. Current research shows that an AM-PAC score of 18 or greater is typically associated with a discharge to the patient's home setting. Based on the patient's AM-PAC score, and their current ADL deficits, it is recommended that the patient have 2-3 sessions per week of Occupational Therapy at d/c to increase the patient's independence. At this time, this patient demonstrates the endurance and safety to discharge home with [facility] and a follow up treatment frequency of 2-3x/wk. Please see assessment section for further patient specific details. If patient discharges prior to next session this note will serve as a discharge summary. Please see below for the latest assessment towards goals. Assessment   Performance deficits / Impairments: Decreased functional mobility ; Decreased endurance;Decreased ADL status; Decreased safe awareness;Decreased high-level IADLs;Decreased cognition;Decreased balance;Decreased fine motor control  Assessment: Pt is a 81 yo female admitted 3/29 for AMS with PMH of afib and Alzheimer's. PTA, pt lives with son and his partner and requires assist for all ADLs and set up for feeding, and intermittently using AD 2HB for mobility. Today, pt functioning below baseline requiring Max A x2 for bed mobility, Total A toileting, and Max A Lb dressing, feeding and grooming. Pt competes sit<>stand with Min/CGA. Pt most limited by cognition, only able to follow ~20% of commands and difficult to redirect, and visual hallucinations.  Cont acute OT and rec d/c home with sons with 24 hr supervision and 2-3x/week, if 24 hr not available rec 3-5x/week  Prognosis: Fair  Decision Making: Medium Complexity  OT Education: OT Role;Transfer Training  Barriers to Learning: cognition  REQUIRES OT FOLLOW UP: Yes  Activity Tolerance  Activity Tolerance: Treatment limited secondary to decreased cognition  Safety Devices  Safety Devices in place: Yes  Type of devices: Left in chair;Nurse notified;Gait belt;Patient at risk for falls;Call light within reach; Chair alarm in place         Patient Diagnosis(es): There were no encounter diagnoses. has a past medical history of Alzheimer's dementia ([facility] [address] Atrial fibrillation ([facility] [address] Benign essential HTN, and Gout.   has no past surgical history on file. Restrictions  Restrictions/Precautions  Restrictions/Precautions: Fall Risk    Subjective   General  Chart Reviewed: Yes  Patient assessed for rehabilitation services?: Yes  Additional Pertinent Hx: Per H&P, \"80year-old female with past medical history of hypertension, thyroid disease, atrial fibrillation presented to hospital altered mental status since Saturday. Patient is unable to provide any history and mostly does not answer any questions. According to the son apparently patient had some haircut on Saturday and she was doing well at that time. Later in the day he started becoming confused and is carried over onto Sunday. Since her confusion is getting worse she was brought into the hospital.  On arrival she was noted to be hemodynamically stable. Lab work showed lactic acidosis of 2.7. Patient had a UA performed which was concerning for urinary tract infection. CT of the head was unremarkable chest x-ray showed possible bilateral pleural effusions.   Patient was admitted to the hospital for further management\"  Family / Caregiver Present: No  Referring Practitioner: Evelin Anthony MD  Diagnosis: AMS  Subjective  Subjective: Pt resting supine in bed sleeping upon arrival. Pt agreeable to OT/PT eval. pt with no complaints of pain. Pt oriented to name only and frequently asking to \"go shopping\". Pt with visual hallucinations throughout trying to get \"things\" off of her hands\"  General Comment  Comments: RN ok to see     Social/Functional History  Social/Functional History  Lives With: Son(Norberto and his partner Mateo Wade)  Type of Home: House  Home Layout: One level  Home Access: Stairs to enter with rails  Entrance Stairs - Number of Steps: 4-5  Bathroom Shower/Tub: Walk-in shower  Bathroom Toilet: Standard  Bathroom Equipment: Grab bars in shower, Shower chair  Home Equipment: 4 wheeled walker, Cane  ADL Assistance: Needs assistance(Son assists with bathing/dressing/toileting - patient feeds self)  Homemaking Assistance: (son completes all)  Ambulation Assistance: Independent(sometimes with 4IE)  Transfer Assistance: Independent  Active : No  Additional Comments: above info per last admission in Dec, 2020; the pt unable to confirm or add to info due to level of dementia during 2/2021 admission. Objective   Vision: Within Functional Limits  Hearing Exceptions: Hard of hearing/hearing concerns    Orientation  Overall Orientation Status: Impaired  Orientation Level: Oriented to person;Disoriented to place; Disoriented to time;Disoriented to situation     Balance  Sitting Balance: (CGA-SBA sitting EOB ~2 min in prep for transfer)  Standing Balance: Minimal assistance  Standing Balance  Time: 1-2 min  Activity: standing in stedy with Min A/CGA, pt marches in place wit minimal unsteadiness  Functional Mobility  Functional Mobility Comments: bed>chair with pt safe handling equipment (stedy) as pt only follows minimal commands and very difficulty to redirect  ADL  Feeding:  Moderate assistance(Lifts cup only with index and and thumb, does not bring to mouth)  UE Bathing: Maximum assistance(pt given wash cloth with directions to \"clean hands\", but unable to carryout hand 1: bed mobility with Mod A x1  Short term goal 2: tolerate 5 min standing fxl task with CGA  Short term goal 3: toileting Min A  Short term goal 4: ADL transfer CGA  Short term goal 5: complete seated grooming with set up, with minimal cues  Patient Goals   Patient goals : unable to report     Therapy Time   Individual Concurrent Group Co-treatment   Time In 0740         Time Out 0820         Minutes 40         Timed Code Treatment Minutes: 25 Minutes(15 evla)     Jcarlos Morales, MANASA, OTR/L

## 2021-03-31 LAB
AMMONIA: 47 UMOL/L (ref 11–51)
BASE EXCESS VENOUS: -2.6 MMOL/L
CARBOXYHEMOGLOBIN: 1.1 %
HCO3 VENOUS: 24 MMOL/L (ref 23–29)
LACTIC ACID: 2.1 MMOL/L (ref 0.4–2)
METHEMOGLOBIN VENOUS: 0.5 %
O2 CONTENT, VEN: 9 ML/DL
O2 SAT, VEN: 51 %
O2 THERAPY: ABNORMAL
PCO2, VEN: 46.7 MMHG (ref 40–50)
PH VENOUS: 7.32 (ref 7.35–7.45)
PO2, VEN: 31 MMHG
TCO2 CALC VENOUS: 25 MMOL/L
TSH REFLEX FT4: 2.15 UIU/ML (ref 0.27–4.2)

## 2021-03-31 PROCEDURE — 82140 ASSAY OF AMMONIA: CPT

## 2021-03-31 PROCEDURE — 83605 ASSAY OF LACTIC ACID: CPT

## 2021-03-31 PROCEDURE — 94761 N-INVAS EAR/PLS OXIMETRY MLT: CPT

## 2021-03-31 PROCEDURE — 82803 BLOOD GASES ANY COMBINATION: CPT

## 2021-03-31 PROCEDURE — 36415 COLL VENOUS BLD VENIPUNCTURE: CPT

## 2021-03-31 PROCEDURE — 93005 ELECTROCARDIOGRAM TRACING: CPT | Performed by: FAMILY MEDICINE

## 2021-03-31 PROCEDURE — 84443 ASSAY THYROID STIM HORMONE: CPT

## 2021-03-31 PROCEDURE — 1200000000 HC SEMI PRIVATE

## 2021-03-31 PROCEDURE — 2580000003 HC RX 258: Performed by: INTERNAL MEDICINE

## 2021-03-31 PROCEDURE — 6360000002 HC RX W HCPCS: Performed by: INTERNAL MEDICINE

## 2021-03-31 RX ADMIN — CEFTRIAXONE 1000 MG: 1 INJECTION, POWDER, FOR SOLUTION INTRAMUSCULAR; INTRAVENOUS at 18:27

## 2021-03-31 RX ADMIN — SODIUM CHLORIDE, PRESERVATIVE FREE 10 ML: 5 INJECTION INTRAVENOUS at 20:26

## 2021-03-31 ASSESSMENT — PAIN SCALES - PAIN ASSESSMENT IN ADVANCED DEMENTIA (PAINAD)
BODYLANGUAGE: 0
FACIALEXPRESSION: 0
BREATHING: 0
TOTALSCORE: 0

## 2021-03-31 ASSESSMENT — PAIN SCALES - GENERAL
PAINLEVEL_OUTOF10: 0
PAINLEVEL_OUTOF10: 0

## 2021-03-31 NOTE — PROGRESS NOTES
Call from [facility] that pt is now having pauses lasting around 2 seconds. Dr. Melyssa Garcia made aware and instructed this RN to notify Dr. Deborah Flores as he is the night doctor. Dr. Deborah Flores made aware. Awaiting stat ekg. Charge nurse made aware. Will continue to monitor. Pt resting in bed with eyes closed and responding to pain.    Electronically signed by Bryan Rodriguez RN on 3/31/2021 at 6:42 PM

## 2021-03-31 NOTE — PLAN OF CARE
Problem: Falls - Risk of:  Goal: Will remain free from falls  Description: Will remain free from falls  3/31/2021 1203 by Nella Don RN  Outcome: Ongoing     Problem: Falls - Risk of:  Goal: Absence of physical injury  Description: Absence of physical injury  3/31/2021 1203 by Nella Don RN  Outcome: Ongoing     Problem: OXYGENATION/RESPIRATORY FUNCTION  Goal: Patient will maintain patent airway  3/31/2021 1203 by Nella Don RN  Outcome: Ongoing     Problem: OXYGENATION/RESPIRATORY FUNCTION  Goal: Patient will achieve/maintain normal respiratory rate/effort  Description: Respiratory rate and effort will be within normal limits for the patient  3/31/2021 1203 by Nella Don RN  Outcome: Ongoing     Problem: HEMODYNAMIC STATUS  Goal: Patient has stable vital signs and fluid balance  3/31/2021 1203 by Nella Don RN  Outcome: Ongoing     Problem: FLUID AND ELECTROLYTE IMBALANCE  Goal: Fluid and electrolyte balance are achieved/maintained  3/31/2021 1203 by Nella Don RN  Outcome: Ongoing     Problem: ACTIVITY INTOLERANCE/IMPAIRED MOBILITY  Goal: Mobility/activity is maintained at optimum level for patient  3/31/2021 1203 by Nella Don RN  Outcome: Ongoing     Problem: Skin Integrity:  Goal: Will show no infection signs and symptoms  Description: Will show no infection signs and symptoms  3/31/2021 1203 by Nella Don RN  Outcome: Ongoing     Problem: Skin Integrity:  Goal: Absence of new skin breakdown  Description: Absence of new skin breakdown  3/31/2021 1203 by Nella Don RN  Outcome: Ongoing

## 2021-03-31 NOTE — PROGRESS NOTES
Hospitalist Progress Note      PCP: TASHI Farrar - CNP    Date of Admission: 3/29/2021    Chief Complaint: AMS. Hospital Course: An 79 Yo female admitted with a uti and hypoactive delirium. This has been a recurrent cycle for Mrs Zackery Reyna. She was recently discharged her on Feb 16 and very similar admission in Dec as well. she has end stage Alzheimer's with UTIs unfortunately just adding to her mental status limitations. During her last admission her depakote was stopped due to oversedation and treated her with nightly seroquel with very good response. She spends most of her days sleeping - not eating or drinking - then develops UTI due to poor urine output and returns to the hospital.    Placement was discussed with son at her last admission - he refused. He also appealed discharge last time, though I am still not sure what his true expectation was. He thought she will become \"normal\", though I am not sure what he meant by that with severe and progressive baseline underlying dementia. At the very least he is unrealistic in his expectation. Subjective: Pt S/E. She is very sleepy today. Did received depakote last night.       Medications:  Reviewed    Infusion Medications    sodium chloride      sodium chloride 75 mL/hr at 03/30/21 8333     Scheduled Medications    divalproex  125 mg Oral Nightly    QUEtiapine  12.5 mg Oral Nightly    metoprolol succinate  25 mg Oral Daily    therapeutic multivitamin-minerals  1 tablet Oral Daily    calcium elemental  500 mg Oral Daily    Vitamin D  1,000 Units Oral Daily    rivaroxaban  15 mg Oral Daily with breakfast    budesonide-formoterol  2 puff Inhalation BID    sodium chloride flush  10 mL Intravenous 2 times per day    cefTRIAXone (ROCEPHIN) IV  1,000 mg Intravenous Q24H     PRN Meds: albuterol, QUEtiapine, sodium chloride flush, sodium chloride, promethazine **OR** ondansetron, polyethylene glycol, acetaminophen **OR** acetaminophen      Intake/Output Summary (Last 24 hours) at 3/31/2021 0850  Last data filed at 3/30/2021 1726  Gross per 24 hour   Intake 100 ml   Output --   Net 100 ml       Physical Exam Performed:    /70   Pulse 65   Temp 97.6 °F (36.4 °C) (Axillary)   Resp 20   Wt 207 lb 3.7 oz (94 kg)   SpO2 93%   BMI 34.49 kg/m²     General appearance: Confused and frail looking, nad  HEENT:  Conjunctivae/corneas clear. Neck: Supple, with full range of motion. Respiratory:  Normal respiratory effort. Clear to auscultation, bilaterally without Rales/Wheezes/Rhonchi. Cardiovascular: Regular rate and rhythm with normal S1/S2 without murmurs or rubs  Abdomen: Soft, non-tender, non-distended, normal bowel sounds. Musculoskeletal: No cyanosis or edema bilaterally  Neurologic: Asleep, easily awakened. pleasantly confused. Moves all extremities. Peripheral Pulses: +2 palpable, equal bilaterally       Labs:   Recent Labs     03/29/21  1517 03/30/21  0712   WBC 7.0 7.7   HGB 12.8 12.3   HCT 39.3 39.0    111*     Recent Labs     03/29/21  1517 03/30/21  0712    142   K 5.1 4.5    111*   CO2 26 20*   BUN 25* 20   CREATININE 1.1 0.9   CALCIUM 10.4 9.9     Recent Labs     03/29/21  1517   AST 38*   ALT 23   BILITOT 0.9   ALKPHOS 220*     No results for input(s): INR in the last 72 hours. Recent Labs     03/29/21  1517   TROPONINI <0.01       Urinalysis:      Lab Results   Component Value Date    NITRU POSITIVE 03/29/2021    WBCUA 53 03/29/2021    BACTERIA 4+ 03/29/2021    RBCUA 1 03/29/2021    BLOODU Negative 03/29/2021    SPECGRAV 1.023 03/29/2021    GLUCOSEU Negative 03/29/2021       Radiology:  CT Head WO Contrast   Final Result   1. No acute intracranial abnormality.          XR CHEST PORTABLE   Final Result   Bilateral pleural effusions and bibasilar opacities could represent   atelectasis or pneumonia                 Assessment/Plan:    Active Hospital Problems    Diagnosis    Acute encephalopathy [G93.40]          Acute metabolic encephalopathy - in setting of progressive advanced Alzheimer's Dementia. Likely  worsened by Urinary tract infection, though I am concerned about progressing dementia.    - will stop depakote completely   - started seroquel 12.5 mg in the evenings (she is actively hallucinating) - very low dose.        UTI  - UCx with e coli, awaiting sensitivities   - rocephin day 3/5    Chronic Atrial fibrillation on eliquis.        Alzheimer's dementia - severe end stage.   See med discussion as above.       Consulted palliative care - CODE STATUS changed to DNR CCA last admission. reconsult again.       Cardiology was consulted last admission for suspected CHF - diastolic with severe valvular disease. She has mod to severe MR, mod AR and severe TR. She was deemed very poor candidate for aggressive intervention. She actually appears dehydrated - Will continue with IVF and monitor closely - stop IVF when tolerates diet better.        DVT Prophylaxis: on eliquis  Diet: DIET GENERAL; Low Sodium (2 GM); Daily Fluid Restriction: 2000 ml; Cardiac  Code Status: DNR-CCA    PTOT as tolerates.      Dispo - dc tomorrow if she is more awake    Celia Alex, DO

## 2021-03-31 NOTE — PROGRESS NOTES
Physician Progress Note      Talib Anne  CSN #:                  [id number]  :                       1931  ADMIT DATE:       3/29/2021 1:09 PM  [address] [place] DATE:  RESPONDING  PROVIDER #:        EUSEBIO KWAN DO          QUERY TEXT:    Dear Dr Kalen Posadas,    Pt admitted with UTI and has encephalopathy documented. If possible, please   document in progress notes and discharge summary further specificity regarding   the type of encephalopathy:      The medical record reflects the following:  Risk Factors: Current admission with AMS, likely UTI. Clinical Indicators: u/a- turbid, 30 protein, + nitrite, small leuk, 4+   bacteria, 53 wbc? Yolie Records .Per ED dictation- brought by EMS with altered mental status   that started since Saturday. She is unable to provide history and   information was obtained from the son who stated that patient declined since    and has not been following commands. Family is concerned that symptoms   may be secondary to urinary tract infection. Yolie Records Yolie Records Per H&P- Acute encephalopathy   likely secondary to urinary tract infection  Treatment: Cefipime IV, Rocephin IV, Vanco IV, urine culture    Thank You,  Marko Orlando RN BSN CDS CRCR  [email or web address]  Options provided:  -- Metabolic encephalopathy  -- Septic encephalopathy  -- Toxic encephalopathy  -- Toxic metabolic encephalopathy  -- Other - I will add my own diagnosis  -- Disagree - Not applicable / Not valid  -- Disagree - Clinically unable to determine / Unknown  -- Refer to Clinical Documentation Reviewer    PROVIDER RESPONSE TEXT:    This patient has toxic metabolic encephalopathy.     Query created by: Jairo Lopes on 3/30/2021 9:57 AM      Electronically signed by:  EUSEBIO KWAN DO 3/31/2021 11:48 AM

## 2021-03-31 NOTE — PLAN OF CARE
Problem: Falls - Risk of: Bed alarm in place and call light within reach   Goal: Will remain free from falls  Description: Will remain free from falls  Outcome: Ongoing  Goal: Absence of physical injury  Description: Absence of physical injury  Outcome: Ongoing     Problem: OXYGENATION/RESPIRATORY FUNCTION  Goal: Patient will maintain patent airway  Outcome: Ongoing  Goal: Patient will achieve/maintain normal respiratory rate/effort  Description: Respiratory rate and effort will be within normal limits for the patient  Outcome: Ongoing     Problem: HEMODYNAMIC STATUS  Goal: Patient has stable vital signs and fluid balance  Outcome: Ongoing     Problem: FLUID AND ELECTROLYTE IMBALANCE  Goal: Fluid and electrolyte balance are achieved/maintained  Outcome: Ongoing     Problem: ACTIVITY INTOLERANCE/IMPAIRED MOBILITY  Goal: Mobility/activity is maintained at optimum level for patient  Outcome: Ongoing     Problem: Skin Integrity:  Goal: Will show no infection signs and symptoms  Description: Will show no infection signs and symptoms  Outcome: Ongoing  Goal: Absence of new skin breakdown  Description: Absence of new skin breakdown  Outcome: Ongoing

## 2021-04-01 LAB
ANION GAP SERPL CALCULATED.3IONS-SCNC: 11 MMOL/L (ref 3–16)
BASOPHILS ABSOLUTE: 0 K/UL (ref 0–0.2)
BASOPHILS RELATIVE PERCENT: 0.6 %
BUN BLDV-MCNC: 25 MG/DL (ref 7–20)
CALCIUM SERPL-MCNC: 9.9 MG/DL (ref 8.3–10.6)
CHLORIDE BLD-SCNC: 114 MMOL/L (ref 99–110)
CO2: 21 MMOL/L (ref 21–32)
CREAT SERPL-MCNC: 1.1 MG/DL (ref 0.6–1.2)
EKG ATRIAL RATE: 441 BPM
EKG DIAGNOSIS: NORMAL
EKG Q-T INTERVAL: 446 MS
EKG QRS DURATION: 98 MS
EKG QTC CALCULATION (BAZETT): 456 MS
EKG R AXIS: 56 DEGREES
EKG T AXIS: -35 DEGREES
EKG VENTRICULAR RATE: 63 BPM
EOSINOPHILS ABSOLUTE: 0.1 K/UL (ref 0–0.6)
EOSINOPHILS RELATIVE PERCENT: 2.3 %
GFR AFRICAN AMERICAN: 56
GFR NON-AFRICAN AMERICAN: 47
GLUCOSE BLD-MCNC: 63 MG/DL (ref 70–99)
GLUCOSE BLD-MCNC: 99 MG/DL (ref 70–99)
HCT VFR BLD CALC: 37.3 % (ref 36–48)
HEMOGLOBIN: 12 G/DL (ref 12–16)
LYMPHOCYTES ABSOLUTE: 0.6 K/UL (ref 1–5.1)
LYMPHOCYTES RELATIVE PERCENT: 11.9 %
MCH RBC QN AUTO: 27.6 PG (ref 26–34)
MCHC RBC AUTO-ENTMCNC: 32.3 G/DL (ref 31–36)
MCV RBC AUTO: 85.6 FL (ref 80–100)
MONOCYTES ABSOLUTE: 0.7 K/UL (ref 0–1.3)
MONOCYTES RELATIVE PERCENT: 13.2 %
NEUTROPHILS ABSOLUTE: 3.9 K/UL (ref 1.7–7.7)
NEUTROPHILS RELATIVE PERCENT: 72 %
ORGANISM: ABNORMAL
PDW BLD-RTO: 19.3 % (ref 12.4–15.4)
PERFORMED ON: NORMAL
PLATELET # BLD: 104 K/UL (ref 135–450)
PMV BLD AUTO: 10.4 FL (ref 5–10.5)
POTASSIUM REFLEX MAGNESIUM: 3.8 MMOL/L (ref 3.5–5.1)
RBC # BLD: 4.36 M/UL (ref 4–5.2)
SODIUM BLD-SCNC: 146 MMOL/L (ref 136–145)
URINE CULTURE, ROUTINE: ABNORMAL
WBC # BLD: 5.4 K/UL (ref 4–11)

## 2021-04-01 PROCEDURE — 94640 AIRWAY INHALATION TREATMENT: CPT

## 2021-04-01 PROCEDURE — 6370000000 HC RX 637 (ALT 250 FOR IP): Performed by: INTERNAL MEDICINE

## 2021-04-01 PROCEDURE — 85025 COMPLETE CBC W/AUTO DIFF WBC: CPT

## 2021-04-01 PROCEDURE — 2580000003 HC RX 258: Performed by: FAMILY MEDICINE

## 2021-04-01 PROCEDURE — 36415 COLL VENOUS BLD VENIPUNCTURE: CPT

## 2021-04-01 PROCEDURE — 2580000003 HC RX 258: Performed by: INTERNAL MEDICINE

## 2021-04-01 PROCEDURE — 6360000002 HC RX W HCPCS: Performed by: INTERNAL MEDICINE

## 2021-04-01 PROCEDURE — 1200000000 HC SEMI PRIVATE

## 2021-04-01 PROCEDURE — 93010 ELECTROCARDIOGRAM REPORT: CPT | Performed by: INTERNAL MEDICINE

## 2021-04-01 PROCEDURE — 80048 BASIC METABOLIC PNL TOTAL CA: CPT

## 2021-04-01 PROCEDURE — 94761 N-INVAS EAR/PLS OXIMETRY MLT: CPT

## 2021-04-01 RX ORDER — DEXTROSE AND SODIUM CHLORIDE 5; .45 G/100ML; G/100ML
INJECTION, SOLUTION INTRAVENOUS CONTINUOUS
Status: DISCONTINUED | OUTPATIENT
Start: 2021-04-01 | End: 2021-04-03

## 2021-04-01 RX ORDER — HALOPERIDOL 5 MG/ML
1 INJECTION INTRAMUSCULAR EVERY 8 HOURS PRN
Status: DISCONTINUED | OUTPATIENT
Start: 2021-04-01 | End: 2021-04-05 | Stop reason: HOSPADM

## 2021-04-01 RX ORDER — CEPHALEXIN 500 MG/1
500 CAPSULE ORAL 3 TIMES DAILY
Qty: 6 CAPSULE | Refills: 0 | Status: SHIPPED | OUTPATIENT
Start: 2021-04-01 | End: 2021-04-04 | Stop reason: HOSPADM

## 2021-04-01 RX ORDER — SODIUM CHLORIDE, SODIUM LACTATE, POTASSIUM CHLORIDE, CALCIUM CHLORIDE 600; 310; 30; 20 MG/100ML; MG/100ML; MG/100ML; MG/100ML
INJECTION, SOLUTION INTRAVENOUS CONTINUOUS
Status: DISCONTINUED | OUTPATIENT
Start: 2021-04-01 | End: 2021-04-01

## 2021-04-01 RX ADMIN — DEXTROSE AND SODIUM CHLORIDE: 5; 450 INJECTION, SOLUTION INTRAVENOUS at 13:31

## 2021-04-01 RX ADMIN — CEFTRIAXONE 1000 MG: 1 INJECTION, POWDER, FOR SOLUTION INTRAMUSCULAR; INTRAVENOUS at 18:09

## 2021-04-01 RX ADMIN — SODIUM CHLORIDE, PRESERVATIVE FREE 10 ML: 5 INJECTION INTRAVENOUS at 09:54

## 2021-04-01 RX ADMIN — Medication 1000 UNITS: at 09:49

## 2021-04-01 RX ADMIN — BUDESONIDE AND FORMOTEROL FUMARATE DIHYDRATE 2 PUFF: 160; 4.5 AEROSOL RESPIRATORY (INHALATION) at 08:24

## 2021-04-01 RX ADMIN — POLYETHYLENE GLYCOL 3350 17 G: 17 POWDER, FOR SOLUTION ORAL at 16:54

## 2021-04-01 RX ADMIN — MULTIPLE VITAMINS W/ MINERALS TAB 1 TABLET: TAB at 09:49

## 2021-04-01 RX ADMIN — SODIUM CHLORIDE, PRESERVATIVE FREE 10 ML: 5 INJECTION INTRAVENOUS at 22:01

## 2021-04-01 RX ADMIN — ACETAMINOPHEN 650 MG: 325 TABLET ORAL at 21:21

## 2021-04-01 RX ADMIN — CALCIUM 500 MG: 500 TABLET ORAL at 09:49

## 2021-04-01 RX ADMIN — RIVAROXABAN 15 MG: 15 TABLET, FILM COATED ORAL at 09:49

## 2021-04-01 ASSESSMENT — PAIN SCALES - PAIN ASSESSMENT IN ADVANCED DEMENTIA (PAINAD)
CONSOLABILITY: 0
BODYLANGUAGE: 0
BREATHING: 0
BREATHING: 0
NEGVOCALIZATION: 0
BODYLANGUAGE: 0
TOTALSCORE: 0
NEGVOCALIZATION: 0
BODYLANGUAGE: 0
BODYLANGUAGE: 0
CONSOLABILITY: 0
FACIALEXPRESSION: 0
BREATHING: 0
BODYLANGUAGE: 0
BREATHING: 0
BREATHING: 0
NEGVOCALIZATION: 0
FACIALEXPRESSION: 0
TOTALSCORE: 0
FACIALEXPRESSION: 0
FACIALEXPRESSION: 0
CONSOLABILITY: 0
BREATHING: 0
BREATHING: 0
FACIALEXPRESSION: 0
TOTALSCORE: 0
BODYLANGUAGE: 0
BREATHING: 0
NEGVOCALIZATION: 0
TOTALSCORE: 5
BODYLANGUAGE: 0
TOTALSCORE: 0
CONSOLABILITY: 0
NEGVOCALIZATION: 1
CONSOLABILITY: 0
CONSOLABILITY: 0
TOTALSCORE: 0
CONSOLABILITY: 0
BODYLANGUAGE: 0
TOTALSCORE: 0
BODYLANGUAGE: 0
NEGVOCALIZATION: 0
NEGVOCALIZATION: 0
BREATHING: 0

## 2021-04-01 ASSESSMENT — PAIN SCALES - GENERAL: PAINLEVEL_OUTOF10: 0

## 2021-04-01 NOTE — DISCHARGE SUMMARY
severe valvular disease. She has mod to severe MR, mod AR and severe TR. She was deemed very poor candidate for aggressive intervention. Was dehydrated, given ivf     Disposition:  Home    Exam:     BP (!) 142/52   Pulse 62   Temp 97.4 °F (36.3 °C) (Axillary)   Resp 16   Wt 192 lb 3.9 oz (87.2 kg)   SpO2 95%   BMI 31.99 kg/m²     General appearance:  No apparent distress, appears comfortable  HEENT:  Normal cephalic, atraumatic without obvious deformity. Pupils equal, round, and reactive to light. Extra ocular muscles intact. Conjunctivae/corneas clear. Neck: Supple, with full range of motion. No jugular venous distention. Trachea midline. Respiratory:  Normal respiratory effort. Clear to auscultation, bilaterally without Rales/Wheezes/Rhonchi. Cardiovascular:  Regular rate and rhythm with normal S1/S2 without murmurs, rubs or gallops. Abdomen: Soft, non-tender, non-distended with normal bowel sounds. Musculoskeletal:  No clubbing, cyanosis or edema bilaterally. Skin: Skin color, texture, turgor normal.  No rashes or lesions. Neurologic:  Neurovascularly intact without any focal sensory/motor deficits. Cranial nerves: II-XII intact, grossly non-focal.  Psychiatric:  Alert and oriented to name only, pleasantly confused    Consults:     IP CONSULT TO HOSPITALIST  IP CONSULT TO PALLIATIVE CARE    Labs:  For convenience and continuity at follow-up the following most recent labs are provided:    Lab Results   Component Value Date    WBC 5.4 04/01/2021    HGB 12.0 04/01/2021    HCT 37.3 04/01/2021    MCV 85.6 04/01/2021     04/01/2021     04/01/2021    K 3.8 04/01/2021     04/01/2021    CO2 21 04/01/2021    BUN 25 04/01/2021    CREATININE 1.1 04/01/2021    CALCIUM 9.9 04/01/2021    PHOS 2.4 12/07/2020    ALKPHOS 220 03/29/2021    ALT 23 03/29/2021    AST 38 03/29/2021    BILITOT 0.9 03/29/2021    BILIDIR <0.2 02/10/2021    LABALBU 3.7 03/29/2021    LDLCALC 49 06/06/2020    TRIG 82 06/06/2020     No results found for: INR    Radiology:  CT Head WO Contrast   Final Result   1. No acute intracranial abnormality. XR CHEST PORTABLE   Final Result   Bilateral pleural effusions and bibasilar opacities could represent   atelectasis or pneumonia             Discharge Medications:   Current Discharge Medication List      START taking these medications    Details   Cranberry 250 MG CHEW Take 2 tablets by mouth 2 times daily (with meals)  Qty: 120 tablet, Refills: 0      cephALEXin (KEFLEX) 500 MG capsule Take 1 capsule by mouth 3 times daily for 2 days  Qty: 6 capsule, Refills: 0           Current Discharge Medication List        Current Discharge Medication List      CONTINUE these medications which have NOT CHANGED    Details   rivaroxaban (XARELTO) 15 MG TABS tablet Take 1 tablet by mouth daily (with breakfast)  Qty: 90 tablet, Refills: 1      vitamin D 25 MCG (1000 UT) CAPS Take 1,000 Units by mouth daily      albuterol sulfate HFA (VENTOLIN HFA) 108 (90 Base) MCG/ACT inhaler Inhale 2 puffs into the lungs every 6 hours as needed for Wheezing      mometasone-formoterol (DULERA) 200-5 MCG/ACT inhaler Inhale 2 puffs into the lungs 2 times daily as needed (shortness of breath)       Multiple Vitamins-Minerals (MULTIVITAMIN WITH MINERALS) tablet Take 1 tablet by mouth daily      calcium carbonate (OSCAL) 500 MG TABS tablet Take 500 mg by mouth daily           Current Discharge Medication List      STOP taking these medications       divalproex (DEPAKOTE SPRINKLE) 125 MG capsule Comments:   Reason for Stopping:         QUEtiapine (SEROQUEL) 25 MG tablet Comments:   Reason for Stopping:         divalproex (DEPAKOTE) 125 MG DR tablet Comments:   Reason for Stopping:         metoprolol succinate (TOPROL XL) 25 MG extended release tablet Comments:   Reason for Stopping:                Follow-up appointments:  one week    Provider Follow-up:    pcp    Condition at Discharge:  Stable    The patient was seen and examined on day of discharge and this discharge summary is in conjunction with any daily progress note from day of discharge. Time Spent on discharge is 45 minutes  in the examination, evaluation, counseling and review of medications and discharge plan. Signed:    Gopal Stern DO   4/1/2021      Thank you TASHI Kong CNP for the opportunity to be involved in this patient's care. If you have any questions or concerns please feel free to contact me at [phone].

## 2021-04-01 NOTE — DISCHARGE INSTR - COC
Continuity of Care Form    Patient Name: Fabian Wang   :  1931  MRN:  [id number]    Admit date:  3/29/2021  Discharge date:  2021    Code Status Order: DNR-CCA   Advance Directives:      Admitting Physician:  Heather Veras MD  PCP: Shasta Beth, TASHI - CNP    Discharging Nurse:   [address] Unit/Room#: [address]  Unit Phone Number: [phone]    Emergency Contact:   Extended Emergency Contact Information  Primary Emergency Contact: Ruby  W Jessica Peters Phone: [phone]  Mobile Phone: [phone]  Relation: Child   needed? No  Secondary Emergency Contact: Norbert Berman  Mobile Phone: [phone]  Relation: Child    Past Surgical History:  History reviewed. No pertinent surgical history. Immunization History: There is no immunization history on file for this patient. 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, Y92.009    Acute metabolic encephalopathy I82.89    Acute on chronic congestive heart failure (HCC) I50.9    Sepsis ([facility]) A41.9    Bilateral pleural effusion J90    Chronic atrial fibrillation ([facility]) I48.20    Acute encephalopathy G93.40       Isolation/Infection:   Isolation            No Isolation          Patient Infection Status       Infection Onset Added Last Indicated Last Indicated By Review Planned Expiration Resolved Resolved By    None active    Resolved    COVID-19 Rule Out 20 COVID-19 (Ordered)   20 Rule-Out Test Resulted    COVID-19 Rule Out 20 COVID-19 (Ordered)   20 Rule-Out Test Resulted Nurse Assessment:  Last Vital Signs: BP (!) 142/52   Pulse 62   Temp 97.4 °F (36.3 °C) (Axillary)   Resp 16   Wt 192 lb 3.9 oz (87.2 kg)   SpO2 95%   BMI 31.99 kg/m²     Last documented pain score (0-10 scale): Pain Level: 0(pt resting)  Last Weight:   Wt Readings from Last 1 Encounters:   04/01/21 192 lb 3.9 oz (87.2 kg)     Mental Status:  disoriented    IV Access:  - None    Nursing Mobility/ADLs:  Walking   Dependent  Transfer  Dependent  Bathing  Dependent  Dressing  Dependent  Toileting  Dependent  Feeding  Dependent  Med Admin  Dependent  Med Delivery   crushed    Wound Care Documentation and Therapy:  Wound 02/15/21 Sacrum Stage 1 - open area (Active)   Number of days: 45        Elimination:  Continence:   · Bowel: No  · Bladder: No  Urinary Catheter: None   Colostomy/Ileostomy/Ileal Conduit: No       Date of Last BM: 3/29/21 Miralax given     Intake/Output Summary (Last 24 hours) at 4/1/2021 1226  Last data filed at 4/1/2021 0901  Gross per 24 hour   Intake 478.33 ml   Output 125 ml   Net 353.33 ml     I/O last 3 completed shifts: In: 278.3 [P.O.:50; IV Piggyback:228.3]  Out: 125 [Urine:125]    Safety Concerns:     Confusion    Impairments/Disabilities:      Speech    Nutrition Therapy:  Current Nutrition Therapy:   - Oral Diet:  Low Sodium (2gm) 2000ml     Routes of Feeding: Oral  Liquids: Thin Liquids  Daily Fluid Restriction: yes - amount 2000ml  Last Modified Barium Swallow with Video (Video Swallowing Test): not done    Treatments at the Time of Hospital Discharge:   Respiratory Treatments: See orders  Oxygen Therapy:  is not on home oxygen therapy. Ventilator:    - No ventilator support     Heart Failure Instructions for Daily Management  Patient was treated for chronic systolic and diastolic heart failure. she  will require the following:     Please weigh daily on the same scale and approximately the same time of day.   Report weight gain of 3 pounds/day or 5 pounds/week to : [facility] [phone].  Please use hospital discharge weight as baseline reference.  Please monitor for signs and symptoms of and report to MD:  o Worsening Heart Failure: sudden weight gain, shortness of breath, lower extremity or general edema/swelling, abdominal bloating/swelling, inability to lie flat, intolerance to usual activity, or cough (especially at night). Report these finding even if no increase in weight.  o Dehydration:  having difficulty or a decrease in urination, dizziness, worsening fatigue, or new onset/worsening of generalized weakness.  Please continue a LOW SODIUM diet and LIMIT fluid intake to 48 - 64 ounces ( 1.5 - 2 liters) per day. [facility] Call [facility] [phone] and/or Patsy Carpenter @ [phone] with any questions or concerns.  Please continue heart failure education to patient and family/support system.  See After Visit Summary for hospital follow up appointment details.  Consider spiritual care referral for support and/or completion of advance directives [phone].  Consider: [place] [address] telehealth program if patient agreeable and able to participate, palliative care consult for ongoing goals of care, end of life, and/or chronic disease management discussions and referral to [facility] [phone]) once SNF/HHC complete.   Dr Brenda Rodríguez is pt's primary cardiologist.       Rehab Therapies: Physical Therapy, Occupational Therapy and Nurse  Weight Bearing Status/Restrictions: No weight bearing restirctions  Other Medical Equipment (for information only, NOT a DME order):  PT/OT  Other Treatments: HOME HEALTH CARE: LEVEL 1 STANDARD    Home health agency to establish plan of care for patient over 60 day period   [address] Initial home SN evaluation visit to occur within 24-48 hours for:  1)  medication management  2)  VS and clinical assessment  3)  S&S chronic disease exacerbation education + when to contact MD/NP  4)  care coordination   Medication Reconciliation during 1st SN visit     PT/OT    Evaluate with goal of regaining prior level of functioning    OT to evaluate if patient has [address] needs for personal care     PCP Visit scheduled within 7 days of hospital discharge    Telehealth-Homecare Vitals(If patient is agreeable and meets guidelines)      Patient's personal belongings (please select all that are sent with patient):  None    RN SIGNATURE:  Electronically signed by Alejandra Peterson RN on 4/1/21 at 3:07 PM EDT    CASE MANAGEMENT/SOCIAL WORK SECTION    Inpatient Status Date: 3/29/2021    Readmission Risk Assessment Score:  26      Discharging to Facility/ Agency   · Name:  [facility]    · Address: [address] [place] [address], [address] [place] [address]., [place] [address]  · Phone: [phone]  · Fax: [phone]      / signature: Electronically signed by ZECHARIAH Zavala on 4/2/2021 at 4:34 PM    PHYSICIAN SECTION    Prognosis: Guarded    Condition at Discharge: Stable    Rehab Potential (if transferring to Rehab): Fair    Recommended Labs or Other Treatments After Discharge: PT, OT, RN    Physician Certification: I certify the above information and transfer of Fabian Wang  is necessary for the continuing treatment of the diagnosis listed and that she requires Home Care for greater 30 days.      Update Admission H&P: No change in H&P    PHYSICIAN SIGNATURE:  Electronically signed by Gloria Steele DO on 4/5/21 at 12:27 PM EDT

## 2021-04-01 NOTE — CARE COORDINATION
[facility]  Palliative Care   Progress Note    NAME:  Fabian Wang  MEDICAL RECORD NUMBER:  [id number]  AGE: 80 y.o. GENDER: female  : 1931  TODAY'S DATE:  2021    Subjective: oriented to self, more awake today. Objective:    Vitals:    21 1119   BP: (!) 142/52   Pulse: 62   Resp: 16   Temp:    SpO2: 95%     Lab Results   Component Value Date    WBC 5.4 2021    HGB 12.0 2021    HCT 37.3 2021    MCV 85.6 2021     (L) 2021     Lab Results   Component Value Date    CREATININE 1.1 2021    BUN 25 (H) 2021     (H) 2021    K 3.8 2021     (H) 2021    CO2 21 2021     Lab Results   Component Value Date    ALT 23 2021    AST 38 (H) 2021    ALKPHOS 220 (H) 2021    BILITOT 0.9 2021       Plan: son would like to take her home today and is agreeable to home care with assistance from [facility], referral was faxed. Code Status: DNR-CCA  Discharge Environment:  [x] Palliative care Consult Agency: [facility]    [x] Home with Home care and [address]     Teaching Time:  0hours  20 min     I will continue to follow Ms. Adorno's care as needed. Thank you for allowing me to participate in the care of Ms. Adorno .      Electronically signed by Skip Mixon RN, [address] on 2021 at 2:53 PM  [address] [place] [address]  Office: [phone]

## 2021-04-01 NOTE — PLAN OF CARE
Problem: Falls - Risk of:  Goal: Will remain free from falls  Description: Will remain free from falls  4/1/2021 0137 by Angella Tomas RN  Outcome: Ongoing  3/31/2021 1203 by Jh Alexander RN  Outcome: Ongoing  Goal: Absence of physical injury  Description: Absence of physical injury  4/1/2021 0137 by Angella Tomas RN  Outcome: Ongoing  3/31/2021 1203 by Jh Alexander RN  Outcome: Ongoing     Problem: OXYGENATION/RESPIRATORY FUNCTION  Goal: Patient will maintain patent airway  4/1/2021 0137 by Angella Tomas RN  Outcome: Ongoing  3/31/2021 1203 by Jh Alexander RN  Outcome: Ongoing  Goal: Patient will achieve/maintain normal respiratory rate/effort  Description: Respiratory rate and effort will be within normal limits for the patient  4/1/2021 0137 by Angella Tomas RN  Outcome: Ongoing  3/31/2021 1203 by Jh Alexander RN  Outcome: Ongoing     Problem: HEMODYNAMIC STATUS  Goal: Patient has stable vital signs and fluid balance  4/1/2021 0137 by Angella Tomas RN  Outcome: Ongoing  3/31/2021 1203 by Jh Alexander RN  Outcome: Ongoing     Problem: FLUID AND ELECTROLYTE IMBALANCE  Goal: Fluid and electrolyte balance are achieved/maintained  4/1/2021 0137 by Angella Tomas RN  Outcome: Ongoing  3/31/2021 1203 by Jh Alexander RN  Outcome: Ongoing     Problem: ACTIVITY INTOLERANCE/IMPAIRED MOBILITY  Goal: Mobility/activity is maintained at optimum level for patient  4/1/2021 0137 by Angella Tomas RN  Outcome: Ongoing  3/31/2021 1203 by Jh Alexander RN  Outcome: Ongoing

## 2021-04-01 NOTE — PLAN OF CARE
Problem: Falls - Risk of:  Goal: Will remain free from falls  Description: Will remain free from falls  4/1/2021 1008 by Arnoldo Becker RN  Outcome: Ongoing  4/1/2021 0137 by Juan Kerns RN  Outcome: Ongoing  Goal: Absence of physical injury  Description: Absence of physical injury  4/1/2021 1008 by Arnoldo Becker RN  Outcome: Ongoing  4/1/2021 0137 by Juan Kerns RN  Outcome: Ongoing

## 2021-04-01 NOTE — CARE COORDINATION
CASE MANAGEMENT DISCHARGE SUMMARY:    DISCHARGE DATE: 4/1/2021    DISCHARGED TO: Home with family     HOME CARE AGENCY:   Discharging to Facility/ Agency   · Name:  [facility]    · Address: [address] [place] [address]., [address] [place] [address]., [place] [address]  · Phone: [phone]  · Fax: [phone]    Referral to [address] [place] [address] made by [facility] RN     TRANSPORTATION: Son to transport      with [facility] with [facility]. SEBASTIAN DangS, Social Work  [phone]    Electronically signed by SOPHY Craven LSW on 4/1/2021 at 12:35 PM    Sw placed call to patient's son, Zuleyma Robertson, left message requesting return call to discuss discharge plan. ZECHARIAH Dang, Social Work  [phone]    Electronically signed by SOPHY Craven LSW on 4/1/2021 at 2:27 PM    Received return call from son, Zuleyma Robertson, regarding discharge plan. Son to  today around 5 pm. IMM given over the phone.      Electronically signed by SOPHY Craven LSW on 4/1/2021 at 2:36 PM

## 2021-04-02 LAB
ANION GAP SERPL CALCULATED.3IONS-SCNC: 9 MMOL/L (ref 3–16)
BLOOD CULTURE, ROUTINE: NORMAL
BUN BLDV-MCNC: 19 MG/DL (ref 7–20)
CALCIUM SERPL-MCNC: 9.2 MG/DL (ref 8.3–10.6)
CHLORIDE BLD-SCNC: 112 MMOL/L (ref 99–110)
CO2: 21 MMOL/L (ref 21–32)
CREAT SERPL-MCNC: 1.2 MG/DL (ref 0.6–1.2)
GFR AFRICAN AMERICAN: 51
GFR NON-AFRICAN AMERICAN: 42
GLUCOSE BLD-MCNC: 85 MG/DL (ref 70–99)
POTASSIUM REFLEX MAGNESIUM: 4.1 MMOL/L (ref 3.5–5.1)
SODIUM BLD-SCNC: 142 MMOL/L (ref 136–145)

## 2021-04-02 PROCEDURE — 94761 N-INVAS EAR/PLS OXIMETRY MLT: CPT

## 2021-04-02 PROCEDURE — 1200000000 HC SEMI PRIVATE

## 2021-04-02 PROCEDURE — 36415 COLL VENOUS BLD VENIPUNCTURE: CPT

## 2021-04-02 PROCEDURE — 6360000002 HC RX W HCPCS: Performed by: INTERNAL MEDICINE

## 2021-04-02 PROCEDURE — 97530 THERAPEUTIC ACTIVITIES: CPT

## 2021-04-02 PROCEDURE — 6370000000 HC RX 637 (ALT 250 FOR IP): Performed by: FAMILY MEDICINE

## 2021-04-02 PROCEDURE — 94640 AIRWAY INHALATION TREATMENT: CPT

## 2021-04-02 PROCEDURE — 2580000003 HC RX 258: Performed by: FAMILY MEDICINE

## 2021-04-02 PROCEDURE — 6370000000 HC RX 637 (ALT 250 FOR IP): Performed by: INTERNAL MEDICINE

## 2021-04-02 PROCEDURE — 2580000003 HC RX 258: Performed by: INTERNAL MEDICINE

## 2021-04-02 PROCEDURE — 80048 BASIC METABOLIC PNL TOTAL CA: CPT

## 2021-04-02 RX ORDER — METOPROLOL SUCCINATE 25 MG/1
12.5 TABLET, EXTENDED RELEASE ORAL DAILY
Status: DISCONTINUED | OUTPATIENT
Start: 2021-04-02 | End: 2021-04-05 | Stop reason: HOSPADM

## 2021-04-02 RX ADMIN — DEXTROSE AND SODIUM CHLORIDE: 5; 450 INJECTION, SOLUTION INTRAVENOUS at 03:01

## 2021-04-02 RX ADMIN — RIVAROXABAN 15 MG: 15 TABLET, FILM COATED ORAL at 09:43

## 2021-04-02 RX ADMIN — METOPROLOL SUCCINATE 12.5 MG: 25 TABLET, EXTENDED RELEASE ORAL at 09:41

## 2021-04-02 RX ADMIN — BUDESONIDE AND FORMOTEROL FUMARATE DIHYDRATE 2 PUFF: 160; 4.5 AEROSOL RESPIRATORY (INHALATION) at 09:00

## 2021-04-02 RX ADMIN — DEXTROSE AND SODIUM CHLORIDE: 5; 450 INJECTION, SOLUTION INTRAVENOUS at 17:23

## 2021-04-02 RX ADMIN — CEFTRIAXONE 1000 MG: 1 INJECTION, POWDER, FOR SOLUTION INTRAMUSCULAR; INTRAVENOUS at 17:23

## 2021-04-02 RX ADMIN — CALCIUM 500 MG: 500 TABLET ORAL at 09:40

## 2021-04-02 RX ADMIN — Medication 1000 UNITS: at 09:40

## 2021-04-02 ASSESSMENT — PAIN SCALES - PAIN ASSESSMENT IN ADVANCED DEMENTIA (PAINAD)
CONSOLABILITY: 0
BREATHING: 0
TOTALSCORE: 4
BODYLANGUAGE: 0
BODYLANGUAGE: 0
CONSOLABILITY: 0
BODYLANGUAGE: 0
TOTALSCORE: 0
BREATHING: 0
FACIALEXPRESSION: 0
BODYLANGUAGE: 0
FACIALEXPRESSION: 2
TOTALSCORE: 0
FACIALEXPRESSION: 0
BREATHING: 0
CONSOLABILITY: 0
BREATHING: 0
BODYLANGUAGE: 0
TOTALSCORE: 0
CONSOLABILITY: 0
TOTALSCORE: 0
BREATHING: 0
BODYLANGUAGE: 0
BODYLANGUAGE: 0
NEGVOCALIZATION: 0
BODYLANGUAGE: 0
BODYLANGUAGE: 0
BREATHING: 0
NEGVOCALIZATION: 0
CONSOLABILITY: 0
FACIALEXPRESSION: 0
BREATHING: 0
NEGVOCALIZATION: 0

## 2021-04-02 NOTE — CARE COORDINATION
SW left message for MD after speaking with nurse assigned and working with patient this morning. There was a discharge order yesterday at 12pm and patient was supposed to leave at 5pm with son per case management notes. SW attempting to obtain avoidable day info. There was no medicare appeal filed. SW will wait for a response before reaching out to family. Respectfully submitted,    ZECHARIAH Santos  [facility]   [phone]    Electronically signed by ZECHARIAH Hernandez on 4/2/2021 at 8:54 AM

## 2021-04-02 NOTE — PROGRESS NOTES
Pt very sleepy this morning. did wake up long enough to give AM meds crushed in applesauce, pt tolerated well. Pt then worked with therapy, pt unable/refused to stand up, and they are recommending skilled therapy at discharge. Called son to update, he states pt is normally like this earlier in the day but is more cooperative later in the day. Son will be here around 1-2 pm to see if pt is safe to discharge home today. Dr Isela Montero aware, discharge order remains in place and hopeful to safely send pt home later today once son evaluates pt at bedside. Will continue to monitor.

## 2021-04-02 NOTE — CARE COORDINATION
04/02/21 1819   Documentation for Discharge Appeal   Discharge Appealed by Family   Date notifed by FARZANEH of appeal request: 04/02/21   Time notified by Sherrilee Felty of appeal request: [id number]   Detailed Notice of Discharge given to: Family   Date Notice of Discharge given: 04/02/21   Time Notice of Discharge given: 3322   Date records sent to [facility] 04/02/21   Time records sent to [address] [place] [address]     Appeal Case # [id number]  Teodora Noel RN, BSN, Case Management  Phone: [phone]  Electronically signed by Teodora Noel RN on 4/2/2021 at 6:23 PM

## 2021-04-02 NOTE — FLOWSHEET NOTE
04/02/21 1720   IMM Letter   Notice of Medicare Non-Coverage Letter Not Given? (Post Acute) Patient Initiated Discharge     Detailed of notice given on 4/2. Copy placed on soft chart. Respectfully submitted,    ALMAS Cerda-S  [facility]   [phone]     Electronically signed by ZECHARIAH Zamora on 4/2/2021 at 5:20 PM

## 2021-04-02 NOTE — PLAN OF CARE
Problem: Falls - Risk of:  Goal: Will remain free from falls  Description: Will remain free from falls  4/1/2021 1723 by Rosalinda Christiansen RN  Outcome: Completed  4/1/2021 1008 by Rosalinda Christiansen RN  Outcome: Ongoing     Problem: Falls - Risk of:  Goal: Absence of physical injury  Description: Absence of physical injury  4/1/2021 1723 by Rosalinda Christiansen RN  Outcome: Completed  4/1/2021 1008 by Rosalinda Christiansen RN  Outcome: Ongoing     Problem: HEMODYNAMIC STATUS  Goal: Patient has stable vital signs and fluid balance  4/1/2021 1723 by Rosalinda Christiansen RN  Outcome: Completed  4/1/2021 1008 by Rosalinda Christiansen RN  Outcome: Ongoing

## 2021-04-02 NOTE — PROGRESS NOTES
Occupational Therapy  Facility/Department: [address] MED SURG  Daily Treatment Note  NAME: Triny Leal  : 1931  MRN: [id number]    Date of Service: 2021    Assessment: Pt tolerated session poorly this date - reluctant and somewhat agitated when attempting to assist. Pt required max/total Ax2 for bed mobility and attempted to stand at EOB to RW however unable to follow cues to obtain upright posture. Anticipate pt is most limited by cognition. Anticipate pt to require max A for ADL needs. Pt is requiring significant assist at this time. If son is able to to demonstrate ability to move patient then pt can d/c home with 24/7 supervision/assist and home health OT. Anticipate pt will require more physical assist than son is able to provide. May require caitlin lift if admanat about d/c home and cognition does not improve. Discharge Recommendations:  24 hour supervision or assist, Home with Home health OT, 3-5 sessions per week       Assessment   Performance deficits / Impairments: Decreased functional mobility ; Decreased endurance;Decreased ADL status; Decreased safe awareness;Decreased high-level IADLs;Decreased cognition;Decreased balance;Decreased fine motor control  Assessment: Pt tolerated session poorly this date - reluctant and somewhat agitated when attempting to assist. Pt required max/total Ax2 for bed mobility and attempted to stand at EOB to RW however unable to follow cues to obtain upright posture. Anticipate pt is most limited by cognition. Anticipate pt to require max A for ADL needs. Pt is requiring significant assist at this time. If son is able to to demonstrate ability to move patient then pt can d/c home with 24/7 supervision/assist and home health OT. Anticipate pt will require more physical assist than son is able to provide. May require caitlin lift if admanat about d/c home and cognition does not improve.   OT Education: OT Role;Transfer Training  Barriers to Learning: cognition  REQUIRES OT FOLLOW UP: Yes  Activity Tolerance  Activity Tolerance: Treatment limited secondary to decreased cognition  Safety Devices  Safety Devices in place: Yes(DANIA Erickson) notified)  Type of devices: Nurse notified;Gait belt;Patient at risk for falls;Call light within reach; Left in bed;Bed alarm in place         Patient Diagnosis(es): The primary encounter diagnosis was Acute encephalopathy. Diagnoses of Permanent atrial fibrillation (HCC) and Acute cystitis with hematuria were also pertinent to this visit. has a past medical history of Alzheimer's dementia ([facility] [address] Atrial fibrillation ([facility] [address] Benign essential HTN, and Gout.   has no past surgical history on file. Restrictions  Restrictions/Precautions  Restrictions/Precautions: Fall Risk     Subjective   General  Chart Reviewed: Yes  Patient assessed for rehabilitation services?: Yes  Additional Pertinent Hx: Per H&P, \"80year-old female with past medical history of hypertension, thyroid disease, atrial fibrillation presented to hospital altered mental status since Saturday. Patient is unable to provide any history and mostly does not answer any questions. According to the son apparently patient had some haircut on Saturday and she was doing well at that time. Later in the day he started becoming confused and is carried over onto Sunday. Since her confusion is getting worse she was brought into the hospital.  On arrival she was noted to be hemodynamically stable. Lab work showed lactic acidosis of 2.7. Patient had a UA performed which was concerning for urinary tract infection. CT of the head was unremarkable chest x-ray showed possible bilateral pleural effusions. Patient was admitted to the hospital for further management\"  Family / Caregiver Present: No  Referring Practitioner: Lavern Hansen MD  Diagnosis: AMS  Subjective  Subjective: Pt met bedside, reluctant and somewhat agitated to complete therapy session.  Begins yelling when assisting with mobility. General Comment  Comments: RN ok to see  Vital Signs  Patient Currently in Pain: (No complaints of pain)        Objective    ADL  Feeding: Dependent/Total(RN fed pt for medication)        Balance  Sitting Balance: Maximum assistance(Max A for sitting balance at EOB for ~5 minutes)  Standing Balance: (Not able to stand at EOB despite max Ax2, pt resisting.)     Bed mobility  Supine to Sit: Maximum assistance;2 Person assistance  Sit to Supine: Maximum assistance;2 Person assistance  Scooting: Dependent/Total;2 Person assistance     Transfers  Transfer Comments: Despite max Ax2 pt unable to obtain upright posture d/t resisting        Cognition  Overall Cognitive Status: Exceptions  Arousal/Alertness: Delayed responses to stimuli;Inconsistent responses to stimuli  Following Commands: (inconsistently follows commands)  Attention Span: Unable to maintain attention  Safety Judgement: Decreased awareness of need for assistance;Decreased awareness of need for safety  Problem Solving: Decreased awareness of errors  Insights: Not aware of deficits  Initiation: Requires cues for all  Sequencing: Requires cues for all           Plan   Plan  Times per week: 3-5  Current Treatment Recommendations: Strengthening, Functional Mobility Training, Cognitive Reorientation, Patient/Caregiver Education & Training, Endurance Training, ROM, Cognitive/Perceptual Training, Balance Training, Safety Education & Training, Self-Care / ADL    AM-PAC Score    Rossi Garcia scored a 10/24 on the AM-PAC ADL Inpatient form. Current research shows that an AM-PAC score of 17 or less is typically not associated with a discharge to the patient's home setting. Based on the patient's AM-PAC score and their current ADL deficits, it is recommended that the patient have 3-5 sessions per week of Occupational Therapy at d/c to increase the patient's independence. Please see assessment section for further patient specific details.     If patient discharges prior to next session this note will serve as a discharge summary. Please see below for the latest assessment towards goals. \     AM-PAC Inpatient Daily Activity Raw Score: 10 (04/02/21 1131)  AM-PAC Inpatient ADL T-Scale Score : 27.31 (04/02/21 1131)  ADL Inpatient CMS 0-100% Score: 74.7 (04/02/21 1131)  ADL Inpatient CMS G-Code Modifier : CL (04/02/21 1131)    Goals  Short term goals  Time Frame for Short term goals: prior to d/c: status of ongoing as of 4/2/21  Short term goal 1: bed mobility with Mod A x1  Short term goal 2: tolerate 5 min standing fxl task with CGA  Short term goal 3: toileting Min A  Short term goal 4: ADL transfer CGA  Short term goal 5: complete seated grooming with set up, with minimal cues  Patient Goals   Patient goals : unable to report       Therapy Time   Individual Concurrent Group Co-treatment   Time In       0942   Time Out       1005   Minutes       23   Timed Code Treatment Minutes: 23 Minutes     If pt is discharged prior to next OT session, this note will serve as the discharge summary.     Idamae Dubin, FUQ/A#[id number]

## 2021-04-02 NOTE — CARE COORDINATION
Spoke with the patient's son, he states when he arrived to take her home last evening, she was too sleeping to take her home. He was concerned about her medications and did not feel comfortable taking her home. He wants to talk with the doctor today as she was not as alert as the doctor described her being earlier in the day.     Cesar Trimble RN, BSN, Case Management  Phone: [phone]  Electronically signed by Cesar Trimble RN on 4/2/2021 at 8:56 AM

## 2021-04-02 NOTE — PROGRESS NOTES
Writer just spoke to son, he was on his way here to pick patient up, and his car broke down.  He is declining medical transport home and hes appealing the discharge at this time, notified Radha Fofana from  and Dr Leda Wright

## 2021-04-02 NOTE — PROGRESS NOTES
Physical Therapy  Facility/Department: [address] MED SURG  Daily Treatment Note - COTX  NAME: Ritesh Webster  : 1931  MRN: [id number]    Date of Service: 2021    Discharge Recommendations:  Patient would benefit from continued therapy after discharge, 24 hour supervision or assist, Continue to assess pending progress   PT Equipment Recommendations  Equipment Needed: No    Assessment   Body structures, Functions, Activity limitations: Decreased functional mobility ; Decreased strength;Decreased ADL status; Decreased cognition  Assessment: Pt unable/unwilling to participate in OOB activity today. Dementia significantly limits her ability to complete therapy. Her son reportedly plans to take her home. He will need to demonsrate the ability to coax her/assist her in completing mobility tasks, otherwise, she will require / assist at [facility] Ritesh Webster scored a 10/24 on the AM-PAC short mobility form. Current research shows that an AM-PAC score of 17 or less is typically not associated with a discharge to the patient's home setting. Based on the patient's AM-PAC score and their current functional mobility deficits, it is recommended that the patient have 3-5 sessions per week of Physical Therapy at d/c to increase the patient's independence. Please see assessment section for further patient specific details. If patient discharges prior to next session this note will serve as a discharge summary. Please see below for the latest assessment towards goals. PT Education: PT Role;Plan of Care;General Safety;Orientation;Transfer Training;Gait Training;Functional Mobility Training  REQUIRES PT FOLLOW UP: Yes  Activity Tolerance  Activity Tolerance: Patient limited by cognitive status     Patient Diagnosis(es): The primary encounter diagnosis was Acute encephalopathy. Diagnoses of Permanent atrial fibrillation (HCC) and Acute cystitis with hematuria were also pertinent to this visit.      has a past medical history of Alzheimer's dementia ([facility] [address] Atrial fibrillation ([facility] [address] Benign essential HTN, and Gout.   has no past surgical history on file. Restrictions  Restrictions/Precautions  Restrictions/Precautions: Fall Risk     Subjective   General  Chart Reviewed: Yes  Additional Pertinent Hx: 51-year-old female with past medical history of hypertension, thyroid disease, atrial fibrillation presented to hospital on 3/29/21 altered mental status since Saturday  Subjective  Subjective: Pt not agreeable to activity. Confused, somnolent this morning. Does not want to do anything. Orientation  Orientation  Overall Orientation Status: Impaired    Objective      Bed mobility  Supine to Sit: Maximum assistance;2 Person assistance  Sit to Supine: Maximum assistance;2 Person assistance  Scooting: Dependent/Total;2 Person assistance     Transfers  Sit to Stand: Dependent/Total;Maximum Assistance;2 Person Assistance  Stand to sit: Maximum Assistance;Dependent/Total;2 Person Assistance     Ambulation  Ambulation?: No  Stairs/Curb  Stairs?: No     Other Activities: Other (see comment)  Comment: With max encouragement and assist, pt able to transfer from supine to sit at EOB. She required intermittent Max A to maintain sitting balance (correcting posterior lean). After extended encouragement, pt required max/total assist x 2 to partially stand from EOB to walker (actively resisting full upright stance).        Goals  Short term goals  Time Frame for Short term goals: by acute discharge  Short term goal 1: bed mobility with min A  Short term goal 2: sit<>stand with CGA to RW  Short term goal 3: ambulate > 10' with RW and CGA  Patient Goals   Patient goals : unable to assess    Plan    Plan  Times per week: 3-5x/week  Current Treatment Recommendations: Functional Mobility Training, Strengthening, Transfer Training, Balance Training, Gait Training, Patient/Caregiver Education & Training, Safety Education & Training, Neuromuscular

## 2021-04-03 ENCOUNTER — APPOINTMENT (OUTPATIENT)
Dept: GENERAL RADIOLOGY | Age: 86
DRG: 689 | End: 2021-04-03
Payer: MEDICARE

## 2021-04-03 PROCEDURE — 2580000003 HC RX 258: Performed by: INTERNAL MEDICINE

## 2021-04-03 PROCEDURE — 6370000000 HC RX 637 (ALT 250 FOR IP): Performed by: FAMILY MEDICINE

## 2021-04-03 PROCEDURE — 2700000000 HC OXYGEN THERAPY PER DAY

## 2021-04-03 PROCEDURE — 94640 AIRWAY INHALATION TREATMENT: CPT

## 2021-04-03 PROCEDURE — 6370000000 HC RX 637 (ALT 250 FOR IP): Performed by: INTERNAL MEDICINE

## 2021-04-03 PROCEDURE — 94761 N-INVAS EAR/PLS OXIMETRY MLT: CPT

## 2021-04-03 PROCEDURE — 1200000000 HC SEMI PRIVATE

## 2021-04-03 PROCEDURE — 6360000002 HC RX W HCPCS: Performed by: INTERNAL MEDICINE

## 2021-04-03 PROCEDURE — 71045 X-RAY EXAM CHEST 1 VIEW: CPT

## 2021-04-03 PROCEDURE — 6360000002 HC RX W HCPCS: Performed by: FAMILY MEDICINE

## 2021-04-03 RX ORDER — FUROSEMIDE 10 MG/ML
20 INJECTION INTRAMUSCULAR; INTRAVENOUS ONCE
Status: COMPLETED | OUTPATIENT
Start: 2021-04-03 | End: 2021-04-03

## 2021-04-03 RX ADMIN — CALCIUM 500 MG: 500 TABLET ORAL at 08:44

## 2021-04-03 RX ADMIN — FUROSEMIDE 20 MG: 10 INJECTION, SOLUTION INTRAMUSCULAR; INTRAVENOUS at 09:17

## 2021-04-03 RX ADMIN — FUROSEMIDE 20 MG: 10 INJECTION, SOLUTION INTRAMUSCULAR; INTRAVENOUS at 15:32

## 2021-04-03 RX ADMIN — Medication 1000 UNITS: at 08:44

## 2021-04-03 RX ADMIN — SODIUM CHLORIDE, PRESERVATIVE FREE 10 ML: 5 INJECTION INTRAVENOUS at 08:49

## 2021-04-03 RX ADMIN — RIVAROXABAN 15 MG: 15 TABLET, FILM COATED ORAL at 08:44

## 2021-04-03 RX ADMIN — BUDESONIDE AND FORMOTEROL FUMARATE DIHYDRATE 2 PUFF: 160; 4.5 AEROSOL RESPIRATORY (INHALATION) at 08:56

## 2021-04-03 RX ADMIN — ALBUTEROL SULFATE 2.5 MG: 2.5 SOLUTION RESPIRATORY (INHALATION) at 08:57

## 2021-04-03 RX ADMIN — BUDESONIDE AND FORMOTEROL FUMARATE DIHYDRATE 2 PUFF: 160; 4.5 AEROSOL RESPIRATORY (INHALATION) at 21:36

## 2021-04-03 RX ADMIN — METOPROLOL SUCCINATE 12.5 MG: 25 TABLET, EXTENDED RELEASE ORAL at 08:44

## 2021-04-03 RX ADMIN — SODIUM CHLORIDE, PRESERVATIVE FREE 10 ML: 5 INJECTION INTRAVENOUS at 21:21

## 2021-04-03 ASSESSMENT — PAIN SCALES - PAIN ASSESSMENT IN ADVANCED DEMENTIA (PAINAD)
BREATHING: 0
NEGVOCALIZATION: 0
FACIALEXPRESSION: 0
NEGVOCALIZATION: 0
CONSOLABILITY: 0
CONSOLABILITY: 0
BREATHING: 0
TOTALSCORE: 0
NEGVOCALIZATION: 0
BODYLANGUAGE: 0
NEGVOCALIZATION: 0
BODYLANGUAGE: 0

## 2021-04-03 ASSESSMENT — PAIN SCALES - WONG BAKER: WONGBAKER_NUMERICALRESPONSE: 2

## 2021-04-03 NOTE — PROGRESS NOTES
Hospitalist Progress Note      PCP: TASHI Li - CNP    Date of Admission: 3/29/2021    Chief Complaint: AMS. Hospital Course: An 81 Yo female admitted with a uti and hypoactive delirium. This has been a recurrent cycle for Mrs Shantel Alfaro. She was recently discharged her on Feb 16 and very similar admission in Dec as well. she has end stage Alzheimer's with UTIs unfortunately just adding to her mental status limitations. During her last admission her depakote was stopped due to oversedation and treated her with nightly seroquel with very good response. She spends most of her days sleeping - not eating or drinking - then develops UTI due to poor urine output and returns to the hospital.    Placement was discussed with son at her last admission - he refused. He also appealed discharge last time, though I am still not sure what his true expectation was. He thought she will become \"normal\", though I am not sure what he meant by that with severe and progressive baseline underlying dementia. At the very least he is unrealistic in his expectation. Subjective: Pt S/E. No change in mental status.        Medications:  Reviewed    Infusion Medications    sodium chloride       Scheduled Medications    furosemide  20 mg Intravenous Once    metoprolol succinate  12.5 mg Oral Daily    therapeutic multivitamin-minerals  1 tablet Oral Daily    calcium elemental  500 mg Oral Daily    Vitamin D  1,000 Units Oral Daily    rivaroxaban  15 mg Oral Daily with breakfast    budesonide-formoterol  2 puff Inhalation BID    sodium chloride flush  10 mL Intravenous 2 times per day    cefTRIAXone (ROCEPHIN) IV  1,000 mg Intravenous Q24H     PRN Meds: haloperidol lactate, albuterol, sodium chloride flush, sodium chloride, promethazine **OR** ondansetron, polyethylene glycol, acetaminophen **OR** acetaminophen      Intake/Output Summary (Last 24 hours) at 4/3/2021 5442  Last data filed at 4/3/2021 1449  Gross Diagnosis    Acute encephalopathy [G93.40]          Acute metabolic encephalopathy - in setting of progressive advanced Alzheimer's Dementia. Likely  worsened by Urinary tract infection, though I am concerned about progressing dementia.    - will stop depakote completely   - started seroquel 12.5 mg in the evenings (she is actively hallucinating) - very low dose. Volume overload  - due to ivf hydration as she was not taking much po   - stop ivf  - cxr with increased opacities, likely edema  - lasix 20 mg iv given this am with good response, 1L uop. Will repeat this again today      UTI  - UCx with e coli, pansensitive  - completed 5 days rocephin     Chronic Atrial fibrillation on eliquis.        Alzheimer's dementia - severe end stage.   See med discussion as above.   - now art baseline per her son. She is sleepy in the am, which is normal for her, but wakes up later in the afternoon.       Consulted palliative care - CODE STATUS changed to DNR CCA last admission. reconsult again.       Cardiology was consulted last admission for suspected CHF - diastolic with severe valvular disease. She has mod to severe MR, mod AR and severe TR. She was deemed very poor candidate for aggressive intervention. She actually appears dehydrated - Will continue with IVF and monitor closely - stop IVF when tolerates diet better.        DVT Prophylaxis: on eliquis  Diet: DIET GENERAL; Low Sodium (2 GM); Daily Fluid Restriction: 2000 ml; Cardiac  Code Status: DNR-CCA    PTOT as tolerates.      Dispo - dc tomorrow if she is more awake, son is more hopeful for dc tomorrow    Celia Alex, DO

## 2021-04-03 NOTE — CARE COORDINATION
Sw placed call to Nasima Araiza ([phone]) to inquire about appeal process. Spoke with liason who states the appeal is still pending and it can take 1-3 days for a decision. She was unable to give me an answer to why the process is taking longer than 24 hours and is unsure when the decision will be made.      Justin BECKETT, ALMAS-S, Social Work  [phone]    Electronically signed by SOPHY Wright, KARENW on 4/3/2021 at 2:19 PM

## 2021-04-03 NOTE — PROGRESS NOTES
Pt resting in bed. Disoriented x4. No complaints overnight. IVF running at 75 ml/hr. Tele camera in pt room. Fall precautions in place, call light and bedside table within reach.

## 2021-04-03 NOTE — PROGRESS NOTES
Message sent to Dr Sj Angel via perfect serve. Informed MD pt is having audible wheezing and is having trouble breathing. O2 93% on room air. Call placed to RT for treatment. Will await new orders. Will continue to monitor.

## 2021-04-03 NOTE — PROGRESS NOTES
Hospitalist Progress Note      PCP: TASHI Degroot - CNP    Date of Admission: 3/29/2021    Chief Complaint: AMS. Hospital Course: An 79 Yo female admitted with a uti and hypoactive delirium. This has been a recurrent cycle for Mrs Anaya Brothers. She was recently discharged her on Feb 16 and very similar admission in Dec as well. she has end stage Alzheimer's with UTIs unfortunately just adding to her mental status limitations. During her last admission her depakote was stopped due to oversedation and treated her with nightly seroquel with very good response. She spends most of her days sleeping - not eating or drinking - then develops UTI due to poor urine output and returns to the hospital.    Placement was discussed with son at her last admission - he refused. He also appealed discharge last time, though I am still not sure what his true expectation was. He thought she will become \"normal\", though I am not sure what he meant by that with severe and progressive baseline underlying dementia. At the very least he is unrealistic in his expectation. Subjective: Pt S/E. No change in mental status.        Medications:  Reviewed    Infusion Medications    sodium chloride       Scheduled Medications    furosemide  20 mg Intravenous Once    metoprolol succinate  12.5 mg Oral Daily    therapeutic multivitamin-minerals  1 tablet Oral Daily    calcium elemental  500 mg Oral Daily    Vitamin D  1,000 Units Oral Daily    rivaroxaban  15 mg Oral Daily with breakfast    budesonide-formoterol  2 puff Inhalation BID    sodium chloride flush  10 mL Intravenous 2 times per day    cefTRIAXone (ROCEPHIN) IV  1,000 mg Intravenous Q24H     PRN Meds: haloperidol lactate, albuterol, sodium chloride flush, sodium chloride, promethazine **OR** ondansetron, polyethylene glycol, acetaminophen **OR** acetaminophen      Intake/Output Summary (Last 24 hours) at 4/3/2021 1513  Last data filed at 4/3/2021 1449  Gross per 24 hour   Intake 300 ml   Output 1900 ml   Net -1600 ml       Physical Exam Performed:    BP (!) 152/62   Pulse 76   Temp 97.5 °F (36.4 °C) (Oral)   Resp 20   Wt 201 lb 8 oz (91.4 kg)   SpO2 90%   BMI 33.53 kg/m²     General appearance: Confused and frail looking, nad  HEENT:  Conjunctivae/corneas clear. Neck: Supple, with full range of motion. Respiratory:  Normal respiratory effort. Clear to auscultation, bilaterally without Rales/Wheezes/Rhonchi. Cardiovascular: Regular rate and rhythm with normal S1/S2 without murmurs or rubs  Abdomen: Soft, non-tender, non-distended, normal bowel sounds. Musculoskeletal: No cyanosis or edema bilaterally  Neurologic: Asleep, easily awakened. pleasantly confused but grumpy when she wakes up. Moves all extremities. Peripheral Pulses: +2 palpable, equal bilaterally       Labs:   Recent Labs     04/01/21  0833   WBC 5.4   HGB 12.0   HCT 37.3   *     Recent Labs     04/01/21  0833 04/02/21  1411   * 142   K 3.8 4.1   * 112*   CO2 21 21   BUN 25* 19   CREATININE 1.1 1.2   CALCIUM 9.9 9.2     No results for input(s): AST, ALT, BILIDIR, BILITOT, ALKPHOS in the last 72 hours. No results for input(s): INR in the last 72 hours. No results for input(s): Mary Anne Burdock in the last 72 hours. Urinalysis:      Lab Results   Component Value Date    NITRU POSITIVE 03/29/2021    WBCUA 53 03/29/2021    BACTERIA 4+ 03/29/2021    RBCUA 1 03/29/2021    BLOODU Negative 03/29/2021    SPECGRAV 1.023 03/29/2021    GLUCOSEU Negative 03/29/2021       Radiology:  XR CHEST PORTABLE   Final Result   Increased bilateral parahilar opacities could represent pulmonary edema or   infection         CT Head WO Contrast   Final Result   1. No acute intracranial abnormality.          XR CHEST PORTABLE   Final Result   Bilateral pleural effusions and bibasilar opacities could represent   atelectasis or pneumonia                 Assessment/Plan:    Active Hospital Problems Diagnosis    Acute encephalopathy [G93.40]          Acute metabolic encephalopathy - in setting of progressive advanced Alzheimer's Dementia. Likely  worsened by Urinary tract infection, though I am concerned about progressing dementia.    - will stop depakote completely   - started seroquel 12.5 mg in the evenings (she is actively hallucinating) - very low dose.        UTI  - UCx with e coli, pansensitive  - rocephin day 4/5    Chronic Atrial fibrillation on eliquis.        Alzheimer's dementia - severe end stage.   See med discussion as above.       Consulted palliative care - CODE STATUS changed to DNR CCA last admission. reconsult again.       Cardiology was consulted last admission for suspected CHF - diastolic with severe valvular disease. She has mod to severe MR, mod AR and severe TR. She was deemed very poor candidate for aggressive intervention. She actually appears dehydrated - Will continue with IVF and monitor closely - stop IVF when tolerates diet better.        DVT Prophylaxis: on eliquis  Diet: DIET GENERAL; Low Sodium (2 GM); Daily Fluid Restriction: 2000 ml; Cardiac  Code Status: DNR-CCA    PTOT as tolerates.      Dispo - dc tomorrow if she is more awake    Celia Alex, DO

## 2021-04-04 LAB
ANION GAP SERPL CALCULATED.3IONS-SCNC: 10 MMOL/L (ref 3–16)
BUN BLDV-MCNC: 15 MG/DL (ref 7–20)
CALCIUM SERPL-MCNC: 9.1 MG/DL (ref 8.3–10.6)
CHLORIDE BLD-SCNC: 103 MMOL/L (ref 99–110)
CO2: 26 MMOL/L (ref 21–32)
CREAT SERPL-MCNC: 1 MG/DL (ref 0.6–1.2)
GFR AFRICAN AMERICAN: >60
GFR NON-AFRICAN AMERICAN: 52
GLUCOSE BLD-MCNC: 73 MG/DL (ref 70–99)
MAGNESIUM: 1.4 MG/DL (ref 1.8–2.4)
POTASSIUM REFLEX MAGNESIUM: 3.4 MMOL/L (ref 3.5–5.1)
SODIUM BLD-SCNC: 139 MMOL/L (ref 136–145)

## 2021-04-04 PROCEDURE — 1200000000 HC SEMI PRIVATE

## 2021-04-04 PROCEDURE — 83735 ASSAY OF MAGNESIUM: CPT

## 2021-04-04 PROCEDURE — 36415 COLL VENOUS BLD VENIPUNCTURE: CPT

## 2021-04-04 PROCEDURE — 6370000000 HC RX 637 (ALT 250 FOR IP): Performed by: FAMILY MEDICINE

## 2021-04-04 PROCEDURE — 94640 AIRWAY INHALATION TREATMENT: CPT

## 2021-04-04 PROCEDURE — 2580000003 HC RX 258: Performed by: INTERNAL MEDICINE

## 2021-04-04 PROCEDURE — 6370000000 HC RX 637 (ALT 250 FOR IP): Performed by: INTERNAL MEDICINE

## 2021-04-04 PROCEDURE — 6360000002 HC RX W HCPCS: Performed by: FAMILY MEDICINE

## 2021-04-04 PROCEDURE — 94761 N-INVAS EAR/PLS OXIMETRY MLT: CPT

## 2021-04-04 PROCEDURE — 80048 BASIC METABOLIC PNL TOTAL CA: CPT

## 2021-04-04 RX ORDER — POTASSIUM CHLORIDE 20 MEQ/1
40 TABLET, EXTENDED RELEASE ORAL ONCE
Status: DISCONTINUED | OUTPATIENT
Start: 2021-04-04 | End: 2021-04-04

## 2021-04-04 RX ORDER — MAGNESIUM SULFATE IN WATER 40 MG/ML
2000 INJECTION, SOLUTION INTRAVENOUS ONCE
Status: COMPLETED | OUTPATIENT
Start: 2021-04-04 | End: 2021-04-04

## 2021-04-04 RX ORDER — METOPROLOL SUCCINATE 25 MG/1
12.5 TABLET, EXTENDED RELEASE ORAL DAILY
Qty: 30 TABLET | Refills: 3 | Status: ON HOLD | OUTPATIENT
Start: 2021-04-05 | End: 2021-11-23 | Stop reason: HOSPADM

## 2021-04-04 RX ORDER — FUROSEMIDE 20 MG/1
20 TABLET ORAL EVERY OTHER DAY
Qty: 30 TABLET | Refills: 3 | Status: SHIPPED | OUTPATIENT
Start: 2021-04-04

## 2021-04-04 RX ADMIN — METOPROLOL SUCCINATE 12.5 MG: 25 TABLET, EXTENDED RELEASE ORAL at 10:08

## 2021-04-04 RX ADMIN — MAGNESIUM SULFATE HEPTAHYDRATE 2000 MG: 40 INJECTION, SOLUTION INTRAVENOUS at 15:16

## 2021-04-04 RX ADMIN — POTASSIUM BICARBONATE 40 MEQ: 782 TABLET, EFFERVESCENT ORAL at 16:42

## 2021-04-04 RX ADMIN — BUDESONIDE AND FORMOTEROL FUMARATE DIHYDRATE 2 PUFF: 160; 4.5 AEROSOL RESPIRATORY (INHALATION) at 21:37

## 2021-04-04 RX ADMIN — RIVAROXABAN 15 MG: 15 TABLET, FILM COATED ORAL at 10:08

## 2021-04-04 RX ADMIN — SODIUM CHLORIDE, PRESERVATIVE FREE 10 ML: 5 INJECTION INTRAVENOUS at 22:17

## 2021-04-04 ASSESSMENT — PAIN SCALES - PAIN ASSESSMENT IN ADVANCED DEMENTIA (PAINAD)
BODYLANGUAGE: 0
NEGVOCALIZATION: 0
CONSOLABILITY: 0

## 2021-04-04 NOTE — DISCHARGE SUMMARY
Hospital Medicine Discharge Summary    Patient: Rosario Coker     Gender: female  : 1931   Age: 80 y.o. MRN: [id number]    Code Status: DNR-CCA     Primary Care Provider: TASHI Li CNP    Admit Date: 3/29/2021   Discharge Date:   2021  DC summary was completed 2021 but discharge was held up due to family appealing the discharge. Admitting Physician: Kina Taylor MD  Discharge Physician: Anne-Marie Rvias DO     Discharge Diagnoses: Active Hospital Problems    Diagnosis Date Noted    Acute encephalopathy [G93.40] 2021       Hospital Course: An 81 Yo female admitted with a uti and hypoactive delirium.   This has been a recurrent cycle for Mrs Shantel Alfaro. She was recently discharged her on  and very similar admission in Dec as well. she has end stage Alzheimer's with UTIs unfortunately just adding to her mental status limitations. During her last admission her depakote was stopped due to oversedation and treated her with nightly seroquel with very good response. She spends most of her days sleeping - not eating or drinking - then develops UTI due to poor urine output and returns to the hospital.     Placement was discussed with son at her last admission - he refused. He also appealed discharge last time, though I am still not sure what his true expectation was. He thought she will become \"normal\", though I am not sure what he meant by that with severe and progressive baseline underlying dementia. At the very least he is unrealistic in his expectation. Work up completed, and improved with treatment as below. was discharged today in stable condition. Acute metabolic encephalopathy - in setting of progressive advanced Alzheimer's Dementia. Likely  worsened by Urinary tract infection, though her dementia is likely progressing, given multiple admissions (5) in the past year.      - will stop depakote completely   - family does not want seroquel either     Volume CONSULT TO PALLIATIVE CARE  IP CONSULT TO HOME CARE NEEDS  IP CONSULT TO SOCIAL WORK    Labs: For convenience and continuity at follow-up the following most recent labs are provided:    Lab Results   Component Value Date    WBC 5.4 04/01/2021    HGB 12.0 04/01/2021    HCT 37.3 04/01/2021    MCV 85.6 04/01/2021     04/01/2021     04/04/2021    K 3.4 04/04/2021     04/04/2021    CO2 26 04/04/2021    BUN 15 04/04/2021    CREATININE 1.0 04/04/2021    CALCIUM 9.1 04/04/2021    PHOS 2.4 12/07/2020    ALKPHOS 220 03/29/2021    ALT 23 03/29/2021    AST 38 03/29/2021    BILITOT 0.9 03/29/2021    BILIDIR <0.2 02/10/2021    LABALBU 3.7 03/29/2021    LDLCALC 49 06/06/2020    TRIG 82 06/06/2020     No results found for: INR    Radiology:  XR CHEST PORTABLE   Final Result   Increased bilateral parahilar opacities could represent pulmonary edema or   infection         CT Head WO Contrast   Final Result   1. No acute intracranial abnormality.          XR CHEST PORTABLE   Final Result   Bilateral pleural effusions and bibasilar opacities could represent   atelectasis or pneumonia             Discharge Medications:   Current Discharge Medication List      START taking these medications    Details   Cranberry 250 MG CHEW Take 2 tablets by mouth 2 times daily (with meals)  Qty: 120 tablet, Refills: 0           Current Discharge Medication List        Current Discharge Medication List      CONTINUE these medications which have NOT CHANGED    Details   rivaroxaban (XARELTO) 15 MG TABS tablet Take 1 tablet by mouth daily (with breakfast)  Qty: 90 tablet, Refills: 1      vitamin D 25 MCG (1000 UT) CAPS Take 1,000 Units by mouth daily      albuterol sulfate HFA (VENTOLIN HFA) 108 (90 Base) MCG/ACT inhaler Inhale 2 puffs into the lungs every 6 hours as needed for Wheezing      mometasone-formoterol (DULERA) 200-5 MCG/ACT inhaler Inhale 2 puffs into the lungs 2 times daily as needed (shortness of breath)       Multiple Vitamins-Minerals (MULTIVITAMIN WITH MINERALS) tablet Take 1 tablet by mouth daily      calcium carbonate (OSCAL) 500 MG TABS tablet Take 500 mg by mouth daily           Current Discharge Medication List      STOP taking these medications       cephALEXin (KEFLEX) 500 MG capsule Comments:   Reason for Stopping:         divalproex (DEPAKOTE SPRINKLE) 125 MG capsule Comments:   Reason for Stopping:         QUEtiapine (SEROQUEL) 25 MG tablet Comments:   Reason for Stopping:         divalproex (DEPAKOTE) 125 MG DR tablet Comments:   Reason for Stopping:         metoprolol succinate (TOPROL XL) 25 MG extended release tablet Comments:   Reason for Stopping: Follow-up appointments:  one week    Provider Follow-up:    pcp    Condition at Discharge:  Stable    The patient was seen and examined on day of discharge and this discharge summary is in conjunction with any daily progress note from day of discharge. Time Spent on discharge is 45 minutes  in the examination, evaluation, counseling and review of medications and discharge plan. Signed:    Joana Maxwell DO   4/4/2021      Thank you TASHI Chatterjee CNP for the opportunity to be involved in this patient's care. If you have any questions or concerns please feel free to contact me at [phone].

## 2021-04-04 NOTE — CARE COORDINATION
Tay received call from patient's son, Liliana Oswald ([phone]), stating that he received notice from [employer] that his appeal was denied and patient should dc from the hospital by 12 noon on 4/5/2021. Son reported that patient has CHF and had significant swelling yesterday and hopes that it is better today because he would love to bring her home. Tay sent message to the attending regarding the appeal and son's concern about patient's swelling/CHF. Tay received message from Ibrahima Viveros at [employer]. She stated that the physician reviewer agrees with terminations of hospital services and coverage for patient will end on 4/5/2021 at 12 noon. Electronically signed by SOPHY Vela LISW, Case Management on 4/4/2021 at 10:17 AM  [facility] [id number]     3:42 PM  Sw received notice from patient's attending MD that son reported he will  patient today. Tay informed intake at [facility] of patient's dc today.      Electronically signed by SOPHY Vela LISW, Case Management on 4/4/2021 at 3:43 PM  [place] [id number]

## 2021-04-05 VITALS
SYSTOLIC BLOOD PRESSURE: 116 MMHG | RESPIRATION RATE: 18 BRPM | OXYGEN SATURATION: 93 % | HEART RATE: 63 BPM | BODY MASS INDEX: 34.67 KG/M2 | WEIGHT: 208.34 LBS | DIASTOLIC BLOOD PRESSURE: 67 MMHG | TEMPERATURE: 98.1 F

## 2021-04-05 LAB
ANION GAP SERPL CALCULATED.3IONS-SCNC: 10 MMOL/L (ref 3–16)
BUN BLDV-MCNC: 14 MG/DL (ref 7–20)
CALCIUM SERPL-MCNC: 9.4 MG/DL (ref 8.3–10.6)
CHLORIDE BLD-SCNC: 108 MMOL/L (ref 99–110)
CO2: 25 MMOL/L (ref 21–32)
CREAT SERPL-MCNC: 1 MG/DL (ref 0.6–1.2)
GFR AFRICAN AMERICAN: >60
GFR NON-AFRICAN AMERICAN: 52
GLUCOSE BLD-MCNC: 120 MG/DL (ref 70–99)
POTASSIUM REFLEX MAGNESIUM: 3.8 MMOL/L (ref 3.5–5.1)
SODIUM BLD-SCNC: 143 MMOL/L (ref 136–145)

## 2021-04-05 PROCEDURE — 94760 N-INVAS EAR/PLS OXIMETRY 1: CPT

## 2021-04-05 PROCEDURE — 6370000000 HC RX 637 (ALT 250 FOR IP): Performed by: INTERNAL MEDICINE

## 2021-04-05 PROCEDURE — 97530 THERAPEUTIC ACTIVITIES: CPT

## 2021-04-05 PROCEDURE — 97535 SELF CARE MNGMENT TRAINING: CPT

## 2021-04-05 PROCEDURE — 94640 AIRWAY INHALATION TREATMENT: CPT

## 2021-04-05 PROCEDURE — 36415 COLL VENOUS BLD VENIPUNCTURE: CPT

## 2021-04-05 PROCEDURE — 80048 BASIC METABOLIC PNL TOTAL CA: CPT

## 2021-04-05 PROCEDURE — 6370000000 HC RX 637 (ALT 250 FOR IP): Performed by: FAMILY MEDICINE

## 2021-04-05 RX ORDER — LANOLIN ALCOHOL/MO/W.PET/CERES
400 CREAM (GRAM) TOPICAL DAILY
Qty: 7 TABLET | Refills: 0 | Status: SHIPPED | OUTPATIENT
Start: 2021-04-05 | End: 2021-04-13

## 2021-04-05 RX ORDER — LANOLIN ALCOHOL/MO/W.PET/CERES
400 CREAM (GRAM) TOPICAL DAILY
Status: DISCONTINUED | OUTPATIENT
Start: 2021-04-05 | End: 2021-04-05 | Stop reason: HOSPADM

## 2021-04-05 RX ADMIN — CALCIUM 500 MG: 500 TABLET ORAL at 09:56

## 2021-04-05 RX ADMIN — METOPROLOL SUCCINATE 12.5 MG: 25 TABLET, EXTENDED RELEASE ORAL at 09:56

## 2021-04-05 RX ADMIN — Medication 400 MG: at 09:59

## 2021-04-05 RX ADMIN — BUDESONIDE AND FORMOTEROL FUMARATE DIHYDRATE 2 PUFF: 160; 4.5 AEROSOL RESPIRATORY (INHALATION) at 08:55

## 2021-04-05 RX ADMIN — Medication 1000 UNITS: at 09:56

## 2021-04-05 RX ADMIN — RIVAROXABAN 15 MG: 15 TABLET, FILM COATED ORAL at 09:56

## 2021-04-05 ASSESSMENT — PAIN SCALES - PAIN ASSESSMENT IN ADVANCED DEMENTIA (PAINAD)
CONSOLABILITY: 0
BODYLANGUAGE: 0
BREATHING: 0
FACIALEXPRESSION: 0

## 2021-04-05 NOTE — PROGRESS NOTES
Physical Therapy    Facility/Department: [address] MED SURG  Treatment note    NAME: Yessenia Hernandez  : 1931  MRN: [id number]    Date of Service: 2021    Assessment / Discharge Recommendations:  -recommend continued PT OT and nursing care in a skilled setting (all chart notes indicate her son will not agree to this and plans to take her home)  -requires max assist of 2 to manage her bed mobility and transfers. .    Activity Tolerance: Patient limited by fatigue;Patient limited by cognitive status       Patient Diagnosis(es): The primary encounter diagnosis was Acute encephalopathy. Diagnoses of Permanent atrial fibrillation (HCC) and Acute cystitis with hematuria were also pertinent to this visit. has a past medical history of Alzheimer's dementia ([facility] [address] Atrial fibrillation ([facility] [address] Benign essential HTN, and Gout.   has no past surgical history on file.     Restrictions  Restrictions/Precautions  Restrictions/Precautions: Fall Risk  Subjective  General  Chart Reviewed: Yes  Patient assessed for rehabilitation services?: Yes  Additional Pertinent Hx: 66-year-old female with past medical history of hypertension, thyroid disease, atrial fibrillation presented to hospital on 3/29/21 altered mental status since Saturday  Response To Previous Treatment: (patient does not remember)  Family / Caregiver Present: No  Follows Commands: Impaired  Subjective  Subjective: arrived to room along with OT to patient sleeping - very difficult to wake her up- she is not able to acknowledge agreement to PT OT session  Social/Functional History  Social/Functional History  Lives With: Son(Norberto and his partner Salma Enriquez)  Type of Home: House  Home Layout: One level  Home Access: Stairs to enter with rails  Entrance Stairs - Number of Steps: 4-5  Bathroom Shower/Tub: Walk-in shower  Bathroom Toilet: Standard  Bathroom Equipment: Grab bars in shower, Shower chair  Home Equipment: 4 wheeled walker, Cane  ADL Assistance: Needs assistance(Son assists with bathing/dressing/toileting - patient feeds self)  Homemaking Assistance: (son completes all)  Ambulation Assistance: Independent(sometimes with 0UJ)  Transfer Assistance: Independent  Active : No  Additional Comments: above info per last admission in Dec, 2020; the pt unable to confirm or add to info due to level of dementia during 2/2021 admission. Objective  Bed mobility  Supine to Sit: Maximum assistance;2 Person assistance  Sit to Supine: (anticipate max of 2)  Transfers  Sit to Stand: Maximum Assistance;2 Person Assistance(in stedy)  Stand to sit: 2 Person Assistance;Maximum Assistance; Moderate Assistance  Ambulation  Ambulation?: No  Stairs/Curb  Stairs?: No  Balance  Comments: able to sit midline at the EOB with min/mod assist to maintain  Plan   Plan  Times per week: 3-5x/week  Current Treatment Recommendations: Functional Mobility Training, Strengthening, Transfer Training, Balance Training, Gait Training, Patient/Caregiver Education & Training, Safety Education & Training, Neuromuscular Re-education  Safety Devices  Type of devices:  All fall risk precautions in place, Call light within reach, Chair alarm in place, Telesitter in use, Left in chair, Nurse notified  AM-PAC Score  AM-PAC Inpatient Mobility Raw Score : 9 (04/05/21 0930)  AM-PAC Inpatient T-Scale Score : 30.55 (04/05/21 0930)  Mobility Inpatient CMS 0-100% Score: 81.38 (04/05/21 0930)  Mobility Inpatient CMS G-Code Modifier : CM (04/05/21 0930)    Goals  Short term goals  Time Frame for Short term goals: by acute discharge  Short term goal 1: bed mobility with min A  Short term goal 2: sit<>stand with CGA to RW  Short term goal 3: ambulate > 10' with RW and CGA  Patient Goals   Patient goals : unable to assess       Therapy Time   Individual Concurrent Group Co-treatment   Time In 0900         Time Out 0940         Minutes [address] Judd Betts, PT

## 2021-04-05 NOTE — CARE COORDINATION
Spoke with Sandy with [facility], she is aware of the referral and D/C today.   Cade Orr RN, BSN, Case Management  Phone: [phone]  Electronically signed by Cade Orr RN on 4/5/2021 at 8:49 AM

## 2021-04-05 NOTE — PROGRESS NOTES
bed to chair via diamond stedy lift  Bed mobility  Supine to Sit: Maximum assistance;2 Person assistance  Sit to Supine: Unable to assess  Scooting: Dependent/Total;2 Person assistance  Transfers  Sit to stand: Maximum assistance;2 Person assistance  Stand to sit: Maximum assistance;2 Person assistance  Transfer Comments: to/from diamond davis at bed and to recliner         Cognition  Overall Cognitive Status: Exceptions  Arousal/Alertness: Delayed responses to stimuli;Inconsistent responses to stimuli  Following Commands: Does not follow commands  Attention Span: Unable to maintain attention  Safety Judgement: Decreased awareness of need for assistance;Decreased awareness of need for safety  Problem Solving: Decreased awareness of errors  Insights: Not aware of deficits  Initiation: Requires cues for all  Sequencing: Requires cues for all          Plan   Plan  Times per week: 3-5  Current Treatment Recommendations: Strengthening, Functional Mobility Training, Cognitive Reorientation, Patient/Caregiver Education & Training, Endurance Training, ROM, Cognitive/Perceptual Training, Balance Training, Safety Education & Training, Self-Care / ADL    AM-PAC Score        AM-[facility] Inpatient Daily Activity Raw Score: 9 (04/05/21 0934)  AM-PAC Inpatient ADL T-Scale Score : 25.33 (04/05/21 0934)  ADL Inpatient CMS 0-100% Score: 79.59 (04/05/21 0934)  ADL Inpatient CMS G-Code Modifier : CL (04/05/21 0934)    Goals  Short term goals  Time Frame for Short term goals: prior to d/c: status of ongoing as of 4/5/21  Short term goal 1: bed mobility with Mod A x1  Short term goal 2: tolerate 5 min standing fxl task with CGA  Short term goal 3: toileting Min A  Short term goal 4: ADL transfer CGA  Short term goal 5: complete seated grooming with set up, with minimal cues  Patient Goals   Patient goals : unable to report       Therapy Time   Individual Concurrent Group Co-treatment   Time In 0905         Time Out 0943         Minutes 38

## 2021-04-05 NOTE — PROGRESS NOTES
Called pharmacy to verify and spoke with Brock Barahona that pt med: magnesium oxide 400mg did not scan properly. Saying wrong med. Verified with second nurse, Janice Ding. That the correct medication was given to the patient. Scan overrided. Pharmacy aware    Discharge teaching completed with patient and her son/ Sarmad Miller. Pt dressed. IV removed. All questions answered. Pt taken downstairs via wheelchair and sons there to drive her home.     Electronically signed by Brock Barahona, RN on 4/5/2021 at 12:45 PM

## 2021-04-05 NOTE — PROGRESS NOTES
Night shift reported that pt son, Alissa Xavier called overnight and will pick patient up today after 9am

## 2021-04-13 ENCOUNTER — APPOINTMENT (OUTPATIENT)
Dept: GENERAL RADIOLOGY | Age: 86
DRG: 884 | End: 2021-04-13
Payer: MEDICARE

## 2021-04-13 ENCOUNTER — APPOINTMENT (OUTPATIENT)
Dept: CT IMAGING | Age: 86
DRG: 884 | End: 2021-04-13
Payer: MEDICARE

## 2021-04-13 ENCOUNTER — HOSPITAL ENCOUNTER (INPATIENT)
Age: 86
LOS: 2 days | Discharge: HOME | DRG: 884 | End: 2021-04-16
Attending: INTERNAL MEDICINE | Admitting: INTERNAL MEDICINE
Payer: MEDICARE

## 2021-04-13 DIAGNOSIS — R26.2 UNABLE TO WALK: Primary | ICD-10-CM

## 2021-04-13 DIAGNOSIS — R53.1 GENERAL WEAKNESS: ICD-10-CM

## 2021-04-13 PROBLEM — R27.0 ATAXIA: Status: ACTIVE | Noted: 2021-04-13

## 2021-04-13 LAB
A/G RATIO: 1.1 (ref 1.1–2.2)
ALBUMIN SERPL-MCNC: 3.1 G/DL (ref 3.4–5)
ALP BLD-CCNC: 186 U/L (ref 40–129)
ALT SERPL-CCNC: 11 U/L (ref 10–40)
ANION GAP SERPL CALCULATED.3IONS-SCNC: 10 MMOL/L (ref 3–16)
AST SERPL-CCNC: 15 U/L (ref 15–37)
BASOPHILS ABSOLUTE: 0.1 K/UL (ref 0–0.2)
BASOPHILS RELATIVE PERCENT: 0.8 %
BILIRUB SERPL-MCNC: 0.8 MG/DL (ref 0–1)
BILIRUBIN URINE: NEGATIVE
BLOOD, URINE: NEGATIVE
BUN BLDV-MCNC: 18 MG/DL (ref 7–20)
CALCIUM SERPL-MCNC: 9.9 MG/DL (ref 8.3–10.6)
CHLORIDE BLD-SCNC: 107 MMOL/L (ref 99–110)
CLARITY: CLEAR
CO2: 23 MMOL/L (ref 21–32)
COLOR: YELLOW
CREAT SERPL-MCNC: 0.9 MG/DL (ref 0.6–1.2)
EOSINOPHILS ABSOLUTE: 0.2 K/UL (ref 0–0.6)
EOSINOPHILS RELATIVE PERCENT: 2.2 %
GFR AFRICAN AMERICAN: >60
GFR NON-AFRICAN AMERICAN: 59
GLOBULIN: 2.9 G/DL
GLUCOSE BLD-MCNC: 97 MG/DL (ref 70–99)
GLUCOSE URINE: NEGATIVE MG/DL
HCT VFR BLD CALC: 37.3 % (ref 36–48)
HEMOGLOBIN: 11.9 G/DL (ref 12–16)
KETONES, URINE: NEGATIVE MG/DL
LEUKOCYTE ESTERASE, URINE: NEGATIVE
LYMPHOCYTES ABSOLUTE: 0.7 K/UL (ref 1–5.1)
LYMPHOCYTES RELATIVE PERCENT: 8.5 %
MCH RBC QN AUTO: 27.8 PG (ref 26–34)
MCHC RBC AUTO-ENTMCNC: 32.1 G/DL (ref 31–36)
MCV RBC AUTO: 86.9 FL (ref 80–100)
MICROSCOPIC EXAMINATION: ABNORMAL
MONOCYTES ABSOLUTE: 1 K/UL (ref 0–1.3)
MONOCYTES RELATIVE PERCENT: 13.2 %
NEUTROPHILS ABSOLUTE: 5.9 K/UL (ref 1.7–7.7)
NEUTROPHILS RELATIVE PERCENT: 75.3 %
NITRITE, URINE: NEGATIVE
PDW BLD-RTO: 19.1 % (ref 12.4–15.4)
PH UA: 6.5 (ref 5–8)
PLATELET # BLD: 324 K/UL (ref 135–450)
PMV BLD AUTO: 8.9 FL (ref 5–10.5)
POTASSIUM SERPL-SCNC: 4.1 MMOL/L (ref 3.5–5.1)
PRO-BNP: 4028 PG/ML (ref 0–449)
PROTEIN UA: NEGATIVE MG/DL
RBC # BLD: 4.29 M/UL (ref 4–5.2)
SODIUM BLD-SCNC: 140 MMOL/L (ref 136–145)
SPECIFIC GRAVITY UA: 1.02 (ref 1–1.03)
TOTAL CK: 24 U/L (ref 26–192)
TOTAL PROTEIN: 6 G/DL (ref 6.4–8.2)
TROPONIN: <0.01 NG/ML
URINE REFLEX TO CULTURE: ABNORMAL
URINE TYPE: ABNORMAL
UROBILINOGEN, URINE: 2 E.U./DL
WBC # BLD: 7.8 K/UL (ref 4–11)

## 2021-04-13 PROCEDURE — 96374 THER/PROPH/DIAG INJ IV PUSH: CPT

## 2021-04-13 PROCEDURE — G0378 HOSPITAL OBSERVATION PER HR: HCPCS

## 2021-04-13 PROCEDURE — 71045 X-RAY EXAM CHEST 1 VIEW: CPT

## 2021-04-13 PROCEDURE — 71101 X-RAY EXAM UNILAT RIBS/CHEST: CPT

## 2021-04-13 PROCEDURE — 51701 INSERT BLADDER CATHETER: CPT

## 2021-04-13 PROCEDURE — 83880 ASSAY OF NATRIURETIC PEPTIDE: CPT

## 2021-04-13 PROCEDURE — 84484 ASSAY OF TROPONIN QUANT: CPT

## 2021-04-13 PROCEDURE — 80053 COMPREHEN METABOLIC PANEL: CPT

## 2021-04-13 PROCEDURE — 72125 CT NECK SPINE W/O DYE: CPT

## 2021-04-13 PROCEDURE — 6370000000 HC RX 637 (ALT 250 FOR IP): Performed by: INTERNAL MEDICINE

## 2021-04-13 PROCEDURE — 70450 CT HEAD/BRAIN W/O DYE: CPT

## 2021-04-13 PROCEDURE — 6360000002 HC RX W HCPCS: Performed by: PHYSICIAN ASSISTANT

## 2021-04-13 PROCEDURE — 82550 ASSAY OF CK (CPK): CPT

## 2021-04-13 PROCEDURE — 99284 EMERGENCY DEPT VISIT MOD MDM: CPT

## 2021-04-13 PROCEDURE — 73502 X-RAY EXAM HIP UNI 2-3 VIEWS: CPT

## 2021-04-13 PROCEDURE — 94664 DEMO&/EVAL PT USE INHALER: CPT

## 2021-04-13 PROCEDURE — 85025 COMPLETE CBC W/AUTO DIFF WBC: CPT

## 2021-04-13 PROCEDURE — 94640 AIRWAY INHALATION TREATMENT: CPT

## 2021-04-13 PROCEDURE — 81003 URINALYSIS AUTO W/O SCOPE: CPT

## 2021-04-13 RX ORDER — SODIUM CHLORIDE 0.9 % (FLUSH) 0.9 %
10 SYRINGE (ML) INJECTION PRN
Status: DISCONTINUED | OUTPATIENT
Start: 2021-04-13 | End: 2021-04-16 | Stop reason: HOSPADM

## 2021-04-13 RX ORDER — ONDANSETRON 2 MG/ML
4 INJECTION INTRAMUSCULAR; INTRAVENOUS EVERY 4 HOURS PRN
Status: DISCONTINUED | OUTPATIENT
Start: 2021-04-13 | End: 2021-04-16 | Stop reason: HOSPADM

## 2021-04-13 RX ORDER — POLYETHYLENE GLYCOL 3350 17 G/17G
17 POWDER, FOR SOLUTION ORAL DAILY PRN
Status: DISCONTINUED | OUTPATIENT
Start: 2021-04-13 | End: 2021-04-16 | Stop reason: HOSPADM

## 2021-04-13 RX ORDER — ACETAMINOPHEN 325 MG/1
650 TABLET ORAL EVERY 4 HOURS PRN
Status: DISCONTINUED | OUTPATIENT
Start: 2021-04-13 | End: 2021-04-16 | Stop reason: HOSPADM

## 2021-04-13 RX ORDER — SODIUM CHLORIDE 0.9 % (FLUSH) 0.9 %
10 SYRINGE (ML) INJECTION EVERY 12 HOURS SCHEDULED
Status: DISCONTINUED | OUTPATIENT
Start: 2021-04-13 | End: 2021-04-16 | Stop reason: HOSPADM

## 2021-04-13 RX ORDER — ALBUTEROL SULFATE 2.5 MG/3ML
2.5 SOLUTION RESPIRATORY (INHALATION) EVERY 6 HOURS PRN
Status: DISCONTINUED | OUTPATIENT
Start: 2021-04-13 | End: 2021-04-16 | Stop reason: HOSPADM

## 2021-04-13 RX ORDER — SODIUM CHLORIDE 9 MG/ML
25 INJECTION, SOLUTION INTRAVENOUS PRN
Status: DISCONTINUED | OUTPATIENT
Start: 2021-04-13 | End: 2021-04-16 | Stop reason: HOSPADM

## 2021-04-13 RX ORDER — ACETAMINOPHEN 650 MG/1
650 SUPPOSITORY RECTAL EVERY 4 HOURS PRN
Status: DISCONTINUED | OUTPATIENT
Start: 2021-04-13 | End: 2021-04-16 | Stop reason: HOSPADM

## 2021-04-13 RX ORDER — FUROSEMIDE 20 MG/1
20 TABLET ORAL EVERY OTHER DAY
Status: DISCONTINUED | OUTPATIENT
Start: 2021-04-15 | End: 2021-04-14

## 2021-04-13 RX ORDER — FUROSEMIDE 10 MG/ML
20 INJECTION INTRAMUSCULAR; INTRAVENOUS ONCE
Status: COMPLETED | OUTPATIENT
Start: 2021-04-13 | End: 2021-04-13

## 2021-04-13 RX ORDER — METOPROLOL SUCCINATE 25 MG/1
12.5 TABLET, EXTENDED RELEASE ORAL DAILY
Status: DISCONTINUED | OUTPATIENT
Start: 2021-04-14 | End: 2021-04-16 | Stop reason: HOSPADM

## 2021-04-13 RX ORDER — BUDESONIDE AND FORMOTEROL FUMARATE DIHYDRATE 160; 4.5 UG/1; UG/1
2 AEROSOL RESPIRATORY (INHALATION) 2 TIMES DAILY
Status: DISCONTINUED | OUTPATIENT
Start: 2021-04-13 | End: 2021-04-16 | Stop reason: HOSPADM

## 2021-04-13 RX ADMIN — BUDESONIDE AND FORMOTEROL FUMARATE DIHYDRATE 2 PUFF: 160; 4.5 AEROSOL RESPIRATORY (INHALATION) at 21:46

## 2021-04-13 RX ADMIN — FUROSEMIDE 20 MG: 10 INJECTION, SOLUTION INTRAMUSCULAR; INTRAVENOUS at 17:49

## 2021-04-13 ASSESSMENT — PAIN SCALES - PAIN ASSESSMENT IN ADVANCED DEMENTIA (PAINAD)
TOTALSCORE: 0
NEGVOCALIZATION: 0
BREATHING: 0
BODYLANGUAGE: 0
FACIALEXPRESSION: 0

## 2021-04-13 ASSESSMENT — PAIN SCALES - WONG BAKER: WONGBAKER_NUMERICALRESPONSE: 0

## 2021-04-13 NOTE — ED PROVIDER NOTES
[address] [facility]        Pt Name: Mign Villanueva  MRN: [id number]  [place] 5/9/1931  Date of evaluation: 4/13/2021  Provider: MLEY Lubin  PCP: Odella Duverney, APRN - CNP    ESMER. I have evaluated this patient. My supervising physician was available for consultation. CHIEF COMPLAINT       Chief Complaint   Patient presents with    Fall     pt brought to ED via EMS from home. Per EMS pt. fell 1 week ago. EMS called by home health nurse for increased bruising on patient left hip. pt has dementia. Unable to recall event. pt denies pain at this time. HISTORY OF PRESENT ILLNESS   (Location, Timing/Onset, Context/Setting, Quality, Duration, Modifying Factors, Severity, Associated Signs and Symptoms)  Note limiting factors. Ming Villanueva is a 80 y.o. female past medical history of Alzheimer's dementia, who presents to the emergency department via squad for evaluation of weakness. Patient with severe Alzheimer's dementia, unable to provide HPI. I called son and spoke with him, Duncan Desirae, who states that patient was recently discharged after being admitted for urinary tract infection 1 week ago. She completed her antibiotics on Friday, 4 days ago. States that when she first got home she was feeling well, walking around. Following day she tripped on a sock and fell onto her bottom. However since then she still been able to ambulate. However the last couple days she started acting different and states that she has been nonambulatory since yesterday. Patient answers all my questions with a no and repeatedly states that she wants to be left alone and wants me to leave her alone. Nursing Notes were all reviewed and agreed with or any disagreements were addressed in the HPI.     REVIEW OF SYSTEMS    (2-9 systems for level 4, 10 or more for level 5)     Review of Systems   Unable to perform ROS: Dementia       Positives and Pertinent negatives as per HPI. Except as noted above in the ROS, all other systems were reviewed and negative. PAST MEDICAL HISTORY     Past Medical History:   Diagnosis Date    [facility]'s dementia ([facility] [address] [facility]     Atrial fibrillation ([facility] [facility]     Benign essential HTN     Gout          SURGICAL HISTORY   History reviewed. No pertinent surgical history. CURRENTMEDICATIONS       Previous Medications    ALBUTEROL SULFATE HFA (VENTOLIN HFA) 108 (90 BASE) MCG/ACT INHALER    Inhale 2 puffs into the lungs every 6 hours as needed for Wheezing    CALCIUM CARBONATE (OSCAL) 500 MG TABS TABLET    Take 500 mg by mouth daily    CRANBERRY 250 MG CHEW    Take 2 tablets by mouth 2 times daily (with meals)    FUROSEMIDE (LASIX) 20 MG TABLET    Take 1 tablet by mouth every other day    METOPROLOL SUCCINATE (TOPROL XL) 25 MG EXTENDED RELEASE TABLET    Take 0.5 tablets by mouth daily    MOMETASONE-FORMOTEROL (DULERA) 200-5 MCG/ACT INHALER    Inhale 2 puffs into the lungs 2 times daily as needed (shortness of breath)     MULTIPLE VITAMINS-MINERALS (MULTIVITAMIN WITH MINERALS) TABLET    Take 1 tablet by mouth daily    RIVAROXABAN (XARELTO) 15 MG TABS TABLET    Take 1 tablet by mouth daily (with breakfast)    VITAMIN D 25 MCG (1000 UT) CAPS    Take 1,000 Units by mouth daily         ALLERGIES     Chlorpromazine and Hydrochlorothiazide    FAMILYHISTORY     History reviewed. No pertinent family history.        SOCIAL HISTORY       Social History     Tobacco Use    Smoking status: Unknown If Ever Smoked    Smokeless tobacco: Never Used   Substance Use Topics    Alcohol use: Never     Frequency: Never     Binge frequency: Never    Drug use: Never       SCREENINGS             PHYSICAL EXAM    (up to 7 for level 4, 8 or more for level 5)     ED Triage Vitals   BP Temp Temp Source Pulse Resp SpO2 Height Weight   04/13/21 1418 04/13/21 1418 04/13/21 1418 04/13/21 1418 04/13/21 1418 04/13/21 1418 -- 04/13/21 1420   (!) 125/54 97.5 °F (36.4 °C) Oral 70 18 98 %  184 lb 1.4 oz (83.5 kg)       Physical Exam  Vitals signs and nursing note reviewed. Constitutional:       General: She is not in acute distress. Appearance: Normal appearance. She is well-developed. She is obese. She is not diaphoretic. HENT:      Head: Normocephalic and atraumatic. Eyes:      General:         Right eye: No discharge. Left eye: No discharge. Extraocular Movements: Extraocular movements intact. Pupils: Pupils are equal, round, and reactive to light. Neck:      Musculoskeletal: Normal range of motion and neck supple. Cardiovascular:      Rate and Rhythm: Normal rate. Pulmonary:      Effort: Pulmonary effort is normal. No respiratory distress. Breath sounds: No stridor. Abdominal:      Palpations: Abdomen is soft. Tenderness: There is no abdominal tenderness. Musculoskeletal: Normal range of motion. General: No tenderness or deformity. Skin:     General: Skin is warm and dry. Coloration: Skin is not pale. Neurological:      Mental Status: She is alert. Mental status is at baseline. She is disoriented. Comments: No gross facial drooping. Moves all 4 extremities spontaneously.    Psychiatric:         Behavior: Behavior normal.         DIAGNOSTIC RESULTS   LABS:    Labs Reviewed   CBC WITH AUTO DIFFERENTIAL - Abnormal; Notable for the following components:       Result Value    Hemoglobin 11.9 (*)     RDW 19.1 (*)     Lymphocytes Absolute 0.7 (*)     All other components within normal limits    Narrative:     Performed at:  [address] [address]   Phone [phone]   COMPREHENSIVE METABOLIC PANEL - Abnormal; Notable for the following components:    GFR Non- 59 (*)     Total Protein 6.0 (*)     Albumin 3.1 (*)     Alkaline Phosphatase 186 (*)     All other components within normal limits    Narrative:     Performed at:  [facility]) - [facility]  [address] [facility] [address] [place] [address]   Phone [phone]   URINE RT REFLEX TO CULTURE - Abnormal; Notable for the following components:    Urobilinogen, Urine 2.0 (*)     All other components within normal limits    Narrative:     Performed at:  [facility]  [address] [facility] [place] [address]   Phone [phone]   CK - Abnormal; Notable for the following components: Total CK 24 (*)     All other components within normal limits    Narrative:     Performed at:  [facility]  [address] [facility] [place] [address]   Phone [phone]   BRAIN NATRIURETIC PEPTIDE - Abnormal; Notable for the following components:    Pro-BNP 4,028 (*)     All other components within normal limits    Narrative:     Performed at:  [facility]  [address] [facility] [place] [address] [place] [address]   Phone [phone]   TROPONIN    Narrative:     Performed at:  [facility]  [address] [facility] [place] [address] [place] [address]   Phone [phone]       All other labs were within normal range or not returned as of this dictation. EKG: All EKG's are interpreted by the Emergency Department Physician in the absence of a cardiologist.  Please see their note for interpretation of EKG. RADIOLOGY:   Non-plain film images such as CT, Ultrasound and MRI are read by the radiologist. Plain radiographic images are visualized and preliminarily interpreted by the ED Provider with the below findings:        Interpretation per the Radiologist below, if available at the time of this note:    CT HEAD WO CONTRAST   Final Result   No acute hemorrhage or midline shift. Other findings as described. CT CERVICAL SPINE WO CONTRAST   Final Result   No acute osseous abnormality of the cervical spine. Multilevel degenerative disc disease and multilevel facet arthropathy. Straightened cervical lordosis, positional versus spasm. Bilateral pleural effusions are noted at the lung apices. XR RIBS LEFT INCLUDE CHEST (MIN 3 VIEWS)   Final Result   Negative radiographs of the lower ribs, no definitive acute fracture. Regardless, no pneumothorax or other complication. Small bilateral pleural effusions appear present. There also is vascular   congestion and possible mild interstitial edema         XR HIP LEFT (2-3 VIEWS)   Final Result   1. No acute osseous abnormality of the pelvis or left hip. 2. Mild bilateral hip osteoarthritis. XR CHEST 1 VIEW   Final Result   Somewhat limited evaluation due to patient rotation. Slight hazy appearance   throughout the right lung may be related to patient positioning and   artifactual.  Recommend two view of the chest with better positioning if this   would change clinical management. Very small pleural effusions, decreased. Findings as above concerning for nondisplaced left 6th rib fracture. Xr Ribs Left Include Chest (min 3 Views)    Result Date: 4/13/2021  EXAMINATION: 4 XRAY VIEWS OF THE LEFT RIBS WITH FRONTAL XRAY VIEW OF THE CHEST 4/13/2021 3:51 pm COMPARISON: Portable chest x-ray earlier today HISTORY: ORDERING SYSTEM PROVIDED HISTORY: portable CXR concerning for left 6th rib fx.  fall. TECHNOLOGIST PROVIDED HISTORY: Reason for exam:->portable CXR concerning for left 6th rib fx.  fall. Reason for Exam: portable CXR concerning for left 6th rib fx.  fall. Acuity: Acute Type of Exam: Initial Mechanism of Injury: fall FINDINGS: No pneumothorax. There is bibasilar airspace disease. Pleural effusions appear present the upright frontal image, possibly shifting on the other images. No definitive acute fracture demonstrated. Negative radiographs of the lower ribs, no definitive acute fracture. Regardless, no pneumothorax or other complication. Small bilateral pleural effusions appear present. There also is vascular congestion and possible mild interstitial edema     Xr Hip Left (2-3 Views)    Result Date: 4/13/2021  EXAMINATION: TWO XRAY VIEWS OF THE LEFT HIP 4/13/2021 2:25 pm COMPARISON: None. HISTORY: ORDERING SYSTEM PROVIDED HISTORY: pain/fall TECHNOLOGIST PROVIDED HISTORY: Reason for exam:->pain/fall Reason for Exam: fall, left hip pain Acuity: Acute Type of Exam: Initial FINDINGS: Frontal view of the pelvis and dedicated imaging of the left hip demonstrate no acute fracture or dislocation. Normal bony mineralization. Mild bilateral hip osteoarthritis. SI joints and sacral arcuate lines appear intact. Evaluation is moderately limited by overlying bowel gas and stool. No soft tissue abnormality. 1. No acute osseous abnormality of the pelvis or left hip. 2. Mild bilateral hip osteoarthritis. Ct Head Wo Contrast    Result Date: 4/13/2021  EXAMINATION: CT OF THE HEAD WITHOUT CONTRAST  4/13/2021 3:52 pm TECHNIQUE: CT of the head was performed without the administration of intravenous contrast. Dose modulation, iterative reconstruction, and/or weight based adjustment of the mA/kV was utilized to reduce the radiation dose to as low as reasonably achievable. COMPARISON: CT head 03/29/2021. HISTORY: ORDERING SYSTEM PROVIDED HISTORY: unwitnessed fall TECHNOLOGIST PROVIDED HISTORY: Reason for exam:->unwitnessed fall Has a \"code stroke\" or \"stroke alert\" been called? ->No Decision Support Exception->Emergency Medical Condition (MA) Reason for Exam: unwitnessed fall Acuity: Acute Type of Exam: Initial FINDINGS: BRAIN/VENTRICLES: No acute hemorrhage. Periventricular and subcortical hypoattenuation is nonspecific and may be related to microvascular disease. Encephalomalacia in the right parietal temporal lobes again visualized. [place] white differentiation appears maintained given artifact near the skull base and through the posterior fossa.   Cerebral volume loss and ventriculomegaly disproportionate to sulcal size again visualized. There is no midline shift. Basal cisterns appear patent. ORBITS: Visualized orbits appear unremarkable on this unenhanced exam. SINUSES: Visualized paranasal sinuses appear clear. Visualized mastoid air cells appear clear. SOFT TISSUES/SKULL: No depressed calvarial fracture. Right parietal craniotomy. No acute hemorrhage or midline shift. Other findings as described. Ct Cervical Spine Wo Contrast    Result Date: 4/13/2021  EXAMINATION: CT OF THE CERVICAL SPINE WITHOUT CONTRAST 4/13/2021 3:52 pm TECHNIQUE: CT of the cervical spine was performed without the administration of intravenous contrast. Multiplanar reformatted images are provided for review. Dose modulation, iterative reconstruction, and/or weight based adjustment of the mA/kV was utilized to reduce the radiation dose to as low as reasonably achievable. COMPARISON: 11/28/2020. HISTORY: ORDERING SYSTEM PROVIDED HISTORY: unwitnessed fall TECHNOLOGIST PROVIDED HISTORY: Reason for exam:->unwitnessed fall Decision Support Exception->Emergency Medical Condition (MA) Reason for Exam: unwitnessed fall Acuity: Acute Type of Exam: Initial FINDINGS: BONES/ALIGNMENT: There is no acute fracture or traumatic malalignment. DEGENERATIVE CHANGES: Straightened cervical lordotic curvature. Moderate degenerative disc disease at C4-5 through C6-7 as well as at C7-T1. Multilevel facet arthropathy. 4 mm anterolisthesis of C3 on C4 secondary to facet arthropathy. Moderate degenerative narrowing of the C1-2 articulation anteriorly. SOFT TISSUES: There is no prevertebral soft tissue swelling. Bilateral pleural effusions are noted at the lung apices. Mild atherosclerotic plaque at the level the carotid bifurcation. No acute osseous abnormality of the cervical spine. Multilevel degenerative disc disease and multilevel facet arthropathy. Straightened cervical lordosis, positional versus spasm.  Bilateral pleural effusions are noted at the lung apices. Xr Chest 1 View    Result Date: 4/13/2021  EXAMINATION: ONE XRAY VIEW OF THE CHEST 4/13/2021 2:38 pm COMPARISON: None. HISTORY: ORDERING SYSTEM PROVIDED HISTORY: pre op TECHNOLOGIST PROVIDED HISTORY: Reason for exam:->pre op Reason for Exam: pre op Acuity: Acute Type of Exam: Initial FINDINGS: Patient is rotated which somewhat limits evaluation. Slight hazy appearance throughout the right lung may be related to patient positioning. Cardiac silhouette is enlarged. Very small pleural effusions, decreased since previous exam.  Irregularity of the left 6th rib concerning for nondisplaced fracture. Somewhat limited evaluation due to patient rotation. Slight hazy appearance throughout the right lung may be related to patient positioning and artifactual.  Recommend two view of the chest with better positioning if this would change clinical management. Very small pleural effusions, decreased. Findings as above concerning for nondisplaced left 6th rib fracture. PROCEDURES   Unless otherwise noted below, none     Procedures    CRITICAL CARE TIME   N/A    CONSULTS:  IP CONSULT TO CASE MANAGEMENT  IP CONSULT TO PALLIATIVE CARE      EMERGENCY DEPARTMENT COURSE and DIFFERENTIAL DIAGNOSIS/MDM:   Vitals:    Vitals:    04/13/21 1418 04/13/21 1420 04/13/21 1723   BP: (!) 125/54  (!) 123/95   Pulse: 70  67   Resp: 18  16   Temp: 97.5 °F (36.4 °C)     TempSrc: Oral     SpO2: 98%  100%   Weight:  184 lb 1.4 oz (83.5 kg)        Patient was given the following medications:  Medications   furosemide (LASIX) injection 20 mg (20 mg Intravenous Given 4/13/21 1749)           ED COURSE & MEDICAL DECISION MAKING    Pertinent Labs & Imaging studies reviewed. (See chart for details)   -  Patient seen and evaluated in the emergency department. h/o severe alzheimers dementia, CHF, recently admitted last week for UTI. VS nml.  Pt lives with her son who has several steps in his home and she has not been standing or walking x 2 days. pt just telling me \"no\" to all questions and wants me to leave her alone  -  Triage and nursing notes reviewed and incorporated. -  Old chart records reviewed and incorporated. - Labs reviewed as detailed above. UA nml, labs clinically unremarkable other than ^BNP 4,028, CXR shows small bilat pleural effusion, vascular congestion, possible mild interstitial edema.  -  ED treatment included:  IV LAsix 20 mg. Son states she did not take her Lasix PO today.  -Patient son states that he has several steps going into the house so does not know how he would be able to take care of her at home. Will consult the hospitalist for inability to walk, generalized weakness.   Results for orders placed or performed during the hospital encounter of 04/13/21   CBC Auto Differential   Result Value Ref Range    WBC 7.8 4.0 - 11.0 K/uL    RBC 4.29 4.00 - 5.20 M/uL    Hemoglobin 11.9 (L) 12.0 - 16.0 g/dL    Hematocrit 37.3 36.0 - 48.0 %    MCV 86.9 80.0 - 100.0 fL    MCH 27.8 26.0 - 34.0 pg    MCHC 32.1 31.0 - 36.0 g/dL    RDW 19.1 (H) 12.4 - 15.4 %    Platelets 070 917 - 761 K/uL    MPV 8.9 5.0 - 10.5 fL    Neutrophils % 75.3 %    Lymphocytes % 8.5 %    Monocytes % 13.2 %    Eosinophils % 2.2 %    Basophils % 0.8 %    Neutrophils Absolute 5.9 1.7 - 7.7 K/uL    Lymphocytes Absolute 0.7 (L) 1.0 - 5.1 K/uL    Monocytes Absolute 1.0 0.0 - 1.3 K/uL    Eosinophils Absolute 0.2 0.0 - 0.6 K/uL    Basophils Absolute 0.1 0.0 - 0.2 K/uL   Comprehensive Metabolic Panel   Result Value Ref Range    Sodium 140 136 - 145 mmol/L    Potassium 4.1 3.5 - 5.1 mmol/L    Chloride 107 99 - 110 mmol/L    CO2 23 21 - 32 mmol/L    Anion Gap 10 3 - 16    Glucose 97 70 - 99 mg/dL    BUN 18 7 - 20 mg/dL    CREATININE 0.9 0.6 - 1.2 mg/dL    GFR Non- 59 (A) >60    GFR African American >60 >60    Calcium 9.9 8.3 - 10.6 mg/dL    Total Protein 6.0 (L) 6.4 - 8.2 g/dL    Albumin 3.1 (L) 3.4 - 5.0 g/dL    Albumin/Globulin Ratio 1.1 1.1 - 2. 2    Total Bilirubin 0.8 0.0 - 1.0 mg/dL    Alkaline Phosphatase 186 (H) 40 - 129 U/L    ALT 11 10 - 40 U/L    AST 15 15 - 37 U/L    Globulin 2.9 g/dL   Urinalysis Reflex to Culture    Specimen: Urine, clean catch   Result Value Ref Range    Color, UA YELLOW Straw/Yellow    Clarity, UA Clear Clear    Glucose, Ur Negative Negative mg/dL    Bilirubin Urine Negative Negative    Ketones, Urine Negative Negative mg/dL    Specific Gravity, UA 1.019 1.005 - 1.030    Blood, Urine Negative Negative    pH, UA 6.5 5.0 - 8.0    Protein, UA Negative Negative mg/dL    Urobilinogen, Urine 2.0 (A) <2.0 E.U./dL    Nitrite, Urine Negative Negative    Leukocyte Esterase, Urine Negative Negative    Microscopic Examination Not Indicated     Urine Type NotGiven     Urine Reflex to Culture Not Indicated    CK   Result Value Ref Range    Total CK 24 (L) 26 - 192 U/L   Troponin   Result Value Ref Range    Troponin <0.01 <0.01 ng/mL   Brain Natriuretic Peptide   Result Value Ref Range    Pro-BNP 4,028 (H) 0 - 449 pg/mL       I spoke with Dr. Judy Concepcion. We thoroughly discussed the history, physical exam, laboratory and imaging studies, as well as, emergency department course. Based upon that discussion, we've decided to admit Isela Sylvester for further observation and evaluation of Sally Adorno's weakness, inability to walk        FINAL IMPRESSION      1. Unable to walk    2. General weakness          DISPOSITION/PLAN   DISPOSITION Admitted 04/13/2021 06:21:26 PM      PATIENT REFERREDTO:  No follow-up provider specified.     DISCHARGE MEDICATIONS:  New Prescriptions    No medications on file       DISCONTINUED MEDICATIONS:  Discontinued Medications    MAGNESIUM OXIDE (MAG-OX) 400 (240 MG) MG TABLET    Take 1 tablet by mouth daily for 7 days              (Please note that portions of this note were completed with a voice recognition program.  Efforts were made to edit the dictations but occasionally words are mis-transcribed.)    Qi Zimmerman, PA (electronically signed)            MELY Suazo  04/13/21 6871

## 2021-04-13 NOTE — ED NOTES
Patient straight catheterized for urine. Pt. Continuing to scream during procedure \"Please let me go home! \". Pt. perineal area appears excoriated. Patient attends changed. Patient attempting to remove clean attends. Attempted to redirect patient at this time. Patient does not follow directions. Pt. Continuing to yell \"Please let me go home\". Warm blanket provided to patient at this time with lights dimmed at this time to attempt to comfort patient.       Elana Leyden, RN  04/13/21 5615

## 2021-04-13 NOTE — PROGRESS NOTES
Medication Reconciliation    List of medications patient is currently taking is complete. Source of information: 1. Conversation with patient at bedside                                      2. EPIC records                                       3. Dispense history     Allergies  Chlorpromazine and Hydrochlorothiazide     Notes regarding home medications:   1. Patient received all of her morning home medications prior to arrival to the emergency department. 2. Patient takes Lasix Q48H - she received this today.     Thomas Carmona PharmD, [facility]  4/13/2021 5:27 PM

## 2021-04-14 ENCOUNTER — APPOINTMENT (OUTPATIENT)
Dept: CT IMAGING | Age: 86
DRG: 884 | End: 2021-04-14
Payer: MEDICARE

## 2021-04-14 PROBLEM — J90 PLEURAL EFFUSION: Status: ACTIVE | Noted: 2021-04-14

## 2021-04-14 LAB
ANION GAP SERPL CALCULATED.3IONS-SCNC: 7 MMOL/L (ref 3–16)
BASOPHILS ABSOLUTE: 0.1 K/UL (ref 0–0.2)
BASOPHILS RELATIVE PERCENT: 1.4 %
BUN BLDV-MCNC: 20 MG/DL (ref 7–20)
CALCIUM SERPL-MCNC: 9.5 MG/DL (ref 8.3–10.6)
CHLORIDE BLD-SCNC: 105 MMOL/L (ref 99–110)
CO2: 26 MMOL/L (ref 21–32)
CREAT SERPL-MCNC: 1 MG/DL (ref 0.6–1.2)
EOSINOPHILS ABSOLUTE: 0.1 K/UL (ref 0–0.6)
EOSINOPHILS RELATIVE PERCENT: 1.8 %
GFR AFRICAN AMERICAN: >60
GFR NON-AFRICAN AMERICAN: 52
GLUCOSE BLD-MCNC: 82 MG/DL (ref 70–99)
HCT VFR BLD CALC: 35.9 % (ref 36–48)
HEMOGLOBIN: 11.6 G/DL (ref 12–16)
LYMPHOCYTES ABSOLUTE: 0.8 K/UL (ref 1–5.1)
LYMPHOCYTES RELATIVE PERCENT: 11.2 %
MCH RBC QN AUTO: 27.6 PG (ref 26–34)
MCHC RBC AUTO-ENTMCNC: 32.4 G/DL (ref 31–36)
MCV RBC AUTO: 85.4 FL (ref 80–100)
MONOCYTES ABSOLUTE: 0.9 K/UL (ref 0–1.3)
MONOCYTES RELATIVE PERCENT: 12.4 %
NEUTROPHILS ABSOLUTE: 5.1 K/UL (ref 1.7–7.7)
NEUTROPHILS RELATIVE PERCENT: 73.2 %
PDW BLD-RTO: 18.9 % (ref 12.4–15.4)
PLATELET # BLD: 321 K/UL (ref 135–450)
PMV BLD AUTO: 8.8 FL (ref 5–10.5)
POTASSIUM REFLEX MAGNESIUM: 4 MMOL/L (ref 3.5–5.1)
RBC # BLD: 4.2 M/UL (ref 4–5.2)
SODIUM BLD-SCNC: 138 MMOL/L (ref 136–145)
WBC # BLD: 7 K/UL (ref 4–11)

## 2021-04-14 PROCEDURE — 6360000004 HC RX CONTRAST MEDICATION: Performed by: HOSPITALIST

## 2021-04-14 PROCEDURE — 97530 THERAPEUTIC ACTIVITIES: CPT

## 2021-04-14 PROCEDURE — 2580000003 HC RX 258: Performed by: INTERNAL MEDICINE

## 2021-04-14 PROCEDURE — 85025 COMPLETE CBC W/AUTO DIFF WBC: CPT

## 2021-04-14 PROCEDURE — 94761 N-INVAS EAR/PLS OXIMETRY MLT: CPT

## 2021-04-14 PROCEDURE — 6370000000 HC RX 637 (ALT 250 FOR IP): Performed by: NURSE PRACTITIONER

## 2021-04-14 PROCEDURE — 94640 AIRWAY INHALATION TREATMENT: CPT

## 2021-04-14 PROCEDURE — 80048 BASIC METABOLIC PNL TOTAL CA: CPT

## 2021-04-14 PROCEDURE — 6360000002 HC RX W HCPCS: Performed by: NURSE PRACTITIONER

## 2021-04-14 PROCEDURE — G0378 HOSPITAL OBSERVATION PER HR: HCPCS

## 2021-04-14 PROCEDURE — 97162 PT EVAL MOD COMPLEX 30 MIN: CPT

## 2021-04-14 PROCEDURE — 71260 CT THORAX DX C+: CPT

## 2021-04-14 PROCEDURE — 6370000000 HC RX 637 (ALT 250 FOR IP): Performed by: INTERNAL MEDICINE

## 2021-04-14 PROCEDURE — 1200000000 HC SEMI PRIVATE

## 2021-04-14 PROCEDURE — 36415 COLL VENOUS BLD VENIPUNCTURE: CPT

## 2021-04-14 RX ORDER — CALCIUM CARBONATE 500(1250)
500 TABLET ORAL
Status: DISCONTINUED | OUTPATIENT
Start: 2021-04-14 | End: 2021-04-16 | Stop reason: HOSPADM

## 2021-04-14 RX ORDER — FUROSEMIDE 20 MG/1
20 TABLET ORAL DAILY
Status: DISCONTINUED | OUTPATIENT
Start: 2021-04-14 | End: 2021-04-14

## 2021-04-14 RX ORDER — FUROSEMIDE 10 MG/ML
20 INJECTION INTRAMUSCULAR; INTRAVENOUS DAILY
Status: DISCONTINUED | OUTPATIENT
Start: 2021-04-14 | End: 2021-04-15

## 2021-04-14 RX ORDER — VITAMIN B COMPLEX
1000 TABLET ORAL DAILY
Status: DISCONTINUED | OUTPATIENT
Start: 2021-04-14 | End: 2021-04-16 | Stop reason: HOSPADM

## 2021-04-14 RX ADMIN — FUROSEMIDE 20 MG: 20 TABLET ORAL at 10:30

## 2021-04-14 RX ADMIN — RIVAROXABAN 15 MG: 15 TABLET, FILM COATED ORAL at 08:41

## 2021-04-14 RX ADMIN — FUROSEMIDE 20 MG: 10 INJECTION, SOLUTION INTRAMUSCULAR; INTRAVENOUS at 16:55

## 2021-04-14 RX ADMIN — Medication 10 ML: at 08:42

## 2021-04-14 RX ADMIN — CALCIUM 500 MG: 500 TABLET ORAL at 10:30

## 2021-04-14 RX ADMIN — METOPROLOL SUCCINATE 12.5 MG: 25 TABLET, EXTENDED RELEASE ORAL at 08:41

## 2021-04-14 RX ADMIN — Medication 1000 UNITS: at 10:30

## 2021-04-14 RX ADMIN — BUDESONIDE AND FORMOTEROL FUMARATE DIHYDRATE 2 PUFF: 160; 4.5 AEROSOL RESPIRATORY (INHALATION) at 08:02

## 2021-04-14 RX ADMIN — IOPAMIDOL 75 ML: 755 INJECTION, SOLUTION INTRAVENOUS at 11:01

## 2021-04-14 RX ADMIN — BUDESONIDE AND FORMOTEROL FUMARATE DIHYDRATE 2 PUFF: 160; 4.5 AEROSOL RESPIRATORY (INHALATION) at 19:43

## 2021-04-14 ASSESSMENT — PAIN SCALES - WONG BAKER: WONGBAKER_NUMERICALRESPONSE: 0

## 2021-04-14 ASSESSMENT — PAIN SCALES - PAIN ASSESSMENT IN ADVANCED DEMENTIA (PAINAD)
CONSOLABILITY: 0
BODYLANGUAGE: 0
NEGVOCALIZATION: 0

## 2021-04-14 ASSESSMENT — PAIN SCALES - GENERAL: PAINLEVEL_OUTOF10: 0

## 2021-04-14 NOTE — PLAN OF CARE
Problem: Falls - Risk of:  Goal: Will remain free from falls  Description: Will remain free from falls  Outcome: Ongoing  Note: Fall risk assessment completed. Fall precautions in place. Call light within reach. Pt educated on calling for assistance before getting up. Walkway free of clutter. Will continue to monitor. Goal: Absence of physical injury  Description: Absence of physical injury  Outcome: Ongoing  Note: Pt is free of injury. No injury noted. Fall precautions in place. Call light within reach. Will monitor. Problem: Skin Integrity:  Goal: Will show no infection signs and symptoms  Description: Will show no infection signs and symptoms  Outcome: Ongoing  Note: Pt is free of signs and symptoms of infection. Incision and dressing are clean, dry and intact. Vital signs stable. Will monitor.      Goal: Absence of new skin breakdown  Description: Absence of new skin breakdown  Outcome: Ongoing  Note: Patient will be absent of new skin breakdown     Electronically signed by Juvencio Meraz RN on 4/13/2021 at 10:16 PM

## 2021-04-14 NOTE — PROGRESS NOTES
Patient is resting in bed with eyes closed, but arouses easily to verbal stimulation. Room air. Side rails are up x3. Fall precautions are in place. Bed alarm on. Bed is in lowest position. Call light, telephone and bedside table are within reach.

## 2021-04-14 NOTE — DISCHARGE INSTR - COC
Continuity of Care Form    Patient Name: Hossein Landeros   :  1931  MRN:  [id number]    Admit date:  2021  Discharge date:  2021    Code Status Order: DNR-CCA   Advance Directives:   [facility] Documentation       Date/Time Healthcare Directive Type of Healthcare Directive Copy in [address] Agent's Name Healthcare Agent's Phone Number    21 0895  Yes, patient has an advance directive for healthcare treatment  Durable power of  for health care  No, copy requested from family  Adult [facility]  --            Admitting Physician:  Zaida Antunez MD  PCP: Dustin Fisher APRN - CNP    Discharging Nurse: [facility] Unit/Room#: L9Y-5428/3110-01  Discharging Unit Phone Number: [phone]    Emergency Contact:   Extended Emergency Contact Information  Primary Emergency Contact: Kay Stephen, [address] [facility] Phone: [phone]  Mobile Phone: [phone]  Relation: Child   needed? No  Secondary Emergency Contact: Jacqueline Alonso  Mobile Phone: [phone]  Relation: Child    Past Surgical History:  History reviewed. No pertinent surgical history. Immunization History: There is no immunization history on file for this patient. 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 Quinn Hong, Y92.009    Acute metabolic encephalopathy J31.37    Acute on chronic congestive heart failure (HCC) I50.9    Sepsis (HCC) A41.9    Bilateral pleural effusion J90    Chronic atrial fibrillation (HCC) I48.20    Acute encephalopathy G93.40    Ataxia R27.0 Isolation/Infection:   Isolation            No Isolation          Patient Infection Status       Infection Onset Added Last Indicated Last Indicated By Review Planned Expiration Resolved Resolved By    None active    Resolved    COVID-19 Rule Out 11/29/20 11/29/20 11/29/20 COVID-19 (Ordered)   11/30/20 Rule-Out Test Resulted    COVID-19 Rule Out 06/04/20 06/04/20 06/04/20 COVID-19 (Ordered)   06/05/20 Rule-Out Test Resulted            Nurse Assessment:  Last Vital Signs: BP (!) 143/75   Pulse 77   Temp 97.5 °F (36.4 °C) (Axillary)   Resp 16   Wt 176 lb 2.4 oz (79.9 kg)   SpO2 91%   BMI 29.31 kg/m²     Last documented pain score (0-10 scale): Pain Level: 0  Last Weight:   Wt Readings from Last 1 Encounters:   04/14/21 176 lb 2.4 oz (79.9 kg)     Mental Status:  disoriented    IV Access:  - None    Nursing Mobility/ADLs:  Walking   Assisted  Transfer  Dependent  Bathing  Dependent  Dressing  Dependent  Toileting  Dependent  Feeding  Assisted  Med Admin  Assisted  Med Delivery   prefers mixed with pudding     Wound Care Documentation and Therapy:  Wound 02/15/21 Sacrum Stage 1 - open area (Active)   Number of days: 58        Elimination:  Continence:   · Bowel: No  · Bladder: No  Urinary Catheter: Insertion Date: 4/15/2021   Colostomy/Ileostomy/Ileal Conduit: No       Date of Last BM: 4/15/2021    Intake/Output Summary (Last 24 hours) at 4/14/2021 1620  Last data filed at 4/14/2021 1313  Gross per 24 hour   Intake 720 ml   Output 1250 ml   Net -530 ml     I/O last 3 completed shifts: In: 5 [P.O.:720]  Out: 1250 [Urine:1250]    Safety Concerns:     Sundowners [place], History of Falls (last 30 days) and At Risk for Falls    Impairments/Disabilities:      Speech, Vision and Hearing    Nutrition Therapy:  Current Nutrition Therapy:   - Oral Diet:  Low Sodium (2gm)    Routes of Feeding: Oral  Liquids:  Thin Liquids  Daily Fluid Restriction: yes - amount 2000 ml  Last Modified Barium Swallow with Video (Video Swallowing Test): not done    Treatments at the Time of Hospital Discharge:   Respiratory Treatments: no  Oxygen Therapy:  is not on home oxygen therapy. Ventilator:    - No ventilator support     Heart Failure Instructions for Daily Management  Patient was treated for chronic diastolic heart failure. she  will require the following:     Please weigh daily on the same scale and approximately the same time of day. Report weight gain of 3 pounds/day or 5 pounds/week to : [facility] [phone].  Please use hospital discharge weight as baseline reference.  Please monitor for signs and symptoms of and report to MD:  o Worsening Heart Failure: sudden weight gain, shortness of breath, lower extremity or general edema/swelling, abdominal bloating/swelling, inability to lie flat, intolerance to usual activity, or cough (especially at night). Report these finding even if no increase in weight.  o Dehydration:  having difficulty or a decrease in urination, dizziness, worsening fatigue, or new onset/worsening of generalized weakness.  Please continue a LOW SODIUM diet and LIMIT fluid intake to 48 - 64 ounces ( 1.5 - 2 liters) per day. [facility] Call [facility] [phone] and/or Patsy Carpenter @ [phone] with any questions or concerns.  Please continue heart failure education to patient and family/support system.  See After Visit Summary for hospital follow up appointment details.  Consider spiritual care referral for support and/or completion of advance directives [phone].  Consider: [place] [address] telehealth program if patient agreeable and able to participate, palliative care consult for ongoing goals of care, end of life, and/or chronic disease management discussions and referral to [facility] [phone]) once SNF/HHC complete.   Dr Brenda Rodríguez is pt's primary cardiologist.      Rehab Therapies: Physical Therapy, Occupational Therapy and VN   Weight Bearing Status/Restrictions: No weight bearing restirctions  Other Medical Equipment (for information only, NOT a DME order):  wheelchair, walker and hospital bed  Other Treatments: zinc ointment to periarea     Patient's personal belongings (please select all that are sent with patient):  Glasses    RN SIGNATURE:  Electronically signed by Jessica Goldman RN on 4/16/21 at 11:41 AM EDT    CASE MANAGEMENT/SOCIAL WORK SECTION    Inpatient Status Date: 4/13/2021    Readmission Risk Assessment Score:  Readmission Risk              Risk of Unplanned Readmission:        0           Discharging to Facility/ Agency   · Name: [facility]  · Address:  · Phone:[phone]  · Fax:    Dialysis Facility (if applicable)   · Name:  · Address:  · Dialysis Schedule:  · Phone:  · Fax:    / signature: Electronically signed by Carrie Feldman on 4/16/21 at 8:48 AM EDT    PHYSICIAN SECTION    Prognosis: Fair    Condition at Discharge: Stable    Rehab Potential (if transferring to Rehab): Fair    Recommended Labs or Other Treatments After Discharge: RN/PT/OT    Physician Certification: I certify the above information and transfer of Mona Zabala  is necessary for the continuing treatment of the diagnosis listed and that she requires Home Care for less 30 days.      Update Admission H&P: No change in H&P    PHYSICIAN SIGNATURE:  Electronically signed by TASHI Burnham CNP on 4/14/21 at 4:21 PM EDT/ Dr. Sarika Ramsay

## 2021-04-14 NOTE — CARE COORDINATION
Critical access hospital  Patient is active with [facility], Will follow for discharge to home with orders to resume care.   Sandy Goetz LPN  CTN with  [facility],  [address] [facility] [address], Fax [phone]

## 2021-04-14 NOTE — PROGRESS NOTES
Physical Therapy    Facility/Department: [facility] 3W ORTHOPEDICS  Initial Assessment    NAME: Linda Senior  : 1931  MRN: [id number]    Date of Service: 2021    Assessment / Discharge Recommendations:  -will follow and make discharge recommendations as she progresse- recent past admissions have been for continued PT OT and nursing care in a skilled setting  -at this moment she requires mod/max assist  -will need family input as to discharge plans  Linda Senior scored a  on the AM-PAC short mobility form. Current research shows that an AM-PAC score of 17 or less is typically not associated with a discharge to the patient's home setting. Based on the patient's AM-PAC score and their current functional mobility deficits, it is recommended that the patient have 3-5 sessions per week of Physical Therapy at d/c to increase the patient's independence. If patient discharges prior to next session this note will serve as a discharge summary. Please see below for the latest assessment towards goals. Body structures, Functions, Activity limitations: Decreased functional mobility ; Decreased ADL status; Decreased balance  Prognosis: Fair;Poor  Decision Making: Medium Complexity  Activity Tolerance  Activity Tolerance: Patient limited by cognitive status       Patient Diagnosis(es): The primary encounter diagnosis was Unable to walk. A diagnosis of General weakness was also pertinent to this visit. has a past medical history of Alzheimer's dementia ([address] Atrial fibrillation ([address] Benign essential HTN, and Gout.   has no past surgical history on file.     Restrictions  Restrictions/Precautions  Restrictions/Precautions: Fall Risk  Vision/Hearing  Vision: (not assessed formally)     Subjective  General  Chart Reviewed: Yes  Patient assessed for rehabilitation services?: Yes  Additional Pertinent Hx: here due to inability to ambulate and greater than baseline confusion  Family / Caregiver Present: No  Follows Commands: Impaired  Subjective  Subjective: arrived to room along with OT to patient in bed  Pain Screening  Patient Currently in Pain: No  Social/Functional History  Social/Functional History  Lives With: Son(son Zaira Beck and his partner Nancy Wilson)  Type of Home: House  Home Layout: One level  Home Access: Stairs to enter with rails  Entrance Stairs - Number of Steps: 4-5  Bathroom Shower/Tub: Walk-in shower  Bathroom Toilet: Standard  Bathroom Equipment: Grab bars in shower, Shower chair  Home Equipment: 4 wheeled walker, Cane  ADL Assistance: (anticipate recently son assists)  Homemaking Assistance: (son completes)  Ambulation Assistance: (son assists but not sure what level of assist since last discharge from here)  Additional Comments: assume her son is assisting with her care at home - recently discharged from [facility] and was needing high levels of assist for bed mobility, transfers and had not ambulated with staff here  Objective  Bed mobility  Supine to Sit: Moderate assistance;Maximum assistance;2 Person assistance  Sit to Supine: Unable to assess  Transfers  Sit to Stand: Moderate Assistance;2 Person Assistance(in stedy)  Stand to sit: Moderate Assistance;2 Person Assistance  Ambulation  Ambulation?: No  Stairs/Curb  Stairs?: No  Balance  Comments: able to sit midline at the EOB with min assist to maintain safety    -midline in static stance in the stedy with min assist to maintain  Plan   Plan  Times per week: 3-5 while  Current Treatment Recommendations: Functional Mobility Training  Safety Devices  Type of devices:  All fall risk precautions in place, Call light within reach, Chair alarm in place, Left in chair, Telesitter in use, Nurse notified([place])    AM-PAC Score  AM-PAC Inpatient Mobility Raw Score : 9 (04/14/21 1145)  AM-PAC Inpatient T-Scale Score : 30.55 (04/14/21 1145)  Mobility Inpatient CMS 0-100% Score: 81.38 (04/14/21 1145)  Mobility Inpatient CMS G-Code Modifier : CM (04/14/21 1145)    Goals  Short term goals  Time Frame for Short term goals: 2-3 days  Short term goal 1: bed mobility at mod assist  Short term goal 2: transfers at mod assist  Short term goal 3: ambulation at mod assist rolling walker for 8-10 feet  Patient Goals   Patient goals : none formulated-       Therapy Time   Individual Concurrent Group Co-treatment   Time In 1120         Time Out 1200         Minutes 111 Judd Betts PT   Returned to room along with OT to assist patient back to bed from the recliner. Sit to stand with mod assist of 2 in stedy  Assist of 2 for stand to sit from stedy to the bed  Mod assist of 2 for sit to supine  At conclusion, remains in supine with needs in reach (had a small drink of [employer]) and bed alarm on (telesitter in room)  Nursing informed.   Electronically signed by [place] PT on 4/14/2021 at 3:33 PM

## 2021-04-14 NOTE — PROGRESS NOTES
Patient is resting in bed, awake and quiet. Patient is alert to self and place;disoriented to time and situation. Room air. Side rails are up x3. Fall precautions are in place. Bed alarm on. Bed is in lowest position. Call light, telephone and bedside table are within reach. VSS taken. AM meds given. Shift assessment completed.  Electronically signed by Mary Rizo RN on 4/14/2021 at 10:43 AM

## 2021-04-14 NOTE — PROGRESS NOTES
Atrial fibrillation (HCC)     Benign essential HTN     Gout        Past Surgical History:    History reviewed. No pertinent surgical history. Allergies:  Chlorpromazine and Hydrochlorothiazide    Past medical and surgical history reviewed. Any changes have been noted. PHYSICAL EXAM:  BP (!) 143/75   Pulse 77   Temp 97.5 °F (36.4 °C) (Axillary)   Resp 16   Wt 176 lb 2.4 oz (79.9 kg)   SpO2 91%   BMI 29.31 kg/m²       Intake/Output Summary (Last 24 hours) at 4/14/2021 1006  Last data filed at 4/14/2021 5828  Gross per 24 hour   Intake 480 ml   Output 850 ml   Net -370 ml        General appearance:   No apparent distress, appears stated age. Cooperative. HEENT:  Normocephalic, atraumatic. PERRLA. EOMi. Conjunctivae/corneas clear, no icterus, non-injected. Neck: Supple, with full range of motion. No jugular venous distention. Trachea midline. Respiratory:  Normal respiratory effort. Clear to auscultation, bilaterally without Rales/Wheezes/Rhonchi. Cardiovascular:  Regular rate and rhythm without murmurs, rubs or gallops. Abdomen: Soft, non-tender, non-distended, without rebound or guarding. Normal bowel sounds. Musculoskeletal:  No clubbing, cyanosis or edema bilaterally. Full range of motion without deformity. Skin: Skin color, texture, turgor normal.  No rashes or lesions. Neurologic:  Would not follow all commands for complete neuro exam including even lifting extremities off the chair. Cranial nerves: II-XII intact, grossly intact. No facial asymmetry, tongue midline.    Psychiatric:  Alert and oriented to self; thought she was at [address] [facility] and looked away when I asked her the date. (per son, he was surprised she knew she was in the hospital) at times, knows him and his brother)seems at baseline cognitively based on his report  Capillary Refill: Brisk,< 3 seconds   Peripheral Pulses: +2 palpable, equal bilaterally       LABS:    Lab Results   Component Value Date    WBC 7.0 04/14/2021    HGB 11.6 (L) 04/14/2021    HCT 35.9 (L) 04/14/2021    MCV 85.4 04/14/2021     04/14/2021    LYMPHOPCT 11.2 04/14/2021    RBC 4.20 04/14/2021    MCH 27.6 04/14/2021    MCHC 32.4 04/14/2021    RDW 18.9 (H) 04/14/2021       Lab Results   Component Value Date    CREATININE 1.0 04/14/2021    BUN 20 04/14/2021     04/14/2021    K 4.0 04/14/2021     04/14/2021    CO2 26 04/14/2021       Lab Results   Component Value Date    MG 1.40 04/04/2021       Lab Results   Component Value Date    ALT 11 04/13/2021    AST 15 04/13/2021    ALKPHOS 186 (H) 04/13/2021    BILITOT 0.8 04/13/2021        No flowsheet data found. No results found for: LABA1C    Imaging:  CT HEAD WO CONTRAST   Final Result   No acute hemorrhage or midline shift. Other findings as described. CT CERVICAL SPINE WO CONTRAST   Final Result   No acute osseous abnormality of the cervical spine. Multilevel degenerative disc disease and multilevel facet arthropathy. Straightened cervical lordosis, positional versus spasm. Bilateral pleural effusions are noted at the lung apices. XR RIBS LEFT INCLUDE CHEST (MIN 3 VIEWS)   Final Result   Negative radiographs of the lower ribs, no definitive acute fracture. Regardless, no pneumothorax or other complication. Small bilateral pleural effusions appear present. There also is vascular   congestion and possible mild interstitial edema         XR HIP LEFT (2-3 VIEWS)   Final Result   1. No acute osseous abnormality of the pelvis or left hip. 2. Mild bilateral hip osteoarthritis. XR CHEST 1 VIEW   Final Result   Somewhat limited evaluation due to patient rotation. Slight hazy appearance   throughout the right lung may be related to patient positioning and   artifactual.  Recommend two view of the chest with better positioning if this   would change clinical management. Very small pleural effusions, decreased.       Findings as above concerning for nondisplaced left 6th rib fracture. Scheduled and prn Medications:    Scheduled Meds:   rivaroxaban  15 mg Oral Daily with breakfast    budesonide-formoterol  2 puff Inhalation BID    metoprolol succinate  12.5 mg Oral Daily    [START ON 4/15/2021] furosemide  20 mg Oral Every Other Day    sodium chloride flush  10 mL Intravenous 2 times per day     Continuous Infusions:   sodium chloride       PRN Meds:.albuterol, sodium chloride flush, sodium chloride, ondansetron, polyethylene glycol, acetaminophen **OR** acetaminophen    Assessment & Plan:        Weakness  - PT/OT   - Will consult Case Management for help with discharge planning, and - Palliative care for goals of care  - B12 level and folate pending     Essential (primary) hypertension   - continue home meds and monitor blood pressure   Lasix 20mg every other day  At home  - getting IV lasix now for pleural effusion       Chronic atrial fibrillation ([facility] [address]   -controlled continue Toprol and Xarelto     Dementia in Alzheimer's disease with delirium ([facility] [address]   - provide supportive care    Moderate to large right +  moderate left pleural effusions  ? On cxr nondisplaced rib fracture- no acute fx visualized on CT   - CT chest  - small effusion, improving.   - takes lasix at home qod.   - received one dose po 20mg lasix 4/14  - given 1x dose IV in ER  - IV lasix 20mg daily - adjust as needed  - monitor BMP  - last echo 11/2020 EF 50%, mod-to-severe MR, left atrium severely dilated, moderate AR, severely dilated RV, RV mildly reduced. Severe TR, RA severely dilated       Continue current regimen/therapies. Monitor. Adjust medical regimen as appropriate. Body mass index is 29.31 kg/m². The patient and / or the family were informed of the results of any tests, a time was given to answer questions, a plan was proposed and they agreed with plan. DVT ppx: xarelto      Diet: DIET GENERAL; Low Sodium (2 GM);  Daily Fluid Restriction: 2000 ml    Consults:  IP CONSULT TO CASE MANAGEMENT  IP CONSULT TO PALLIATIVE CARE  IP CONSULT TO SOCIAL WORK    DISPO/placement plan: pending    Code Status: DNR-CCA      TASHI Olmstead CNP  04/14/21

## 2021-04-14 NOTE — PROGRESS NOTES
4 Eyes Skin Assessment     NAME:  Rossi Garcia  YOB: 1931  MEDICAL RECORD NUMBER:  [id number]    The patient is being assess for  Admission    I agree that 2 RN's have performed a thorough Head to Toe Skin Assessment on the patient. ALL assessment sites listed below have been assessed. Areas assessed by both nurses:    Patient has excoriation and redness all over donna area, blanchable and no open spots. Head, Face, Ears, Shoulders, Back, Chest, Arms, Elbows, Hands, Sacrum. Buttock, Coccyx, Ischium and Legs. Feet and Heels        Does the Patient have a Wound?  No noted wound(s)       Eddie Prevention initiated:  Yes   Wound Care Orders initiated:  Yes    Pressure Injury (Stage 3,4, Unstageable, DTI, NWPT, and Complex wounds) if present place consult order under [de-identified] No    New and Established Ostomies if present place consult order under : No      Nurse 1 eSignature: Electronically signed by Malia Maurer RN on 4/13/21 at 10:17 PM EDT    **SHARE this note so that the co-signing nurse is able to place an eSignature**    Nurse 2 eSignature: Electronically signed by Gary Hernandez RN on 4/13/21 at 10:19 PM EDT

## 2021-04-14 NOTE — PROGRESS NOTES
Occupational Therapy   Occupational Therapy Initial Assessment  Date: 2021   Patient Name: Mona Zabala  MRN: [id number]     : 1931    Date of Service: 2021    Assessment: Pt is 80 y.o. F who presents with difficulty ambulating, found to have UTI and advanced dementia with delirium. PTA pt lives at home with son and his partner in one story home with 4-5 ESTRELLA. Anticipate pt son completes IADL tasks, assists with ADLs and likely requries assist with mobility tasks. Pt is unable to provide PLOF. Currently, pt presents with ROM/strength in [facility] for self-care and transfers. Pt completed bed mobility with mod/max Ax2 and sit <> stand transfers in [facility] with mod Ax2. Anticipate pt to require mod/max A for ADL needs - required assist for setup and to initiate feeding this date. Pt remains unsafe to d/c home, which is the recommendation during all previous admissions. Son generally declines placement. Pt would benefit from continued therapy to increase mobility, safety, and independence. Discharge Recommendations:  3-5 sessions per week     Mona Zabala scored a 13/24 on the AM-PAC ADL Inpatient form. Current research shows that an AM-PAC score of 17 or less is typically not associated with a discharge to the patient's home setting. Based on the patient's AM-PAC score and their current ADL deficits, it is recommended that the patient have 3-5 sessions per week of Occupational Therapy at d/c to increase the patient's independence. Please see assessment section for further patient specific details. If patient discharges prior to next session this note will serve as a discharge summary. Please see below for the latest assessment towards goals. Second Session  Pt met bedside, asleep, appears to be sliding down in recliner chair. Pt awoke to name and remains confused (baseline). Pt with food on gown, agreeable to assist for changing gown. Pt continues to report \"what are you doing to me? \" Pt since last discharge from here)  Additional Comments: assume her son is assisting with her care at home - recently discharged from [facility] and was needing high levels of assist for bed mobility, transfers and had not ambulated with staff here       Objective   Vision: (not assessed formally)  Hearing: (Appears WFL)      Orientation  Overall Orientation Status: Impaired     Balance  Sitting Balance: Minimal assistance(at EOB)  Standing Balance: Moderate assistance(in stedy)  Standing Balance  Time: ~10 seconds  Activity: Transfers in stedy    Functional Mobility  Functional Mobility Comments: Use of diamond stedy for mobility    ADL  Feeding: Setup(Initial assist to initiate however once setup pt able to feed self)  Toileting: (Catheter in place)  Additional Comments: PTA pt reports independence in self-care tasks. Anticipate pt to require mod/max A for bathing/dressing/toileting. Tone RUE  RUE Tone: Normotonic  Tone LUE  LUE Tone: Normotonic  Coordination  Movements Are Fluid And Coordinated: Yes     Bed mobility  Supine to Sit: Moderate assistance;Maximum assistance;2 Person assistance(HOB slightly elevated)  Sit to Supine: Unable to assess(Up in chair at end of session)     Transfers  Sit to stand: Moderate assistance;2 Person assistance  Stand to sit: Moderate assistance;2 Person assistance  Transfer Comments:  Mod Ax2 for sit <> stand from EOB to diamond stedy and sit  to recliner chair     Cognition  Overall Cognitive Status: Exceptions  Cognition Comment: Advanced dementia        Sensation  Overall Sensation Status: (Appears WFL for light touch)        LUE AROM (degrees)  LUE AROM : WFL  Left Hand AROM (degrees)  Left Hand AROM: WFL  RUE AROM (degrees)  RUE AROM : WFL  Right Hand AROM (degrees)  Right Hand AROM: WFL     LUE Strength  Gross LUE Strength: WFL  RUE Strength  Gross RUE Strength: WFL          Plan   Plan  Times per week: 3-5  Current Treatment Recommendations: Strengthening, Endurance Training,

## 2021-04-14 NOTE — CONSULTS
[facility]  Palliative Care   Consult Note    NAME:  Stacey Hunter  MEDICAL RECORD NUMBER:  [id number]  AGE: 80 y.o. GENDER: female  : 1931  TODAY'S DATE:  2021    Subjective     Reason for Consult:  goals of care  Visit Type: Initial Consult      Stacey Hunter is a 80 y.o. female referred by:   [x] Physician    PAST MEDICAL HISTORY      Diagnosis Date    [facility] [address]     Atrial fibrillation ([facility] [address] [facility]     Benign essential HTN     Gout        PAST SURGICAL HISTORY  History reviewed. No pertinent surgical history. FAMILY HISTORY  History reviewed. No pertinent family history. SOCIAL HISTORY  Social History     Tobacco Use    Smoking status: Unknown If Ever Smoked    Smokeless tobacco: Never Used   Substance Use Topics    Alcohol use: Never     Frequency: Never     Binge frequency: Never    Drug use: Never       ALLERGIES  Allergies   Allergen Reactions    Chlorpromazine Other (See Comments)    Hydrochlorothiazide Other (See Comments)     Gout       MEDICATIONS  No current facility-administered medications on file prior to encounter.       Current Outpatient Medications on File Prior to Encounter   Medication Sig Dispense Refill    furosemide (LASIX) 20 MG tablet Take 1 tablet by mouth every other day 30 tablet 3    metoprolol succinate (TOPROL XL) 25 MG extended release tablet Take 0.5 tablets by mouth daily 30 tablet 3    Cranberry 250 MG CHEW Take 2 tablets by mouth 2 times daily (with meals) 120 tablet 0    rivaroxaban (XARELTO) 15 MG TABS tablet Take 1 tablet by mouth daily (with breakfast) 90 tablet 1    vitamin D 25 MCG (1000 UT) CAPS Take 1,000 Units by mouth daily      albuterol sulfate HFA (VENTOLIN HFA) 108 (90 Base) MCG/ACT inhaler Inhale 2 puffs into the lungs every 6 hours as needed for Wheezing      mometasone-formoterol (DULERA) 200-5 MCG/ACT inhaler Inhale 2 puffs into the lungs 2 times daily as needed (shortness of breath)       Multiple Vitamins-Minerals (MULTIVITAMIN WITH MINERALS) tablet Take 1 tablet by mouth daily      calcium carbonate (OSCAL) 500 MG TABS tablet Take 500 mg by mouth daily         Objective         BP (!) 143/75   Pulse 77   Temp 97.5 °F (36.4 °C) (Axillary)   Resp 16   Wt 176 lb 2.4 oz (79.9 kg)   SpO2 91%   BMI 29.31 kg/m²     Code Status: DNR-CCA    Advanced Directives: completed son is spencer Mistry [phone]    Assessment        Management and Education    Persons available for education:      [] Self     [] Caregiver       [] Spouse       [x] Other Family Member   []  Other    Spiritual History:  notified: Yes,     Does the patient have a Primary Care Physician? Yes    Level of patient/caregiver understanding able to:        [x] Verbalize Understanding   [] Demonstrate Understanding       [] Teach Back       [] Needs Reinforcement     []  Other:      Teaching Time:  1hours  0 minutes     Plan        Social Service Consult Made:  Yes  Assistance filling out Living Will/HPOA:  No      Discharge Plan:  Education/support to family  Providing support for coping/adaptation/distress of family  Code status clarified: [facility]  Palliative care orders introduced  Provided information about hospice    Discharge Environment:  [x] Hospice Consult Agency: List provided chose [facility]     [x] Home with Hospice Care or palliative care/home care     Discussion: Patient oriented to self only, just keeps saying she wants to go home. Patient has had 3 admissions in last 3 months, last being 8 days ago. I have called her son/SPENCER Farias to discuss current status. He did say palliative care followed up at home. We have discussed what seems to be a general decline in last few months and he is realizing she may not get back to her baseline. We discussed possible hospice and he was agreeable to speaking to hospice to see if she qualifies and what services they can offer him.     The Patient and/or patient representative Hector Harris was provided with a choice of provider and agrees with the discharge plan. [x] Yes [] No    Freedom of choice list was provided with basic dialogue that supports the patient's individualized plan of care/goals, treatment preferences and shares the quality data associated with the providers. [x] Yes [] No  TASHI Gil-CNP informed of above orders noted, referral faxed     I will continue to follow Ms. Adorno's care as needed. Thank you for allowing me to participate in the care of Ms. Adorno .      Electronically signed by Bernadette Lund RN, [address] [place] [address] on 4/14/2021 at 3:14 PM  [address] [place] [address]  Office: [phone]

## 2021-04-14 NOTE — H&P
Hospital Medicine  History and Physical    PCP: TASHI Yip - CNP  Patient Name: Triny Leal    Date of Service: Pt seen/examined on 04/13/2021 and placed in observation. CHIEF COMPLAINT:  Pt sent via EMS for evaluation of weakness  HISTORY OF PRESENT ILLNESS: Pt is an 80y.o. year-old female with a history of hypertention, severe dementia and chronic atrial fibrillation. She was admitted here from 03/29/2021 through 04/05/2021 3 in which time she was treated for a uti and hypoactive delirium. Her discharge was delayed for several days due todue to family appealing the discharge. Her family has refused placement. Over the past few days she has had increasing weakness and has been nonambulatory since yesterday. In the ER she was found not to have a UTI. The cause of her ataxia is unclear. She is being admitted for further evaluation and treatment        Past Medical History:        Diagnosis Date    Alzheimer's dementia ([facility] [address] [facility]     Atrial fibrillation ([facility] [address] [facility]     Benign essential HTN     Gout        Past Surgical History:    History reviewed. No pertinent surgical history. Allergies:  Chlorpromazine and Hydrochlorothiazide    Medications Prior to Admission:    Prior to Admission medications    Medication Sig Start Date End Date Taking?  Authorizing Provider   furosemide (LASIX) 20 MG tablet Take 1 tablet by mouth every other day 4/4/21  Yes Celia Alex DO   metoprolol succinate (TOPROL XL) 25 MG extended release tablet Take 0.5 tablets by mouth daily 4/5/21  Yes Celia Alex DO   Cranberry 250 MG CHEW Take 2 tablets by mouth 2 times daily (with meals) 4/1/21  Yes Celia Alex DO   rivaroxaban (XARELTO) 15 MG TABS tablet Take 1 tablet by mouth daily (with breakfast) 2/16/21  Yes Екатерина Woodard MD   vitamin D 25 MCG (1000 UT) CAPS Take 1,000 Units by mouth daily   Yes Historical Provider, MD   albuterol sulfate HFA (VENTOLIN HFA) 108 (90 Base) MCG/ACT inhaler Inhale 2 puffs into the lungs every 6 hours as needed for Wheezing   Yes Historical Provider, MD   mometasone-formoterol (DULERA) 200-5 MCG/ACT inhaler Inhale 2 puffs into the lungs 2 times daily as needed (shortness of breath)    Yes Historical Provider, MD   Multiple Vitamins-Minerals (MULTIVITAMIN WITH MINERALS) tablet Take 1 tablet by mouth daily   Yes Historical Provider, MD   calcium carbonate (OSCAL) 500 MG TABS tablet Take 500 mg by mouth daily   Yes Historical Provider, MD       Family History:   amily history is negative for accelerated coronary artery disease, diabetes or malignancies. Social History:   TOBACCO:   has an unknown smoking status. She has never used smokeless tobacco.  ETOH:   reports no history of alcohol use. OCCUPATION:      REVIEW OF SYSTEMS:  A full review of systems was performed and is negative except for that which appears in the HPI    Physical Exam:    Vitals: BP (!) 138/45   Pulse 81   Temp 97.5 °F (36.4 °C) (Oral)   Resp 16   Wt 184 lb 1.4 oz (83.5 kg)   SpO2 94%   BMI 30.63 kg/m²   General appearance: WD/WN 80y.o. year-old female who is alert, appears stated age and is cooperative  HEENT: Head: Normocephalic, no lesions, without obvious abnormality. Eye: Normal external eye, conjunctiva, lids cornea, PEERL. Ears: Normal external ears. Non-tender. Nose: Normal external nose, mucus membranes and septum. Pharynx: Dental Hygiene adequate. Normal buccal mucosa. Normal pharynx. Neck: no adenopathy, no carotid bruit, no JVD, supple, symmetrical, trachea midline and thyroid not enlarged, symmetric, no tenderness/mass/nodules  Lungs: clear to auscultation bilaterally and no use of accessory muscles. Heart: regular rate and rhythm, S1, S2 normal, no murmur, click, rub or gallop and normal apical impulse  Abdomen: soft, non-tender; bowel sounds normal; no masses, no organomegaly  Extremities: extremities atraumatic, no cyanosis or edema and Homans sign is negative, no sign of DVT.     Capillary Refill: Acceptable < 3 seconds   Peripheral Pulses: +3 easily felt, not easily obliterated with pressures   Skin: Skin color, texture, turgor normal. No rashes or lesions on exposed skin  Neurologic: Neurovascularly intact without any focal sensory/motor deficits. Cranial nerves: II-XII intact, grossly non-focal. Gait was not tested. Mental Status: Alert and oriented x 0, thought content inappropriate, poor insight    CBC:   Recent Labs     04/13/21  1524   WBC 7.8   HGB 11.9*        BMP:    Recent Labs     04/13/21  1524      K 4.1      CO2 23   BUN 18   CREATININE 0.9   GLUCOSE 97     Troponin:   Recent Labs     04/13/21  1524   TROPONINI <0.01     PT/INR:  No results found for: PTINR  U/A:    Lab Results   Component Value Date    LEUKOCYTESUR Negative 04/13/2021    RBCUA 1 03/29/2021    SPECGRAV 1.019 04/13/2021    UROBILINOGEN 2.0 04/13/2021    BILIRUBINUR Negative 04/13/2021    BLOODU Negative 04/13/2021    GLUCOSEU Negative 04/13/2021    PROTEINU Negative 04/13/2021         RAD:   I have independently reviewed and interpreted the imaging studies below and based my recommendations to the patient on those findings. Xr Ribs Left Include Chest (min 3 Views)    Result Date: 4/13/2021  EXAMINATION: 4 XRAY VIEWS OF THE LEFT RIBS WITH FRONTAL XRAY VIEW OF THE CHEST 4/13/2021 3:51 pm COMPARISON: Portable chest x-ray earlier today HISTORY: ORDERING SYSTEM PROVIDED HISTORY: portable CXR concerning for left 6th rib fx.  fall. TECHNOLOGIST PROVIDED HISTORY: Reason for exam:->portable CXR concerning for left 6th rib fx.  fall. Reason for Exam: portable CXR concerning for left 6th rib fx.  fall. Acuity: Acute Type of Exam: Initial Mechanism of Injury: fall FINDINGS: No pneumothorax. There is bibasilar airspace disease. Pleural effusions appear present the upright frontal image, possibly shifting on the other images. No definitive acute fracture demonstrated.      Negative radiographs of the lower ribs, no definitive acute fracture. Regardless, no pneumothorax or other complication. Small bilateral pleural effusions appear present. There also is vascular congestion and possible mild interstitial edema     Xr Hip Left (2-3 Views)    Result Date: 4/13/2021  EXAMINATION: TWO XRAY VIEWS OF THE LEFT HIP 4/13/2021 2:25 pm COMPARISON: None. HISTORY: ORDERING SYSTEM PROVIDED HISTORY: pain/fall TECHNOLOGIST PROVIDED HISTORY: Reason for exam:->pain/fall Reason for Exam: fall, left hip pain Acuity: Acute Type of Exam: Initial FINDINGS: Frontal view of the pelvis and dedicated imaging of the left hip demonstrate no acute fracture or dislocation. Normal bony mineralization. Mild bilateral hip osteoarthritis. SI joints and sacral arcuate lines appear intact. Evaluation is moderately limited by overlying bowel gas and stool. No soft tissue abnormality. 1. No acute osseous abnormality of the pelvis or left hip. 2. Mild bilateral hip osteoarthritis. Ct Head Wo Contrast    Result Date: 4/13/2021  EXAMINATION: CT OF THE HEAD WITHOUT CONTRAST  4/13/2021 3:52 pm TECHNIQUE: CT of the head was performed without the administration of intravenous contrast. Dose modulation, iterative reconstruction, and/or weight based adjustment of the mA/kV was utilized to reduce the radiation dose to as low as reasonably achievable. COMPARISON: CT head 03/29/2021. HISTORY: ORDERING SYSTEM PROVIDED HISTORY: unwitnessed fall TECHNOLOGIST PROVIDED HISTORY: Reason for exam:->unwitnessed fall Has a \"code stroke\" or \"stroke alert\" been called? ->No Decision Support Exception->Emergency Medical Condition (MA) Reason for Exam: unwitnessed fall Acuity: Acute Type of Exam: Initial FINDINGS: BRAIN/VENTRICLES: No acute hemorrhage. Periventricular and subcortical hypoattenuation is nonspecific and may be related to microvascular disease. Encephalomalacia in the right parietal temporal lobes again visualized.  Nathanel Lux white differentiation appears maintained Multiplanar reformatted images are provided for review. Dose modulation, iterative reconstruction, and/or weight based adjustment of the mA/kV was utilized to reduce the radiation dose to as low as reasonably achievable. COMPARISON: 11/28/2020. HISTORY: ORDERING SYSTEM PROVIDED HISTORY: unwitnessed fall TECHNOLOGIST PROVIDED HISTORY: Reason for exam:->unwitnessed fall Decision Support Exception->Emergency Medical Condition (MA) Reason for Exam: unwitnessed fall Acuity: Acute Type of Exam: Initial FINDINGS: BONES/ALIGNMENT: There is no acute fracture or traumatic malalignment. DEGENERATIVE CHANGES: Straightened cervical lordotic curvature. Moderate degenerative disc disease at C4-5 through C6-7 as well as at C7-T1. Multilevel facet arthropathy. 4 mm anterolisthesis of C3 on C4 secondary to facet arthropathy. Moderate degenerative narrowing of the C1-2 articulation anteriorly. SOFT TISSUES: There is no prevertebral soft tissue swelling. Bilateral pleural effusions are noted at the lung apices. Mild atherosclerotic plaque at the level the carotid bifurcation. No acute osseous abnormality of the cervical spine. Multilevel degenerative disc disease and multilevel facet arthropathy. Straightened cervical lordosis, positional versus spasm. Bilateral pleural effusions are noted at the lung apices. Xr Chest Portable    Result Date: 4/3/2021  EXAMINATION: ONE XRAY VIEW OF THE CHEST 4/3/2021 11:43 am COMPARISON: 03/29/2021 HISTORY: ORDERING SYSTEM PROVIDED HISTORY: sob TECHNOLOGIST PROVIDED HISTORY: Reason for exam:->sob Reason for Exam: sob Acuity: Unknown Type of Exam: Ongoing Relevant Medical/Surgical History: sob FINDINGS: Stable cardiomegaly. Bilateral parahilar opacities have increased. Possible bilateral pleural effusions. No pneumothorax.      Increased bilateral parahilar opacities could represent pulmonary edema or infection     Xr Chest Portable    Result Date: 3/29/2021  EXAMINATION: ONE XRAY VIEW OF THE CHEST 3/29/2021 1:46 pm COMPARISON: 02/10/2021 HISTORY: ORDERING SYSTEM PROVIDED HISTORY: r/o pNA TECHNOLOGIST PROVIDED HISTORY: Reason for exam:->r/o pNA Reason for Exam: Altered Mental Status Acuity: Chronic Type of Exam: Ongoing FINDINGS: Stable cardiomegaly. Bilateral pleural effusions and bibasilar opacities. No pneumothorax. No pulmonary vascular congestion. Bilateral pleural effusions and bibasilar opacities could represent atelectasis or pneumonia     Xr Chest 1 View    Result Date: 4/13/2021  EXAMINATION: ONE XRAY VIEW OF THE CHEST 4/13/2021 2:38 pm COMPARISON: None. HISTORY: ORDERING SYSTEM PROVIDED HISTORY: pre op TECHNOLOGIST PROVIDED HISTORY: Reason for exam:->pre op Reason for Exam: pre op Acuity: Acute Type of Exam: Initial FINDINGS: Patient is rotated which somewhat limits evaluation. Slight hazy appearance throughout the right lung may be related to patient positioning. Cardiac silhouette is enlarged. Very small pleural effusions, decreased since previous exam.  Irregularity of the left 6th rib concerning for nondisplaced fracture. Somewhat limited evaluation due to patient rotation. Slight hazy appearance throughout the right lung may be related to patient positioning and artifactual.  Recommend two view of the chest with better positioning if this would change clinical management. Very small pleural effusions, decreased. Findings as above concerning for nondisplaced left 6th rib fracture. 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  Further orders associated with this have been entered if appropriate)      Disposition: Anticipate that patient will remain in the hospital for 1 to 2 days depending on further evaluation and clinical course    Please note that over 50 minutes was spent in evaluating the patient, review of records and results, discussion with staff/family, etc.    Jony Rm MD

## 2021-04-14 NOTE — PROGRESS NOTES
Lalit Oswald called at this time to go over admission information. Updated on patient room number and visiting hours. All questions answered at this time.   Electronically signed by Joann Matthews RN on 4/13/2021 at 10:20 PM

## 2021-04-14 NOTE — PLAN OF CARE
Problem: Falls - Risk of:  Goal: Will remain free from falls  Description: Will remain free from falls  4/14/2021 0924 by Renny Zavala RN  Outcome: Ongoing   Will remain free from falls. Fall precautions are in place. Call light, telephone and bedside table are within reach.    Problem: Falls - Risk of:  Goal: Absence of physical injury  Description: Absence of physical injury  4/14/2021 0924 by Renny Zavala RN  Outcome: Ongoing     Problem: Skin Integrity:  Goal: Will show no infection signs and symptoms  Description: Will show no infection signs and symptoms  4/14/2021 0924 by Renny Zavala RN  Outcome: Ongoing     Problem: Skin Integrity:  Goal: Absence of new skin breakdown  Description: Absence of new skin breakdown  4/14/2021 0924 by Renny Zavala RN  Outcome: Ongoing     Problem: OXYGENATION/RESPIRATORY FUNCTION  Goal: Patient will maintain patent airway  Outcome: Ongoing   Patient will maintain patent airway   Problem: OXYGENATION/RESPIRATORY FUNCTION  Goal: Patient will achieve/maintain normal respiratory rate/effort  Description: Respiratory rate and effort will be within normal limits for the patient  Outcome: Ongoing     Problem: FLUID AND ELECTROLYTE IMBALANCE  Goal: Fluid and electrolyte balance are achieved/maintained  Outcome: Ongoing     Problem: ACTIVITY INTOLERANCE/IMPAIRED MOBILITY  Goal: Mobility/activity is maintained at optimum level for patient  Outcome: Ongoing

## 2021-04-15 LAB
ANION GAP SERPL CALCULATED.3IONS-SCNC: 7 MMOL/L (ref 3–16)
BASOPHILS ABSOLUTE: 0.1 K/UL (ref 0–0.2)
BASOPHILS RELATIVE PERCENT: 1 %
BUN BLDV-MCNC: 22 MG/DL (ref 7–20)
CALCIUM SERPL-MCNC: 9.1 MG/DL (ref 8.3–10.6)
CHLORIDE BLD-SCNC: 105 MMOL/L (ref 99–110)
CO2: 29 MMOL/L (ref 21–32)
CREAT SERPL-MCNC: 1.1 MG/DL (ref 0.6–1.2)
EOSINOPHILS ABSOLUTE: 0.1 K/UL (ref 0–0.6)
EOSINOPHILS RELATIVE PERCENT: 2 %
FOLATE: 11.67 NG/ML (ref 4.78–24.2)
GFR AFRICAN AMERICAN: 56
GFR NON-AFRICAN AMERICAN: 47
GLUCOSE BLD-MCNC: 98 MG/DL (ref 70–99)
HCT VFR BLD CALC: 36.7 % (ref 36–48)
HEMOGLOBIN: 12 G/DL (ref 12–16)
LYMPHOCYTES ABSOLUTE: 0.8 K/UL (ref 1–5.1)
LYMPHOCYTES RELATIVE PERCENT: 10.6 %
MCH RBC QN AUTO: 28.1 PG (ref 26–34)
MCHC RBC AUTO-ENTMCNC: 32.8 G/DL (ref 31–36)
MCV RBC AUTO: 85.5 FL (ref 80–100)
MONOCYTES ABSOLUTE: 0.9 K/UL (ref 0–1.3)
MONOCYTES RELATIVE PERCENT: 12.1 %
NEUTROPHILS ABSOLUTE: 5.3 K/UL (ref 1.7–7.7)
NEUTROPHILS RELATIVE PERCENT: 74.3 %
PDW BLD-RTO: 18.6 % (ref 12.4–15.4)
PLATELET # BLD: 311 K/UL (ref 135–450)
PMV BLD AUTO: 8.5 FL (ref 5–10.5)
POTASSIUM REFLEX MAGNESIUM: 3.7 MMOL/L (ref 3.5–5.1)
PRO-BNP: 4655 PG/ML (ref 0–449)
RBC # BLD: 4.29 M/UL (ref 4–5.2)
SODIUM BLD-SCNC: 141 MMOL/L (ref 136–145)
VITAMIN B-12: 402 PG/ML (ref 211–911)
WBC # BLD: 7.2 K/UL (ref 4–11)

## 2021-04-15 PROCEDURE — 94640 AIRWAY INHALATION TREATMENT: CPT

## 2021-04-15 PROCEDURE — 1200000000 HC SEMI PRIVATE

## 2021-04-15 PROCEDURE — 6360000002 HC RX W HCPCS: Performed by: NURSE PRACTITIONER

## 2021-04-15 PROCEDURE — 97530 THERAPEUTIC ACTIVITIES: CPT

## 2021-04-15 PROCEDURE — 6370000000 HC RX 637 (ALT 250 FOR IP): Performed by: INTERNAL MEDICINE

## 2021-04-15 PROCEDURE — 83880 ASSAY OF NATRIURETIC PEPTIDE: CPT

## 2021-04-15 PROCEDURE — 97535 SELF CARE MNGMENT TRAINING: CPT

## 2021-04-15 PROCEDURE — 6370000000 HC RX 637 (ALT 250 FOR IP): Performed by: NURSE PRACTITIONER

## 2021-04-15 PROCEDURE — 82746 ASSAY OF FOLIC ACID SERUM: CPT

## 2021-04-15 PROCEDURE — 82607 VITAMIN B-12: CPT

## 2021-04-15 PROCEDURE — 94761 N-INVAS EAR/PLS OXIMETRY MLT: CPT

## 2021-04-15 PROCEDURE — 36415 COLL VENOUS BLD VENIPUNCTURE: CPT

## 2021-04-15 PROCEDURE — 80048 BASIC METABOLIC PNL TOTAL CA: CPT

## 2021-04-15 PROCEDURE — 85025 COMPLETE CBC W/AUTO DIFF WBC: CPT

## 2021-04-15 RX ORDER — FUROSEMIDE 10 MG/ML
20 INJECTION INTRAMUSCULAR; INTRAVENOUS 2 TIMES DAILY
Status: DISCONTINUED | OUTPATIENT
Start: 2021-04-15 | End: 2021-04-16 | Stop reason: HOSPADM

## 2021-04-15 RX ADMIN — BUDESONIDE AND FORMOTEROL FUMARATE DIHYDRATE 2 PUFF: 160; 4.5 AEROSOL RESPIRATORY (INHALATION) at 20:17

## 2021-04-15 RX ADMIN — FUROSEMIDE 20 MG: 10 INJECTION, SOLUTION INTRAMUSCULAR; INTRAVENOUS at 18:30

## 2021-04-15 RX ADMIN — CALCIUM 500 MG: 500 TABLET ORAL at 10:17

## 2021-04-15 RX ADMIN — FUROSEMIDE 20 MG: 10 INJECTION, SOLUTION INTRAMUSCULAR; INTRAVENOUS at 10:16

## 2021-04-15 RX ADMIN — Medication 1000 UNITS: at 10:17

## 2021-04-15 RX ADMIN — RIVAROXABAN 15 MG: 15 TABLET, FILM COATED ORAL at 10:17

## 2021-04-15 RX ADMIN — METOPROLOL SUCCINATE 12.5 MG: 25 TABLET, EXTENDED RELEASE ORAL at 10:17

## 2021-04-15 NOTE — PROGRESS NOTES
Physical Therapy    Facility/Department: [address] ORTHOPEDICS  Treatment note    NAME: Mona Zabala  : 1931  MRN: [id number]    Date of Service: 4/15/2021    Assessment / Discharge Recommendations:  -remains with need of 24 hour \"hands on\" assist for all mobility  -follows cues poorly except for eating - does feed herself once set up is completed for her  -will attempt to get up to a walker is she is able to follow cues to do so safely  -perhaps her son is better able to have her follow cues (assuming she recognizes him and able to be assisted by him)      Body structures, Functions, Activity limitations: Decreased functional mobility ; Decreased ADL status; Decreased balance;Decreased cognition  Activity Tolerance  Activity Tolerance: Patient limited by cognitive status       Patient Diagnosis(es): The primary encounter diagnosis was Unable to walk. A diagnosis of General weakness was also pertinent to this visit. has a past medical history of Alzheimer's dementia ([facility] [address] Atrial fibrillation ([facility] [address] Benign essential HTN, and Gout.   has no past surgical history on file. Restrictions  Restrictions/Precautions  Restrictions/Precautions: Fall Risk    Subjective  General  Chart Reviewed:  Yes  Additional Pertinent Hx: here due to inability to ambulate and greater than baseline confusion  Response To Previous Treatment: (patient does not remember PT OT yesterday)  Family / Caregiver Present: No  Follows Commands: Impaired  Subjective  Subjective: to room along with OT to patient resting in bed - remains confused but appears cooperative- wants to eat - in this context PTOT session initiated  Pain Screening  Patient Currently in Pain: No    Social/Functional History  Social/Functional History  Lives With: Roman Jalloh and his partner Bel Larsen)  Type of Home: House  Home Layout: One level  Home Access: Stairs to enter with rails  Entrance Stairs - Number of Steps: 4-5  Bathroom Shower/Tub: Walk-in shower  Bathroom Toilet: Standard  Bathroom Equipment: Grab bars in shower, Shower chair  Home Equipment: 4 wheeled walker, [employer]  ADL Assistance: (anticipate recently son assists)  Homemaking Assistance: (son completes)  Ambulation Assistance: (son assists but not sure what level of assist since last discharge from here)  Additional Comments: assume her son is assisting with her care at home - recently discharged from [facility] and was needing high levels of assist for bed mobility, transfers and had not ambulated with staff here    Objective  Bed mobility  Supine to Sit: Moderate assistance;Maximum assistance;2 Person assistance  Sit to Supine: Unable to assess(remained up in recliner at conclusion)  Transfers  Sit to Stand: Minimal Assistance;2 Person Assistance(in diamond stedy)  Stand to sit: Minimal Assistance;2 Person Assistance  Ambulation  Ambulation?: No  Stairs/Curb  Stairs?: No     Balance  Comments: able to sit midline at the EOB with min assist to maintain safety    -midline in static stance in the stedy with min assist to maintain    Plan   Plan  Times per week: 3-5 while on acute floor  Current Treatment Recommendations: Functional Mobility Training  Safety Devices  Type of devices:  All fall risk precautions in place, Call light within reach, Chair alarm in place, Left in chair, Telesitter in use, Nurse notified(Kelsey)    AM-PAC Score  AM-PAC Inpatient Mobility Raw Score : 9 (04/14/21 1145)  AM-PAC Inpatient T-Scale Score : 30.55 (04/14/21 1145)  Mobility Inpatient CMS 0-100% Score: 81.38 (04/14/21 1145)  Mobility Inpatient CMS G-Code Modifier : CM (04/14/21 1145)          Goals  Short term goals  Time Frame for Short term goals: 2-3 days  Short term goal 1: bed mobility at mod assist  Short term goal 2: transfers at mod assist  Short term goal 3: ambulation at mod assist rolling walker for 8-10 feet  Patient Goals   Patient goals : none formulated-       Therapy Time   Individual Concurrent Group Co-treatment   Time In

## 2021-04-15 NOTE — PLAN OF CARE
Problem: Falls - Risk of:  Goal: Will remain free from falls  Description: Will remain free from falls  4/14/2021 2209 by Phil Perez RN  Outcome: Ongoing  Note: Fall risk assessment completed. Fall precautions in place. Call light within reach. Pt educated on calling for assistance before getting up. Walkway free of clutter. Will continue to monitor. 4/14/2021 0924 by Andrew Stanley RN  Outcome: Ongoing  Goal: Absence of physical injury  Description: Absence of physical injury  4/14/2021 2209 by Phil Perez RN  Outcome: Ongoing  Note: Pt is free of injury. No injury noted. Fall precautions in place. Call light within reach. Will monitor. 4/14/2021 0924 by Andrew Stanley RN  Outcome: Ongoing     Problem: Skin Integrity:  Goal: Will show no infection signs and symptoms  Description: Will show no infection signs and symptoms  4/14/2021 2209 by Phil Perez RN  Outcome: Ongoing  Note: Pt is free of signs and symptoms of infection. Incision and dressing are clean, dry and intact. Vital signs stable. Will monitor.      4/14/2021 0924 by Andrew Stanley RN  Outcome: Ongoing  Goal: Absence of new skin breakdown  Description: Absence of new skin breakdown  4/14/2021 2209 by Phil Perez RN  Outcome: Ongoing  Note: Patient will be absent of new skin breakdown    4/14/2021 0924 by Andrew Stanley RN  Outcome: Ongoing     Problem: OXYGENATION/RESPIRATORY FUNCTION  Goal: Patient will maintain patent airway  4/14/2021 2209 by Phil Perez RN  Outcome: Ongoing  Note: Patients airway will remain patent    4/14/2021 0924 by Andrew Stanley RN  Outcome: Ongoing  Goal: Patient will achieve/maintain normal respiratory rate/effort  Description: Respiratory rate and effort will be within normal limits for the patient  4/14/2021 2209 by Phil Perez RN  Outcome: Ongoing  Note: Patients respiratory rate and effort will remain stable    4/14/2021 0924 by Andrew Stanley RN  Outcome: Ongoing     Problem: HEMODYNAMIC STATUS  Goal: Patient has stable vital signs and fluid balance  4/14/2021 2209 by Malia Maurer RN  Outcome: Ongoing  Note: Patients fluid and vitals will remain stable    4/14/2021 0924 by Awais Kemp RN  Outcome: Ongoing     Problem: FLUID AND ELECTROLYTE IMBALANCE  Goal: Fluid and electrolyte balance are achieved/maintained  4/14/2021 2209 by Malia Maurer RN  Outcome: Ongoing  4/14/2021 0924 by Awais Kemp RN  Outcome: Ongoing     Problem: ACTIVITY INTOLERANCE/IMPAIRED MOBILITY  Goal: Mobility/activity is maintained at optimum level for patient  4/14/2021 2209 by Malia Maurer RN  Outcome: Ongoing  Note: Patients mobility will return to baseline    4/14/2021 0924 by Awais Kemp RN  Outcome: Ongoing   Electronically signed by Malia Maurer RN on 4/14/2021 at 10:10 PM

## 2021-04-15 NOTE — PROGRESS NOTES
Physician Progress Note      Rambo Parekh  TUSHAR #:                  [id number]  :                       1931  ADMIT DATE:       2021 2:05 PM  [address] [place] DATE:  RESPONDING  PROVIDER #:        Meli Mulligan CNP          QUERY TEXT:    Dear Jane Chapman, APRN-CNP,  Pt admitted with Ataxia and weakness. Pt noted to have Right and Left pleural   effusions, elevated BNP and is receiving Lasix. If possible, please document   in progress notes and discharge summary if you are evaluating and/or treating   any of the following: The medical record reflects the following:  Risk Factors: Hx HTN, chronic A-fib, ED notes pt is on po Lasix @ home  Clinical Indicators: 13 ED per EMS for weakness, fall and non-ambulatory,   CXR-CXR-Very small pleural effusions,  vascular congestion and possible mild   interstitial edema, Pro-PAL=1179,  CT- Moderate to large right and   moderate left pleural effusions, PCP notes last Echo ( 2020 EF 50%,   mod-to-severe MR, left atrium severely dilated, moderate AR, severely dilated   RV, RV mildly reduced. Severe TR, RA severely dilated)  Treatment: ED treatment included:  IV LAsix 20 mg, CT scan chest, Lasix 20 mg   IV daily, monitor labs  Thank you,  Kilo Watt RN, CDS  [email or web address]  Options provided:  -- Acute on Chronic Systolic CHF/HFrEF  -- Acute on Chronic Diastolic CHF/HFpEF  -- Acute on Chronic Systolic and Diastolic CHF  -- Acute Systolic CHF/HFrEF  -- Acute Diastolic CHF/HFpEF  -- Acute Systolic and Diastolic CHF  -- Chronic Systolic CHF/HFrEF  -- Chronic Diastolic CHF/HFpEF  -- Chronic Systolic and Diastolic CHF  -- Other - I will add my own diagnosis  -- Disagree - Not applicable / Not valid  -- Disagree - Clinically unable to determine / Unknown  -- Refer to Clinical Documentation Reviewer    PROVIDER RESPONSE TEXT:    Patient has valvular regurgitation and mildly reduced Right Ventricular   function    Query created by: [employer] Parvezgia Ravi on 4/15/2021 8:50 AM      QUERY TEXT:    Dear Kristie Clifford, APRN-CNP,  Pt admitted with Ataxia and weakness and noted recent fall, increasing   weakness and has been nonambulatory . If possible, please document in the   progress notes and discharge summary if you are evaluating and / or treating   any of the following: The medical record reflects the following:  Risk Factors: 89 yrs old,Hx Alzheimer's Dementia, recent hospitalization and   tx for UTI  Clinical Indicators: 4/13 Presents to ED \"evaluation of weakness\", had a fall   at home and after has not been able to ambulate, work up in ED showed no   fractures and no UTI. H and P notes \"The cause of her ataxia is unclear\"  Treatment: PT/OT eval  Thank you,  Rojas Fink RN, [email or web address]  Options provided:  -- Age Related Physical Debility  -- Frailty  -- weakness, falls and inability to ambulate due to other, Please document   other cause. -- Other - I will add my own diagnosis  -- Disagree - Not applicable / Not valid  -- Disagree - Clinically unable to determine / Unknown  -- Refer to Clinical Documentation Reviewer    PROVIDER RESPONSE TEXT:    This patient has frailty. Query created by:  [id number]  on 4/15/2021 9:09 AM      Electronically signed by:  Prasad Mulligan CNP 4/15/2021 9:45 AM

## 2021-04-15 NOTE — PROGRESS NOTES
Patient is resting in bed. Alert and oriented to self. Patient confused at baseline. Tele cam remains in place. Herron in place and draining clear yellow urine. Assessment complete. All patient needs are met at this time. Fall precautions are in place call light is in reach. Will continue to monitor.    Electronically signed by Benito Holman RN on 4/14/2021 at 10:15 PM

## 2021-04-15 NOTE — PROGRESS NOTES
Spoke on phone with pt.'s son Ronnie Zeng informed him pt. Was incontinent of BM and proceeded to paint her whole body with it. Ronnie Zeng states this happens a lot lately. He and Noble Anderson get her up and put her in the shower. This nurse notified him the concern is that pt. Is very uncooperative and cannot be placed in shower if uncooperative and unable to walk to bathroom. Ronnie Zeng verbalized understanding.

## 2021-04-15 NOTE — PROGRESS NOTES
weekend. Doing well today. PT/OT got her out of chair and back to bed. Very [place]. No complaints. No edema. Denies joint pain anywhere when I ask her. Son requested we do a uric acid test because she has had gout before in the past.      Review of Systems:     The patient denied headaches, visual changes, LOC, SOB, CP, ABD pain, N/V/D, skin changes, new or worsening weakness or neuromuscular deficits. Comprehensive ROS negative except as mentioned above. Past Medical History:        Diagnosis Date    Alzheimer's dementia ([facility] [address]     Atrial fibrillation ([facility] [address]     Benign essential HTN     Gout        Past Surgical History:    History reviewed. No pertinent surgical history. Allergies:  Chlorpromazine and Hydrochlorothiazide    Past medical and surgical history reviewed. Any changes have been noted. PHYSICAL EXAM:  /63   Pulse 81   Temp 97.6 °F (36.4 °C) (Axillary)   Resp 17   Wt 158 lb 15.2 oz (72.1 kg) Comment: patient refused to stand . SpO2 93%   BMI 26.45 kg/m²       Intake/Output Summary (Last 24 hours) at 4/15/2021 0945  Last data filed at 4/15/2021 0454  Gross per 24 hour   Intake 530 ml   Output 2600 ml   Net -2070 ml        General appearance:  VERY [place];  No apparent distress, appears stated age. Cooperative. HEENT:  Normocephalic, atraumatic. PERRLA. EOMi. Conjunctivae/corneas clear, no icterus, non-injected. Neck: Supple, with full range of motion. No jugular venous distention. Trachea midline. Respiratory:  Normal respiratory effort. Clear to auscultation, bilaterally without Rales/Wheezes/Rhonchi. Cardiovascular:  Regular rate and rhythm without murmurs, rubs or gallops. Abdomen: Soft, non-tender, non-distended, without rebound or guarding. Normal bowel sounds. Musculoskeletal:  No clubbing, cyanosis or edema bilaterally. Full range of motion without deformity. Skin: Skin color, texture, turgor normal.  No rashes or lesions.   Neurologic:  Neurovascularly intact without any focal sensory/motor deficits. Cranial nerves: II-XII intact, grossly intact. No facial asymmetry, tongue midline. Psychiatric:  Alert and oriented, thought content appropriate  Capillary Refill: Brisk,< 3 seconds   Peripheral Pulses: +2 palpable, equal bilaterally       LABS:    Lab Results   Component Value Date    WBC 7.2 04/15/2021    HGB 12.0 04/15/2021    HCT 36.7 04/15/2021    MCV 85.5 04/15/2021     04/15/2021    LYMPHOPCT 10.6 04/15/2021    RBC 4.29 04/15/2021    MCH 28.1 04/15/2021    MCHC 32.8 04/15/2021    RDW 18.6 (H) 04/15/2021       Lab Results   Component Value Date    CREATININE 1.1 04/15/2021    BUN 22 (H) 04/15/2021     04/15/2021    K 3.7 04/15/2021     04/15/2021    CO2 29 04/15/2021       Lab Results   Component Value Date    MG 1.40 04/04/2021       Lab Results   Component Value Date    ALT 11 04/13/2021    AST 15 04/13/2021    ALKPHOS 186 (H) 04/13/2021    BILITOT 0.8 04/13/2021        No flowsheet data found. No results found for: LABA1C    Imaging:  CT CHEST PULMONARY EMBOLISM W CONTRAST   Preliminary Result   1. No evidence of pulmonary embolic disease. 2. Moderate to large right and moderate left pleural effusions, simple in   attenuation. Gravity dependent bibasilar atelectasis. Mild interstitial   edema with interlobular septal thickening. CT HEAD WO CONTRAST   Final Result   No acute hemorrhage or midline shift. Other findings as described. CT CERVICAL SPINE WO CONTRAST   Final Result   No acute osseous abnormality of the cervical spine. Multilevel degenerative disc disease and multilevel facet arthropathy. Straightened cervical lordosis, positional versus spasm. Bilateral pleural effusions are noted at the lung apices. XR RIBS LEFT INCLUDE CHEST (MIN 3 VIEWS)   Final Result   Negative radiographs of the lower ribs, no definitive acute fracture. Regardless, no pneumothorax or other complication.       Small bilateral pleural effusions appear present. There also is vascular   congestion and possible mild interstitial edema         XR HIP LEFT (2-3 VIEWS)   Final Result   1. No acute osseous abnormality of the pelvis or left hip. 2. Mild bilateral hip osteoarthritis. XR CHEST 1 VIEW   Final Result   Somewhat limited evaluation due to patient rotation. Slight hazy appearance   throughout the right lung may be related to patient positioning and   artifactual.  Recommend two view of the chest with better positioning if this   would change clinical management. Very small pleural effusions, decreased. Findings as above concerning for nondisplaced left 6th rib fracture. Scheduled and prn Medications:    Scheduled Meds:   Vitamin D  1,000 Units Oral Daily    calcium elemental  500 mg Oral Daily with breakfast    furosemide  20 mg Intravenous Daily    rivaroxaban  15 mg Oral Daily with breakfast    budesonide-formoterol  2 puff Inhalation BID    metoprolol succinate  12.5 mg Oral Daily    sodium chloride flush  10 mL Intravenous 2 times per day     Continuous Infusions:   sodium chloride       PRN Meds:.albuterol, sodium chloride flush, sodium chloride, ondansetron, polyethylene glycol, acetaminophen **OR** acetaminophen    Assessment & Plan:        Weakness  - PT/OT   - Will consult Case Management for help with discharge planning, and - Palliative care for goals of care  - B12 level and folate normal     Essential (primary) hypertension   - continue home meds and monitor blood pressure   Lasix 20mg every other day  At home  - getting IV lasix now for pleural effusion        Chronic atrial fibrillation (HCC)   -controlled continue Toprol and Xarelto     Dementia in Alzheimer's disease with delirium (HCC)   - provide supportive care     Moderate to large right +  moderate left pleural effusions  ?  On cxr nondisplaced rib fracture- no acute fx visualized on CT   - CT chest  - small

## 2021-04-15 NOTE — PROGRESS NOTES
Pt. incont of soft brown stool. 2 person assist to turn pt. And clean her up pt. Screaming with care sacrum and periarea very red and excoriated . Zinc ointment placed on periarea and sacrum.

## 2021-04-15 NOTE — PROGRESS NOTES
Occupational Therapy  [facility]  [id number]  [id number]    Patient yelling out and hitting table upon arrival to room with PT. Patient asking for MD and family, attempts made to redirect patient. Patient sliding forward out towards end of chair. Sit to stand from recliner chair to commode to EOB with Mod A of 2. Stand to sit with Min A of 2. Dependent for toileting as patient has chong and incont of BM. Mobility completed per diamond davis for safe mobility from bed<>bathroom. Sit to supine with Mod A of 2. Dependent of 2 for scooting up in bed. Patient left in bed with needs in reach and RN Ame Gonzalez informed, bed alarm active and tele sitter in room. If discharged prior to next OT session please see last daily note for discharge status.     Electronically signed by Bernardino Amado, [id number] on 4/15/2021 at 2:40 PM      Therapy Time     Individual Co-treatment   Time In   1400   Time Out   1430   Minutes   30

## 2021-04-15 NOTE — PROGRESS NOTES
Occupational Therapy  Facility/Department: [address] ORTHOPEDICS  Daily Treatment Note  NAME: Ming Villanueva  : 1931  MRN: [id number]    Date of Service: 4/15/2021    Assessment: Pt tolerated session fair - continues to be limited by cognition. Pt not able to follow cues except when motivated by food tray. Pt completed bed mobility with mod/max Ax2 and sit <> stand transfers with min Ax2 in diamond davis. Pt declined need for ADL tasks (unsure if she is understanding) anticiapte pt to require mod/max A for ADL needs. Pt fairly resistive to assistance unless it is to eat. Pt continues to require lift equipment for functional mobility, not yet able to trial RW d/t inability to follow cues. Pt son would need to demonstrate ability to safely mobilize patient in order for patient to d/c home. Pt would benefit from continued therapy to increase mobility, safety, and independence. If family refuses SNF, pt will require 24/7 supervision/assist and potentially lift equipment if son is not able to safely mobilize patient. Discharge Recommendations:  Continue to assess pending progress, Patient would benefit from continued therapy after discharge, 3-5 sessions per week     Ming Villanueva scored a 13/24 on the AM-PAC ADL Inpatient form. Current research shows that an AM-PAC score of 17 or less is typically not associated with a discharge to the patient's home setting. Based on the patient's AM-PAC score and their current ADL deficits, it is recommended that the patient have 3-5 sessions per week of Occupational Therapy at d/c to increase the patient's independence. Please see assessment section for further patient specific details. If patient discharges prior to next session this note will serve as a discharge summary. Please see below for the latest assessment towards goals. Assessment   Performance deficits / Impairments: Decreased balance;Decreased functional mobility ; Decreased ADL status; Decreased endurance;Decreased strength;Decreased cognition;Decreased safe awareness  Assessment: Pt tolerated session fair - continues to be limited by cognition. Pt not able to follow cues except when motivated by food tray. Pt completed bed mobility with mod/max Ax2 and sit <> stand transfers with min Ax2 in [place] davis. Pt declined need for ADL tasks (unsure if she is understanding) anticiapte pt to require mod/max A for ADL needs. Pt fairly resistive to assistance unless it is to eat. Pt continues to require lift equipment for functional mobility, not yet able to trial RW d/t inability to follow cues. Pt son would need to demonstrate ability to safely mobilize patient in order for patient to d/c home. Pt would benefit from continued therapy to increase mobility, safety, and independence. If family refuses SNF, pt will require 24/7 supervision/assist and potentially lift equipment if son is not able to safely mobilize patient. OT Education: Transfer Training;OT Role;Plan of Care;Precautions; ADL Adaptive Strategies  REQUIRES OT FOLLOW UP: Yes  Activity Tolerance  Activity Tolerance: Treatment limited secondary to decreased cognition  Safety Devices  Safety Devices in place: Yes(DANIA Arnett) aware)  Type of devices: Nurse notified;Gait belt;Call light within reach; Chair alarm in place; Left in chair         Patient Diagnosis(es): The primary encounter diagnosis was Unable to walk. A diagnosis of General weakness was also pertinent to this visit. has a past medical history of Alzheimer's dementia ([facility] [address] Atrial fibrillation ([facility] [address] Benign essential HTN, and Gout.   has no past surgical history on file.     Restrictions  Restrictions/Precautions  Restrictions/Precautions: Fall Risk     Subjective   General  Chart Reviewed: Yes  Patient assessed for rehabilitation services?: Yes  Additional Pertinent Hx: Per Alonso Henley MD: Pt is \"80 y.o. year-old female with a history of hypertention, severe dementia and chronic atrial Education & Training, Equipment Evaluation, Education, & procurement, Patient/Caregiver Education & Training, Self-Care / ADL    AM-PAC Score    Triny Leal scored a 13/24 on the AM-PAC ADL Inpatient form. Current research shows that an AM-PAC score of 17 or less is typically not associated with a discharge to the patient's home setting. Based on the patient's AM-PAC score and their current ADL deficits, it is recommended that the patient have 3-5 sessions per week of Occupational Therapy at d/c to increase the patient's independence. Please see assessment section for further patient specific details. If patient discharges prior to next session this note will serve as a discharge summary. Please see below for the latest assessment towards goals. AM-PAC Inpatient Daily Activity Raw Score: 13 (04/15/21 1308)  AM-PAC Inpatient ADL T-Scale Score : 32.03 (04/15/21 1308)  ADL Inpatient CMS 0-100% Score: 63.03 (04/15/21 1308)  ADL Inpatient CMS G-Code Modifier : CL (04/15/21 1308)    Goals  Short term goals  Time Frame for Short term goals: prior to d/c: goals ongoing as of 4/15/21  Short term goal 1: Pt will complete bed mobility with min A  Short term goal 2: Pt will complete transfers with min A  Short term goal 3: Pt will complete functional mobility with AD with min A  Short term goal 4: Pt will tolerate 1+ minute of standing activity to increase strength and activity tolerance for self-care and transfers  Patient Goals   Patient goals : Pt not able to report goal d/t cognition       Therapy Time   Individual Concurrent Group Co-treatment   Time In       0910   Time Out       0935   Minutes       25   Timed Code Treatment Minutes: 25 Minutes     If pt is discharged prior to next OT session, this note will serve as the discharge summary.     GARY Keller/SANDEEP#[id number]

## 2021-04-16 ENCOUNTER — APPOINTMENT (OUTPATIENT)
Dept: GENERAL RADIOLOGY | Age: 86
DRG: 884 | End: 2021-04-16
Payer: MEDICARE

## 2021-04-16 VITALS
HEART RATE: 95 BPM | SYSTOLIC BLOOD PRESSURE: 130 MMHG | DIASTOLIC BLOOD PRESSURE: 70 MMHG | WEIGHT: 155.42 LBS | OXYGEN SATURATION: 94 % | TEMPERATURE: 97.8 F | RESPIRATION RATE: 16 BRPM | HEIGHT: 60 IN | BODY MASS INDEX: 30.51 KG/M2

## 2021-04-16 LAB
ANION GAP SERPL CALCULATED.3IONS-SCNC: 7 MMOL/L (ref 3–16)
BASOPHILS ABSOLUTE: 0.1 K/UL (ref 0–0.2)
BASOPHILS RELATIVE PERCENT: 1.4 %
BUN BLDV-MCNC: 20 MG/DL (ref 7–20)
CALCIUM SERPL-MCNC: 9.3 MG/DL (ref 8.3–10.6)
CHLORIDE BLD-SCNC: 102 MMOL/L (ref 99–110)
CO2: 30 MMOL/L (ref 21–32)
CREAT SERPL-MCNC: 1.1 MG/DL (ref 0.6–1.2)
EOSINOPHILS ABSOLUTE: 0.2 K/UL (ref 0–0.6)
EOSINOPHILS RELATIVE PERCENT: 2.8 %
GFR AFRICAN AMERICAN: 56
GFR NON-AFRICAN AMERICAN: 47
GLUCOSE BLD-MCNC: 79 MG/DL (ref 70–99)
HCT VFR BLD CALC: 38.3 % (ref 36–48)
HEMOGLOBIN: 12.5 G/DL (ref 12–16)
LYMPHOCYTES ABSOLUTE: 1.1 K/UL (ref 1–5.1)
LYMPHOCYTES RELATIVE PERCENT: 14.3 %
MCH RBC QN AUTO: 27.7 PG (ref 26–34)
MCHC RBC AUTO-ENTMCNC: 32.5 G/DL (ref 31–36)
MCV RBC AUTO: 85.3 FL (ref 80–100)
MONOCYTES ABSOLUTE: 1 K/UL (ref 0–1.3)
MONOCYTES RELATIVE PERCENT: 13.3 %
NEUTROPHILS ABSOLUTE: 5.3 K/UL (ref 1.7–7.7)
NEUTROPHILS RELATIVE PERCENT: 68.2 %
PDW BLD-RTO: 18.3 % (ref 12.4–15.4)
PLATELET # BLD: 336 K/UL (ref 135–450)
PMV BLD AUTO: 9 FL (ref 5–10.5)
POTASSIUM REFLEX MAGNESIUM: 3.7 MMOL/L (ref 3.5–5.1)
PRO-BNP: 2730 PG/ML (ref 0–449)
RBC # BLD: 4.5 M/UL (ref 4–5.2)
SODIUM BLD-SCNC: 139 MMOL/L (ref 136–145)
URIC ACID, SERUM: 7.3 MG/DL (ref 2.6–6)
WBC # BLD: 7.8 K/UL (ref 4–11)

## 2021-04-16 PROCEDURE — 97116 GAIT TRAINING THERAPY: CPT

## 2021-04-16 PROCEDURE — 6360000002 HC RX W HCPCS: Performed by: NURSE PRACTITIONER

## 2021-04-16 PROCEDURE — 94760 N-INVAS EAR/PLS OXIMETRY 1: CPT

## 2021-04-16 PROCEDURE — 97530 THERAPEUTIC ACTIVITIES: CPT

## 2021-04-16 PROCEDURE — 80048 BASIC METABOLIC PNL TOTAL CA: CPT

## 2021-04-16 PROCEDURE — 83880 ASSAY OF NATRIURETIC PEPTIDE: CPT

## 2021-04-16 PROCEDURE — 71045 X-RAY EXAM CHEST 1 VIEW: CPT

## 2021-04-16 PROCEDURE — 6370000000 HC RX 637 (ALT 250 FOR IP): Performed by: INTERNAL MEDICINE

## 2021-04-16 PROCEDURE — 94640 AIRWAY INHALATION TREATMENT: CPT

## 2021-04-16 PROCEDURE — 36415 COLL VENOUS BLD VENIPUNCTURE: CPT

## 2021-04-16 PROCEDURE — 84550 ASSAY OF BLOOD/URIC ACID: CPT

## 2021-04-16 PROCEDURE — 97535 SELF CARE MNGMENT TRAINING: CPT

## 2021-04-16 PROCEDURE — 6370000000 HC RX 637 (ALT 250 FOR IP): Performed by: NURSE PRACTITIONER

## 2021-04-16 PROCEDURE — 85025 COMPLETE CBC W/AUTO DIFF WBC: CPT

## 2021-04-16 RX ADMIN — FUROSEMIDE 20 MG: 10 INJECTION, SOLUTION INTRAMUSCULAR; INTRAVENOUS at 09:08

## 2021-04-16 RX ADMIN — CALCIUM 500 MG: 500 TABLET ORAL at 09:08

## 2021-04-16 RX ADMIN — BUDESONIDE AND FORMOTEROL FUMARATE DIHYDRATE 2 PUFF: 160; 4.5 AEROSOL RESPIRATORY (INHALATION) at 08:55

## 2021-04-16 RX ADMIN — METOPROLOL SUCCINATE 12.5 MG: 25 TABLET, EXTENDED RELEASE ORAL at 09:08

## 2021-04-16 RX ADMIN — RIVAROXABAN 15 MG: 15 TABLET, FILM COATED ORAL at 09:08

## 2021-04-16 RX ADMIN — Medication 1000 UNITS: at 09:08

## 2021-04-16 NOTE — PROGRESS NOTES
Physical Therapy    Facility/Department: [facility] 3 ORTHOPEDICS  Initial Assessment    NAME: Dylan Guadarrama  : 1931  MRN: [id number]    Date of Service: 2021    Assessment / Discharge Recommendations:  -remains with need of 24 hour \"hands on\" assist for all mobility and ADLs  -follows cues poorly except for eating - does feed herself once set up is completed for her  -perhaps her son is better able to have her follow cues (assuming she recognizes him and able to be assisted by him)    Body structures, Functions, Activity limitations: Decreased functional mobility ; Decreased ADL status; Decreased balance;Decreased cognition  Activity Tolerance  Activity Tolerance: Patient limited by cognitive status       Patient Diagnosis(es): The primary encounter diagnosis was Unable to walk. A diagnosis of General weakness was also pertinent to this visit. has a past medical history of Alzheimer's dementia ([facility] [address] Atrial fibrillation ([facility] [address] Benign essential HTN, and Gout.   has no past surgical history on file. Restrictions  Restrictions/Precautions  Restrictions/Precautions: Fall Risk  Hearing: very [place]  Subjective  General  Chart Reviewed:  Yes  Additional Pertinent Hx: here due to inability to ambulate and greater than baseline confusion  Response To Previous Treatment: Not applicable  Family / Caregiver Present: No  Follows Commands: Impaired  Subjective  Subjective: to room along with OT to patient resting in bed - remains confused but appears cooperative- wants to eat - in this context PTOT session initiated -  Pain Screening  Patient Currently in Pain: Other (comment)(AXEL )  Social/Functional History  Social/Functional History  Lives With: Son(son Ro and his partner Aydee Stein)  Type of Home: House  Home Layout: One level  Home Access: Stairs to enter with rails  Entrance Stairs - Number of Steps: 4-5  Bathroom Shower/Tub: Walk-in shower  Bathroom Toilet: Standard  Bathroom Equipment: Grab bars in shower, [employer] chair  Home Equipment: 4 wheeled walker, Cane  ADL Assistance: (anticipate recently son assists)  Homemaking Assistance: (son completes)  Ambulation Assistance: (son assists but not sure what level of assist since last discharge from here)  Additional Comments: assume her son is assisting with her care at home - recently discharged from [facility] and was needing high levels of assist for bed mobility, transfers and had not ambulated with staff here  Objective  Bed mobility  Supine to Sit: Moderate assistance  Sit to Supine: Unable to assess  Transfers  Sit to Stand: Moderate Assistance  Stand to sit: Moderate Assistance  Ambulation  Ambulation?: Yes  Ambulation 1  Surface: level tile  Device: Rolling Walker  Assistance: Moderate assistance;2 Person assistance  Quality of Gait: poor- step to pattern - hips and knees flexed but no jacques LOB  Distance: close bed to recliner of ~3-5 feet  Stairs/Curb  Stairs?: No  Balance  Comments: able to sit midline at the EOB with close cga assist to maintain safety    -dynamic is poor on rolling walker - needs mod assist of 2  Plan   Plan  Times per week: 3-5 while on acute floor  Current Treatment Recommendations: Functional Mobility Training  Safety Devices  Type of devices:  All fall risk precautions in place, Call light within reach, Chair alarm in place, Left in chair, Telesitter in use, Nurse notified(Kelsey)    AM-PAC Score  AM-PAC Inpatient Mobility Raw Score : 11 (04/16/21 0851)  AM-PAC Inpatient T-Scale Score : 33.86 (04/16/21 0851)  Mobility Inpatient CMS 0-100% Score: 72.57 (04/16/21 0851)  Mobility Inpatient CMS G-Code Modifier : CL (04/16/21 9860)    Goals  Short term goals  Time Frame for Short term goals: 2-3 days  Short term goal 1: bed mobility at mod assist  Short term goal 2: transfers at mod assist  Short term goal 3: ambulation at mod assist rolling walker for 8-10 feet  Patient Goals   Patient goals : none formulated-       Therapy Time   Individual Concurrent Group Co-treatment   Time In 0825         Time Out 0855         Minutes [address] [facility],[address] Maximilian Gonzales, PT

## 2021-04-16 NOTE — FLOWSHEET NOTE
04/16/21 0855   Readmission Assessment   Number of Days since last admission? 8-30 days   Previous Disposition Home with Home Health  (and son)   Who is being Wes Conti   What was the patient's/caregiver's perception as to why they think they needed to return back to the hospital? Other (Comment)  (fell unable to ambulate)   Did you visit your Primary Care Physician after you left the hospital, before you returned this time? No   Why weren't you able to visit your PCP? Did not have an appointment   Did you see a specialist, such as Cardiac, Pulmonary, Orthopedic Physician, etc. after you left the hospital? No   Who advised the patient to return to the hospital? [address] [facility] Staff   Does the patient report anything that got in the way of taking their medications? No   In our efforts to provide the best possible care to you and others like you, can you think of anything that we could have done to help you after you left the hospital the first time, so that you might not have needed to return so soon?  Other (Comment)  (fell unable to ambulate)

## 2021-04-16 NOTE — PROGRESS NOTES
Occupational Therapy  Facility/Department: [address] ORTHOPEDICS  Daily Treatment Note  NAME: Rose Liz  : 1931  MRN: [id number]    Date of Service: 2021    Discharge Recommendations:  3-5 sessions per week     Rose Liz scored a 13/24 on the AM-PAC ADL Inpatient form. Current research shows that an AM-PAC score of 17 or less is typically not associated with a discharge to the patient's home setting. Based on the patient's AM-PAC score and their current ADL deficits, it is recommended that the patient have 3-5 sessions per week of Occupational Therapy at d/c to increase the patient's independence. Please see assessment section for further patient specific details. If patient discharges prior to next session this note will serve as a discharge summary. Please see below for the latest assessment towards goals. Assessment: Discussed with OTR am pac score is 13 which indicates need for continued skilled OT to increase [place] and decrease caregiver burden. Patient able to complete bed mobility with Mod A. Mod A of 2 for functional mobility with RW from bed to recliner chair, assist of walker management. Mod A for sit<>stand from EOB to RW to sit to recliner chair. Despite low am pac score plan is for patient to discharge to home with son. Patient will require 24/7 hands on assist for all BADL tasks, functional mobility and transfers. Cont with POC. Patient Diagnosis(es): The primary encounter diagnosis was Unable to walk. A diagnosis of General weakness was also pertinent to this visit. has a past medical history of Alzheimer's dementia ([facility] [address] Atrial fibrillation ([address] Benign essential HTN, and Gout.   has no past surgical history on file. Restrictions  Restrictions/Precautions  Restrictions/Precautions: Fall Risk  Subjective   General  Chart Reviewed: Yes  Patient assessed for rehabilitation services?: Yes  Additional Pertinent Hx: Per Kassie Shukla MD: Pt is \"29 y.o. year-old female with a history of hypertention, severe dementia and chronic atrial fibrillation. She was admitted here from 03/29/2021 through 04/05/2021 3 in which time she was treated for a uti and hypoactive delirium. Her discharge was delayed for several days due todue to family appealing the discharge. Her family has refused placement. Over the past few days she has had increasing weakness and has been nonambulatory since yesterday. In the ER she was found not to have a UTI. The cause of her ataxia is unclear. \"  Response to previous treatment: Patient unable to report, no changes reported from family or staff  Family / Caregiver Present: No  Referring Practitioner: Marilyn Hernandez MD  Diagnosis: Ataxia, UTI  Subjective  Subjective: Patient supine in bed upon arrival to room with PT. Patient agreeable to therapy with motivation for breakfast. Patient does not follow cues without tactile prompting and food motivation      Orientation  Orientation  Overall Orientation Status: Impaired  Orientation Level: Oriented to person;Disoriented to place; Disoriented to time;Disoriented to situation  Objective    ADL  Feeding: Setup(intital assist and then able to feed self after set up of tray)  Toileting: Dependent/Total(chong)        Balance  Sitting Balance: Contact guard assistance(seated on edge of bed)  Standing Balance: Minimal assistance  Standing Balance  Activity: Static standing with RW  Functional Mobility  Functional - Mobility Device: Rolling Walker  Activity: Other  Assist Level: Moderate assistance(Mod A of 2)  Functional Mobility Comments: Functioanl mobility with RW with Mod A of 2 from bed to recliner chair, assist for walker magagement, safety and direction to chair  Bed mobility  Supine to Sit: Moderate assistance  Sit to Supine: Unable to assess  Transfers  Sit to stand: Moderate assistance  Stand to sit: Moderate assistance  Transfer Comments:  Mod A for sit <> stand from EOB to RW and sit to recliner chair     Cognition  Overall Cognitive Status: Exceptions  Cognition Comment: Advanced dementia - not following cues unless motivated with breakfast tray     Assessment   Performance deficits / Impairments: Decreased balance;Decreased functional mobility ; Decreased ADL status; Decreased endurance;Decreased strength;Decreased cognition;Decreased safe awareness  Assessment: Discussed with OTR am pac score is 13 which indicates need for continued skilled OT to increase [place] and decrease caregiver burden. Patient able to complete bed mobility with Mod A. Mod A of 2 for functional mobility with RW from bed to recliner chair, assist of walker management. Mod A for sit<>stand from EOB to RW to sit to recliner chair. Despite low am pac score plan is for patient to discharge to home with son. Patient will require 24/7 hands on assist for all BADL tasks, functional mobility and transfers. Cont with POC. OT Education: Transfer Training;OT Role;Plan of Care;Precautions; ADL Adaptive Strategies  Activity Tolerance  Activity Tolerance: Treatment limited secondary to decreased cognition  Safety Devices  Safety Devices in place: Yes  Type of devices: Nurse notified;Gait belt;Call light within reach; Chair alarm in place; Left in chair        Plan   Plan  Times per week: 3-5  Current Treatment Recommendations: Strengthening, Endurance Training, Balance Training, Functional Mobility Training, Safety Education & Training, Equipment Evaluation, Education, & procurement, Patient/Caregiver Education & Training, Self-Care / ADL    AM-PAC Score        AM-PAC Inpatient Daily Activity Raw Score: 13 (04/16/21 0848)  AM-PAC Inpatient ADL T-Scale Score : 32.03 (04/16/21 0848)  ADL Inpatient CMS 0-100% Score: 63.03 (04/16/21 0848)  ADL Inpatient CMS G-Code Modifier : CL (04/16/21 0848)    Goals  Short term goals  Time Frame for Short term goals: prior to d/c: goals ongoing as of 4/16/21  Short term goal 1: Pt will complete bed mobility with min A  Short term goal 2: Pt will complete transfers with min A  Short term goal 3: Pt will complete functional mobility with AD with min A  Short term goal 4: Pt will tolerate 1+ minute of standing activity to increase strength and activity tolerance for self-care and transfers  Patient Goals   Patient goals : Pt not able to report goal d/t cognition       Therapy Time   Individual Concurrent Group Co-treatment   Time In 0825         Time Out 0855         Minutes 30                 Electronically signed by Contreras Cardona, [id number] on 4/16/2021 at 8:59 AM

## 2021-04-16 NOTE — PLAN OF CARE
Problem: Falls - Risk of:  Goal: Will remain free from falls  Description: Will remain free from falls  4/16/2021 0315 by Craig Marcial RN  Outcome: Met This Shift  Note: Patient educated on fall prevention. Call light is within reach, bed locked in lowest position, personal items within reach, and bed alarm is on. Will round on patient per unit guidelines.'    4/16/2021 0200 by Craig Marcial RN  Outcome: Met This Shift  Note: Pain /discomfort being managed with PRN analgesics per MD orders. Patient able to express presence and absence of pain and rate pain appropriately using numerical scale. Goal: Absence of physical injury  Description: Absence of physical injury  Outcome: Met This Shift  Note: Pt is free of injury. No injury noted. Fall precautions in place. Call light within reach. Will monitor. Problem: Skin Integrity:  Goal: Will show no infection signs and symptoms  Description: Will show no infection signs and symptoms  4/16/2021 0315 by Craig Marcial RN  Outcome: Met This Shift  Note: Pt is free of injury. No injury noted. Fall precautions in place. Call light within reach. Will monitor. 4/16/2021 0200 by Craig Marcial RN  Outcome: Ongoing  Note: Pt assessed for infection, VVS, WBC WNL. Reviewed information with pt and family, pt verbalized understanding     Goal: Absence of new skin breakdown  Description: Absence of new skin breakdown  Outcome: Ongoing  Note: Eddie score assessed. Patient able to ambulate  Repositioned patient Q2H and assessed skin. Educated patient on importance of repositioning to prevent skin issues. Problem: OXYGENATION/RESPIRATORY FUNCTION  Goal: Patient will maintain patent airway  Outcome: Ongoing  Note: Patient remains free of significant airway secretions. Patient able to effectively cough and clear any secretions that need cleared. Will continue to monitor patient throughout shift.      Goal: Patient will achieve/maintain normal respiratory rate/effort  Description: Respiratory rate and effort will be within normal limits for the patient  Outcome: Ongoing  Note: Respiratory rate falls within specified parameters. Will continue to monitor patient throughout shift. Problem: HEMODYNAMIC STATUS  Goal: Patient has stable vital signs and fluid balance  Outcome: Ongoing  Note:   Vitals:    21 0030   BP: 114/67   Pulse: 85   Resp: 16   Temp: 98.5 °F (36.9 °C)   SpO2: 92%          Problem: FLUID AND ELECTROLYTE IMBALANCE  Goal: Fluid and electrolyte balance are achieved/maintained  Outcome: Ongoing  Note: Patient monitored for fluid and electrolyte imbalance throughout the day by use of daily labs. Patient encouraged to drink fluids and eat a well balanced . Abnormal lab results will be reported to physician, per orders. Will continue to monitor. Problem: ACTIVITY INTOLERANCE/IMPAIRED MOBILITY  Goal: Mobility/activity is maintained at optimum level for patient  Outcome: Ongoing  Note: Early and frequent ambulqtion encouraged. Educated patient on importance of early ambulation. Patient assisted with ambulation. Will continue to monitor.

## 2021-04-16 NOTE — PROGRESS NOTES
Data- discharge order received, pt verbalized agreement to discharge, needs for [facility] with [facility] for OT/PT/VN  , CAR reviewed and signed by MD, to be completed by RN. Action- AVS prepared, discharge instructions prepared and given to Cassidy Billy  medication information packet given r/t NEW or CHANGED prescriptions, pt verbalized understanding further self-review. D/C instruction summary: Diet- low sodium , Activity- up with assist , follow up with Primary Care Physician Stefany Sheppard, APRN - [phone] appointment advised to make an appointment , immunizations reviewed and up to date , medications not changed  Inpatient treatment reviewed . Contact information provided to above agencies used. Response- Case Management/ reported faxing completed CAR and AVS to needed HHC/DME services stated above. Pt belongings gathered, IV removed, pt dressed with assistance . Disposition is home with HHC/DME as stated above, transported with son , taken to lobby via w/c with son, no complications.

## 2021-04-16 NOTE — DISCHARGE SUMMARY
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 Disposition:  [x] Home  [] Home with home health [] Rehab [] Psych [] SNF  [] LTAC  [] Long term nursing home or group home [] Transfer to ICU  [] Transfer to PCU [] Other:          PCP/SNF to follow up: PCP within 1 wk     D/C condition: stable    Code status: DNR-CCA    Activity: with assistance    Diet: general diet, low sodium 2g/daily; 2000mL fluid restriction /day    D/C Meds: continue your lasix every other day. Continue all other meds as previously prescribed. Hospital Course: Pt is an 80y.o. year-old female with a history of hypertention, severe dementia and chronic atrial fibrillation. She was admitted here from 03/29/2021 through 04/05/2021 3 in which time she was treated for a uti and hypoactive delirium. Her discharge was delayed for several days due  to family appealing the discharge. Her family has refused placement.  Over the past few days she has had increasing weakness and has been nonambulatory since yesterday. In the ER she was found not to have a UTI. The cause of her ataxia is unclear.  She is being admitted for further evaluation and treatment    Spoke to son, Noble Anderson, on the phone who stated she had a fall and her home nurse suggested they take her to the ER to be examined to make sure she had nothing broken. She wasn't getting out of bed at home. She was fine Monday and the next day after the fall she was able to get back up but when Sunday came around, she started not wanting to get out of bed.       States her mom was scheduled the 2nd dose on Saturday [facility]. Would like to have her get that while she is here if we can.she got her first one at [address] [facility] are unable to get this for her while she is here. Encouraged keep her vaccination appt for tomorrow as scheduled.       Palliative consult for plan of care. Will get chest CT to further evaluate for ? Rib fracture on XR  With no acute findings of bony fractures.      Home lasix is every other day. Not sure if she was getting this. Will increase to daily and was given one time dose IV in ER for  small pleural effusion     BNP 4,028  Continued to diurese here while she was here for this and effusion ultimately resolved as seen on repeat CXR on day of discharge.      No signs of infection.      She has [facility] at home. Family wants to take her home on discharge.  Palliative care consult and hospice at home on discharge as well.    Doing well today. PT/OT got her out of chair and back to bed. Very [place]. No complaints. No edema. Denies joint pain anywhere when I ask her.  Son requested we do a uric acid test because she has had gout before in the past. This was checked and elevated however, she has no complaints of pain anywhere and I do not feel she needs medication for this at this time.     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 ? On cxr nondisplaced rib fracture- no acute fx visualized on CT   - CT chest  - small effusion, improving.   - takes lasix at home qod.   - received one dose po 20mg lasix 4/14  - given 1x dose IV in ER  - IV lasix 20mg daily - adjust as needed  - monitor BMP  - last echo 11/2020 EF 50%, mod-to-severe MR, left atrium severely dilated, moderate AR, severely dilated RV, RV mildly reduced. Severe TR, RA severely dilated    Exam:     BP (!) 123/55   Pulse 78   Temp 98.4 °F (36.9 °C) (Oral)   Resp 16   Ht 5' (1.524 m)   Wt 155 lb 6.8 oz (70.5 kg)   SpO2 92%   BMI 30.35 kg/m²   General appearance: VERY [place], No apparent distress, appears stated age and does not cooperate with exam due to dementia  HEENT: Pupils equal, round, and reactive to light. Conjunctivae/corneas clear. Neck: Supple, with full range of motion. No jugular venous distention. Trachea midline. Respiratory:  Normal respiratory effort. Clear to auscultation, bilaterally without Rales/Wheezes/Rhonchi. Cardiovascular: Regular rate and rhythm with normal S1/S2 without murmurs, rubs or gallops. Abdomen: Soft, non-tender, non-distended with normal bowel sounds. Musculoskelatal: No clubbing, cyanosis or edema bilaterally. Full range of motion without deformity. Skin: Skin color, texture, turgor normal.  No rashes or lesions. Neurologic:  Neurovascularly intact without any focal sensory/motor deficits.  Cranial nerves: II-XII intact, grossly non-focal.  Psychiatric: Alert and confused at baseline      Consults:     IP CONSULT TO CASE MANAGEMENT  IP CONSULT TO PALLIATIVE CARE  IP CONSULT TO SOCIAL WORK  IP CONSULT TO HOME CARE Renal:    Lab Results   Component Value Date     04/16/2021    K 3.7 04/16/2021     04/16/2021    CO2 30 04/16/2021    BUN 20 04/16/2021    CREATININE 1.1 04/16/2021    CALCIUM 9.3 04/16/2021    PHOS 2.4 12/07/2020       Discharge Medications:     Current Discharge Medication List           Details   furosemide (LASIX) 20 MG tablet Take 1 tablet by mouth every other day  Qty: 30 tablet, Refills: 3      metoprolol succinate (TOPROL XL) 25 MG extended release tablet Take 0.5 tablets by mouth daily  Qty: 30 tablet, Refills: 3      Cranberry 250 MG CHEW Take 2 tablets by mouth 2 times daily (with meals)  Qty: 120 tablet, Refills: 0      rivaroxaban (XARELTO) 15 MG TABS tablet Take 1 tablet by mouth daily (with breakfast)  Qty: 90 tablet, Refills: 1      vitamin D 25 MCG (1000 UT) CAPS Take 1,000 Units by mouth daily      albuterol sulfate HFA (VENTOLIN HFA) 108 (90 Base) MCG/ACT inhaler Inhale 2 puffs into the lungs every 6 hours as needed for Wheezing      mometasone-formoterol (DULERA) 200-5 MCG/ACT inhaler Inhale 2 puffs into the lungs 2 times daily as needed (shortness of breath)       Multiple Vitamins-Minerals (MULTIVITAMIN WITH MINERALS) tablet Take 1 tablet by mouth daily      calcium carbonate (OSCAL) 500 MG TABS tablet Take 500 mg by mouth daily             Time Spent on discharge is more than 45 minutes in the examination, evaluation, counseling and review of medications and discharge plan. Signed:    TASHI Kramer CNP   4/16/2021      Thank you TASHI Gilliam CNP for the opportunity to be involved in this patient's care. If you have any questions or concerns please feel free to contact me at [phone].

## 2021-04-16 NOTE — PROGRESS NOTES
Pt alert to self only not able to be reoriented per this shift. Pt has good PO intake. VSS. Munira/skin given with incontinents. Herron in place with yellow clear drainage output. Pt son Madelyn Morgan updated per this shift. Son's pt requesting uric acid labs be completed d/t pt history of gout. NP Avery Gerhard notified. See eMAR  with NO. No further needs observed per this shift. Fall precautions in place. Bed alarm on. Call light within reach. Will continue to round.  Electronically signed by Ansley Dupree RN on 4/16/2021 at 7:28 AM

## 2021-04-16 NOTE — CARE COORDINATION
[facility]  Received referral from case management   Faxed orders to [address] care to see patient by   4/18/2021  Arik Schmitt LPN    [facility]. Cell [phone], Fax [phone]

## 2021-04-16 NOTE — CARE COORDINATION
Tay spoke with patient's son, Sayra Cohn ([phone]), regarding patient's dc today and plan. Son plans to take patient home and resume home care. [facility] [place]. Son plans to come to hospital after 1pm. Rn aware. [facility] liaison aware of dc today.     Electronically signed by SOPHY Glasgow, ALMAS, Case Management on 4/16/2021 at 9:01 AM  [facility] [phone]

## 2021-06-04 NOTE — CARE COORDINATION
Discharge Planning:  SW contacted Concetta Olszewski at [facility] to inform her that this pt is being d/cd today and the [facility] [place]. order and CAR have been completed.     DISCHARGE SUMMARY     DATE OF DISCHARGE: 2/16/21    DISCHARGE DESTINATION: Home with sons    HOME CARE    Discharging to Facility/ Agency   · Name:  [facility]    · Address: [address] [place] [address]., [address] [place] [address]., [place] [address]  · Phone: [phone]  · Fax: [phone]    TRANSPORTATION: [facility] Name:  [address] [place] [address] up Time: 1400    Phone Number: [phone]    COMMENTS: Pts son, Ritika Duenas is aware of the transport time and will clear the walkways and steps for the transport team.  LE Noble  [phone]  Electronically signed by Kirsten Bustillo on 2/16/2021 at 10:01 AM [Routine Follow-Up] : routine follow-up visit for

## 2021-07-07 ENCOUNTER — HOSPITAL ENCOUNTER (EMERGENCY)
Age: 86
Discharge: HOME | End: 2021-07-07

## 2021-07-07 ENCOUNTER — APPOINTMENT (OUTPATIENT)
Dept: GENERAL RADIOLOGY | Age: 86
End: 2021-07-07

## 2021-07-07 VITALS
OXYGEN SATURATION: 96 % | SYSTOLIC BLOOD PRESSURE: 168 MMHG | BODY MASS INDEX: 36.47 KG/M2 | TEMPERATURE: 98 F | RESPIRATION RATE: 18 BRPM | WEIGHT: 186.73 LBS | HEART RATE: 74 BPM | DIASTOLIC BLOOD PRESSURE: 79 MMHG

## 2021-07-07 DIAGNOSIS — I50.9 ACUTE ON CHRONIC CONGESTIVE HEART FAILURE, UNSPECIFIED HEART FAILURE TYPE (HCC): Primary | ICD-10-CM

## 2021-07-07 LAB
ANION GAP SERPL CALCULATED.3IONS-SCNC: 7 MMOL/L (ref 3–16)
BACTERIA: ABNORMAL /HPF
BASOPHILS ABSOLUTE: 0.1 K/UL (ref 0–0.2)
BASOPHILS RELATIVE PERCENT: 1.3 %
BILIRUBIN URINE: NEGATIVE
BLOOD, URINE: NEGATIVE
BUN BLDV-MCNC: 20 MG/DL (ref 7–20)
CALCIUM SERPL-MCNC: 10 MG/DL (ref 8.3–10.6)
CHLORIDE BLD-SCNC: 106 MMOL/L (ref 99–110)
CLARITY: ABNORMAL
CO2: 25 MMOL/L (ref 21–32)
COLOR: YELLOW
CREAT SERPL-MCNC: 1.1 MG/DL (ref 0.6–1.2)
EOSINOPHILS ABSOLUTE: 0.1 K/UL (ref 0–0.6)
EOSINOPHILS RELATIVE PERCENT: 1.7 %
EPITHELIAL CELLS, UA: 2 /HPF (ref 0–5)
GFR AFRICAN AMERICAN: 56
GFR NON-AFRICAN AMERICAN: 47
GLUCOSE BLD-MCNC: 86 MG/DL (ref 70–99)
GLUCOSE URINE: NEGATIVE MG/DL
HCT VFR BLD CALC: 36.6 % (ref 36–48)
HEMOGLOBIN: 11.8 G/DL (ref 12–16)
HYALINE CASTS: 0 /LPF (ref 0–8)
KETONES, URINE: NEGATIVE MG/DL
LEUKOCYTE ESTERASE, URINE: ABNORMAL
LYMPHOCYTES ABSOLUTE: 1 K/UL (ref 1–5.1)
LYMPHOCYTES RELATIVE PERCENT: 16.4 %
MCH RBC QN AUTO: 28 PG (ref 26–34)
MCHC RBC AUTO-ENTMCNC: 32.3 G/DL (ref 31–36)
MCV RBC AUTO: 86.7 FL (ref 80–100)
MICROSCOPIC EXAMINATION: YES
MONOCYTES ABSOLUTE: 1 K/UL (ref 0–1.3)
MONOCYTES RELATIVE PERCENT: 16.6 %
NEUTROPHILS ABSOLUTE: 3.7 K/UL (ref 1.7–7.7)
NEUTROPHILS RELATIVE PERCENT: 64 %
NITRITE, URINE: NEGATIVE
PDW BLD-RTO: 17.3 % (ref 12.4–15.4)
PH UA: 7.5 (ref 5–8)
PLATELET # BLD: 201 K/UL (ref 135–450)
PMV BLD AUTO: 9.3 FL (ref 5–10.5)
POTASSIUM REFLEX MAGNESIUM: 4.4 MMOL/L (ref 3.5–5.1)
PRO-BNP: 3379 PG/ML (ref 0–449)
PROTEIN UA: NEGATIVE MG/DL
RBC # BLD: 4.22 M/UL (ref 4–5.2)
RBC UA: 1 /HPF (ref 0–4)
SODIUM BLD-SCNC: 138 MMOL/L (ref 136–145)
SPECIFIC GRAVITY UA: 1.01 (ref 1–1.03)
TROPONIN: <0.01 NG/ML
URINE REFLEX TO CULTURE: ABNORMAL
URINE TYPE: ABNORMAL
UROBILINOGEN, URINE: 1 E.U./DL
WBC # BLD: 5.9 K/UL (ref 4–11)
WBC UA: 5 /HPF (ref 0–5)

## 2021-07-07 PROCEDURE — 6360000002 HC RX W HCPCS: Performed by: PHYSICIAN ASSISTANT

## 2021-07-07 PROCEDURE — 85025 COMPLETE CBC W/AUTO DIFF WBC: CPT

## 2021-07-07 PROCEDURE — 96374 THER/PROPH/DIAG INJ IV PUSH: CPT

## 2021-07-07 PROCEDURE — 83880 ASSAY OF NATRIURETIC PEPTIDE: CPT

## 2021-07-07 PROCEDURE — 84484 ASSAY OF TROPONIN QUANT: CPT

## 2021-07-07 PROCEDURE — 80048 BASIC METABOLIC PNL TOTAL CA: CPT

## 2021-07-07 PROCEDURE — 71045 X-RAY EXAM CHEST 1 VIEW: CPT

## 2021-07-07 PROCEDURE — 93005 ELECTROCARDIOGRAM TRACING: CPT | Performed by: EMERGENCY MEDICINE

## 2021-07-07 PROCEDURE — 81001 URINALYSIS AUTO W/SCOPE: CPT

## 2021-07-07 PROCEDURE — 99282 EMERGENCY DEPT VISIT SF MDM: CPT

## 2021-07-07 RX ORDER — FUROSEMIDE 10 MG/ML
20 INJECTION INTRAMUSCULAR; INTRAVENOUS ONCE
Status: COMPLETED | OUTPATIENT
Start: 2021-07-07 | End: 2021-07-07

## 2021-07-07 RX ADMIN — FUROSEMIDE 20 MG: 10 INJECTION, SOLUTION INTRAMUSCULAR; INTRAVENOUS at 14:10

## 2021-07-07 NOTE — ED PROVIDER NOTES
every 6 hours as needed for Wheezing    CALCIUM CARBONATE (OSCAL) 500 MG TABS TABLET    Take 500 mg by mouth daily    CRANBERRY 250 MG CHEW    Take 2 tablets by mouth 2 times daily (with meals)    FUROSEMIDE (LASIX) 20 MG TABLET    Take 1 tablet by mouth every other day    METOPROLOL SUCCINATE (TOPROL XL) 25 MG EXTENDED RELEASE TABLET    Take 0.5 tablets by mouth daily    MOMETASONE-FORMOTEROL (DULERA) 200-5 MCG/ACT INHALER    Inhale 2 puffs into the lungs 2 times daily as needed (shortness of breath)     MULTIPLE VITAMINS-MINERALS (MULTIVITAMIN WITH MINERALS) TABLET    Take 1 tablet by mouth daily    RIVAROXABAN (XARELTO) 15 MG TABS TABLET    Take 1 tablet by mouth daily (with breakfast)    VITAMIN D 25 MCG (1000 UT) CAPS    Take 1,000 Units by mouth daily       ALLERGIES     Chlorpromazine and Hydrochlorothiazide    FAMILY HISTORY     No family history on file. No family status information on file. SOCIAL HISTORY    reports that she has never smoked. She has never used smokeless tobacco. She reports that she does not drink alcohol and does not use drugs. PHYSICAL EXAM    (up to 7 for level 4, 8 or more for level 5)     ED Triage Vitals   BP Temp Temp Source Pulse Resp SpO2 Height Weight   07/07/21 1244 07/07/21 1239 07/07/21 1239 07/07/21 1239 07/07/21 1239 07/07/21 1239 -- 07/07/21 1239   136/70 98.2 °F (36.8 °C) Oral 78 17 93 %  189 lb 2.5 oz (85.8 kg)        Physical Exam  Vitals and nursing note reviewed. Constitutional:       General: She is not in acute distress. Appearance: She is well-developed. HENT:      Head: Normocephalic and atraumatic. Cardiovascular:      Rate and Rhythm: Normal rate and regular rhythm. Heart sounds: Normal heart sounds. Pulmonary:      Effort: Pulmonary effort is normal. No respiratory distress. Breath sounds: Rales present. Abdominal:      Palpations: Abdomen is soft. Tenderness: There is no abdominal tenderness.    Musculoskeletal: General: Normal range of motion. Cervical back: Neck supple. Right lower leg: Edema (1+) present. Left lower leg: Edema (1+) present. Skin:     General: Skin is warm and dry. Neurological:      Mental Status: She is alert. Mental status is at baseline. She is disoriented. Comments: Baseline dementia   Psychiatric:      Comments: Angry affect, baseline dementia per caretaker          Wt Readings from Last 3 Encounters:   21 186 lb 11.7 oz (84.7 kg)   21 155 lb 6.8 oz (70.5 kg)   21 208 lb 5.4 oz (94.5 kg)         DIAGNOSTIC RESULTS     When ordered, EKGs are interpreted by the Emergency Department Physician in the absence of a cardiologist. Please see their note for interpretation of EKG    EK-lead EKG interpreted as atrial fibrillation with a rate of 61 bpm. No ST elevation or depression on my review. Please see Dr. Trung Barlow note for full interpretation. When compared with EKG dated 3/31/21, no significant change found. RADIOLOGY:         Interpretation per the Radiologist below, if available at the time of this note:    XR CHEST PORTABLE   Final Result   Pulmonary vascular congestion and stable cardiomegaly. Probable small right   pleural effusion.              LABS:  Labs Reviewed   CBC WITH AUTO DIFFERENTIAL - Abnormal; Notable for the following components:       Result Value    Hemoglobin 11.8 (*)     RDW 17.3 (*)     All other components within normal limits    Narrative:     Performed at:  [facility]  [address] [facility] [address]   Phone [phone]   BASIC METABOLIC PANEL W/ REFLEX TO MG FOR LOW K - Abnormal; Notable for the following components:    GFR Non- 47 (*)     GFR  56 (*)     All other components within normal limits    Narrative:     Performed at:  [facility]  [address] [facility] [address]   Phone [phone]   BRAIN NATRIURETIC PEPTIDE - Abnormal; Notable for the following components:    Pro-BNP 3,379 (*)     All other components within normal limits    Narrative:     Performed at:  [place] [address] [address] [facility] [address]   Phone [phone]   URINE RT REFLEX TO CULTURE - Abnormal; Notable for the following components:    Clarity, UA CLOUDY (*)     Leukocyte Esterase, Urine TRACE (*)     All other components within normal limits    Narrative:     Performed at:  [address] [address]   Phone [phone]   MICROSCOPIC URINALYSIS - Abnormal; Notable for the following components:    Bacteria, UA 2+ (*)     All other components within normal limits    Narrative:     Performed at:  [address] [address]   Phone [phone]   TROPONIN    Narrative:     Performed at:  [address] [address]   Phone [phone]       When ordered, only abnormal lab results are displayed. All other labs are within normal range or not returned as of the dictation. EMERGENCY DEPARTMENT COURSE and DIFFERENTIAL DIAGNOSIS/MDM:   Vitals:    Vitals:    07/07/21 1239 07/07/21 1244 07/07/21 1420   BP:  136/70    Pulse: 78     Resp: 17     Temp: 98.2 °F (36.8 °C)     TempSrc: Oral     SpO2: 93%     Weight: 189 lb 2.5 oz (85.8 kg)  186 lb 11.7 oz (84.7 kg)        I have evaluated this patient. My supervising physician was available for consultation. I did discuss the case with Dr. Marcello Cardona and she is in agreement with plan. She did not see the patient. The patient is in no acute distress. She does have bibasilar rales. She is not hypoxic.   I did compare her last weights and she is up significantly from when she was discharged in April, however it is unclear how accurate these weights were as she was 208 pounds on 4/5, 155 on 4/16 and is 186 today. This is significant fluctuance. Her urinalysis is clear at this time. White blood cell count normal.  H&H stable. Electrolytes normal.  Renal function stable with BUN of 20, creatinine of 1.1. Her BNP is elevated at 3379, this is improved when compared to last admission. Troponin is less than 0.01. EKG is unchanged. Chest x-ray showed pulmonary vascular congestion and stable cardiomegaly with probable small right pleural effusion. I discussed at length the patient's condition with Kvng Wyman, caretaker who lives with the patient. He states that the patient is getting around well at home and he would prefer her to go home. I think this is reasonable given that she is not requiring oxygen. I gave her 20 mg of IV Lasix here. I reached out to Amy Ho NP with the wellness center to help facilitate close outpatient follow-up in the next 1 to 2 days for possible repeat IV Lasix dose. The patient's family was comfortable with this. I do want the nurse to ambulate this patient prior to discharge to ensure she does not become hypoxic and require oxygen. As long as she is able to ambulate and does not have hypoxia or other sign of respiratory distress, I think she can be safely discharged. We will call the family to update and have them pick her up. PROCEDURES:  None    FINAL IMPRESSION      1.  Acute on chronic congestive heart failure, unspecified heart failure type [facility])          DISPOSITION/PLAN   DISPOSITION    Decision to discharge    PATIENT REFERRED TO:  [address], MELITON Catalan   [phone]  Go in 1 day  for repeat IV Lasix      MEDICATIONS:  New Prescriptions    No medications on file       (Please note that portions of this note were completed with a voice recognition program.  Efforts were made toedit the dictations but occasionally words are mis-transcribed.)    [facility] E Yue Marroquin PA-C       [facility] MAYA nichole  07/07/21 5637

## 2021-07-07 NOTE — ED PROVIDER NOTES
I did not perform an evaluation of the pt but was asked to review her EKG. EKG by my preliminary interpretation shows atrial fibrillation with a rate of 61, normal axis, normal intervals, with no ST changes indicative of ischemia at this time. Nonspecific changes which are chronic.           Ron Gilbert MD  07/07/21 0796

## 2021-07-07 NOTE — ED TRIAGE NOTES
Pt to ED with increased shortness of breath per care giver. Pt is alert to person only. Pt loves at home.  Pt is poor historian

## 2021-07-08 ENCOUNTER — TELEPHONE (OUTPATIENT)
Dept: PHARMACY | Age: 86
End: 2021-07-08

## 2021-07-08 LAB
EKG ATRIAL RATE: 68 BPM
EKG DIAGNOSIS: NORMAL
EKG Q-T INTERVAL: 424 MS
EKG QRS DURATION: 92 MS
EKG QTC CALCULATION (BAZETT): 426 MS
EKG R AXIS: 17 DEGREES
EKG T AXIS: 5 DEGREES
EKG VENTRICULAR RATE: 61 BPM

## 2021-07-08 PROCEDURE — 93010 ELECTROCARDIOGRAM REPORT: CPT | Performed by: INTERNAL MEDICINE

## 2021-07-08 NOTE — TELEPHONE ENCOUNTER
Lorie Mckeon called, Beatriz Robert son-in-law. He is on her contact list.    He reports that she was suppose to come in to the infusion center today for a repeat IV Lasix, but she will not be able to make it due to the rain. I see that she was in the ED yesterday and received a dose of IV Lasix. There is a general referral to the [facility], but no specific instructions that I could see. The ED note states that they reached out to Georgi Trevino [address] [place] [address] in our center to follow up in the next 1-2 days for possible repeat IV Lasix. When asked how she is doing, he reports her swelling is not bad, but I can tell she is still retaining fluid. I will have our nurse practitioner return their call.      Laz North, PharmD  [facility]  Heart Failure Service  [phone]

## 2021-07-08 NOTE — TELEPHONE ENCOUNTER
Outbound call to patient's son Andrés Qiu and her caregiver Nel Garíca both on speaker phone. Follow-up from ER visit yesterday. She was given IV Lasix dose yesterday in the ED and I am following up with her today. She was instructed to come to the wellness center today for follow-up however son states that it is raining and he does not want to take her out in the rain. It is uncertain if her scale is broken or out of batteries. Son states he will make sure they have a working scale today. We discussed the importance of daily weights and monitoring fluid status. Also discussed the importance of sodium monitoring and restriction to 2000 mg a day and fluids at no more than 64 ounces. She has advanced dementia and son tells me that she eats and drinks well. Has had good urine output over the last 24 hours. I instructed him to continue with the 20 mg Lasix dose daily through the weekend and I will follow-up by phone on Monday morning. If anything changes and her condition worsens I advised him to seek emergency room evaluation. He verbalizes understanding of this. Also given the number to the outpatient wellness center for follow-up and guidance if needed.

## 2021-07-13 ENCOUNTER — TELEPHONE (OUTPATIENT)
Dept: PHARMACY | Age: 86
End: 2021-07-13

## 2021-07-13 NOTE — TELEPHONE ENCOUNTER
Outbound call to speak with Keshia Biswas patient's son. He tells me that his mother is doing great since taking diuretic every day. Tells me she has no shortness of breath and no swelling. Is still taking decent liquid and solids by mouth. She has end-stage dementia and son is going to schedule follow-up with PCP. I encouraged him to continue the once daily 20 mg Lasix dose until he has follow-up. At last check on 7/7/2021 her kidney function was okay. I also gave him my phone number to contact me here at the outpatient wellness center with any questions or concerns that he has about his mother.

## 2021-11-04 ENCOUNTER — HOSPITAL ENCOUNTER (EMERGENCY)
Age: 86
Discharge: HOME | End: 2021-11-04

## 2021-11-04 ENCOUNTER — APPOINTMENT (OUTPATIENT)
Dept: GENERAL RADIOLOGY | Age: 86
End: 2021-11-04

## 2021-11-04 VITALS
OXYGEN SATURATION: 100 % | RESPIRATION RATE: 19 BRPM | SYSTOLIC BLOOD PRESSURE: 140 MMHG | HEART RATE: 89 BPM | TEMPERATURE: 98.4 F | DIASTOLIC BLOOD PRESSURE: 89 MMHG

## 2021-11-04 DIAGNOSIS — I50.9 CHRONIC CONGESTIVE HEART FAILURE, UNSPECIFIED HEART FAILURE TYPE (HCC): ICD-10-CM

## 2021-11-04 DIAGNOSIS — N39.0 URINARY TRACT INFECTION WITHOUT HEMATURIA, SITE UNSPECIFIED: Primary | ICD-10-CM

## 2021-11-04 LAB
A/G RATIO: 1.3 (ref 1.1–2.2)
ALBUMIN SERPL-MCNC: 3.9 G/DL (ref 3.4–5)
ALP BLD-CCNC: 192 U/L (ref 40–129)
ALT SERPL-CCNC: 18 U/L (ref 10–40)
ANION GAP SERPL CALCULATED.3IONS-SCNC: 12 MMOL/L (ref 3–16)
AST SERPL-CCNC: 25 U/L (ref 15–37)
BACTERIA: ABNORMAL /HPF
BASOPHILS ABSOLUTE: 0 K/UL (ref 0–0.2)
BASOPHILS RELATIVE PERCENT: 0.5 %
BILIRUB SERPL-MCNC: 1 MG/DL (ref 0–1)
BILIRUBIN URINE: NEGATIVE
BLOOD, URINE: NEGATIVE
BUN BLDV-MCNC: 27 MG/DL (ref 7–20)
CALCIUM SERPL-MCNC: 10.3 MG/DL (ref 8.3–10.6)
CHLORIDE BLD-SCNC: 109 MMOL/L (ref 99–110)
CLARITY: ABNORMAL
CO2: 23 MMOL/L (ref 21–32)
COLOR: YELLOW
COMMENT UA: ABNORMAL
CREAT SERPL-MCNC: 1 MG/DL (ref 0.6–1.2)
EKG ATRIAL RATE: 138 BPM
EKG DIAGNOSIS: NORMAL
EKG Q-T INTERVAL: 422 MS
EKG QRS DURATION: 96 MS
EKG QTC CALCULATION (BAZETT): 458 MS
EKG R AXIS: 31 DEGREES
EKG T AXIS: 7 DEGREES
EKG VENTRICULAR RATE: 71 BPM
EOSINOPHILS ABSOLUTE: 0.2 K/UL (ref 0–0.6)
EOSINOPHILS RELATIVE PERCENT: 3.6 %
EPITHELIAL CELLS, UA: 11 /HPF (ref 0–5)
GFR AFRICAN AMERICAN: >60
GFR NON-AFRICAN AMERICAN: 52
GLUCOSE BLD-MCNC: 90 MG/DL (ref 70–99)
GLUCOSE URINE: NEGATIVE MG/DL
HCT VFR BLD CALC: 40.5 % (ref 36–48)
HEMOGLOBIN: 12.7 G/DL (ref 12–16)
KETONES, URINE: NEGATIVE MG/DL
LEUKOCYTE ESTERASE, URINE: ABNORMAL
LYMPHOCYTES ABSOLUTE: 0.6 K/UL (ref 1–5.1)
LYMPHOCYTES RELATIVE PERCENT: 13.9 %
MCH RBC QN AUTO: 28 PG (ref 26–34)
MCHC RBC AUTO-ENTMCNC: 31.4 G/DL (ref 31–36)
MCV RBC AUTO: 89 FL (ref 80–100)
MICROSCOPIC EXAMINATION: YES
MONOCYTES ABSOLUTE: 0.6 K/UL (ref 0–1.3)
MONOCYTES RELATIVE PERCENT: 13.1 %
NEUTROPHILS ABSOLUTE: 3.2 K/UL (ref 1.7–7.7)
NEUTROPHILS RELATIVE PERCENT: 68.9 %
NITRITE, URINE: POSITIVE
PDW BLD-RTO: 15.4 % (ref 12.4–15.4)
PH UA: 7 (ref 5–8)
PLATELET # BLD: 146 K/UL (ref 135–450)
PMV BLD AUTO: 9.4 FL (ref 5–10.5)
POTASSIUM REFLEX MAGNESIUM: 4.4 MMOL/L (ref 3.5–5.1)
PRO-BNP: 1828 PG/ML (ref 0–449)
PROTEIN UA: 30 MG/DL
RBC # BLD: 4.55 M/UL (ref 4–5.2)
RBC UA: 5 /HPF (ref 0–4)
SODIUM BLD-SCNC: 144 MMOL/L (ref 136–145)
SPECIFIC GRAVITY UA: 1.02 (ref 1–1.03)
TOTAL PROTEIN: 6.8 G/DL (ref 6.4–8.2)
TROPONIN: <0.01 NG/ML
URINE REFLEX TO CULTURE: YES
URINE TYPE: ABNORMAL
UROBILINOGEN, URINE: 1 E.U./DL
WBC # BLD: 4.6 K/UL (ref 4–11)
WBC UA: 154 /HPF (ref 0–5)

## 2021-11-04 PROCEDURE — 87186 SC STD MICRODIL/AGAR DIL: CPT

## 2021-11-04 PROCEDURE — 71045 X-RAY EXAM CHEST 1 VIEW: CPT

## 2021-11-04 PROCEDURE — 87086 URINE CULTURE/COLONY COUNT: CPT

## 2021-11-04 PROCEDURE — 51701 INSERT BLADDER CATHETER: CPT

## 2021-11-04 PROCEDURE — 81001 URINALYSIS AUTO W/SCOPE: CPT

## 2021-11-04 PROCEDURE — 87088 URINE BACTERIA CULTURE: CPT

## 2021-11-04 PROCEDURE — 80053 COMPREHEN METABOLIC PANEL: CPT

## 2021-11-04 PROCEDURE — 96365 THER/PROPH/DIAG IV INF INIT: CPT

## 2021-11-04 PROCEDURE — 93010 ELECTROCARDIOGRAM REPORT: CPT | Performed by: INTERNAL MEDICINE

## 2021-11-04 PROCEDURE — 2580000003 HC RX 258: Performed by: GENERAL ACUTE CARE HOSPITAL

## 2021-11-04 PROCEDURE — 83880 ASSAY OF NATRIURETIC PEPTIDE: CPT

## 2021-11-04 PROCEDURE — 93005 ELECTROCARDIOGRAM TRACING: CPT | Performed by: PHYSICIAN ASSISTANT

## 2021-11-04 PROCEDURE — 6360000002 HC RX W HCPCS: Performed by: GENERAL ACUTE CARE HOSPITAL

## 2021-11-04 PROCEDURE — 96375 TX/PRO/DX INJ NEW DRUG ADDON: CPT

## 2021-11-04 PROCEDURE — 99284 EMERGENCY DEPT VISIT MOD MDM: CPT

## 2021-11-04 PROCEDURE — 84484 ASSAY OF TROPONIN QUANT: CPT

## 2021-11-04 PROCEDURE — 85025 COMPLETE CBC W/AUTO DIFF WBC: CPT

## 2021-11-04 RX ORDER — FUROSEMIDE 10 MG/ML
40 INJECTION INTRAMUSCULAR; INTRAVENOUS ONCE
Status: COMPLETED | OUTPATIENT
Start: 2021-11-04 | End: 2021-11-04

## 2021-11-04 RX ADMIN — CEFTRIAXONE 1000 MG: 1 INJECTION, POWDER, FOR SOLUTION INTRAMUSCULAR; INTRAVENOUS at 12:51

## 2021-11-04 RX ADMIN — FUROSEMIDE 40 MG: 10 INJECTION, SOLUTION INTRAMUSCULAR; INTRAVENOUS at 12:52

## 2021-11-04 ASSESSMENT — ENCOUNTER SYMPTOMS
COUGH: 0
VOMITING: 0
EYE DISCHARGE: 0
SHORTNESS OF BREATH: 1
VOICE CHANGE: 0
DIARRHEA: 0
WHEEZING: 0

## 2021-11-04 ASSESSMENT — PAIN SCALES - GENERAL: PAINLEVEL_OUTOF10: 0

## 2021-11-04 NOTE — ED TRIAGE NOTES
Patient son report more confusion and hx of dementia, increased weakness today hx of same sx with UTI

## 2021-11-04 NOTE — ED PROVIDER NOTES
[address] [facility]        Pt Name: Theresa Valdes  MRN: [id number]  [place] 5/9/1931  Date of evaluation: 11/4/2021  Provider: TASHI Bose CNP  PCP: TASHI Eldridge CNP  Note Started: 12:45 PM EDT       ESMER. I have evaluated this patient. My supervising physician was available for consultation. CHIEF COMPLAINT       Chief Complaint   Patient presents with    Urinary Tract Infection     family concerned with incresed confusion and thinks she is having another UTI    Shortness of Breath     has CHF and family concerned she has fluid overload        HISTORY OF PRESENT ILLNESS   (Location, Timing/Onset, Context/Setting, Quality, Duration, Modifying Factors, Severity, Associated Signs and Symptoms)  Note limiting factors. Chief Complaint: Increased confusion, concern for UTI    Theresa Valdes is a 80 y.o. female who presents to the emergency department today accompanied by her son Marilu Marti who is her caregiver. Marilu Marti provides most of history. Marilu Marti states that patient does have advanced Alzheimer's dementia with delirium, CHF, and history of frequent urinary tract infections. He states that over the past 2 to 3 days he has noticed increased confusion. He states that he is also noticed a mild increase in shortness of breath and believes that patient may have fluid overload. He denies noticing any increased swelling. There has been no falls or trauma. Marilu Marti states that he contacted patient's PCP who called in a prescription for Macrobid. Marilu Marti states that he has picked up the prescription but has not administered any of these tablets to the patient as of yet. There is been no recent travel or known sick contacts. There have been no URI symptoms. There has been no vomiting or diarrhea. There is been no fever, chills, or other symptoms.     Nursing Notes were all reviewed and agreed with or any disagreements were addressed in the HPI. REVIEW OF SYSTEMS    (2-9 systems for level 4, 10 or more for level 5)     Review of Systems   Constitutional: Negative for activity change, appetite change, chills, fatigue, fever and unexpected weight change. HENT: Negative for congestion and voice change. Eyes: Negative for discharge. Respiratory: Positive for shortness of breath. Negative for cough and wheezing. Cardiovascular: Negative for leg swelling. Gastrointestinal: Negative for diarrhea and vomiting. Endocrine: Negative for polydipsia and polyuria. Genitourinary: Positive for frequency. Negative for difficulty urinating and dysuria. Musculoskeletal: Negative for gait problem and joint swelling. Skin: Negative for rash and wound. Neurological: Negative for tremors, facial asymmetry, speech difficulty and weakness. Hematological: Does not bruise/bleed easily. Psychiatric/Behavioral: Positive for behavioral problems and confusion. Positives and Pertinent negatives as per HPI. Except as noted above in the ROS, all other systems were reviewed and negative. PAST MEDICAL HISTORY     Past Medical History:   Diagnosis Date    [facility]'s dementia ([facility] [address] [facility]     Atrial fibrillation ([facility] [facility]     Benign essential HTN     Gout          SURGICAL HISTORY   No past surgical history on file.       CURRENTMEDICATIONS       Previous Medications    ALBUTEROL SULFATE HFA (VENTOLIN HFA) 108 (90 BASE) MCG/ACT INHALER    Inhale 2 puffs into the lungs every 6 hours as needed for Wheezing    CALCIUM CARBONATE (OSCAL) 500 MG TABS TABLET    Take 500 mg by mouth daily    CRANBERRY 250 MG CHEW    Take 2 tablets by mouth 2 times daily (with meals)    FUROSEMIDE (LASIX) 20 MG TABLET    Take 1 tablet by mouth every other day    METOPROLOL SUCCINATE (TOPROL XL) 25 MG EXTENDED RELEASE TABLET    Take 0.5 tablets by mouth daily    MOMETASONE-FORMOTEROL (DULERA) 200-5 MCG/ACT INHALER    Inhale 2 puffs into the lungs 2 times daily as needed (shortness of breath)     MULTIPLE VITAMINS-MINERALS (MULTIVITAMIN WITH MINERALS) TABLET    Take 1 tablet by mouth daily    RIVAROXABAN (XARELTO) 15 MG TABS TABLET    Take 1 tablet by mouth daily (with breakfast)    VITAMIN D 25 MCG (1000 UT) CAPS    Take 1,000 Units by mouth daily         ALLERGIES     Chlorpromazine and Hydrochlorothiazide    FAMILYHISTORY     No family history on file. SOCIAL HISTORY       Social History     Tobacco Use    Smoking status: Never Smoker    Smokeless tobacco: Never Used   Vaping Use    Vaping Use: Never used   Substance Use Topics    Alcohol use: Never    Drug use: Never       SCREENINGS    [place] Coma Scale  Eye Opening: Spontaneous  Best Verbal Response: Oriented  Best Motor Response: Obeys commands  Ever Coma Scale Score: 15        PHYSICAL EXAM    (up to 7 for level 4, 8 or more for level 5)     ED Triage Vitals [11/04/21 1040]   BP Temp Temp Source Pulse Resp SpO2 Height Weight   (!) 147/74 98.4 °F (36.9 °C) Temporal 77 18 97 % -- --       Physical Exam  Vitals and nursing note reviewed. Constitutional:       General: She is not in acute distress. Appearance: Normal appearance. She is not ill-appearing, toxic-appearing or diaphoretic. HENT:      Head: Normocephalic and atraumatic. Right Ear: External ear normal.      Left Ear: External ear normal.      Nose: Nose normal.      Mouth/Throat:      Mouth: Mucous membranes are moist.   Eyes:      General:         Right eye: No discharge. Left eye: No discharge. Extraocular Movements: Extraocular movements intact. Cardiovascular:      Rate and Rhythm: Normal rate. Rhythm irregular. Pulses: Normal pulses. Heart sounds: Normal heart sounds. Pulmonary:      Effort: Pulmonary effort is normal. No tachypnea, accessory muscle usage or respiratory distress. Breath sounds: Decreased breath sounds present. No wheezing, rhonchi or rales. Chest:      Chest wall: No tenderness. Musculoskeletal:      Cervical back: Normal range of motion and neck supple. Skin:     General: Skin is warm and dry. Capillary Refill: Capillary refill takes less than 2 seconds. Neurological:      Mental Status: She is alert. GCS: GCS eye subscore is 4. GCS verbal subscore is 4. GCS motor subscore is 6. Cranial Nerves: Cranial nerves are intact. Sensory: Sensation is intact. Motor: Motor function is intact. Coordination: Coordination is intact. Gait: Gait is intact. Comments: Alert and oriented x 2, follow all commands   Psychiatric:         Mood and Affect: Mood is anxious. Speech: Speech is tangential.         Behavior: Behavior normal. Behavior is cooperative.          DIAGNOSTIC RESULTS   LABS:    Labs Reviewed   URINE RT REFLEX TO CULTURE - Abnormal; Notable for the following components:       Result Value    Clarity, UA CLOUDY (*)     Protein, UA 30 (*)     Nitrite, Urine POSITIVE (*)     Leukocyte Esterase, Urine LARGE (*)     All other components within normal limits    Narrative:     Performed at:  [facility]  [address] [place] [facility] [place] [address]   Phone [phone]   CBC WITH AUTO DIFFERENTIAL - Abnormal; Notable for the following components:    Lymphocytes Absolute 0.6 (*)     All other components within normal limits    Narrative:     Performed at:  [facility]  [address] [facility] [place] [address]   Phone [phone]   COMPREHENSIVE METABOLIC PANEL W/ REFLEX TO MG FOR LOW K - Abnormal; Notable for the following components:    BUN 27 (*)     GFR Non-African American 52 (*)     Alkaline Phosphatase 192 (*)     All other components within normal limits    Narrative:     Performed at:  [facility]  [address] [facility] [place] [address]   Phone [phone]   BRAIN NATRIURETIC PEPTIDE - Abnormal; Notable for the following components: Pro-BNP 1,828 (*)     All other components within normal limits    Narrative:     Performed at:  [facility]  [address] [place], [place] [facility] [address]   Phone [phone]   MICROSCOPIC URINALYSIS - Abnormal; Notable for the following components:    Bacteria, UA 4+ (*)     WBC,  (*)     RBC, UA 5 (*)     Epithelial Cells, UA 11 (*)     All other components within normal limits    Narrative:     Performed at:  [facility]  [address] [facility] [place] [facility] [address]   Phone [phone]   CULTURE, URINE   TROPONIN    Narrative:     Performed at:  [facility]  [address] [facility] [address] [facility] [address]   Phone [phone]       When ordered only abnormal lab results are displayed. All other labs were within normal range or not returned as of this dictation. EKG: When ordered, EKG's are interpreted by the Emergency Department Physician in the absence of a cardiologist.  Please see their note for interpretation of EKG. RADIOLOGY:   Non-plain film images such as CT, Ultrasound and MRI are read by the radiologist. Plain radiographic images are visualized and preliminarily interpreted by the ED Provider with the below findings:        Interpretation per the Radiologist below, if available at the time of this note:    XR CHEST PORTABLE   Final Result   Moderate cardiomegaly with vascular congestion likely from mild congestive   failure. Question trace right pleural effusion blunting the right costophrenic angle. XR CHEST PORTABLE    Result Date: 11/4/2021  EXAMINATION: ONE XRAY VIEW OF THE CHEST 11/4/2021 10:55 am COMPARISON: Prior study(s) most recent 07/07/2021. HISTORY: ORDERING SYSTEM PROVIDED HISTORY: SOB TECHNOLOGIST PROVIDED HISTORY: Reason for exam:->SOB Reason for Exam: sob FINDINGS: The heart is moderately enlarged but unchanged. Vessels are congested.   No significant or focal airspace disease is seen. There is however blunting of the costophrenic angle on the right with suspected small pleural effusion. No pneumothorax. Moderate cardiomegaly with vascular congestion likely from mild congestive failure. Question trace right pleural effusion blunting the right costophrenic angle. PROCEDURES   Unless otherwise noted below, none     Procedures    CRITICAL CARE TIME   N/A    CONSULTS:  None      EMERGENCY DEPARTMENT COURSE and DIFFERENTIAL DIAGNOSIS/MDM:   Vitals:    Vitals:    11/04/21 1040 11/04/21 1115 11/04/21 1200 11/04/21 1230   BP: (!) 147/74 (!) 156/77 (!) 143/71 (!) 168/98   Pulse: 77  71 85   Resp: 18  15 18   Temp: 98.4 °F (36.9 °C)      TempSrc: Temporal      SpO2: 97% 99% 98% 99%       Patient was given the following medications:  Medications   cefTRIAXone (ROCEPHIN) 1000 mg IVPB in 50 mL D5W minibag (1,000 mg IntraVENous New Bag 11/4/21 1251)   furosemide (LASIX) injection 40 mg (40 mg IntraVENous Given 11/4/21 1252)     ED Course as of Nov 04 1305   Thu Nov 04, 2021   1234 Spoke with patient's son Cari Grewal who is a caregiver. Patient son states that patient developed symptoms 2 to 3 days ago consistent with previous UTIs. He states that he has noticed some increased confusion. Patient has been able to ambulate and is otherwise been stable. He states that he wanted to bring her in so that this may be caught \"early\". He states that he did contact patient's PCP who has called in a prescription for Macrobid. Cari Grewal has pick that prescription up. Cari Grewal states that he does feel comfortable taking his mother home. He is provided with verbal discharge instructions including portance of antibiotic compliance. He agrees to have the patient reevaluated by the PCP early next week. He agrees return patient to nearest ED for high fever, incessant vomiting, severe pain, worsening behaviors, any other worsening symptoms.     [TV]      ED Course User Index  [TV] Jackelyn CHAPARRO Salma Mulligan CNP      Previous records reviewed in order to gain further information guarding patient's PMH as well as her HPI. This is a 80-year-old female with history of advanced Alzheimer's dementia who presents to the emergency department today from home for evaluation of increased confusion and shortness of breath which was observed by patient's son, Ray Goodwin, who is her caregiver. Patient has history of recurrent urinary tract infections. Physical exam complete. Patient is nontoxic, afebrile, mildly hypertensive. No signs or symptoms of acute distress noted. She is alert and oriented x2. She follows all commands. She denies pain. Abdomen is soft and nontender. Urinalysis consistent with UTI. BNP mildly elevated at 1800. Remaining laboratory studies have been unremarkable or consistent with previous. Patient is medicated with IV Rocephin in addition to IV Lasix. I did speak with patient's son, Ray Goodwin who states that he does have a prescription for Macrobid that patient's PCP called in. He states that he does feel comfortable taking his mother home today. At this time there is no evidence of any life-threatening or emergent conditions requiring immediate intervention. Patient will be discharged with emphasis on close outpatient follow-up. Ray Goodwin agrees to give antibiotics as prescribed until gone. He agrees to have patient reevaluated by the PCP on Monday. He agrees return patient to nearest ED for high fever, incessant vomiting, severe pain, change in behavior, any other worsening symptoms. Patient is discharged home in stable condition. FINAL IMPRESSION      1. Urinary tract infection without hematuria, site unspecified    2.  Chronic congestive heart failure, unspecified heart failure type [facility])          DISPOSITION/PLAN   DISPOSITION        PATIENT REFERRED TO:  TASHI Sifuentes CNP  [address] [facility] [address]  [phone]    In 1 week        DISCHARGE MEDICATIONS:  New Prescriptions    No medications on file       DISCONTINUED MEDICATIONS:  Discontinued Medications    No medications on file              (Please note that portions of this note were completed with a voice recognition program.  Efforts were made to edit the dictations but occasionally words are mis-transcribed.)    TASHI Rosario CNP (electronically signed)           TASHI Rosario CNP  11/04/21 6020

## 2021-11-04 NOTE — ED PROVIDER NOTES
Triage note: I evaluated this patient to initiate their ED workup in an expeditious manner. Please see notes from other ED providers regarding comprehensive evaluation including full history, physical exam, interpretation of results, and medical decision making/disposition. HISTORY OF PRESENT ILLNESS  Vivien Dominguez is a 80 y.o. female presents with her son today who is her POA, with concerns for possible UTI/CHF exacerbation. Notes that she has been sleeping a lot, swelling a bit, and is worried about UTI because of increased confusion. PHYSICAL EXAM  ED Triage Vitals [11/04/21 1040]   BP Temp Temp Source Pulse Resp SpO2 Height Weight   (!) 147/74 98.4 °F (36.9 °C) Temporal 77 18 97 % -- --       On exam, with WDWN female, who tells me she is starving. Exams and imaging ordered.         Angela Cortez  11/04/21 1044

## 2021-11-06 LAB
ORGANISM: ABNORMAL
URINE CULTURE, ROUTINE: ABNORMAL

## 2021-11-14 ENCOUNTER — APPOINTMENT (OUTPATIENT)
Dept: GENERAL RADIOLOGY | Age: 86
DRG: 689 | End: 2021-11-14
Payer: MEDICARE

## 2021-11-14 ENCOUNTER — APPOINTMENT (OUTPATIENT)
Dept: CT IMAGING | Age: 86
DRG: 689 | End: 2021-11-14
Payer: MEDICARE

## 2021-11-14 ENCOUNTER — HOSPITAL ENCOUNTER (INPATIENT)
Age: 86
LOS: 9 days | Discharge: HOME HEALTH | DRG: 689 | End: 2021-11-23
Attending: EMERGENCY MEDICINE | Admitting: INTERNAL MEDICINE
Payer: MEDICARE

## 2021-11-14 DIAGNOSIS — I50.43 ACUTE ON CHRONIC COMBINED SYSTOLIC AND DIASTOLIC CONGESTIVE HEART FAILURE (HCC): ICD-10-CM

## 2021-11-14 DIAGNOSIS — R41.0 DISORIENTATION: Primary | ICD-10-CM

## 2021-11-14 DIAGNOSIS — N30.00 ACUTE CYSTITIS WITHOUT HEMATURIA: ICD-10-CM

## 2021-11-14 PROBLEM — R41.82 AMS (ALTERED MENTAL STATUS): Status: ACTIVE | Noted: 2021-11-14

## 2021-11-14 PROBLEM — N39.0 UTI (URINARY TRACT INFECTION): Status: ACTIVE | Noted: 2021-11-14

## 2021-11-14 LAB
A/G RATIO: 1.3 (ref 1.1–2.2)
ALBUMIN SERPL-MCNC: 3.8 G/DL (ref 3.4–5)
ALP BLD-CCNC: 190 U/L (ref 40–129)
ALT SERPL-CCNC: 24 U/L (ref 10–40)
ANION GAP SERPL CALCULATED.3IONS-SCNC: 12 MMOL/L (ref 3–16)
AST SERPL-CCNC: 34 U/L (ref 15–37)
BACTERIA: ABNORMAL /HPF
BASE EXCESS VENOUS: -0.8 MMOL/L
BASOPHILS ABSOLUTE: 0 K/UL (ref 0–0.2)
BASOPHILS RELATIVE PERCENT: 0.5 %
BILIRUB SERPL-MCNC: 0.8 MG/DL (ref 0–1)
BILIRUBIN URINE: NEGATIVE
BLOOD, URINE: ABNORMAL
BUN BLDV-MCNC: 23 MG/DL (ref 7–20)
CALCIUM SERPL-MCNC: 10.4 MG/DL (ref 8.3–10.6)
CARBOXYHEMOGLOBIN: 1.3 %
CHLORIDE BLD-SCNC: 106 MMOL/L (ref 99–110)
CLARITY: ABNORMAL
CO2: 21 MMOL/L (ref 21–32)
COLOR: ABNORMAL
COMMENT UA: ABNORMAL
CREAT SERPL-MCNC: 0.8 MG/DL (ref 0.6–1.2)
EOSINOPHILS ABSOLUTE: 0.1 K/UL (ref 0–0.6)
EOSINOPHILS RELATIVE PERCENT: 1.7 %
EPITHELIAL CELLS, UA: 16 /HPF (ref 0–5)
GFR AFRICAN AMERICAN: >60
GFR NON-AFRICAN AMERICAN: >60
GLUCOSE BLD-MCNC: 146 MG/DL (ref 70–99)
GLUCOSE URINE: NEGATIVE MG/DL
HCO3 VENOUS: 26 MMOL/L (ref 23–29)
HCT VFR BLD CALC: 40.3 % (ref 36–48)
HEMOGLOBIN: 12.7 G/DL (ref 12–16)
HYALINE CASTS: 9 /LPF (ref 0–8)
KETONES, URINE: NEGATIVE MG/DL
LACTIC ACID: 1.8 MMOL/L (ref 0.4–2)
LEUKOCYTE ESTERASE, URINE: ABNORMAL
LYMPHOCYTES ABSOLUTE: 0.8 K/UL (ref 1–5.1)
LYMPHOCYTES RELATIVE PERCENT: 13.1 %
MCH RBC QN AUTO: 28.1 PG (ref 26–34)
MCHC RBC AUTO-ENTMCNC: 31.5 G/DL (ref 31–36)
MCV RBC AUTO: 89.4 FL (ref 80–100)
METHEMOGLOBIN VENOUS: 0.3 %
MICROSCOPIC EXAMINATION: YES
MONOCYTES ABSOLUTE: 0.7 K/UL (ref 0–1.3)
MONOCYTES RELATIVE PERCENT: 11.3 %
MUCUS: ABNORMAL /LPF
NEUTROPHILS ABSOLUTE: 4.4 K/UL (ref 1.7–7.7)
NEUTROPHILS RELATIVE PERCENT: 73.4 %
NITRITE, URINE: NEGATIVE
O2 SAT, VEN: 83 %
O2 THERAPY: ABNORMAL
PCO2, VEN: 48 MMHG (ref 40–50)
PDW BLD-RTO: 15.5 % (ref 12.4–15.4)
PH UA: 6 (ref 5–8)
PH VENOUS: 7.33 (ref 7.35–7.45)
PLATELET # BLD: 158 K/UL (ref 135–450)
PMV BLD AUTO: 10.2 FL (ref 5–10.5)
PO2, VEN: 51 MMHG
POTASSIUM REFLEX MAGNESIUM: 4.1 MMOL/L (ref 3.5–5.1)
PRO-BNP: 1392 PG/ML (ref 0–449)
PROTEIN UA: NEGATIVE MG/DL
RBC # BLD: 4.5 M/UL (ref 4–5.2)
RBC UA: 0 /HPF (ref 0–4)
SODIUM BLD-SCNC: 139 MMOL/L (ref 136–145)
SPECIFIC GRAVITY UA: 1.01 (ref 1–1.03)
TCO2 CALC VENOUS: 27 MMOL/L
TOTAL PROTEIN: 6.8 G/DL (ref 6.4–8.2)
TROPONIN: <0.01 NG/ML
URINE REFLEX TO CULTURE: YES
URINE TYPE: ABNORMAL
UROBILINOGEN, URINE: 1 E.U./DL
WBC # BLD: 6 K/UL (ref 4–11)
WBC UA: 44 /HPF (ref 0–5)
YEAST: PRESENT /HPF

## 2021-11-14 PROCEDURE — 2580000003 HC RX 258: Performed by: INTERNAL MEDICINE

## 2021-11-14 PROCEDURE — 87086 URINE CULTURE/COLONY COUNT: CPT

## 2021-11-14 PROCEDURE — 87186 SC STD MICRODIL/AGAR DIL: CPT

## 2021-11-14 PROCEDURE — 6360000002 HC RX W HCPCS: Performed by: EMERGENCY MEDICINE

## 2021-11-14 PROCEDURE — 82803 BLOOD GASES ANY COMBINATION: CPT

## 2021-11-14 PROCEDURE — 70450 CT HEAD/BRAIN W/O DYE: CPT

## 2021-11-14 PROCEDURE — 71045 X-RAY EXAM CHEST 1 VIEW: CPT

## 2021-11-14 PROCEDURE — 87106 FUNGI IDENTIFICATION YEAST: CPT

## 2021-11-14 PROCEDURE — 81001 URINALYSIS AUTO W/SCOPE: CPT

## 2021-11-14 PROCEDURE — 1200000000 HC SEMI PRIVATE

## 2021-11-14 PROCEDURE — 87040 BLOOD CULTURE FOR BACTERIA: CPT

## 2021-11-14 PROCEDURE — 87077 CULTURE AEROBIC IDENTIFY: CPT

## 2021-11-14 PROCEDURE — 80053 COMPREHEN METABOLIC PANEL: CPT

## 2021-11-14 PROCEDURE — 96365 THER/PROPH/DIAG IV INF INIT: CPT

## 2021-11-14 PROCEDURE — 99284 EMERGENCY DEPT VISIT MOD MDM: CPT

## 2021-11-14 PROCEDURE — 83605 ASSAY OF LACTIC ACID: CPT

## 2021-11-14 PROCEDURE — 96375 TX/PRO/DX INJ NEW DRUG ADDON: CPT

## 2021-11-14 PROCEDURE — 2580000003 HC RX 258: Performed by: EMERGENCY MEDICINE

## 2021-11-14 PROCEDURE — 83880 ASSAY OF NATRIURETIC PEPTIDE: CPT

## 2021-11-14 PROCEDURE — 85025 COMPLETE CBC W/AUTO DIFF WBC: CPT

## 2021-11-14 PROCEDURE — 36415 COLL VENOUS BLD VENIPUNCTURE: CPT

## 2021-11-14 PROCEDURE — 84484 ASSAY OF TROPONIN QUANT: CPT

## 2021-11-14 RX ORDER — FUROSEMIDE 10 MG/ML
20 INJECTION INTRAMUSCULAR; INTRAVENOUS ONCE
Status: COMPLETED | OUTPATIENT
Start: 2021-11-14 | End: 2021-11-14

## 2021-11-14 RX ORDER — ALBUTEROL SULFATE 90 UG/1
2 AEROSOL, METERED RESPIRATORY (INHALATION) EVERY 6 HOURS PRN
Status: DISCONTINUED | OUTPATIENT
Start: 2021-11-14 | End: 2021-11-14

## 2021-11-14 RX ORDER — SODIUM CHLORIDE 0.9 % (FLUSH) 0.9 %
10 SYRINGE (ML) INJECTION PRN
Status: DISCONTINUED | OUTPATIENT
Start: 2021-11-14 | End: 2021-11-23 | Stop reason: HOSPADM

## 2021-11-14 RX ORDER — CALCIUM CARBONATE 500(1250)
500 TABLET ORAL DAILY
Status: DISCONTINUED | OUTPATIENT
Start: 2021-11-14 | End: 2021-11-23 | Stop reason: HOSPADM

## 2021-11-14 RX ORDER — ONDANSETRON 2 MG/ML
4 INJECTION INTRAMUSCULAR; INTRAVENOUS EVERY 6 HOURS PRN
Status: DISCONTINUED | OUTPATIENT
Start: 2021-11-14 | End: 2021-11-23 | Stop reason: HOSPADM

## 2021-11-14 RX ORDER — ALBUTEROL SULFATE 2.5 MG/3ML
2.5 SOLUTION RESPIRATORY (INHALATION) EVERY 6 HOURS PRN
Status: DISCONTINUED | OUTPATIENT
Start: 2021-11-14 | End: 2021-11-23 | Stop reason: HOSPADM

## 2021-11-14 RX ORDER — ONDANSETRON 4 MG/1
4 TABLET, ORALLY DISINTEGRATING ORAL EVERY 8 HOURS PRN
Status: DISCONTINUED | OUTPATIENT
Start: 2021-11-14 | End: 2021-11-23 | Stop reason: HOSPADM

## 2021-11-14 RX ORDER — BUDESONIDE AND FORMOTEROL FUMARATE DIHYDRATE 160; 4.5 UG/1; UG/1
2 AEROSOL RESPIRATORY (INHALATION) 2 TIMES DAILY
Status: DISCONTINUED | OUTPATIENT
Start: 2021-11-14 | End: 2021-11-23 | Stop reason: HOSPADM

## 2021-11-14 RX ORDER — POTASSIUM CHLORIDE 7.45 MG/ML
10 INJECTION INTRAVENOUS PRN
Status: DISCONTINUED | OUTPATIENT
Start: 2021-11-14 | End: 2021-11-23 | Stop reason: HOSPADM

## 2021-11-14 RX ORDER — ACETAMINOPHEN 325 MG/1
650 TABLET ORAL EVERY 6 HOURS PRN
Status: DISCONTINUED | OUTPATIENT
Start: 2021-11-14 | End: 2021-11-23 | Stop reason: HOSPADM

## 2021-11-14 RX ORDER — FUROSEMIDE 10 MG/ML
20 INJECTION INTRAMUSCULAR; INTRAVENOUS 2 TIMES DAILY
Status: DISCONTINUED | OUTPATIENT
Start: 2021-11-15 | End: 2021-11-22

## 2021-11-14 RX ORDER — M-VIT,TX,IRON,MINS/CALC/FOLIC 27MG-0.4MG
1 TABLET ORAL DAILY
Status: DISCONTINUED | OUTPATIENT
Start: 2021-11-14 | End: 2021-11-23 | Stop reason: HOSPADM

## 2021-11-14 RX ORDER — LANOLIN ALCOHOL/MO/W.PET/CERES
6 CREAM (GRAM) TOPICAL NIGHTLY PRN
Status: DISCONTINUED | OUTPATIENT
Start: 2021-11-14 | End: 2021-11-14

## 2021-11-14 RX ORDER — LANOLIN ALCOHOL/MO/W.PET/CERES
6 CREAM (GRAM) TOPICAL NIGHTLY
Status: DISCONTINUED | OUTPATIENT
Start: 2021-11-14 | End: 2021-11-23 | Stop reason: HOSPADM

## 2021-11-14 RX ORDER — SODIUM CHLORIDE 9 MG/ML
25 INJECTION, SOLUTION INTRAVENOUS PRN
Status: DISCONTINUED | OUTPATIENT
Start: 2021-11-14 | End: 2021-11-23 | Stop reason: HOSPADM

## 2021-11-14 RX ORDER — ACETAMINOPHEN 650 MG/1
650 SUPPOSITORY RECTAL EVERY 6 HOURS PRN
Status: DISCONTINUED | OUTPATIENT
Start: 2021-11-14 | End: 2021-11-23 | Stop reason: HOSPADM

## 2021-11-14 RX ORDER — MAGNESIUM SULFATE IN WATER 40 MG/ML
2000 INJECTION, SOLUTION INTRAVENOUS PRN
Status: DISCONTINUED | OUTPATIENT
Start: 2021-11-14 | End: 2021-11-23 | Stop reason: HOSPADM

## 2021-11-14 RX ORDER — VITAMIN B COMPLEX
1000 TABLET ORAL DAILY
Status: DISCONTINUED | OUTPATIENT
Start: 2021-11-15 | End: 2021-11-23 | Stop reason: HOSPADM

## 2021-11-14 RX ORDER — SODIUM CHLORIDE 0.9 % (FLUSH) 0.9 %
10 SYRINGE (ML) INJECTION EVERY 12 HOURS SCHEDULED
Status: DISCONTINUED | OUTPATIENT
Start: 2021-11-14 | End: 2021-11-23 | Stop reason: HOSPADM

## 2021-11-14 RX ORDER — METOPROLOL SUCCINATE 25 MG/1
12.5 TABLET, EXTENDED RELEASE ORAL DAILY
Status: DISCONTINUED | OUTPATIENT
Start: 2021-11-15 | End: 2021-11-22

## 2021-11-14 RX ADMIN — FUROSEMIDE 20 MG: 10 INJECTION, SOLUTION INTRAMUSCULAR; INTRAVENOUS at 18:54

## 2021-11-14 RX ADMIN — CEFTRIAXONE 1000 MG: 1 INJECTION, POWDER, FOR SOLUTION INTRAMUSCULAR; INTRAVENOUS at 18:54

## 2021-11-14 RX ADMIN — SODIUM CHLORIDE, PRESERVATIVE FREE 10 ML: 5 INJECTION INTRAVENOUS at 22:41

## 2021-11-14 ASSESSMENT — ENCOUNTER SYMPTOMS: DIFFICULTY BREATHING: 0

## 2021-11-14 ASSESSMENT — PAIN SCALES - GENERAL: PAINLEVEL_OUTOF10: 2

## 2021-11-14 ASSESSMENT — PAIN SCALES - PAIN ASSESSMENT IN ADVANCED DEMENTIA (PAINAD)
NEGVOCALIZATION: 0
BREATHING: 0
TOTALSCORE: 2
CONSOLABILITY: 1
FACIALEXPRESSION: 0
BODYLANGUAGE: 1

## 2021-11-14 NOTE — ED PROVIDER NOTES
[address] [facility]      Pt Name: Obie Koehler  MRN: [id number]  [place] 5/9/1931  Date ofevaluation: 11/14/2021  Provider: Chi Adame MD    CHIEF COMPLAINT       Chief Complaint   Patient presents with    Altered Mental Status         HISTORY OF PRESENT ILLNESS   (Location/Symptom, Timing/Onset,Context/Setting, Quality, Duration, Modifying Factors, Severity)  Note limiting factors. Obie Koehler is a 80 y.o. female  who  has a past medical history of Alzheimer's dementia ([address] Atrial fibrillation ([address] Benign essential HTN, Gout, and Multiple drug resistant organism (MDRO) culture positive. 24-year-old female who presents for altered mental status for 1 day. Patient is unable to provide any history at this time. All the history was obtained from the son over the phone. Patient has been becoming more confused for the last 24 hours. Gradual onset. Constant and worsening. Similar to previous episodes when she has urinary tract infections. Patient had a urinary tract infection 10 days ago that was treated with 1 dose of IV antibiotics in the ER but was then discharged home on Macrobid and she completed her course of antibiotics. Son does not report any other symptoms at this time but that this is her typical presentation when she gets UTIs. She has history of Alzheimer's dementia, CHF, A. fib, hypertension, history of multidrug-resistant organisms. The history is provided by the patient and a relative. The history is limited by the condition of the patient. Altered Mental Status  Presenting symptoms: confusion    Severity:  Moderate  Most recent episode: Today  Episode history:  Continuous  Duration:  24 hours  Timing:  Constant  Progression:  Worsening  Chronicity:  Recurrent  Context: recent illness    Associated symptoms: no difficulty breathing        NursingNotes were reviewed.     REVIEW OF SYSTEMS    (2-9 systems for level 4, 10 or more for level 5)     Review of Systems   Unable to perform ROS: Mental status change   Psychiatric/Behavioral: Positive for confusion. Except as noted above the remainder of the review of systems was reviewed and negative. PAST MEDICAL HISTORY     Past Medical History:   Diagnosis Date    Alzheimer's dementia [facility])     Atrial fibrillation ([facility]     Benign essential HTN     Gout     Multiple drug resistant organism (MDRO) culture positive 11/04/2021    Escherichia coli. Urine         SURGICALHISTORY     History reviewed. No pertinent surgical history. CURRENT MEDICATIONS       Previous Medications    ALBUTEROL SULFATE HFA (VENTOLIN HFA) 108 (90 BASE) MCG/ACT INHALER    Inhale 2 puffs into the lungs every 6 hours as needed for Wheezing    CALCIUM CARBONATE (OSCAL) 500 MG TABS TABLET    Take 500 mg by mouth daily    CRANBERRY 250 MG CHEW    Take 2 tablets by mouth 2 times daily (with meals)    FUROSEMIDE (LASIX) 20 MG TABLET    Take 1 tablet by mouth every other day    METOPROLOL SUCCINATE (TOPROL XL) 25 MG EXTENDED RELEASE TABLET    Take 0.5 tablets by mouth daily    MOMETASONE-FORMOTEROL (DULERA) 200-5 MCG/ACT INHALER    Inhale 2 puffs into the lungs 2 times daily as needed (shortness of breath)     MULTIPLE VITAMINS-MINERALS (MULTIVITAMIN WITH MINERALS) TABLET    Take 1 tablet by mouth daily    RIVAROXABAN (XARELTO) 15 MG TABS TABLET    Take 1 tablet by mouth daily (with breakfast)    VITAMIN D 25 MCG (1000 UT) CAPS    Take 1,000 Units by mouth daily            Chlorpromazine and Hydrochlorothiazide    FAMILY HISTORY     History reviewed. No pertinent family history.        SOCIAL HISTORY       Social History     Socioeconomic History    Marital status: Single     Spouse name: None    Number of children: None    Years of education: None    Highest education level: None   Occupational History    None   Tobacco Use    Smoking status: Never Smoker    Smokeless tobacco: Never Used   Vaping Use    Vaping Use: Never used   Substance and Sexual Activity    Alcohol use: Never    Drug use: Never    Sexual activity: Not Currently   Other Topics Concern    None   Social History Narrative    None     Social Determinants of Health     Financial Resource Strain:     Difficulty of Paying Living Expenses: Not on file   Food Insecurity:     Worried About Running Out of Food in the Last Year: Not on file    Caitlin of Food in the Last Year: Not on file   Transportation Needs:     Lack of Transportation (Medical): Not on file    Lack of Transportation (Non-Medical): Not on file   Physical Activity:     Days of Exercise per Week: Not on file    Minutes of Exercise per Session: Not on file   Stress:     Feeling of Stress : Not on file   Social Connections:     Frequency of Communication with Friends and Family: Not on file    Frequency of Social Gatherings with Friends and Family: Not on file    Attends Worship Services: Not on file    Active Member of [address] [place] [address] or Organizations: Not on file    Attends Club or Organization Meetings: Not on file    Marital Status: Not on file   Intimate Partner Violence:     Fear of Current or Ex-Partner: Not on file    Emotionally Abused: Not on file    Physically Abused: Not on file    Sexually Abused: Not on file   Housing Stability:     Unable to Pay for Housing in the Last Year: Not on file    Number of [place] in the Last Year: Not on file    Unstable Housing in the Last Year: Not on file       SCREENINGS             PHYSICAL EXAM    (up to 7 for level 4, 8 or more for level 5)     ED Triage Vitals [11/14/21 1707]   BP Temp Temp src Pulse Resp SpO2 Height Weight   (!) 163/83 -- -- 69 18 100 % 5' 3\" (1.6 m) 211 lb 10.3 oz (96 kg)       Physical Exam  Vitals and nursing note reviewed. Constitutional:       General: She is not in acute distress. Appearance: She is not toxic-appearing or diaphoretic.    HENT:      Head: Normocephalic [facility] [address]   Phone [phone]   MICROSCOPIC URINALYSIS - Abnormal; Notable for the following components:    Mucus, UA Rare (*)     Bacteria, UA 1+ (*)     Yeast, UA Present (*)     Hyaline Casts, UA 9 (*)     WBC, UA 44 (*)     Epithelial Cells, UA 16 (*)     All other components within normal limits    Narrative:     Performed at:  [address] [address]   Phone [phone]   CULTURE, BLOOD 1   CULTURE, BLOOD 2   CULTURE, URINE   TROPONIN    Narrative:     Performed at:  [address] [address]   Phone [phone]   LACTIC ACID, PLASMA    Narrative:     Performed at:  [address]   Phone [phone]       All other labs were within normal range or not returned as of this dictation. EMERGENCY DEPARTMENT COURSE and DIFFERENTIAL DIAGNOSIS/MDM:   Vitals:    Vitals:    11/14/21 1707   BP: (!) 163/83   Pulse: 69   Resp: 18   SpO2: 100%   Weight: 211 lb 10.3 oz (96 kg)   Height: 5' 3\" (1.6 m)       Patient was given thefollowing medications:  Medications   furosemide (LASIX) injection 20 mg (has no administration in time range)   cefTRIAXone (ROCEPHIN) 1000 mg IVPB in 50 mL D5W minibag (has no administration in time range)       ED COURSE & MEDICAL DECISION MAKING    Pertinent Labs & Imaging studies reviewed. (See chart for details)   -  Patient seen and evaluated in the emergency department. -  Triage and nursing notes reviewed and incorporated. -  Old chart records reviewed and incorporated.   -  Differential diagnosis includes: Differential Diagnosis:    Hypoxemia/ischemic encephalopathy, hepatic encephalopathy   Seizure or postictal state   Alterations of glucose such as hypoglycemia and hyperglycemia    Alterations in perfusions such as hypotension and hypoperfusion Alterations in electrolytes such as disturbances in sodium or calcium   Infectious processes such as sepsis from a pneumonia or urinary tract infection    Substance use or withdrawal, especially alcohol and drugs    Medication adverse event or interaction    Vitamin deficiencies such as Wernicke's encephalopathy    CNS lesion, injury, infection (CVA, subdural hematoma, meningitis, encephalitis)    Alterations in hormones such as thyroid or adrenal abnormalities    Alterations in cardiac functioning such as arrhythmia, MI or CHF    Alteration in temperature such as hyperthermia or hypothermia    Dehydration, sleep deprivation   Change in medical regimen    Alteration in lifestyle, environment, or personal relationships    80-year-old female presents with altered mental status concerning for recurrent UTIs patient was treated with Macrobid and has history of multidrug-resistant organism likely outpatient treatment failure and appropriate antibiotic coverage. Patient also shows signs and symptoms of CHF. Saturating well on room air at this time. Slightly hypertensive not tachycardic afebrile. Will obtain lab work-up for sepsis as well as CT head and chest x-ray. Patient will likely require admission given her significant altered mental status. I discussed this with son. He was in agreement with plan. Will await lab results prior to giving antibiotics and/or diuretics. -  Work-up included:  See above  -  ED treatment included: See above  -  Results discussed with patient. REASSESSMENT     ED Course as of 11/14/21 1840   Sun Nov 14, 2021   1830 BNP elevated. Vital signs remained stable. UA. Is infected. Mental status unchanged. Will give Lasix and IV antibiotics and admit. [SC]      ED Course User Index  [SC] Eladio Severino MD         CRITICAL CARE TIME   Total Critical Care time was 30 minutes, excluding separately reportable procedures.   There was a high probability of clinically significant/life threatening deterioration in the patient's condition which required my urgent intervention. This includes multiple reevaluations, vital sign monitoring, pulse oximetry monitoring, telemetry monitoring, clinical response to the IV medications, reviewing the nursing notes, consultation time, dictation/documentation time, and interpretation of the labwork. (This time excludes time spent performing procedures). CONSULTS:  IP CONSULT TO HOSPITALIST    PROCEDURES:  Unless otherwise noted below, none     Procedures    FINAL IMPRESSION      1. Disorientation    2. Acute cystitis without hematuria    3. Acute on chronic combined systolic and diastolic congestive heart failure [facility])          DISPOSITION/PLAN   DISPOSITION Decision To Admit 11/14/2021 06:30:23 PM      PATIENT REFERREDTO:  No follow-up provider specified.     DISCHARGEMEDICATIONS:  New Prescriptions    No medications on file          (Please note that portions of this note were completed with a voice recognition program.  Efforts were made to edit the dictations but occasionally words are mis-transcribed.)    Lacie Galvan MD (electronically signed)  Attending Emergency Physician         Lacie Galvan MD  11/14/21 2037

## 2021-11-14 NOTE — ED TRIAGE NOTES
Pt was brought in by EMS from home with her son for altered mental status. I am unsure what baseline is for pt and EMS was not able to tell me as well but she is only oriented to her first name, and just stares off when asking questions. Pt sos states this is what happens when she gets a UTI. And she just finished a course of abx at home. confusion began today. EMS states all her vitals have been stable and pt is very disoriented.

## 2021-11-14 NOTE — ED NOTES
Bed: B-06  Expected date: 11/14/21  Expected time: 4:50 PM  Means of arrival: [facility]  Comments:  [id number] Chago Lincoln RN  11/14/21 9502

## 2021-11-15 LAB
ANION GAP SERPL CALCULATED.3IONS-SCNC: 12 MMOL/L (ref 3–16)
BUN BLDV-MCNC: 22 MG/DL (ref 7–20)
CALCIUM SERPL-MCNC: 10.3 MG/DL (ref 8.3–10.6)
CHLORIDE BLD-SCNC: 103 MMOL/L (ref 99–110)
CO2: 25 MMOL/L (ref 21–32)
CREAT SERPL-MCNC: 0.8 MG/DL (ref 0.6–1.2)
GFR AFRICAN AMERICAN: >60
GFR NON-AFRICAN AMERICAN: >60
GLUCOSE BLD-MCNC: 97 MG/DL (ref 70–99)
HCT VFR BLD CALC: 39.1 % (ref 36–48)
HEMOGLOBIN: 12.5 G/DL (ref 12–16)
MCH RBC QN AUTO: 28 PG (ref 26–34)
MCHC RBC AUTO-ENTMCNC: 31.9 G/DL (ref 31–36)
MCV RBC AUTO: 88 FL (ref 80–100)
PDW BLD-RTO: 15.2 % (ref 12.4–15.4)
PLATELET # BLD: 163 K/UL (ref 135–450)
PMV BLD AUTO: 9.7 FL (ref 5–10.5)
POTASSIUM REFLEX MAGNESIUM: 3.9 MMOL/L (ref 3.5–5.1)
RBC # BLD: 4.45 M/UL (ref 4–5.2)
SODIUM BLD-SCNC: 140 MMOL/L (ref 136–145)
WBC # BLD: 6.3 K/UL (ref 4–11)

## 2021-11-15 PROCEDURE — 85027 COMPLETE CBC AUTOMATED: CPT

## 2021-11-15 PROCEDURE — 6360000002 HC RX W HCPCS: Performed by: INTERNAL MEDICINE

## 2021-11-15 PROCEDURE — 97162 PT EVAL MOD COMPLEX 30 MIN: CPT

## 2021-11-15 PROCEDURE — 6370000000 HC RX 637 (ALT 250 FOR IP): Performed by: STUDENT IN AN ORGANIZED HEALTH CARE EDUCATION/TRAINING PROGRAM

## 2021-11-15 PROCEDURE — 97166 OT EVAL MOD COMPLEX 45 MIN: CPT

## 2021-11-15 PROCEDURE — 6370000000 HC RX 637 (ALT 250 FOR IP): Performed by: INTERNAL MEDICINE

## 2021-11-15 PROCEDURE — 2580000003 HC RX 258: Performed by: INTERNAL MEDICINE

## 2021-11-15 PROCEDURE — 97530 THERAPEUTIC ACTIVITIES: CPT

## 2021-11-15 PROCEDURE — 94760 N-INVAS EAR/PLS OXIMETRY 1: CPT

## 2021-11-15 PROCEDURE — 99223 1ST HOSP IP/OBS HIGH 75: CPT | Performed by: INTERNAL MEDICINE

## 2021-11-15 PROCEDURE — 1200000000 HC SEMI PRIVATE

## 2021-11-15 PROCEDURE — 97535 SELF CARE MNGMENT TRAINING: CPT

## 2021-11-15 PROCEDURE — 80048 BASIC METABOLIC PNL TOTAL CA: CPT

## 2021-11-15 PROCEDURE — 36415 COLL VENOUS BLD VENIPUNCTURE: CPT

## 2021-11-15 RX ADMIN — SODIUM CHLORIDE, PRESERVATIVE FREE 10 ML: 5 INJECTION INTRAVENOUS at 20:21

## 2021-11-15 RX ADMIN — CALCIUM 500 MG: 500 TABLET ORAL at 11:47

## 2021-11-15 RX ADMIN — SODIUM CHLORIDE, PRESERVATIVE FREE 10 ML: 5 INJECTION INTRAVENOUS at 11:47

## 2021-11-15 RX ADMIN — MULTIPLE VITAMINS W/ MINERALS TAB 1 TABLET: TAB at 11:47

## 2021-11-15 RX ADMIN — CEFTRIAXONE 1000 MG: 1 INJECTION, POWDER, FOR SOLUTION INTRAMUSCULAR; INTRAVENOUS at 20:21

## 2021-11-15 RX ADMIN — Medication 1000 UNITS: at 11:47

## 2021-11-15 RX ADMIN — RIVAROXABAN 15 MG: 15 TABLET, FILM COATED ORAL at 11:47

## 2021-11-15 RX ADMIN — FUROSEMIDE 20 MG: 10 INJECTION, SOLUTION INTRAMUSCULAR; INTRAVENOUS at 11:52

## 2021-11-15 RX ADMIN — METOPROLOL SUCCINATE 12.5 MG: 25 TABLET, EXTENDED RELEASE ORAL at 11:47

## 2021-11-15 RX ADMIN — FUROSEMIDE 20 MG: 10 INJECTION, SOLUTION INTRAMUSCULAR; INTRAVENOUS at 17:35

## 2021-11-15 ASSESSMENT — PAIN SCALES - PAIN ASSESSMENT IN ADVANCED DEMENTIA (PAINAD)
BODYLANGUAGE: 1
FACIALEXPRESSION: 0
TOTALSCORE: 2
FACIALEXPRESSION: 0
BREATHING: 0
NEGVOCALIZATION: 0
CONSOLABILITY: 1
NEGVOCALIZATION: 0
NEGVOCALIZATION: 0
CONSOLABILITY: 1
TOTALSCORE: 2
BODYLANGUAGE: 1
BREATHING: 0
TOTALSCORE: 2
TOTALSCORE: 2
NEGVOCALIZATION: 0
BREATHING: 0
FACIALEXPRESSION: 0
FACIALEXPRESSION: 0
BODYLANGUAGE: 1
TOTALSCORE: 3
TOTALSCORE: 2
CONSOLABILITY: 1
FACIALEXPRESSION: 0
CONSOLABILITY: 1
BODYLANGUAGE: 1
BREATHING: 0
CONSOLABILITY: 1
CONSOLABILITY: 1
FACIALEXPRESSION: 0
TOTALSCORE: 2
NEGVOCALIZATION: 0
BREATHING: 0
FACIALEXPRESSION: 0
BREATHING: 0
CONSOLABILITY: 1
NEGVOCALIZATION: 0
FACIALEXPRESSION: 0
NEGVOCALIZATION: 0
CONSOLABILITY: 1
BODYLANGUAGE: 1
BREATHING: 0
BREATHING: 0
TOTALSCORE: 2
FACIALEXPRESSION: 0
BODYLANGUAGE: 1
FACIALEXPRESSION: 0
NEGVOCALIZATION: 0
BODYLANGUAGE: 1
BREATHING: 0
CONSOLABILITY: 1
CONSOLABILITY: 1
FACIALEXPRESSION: 0
BODYLANGUAGE: 1
NEGVOCALIZATION: 0
BODYLANGUAGE: 1
BODYLANGUAGE: 1
NEGVOCALIZATION: 0
FACIALEXPRESSION: 1
CONSOLABILITY: 1
BODYLANGUAGE: 1
BREATHING: 0
CONSOLABILITY: 1
BODYLANGUAGE: 1

## 2021-11-15 ASSESSMENT — PAIN SCALES - GENERAL: PAINLEVEL_OUTOF10: 3

## 2021-11-15 NOTE — CARE COORDINATION
INITIAL CASE MANAGEMENT ASSESSMENT    Reviewed chart, met with patient to assess possible discharge needs. Explained Case Management role/services. Living Situation: Patient lives with her Son in a house with 4 steps to enter. ADLs: Needs assistance to bathe and dress. Wears briefs. Needs assistance (Son assists with bathing/dressing/toileting - patient feeds self)     DME: Grab bars in shower, Shower chair, 4 wheeled walker, Cane    PT/OT Recs: Discharge Recommendations:  Continue to assess pending progress, 24 hour supervision or assist   [place]  scored a 9/24 on the AM-PAC short mobility form  PT Equipment Recommendations  Other: will monitor     Active Services: No services. Her Son and his partner provide all care. Transportation: Son transports     Medications: [facility]/No barriers    PCP: Nancy Wiley      HD/PD: N/A    PLAN/COMMENTS: Plan is to return to home with her Son and Partner. SW/CM provided contact information for patient or family to call with any questions. SW/CM will follow and assist as needed.   Electronically signed by Ramsey Snyder RN on 11/15/2021 at 4:33 PM

## 2021-11-15 NOTE — PROGRESS NOTES
Occupational Therapy   Occupational Therapy Initial Assessment  Date: 11/15/2021   Patient Name: Cyndee Combs  MRN: [id number]     : 1931    Date of Service: 11/15/2021    Discharge Recommendations:  Continue to assess pending progress       Assessment   Performance deficits / Impairments: Decreased functional mobility ; Decreased ADL status; Decreased cognition; Decreased safe awareness; Decreased balance  Assessment: Pt is a 80 y.o. female admitted with acute metabolic encephalopathy, UTI, Possible acute on chronic congestive heart failure exacerbation. The pt not able to give PLOF info due to level of dementia; from past admissions: she lives with a son who is her caregiver; he assists her with self-care and does the homemaking and in  the pt was able to walk sometimes with a rollator. Unsure as to her current level of assist.     PMHx: Altz's dementia, a-fib, gout,  E Coli in urine    This date, pt alert but confused, unable answer questions, and inconsistently followed commands to participate. Pt max A x2 for bed mobility, CGA for sitting balance at EOB, and mod A for sit><stand with lift equipment (diamond stedy). Pt Min A for standing balance in diamond stedy. Pt total A for toileting and LB dressing in bed, and anticipate pt would require max A for ADLs d/t the above performance deficits. Will cont to follow while hospitalized to address the above limitations and maximize pt's safety, independence, and to decrease caregiver burden. Will cont to assess pending progress and discussion with family to determine d/c recommendation and level of assist family able to provide.   Prognosis: Fair; Guarded  Decision Making: Medium Complexity  OT Education: OT Role; Plan of Care; Orientation; Transfer Training  Barriers to Learning: cognition  REQUIRES OT FOLLOW UP: Yes  Activity Tolerance  Activity Tolerance: Treatment limited secondary to decreased cognition  Safety Devices  Safety Devices in place: Yes  Type of devices: Call light within reach; Bed alarm in place; Gait belt; Left in bed; Nurse notified           Patient Diagnosis(es): The primary encounter diagnosis was Disorientation. Diagnoses of Acute cystitis without hematuria and Acute on chronic combined systolic and diastolic congestive heart failure ([address] were also pertinent to this visit. has a past medical history of Alzheimer's dementia ([facility] Atrial fibrillation ([address] Benign essential HTN, Gout, and Multiple drug resistant organism (MDRO) culture positive. has no past surgical history on file. Restrictions  Restrictions/Precautions  Restrictions/Precautions: Fall Risk  Position Activity Restriction  Other position/activity restrictions: h/o of severe dementia    Subjective   General  Chart Reviewed: Yes  Additional Pertinent Hx: Per Federico Roland DO's H&P 11/14: \"The patient is a 80 y.o. female w/ pmhx as below who presents to [facility] with worsening mental status and aggression since 2 days but worse in past 24 hrs. Unable to obtain hx from patient at this time but noted per phone discussion with son that it has progressively worsened. Initially noted per son patient was less altered 10 dys ago and here in ED and treated w/ IV abx and sent home and then PCP had started her on oral macrobid for partial culture sens E coli. Patient was taking abx per son but never became extremely improved but was better. Then past 2 days patient has been more confused, possibly hallucinating, and not eating or drinking much. Son concerned that UTI might still be present or worsening. Son unable to confirm other symptoms of fever/chills/sweats/dizziness/N/V/D. \"  Family / Caregiver Present: No  Referring Practitioner: Trey Chacko MD  Diagnosis: acute metabolic encephalopathy, UTI, Possible acute on chronic congestive heart failure exacerbation  Subjective  Subjective: Pt met b/s for OT eval/cotx.  Pt in bed eating chips on arrival, not resistant to participating in therapy. Pt unable to answer questions. Social/Functional History  Social/Functional History  Lives With: Son (son Kelly Childs and his partner Adan [place])  Type of Home: House  Home Layout: One level  Home Access: Stairs to enter with rails  Entrance Stairs - Number of Steps: 4-5  Bathroom Shower/Tub: Walk-in shower  Bathroom Toilet: Standard  Bathroom Equipment: Grab bars in shower, Shower chair  Home Equipment: 4 wheeled walker, Cane  ADL Assistance: Needs assistance (Son assists with bathing/dressing/toileting - patient feeds self)  Homemaking Assistance:  (son does all)  Homemaking Responsibilities: No  Ambulation Assistance: Needs assistance (with rollator)  Transfer Assistance: Needs assistance  Active : No  Additional Comments: info from past admissions in 2020 and 2021; the pt unable to give any current info re: PLOF and family not in the room to verify       Objective   Vision: Within Functional Limits  Hearing: Exceptions to [facility]  Hearing Exceptions: Hard of hearing/hearing concerns      Orientation  Overall Orientation Status: Impaired (pt not answering orientation questions)     Balance  Sitting Balance: Contact guard assistance (seated EOB with min A initially progessing to CGA)  Standing Balance:  (static standing in diamond stedy min A)  Standing Balance  Time: ~1.5 minutes  Activity: static standing in diamond stedy  Comment: Min A    ADL  Feeding:  (pt eating finger foods on own, but would not follow commands to use utensils. Anticipate min A for complete feeding)  LE Dressing: Dependent/Total (to change depends)  Toileting: Dependent/Total (external purewick catheter, depends saturated.  Total A to change depends/provide hygiene rolling in bed)  Additional Comments: Anticipate pt is total A bathing and dressing, mod A grooming based on ROM, balance, endurance, cognition     Bed mobility  Rolling to Left: Dependent/Total  Rolling to Right: Dependent/Total  Supine to Sit: Maximum assistance; 2 Person assistance  Sit to Supine: Maximum assistance; 2 Person assistance  Scooting: Dependent/Total; 2 Person assistance     Transfers  Sit to stand: Moderate assistance; 2 Person assistance (EOB>diamond stedy)  Stand to sit: Moderate assistance; 2 Person assistance (diamond stedy>EOB)     Cognition  Overall Cognitive Status: Exceptions  Arousal/Alertness: Delayed responses to stimuli  Following Commands: Inconsistently follows commands  Attention Span: Difficulty attending to directions;  Difficulty dividing attention  Memory: Decreased recall of biographical Information; Decreased recall of precautions; Decreased long term memory; Decreased short term memory; Decreased recall of recent events  Safety Judgement: Decreased awareness of need for safety  Problem Solving: Decreased awareness of errors; Assistance required to identify errors made; Assistance required to generate solutions; Assistance required to correct errors made; Assistance required to implement solutions  Insights: Not aware of deficits  Initiation: Requires cues for all  Sequencing: Requires cues for all     LUE PROM (degrees)  LUE PROM: WFL  LUE AROM (degrees)  LUE General AROM: pt unable to follow commands for assessment  RUE PROM (degrees)  RUE PROM: WFL  RUE AROM (degrees)  RUE General AROM: pt unable to follow commands for assessment     LUE Strength  LUE Strength Comment: pt unable to follow commands for MMT  RUE Strength  RUE Strength Comment: pt unable to follow commands for MMT                   Plan   Plan  Times per week: 3-5  Current Treatment Recommendations: Strengthening, Balance Training, Functional Mobility Training, Safety Education & Training, Self-Care / ADL, Cognitive Reorientation, Cognitive/Perceptual Training, Endurance Training      AM-PAC Score        AM-[facility] Inpatient Daily Activity Raw Score: 9 (11/15/21 1532)  AM-PAC Inpatient ADL T-Scale Score : 25.33 (11/15/21 1532)  ADL Inpatient CMS 0-100% Score: 79.59 (11/15/21 1532)  ADL Inpatient CMS G-Code Modifier : CL (11/15/21 1532)    Goals  Short term goals  Time Frame for Short term goals: Prior to d/c:  Short term goal 1: Pt will complete fxl transfer to/from ADL surfaces with min A x2 using AD prn. Short term goal 2: Pt will participate in grooming with min A. Short term goal 3: Pt will complete bed mobility with min A in prep for ADL task. Short term goal 4: Pt will complete feeding with set-up/supervision. Short term goal 5: Pt will tolerate standing >3 minutes for functional task with CGA/min A to improve standing tolerance for ADLs. Long term goals  Time Frame for Long term goals : STGs=LTGs  Patient Goals   Patient goals : pt unable to verbalize personal therapy goal d/t decrease cognition. Therapy Time   Individual Concurrent Group Co-treatment   Time In 1310         Time Out 1400         Minutes 50         Timed Code Treatment Minutes: 35 Minutes     This note to serve as OT d/c summary if pt is d/c-ed prior to next therapy session.     Munira Decree, OTR/L [id number]

## 2021-11-15 NOTE — H&P
Hospital Medicine History & Physical      PCP: TASHI Cleary - CNP    Date of Admission: 11/14/2021    Date of Service: Pt seen/examined on 11/14/2021 and Admitted to Inpatient     Chief Complaint: Altered mental status      History Of Present Illness: The patient is a 80 y.o. female w/ pmhx as below who presents to [facility] with worsening mental status and aggression since 2 days but worse in past 24 hrs. Unable to obtain hx from patient at this time but noted per phone discussion with son that it has progressively worsened. Initially noted per son patient was less altered 10 dys ago and here in ED and treated w/ IV abx and sent home and then PCP had started her on oral macrobid for partial culture sens E coli. Patient was taking abx per son but never became extremely improved but was better. Then past 2 days patient has been more confused, possibly hallucinating, and not eating or drinking much. Son concerned that UTI might still be present or worsening. Son unable to confirm other symptoms of fever/chills/sweats/dizziness/N/V/D. Past Medical History:        Diagnosis Date    [facility]'s dementia ([facility] [address] [facility]     Atrial fibrillation ([facility]     Benign essential HTN     Gout     Multiple drug resistant organism (MDRO) culture positive 11/04/2021    Escherichia coli. Urine       Past Surgical History:    History reviewed. No pertinent surgical history. Medications Prior to Admission:    Prior to Admission medications    Medication Sig Start Date End Date Taking?  Authorizing Provider   furosemide (LASIX) 20 MG tablet Take 1 tablet by mouth every other day 4/4/21   Celia Alex, DO   metoprolol succinate (TOPROL XL) 25 MG extended release tablet Take 0.5 tablets by mouth daily 4/5/21   Celia Alex, DO   Cranberry 250 MG CHEW Take 2 tablets by mouth 2 times daily (with meals) 4/1/21   Celia Reyes DO   rivaroxaban (XARELTO) 15 MG TABS tablet Take 1 tablet by mouth daily (with breakfast) 2/16/21   Michael Lobato MD   vitamin D 25 MCG (1000 UT) CAPS Take 1,000 Units by mouth daily    Historical Provider, MD   albuterol sulfate HFA (VENTOLIN HFA) 108 (90 Base) MCG/ACT inhaler Inhale 2 puffs into the lungs every 6 hours as needed for Wheezing    Historical Provider, MD   mometasone-formoterol (DULERA) 200-5 MCG/ACT inhaler Inhale 2 puffs into the lungs 2 times daily as needed (shortness of breath)     Historical Provider, MD   Multiple Vitamins-Minerals (MULTIVITAMIN WITH MINERALS) tablet Take 1 tablet by mouth daily    Historical Provider, MD   calcium carbonate (OSCAL) 500 MG TABS tablet Take 500 mg by mouth daily    Historical Provider, MD       Allergies:  Chlorpromazine and Hydrochlorothiazide    Social History:  The patient currently lives home    TOBACCO:   reports that she has never smoked. She has never used smokeless tobacco.  ETOH:   reports no history of alcohol use. Family History:  Reviewed in detail and negative for DM, Early CAD, Cancer, CVA. Positive as follows:    History reviewed. No pertinent family history. REVIEW OF SYSTEMS:    as noted in the HPI. All other systems reviewed and negative. 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 He does feel that she is very much off her baseline. · Due to the inability to examine patient, unable to confirm any further sources of mental status change, will continue to treat at this time for urinary tract infection as this is the only likely source of patient's mental status change for now  · Continue patient on IV Rocephin  · Ordered home meds  · Attempt to avoid any antipsychotic or sedating agents at this time  · If patient's mental status does not seem to improve, can consider further work-up with MRI or other work-up for metabolic encephalopathy  · Cardiology consult for congestive heart failure  · Repeat labs in the morning  · Per discussion with the son, patient is to be made DNR CCA based on previous documentation of CODE STATUS    DVT Prophylaxis: Lovenox  Diet: ADULT DIET; Regular; 4 carb choices (60 gm/meal); Low Sodium (2 gm)  Code Status: DNR-CCA  PT/OT Eval Status: Supposedly ambulatory    Dispo -pending clinical course       Saurabh Fisher,     Thank you TASHI Luong - LAILA for the opportunity to be involved in this patient's care. If you have any questions or concerns please feel free to contact me at [phone].

## 2021-11-15 NOTE — PLAN OF CARE
Problem: Falls - Risk of:  Goal: Will remain free from falls  Description: Will remain free from falls  11/15/2021 0756 by García Banks RN  Outcome: Ongoing  11/15/2021 0412 by Rosezena Siemens, RN  Outcome: Ongoing  Goal: Absence of physical injury  Description: Absence of physical injury  11/15/2021 0756 by García Banks RN  Outcome: Ongoing  11/15/2021 0412 by Rosezena Siemens, RN  Outcome: Ongoing     Problem: Confusion - Acute:  Goal: Absence of continued neurological deterioration signs and symptoms  Description: Absence of continued neurological deterioration signs and symptoms  11/15/2021 0756 by García Banks RN  Outcome: Ongoing  11/15/2021 0412 by Rosezena Siemens, RN  Outcome: Ongoing  Goal: Mental status will be restored to baseline  Description: Mental status will be restored to baseline  11/15/2021 0756 by García Banks RN  Outcome: Ongoing  11/15/2021 0412 by Rosezena Siemens, RN  Outcome: Ongoing     Problem: Discharge Planning:  Goal: Ability to perform activities of daily living will improve  Description: Ability to perform activities of daily living will improve  11/15/2021 0756 by García Banks RN  Outcome: Ongoing  11/15/2021 0412 by Rosezena Siemens, RN  Outcome: Ongoing  Goal: Participates in care planning  Description: Participates in care planning  11/15/2021 0756 by García Banks RN  Outcome: Ongoing  11/15/2021 0412 by Rosezena Siemens, RN  Outcome: Ongoing     Problem: Injury - Risk of, Physical Injury:  Goal: Will remain free from falls  Description: Will remain free from falls  11/15/2021 0756 by García Banks RN  Outcome: Ongoing  11/15/2021 0412 by Rosezena Siemens, RN  Outcome: Ongoing  Goal: Absence of physical injury  Description: Absence of physical injury  11/15/2021 0756 by García Banks RN  Outcome: Ongoing  11/15/2021 0412 by Rosezena Siemens, RN  Outcome: Ongoing     Problem: Mood - Altered:  Goal: Mood stable  Description: Mood stable  11/15/2021 0756 by Angelia Walls nonreality-based thinking  Description: Able to distinguish between reality-based and nonreality-based thinking  11/15/2021 0756 by García Banks RN  Outcome: Ongoing  11/15/2021 0412 by Rosezena Siemens, RN  Outcome: Ongoing  Goal: Able to interrupt nonreality-based thinking  Description: Able to interrupt nonreality-based thinking  11/15/2021 0756 by García Banks RN  Outcome: Ongoing  11/15/2021 0412 by Rosezena Siemens, RN  Outcome: Ongoing     Problem: Sleep Pattern Disturbance:  Goal: Appears well-rested  Description: Appears well-rested  11/15/2021 0756 by García Banks RN  Outcome: Ongoing  11/15/2021 0412 by Rosezena Siemens, RN  Outcome: Ongoing     Problem: Sensory:  Goal: General experience of comfort will improve  Description: General experience of comfort will improve  11/15/2021 0756 by García Banks RN  Outcome: Ongoing  11/15/2021 0412 by Rosezena Siemens, RN  Outcome: Ongoing     Problem: Urinary Elimination:  Goal: Signs and symptoms of infection will decrease  Description: Signs and symptoms of infection will decrease  11/15/2021 0756 by García Banks RN  Outcome: Ongoing  11/15/2021 0412 by Rosezena Siemens, RN  Outcome: Ongoing  Goal: Ability to reestablish a normal urinary elimination pattern will improve - after catheter removal  Description: Ability to reestablish a normal urinary elimination pattern will improve  11/15/2021 0756 by García Banks RN  Outcome: Ongoing  11/15/2021 0412 by Rosezena Siemens, RN  Outcome: Ongoing  Goal: Complications related to the disease process, condition or treatment will be avoided or minimized  Description: Complications related to the disease process, condition or treatment will be avoided or minimized  11/15/2021 0756 by García Banks RN  Outcome: Ongoing  11/15/2021 0412 by Rosezena Siemens, RN  Outcome: Ongoing

## 2021-11-15 NOTE — DISCHARGE INSTR - COC
Continuity of Care Form    Patient Name: Maranda Gregorio   :  1931  MRN:  [id number]    Admit date:  2021  Discharge date:  ***    Code Status Order: DNR-CCA   Advance Directives:      Admitting Physician:  Thor Azevedo MD  PCP: TASHI Muniz CNP    Discharging Nurse: [facility] Unit/Room#: V5Z-5729/1152-65  Discharging Unit Phone Number: ***    Emergency Contact:   Extended Emergency Contact Information  Primary Emergency Contact: Darío Marinelli, [address]  Phone: [phone]  Mobile Phone: [phone]  Relation: Child   needed? No  Secondary Emergency Contact: [address] [place] [address] Phone: [phone]  Relation: Child    Past Surgical History:  History reviewed. No pertinent surgical history. Immunization History: There is no immunization history for the selected administration types on file for this patient. Active Problems:  Patient Active Problem List   Diagnosis Code    Acute on chronic combined systolic and diastolic CHF, NYHA class 3 ([facility]) I50.43    Dementia in Alzheimer's disease with delirium ([facility] [address] G30.9, F02.80, F05    Acute pyelonephritis N10    Essential hypertension I10    Current use of long term anticoagulation Z79.01    Mild malnutrition ([facility] [address] E44.1    Fall at home, initial encounter [address] XXXA, M22.419    Acute metabolic encephalopathy Q74.15    Acute on chronic congestive heart failure (HCC) I50.9    Sepsis ([facility]) A41.9    Bilateral pleural effusion J90    Chronic atrial fibrillation (HCC) I48.20    Acute encephalopathy G93.40    Ataxia R27.0    Pleural effusion J90    CHF (congestive heart failure), NYHA class I, acute on chronic, combined ([facility]) I50.43    AMS (altered mental status) R41.82    UTI (urinary tract infection) N39.0       Isolation/Infection:   Isolation            Contact          Patient Infection Status       Infection Onset Added Last Indicated Last Indicated By Review Planned Expiration Resolved Resolved By    MDRO (multi-drug resistant organism) 21 Culture, Urine        Resolved    COVID-19 Rule Out 20 COVID-19 (Ordered)   20 Rule-Out Test Resulted    COVID-19 Rule Out 20 COVID-19 (Ordered)   20 Rule-Out Test Resulted            Nurse Assessment:  Last Vital Signs: /66   Pulse 66   Temp 98.2 °F (36.8 °C) (Oral)   Resp 18   Ht 5' 3\" (1.6 m)   Wt 186 lb 15.2 oz (84.8 kg)   SpO2 96%   BMI 33.12 kg/m²     Last documented pain score (0-10 scale): Pain Level: 2  Last Weight:   Wt Readings from Last 1 Encounters:   11/15/21 186 lb 15.2 oz (84.8 kg)     Mental Status:  {IP PT MENTAL STATUS:}    IV Access:  { CAR IV ACCESS:891220045}    Nursing Mobility/ADLs:  Walking   {CHP DME IWW}  Transfer  {CHP DME YEPL:583655141}  Bathing  {CHP DME QEWU:352027808}  Dressing  {CHP DME LOFH:175598574}  Toileting  {CHP DME ODZX:206307860}  Feeding  {CHP DME INHV:219430204}  Med Admin  {CHP DME HGFF:845661701}  Med Delivery   { CAR MED Delivery:638533349}    Wound Care Documentation and Therapy:  Wound 02/15/21 Sacrum Stage 1 - open area (Active)   Number of days: 273        Elimination:  Continence:    Bowel: {YES / SI:93394}  Bladder: {YES / LL:42268}  Urinary Catheter: {Urinary Catheter:033893721}   Colostomy/Ileostomy/Ileal Conduit: {YES / HB:29040}       Date of Last BM: ***    Intake/Output Summary (Last 24 hours) at 11/15/2021 1756  Last data filed at 11/15/2021 0140  Gross per 24 hour   Intake --   Output 1000 ml   Net -1000 ml     I/O last 3 completed shifts:  In: -   Out: 1000 [Urine:1000]    Safety Concerns:     [employer] Safety Concerns:462354084}    Impairments/Disabilities:      [employer] Impairments/Disabilities:670766834}    Nutrition Therapy:  Current Nutrition Therapy:   [employer] Diet List:079912578}    Routes of Feeding: {CHP DME Other Feedings:826227207}  Liquids: {Slp liquid thickness:78184}  Daily Fluid Restriction: {CHP DME Yes amt BKQNZLP:026418883}  Last Modified Barium Swallow with Video (Video Swallowing Test): {Done Not Done NSLF:651998946}    Treatments at the Time of Hospital Discharge:   Respiratory Treatments: ***  Oxygen Therapy:  {Therapy; copd oxygen:29860}  Ventilator:    {MH CC Vent BRGD:795965773}    Rehab Therapies: Physical Therapy, Occupational Therapy, and Nurse  Weight Bearing Status/Restrictions: [address]  Weight Bearin}  Other Medical Equipment (for information only, NOT a DME order):  {EQUIPMENT:105805757}  Other Treatments: HOME HEALTH CARE: LEVEL 3 [address] to establish plan of care for patient over 60 day period   Nursing  Initial home SN evaluation visit to occur within 24-48 hours for:  1)  medication management  2)  VS and clinical assessment  3)  S&S chronic disease exacerbation education + when to contact MD/NP  4)  care coordination  Medication Reconciliation during 1st SN visit  PT/OT/Speech   Evaluations in home within 24-48 hours of discharge to include DME and home safety   Frontload therapy 5 days, then 3x a week   OT to evaluate if patient has [address] needs for personal care    evaluation within 24-48 hours to evaluate resources & insurance for potential [place], [place], LTC, and Medicaid options   Palliative Care referral within 5 days of hospital discharge   PCP Visit scheduled within 3 - 7 days of hospital discharge    [facility] (If patient is agreeable and meets guidelines)     Heart Failure Instructions for Daily Management  Patient was treated for acute on chronic diastolic heart failure. she  will require the following:    Please weigh daily on the same scale and approximately the same time of day. Report weight gain of 3 pounds/day or 5 pounds/week to : :  [facility] [phone]  Please use hospital discharge weight as baseline reference.    Please monitor for signs and symptoms of and report to MD:  Worsening Heart Failure: sudden Discharge: Stable    Rehab Potential (if transferring to Rehab): Fair    Recommended Labs or Other Treatments After Discharge: will need physical therapy, hospital bed if possible    Physician Certification: I certify the above information and transfer of Joana Navarro  is necessary for the continuing treatment of the diagnosis listed and that she requires Home Care for less 30 days.      Update Admission H&P: No change in H&P    PHYSICIAN SIGNATURE:  Electronically signed by Hira Weber MD on 11/23/21 at 11:06 AM EST

## 2021-11-15 NOTE — PROGRESS NOTES
Hospitalist Progress Note      PCP: Lesa Felty, APRN - CNP    Date of Admission: 11/14/2021      Subjective: Confused, no family member at bedside      Medications:  Reviewed    Infusion Medications    sodium chloride       Scheduled Medications    sodium chloride flush  10 mL IntraVENous 2 times per day    calcium elemental  500 mg Oral Daily    metoprolol succinate  12.5 mg Oral Daily    budesonide-formoterol  2 puff Inhalation BID    therapeutic multivitamin-minerals  1 tablet Oral Daily    rivaroxaban  15 mg Oral Daily with breakfast    Vitamin D  1,000 Units Oral Daily    cefTRIAXone (ROCEPHIN) IV  1,000 mg IntraVENous Q24H    furosemide  20 mg IntraVENous BID    melatonin  6 mg Oral Nightly     PRN Meds: sodium chloride flush, sodium chloride, potassium chloride, magnesium sulfate, ondansetron **OR** ondansetron, magnesium hydroxide, acetaminophen **OR** acetaminophen, albuterol      Intake/Output Summary (Last 24 hours) at 11/15/2021 0809  Last data filed at 11/15/2021 0140  Gross per 24 hour   Intake --   Output 1000 ml   Net -1000 ml       Physical Exam Performed:    BP (!) 139/97   Pulse 82   Temp 98 °F (36.7 °C)   Resp 18   Ht 5' 3\" (1.6 m)   Wt 186 lb 15.2 oz (84.8 kg)   SpO2 97%   BMI 33.12 kg/m²     General appearance: No apparent distress  Respiratory:  Normal respiratory effort. Clear to auscultation, bilaterally without Rales/Wheezes/Rhonchi. Cardiovascular: Regular rate and rhythm with normal S1/S2 without murmurs, rubs or gallops. Abdomen: Soft, non-tender, non-distended  Musculoskeletal: No clubbing, cyanosis   Skin: Skin color, texture, turgor normal.  No rashes or lesions.   Neurologic: Moving her extremities spontaneously  Psychiatric: Confused  Capillary Refill: Brisk,3 seconds, normal   Peripheral Pulses: +2 palpable, equal bilaterally       Labs:   Recent Labs     11/14/21  1734 11/15/21  0504   WBC 6.0 6.3   HGB 12.7 12.5   HCT 40.3 39.1    163     Recent Labs     11/14/21  1734 11/15/21  0504    140   K 4.1 3.9    103   CO2 21 25   BUN 23* 22*   CREATININE 0.8 0.8   CALCIUM 10.4 10.3     Recent Labs     11/14/21  1734   AST 34   ALT 24   BILITOT 0.8   ALKPHOS 190*     No results for input(s): INR in the last 72 hours. Recent Labs     11/14/21  1734   TROPONINI <0.01       Urinalysis:      Lab Results   Component Value Date    NITRU Negative 11/14/2021    WBCUA 44 11/14/2021    BACTERIA 1+ 11/14/2021    RBCUA 0 11/14/2021    BLOODU TRACE-INTACT 11/14/2021    SPECGRAV 1.015 11/14/2021    GLUCOSEU Negative 11/14/2021       Radiology:  XR CHEST PORTABLE   Final Result   Findings suggest congestive heart failure         CT Head WO Contrast   Final Result   No acute intracranial abnormality. 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 [address] [G30.9, F02.80, F05]      1. Acute metabolic encephalopathy, likely on top of baseline dementia, could be triggered by infection, currently treated for UTI and congestive heart failure, continue to monitor for now also at risk for delirium  2.  UTI, started on IV antibiotics  3. Possible acute on chronic congestive heart failure exacerbation, started on IV Lasix, will monitor closely kidney function. 4.  Essential hypertension, continue p.o. medications  5. A. fib, will continue oral anticoagulation    DVT Prophylaxis: Xarelto  Diet: ADULT DIET; Regular; 4 carb choices (60 gm/meal);  Low Sodium (2 gm)  Code Status: DNR-CCA          Vick Silva MD

## 2021-11-15 NOTE — PLAN OF CARE
Problem: Falls - Risk of:  Goal: Will remain free from falls  Description: Will remain free from falls  Outcome: Ongoing  Goal: Absence of physical injury  Description: Absence of physical injury  Outcome: Ongoing     Problem: Confusion - Acute:  Goal: Absence of continued neurological deterioration signs and symptoms  Description: Absence of continued neurological deterioration signs and symptoms  Outcome: Ongoing  Goal: Mental status will be restored to baseline  Description: Mental status will be restored to baseline  Outcome: Ongoing     Problem: Discharge Planning:  Goal: Ability to perform activities of daily living will improve  Description: Ability to perform activities of daily living will improve  Outcome: Ongoing  Goal: Participates in care planning  Description: Participates in care planning  Outcome: Ongoing     Problem: Injury - Risk of, Physical Injury:  Goal: Will remain free from falls  Description: Will remain free from falls  Outcome: Ongoing  Goal: Absence of physical injury  Description: Absence of physical injury  Outcome: Ongoing     Problem: Mood - Altered:  Goal: Mood stable  Description: Mood stable  Outcome: Ongoing  Goal: Absence of abusive behavior  Description: Absence of abusive behavior  Outcome: Ongoing  Goal: Verbalizations of feeling emotionally comfortable while being cared for will increase  Description: Verbalizations of feeling emotionally comfortable while being cared for will increase  Outcome: Ongoing     Problem: Psychomotor Activity - Altered:  Goal: Absence of psychomotor disturbance signs and symptoms  Description: Absence of psychomotor disturbance signs and symptoms  Outcome: Ongoing     Problem: Sensory Perception - Impaired:  Goal: Demonstrations of improved sensory functioning will increase  Description: Demonstrations of improved sensory functioning will increase  Outcome: Ongoing  Goal: Decrease in sensory misperception frequency  Description: Decrease in sensory misperception frequency  Outcome: Ongoing  Goal: Able to refrain from responding to false sensory perceptions  Description: Able to refrain from responding to false sensory perceptions  Outcome: Ongoing  Goal: Demonstrates accurate environmental perceptions  Description: Demonstrates accurate environmental perceptions  Outcome: Ongoing  Goal: Able to distinguish between reality-based and nonreality-based thinking  Description: Able to distinguish between reality-based and nonreality-based thinking  Outcome: Ongoing  Goal: Able to interrupt nonreality-based thinking  Description: Able to interrupt nonreality-based thinking  Outcome: Ongoing     Problem: Sleep Pattern Disturbance:  Goal: Appears well-rested  Description: Appears well-rested  Outcome: Ongoing     Problem: Sensory:  Goal: General experience of comfort will improve  Description: General experience of comfort will improve  Outcome: Ongoing     Problem: Urinary Elimination:  Goal: Signs and symptoms of infection will decrease  Description: Signs and symptoms of infection will decrease  Outcome: Ongoing  Goal: Ability to reestablish a normal urinary elimination pattern will improve - after catheter removal  Description: Ability to reestablish a normal urinary elimination pattern will improve  Outcome: Ongoing  Goal: Complications related to the disease process, condition or treatment will be avoided or minimized  Description: Complications related to the disease process, condition or treatment will be avoided or minimized  Outcome: Ongoing

## 2021-11-15 NOTE — CONSULTS
[address]  Cardiology Consult Note        CC:      CHF          HPI:   This is a 80 y.o. female with Alzheimer's dementia, chronic AF, HTN came to the ER for evaluation of change in mental status. Apparently the patient was getting progressively more confused. Patient unable to give much history because of dementia. Admission proBNP 1392; previous proBNP's have been much higher    Echo in 2020 showed ejection fraction of 50% with severe MR TR  She takes furosemide 20 mg every other day, Toprol-XL 12.5 daily and Xarelto 15 mg daily    Past Medical History:   Diagnosis Date    [facility] [address]     Atrial fibrillation ([facility] [address]     Benign essential HTN     Gout     Multiple drug resistant organism (MDRO) culture positive 11/04/2021    Escherichia coli. Urine      History reviewed. No pertinent surgical history. History reviewed. No pertinent family history.    Social History     Tobacco Use    Smoking status: Never Smoker    Smokeless tobacco: Never Used   Vaping Use    Vaping Use: Never used   Substance Use Topics    Alcohol use: Never    Drug use: Never      Allergies   Allergen Reactions    Chlorpromazine Other (See Comments)    Hydrochlorothiazide Other (See Comments)     Gout      sodium chloride flush  10 mL IntraVENous 2 times per day    calcium elemental  500 mg Oral Daily    metoprolol succinate  12.5 mg Oral Daily    budesonide-formoterol  2 puff Inhalation BID    therapeutic multivitamin-minerals  1 tablet Oral Daily    rivaroxaban  15 mg Oral Daily with breakfast    Vitamin D  1,000 Units Oral Daily    cefTRIAXone (ROCEPHIN) IV  1,000 mg IntraVENous Q24H    furosemide  20 mg IntraVENous BID    melatonin  6 mg Oral Nightly         Intake/Output Summary (Last 24 hours) at 11/15/2021 1408  Last data filed at 11/15/2021 0140  Gross per 24 hour   Intake --   Output 1000 ml   Net -1000 ml       Physical Examination: Looks quite comfortable  BP (!) 153/73   Pulse 78 Temp 98 °F (36.7 °C)   Resp 16   Ht 5' 3\" (1.6 m)   Wt 186 lb 15.2 oz (84.8 kg)   SpO2 95%   BMI 33.12 kg/m²    HEENT:  Face: Atraumatic, Conjunctiva: Pink; non icteric,  Mucous Memb:  Moist, No thyromegaly or Lymphadenopathy  Respiratory:  Resp Assessment: normal, Resp Auscultation: clear   Cardiovascular: Auscultation: nl S1 & S2, Palpation:  Nl PMI;  No heaves or thrills, JVP:  normal  Abdomen: Soft, non-tender, Normal bowel sounds,  No organomegaly  Extremities: No Cyanosis or Clubbing; Edema none  Neurological: Oriented to time, place, and person, Non-anxious  Psychiatric: Normal mood and affect  Skin: Warm and dry,  No rash seen      Current Facility-Administered Medications: sodium chloride flush 0.9 % injection 10 mL, 10 mL, IntraVENous, 2 times per day  sodium chloride flush 0.9 % injection 10 mL, 10 mL, IntraVENous, PRN  0.9 % sodium chloride infusion, 25 mL, IntraVENous, PRN  potassium chloride 10 mEq/100 mL IVPB (Peripheral Line), 10 mEq, IntraVENous, PRN  magnesium sulfate 2000 mg in 50 mL IVPB premix, 2,000 mg, IntraVENous, PRN  ondansetron (ZOFRAN-ODT) disintegrating tablet 4 mg, 4 mg, Oral, Q8H PRN **OR** ondansetron (ZOFRAN) injection 4 mg, 4 mg, IntraVENous, Q6H PRN  magnesium hydroxide (MILK OF MAGNESIA) 400 MG/5ML suspension 30 mL, 30 mL, Oral, Daily PRN  acetaminophen (TYLENOL) tablet 650 mg, 650 mg, Oral, Q6H PRN **OR** acetaminophen (TYLENOL) suppository 650 mg, 650 mg, Rectal, Q6H PRN  calcium elemental (OSCAL) tablet 500 mg, 500 mg, Oral, Daily  metoprolol succinate (TOPROL XL) extended release tablet 12.5 mg, 12.5 mg, Oral, Daily  budesonide-formoterol (SYMBICORT) 160-4.5 MCG/ACT inhaler 2 puff, 2 puff, Inhalation, BID  therapeutic multivitamin-minerals 1 tablet, 1 tablet, Oral, Daily  rivaroxaban (XARELTO) tablet 15 mg, 15 mg, Oral, Daily with breakfast  Vitamin D (CHOLECALCIFEROL) tablet 1,000 Units, 1,000 Units, Oral, Daily  cefTRIAXone (ROCEPHIN) 1000 mg IVPB in 50 mL D5W minibag, 1,000 mg, IntraVENous, Q24H  furosemide (LASIX) injection 20 mg, 20 mg, IntraVENous, BID  melatonin tablet 6 mg, 6 mg, Oral, Nightly  albuterol (PROVENTIL) nebulizer solution 2.5 mg, 2.5 mg, Nebulization, Q6H PRN      Labs:   Recent Labs     11/14/21 1734 11/15/21  0504   WBC 6.0 6.3   HGB 12.7 12.5   HCT 40.3 39.1    163     Recent Labs     11/14/21  1734 11/14/21  1734 11/15/21  0504     --  140   K 4.1  --  3.9   CO2 21  --  25   BUN 23*  --  22*   CREATININE 0.8  --  0.8   GLUCOSE 146*   < > 97    < > = values in this interval not displayed. No results for input(s): BNP in the last 72 hours. No results for input(s): PROTIME, INR in the last 72 hours. No results for input(s): APTT in the last 72 hours. Recent Labs     11/14/21  1734   TROPONINI <0.01     Lab Results   Component Value Date    HDL 59 06/06/2020    LDLCALC 49 06/06/2020    TRIG 82 06/06/2020     Recent Labs     11/14/21  1734   AST 34   ALT 24   LABALBU 3.8         EKG:   Chronic controlled A. fib    Chest X-Ray:  CHF    ECHO:   Ejection fraction is visually estimated to be 50%. Moderate to severe mitral regurgitation. The left atrium is severely dilated. Moderate aortic regurgitation. Severly dilated right ventricle. Right ventricular systolic function is mildly reduced . Severe tricuspid regurgitation. The right atrium is severely dilated.       ASSESSMENT AND PLAN:        Patient admitted with change in mental status  Has had similar changes in mental status when she had UTI  This time concern is CHF;   proBNP is elevated 1392 however she has had much higher levels in the past  EF is normal with valvular regurgitation  Has been treated with IV Lasix with good diuresis  There is fair amount of urine in the collection box  Patient looks euvolemic to me  We will continue with IV Lasix today and probably transition to oral Lasix tomorrow      Chronic atrial fibrillation  Rate is well controlled  On anticoagulation      Charmayne Coral M. D  11/15/2021

## 2021-11-15 NOTE — PROGRESS NOTES
Physical Therapy    Facility/Department: [facility] 4W MED SURG  Initial Assessment  If patient discharges prior to next session this note will serve as a discharge summary. Please see below for the latest assessment towards goals. NAME: Awa Curry  : 1931  MRN: [id number]    Date of Service: 11/15/2021    Discharge Recommendations:  Continue to assess pending progress, 24 hour supervision or assist   Awa Curry scored a  on the AM-PAC short mobility form. Please see assessment section for further patient specific details. PT Equipment Recommendations  Other: will monitor    Assessment   Assessment: The pt is a 79 yo female who came to the ED with worsening MS and agression. The pt was started on abx for a possbile UTI as an outpt. The pt not able to give PLOF info due to level of dementia; from past admissions: she lives with a son who is her caregiver; he assists her with self-care and does the homemaking and in  the pt was able to walk sometimes with a rollator. Unsure as to her current level of assist.     PMHx: Altz's dementia, a-fib, gout,  E Coli in urine      Today, the pt presented as awake but incinsistently follows directions. The pt required max A of 2 for bed mobility; min A for sitting EOB and came to standing in the stedy with mod A of 2. Due to her level of dementia, she is unsafe to be seated in the chair therefore was placed back in the bed with clean depends and new pure-wick. In the past, her son has always taken her home where he cares for her. Will con't to follow while on acute care floor and hopefully she will be able to walk or at least transfer to a chair with an asisst level that the son will be able to perform at home.   Specific instructions for Next Treatment: cotx  Prognosis: Guarded  Decision Making: Medium Complexity  PT Education: PT Role; General Safety; Transfer Training; Orientation  Barriers to Learning: cog  REQUIRES PT FOLLOW UP: Yes  Activity Tolerance  Activity Tolerance: Patient limited by cognitive status       Patient Diagnosis(es): The primary encounter diagnosis was Disorientation. Diagnoses of Acute cystitis without hematuria and Acute on chronic combined systolic and diastolic congestive heart failure ([address] were also pertinent to this visit. has a past medical history of Alzheimer's dementia ([facility] [address] Atrial fibrillation ([facility] [address] Benign essential HTN, Gout, and Multiple drug resistant organism (MDRO) culture positive. has no past surgical history on file. Restrictions  Restrictions/Precautions  Restrictions/Precautions: Fall Risk  Position Activity Restriction  Other position/activity restrictions: h/o of severe dementia  Vision/Hearing  Vision: Within Functional Limits  Hearing: Exceptions to [facility]  Hearing Exceptions: Hard of hearing/hearing concerns     Subjective  General  Chart Reviewed: Yes  Additional Pertinent Hx: Per H&P on 11- of Saurabh Fisher, DO: The pt is a 81 yo female who came to the ED with worsening MS and agression. The pt was started on abx for a possbile UTI as an outpt.        PMHx: Altz's dementia, a-fib, gout,  E Coli in urine  Response To Previous Treatment: Not applicable  Referring Practitioner: Jacob Rebolledo MD  Referral Date : 11/14/21  Diagnosis: UTI, altered MS, Acute metabolic encephalopathy, possible CHF  Follows Commands: Impaired  Subjective  Subjective: the pt was found to be awake in the bed holding a bag of potato chips in her L hand and feeding herself with her R hand; the pt essentially non-verbal and does not directly answer questions asked          Orientation  Orientation  Overall Orientation Status: Impaired  Orientation Level: Unable to assess (the pt could not giver her name when asked but did seem to respond to her 1st name)  Social/Functional History  Social/Functional History  Lives With: Son (son Patricia Lewis and his partner Uli Babin)  Type of Home: House  Home Layout: One level  Home Access: Stairs to enter with rails  Entrance Stairs - Number of Steps: 4-5  Bathroom Shower/Tub: Walk-in shower  Bathroom Toilet: Standard  Bathroom Equipment: Grab bars in shower, Shower chair  Home Equipment: 4 wheeled walker, Cane  ADL Assistance: Needs assistance (Son assists with bathing/dressing/toileting - patient feeds self)  Homemaking Assistance:  (son does all)  Homemaking Responsibilities: No  Ambulation Assistance: Needs assistance (with rollator)  Transfer Assistance: Needs assistance  Active : No  Additional Comments: info from past admissions in 2020 and 2021; the pt unable to give any current info re: PLOF and family not in the room to verify  Cognition   Cognition  Overall Cognitive Status: Exceptions  Arousal/Alertness: Delayed responses to stimuli  Following Commands: Inconsistently follows commands  Attention Span: Difficulty attending to directions;  Difficulty dividing attention  Memory: Decreased recall of biographical Information; Decreased recall of precautions; Decreased long term memory; Decreased short term memory; Decreased recall of recent events  Safety Judgement: Decreased awareness of need for safety  Problem Solving: Decreased awareness of errors; Assistance required to identify errors made; Assistance required to generate solutions; Assistance required to correct errors made; Assistance required to implement solutions  Insights: Not aware of deficits  Initiation: Requires cues for all  Sequencing: Requires cues for all    Objective  PROM RLE (degrees)  RLE PROM: WFL  PROM LLE (degrees)  LLE General PROM: anticipate that she has functional PROM but was resistive to movement and did not follow directions for AROM  Strength RLE  Comment: unable to test due to pt's level of dementia  Strength LLE  Comment: unable to test due to pt's level of dementia        Bed mobility  Rolling to Left: Dependent/Total  Rolling to Right: Dependent/Total  Supine to Sit: Maximum assistance; 2 Person assistance  Sit to Supine: Maximum assistance; 2 Person assistance  Scooting: Dependent/Total; 2 Person assistance  Transfers  Sit to Stand: Moderate Assistance; 2 Person Assistance  Stand to sit: Moderate Assistance  Bed to Chair: Unable to assess (unsafe to be out of the bed due to level of dementia but would need LIFT equipment)  Ambulation  Ambulation?: No     Balance  Sitting - Static: Fair  Sitting - Dynamic: Fair; -  Standing - Static: Fair  Comments: sat EOB with initially min A and progressing to [facility]; stood in the stedy x 1 and 1/2 minutes with min A        Plan   Plan  Times per week: 2-3x/week  Specific instructions for Next Treatment: cotx  Current Treatment Recommendations: Functional Mobility Training, Transfer Training, Gait Training, Safety Education & Training, Patient/Caregiver Education & Training  Safety Devices  Type of devices: Call light within reach, Bed alarm in place, Gait belt, Patient at risk for falls, Left in bed, Nurse notified      AM-PAC Score  AM-PAC Inpatient Mobility Raw Score : 9 (11/15/21 [facility])  AM-PAC Inpatient T-Scale Score : 30.55 (11/15/21 [facility])  Mobility Inpatient CMS 0-100% Score: 81.38 (11/15/21 [facility])  Mobility Inpatient CMS G-Code Modifier : CM (11/15/21 1351)          Goals  Short term goals  Time Frame for Short term goals: upon d/c  Short term goal 1: Bed mobility min A of 1-2. Short term goal 2: Transfers sit <> stand with min A of 1-2. Short term goal 3: Ambulate with HHA of 2 10 feet with min A of 2.   Patient Goals   Patient goals : none stated per pt due to level of dementia       Therapy Time   Individual Concurrent Group Co-treatment   Time In 1310         Time Out 1400         Minutes 50         Timed Code Treatment Minutes: 35 Minutes     Electronically signed by Higinio Michel, PT [id number] on 11/15/2021 at 2:03 PM

## 2021-11-15 NOTE — ACP (ADVANCE CARE PLANNING)
Advance Care Planning     Advance Care Planning Activator (Inpatient)  Conversation Note      Date of ACP Conversation: 11/15/2021     Conversation Conducted with: Viktor Beverly Decision Maker: Named in Advance Directive or Healthcare Power of  (name) kassie Elise    ACP Activator: hCante Stein RN    Health Care Decision Maker:     Current Designated Health Care Decision Maker:     Primary Decision Maker: Marlen Dickson - Child - [phone]    Care Preferences    Ventilation: \"If you were in your present state of health and suddenly became very ill and were unable to breathe on your own, what would your preference be about the use of a ventilator (breathing machine) if it were available to you? \"      Would the patient desire the use of ventilator (breathing machine)?: no    \"If your health worsens and it becomes clear that your chance of recovery is unlikely, what would your preference be about the use of a ventilator (breathing machine) if it were available to you? \"     Would the patient desire the use of ventilator (breathing machine)?: No      Resuscitation  \"CPR works best to restart the heart when there is a sudden event, like a heart attack, in someone who is otherwise healthy. Unfortunately, CPR does not typically restart the heart for people who have serious health conditions or who are very sick. \"    \"In the event your heart stopped as a result of an underlying serious health condition, would you want attempts to be made to restart your heart (answer \"yes\" for attempt to resuscitate) or would you prefer a natural death (answer \"no\" for do not attempt to resuscitate)? \" no       [] Yes   [x] No   Educated Patient / Kia Miner regarding differences between Advance Directives and portable DNR orders.     Length of ACP Conversation in minutes:  3    Conversation Outcomes:  [x] ACP discussion completed  [x] Existing advance directive reviewed with patient; no changes to patient's previously recorded wishes  [] New Advance Directive completed  [] Portable Do Not Rescitate prepared for Provider review and signature  [] POLST/POST/MOLST/MOST prepared for Provider review and signature      Follow-up plan:    [] Schedule follow-up conversation to continue planning  [] Referred individual to Provider for additional questions/concerns   [] Advised patient/agent/surrogate to review completed ACP document and update if needed with changes in condition, patient preferences or care setting    [x] This note routed to one or more involved healthcare providers    Electronically signed by Perry Jones RN on 11/15/2021 at 4:36 PM

## 2021-11-16 LAB
ANION GAP SERPL CALCULATED.3IONS-SCNC: 13 MMOL/L (ref 3–16)
BUN BLDV-MCNC: 23 MG/DL (ref 7–20)
CALCIUM SERPL-MCNC: 9.9 MG/DL (ref 8.3–10.6)
CHLORIDE BLD-SCNC: 103 MMOL/L (ref 99–110)
CO2: 26 MMOL/L (ref 21–32)
CREAT SERPL-MCNC: 1.3 MG/DL (ref 0.6–1.2)
GFR AFRICAN AMERICAN: 47
GFR NON-AFRICAN AMERICAN: 38
GLUCOSE BLD-MCNC: 75 MG/DL (ref 70–99)
HCT VFR BLD CALC: 40.2 % (ref 36–48)
HEMOGLOBIN: 12.9 G/DL (ref 12–16)
MCH RBC QN AUTO: 28.2 PG (ref 26–34)
MCHC RBC AUTO-ENTMCNC: 32.2 G/DL (ref 31–36)
MCV RBC AUTO: 87.5 FL (ref 80–100)
PDW BLD-RTO: 15.5 % (ref 12.4–15.4)
PLATELET # BLD: 176 K/UL (ref 135–450)
PMV BLD AUTO: 10 FL (ref 5–10.5)
POTASSIUM REFLEX MAGNESIUM: 4.2 MMOL/L (ref 3.5–5.1)
RBC # BLD: 4.59 M/UL (ref 4–5.2)
SODIUM BLD-SCNC: 142 MMOL/L (ref 136–145)
WBC # BLD: 5.5 K/UL (ref 4–11)

## 2021-11-16 PROCEDURE — 6360000002 HC RX W HCPCS: Performed by: INTERNAL MEDICINE

## 2021-11-16 PROCEDURE — 80048 BASIC METABOLIC PNL TOTAL CA: CPT

## 2021-11-16 PROCEDURE — 99232 SBSQ HOSP IP/OBS MODERATE 35: CPT | Performed by: INTERNAL MEDICINE

## 2021-11-16 PROCEDURE — 2580000003 HC RX 258: Performed by: INTERNAL MEDICINE

## 2021-11-16 PROCEDURE — 6370000000 HC RX 637 (ALT 250 FOR IP): Performed by: INTERNAL MEDICINE

## 2021-11-16 PROCEDURE — 94761 N-INVAS EAR/PLS OXIMETRY MLT: CPT

## 2021-11-16 PROCEDURE — 1200000000 HC SEMI PRIVATE

## 2021-11-16 PROCEDURE — 85027 COMPLETE CBC AUTOMATED: CPT

## 2021-11-16 PROCEDURE — 36415 COLL VENOUS BLD VENIPUNCTURE: CPT

## 2021-11-16 RX ADMIN — SODIUM CHLORIDE, PRESERVATIVE FREE 10 ML: 5 INJECTION INTRAVENOUS at 20:27

## 2021-11-16 RX ADMIN — CEFTRIAXONE 1000 MG: 1 INJECTION, POWDER, FOR SOLUTION INTRAMUSCULAR; INTRAVENOUS at 20:27

## 2021-11-16 NOTE — PLAN OF CARE
Problem: Falls - Risk of:  Goal: Will remain free from falls  Description: Will remain free from falls  Outcome: Ongoing  Goal: Absence of physical injury  Description: Absence of physical injury  Outcome: Ongoing     Problem: Confusion - Acute:  Goal: Absence of continued neurological deterioration signs and symptoms  Description: Absence of continued neurological deterioration signs and symptoms  Outcome: Ongoing  Goal: Mental status will be restored to baseline  Description: Mental status will be restored to baseline  Outcome: Ongoing     Problem: Discharge Planning:  Goal: Ability to perform activities of daily living will improve  Description: Ability to perform activities of daily living will improve  Outcome: Ongoing  Goal: Participates in care planning  Description: Participates in care planning  Outcome: Ongoing     Problem: Injury - Risk of, Physical Injury:  Goal: Will remain free from falls  Description: Will remain free from falls  Outcome: Ongoing  Goal: Absence of physical injury  Description: Absence of physical injury  Outcome: Ongoing     Problem: Mood - Altered:  Goal: Mood stable  Description: Mood stable  Outcome: Ongoing  Goal: Absence of abusive behavior  Description: Absence of abusive behavior  Outcome: Ongoing  Goal: Verbalizations of feeling emotionally comfortable while being cared for will increase  Description: Verbalizations of feeling emotionally comfortable while being cared for will increase  Outcome: Ongoing     Problem: Psychomotor Activity - Altered:  Goal: Absence of psychomotor disturbance signs and symptoms  Description: Absence of psychomotor disturbance signs and symptoms  Outcome: Ongoing     Problem: Sensory Perception - Impaired:  Goal: Demonstrations of improved sensory functioning will increase  Description: Demonstrations of improved sensory functioning will increase  Outcome: Ongoing  Goal: Decrease in sensory misperception frequency  Description: Decrease in sensory misperception frequency  Outcome: Ongoing  Goal: Able to refrain from responding to false sensory perceptions  Description: Able to refrain from responding to false sensory perceptions  Outcome: Ongoing  Goal: Demonstrates accurate environmental perceptions  Description: Demonstrates accurate environmental perceptions  Outcome: Ongoing  Goal: Able to distinguish between reality-based and nonreality-based thinking  Description: Able to distinguish between reality-based and nonreality-based thinking  Outcome: Ongoing  Goal: Able to interrupt nonreality-based thinking  Description: Able to interrupt nonreality-based thinking  Outcome: Ongoing     Problem: Sleep Pattern Disturbance:  Goal: Appears well-rested  Description: Appears well-rested  Outcome: Ongoing     Problem: Sensory:  Goal: General experience of comfort will improve  Description: General experience of comfort will improve  Outcome: Ongoing     Problem: Urinary Elimination:  Goal: Signs and symptoms of infection will decrease  Description: Signs and symptoms of infection will decrease  Outcome: Ongoing  Goal: Ability to reestablish a normal urinary elimination pattern will improve - after catheter removal  Description: Ability to reestablish a normal urinary elimination pattern will improve  Outcome: Ongoing  Goal: Complications related to the disease process, condition or treatment will be avoided or minimized  Description: Complications related to the disease process, condition or treatment will be avoided or minimized  Outcome: Ongoing     Problem: Skin Integrity:  Goal: Will show no infection signs and symptoms  Description: Will show no infection signs and symptoms  Outcome: Ongoing  Goal: Absence of new skin breakdown  Description: Absence of new skin breakdown  Outcome: Ongoing     Problem: OXYGENATION/RESPIRATORY FUNCTION  Goal: Patient will maintain patent airway  Outcome: Ongoing  Goal: Patient will achieve/maintain normal respiratory rate/effort  Description: Respiratory rate and effort will be within normal limits for the patient  Outcome: Ongoing     Problem: HEMODYNAMIC STATUS  Goal: Patient has stable vital signs and fluid balance  Outcome: Ongoing     Problem: FLUID AND ELECTROLYTE IMBALANCE  Goal: Fluid and electrolyte balance are achieved/maintained  Outcome: Ongoing     Problem: ACTIVITY INTOLERANCE/IMPAIRED MOBILITY  Goal: Mobility/activity is maintained at optimum level for patient  Outcome: Ongoing

## 2021-11-16 NOTE — PROGRESS NOTES
[facility]  Cardiology Consult Note        CC:      CHF          HPI:   This is a 80 y.o. female with Alzheimer's dementia, chronic AF, HTN came to the ER for evaluation of change in mental status. Apparently the patient was getting progressively more confused. Patient unable to give much history because of dementia. Admission proBNP 1392; previous proBNP's have been much higher    Echo in 2020 showed ejection fraction of 50% with severe MR TR  She takes furosemide 20 mg every other day, Toprol-XL 12.5 daily and Xarelto 15 mg daily    Interval history  Patient laying in bed sleeping difficult to arouse  No tachypnea or respiratory distress    Past Medical History:   Diagnosis Date    [facility]'s dementia ([facility] [facility]     Atrial fibrillation ([facility] [facility]     Benign essential HTN     Gout     Multiple drug resistant organism (MDRO) culture positive 11/04/2021    Escherichia coli. Urine      History reviewed. No pertinent surgical history. History reviewed. No pertinent family history.    Social History     Tobacco Use    Smoking status: Never Smoker    Smokeless tobacco: Never Used   Vaping Use    Vaping Use: Never used   Substance Use Topics    Alcohol use: Never    Drug use: Never      Allergies   Allergen Reactions    Chlorpromazine Other (See Comments)    Hydrochlorothiazide Other (See Comments)     Gout      sodium chloride flush  10 mL IntraVENous 2 times per day    calcium elemental  500 mg Oral Daily    metoprolol succinate  12.5 mg Oral Daily    budesonide-formoterol  2 puff Inhalation BID    therapeutic multivitamin-minerals  1 tablet Oral Daily    rivaroxaban  15 mg Oral Daily with breakfast    Vitamin D  1,000 Units Oral Daily    cefTRIAXone (ROCEPHIN) IV  1,000 mg IntraVENous Q24H    [Held by provider] furosemide  20 mg IntraVENous BID    melatonin  6 mg Oral Nightly         Intake/Output Summary (Last 24 hours) at 11/16/2021 1638  Last data filed at 11/16/2021 0656  Gross per 24 hour   Intake 0 ml   Output 1600 ml   Net -1600 ml       Physical Examination: Looks quite comfortable  BP (!) 103/55   Pulse 99   Temp 98.6 °F (37 °C) (Axillary)   Resp 16   Ht 5' 3\" (1.6 m)   Wt 182 lb 1.6 oz (82.6 kg)   SpO2 90%   BMI 32.26 kg/m²    HEENT:  Face: Atraumatic, Conjunctiva: Pink; non icteric,  Mucous Memb:  Moist, No thyromegaly or Lymphadenopathy  Respiratory:  Resp Assessment: normal, Resp Auscultation: clear   Cardiovascular: Auscultation: nl S1 & S2, Palpation:  Nl PMI;  No heaves or thrills, JVP:  normal  Abdomen: Soft, non-tender, Normal bowel sounds,  No organomegaly  Extremities: No Cyanosis or Clubbing; Edema none  Neurological: Oriented to time, place, and person, Non-anxious  Psychiatric: Normal mood and affect  Skin: Warm and dry,  No rash seen      Current Facility-Administered Medications: sodium chloride flush 0.9 % injection 10 mL, 10 mL, IntraVENous, 2 times per day  sodium chloride flush 0.9 % injection 10 mL, 10 mL, IntraVENous, PRN  0.9 % sodium chloride infusion, 25 mL, IntraVENous, PRN  potassium chloride 10 mEq/100 mL IVPB (Peripheral Line), 10 mEq, IntraVENous, PRN  magnesium sulfate 2000 mg in 50 mL IVPB premix, 2,000 mg, IntraVENous, PRN  ondansetron (ZOFRAN-ODT) disintegrating tablet 4 mg, 4 mg, Oral, Q8H PRN **OR** ondansetron (ZOFRAN) injection 4 mg, 4 mg, IntraVENous, Q6H PRN  magnesium hydroxide (MILK OF MAGNESIA) 400 MG/5ML suspension 30 mL, 30 mL, Oral, Daily PRN  acetaminophen (TYLENOL) tablet 650 mg, 650 mg, Oral, Q6H PRN **OR** acetaminophen (TYLENOL) suppository 650 mg, 650 mg, Rectal, Q6H PRN  calcium elemental (OSCAL) tablet 500 mg, 500 mg, Oral, Daily  metoprolol succinate (TOPROL XL) extended release tablet 12.5 mg, 12.5 mg, Oral, Daily  budesonide-formoterol (SYMBICORT) 160-4.5 MCG/ACT inhaler 2 puff, 2 puff, Inhalation, BID  therapeutic multivitamin-minerals 1 tablet, 1 tablet, Oral, Daily  rivaroxaban (XARELTO) tablet 15 mg, 15 mg, Oral, Daily with breakfast  Vitamin D (CHOLECALCIFEROL) tablet 1,000 Units, 1,000 Units, Oral, Daily  cefTRIAXone (ROCEPHIN) 1000 mg IVPB in 50 mL D5W minibag, 1,000 mg, IntraVENous, Q24H  [Held by provider] furosemide (LASIX) injection 20 mg, 20 mg, IntraVENous, BID  melatonin tablet 6 mg, 6 mg, Oral, Nightly  albuterol (PROVENTIL) nebulizer solution 2.5 mg, 2.5 mg, Nebulization, Q6H PRN      Labs:   Recent Labs     11/15/21  0504 11/16/21  0627   WBC 6.3 5.5   HGB 12.5 12.9   HCT 39.1 40.2    176     Recent Labs     11/15/21  0504 11/15/21  0504 11/16/21  0626     --  142   K 3.9  --  4.2   CO2 25  --  26   BUN 22*  --  23*   CREATININE 0.8  --  1.3*   GLUCOSE 97   < > 75    < > = values in this interval not displayed. No results for input(s): BNP in the last 72 hours. No results for input(s): PROTIME, INR in the last 72 hours. No results for input(s): APTT in the last 72 hours. Recent Labs     11/14/21  1734   TROPONINI <0.01     Lab Results   Component Value Date    HDL 59 06/06/2020    LDLCALC 49 06/06/2020    TRIG 82 06/06/2020     Recent Labs     11/14/21  1734   AST 34   ALT 24   LABALBU 3.8         EKG:   Chronic controlled A. fib    Chest X-Ray:  CHF    ECHO:   Ejection fraction is visually estimated to be 50%. Moderate to severe mitral regurgitation. The left atrium is severely dilated. Moderate aortic regurgitation. Severly dilated right ventricle. Right ventricular systolic function is mildly reduced . Severe tricuspid regurgitation. The right atrium is severely dilated. ASSESSMENT AND PLAN:      CHF  I think the patient is euvolemic   agree with discontinuation of Lasix    Chronic atrial fibrillation  Rate is well controlled  On anticoagulation      Antwon MORALES  11/16/2021

## 2021-11-16 NOTE — PLAN OF CARE
feeling emotionally comfortable while being cared for will increase  11/16/2021 1659 by Risa Burr RN  Outcome: Ongoing     Problem: Psychomotor Activity - Altered:  Goal: Absence of psychomotor disturbance signs and symptoms  Description: Absence of psychomotor disturbance signs and symptoms  11/16/2021 1659 by Risa Burr RN  Outcome: Ongoing     Problem: Sensory Perception - Impaired:  Goal: Demonstrations of improved sensory functioning will increase  Description: Demonstrations of improved sensory functioning will increase  11/16/2021 1659 by Risa Burr RN  Outcome: Ongoing     Problem: Sensory Perception - Impaired:  Goal: Decrease in sensory misperception frequency  Description: Decrease in sensory misperception frequency  11/16/2021 1659 by Risa Burr RN  Outcome: Ongoing     Problem: Sensory Perception - Impaired:  Goal: Able to refrain from responding to false sensory perceptions  Description: Able to refrain from responding to false sensory perceptions  11/16/2021 1659 by Risa Burr RN  Outcome: Ongoing     Problem: Sensory Perception - Impaired:  Goal: Demonstrates accurate environmental perceptions  Description: Demonstrates accurate environmental perceptions  11/16/2021 1659 by Risa Burr RN  Outcome: Ongoing     Problem: Sensory Perception - Impaired:  Goal: Able to distinguish between reality-based and nonreality-based thinking  Description: Able to distinguish between reality-based and nonreality-based thinking  11/16/2021 1659 by Risa Burr RN  Outcome: Ongoing     Problem: Sensory Perception - Impaired:  Goal: Able to interrupt nonreality-based thinking  Description: Able to interrupt nonreality-based thinking  11/16/2021 1659 by Risa Burr RN  Outcome: Ongoing     Problem: Sleep Pattern Disturbance:  Goal: Appears well-rested  Description: Appears well-rested  11/16/2021 1659 by Risa Burr RN  Outcome: Ongoing     Problem: Sensory:  Goal: General experience of comfort will Ongoing     Problem: ACTIVITY INTOLERANCE/IMPAIRED MOBILITY  Goal: Mobility/activity is maintained at optimum level for patient  11/16/2021 1659 by Maria Del Carmen Hanna RN  Outcome: Ongoing

## 2021-11-16 NOTE — PROGRESS NOTES
History     Chief Complaint   Patient presents with     Loss of Consciousness     HPI  Karey Paulson is a 89 year old female who was at [facility] this morning where she lives.  She felt flushed, warm, sweaty, nauseated, and then evidently may have passed out.  She became incontinent of stool and was evidently out for about 20sec.  HR at that time was in the 40s.  A classic constellation of symptoms suggesting vasovagal syncope, in my opinion.  What predicated her symptoms was an immediate preceding abdominal discomfort, although this was fleeting, and does not bother her now.    She feels fine now, and has not been unwell recently, according to her.     No hx of syncope or cardiac issues.  No hx of palpitations.    No new meds.    No fevers, runny nose, cough, Chest pain, SOB, abdominal issues, V/D, urinary symptoms    It appears in the PMH below, however, that she does have a hx of an acute MI in the remote past.    Problem List:    Patient Active Problem List    Diagnosis Date Noted     Primary insomnia 04/11/2018     Priority: Medium     Weakness of both lower extremities 03/27/2018     Priority: Medium     Essential hypertension 03/13/2018     Priority: Medium     S/P laparoscopic colectomy 11/14/2017     Priority: Medium     Diverticulosis of large intestine 10/22/2017     Priority: Medium     Overview:   S/p fle sig, 10/22/2017       Stricture of sigmoid colon (H) 10/22/2017     Priority: Medium     Overview:   S/p flex sig 10/22/2017       [place]-vesical fistula 10/21/2017     Priority: Medium     Overview:        Recurrent UTI 10/21/2017     Priority: Medium     Overview:   2 in 2017, 10/13/2017 and 6/28/2017       Pancreatic cyst 10/20/2017     Priority: Medium     Overview:   Cystic nodules, multiple; s/p CT Abd Pelv       Gastroesophageal reflux disease without esophagitis 12/31/2015     Priority: Medium     ACP (advance care planning) 12/05/2013     Priority: Medium     Backache 02/24/2013      Hospitalist Progress Note      PCP: TASHI Morgan - CNP    Date of Admission: 11/14/2021      Subjective: Feels confused, minimal improvement, no family member at bedside      Medications:  Reviewed    Infusion Medications    sodium chloride       Scheduled Medications    sodium chloride flush  10 mL IntraVENous 2 times per day    calcium elemental  500 mg Oral Daily    metoprolol succinate  12.5 mg Oral Daily    budesonide-formoterol  2 puff Inhalation BID    therapeutic multivitamin-minerals  1 tablet Oral Daily    rivaroxaban  15 mg Oral Daily with breakfast    Vitamin D  1,000 Units Oral Daily    cefTRIAXone (ROCEPHIN) IV  1,000 mg IntraVENous Q24H    furosemide  20 mg IntraVENous BID    melatonin  6 mg Oral Nightly     PRN Meds: sodium chloride flush, sodium chloride, potassium chloride, magnesium sulfate, ondansetron **OR** ondansetron, magnesium hydroxide, acetaminophen **OR** acetaminophen, albuterol      Intake/Output Summary (Last 24 hours) at 11/16/2021 0709  Last data filed at 11/16/2021 0656  Gross per 24 hour   Intake 0 ml   Output 600 ml   Net -600 ml       Physical Exam Performed:    BP (!) 116/57   Pulse 96   Temp 99.1 °F (37.3 °C) (Oral)   Resp 20   Ht 5' 3\" (1.6 m)   Wt 182 lb 1.6 oz (82.6 kg)   SpO2 94%   BMI 32.26 kg/m²     General appearance: No apparent distress  Respiratory:  Normal respiratory effort. Clear to auscultation, bilaterally without Rales/Wheezes/Rhonchi. Cardiovascular: Regular rate and rhythm with normal S1/S2 without murmurs, rubs or gallops. Abdomen: Soft  Musculoskeletal: No clubbing, cyanosis  Skin: Skin color, texture, turgor normal.  No rashes or lesions.   Neurologic: Moving her extremities  Psychiatric: Awake  Capillary Refill: Brisk,3 seconds, normal   Peripheral Pulses: +2 palpable, equal bilaterally       Labs:   Recent Labs     11/14/21  1734 11/15/21  0504   WBC 6.0 6.3   HGB 12.7 12.5   HCT 40.3 39.1    163     Recent Labs 11/14/21  1734 11/15/21  0504    140   K 4.1 3.9    103   CO2 21 25   BUN 23* 22*   CREATININE 0.8 0.8   CALCIUM 10.4 10.3     Recent Labs     11/14/21  1734   AST 34   ALT 24   BILITOT 0.8   ALKPHOS 190*     No results for input(s): INR in the last 72 hours. Recent Labs     11/14/21  1734   TROPONINI <0.01       Urinalysis:      Lab Results   Component Value Date    NITRU Negative 11/14/2021    WBCUA 44 11/14/2021    BACTERIA 1+ 11/14/2021    RBCUA 0 11/14/2021    BLOODU TRACE-INTACT 11/14/2021    SPECGRAV 1.015 11/14/2021    GLUCOSEU Negative 11/14/2021       Radiology:  XR CHEST PORTABLE   Final Result   Findings suggest congestive heart failure         CT Head WO Contrast   Final Result   No acute intracranial abnormality. Diffuse atrophic changes with findings suggesting chronic microvascular   ischemia and an old right parietooccipital infarct                 Assessment/Plan:    Active Hospital Problems    Diagnosis     CHF (congestive heart failure), NYHA class I, acute on chronic, combined ([facility] [address] [I50.43]     AMS (altered mental status) [R41.82]     UTI (urinary tract infection) [N39.0]     Chronic atrial fibrillation (HCC) [I48.20]     Dementia in Alzheimer's disease with delirium ([facility] [address] [G30.9, F02.80, F05]        1. Acute metabolic encephalopathy, likely on top of baseline dementia, could be triggered by infection, currently treated for UTI and congestive heart failure, continue to monitor for now also at risk for delirium. Significantly improved since yesterday  2.  UTI, started on IV antibiotics, ceftriaxone, will keep for now  3. Possible acute on chronic congestive heart failure exacerbation, started on IV Lasix, creatinine increased this morning, I will hold Lasix and monitor. Repeat BMP in a.m.  4.  Essential hypertension, continue p.o. medications  5. A. 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     Priority: Medium        Past Medical History:    Past Medical History:   Diagnosis Date     Acquired absence of other specified parts of digestive tract      Acute myocardial infarction (H)      Cyst of pancreas      Diaphragmatic hernia without obstruction or gangrene      Diverticulosis of large intestine without perforation or abscess without bleeding      Edema      Epigastric pain      Essential (primary) hypertension      Gastro-esophageal reflux disease without esophagitis      Osteoarthritis      Other intestinal obstruction unspecified as to partial versus complete obstruction (CODE)      Personal history of malignant neoplasm of breast      Personal history of other medical treatment (CODE)      Postmenopausal atrophic vaginitis      Sepsis (H)      Urinary tract infection      Urinary tract infection        Past Surgical History:    Past Surgical History:   Procedure Laterality Date     ANGIOPLASTY      12-,Angioplasty with 3 bare-metal stents RCA     APPENDECTOMY OPEN      1948,Appendectomy     CHOLECYSTECTOMY      1995,Neon     EXTRACAPSULAR CATARACT EXTRATION WITH INTRAOCULAR LENS IMPLANT      12/2016,bilat     HYSTERECTOMY TOTAL ABDOMINAL      1967,RUEL, ovaries remaining     MASTECTOMY SIMPLE      1993,Neon     OTHER SURGICAL HISTORY      10/24/2017,[id number],OTHER,Ellen Arambula and Jairo       Family History:    Family History   Problem Relation Age of Onset     Breast Cancer No family hx of      Cancer-breast       Social History:  Marital Status:   [5]  Social History   Substance Use Topics     Smoking status: Never Smoker     Smokeless tobacco: Never Used     Alcohol use No        Medications:   "    acetaZOLAMIDE (DIAMOX) 250 MG tablet   amLODIPine (NORVASC) 10 MG tablet   ASPIRIN LOW DOSE 81 MG EC tablet   brimonidine (ALPHAGAN P) 0.1 % ophthalmic solution   fluorometholone (FML LIQUIFILM) 0.1 % ophthalmic susp   losartan (COZAAR) 50 MG tablet   melatonin 3 MG tablet   timolol (TIMOPTIC) 0.5 % ophthalmic solution   ALPHAGAN P 0.1 % ophthalmic solution   alum & mag hydroxide-simethicone (MYLANTA/MAALOX) 200-200-20 MG/5ML SUSP suspension   amLODIPine (NORVASC) 10 MG tablet   atenolol (TENORMIN) 25 MG tablet   COMBIGAN 0.2-0.5 % ophthalmic solution   dorzolamide-timolol (COSOPT) 2-0.5 % ophthalmic solution   hydrochlorothiazide (HYDRODIURIL) 25 MG tablet   latanoprost (XALATAN) 0.005 % ophthalmic solution   latanoprost (XALATAN) 0.005 % ophthalmic solution   MAPAP 500 MG tablet   order for DME   prednisoLONE acetate (PRED FORTE) 1 % ophthalmic susp   prednisoLONE acetate (PRED FORTE) 1 % ophthalmic susp   ranitidine (ZANTAC) 150 MG tablet   SIMBRINZA 1-0.2 % ophthalmic suspension   sucralfate (CARAFATE) 1 GM tablet   timolol (TIMOPTIC) 0.5 % ophthalmic solution   timolol (TIMOPTIC-XE) 0.5 % ophthalmic gel-form   timolol (TIMOPTIC-XE) 0.5 % ophthalmic gel-form   valsartan (DIOVAN) 80 MG tablet   VITAMIN D3 1000 UNITS tablet   [DISCONTINUED] valsartan (DIOVAN) 80 MG tablet         Review of Systems  As above  Physical Exam   BP: 134/58  Heart Rate: 61  Temp: 97  F (36.1  C)  Resp: 14  Height: 167.6 cm (5' 6\")  Weight: 64 kg (141 lb)  SpO2: 98 % (Simultaneous filing. User may not have seen previous data.)      Physical Exam  Well woman.  She remains asymptomatic in sinus rhythm throughout her stay.  Heart reg with known murmur.  Lungs clear.  No tachypnea.  abd soft, NT, ND, NABS, no pulsatile abdominal mass.  No DVt signs or ankle edema.    ED Course     ED Course     Procedures               Critical Care time:  none               Results for orders placed or performed during the hospital encounter of 10/07/18 " 1800 ml  Code Status: DNR-CCA        Vick Presley MD (from the past 24 hour(s))   CBC with platelets differential   Result Value Ref Range    WBC 10.9 4.0 - 11.0 10e9/L    RBC Count 4.08 3.8 - 5.2 10e12/L    Hemoglobin 11.9 11.7 - 15.7 g/dL    Hematocrit 36.1 35.0 - 47.0 %    MCV 89 78 - 100 fl    MCH 29.2 26.5 - 33.0 pg    MCHC 33.0 31.5 - 36.5 g/dL    RDW 14.1 10.0 - 15.0 %    Platelet Count 244 150 - 450 10e9/L    Diff Method Automated Method     % Neutrophils 83.3 %    % Lymphocytes 7.5 %    % Monocytes 7.1 %    % Eosinophils 1.2 %    % Basophils 0.6 %    % Immature Granulocytes 0.3 %    Absolute Neutrophil 9.1 (H) 1.6 - 8.3 10e9/L    Absolute Lymphocytes 0.8 0.8 - 5.3 10e9/L    Absolute Monocytes 0.8 0.0 - 1.3 10e9/L    Absolute Eosinophils 0.1 0.0 - 0.7 10e9/L    Absolute Basophils 0.1 0.0 - 0.2 10e9/L    Abs Immature Granulocytes 0.0 0 - 0.4 10e9/L   Comprehensive metabolic panel   Result Value Ref Range    Sodium 138 134 - 144 mmol/L    Potassium 3.4 (L) 3.5 - 5.1 mmol/L    Chloride 110 (H) 98 - 107 mmol/L    Carbon Dioxide 21 21 - 31 mmol/L    Anion Gap 7 3 - 14 mmol/L    Glucose 166 (H) 70 - 105 mg/dL    Urea Nitrogen 31 (H) 7 - 25 mg/dL    Creatinine 0.68 0.60 - 1.20 mg/dL    GFR Estimate 81 >60 mL/min/1.7m2    GFR Estimate If Black >90 >60 mL/min/1.7m2    Calcium 9.5 8.6 - 10.3 mg/dL    Bilirubin Total 0.5 0.3 - 1.0 mg/dL    Albumin 3.6 3.5 - 5.7 g/dL    Protein Total 6.2 (L) 6.4 - 8.9 g/dL    Alkaline Phosphatase 41 34 - 104 U/L    ALT 17 7 - 52 U/L    AST 17 13 - 39 U/L   Troponin I   Result Value Ref Range    Troponin I ES <0.030 0.000 - 0.034 ug/L   XR Chest Port 1 View    Narrative    PROCEDURE: XR CHEST PORT 1 VW 10/7/2018 11:38 AM    HISTORY: syncope;     COMPARISONS: 6/28/2017.    TECHNIQUE: Single view.    FINDINGS: Heart is mildly enlarged but is stable in size. There is  some elongation of the thoracic aorta. No acute infiltrate or effusion  is seen.    There is chronic rotator cuff disease bilaterally.         Impression    IMPRESSION: No acute  disease.    BENJIE THACKER MD   *UA reflex to Microscopic   Result Value Ref Range    Color Urine Yellow     Appearance Urine Slightly Cloudy     Glucose Urine Negative NEG^Negative mg/dL    Bilirubin Urine Negative NEG^Negative    Ketones Urine Negative NEG^Negative mg/dL    Specific Gravity Urine 1.010 1.000 - 1.030    Blood Urine Negative NEG^Negative    pH Urine 8.0 5.0 - 9.0 pH    Protein Albumin Urine Negative NEG^Negative mg/dL    Urobilinogen Urine 1.0 0.2 - 1.0 EU/dL    Nitrite Urine Negative NEG^Negative    Leukocyte Esterase Urine Small (A) NEG^Negative    Source Midstream Urine    Urine Microscopic   Result Value Ref Range    WBC Urine 0 - 5 OTO5^0 - 5 /HPF    RBC Urine O - 2 OTO2^O - 2 /HPF    Bacteria Urine Moderate (A) NEG^Negative /HPF    Amorphous Crystals Many (A) NEG^Negative /HPF       Medications - No data to display    Assessments & Plan (with Medical Decision Making)     I have reviewed the nursing notes.    I have reviewed the findings, diagnosis, plan and need for follow up with the patient.   EKG shows junctional rhythm, which I believe is erroneous, and is a sinus rhythm with no concerning ST or T changes.  CXR one view looks clear.      Diagnostics reassuring, no infectious cause noted.    At this time, I believe the patient had a painful episode of abdominal pain that precipitated a vasovagal event.  Her symptom constellation is classic.  Cardiogenic causes to syncope is obviously considered, but at this time, being monitored in the ER with no ectopy and no symptoms, I do not feel she needs to be admitted.  Will send back to the monitored setting of [facility], and have her follow up back here to the ER today or tomorrow should there be a recurrence of syncope or near-syncope.      She is currently asymptomatic, has no concerns, and diagnostics reassuring with no evidence of cardiogenic causes to her syncope.        New Prescriptions    No medications on file       Final diagnoses:    Vasovagal syncope       10/7/2018   [facility]     Waqas Espana MD  10/07/18 2540

## 2021-11-17 LAB
ANION GAP SERPL CALCULATED.3IONS-SCNC: 12 MMOL/L (ref 3–16)
BUN BLDV-MCNC: 33 MG/DL (ref 7–20)
CALCIUM SERPL-MCNC: 9.6 MG/DL (ref 8.3–10.6)
CHLORIDE BLD-SCNC: 105 MMOL/L (ref 99–110)
CO2: 26 MMOL/L (ref 21–32)
CREAT SERPL-MCNC: 1.3 MG/DL (ref 0.6–1.2)
GFR AFRICAN AMERICAN: 47
GFR NON-AFRICAN AMERICAN: 38
GLUCOSE BLD-MCNC: 81 MG/DL (ref 70–99)
HCT VFR BLD CALC: 39 % (ref 36–48)
HEMOGLOBIN: 12.5 G/DL (ref 12–16)
MCH RBC QN AUTO: 27.9 PG (ref 26–34)
MCHC RBC AUTO-ENTMCNC: 32 G/DL (ref 31–36)
MCV RBC AUTO: 87.2 FL (ref 80–100)
ORGANISM: ABNORMAL
ORGANISM: ABNORMAL
PDW BLD-RTO: 15.4 % (ref 12.4–15.4)
PLATELET # BLD: 190 K/UL (ref 135–450)
PMV BLD AUTO: 9.8 FL (ref 5–10.5)
POTASSIUM REFLEX MAGNESIUM: 4 MMOL/L (ref 3.5–5.1)
RBC # BLD: 4.48 M/UL (ref 4–5.2)
SODIUM BLD-SCNC: 143 MMOL/L (ref 136–145)
URINE CULTURE, ROUTINE: ABNORMAL
URINE CULTURE, ROUTINE: ABNORMAL
WBC # BLD: 6.2 K/UL (ref 4–11)

## 2021-11-17 PROCEDURE — 6360000002 HC RX W HCPCS: Performed by: INTERNAL MEDICINE

## 2021-11-17 PROCEDURE — 2580000003 HC RX 258: Performed by: INTERNAL MEDICINE

## 2021-11-17 PROCEDURE — 94640 AIRWAY INHALATION TREATMENT: CPT

## 2021-11-17 PROCEDURE — 1200000000 HC SEMI PRIVATE

## 2021-11-17 PROCEDURE — 36415 COLL VENOUS BLD VENIPUNCTURE: CPT

## 2021-11-17 PROCEDURE — 80048 BASIC METABOLIC PNL TOTAL CA: CPT

## 2021-11-17 PROCEDURE — 85027 COMPLETE CBC AUTOMATED: CPT

## 2021-11-17 PROCEDURE — 6370000000 HC RX 637 (ALT 250 FOR IP): Performed by: STUDENT IN AN ORGANIZED HEALTH CARE EDUCATION/TRAINING PROGRAM

## 2021-11-17 PROCEDURE — 6370000000 HC RX 637 (ALT 250 FOR IP): Performed by: INTERNAL MEDICINE

## 2021-11-17 PROCEDURE — 94760 N-INVAS EAR/PLS OXIMETRY 1: CPT

## 2021-11-17 RX ORDER — SODIUM CHLORIDE 9 MG/ML
INJECTION, SOLUTION INTRAVENOUS CONTINUOUS
Status: DISCONTINUED | OUTPATIENT
Start: 2021-11-17 | End: 2021-11-21

## 2021-11-17 RX ADMIN — SODIUM CHLORIDE, PRESERVATIVE FREE 10 ML: 5 INJECTION INTRAVENOUS at 22:28

## 2021-11-17 RX ADMIN — RIVAROXABAN 15 MG: 15 TABLET, FILM COATED ORAL at 09:41

## 2021-11-17 RX ADMIN — SODIUM CHLORIDE 1500 MG: 900 INJECTION INTRAVENOUS at 15:58

## 2021-11-17 RX ADMIN — SODIUM CHLORIDE 25 ML: 9 INJECTION, SOLUTION INTRAVENOUS at 09:46

## 2021-11-17 RX ADMIN — SODIUM CHLORIDE 25 ML: 9 INJECTION, SOLUTION INTRAVENOUS at 15:57

## 2021-11-17 RX ADMIN — Medication 6 MG: at 22:28

## 2021-11-17 RX ADMIN — Medication 1000 UNITS: at 09:38

## 2021-11-17 RX ADMIN — SODIUM CHLORIDE, PRESERVATIVE FREE 10 ML: 5 INJECTION INTRAVENOUS at 09:39

## 2021-11-17 RX ADMIN — CALCIUM 500 MG: 500 TABLET ORAL at 09:39

## 2021-11-17 RX ADMIN — BUDESONIDE AND FORMOTEROL FUMARATE DIHYDRATE 2 PUFF: 160; 4.5 AEROSOL RESPIRATORY (INHALATION) at 10:49

## 2021-11-17 RX ADMIN — SODIUM CHLORIDE 1500 MG: 900 INJECTION INTRAVENOUS at 22:32

## 2021-11-17 RX ADMIN — SODIUM CHLORIDE 1500 MG: 900 INJECTION INTRAVENOUS at 09:47

## 2021-11-17 RX ADMIN — SODIUM CHLORIDE: 9 INJECTION, SOLUTION INTRAVENOUS at 22:31

## 2021-11-17 RX ADMIN — MULTIPLE VITAMINS W/ MINERALS TAB 1 TABLET: TAB at 09:39

## 2021-11-17 RX ADMIN — METOPROLOL SUCCINATE 12.5 MG: 25 TABLET, EXTENDED RELEASE ORAL at 09:38

## 2021-11-17 NOTE — PROGRESS NOTES
Hospitalist Progress Note      PCP: Palmira Dodd, APRN - CNP    Date of Admission: 11/14/2021        Subjective: Still confused, no  family member at bedside      Medications:  Reviewed    Infusion Medications    sodium chloride       Scheduled Medications    sodium chloride flush  10 mL IntraVENous 2 times per day    calcium elemental  500 mg Oral Daily    metoprolol succinate  12.5 mg Oral Daily    budesonide-formoterol  2 puff Inhalation BID    therapeutic multivitamin-minerals  1 tablet Oral Daily    rivaroxaban  15 mg Oral Daily with breakfast    Vitamin D  1,000 Units Oral Daily    cefTRIAXone (ROCEPHIN) IV  1,000 mg IntraVENous Q24H    [Held by provider] furosemide  20 mg IntraVENous BID    melatonin  6 mg Oral Nightly     PRN Meds: sodium chloride flush, sodium chloride, potassium chloride, magnesium sulfate, ondansetron **OR** ondansetron, magnesium hydroxide, acetaminophen **OR** acetaminophen, albuterol      Intake/Output Summary (Last 24 hours) at 11/17/2021 0836  Last data filed at 11/17/2021 0650  Gross per 24 hour   Intake --   Output 600 ml   Net -600 ml       Physical Exam Performed:    /64   Pulse 94   Temp 98 °F (36.7 °C) (Oral)   Resp 17   Ht 5' 3\" (1.6 m)   Wt 179 lb 14.3 oz (81.6 kg)   SpO2 96%   BMI 31.87 kg/m²     General appearance: No apparent distress  Respiratory:  Normal respiratory effort. Clear to auscultation, bilaterally without Rales/Wheezes/Rhonchi. Cardiovascular: Regular rate and rhythm with normal S1/S2 without murmurs, rubs or gallops. Abdomen: Soft, non-tender  Musculoskeletal: No clubbing, cyanosis   Skin: Skin color, texture, turgor normal.  No rashes or lesions.   Neurologic: Moving all extremities  Psychiatric: confused   Capillary Refill: Brisk,3 seconds, normal   Peripheral Pulses: +2 palpable, equal bilaterally       Labs:   Recent Labs     11/15/21  0504 11/16/21  0627 11/17/21  0617   WBC 6.3 5.5 6.2   HGB 12.5 12.9 12.5   HCT 39.1 40.2 39.0    176 190     Recent Labs     11/15/21  0504 11/16/21  0626 11/17/21  0617    142 143   K 3.9 4.2 4.0    103 105   CO2 25 26 26   BUN 22* 23* 33*   CREATININE 0.8 1.3* 1.3*   CALCIUM 10.3 9.9 9.6     Recent Labs     11/14/21  1734   AST 34   ALT 24   BILITOT 0.8   ALKPHOS 190*     No results for input(s): INR in the last 72 hours. Recent Labs     11/14/21  1734   TROPONINI <0.01       Urinalysis:      Lab Results   Component Value Date    NITRU Negative 11/14/2021    WBCUA 44 11/14/2021    BACTERIA 1+ 11/14/2021    RBCUA 0 11/14/2021    BLOODU TRACE-INTACT 11/14/2021    SPECGRAV 1.015 11/14/2021    GLUCOSEU Negative 11/14/2021       Radiology:  XR CHEST PORTABLE   Final Result   Findings suggest congestive heart failure         CT Head WO Contrast   Final Result   No acute intracranial abnormality. 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  Acute metabolic encephalopathy, likely on top of baseline dementia, could be triggered by infection, currently treated for UTI and congestive heart failure, continue to monitor for now also will at risk for delirium. Still with no improvement. consulting neurology. 2.  UTI, started on IV antibiotics, ceftriaxone, urine culture positive for Enterococcus, changing antibiotics to Unasyn for now stated  3.  Possible acute on chronic congestive heart failure exacerbation, started on IV Lasix, creatinine increased to 1.3 yesterday, stable at 1.3 this am, will keep off lasix for now. 4.  Essential hypertension, continue p.o. medications  5.  A. fib, will continue oral anticoagulation    Diet: ADULT DIET; Regular; 4 carb choices (60 gm/meal);  Low Sodium (2 gm); 1800 ml  Code Status: DNR-CCA        Vick Redding MD

## 2021-11-17 NOTE — PLAN OF CARE
Problem: Falls - Risk of:  Goal: Will remain free from falls  Description: Will remain free from falls  11/17/2021 0404 by Wilberto Chavez RN  Outcome: Ongoing     Problem: Skin Integrity:  Goal: Will show no infection signs and symptoms  Description: Will show no infection signs and symptoms  11/17/2021 0404 by Wilberto Chavez RN  Outcome: Ongoing     Problem: ACTIVITY INTOLERANCE/IMPAIRED MOBILITY  Goal: Mobility/activity is maintained at optimum level for patient  11/17/2021 0404 by Wilberto Chavez RN  Outcome: Ongoing

## 2021-11-17 NOTE — PLAN OF CARE
Problem: Falls - Risk of:  Goal: Will remain free from falls  Description: Will remain free from falls  11/17/2021 0404 by Hima Davis RN  Outcome: Ongoing    Goal: Absence of physical injury  Description: Absence of physical injury  11/17/2021 0404 by Hima Davis RN  Outcome: Ongoing       Problem: Confusion - Acute:  Goal: Absence of continued neurological deterioration signs and symptoms  Description: Absence of continued neurological deterioration signs and symptoms  11/17/2021 0404 by Hima Davis RN  Outcome: Ongoing    Goal: Mental status will be restored to baseline  Description: Mental status will be restored to baseline  11/17/2021 0404 by Hima Davis RN  Outcome: Ongoing       Problem: Discharge Planning:  Goal: Ability to perform activities of daily living will improve  Description: Ability to perform activities of daily living will improve  11/17/2021 0404 by Hima Davis RN  Outcome: Ongoing    Goal: Participates in care planning  Description: Participates in care planning  11/17/2021 0404 by Hima Davis RN  Outcome: Ongoing       Problem: Injury - Risk of, Physical Injury:  Goal: Will remain free from falls  Description: Will remain free from falls  11/17/2021 0404 by Hima Davis RN  Outcome: Ongoing    Goal: Absence of physical injury  Description: Absence of physical injury  11/17/2021 0404 by Hima Dvais RN  Outcome: Ongoing       Problem: Mood - Altered:  Goal: Mood stable  Description: Mood stable  11/17/2021 0404 by Hima Davis RN  Outcome: Ongoing    Goal: Absence of abusive behavior  Description: Absence of abusive behavior  11/17/2021 0404 by Hima Davis RN  Outcome: Ongoing    Goal: Verbalizations of feeling emotionally comfortable while being cared for will increase  Description: Verbalizations of feeling emotionally comfortable while being cared for will increase  11/17/2021 0404 by Hima Davis RN  Outcome: Ongoing       Problem: Psychomotor Activity - Altered:  Goal: Absence of psychomotor disturbance signs and symptoms  Description: Absence of psychomotor disturbance signs and symptoms  11/17/2021 0404 by Gurjit Oh RN  Outcome: Ongoing       Problem: Sensory Perception - Impaired:  Goal: Demonstrations of improved sensory functioning will increase  Description: Demonstrations of improved sensory functioning will increase  11/17/2021 0404 by Gurjit Oh RN  Outcome: Ongoing    Goal: Decrease in sensory misperception frequency  Description: Decrease in sensory misperception frequency  11/17/2021 0404 by Gurjit Oh RN  Outcome: Ongoing    Goal: Able to refrain from responding to false sensory perceptions  Description: Able to refrain from responding to false sensory perceptions  11/17/2021 0404 by Gurjit Oh RN  Outcome: Ongoing    Goal: Demonstrates accurate environmental perceptions  Description: Demonstrates accurate environmental perceptions  11/17/2021 0404 by Gurjit Oh RN  Outcome: Ongoing    Goal: Able to distinguish between reality-based and nonreality-based thinking  Description: Able to distinguish between reality-based and nonreality-based thinking  11/17/2021 0404 by Gurjit Oh RN  Outcome: Ongoing    Goal: Able to interrupt nonreality-based thinking  Description: Able to interrupt nonreality-based thinking  11/17/2021 0404 by Gurjit Oh RN  Outcome: Ongoing       Problem: Sleep Pattern Disturbance:  Goal: Appears well-rested  Description: Appears well-rested  11/17/2021 0404 by Gurjit Oh RN  Outcome: Ongoing       Problem: Sensory:  Goal: General experience of comfort will improve  Description: General experience of comfort will improve  11/17/2021 0404 by Gurjit Oh RN  Outcome: Ongoing       Problem: Urinary Elimination:  Goal: Signs and symptoms of infection will decrease  Description: Signs and symptoms of infection will decrease  11/17/2021 0404 by Gurjit Oh RN  Outcome: Ongoing    Goal: Ability to reestablish a normal urinary elimination pattern will improve - after catheter removal  Description: Ability to reestablish a normal urinary elimination pattern will improve  11/17/2021 0404 by Anni Diaz RN  Outcome: Ongoing    Goal: Complications related to the disease process, condition or treatment will be avoided or minimized  Description: Complications related to the disease process, condition or treatment will be avoided or minimized  11/17/2021 0404 by Anni Diaz RN  Outcome: Ongoing       Problem: Skin Integrity:  Goal: Will show no infection signs and symptoms  Description: Will show no infection signs and symptoms  11/17/2021 0404 by Anni Diaz RN  Outcome: Ongoing    Goal: Absence of new skin breakdown  Description: Absence of new skin breakdown  11/17/2021 0404 by Anni Diaz RN  Outcome: Ongoing       Problem: OXYGENATION/RESPIRATORY FUNCTION  Goal: Patient will maintain patent airway  11/17/2021 0404 by Anni Diaz RN  Outcome: Ongoing    Goal: Patient will achieve/maintain normal respiratory rate/effort  Description: Respiratory rate and effort will be within normal limits for the patient  11/17/2021 0404 by Anni Diaz RN  Outcome: Ongoing       Problem: HEMODYNAMIC STATUS  Goal: Patient has stable vital signs and fluid balance  11/17/2021 0404 by Anni Diaz RN  Outcome: Ongoing       Problem: FLUID AND ELECTROLYTE IMBALANCE  Goal: Fluid and electrolyte balance are achieved/maintained  11/17/2021 0404 by Anni Diaz RN  Outcome: Ongoing       Problem: ACTIVITY INTOLERANCE/IMPAIRED MOBILITY  Goal: Mobility/activity is maintained at optimum level for patient  11/17/2021 0404 by Anni Diaz RN  Outcome: Ongoing

## 2021-11-17 NOTE — PROGRESS NOTES
Message sent to Dr Tai See via [place], informed MD that pt has only had a urine output of 150mls. New orders received for NS at 50ml/hr. Will continue to monitor.

## 2021-11-17 NOTE — CONSULTS
Neurology Consult Note  Reason for Consult: persistent encephalopathy    Chief complaint: nothing specific    Dr Sharon Hernandes MD asked me to see Obie Koehler in consultation for evaluation of persistent encephalopathy    History of Present Illness:  Obie Koehler is a 80 y.o. female who presents with altered mental status. I obtained my information via chart review. The patient is unable to provide any information about the events prior to admission at this time. The patient has baseline dementia. She frequently has urinary tract infections. It was reported by family that over the past 24 hours prior to coming to the ED on the 14th she had been increasingly becoming confused, much like her previous symptoms when she gets infections. No other symptoms were reported by her son. She was not febrile. A UTI was again identified. CT head was w/out any acute findings. Other than her confusion, no other focal neurologic deficits were noted. Currently she is resting comfortably in bed. No complaints. Medical History:  Past Medical History:   Diagnosis Date    Alzheimer's dementia ([facility] [address]     Atrial fibrillation ([facility] [address]     Benign essential HTN     Gout     Multiple drug resistant organism (MDRO) culture positive 11/04/2021    Escherichia coli. Urine     History reviewed. No pertinent surgical history.      Scheduled Meds:   ampicillin-sulbactam  1,500 mg IntraVENous Q6H    sodium chloride flush  10 mL IntraVENous 2 times per day    calcium elemental  500 mg Oral Daily    metoprolol succinate  12.5 mg Oral Daily    budesonide-formoterol  2 puff Inhalation BID    therapeutic multivitamin-minerals  1 tablet Oral Daily    rivaroxaban  15 mg Oral Daily with breakfast    Vitamin D  1,000 Units Oral Daily    [Held by provider] furosemide  20 mg IntraVENous BID    melatonin  6 mg Oral Nightly     Continuous Infusions:   sodium chloride 25 mL (11/17/21 0946)     Medications Prior to Admission:   furosemide (LASIX) 20 MG tablet, Take 1 tablet by mouth every other day  metoprolol succinate (TOPROL XL) 25 MG extended release tablet, Take 0.5 tablets by mouth daily  rivaroxaban (XARELTO) 15 MG TABS tablet, Take 1 tablet by mouth daily (with breakfast)  vitamin D 25 MCG (1000 UT) CAPS, Take 1,000 Units by mouth daily  mometasone-formoterol (DULERA) 200-5 MCG/ACT inhaler, Inhale 2 puffs into the lungs 2 times daily as needed (shortness of breath)   Multiple Vitamins-Minerals (MULTIVITAMIN WITH MINERALS) tablet, Take 1 tablet by mouth daily  Cranberry 250 MG CHEW, Take 2 tablets by mouth 2 times daily (with meals)  albuterol sulfate HFA (VENTOLIN HFA) 108 (90 Base) MCG/ACT inhaler, Inhale 2 puffs into the lungs every 6 hours as needed for Wheezing  calcium carbonate (OSCAL) 500 MG TABS tablet, Take 500 mg by mouth daily    Allergies   Allergen Reactions    Chlorpromazine Other (See Comments)    Hydrochlorothiazide Other (See Comments)     Gout     History reviewed. No pertinent family history. Social History     Tobacco Use   Smoking Status Never Smoker   Smokeless Tobacco Never Used     Social History     Substance and Sexual Activity   Drug Use Never     Social History     Substance and Sexual Activity   Alcohol Use Never     ROS  Constitutional- No weight loss or fevers  Eyes- No diplopia. No photophobia. Ears/nose/throat- No dysphagia. No Dysarthria  Cardiovascular- No palpitations. No chest pain  Respiratory- No dyspnea. No Cough  Gastrointestinal- No Abdominal pain. No Vomiting. Genitourinary- No incontinence. No urinary retention  Musculoskeletal- No myalgia. No arthralgia  Skin- No rash. No easy bruising. Psychiatric- No depression. No anxiety  Endocrine- No diabetes. No thyroid issues. Hematologic- No bleeding difficulty. No fatigue  Neurologic- No weakness. No Headache. Exam:  Blood pressure 113/64, pulse 94, temperature 98 °F (36.7 °C), temperature source Oral, resp.  rate 17, height 5' 3\" (1.6 m), weight 179 lb 14.3 oz (81.6 kg), SpO2 95 %. Constitutional    Vital signs: BP, HR, and RR reviewed   General alert, no distress  Eyes: unable to visualize the fundi  Cardiovascular: no peripheral edema. Psychiatric: cooperative with examination, no psychotic behavior noted. Neurologic  Mental status:   orientation to person, says she is in the hospital.  Unsure why. General fund of knowledge baseline dementia. Memory baseline dementia. Attention intact as able to attend well to the exam     Language no aphasia. Comprehension intact; follows simple commands  Cranial nerves:   CN2: visual fields grossly full   CN 3,4,6: extraocular muscles intact. Pupils are equal, round, reactive bilaterally. CN5: facial sensation symmetric   CN7: face symmetric without dysarthria  CN8: VERY hard of hearing. CN9: palate elevated symmetrically  CN11: trap full strength on shoulder shrug  CN12: tongue midline with protrusion  Strength: good strength in all 4 extremities   Deep tendon reflexes: diminished in all 4 extremities  Sensory: light touch intact in all 4 extremities. Cerebellar/coordination: finger nose finger normal without ataxia  Tone: normal in all 4 extremities  Gait: deferred at this time for safety. Labs  Glucose 81  Na 143  K 4.0  BUN 23 -> 33  Cr 0.8 -> 1.3    BNP 1392  Troponin < 0.01    ALT 24  AST 34    WBC 6.2K  Hg 12.5  Platelets 541    Urine culture + enterococcus faecalis, candida albicans  Blood cultures NG    ABG 11/14/21  pH, Albino 7.335 (L)   pCO2, Albino 48.0   pO2, Albino 51   HCO3, Venous 26     Studies  CT head w/o 11/14/21, independently reviewed  No acute intracranial abnormality. 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 Metabolic encephalopathy secondary to UTI in the setting of baseline dementia. She has no focal or lateralizing neurologic symptoms. 2. Urinary tract infection. 3.  Worsening renal function. 4.  Atrial fibrillation. Recommendations  1. Management of UTI and metabolic issues ongoing. 2.  No specific neurologic intervention to suggest at this time.       Joann Nelson NP  [address] [place] [address] [id number] Neurology    A copy of this note was provided for Dr Kris Jaeger MD

## 2021-11-17 NOTE — PROGRESS NOTES
4 Eyes Skin Assessment     NAME:  Dahlia Keller  YOB: 1931  MEDICAL RECORD NUMBER:  [id number]    The patient is being assess for  Admission    I agree that 2 RN's have performed a thorough Head to Toe Skin Assessment on the patient. ALL assessment sites listed below have been assessed. Areas assessed by both nurses:    Head, Face, Ears, Shoulders, Back, Chest, Arms, Elbows, Hands, Sacrum. Buttock, Coccyx, Ischium and Legs. Feet and Heels        Does the Patient have a Wound?  No noted wound(s)       Eddie Prevention initiated:  Yes   Wound Care Orders initiated:  No    Pressure Injury (Stage 3,4, Unstageable, DTI, NWPT, and Complex wounds) if present place consult order under [de-identified] No    New and Established Ostomies if present place consult order under : No      Nurse 1 eSignature: Electronically signed by Russell Shah RN on 11/16/21 at 11:04 PM EST    **SHARE this note so that the co-signing nurse is able to place an eSignature**    Nurse 2 eSignature: {Esignature:[id number]}

## 2021-11-18 LAB
ANION GAP SERPL CALCULATED.3IONS-SCNC: 12 MMOL/L (ref 3–16)
BLOOD CULTURE, ROUTINE: NORMAL
BUN BLDV-MCNC: 37 MG/DL (ref 7–20)
CALCIUM SERPL-MCNC: 9.9 MG/DL (ref 8.3–10.6)
CHLORIDE BLD-SCNC: 107 MMOL/L (ref 99–110)
CO2: 25 MMOL/L (ref 21–32)
CREAT SERPL-MCNC: 1.1 MG/DL (ref 0.6–1.2)
GFR AFRICAN AMERICAN: 56
GFR NON-AFRICAN AMERICAN: 47
GLUCOSE BLD-MCNC: 130 MG/DL (ref 70–99)
HCT VFR BLD CALC: 38.8 % (ref 36–48)
HEMOGLOBIN: 12.5 G/DL (ref 12–16)
MCH RBC QN AUTO: 28.3 PG (ref 26–34)
MCHC RBC AUTO-ENTMCNC: 32.2 G/DL (ref 31–36)
MCV RBC AUTO: 88 FL (ref 80–100)
PDW BLD-RTO: 14.4 % (ref 12.4–15.4)
PLATELET # BLD: 171 K/UL (ref 135–450)
PMV BLD AUTO: 9.8 FL (ref 5–10.5)
POTASSIUM REFLEX MAGNESIUM: 4.3 MMOL/L (ref 3.5–5.1)
RBC # BLD: 4.41 M/UL (ref 4–5.2)
SODIUM BLD-SCNC: 144 MMOL/L (ref 136–145)
WBC # BLD: 6.3 K/UL (ref 4–11)

## 2021-11-18 PROCEDURE — 97530 THERAPEUTIC ACTIVITIES: CPT

## 2021-11-18 PROCEDURE — 6370000000 HC RX 637 (ALT 250 FOR IP): Performed by: STUDENT IN AN ORGANIZED HEALTH CARE EDUCATION/TRAINING PROGRAM

## 2021-11-18 PROCEDURE — 6370000000 HC RX 637 (ALT 250 FOR IP): Performed by: INTERNAL MEDICINE

## 2021-11-18 PROCEDURE — 94640 AIRWAY INHALATION TREATMENT: CPT

## 2021-11-18 PROCEDURE — 94761 N-INVAS EAR/PLS OXIMETRY MLT: CPT

## 2021-11-18 PROCEDURE — 6360000002 HC RX W HCPCS: Performed by: INTERNAL MEDICINE

## 2021-11-18 PROCEDURE — 80048 BASIC METABOLIC PNL TOTAL CA: CPT

## 2021-11-18 PROCEDURE — 36415 COLL VENOUS BLD VENIPUNCTURE: CPT

## 2021-11-18 PROCEDURE — 2580000003 HC RX 258: Performed by: INTERNAL MEDICINE

## 2021-11-18 PROCEDURE — 1200000000 HC SEMI PRIVATE

## 2021-11-18 PROCEDURE — 85027 COMPLETE CBC AUTOMATED: CPT

## 2021-11-18 RX ADMIN — METOPROLOL SUCCINATE 12.5 MG: 25 TABLET, EXTENDED RELEASE ORAL at 09:29

## 2021-11-18 RX ADMIN — BUDESONIDE AND FORMOTEROL FUMARATE DIHYDRATE 2 PUFF: 160; 4.5 AEROSOL RESPIRATORY (INHALATION) at 09:27

## 2021-11-18 RX ADMIN — RIVAROXABAN 15 MG: 15 TABLET, FILM COATED ORAL at 09:29

## 2021-11-18 RX ADMIN — Medication 6 MG: at 21:54

## 2021-11-18 RX ADMIN — SODIUM CHLORIDE 1500 MG: 900 INJECTION INTRAVENOUS at 09:28

## 2021-11-18 RX ADMIN — MULTIPLE VITAMINS W/ MINERALS TAB 1 TABLET: TAB at 09:29

## 2021-11-18 RX ADMIN — CALCIUM 500 MG: 500 TABLET ORAL at 09:29

## 2021-11-18 RX ADMIN — SODIUM CHLORIDE 1500 MG: 900 INJECTION INTRAVENOUS at 20:41

## 2021-11-18 RX ADMIN — SODIUM CHLORIDE 1500 MG: 900 INJECTION INTRAVENOUS at 15:54

## 2021-11-18 RX ADMIN — SODIUM CHLORIDE 1500 MG: 900 INJECTION INTRAVENOUS at 03:05

## 2021-11-18 RX ADMIN — BUDESONIDE AND FORMOTEROL FUMARATE DIHYDRATE 2 PUFF: 160; 4.5 AEROSOL RESPIRATORY (INHALATION) at 20:46

## 2021-11-18 RX ADMIN — Medication 1000 UNITS: at 09:29

## 2021-11-18 NOTE — PROGRESS NOTES
Hospitalist Progress Note      PCP: TASHI Sotomayor - LAILA    Date of Admission: 11/14/2021        Subjective: Much more awake and alert this morning still with some confusion but with significant improvement since yesterday able to answer questions denies abdominal pain nausea vomiting she is feeling weak though. Medications:  Reviewed    Infusion Medications    sodium chloride 50 mL/hr at 11/17/21 2231    sodium chloride 25 mL (11/17/21 1557)     Scheduled Medications    ampicillin-sulbactam  1,500 mg IntraVENous Q6H    sodium chloride flush  10 mL IntraVENous 2 times per day    calcium elemental  500 mg Oral Daily    metoprolol succinate  12.5 mg Oral Daily    budesonide-formoterol  2 puff Inhalation BID    therapeutic multivitamin-minerals  1 tablet Oral Daily    rivaroxaban  15 mg Oral Daily with breakfast    Vitamin D  1,000 Units Oral Daily    [Held by provider] furosemide  20 mg IntraVENous BID    melatonin  6 mg Oral Nightly     PRN Meds: sodium chloride flush, sodium chloride, potassium chloride, magnesium sulfate, ondansetron **OR** ondansetron, magnesium hydroxide, acetaminophen **OR** acetaminophen, albuterol      Intake/Output Summary (Last 24 hours) at 11/18/2021 0927  Last data filed at 11/17/2021 1754  Gross per 24 hour   Intake 150 ml   Output 150 ml   Net 0 ml       Physical Exam Performed:    BP (!) 143/71   Pulse 80   Temp 97.2 °F (36.2 °C) (Oral)   Resp 16   Ht 5' 3\" (1.6 m)   Wt 181 lb 14.1 oz (82.5 kg)   SpO2 96%   BMI 32.22 kg/m²     General appearance: No apparent distress  Respiratory:  Normal respiratory effort. Clear to auscultation, bilaterally without Rales/Wheezes/Rhonchi. Cardiovascular: Regular rate and rhythm with normal S1/S2 without murmurs, rubs or gallops. Abdomen: Soft, non-tender  Musculoskeletal: No clubbing, cyanosis   Skin: Skin color, texture, turgor normal.  No rashes or lesions.   Neurologic:  No focal weakness   Psychiatric: Alert and oriented to self place but not to time  Capillary Refill: Brisk,3 seconds, normal   Peripheral Pulses: +2 palpable, equal bilaterally       Labs:   Recent Labs     11/16/21  0627 11/17/21  0617 11/18/21  0635   WBC 5.5 6.2 6.3   HGB 12.9 12.5 12.5   HCT 40.2 39.0 38.8    190 171     Recent Labs     11/16/21  0626 11/17/21  0617 11/18/21  0635    143 144   K 4.2 4.0 4.3    105 107   CO2 26 26 25   BUN 23* 33* 37*   CREATININE 1.3* 1.3* 1.1   CALCIUM 9.9 9.6 9.9     No results for input(s): AST, ALT, BILIDIR, BILITOT, ALKPHOS in the last 72 hours. No results for input(s): INR in the last 72 hours. No results for input(s): Mara [place] in the last 72 hours. Urinalysis:      Lab Results   Component Value Date    NITRU Negative 11/14/2021    WBCUA 44 11/14/2021    BACTERIA 1+ 11/14/2021    RBCUA 0 11/14/2021    BLOODU TRACE-INTACT 11/14/2021    SPECGRAV 1.015 11/14/2021    GLUCOSEU Negative 11/14/2021       Radiology:  XR CHEST PORTABLE   Final Result   Findings suggest congestive heart failure         CT Head WO Contrast   Final Result   No acute intracranial abnormality. 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  Acute metabolic encephalopathy, likely on top of baseline dementia, could be triggered by infection, currently treated for UTI and congestive heart failure, continue to monitor for now also will at risk for delirium.    Improvement noted this morning  2.  UTI, started on IV antibiotics, ceftriaxone, urine culture positive for Enterococcus, changing antibiotics to Unasyn, sensitivity noted. 3.  Possible acute on chronic congestive heart failure exacerbation, started on IV Lasix, creatinine increased to 1.3 yesterday, improved to 1.1 this morning  4.  Essential hypertension, continue p.o. medications  5.  A. fib, will continue oral anticoagulation      Diet: ADULT DIET; Regular; 4 carb choices (60 gm/meal);  Low Sodium (2 gm); 1800 ml  Code Status: DNR-YANET Gibson MD

## 2021-11-18 NOTE — PLAN OF CARE
Problem: Confusion - Acute:  Goal: Absence of continued neurological deterioration signs and symptoms  Description: Absence of continued neurological deterioration signs and symptoms  Outcome: Ongoing  Goal: Mental status will be restored to baseline  Description: Mental status will be restored to baseline  Outcome: Ongoing     Problem: Discharge Planning:  Goal: Ability to perform activities of daily living will improve  Description: Ability to perform activities of daily living will improve  Outcome: Ongoing  Goal: Participates in care planning  Description: Participates in care planning  Outcome: Ongoing

## 2021-11-18 NOTE — PLAN OF CARE
Problem: Falls - Risk of:  Goal: Will remain free from falls  Description: Will remain free from falls  11/17/2021 2326 by Cris Grant RN  Outcome: Ongoing     Problem: Skin Integrity:  Goal: Will show no infection signs and symptoms  Description: Will show no infection signs and symptoms  11/17/2021 2326 by Cris Grant RN  Outcome: Ongoing

## 2021-11-18 NOTE — PROGRESS NOTES
diagnosis was Disorientation. Diagnoses of Acute cystitis without hematuria and Acute on chronic combined systolic and diastolic congestive heart failure ([facility] [address] were also pertinent to this visit. 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 has no past surgical history on file. Restrictions  Restrictions/Precautions  Restrictions/Precautions: Fall Risk  Position Activity Restriction  Other position/activity restrictions: h/o of severe dementia  Subjective   General  Chart Reviewed: Yes  Additional Pertinent Hx: Per H&P on 11- of Saurabh Fisher, DO: The pt is a 79 yo female who came to the ED with worsening MS and agression. The pt was started on abx for a possbile UTI as an outpt. PMHx: Altz's dementia, a-fib, gout,  E Coli in urine  Response To Previous Treatment: Patient with no complaints from previous session. Family / Caregiver Present: No  Referring Practitioner: Dr. Khari Cho. Subjective  Subjective: Pt agreeable to PT Rx, with encouragement. Very [place]; answers questions when able to hear them. Generally confused.           Orientation  Orientation  Overall Orientation Status: Impaired  Orientation Level: Oriented to person  Cognition   Cognition  Overall Cognitive Status: Exceptions  Arousal/Alertness: Delayed responses to stimuli  Following Commands: Inconsistently follows commands  Attention Span: Attends with cues to redirect  Memory: Decreased recall of biographical Information; Decreased recall of precautions; Decreased long term memory; Decreased short term memory; Decreased recall of recent events  Safety Judgement: Decreased awareness of need for safety  Problem Solving: Decreased awareness of errors; Assistance required to identify errors made; Assistance required to generate solutions; Assistance required to correct errors made; Assistance required to implement solutions  Insights: Not aware of deficits  Initiation: Requires cues for all  Sequencing: Requires cues for all  Cognition Comment: [place] also  Objective   Bed mobility  Supine to Sit: Moderate assistance; 2 Person assistance (HOB elevated.)  Scooting: Dependent/Total; 2 Person assistance  Transfers  Sit to Stand: Minimal Assistance; Moderate Assistance; 2 Person Assistance ([place]  Stand to sit: Minimal Assistance; Moderate Assistance; 2 Person Assistance ([place] [place]  Bed to Chair: Dependent/Total ([place] [place]  Comment: Additional Transfer from Chair via Alphonsus [place] assist x 2. Attempt Transfers with [place] although Mod assist; unable to attempt amb activities. Ambulation  Ambulation?: No     Balance  Comments: EOB CGA. Seated during Transfer via [place].  Mod assist x 2 for brief stand with use of Walker. AM-PAC Score  AM-PAC Inpatient Mobility Raw Score : 9 (11/18/21 1459)  AM-PAC Inpatient T-Scale Score : 30.55 (11/18/21 1459)  Mobility Inpatient CMS 0-100% Score: 81.38 (11/18/21 1459)  Mobility Inpatient CMS G-Code Modifier : CM (11/18/21 1459)          Goals  Short term goals  Time Frame for Short term goals: upon d/c  Short term goal 1: Bed mobility min A of 1-2. Short term goal 2: Transfers sit <> stand with min A of 1-2. Short term goal 3: Ambulate with HHA of 2 10 feet with min A of 2.   Patient Goals   Patient goals : none stated per pt due to level of dementia    Plan    Plan  Times per week: 2-3x/week  Specific instructions for Next Treatment: cotx  Current Treatment Recommendations: Functional Mobility Training, Transfer Training, Gait Training, Safety Education & Training, Patient/Caregiver Education & Training  Safety Devices  Type of devices: Call light within reach, Chair alarm in place, Left in chair, Nurse notified, Missy Shove in use     Therapy Time   Individual Concurrent Group Co-treatment   Time In 1030         Time Out 1100         Minutes 30 [place] Dawn, PT

## 2021-11-18 NOTE — PROGRESS NOTES
Occupational Therapy  Facility/Department: [facility] MED SURG  Daily Treatment Note  NAME: Jyothi Wall  : 1931  MRN: [id number]    Date of Service: 2021    Discharge Recommendations:  Continue to assess pending progress, Patient would benefit from continued therapy after discharge, 3-5 sessions per week     Jyothi Wall scored a  on the AM-PAC ADL Inpatient form. Current research shows that an AM-PAC score of 17 or less is typically not associated with a discharge to the patient's home setting. Based on the patient's AM-PAC score and their current ADL deficits, it is recommended that the patient have 3-5 sessions per week of Occupational Therapy at d/c to increase the patient's independence. Please see assessment section for further patient specific details. If patient discharges prior to next session this note will serve as a discharge summary. Please see below for the latest assessment towards goals. Assessment   Performance deficits / Impairments: Decreased functional mobility ; Decreased ADL status; Decreased cognition; Decreased safe awareness; Decreased balance; Decreased strength  Assessment: Pt continues to require assist x2 and use of lift equipment (diamond stedy) for bed-chair transfers. Pt stood to walker with mod A x2, but unsteady and unable to take steps for fxl mobility. Anticipate pt would require overall max A for ADLs d/t the above performance deficits. Per RN's report, spoke with son who reports at baseline pt able to complete fxl mobility with and without a walker, has assist for ADLs. Pt currently functioning below baseline baseline and AM-PAC score indicates need for ongoing skilled OT at d/c.   Prognosis: Fair; Guarded  OT Education: OT Role; Plan of Care; Orientation; Transfer Training  Barriers to Learning: cognition  REQUIRES OT FOLLOW UP: Yes  Activity Tolerance  Activity Tolerance: Treatment limited secondary to decreased cognition  Safety Devices  Safety Devices in place: Yes  Type of devices: Call light within reach; Chair alarm in place; Left in chair; Gait belt; Nurse notified         Patient Diagnosis(es): The primary encounter diagnosis was Disorientation. Diagnoses of Acute cystitis without hematuria and Acute on chronic combined systolic and diastolic congestive heart failure ([address] were also pertinent to this visit. has a past medical history of Alzheimer's dementia ([facility] [address] Atrial fibrillation ([facility] [address] Benign essential HTN, Gout, and Multiple drug resistant organism (MDRO) culture positive. has no past surgical history on file. Restrictions  Restrictions/Precautions  Restrictions/Precautions: Fall Risk  Position Activity Restriction  Other position/activity restrictions: h/o of severe dementia  Subjective   General  Chart Reviewed: Yes  Additional Pertinent Hx: Per Margarito Dobson DO's H&P 11/14: \"The patient is a 80 y.o. female w/ pmhx as below who presents to [facility] with worsening mental status and aggression since 2 days but worse in past 24 hrs. Unable to obtain hx from patient at this time but noted per phone discussion with son that it has progressively worsened. Initially noted per son patient was less altered 10 dys ago and here in ED and treated w/ IV abx and sent home and then PCP had started her on oral macrobid for partial culture sens E coli. Patient was taking abx per son but never became extremely improved but was better. Then past 2 days patient has been more confused, possibly hallucinating, and not eating or drinking much. Son concerned that UTI might still be present or worsening. Son unable to confirm other symptoms of fever/chills/sweats/dizziness/N/V/D. \"  Family / Caregiver Present: No  Referring Practitioner: Dorita Escalante MD  Diagnosis: acute metabolic encephalopathy, UTI, Possible acute on chronic congestive heart failure exacerbation  Subjective  Subjective: Pt met b/s for OT cotx with PT.  Pt in bed on arrival, agreeable to Comment: [place] also        Plan   Plan  Times per week: 3-5  Current Treatment Recommendations: Strengthening, Balance Training, Functional Mobility Training, Safety Education & Training, Self-Care / ADL, Cognitive Reorientation, Cognitive/Perceptual Training, Endurance Training    AM-PAC Score        AM-PAC Inpatient Daily Activity Raw Score: 11 (11/18/21 1155)  AM-PAC Inpatient ADL T-Scale Score : 29.04 (11/18/21 1155)  ADL Inpatient CMS 0-100% Score: 70.42 (11/18/21 1155)  ADL Inpatient CMS G-Code Modifier : CL (11/18/21 1155)    Goals  Short term goals  Time Frame for Short term goals: Prior to d/c: STATUS GOALS 11/18: ALL GOALS ONGOING  Short term goal 1: Pt will complete fxl transfer to/from ADL surfaces with min A x2 using AD prn. Short term goal 2: Pt will participate in grooming with min A. Short term goal 3: Pt will complete bed mobility with min A in prep for ADL task. Short term goal 4: Pt will complete feeding with set-up/supervision. Short term goal 5: Pt will tolerate standing >3 minutes for functional task with CGA/min A to improve standing tolerance for ADLs. Long term goals  Time Frame for Long term goals : STGs=LTGs  Patient Goals   Patient goals : pt unable to verbalize personal therapy goal d/t decrease cognition.        Therapy Time   Individual Concurrent Group Co-treatment   Time In 1030         Time Out 1100         Minutes [place] [id number]

## 2021-11-19 LAB
ANION GAP SERPL CALCULATED.3IONS-SCNC: 11 MMOL/L (ref 3–16)
BUN BLDV-MCNC: 33 MG/DL (ref 7–20)
CALCIUM SERPL-MCNC: 9.7 MG/DL (ref 8.3–10.6)
CHLORIDE BLD-SCNC: 110 MMOL/L (ref 99–110)
CO2: 23 MMOL/L (ref 21–32)
CREAT SERPL-MCNC: 1.1 MG/DL (ref 0.6–1.2)
CULTURE, BLOOD 2: NORMAL
GFR AFRICAN AMERICAN: 56
GFR NON-AFRICAN AMERICAN: 47
GLUCOSE BLD-MCNC: 104 MG/DL (ref 70–99)
HCT VFR BLD CALC: 38.9 % (ref 36–48)
HEMOGLOBIN: 12.3 G/DL (ref 12–16)
MCH RBC QN AUTO: 28 PG (ref 26–34)
MCHC RBC AUTO-ENTMCNC: 31.6 G/DL (ref 31–36)
MCV RBC AUTO: 88.6 FL (ref 80–100)
PDW BLD-RTO: 14.7 % (ref 12.4–15.4)
PLATELET # BLD: 164 K/UL (ref 135–450)
PMV BLD AUTO: 9.6 FL (ref 5–10.5)
POTASSIUM REFLEX MAGNESIUM: 3.8 MMOL/L (ref 3.5–5.1)
RBC # BLD: 4.39 M/UL (ref 4–5.2)
SODIUM BLD-SCNC: 144 MMOL/L (ref 136–145)
WBC # BLD: 6.2 K/UL (ref 4–11)

## 2021-11-19 PROCEDURE — 36415 COLL VENOUS BLD VENIPUNCTURE: CPT

## 2021-11-19 PROCEDURE — 94760 N-INVAS EAR/PLS OXIMETRY 1: CPT

## 2021-11-19 PROCEDURE — 6360000002 HC RX W HCPCS: Performed by: INTERNAL MEDICINE

## 2021-11-19 PROCEDURE — 85027 COMPLETE CBC AUTOMATED: CPT

## 2021-11-19 PROCEDURE — 6370000000 HC RX 637 (ALT 250 FOR IP): Performed by: STUDENT IN AN ORGANIZED HEALTH CARE EDUCATION/TRAINING PROGRAM

## 2021-11-19 PROCEDURE — 94640 AIRWAY INHALATION TREATMENT: CPT

## 2021-11-19 PROCEDURE — 6370000000 HC RX 637 (ALT 250 FOR IP): Performed by: INTERNAL MEDICINE

## 2021-11-19 PROCEDURE — 2580000003 HC RX 258: Performed by: INTERNAL MEDICINE

## 2021-11-19 PROCEDURE — 80048 BASIC METABOLIC PNL TOTAL CA: CPT

## 2021-11-19 PROCEDURE — 97530 THERAPEUTIC ACTIVITIES: CPT

## 2021-11-19 PROCEDURE — 1200000000 HC SEMI PRIVATE

## 2021-11-19 RX ADMIN — RIVAROXABAN 15 MG: 15 TABLET, FILM COATED ORAL at 09:22

## 2021-11-19 RX ADMIN — Medication 1000 UNITS: at 09:22

## 2021-11-19 RX ADMIN — BUDESONIDE AND FORMOTEROL FUMARATE DIHYDRATE 2 PUFF: 160; 4.5 AEROSOL RESPIRATORY (INHALATION) at 21:21

## 2021-11-19 RX ADMIN — SODIUM CHLORIDE 1500 MG: 900 INJECTION INTRAVENOUS at 02:47

## 2021-11-19 RX ADMIN — BUDESONIDE AND FORMOTEROL FUMARATE DIHYDRATE 2 PUFF: 160; 4.5 AEROSOL RESPIRATORY (INHALATION) at 07:56

## 2021-11-19 RX ADMIN — SODIUM CHLORIDE: 9 INJECTION, SOLUTION INTRAVENOUS at 02:40

## 2021-11-19 RX ADMIN — SODIUM CHLORIDE 1500 MG: 900 INJECTION INTRAVENOUS at 09:21

## 2021-11-19 RX ADMIN — SODIUM CHLORIDE 1500 MG: 900 INJECTION INTRAVENOUS at 16:08

## 2021-11-19 RX ADMIN — CALCIUM 500 MG: 500 TABLET ORAL at 09:22

## 2021-11-19 RX ADMIN — Medication 6 MG: at 20:43

## 2021-11-19 RX ADMIN — MULTIPLE VITAMINS W/ MINERALS TAB 1 TABLET: TAB at 09:22

## 2021-11-19 RX ADMIN — METOPROLOL SUCCINATE 12.5 MG: 25 TABLET, EXTENDED RELEASE ORAL at 09:22

## 2021-11-19 RX ADMIN — SODIUM CHLORIDE 1500 MG: 900 INJECTION INTRAVENOUS at 20:48

## 2021-11-19 ASSESSMENT — PAIN SCALES - GENERAL
PAINLEVEL_OUTOF10: 0

## 2021-11-19 NOTE — PROGRESS NOTES
Physical Therapy    Facility/Department: [address] MED SURG  Daily Treatment Note    This note serves as patient discharge summary if pt discharges prior to next PT visit      NAME: Torsten Jenkins  : 1931  MRN: [id number]    Date of Service: 2021    Discharge Recommendations:  Continue to assess pending progress   Torsten Jenkins scored a  on the AM-PAC short mobility form. Current research shows that an AM-PAC score of 17 or less is typically not associated with a discharge to the patient's home setting. Based on the patient's AM-PAC score and their current functional mobility deficits, it is recommended that the patient have 3-5 sessions per week of Physical Therapy at d/c to increase the patient's independence. Please see assessment section for further patient specific details. PT Equipment Recommendations  Other: Will monitor for any equipt needs. Assessment   Body structures, Functions, Activity limitations: Decreased functional mobility ; Decreased strength; Decreased safe awareness; Decreased cognition; Decreased endurance  Assessment: The pt is a 81 yo female who came to the ED with worsening MS and agression. The pt was started on abx for a possbile UTI as an outpt. The pt not able to give PLOF info due to level of dementia; from past admissions: she lives with a son who is her caregiver; he assists her with self-care and does the homemaking and in  the pt was able to walk sometimes with a rollator. Unsure as to her current level of assist.     PMHx: Altz's dementia, a-fib, gout,  E Coli in urine        Status 2021: Bed Mobility Mod A. Transfers from EOB and BS chair to STEDY with Min A. Transferred to [facility] chair via STEDY. Incontinent of stool and unaware. Dep for brief change and donna care. In the past, her son has always taken her home where he cares for her.  Will con't to follow while on acute care floor and hopefully she will be able to walk or at least transfer to a chair with an asisst level that the son will be able to perform at home. At this time, need to consider cont PT Services (3-5) upon d/c acute care setting. Treatment Diagnosis: Impaired activity tolerance  Prognosis: Fair; Guarded  History: as noted  PT Education: PT Role; General Safety; Transfer Training; Orientation  Barriers to Learning: Cognitive. REQUIRES PT FOLLOW UP: Yes  Activity Tolerance  Activity Tolerance: Patient limited by cognitive status       Patient Diagnosis(es): The primary encounter diagnosis was Disorientation. Diagnoses of Acute cystitis without hematuria and Acute on chronic combined systolic and diastolic congestive heart failure ([address] were also pertinent to this visit. has a past medical history of Alzheimer's dementia ([facility] [address] Atrial fibrillation ([facility] [address] Benign essential HTN, Gout, and Multiple drug resistant organism (MDRO) culture positive. has no past surgical history on file. Restrictions  Restrictions/Precautions  Restrictions/Precautions: Fall Risk  Position Activity Restriction  Other position/activity restrictions: h/o of severe dementia. [place]     Vision/Hearing  Vision: Within Functional Limits  Hearing: Exceptions to [facility]  Hearing Exceptions: Hard of hearing/hearing concerns       Subjective  General  Chart Reviewed: Yes  Additional Pertinent Hx: Per H&P on 11- of Saurabh Fisher DO: \"The pt is a 79 yo female who came to the ED with worsening MS and agression. The pt was started on abx for a possbile UTI as an outpt. \"      PMHx: Altz's dementia, a-fib, gout,  E Coli in urine  Response To Previous Treatment: Patient with no complaints from previous session. Family / Caregiver Present: No  Referring Practitioner: Dr. Viviana Mendosa. Diagnosis: UTI, altered MS, Acute metabolic encephalopathy, possible CHF  Subjective  Subjective: Patient in bed, sleeping. Startles awake as therapist calls name. Denies pain.   Agreeable to therapy with encouragement, although frequently states, \"I don't know what's going on. \" \"Why am I doing this? \"  Pain Screening  Patient Currently in Pain: Denies    Orientation  Orientation  Overall Orientation Status: Impaired  Orientation Level: Oriented to person (\"hospital\"  But at times also asks \"where am I? \" Unable to give year of birth.)     Social/Functional History  Social/Functional History  Lives With: Son (son Damir Flores and his partner Noemy Silva)  Type of Home: House  Home Layout: One level  Home Access: Stairs to enter with rails  Entrance Stairs - Number of Steps: 4-5  Bathroom Shower/Tub: Walk-in shower  Bathroom Toilet: Standard  Bathroom Equipment: Grab bars in shower, Shower chair  Home Equipment: 4 wheeled walker, Cane  ADL Assistance: Needs assistance (Son assists with bathing/dressing/toileting - patient feeds self)  Homemaking Assistance:  (son does all)  Homemaking Responsibilities: No  Ambulation Assistance: Needs assistance (with rollator)  Transfer Assistance: Needs assistance  Active : No  Additional Comments: info from past admissions in 2020 and 2021; the pt unable to give any current info re: PLOF and family not in the room to verify     Cognition   Cognition  Overall Cognitive Status: Exceptions  Arousal/Alertness: Delayed responses to stimuli  Following Commands: Inconsistently follows commands  Attention Span: Attends with cues to redirect  Memory: Decreased recall of biographical Information; Decreased recall of precautions; Decreased long term memory; Decreased short term memory; Decreased recall of recent events  Safety Judgement: Decreased awareness of need for safety  Problem Solving: Decreased awareness of errors; Assistance required to identify errors made; Assistance required to generate solutions; Assistance required to correct errors made; Assistance required to implement solutions  Insights: Not aware of deficits  Initiation: Requires cues for all  Sequencing: Requires cues for all  Cognition Comment: [place] also    Objective  Bed mobility  Supine to Sit: Moderate assistance (HOB, exiting to L. Use of rail)  Sit to Supine: Unable to assess (In BS chair at end of session)  Transfers  Sit to Stand: Minimal Assistance (EOB and BS chair to stance at [facility])  Stand to sit: Minimal Assistance; Contact guard assistance  Bed to Chair: Dependent/Total (via STEDY)  Ambulation  Ambulation?: No  Balance  Posture: Fair  Sitting - Static: Good; -  Sitting - Dynamic: Good; -  Standing - Static: Fair; - (at [facility])  Comments: Patient incontinent of loose stool in brief by end of transfer to [facility] chair. Unaware. Aide called to room to assist. Patient stands at [facility] x ~5 minutes, CGA-Min A, for brief change, donna care. Mod A to change gown. Exercises  Straight Leg Raise: AAROM x 10 B  Heelslides: AAROM x 10 B  Knee Long Arc Quad: X 5 B  Ankle Pumps: x 15 B AAROM     Plan   Plan  Times per week: 2-3x/week  Specific instructions for Next Treatment: cotx  Current Treatment Recommendations: Functional Mobility Training, Transfer Training, Gait Training, Safety Education & Training, Patient/Caregiver Education & Training  Safety Devices  Type of devices: Call light within reach, Chair alarm in place, Left in chair, Nurse notified, [place] in use, Gait belt, Patient at risk for falls (PCA in room and aware)    AM-PAC Score  AM-PAC Inpatient Mobility Raw Score : 9 (11/19/21 1659)  AM-PAC Inpatient T-Scale Score : 30.55 (11/19/21 1659)  Mobility Inpatient CMS 0-100% Score: 81.38 (11/19/21 1659)  Mobility Inpatient CMS G-Code Modifier : CM (11/19/21 1659)     Goals  Short term goals  Time Frame for Short term goals: upon d/c  Short term goal 1: Bed mobility min A of 1-2. Short term goal 2: Transfers sit <> stand with min A of 1-2. 11- goal met and ongoing. Short term goal 3: Ambulate with HHA of 2 10 feet with min A of 2.   Patient Goals   Patient goals : none stated per pt due to level of dementia       Therapy Time   Individual Concurrent Group Co-treatment Time In 1600         Time Out 1700         Minutes 61            TA 60    Juan Jose Pugh PT  Electronically signed by Doug Peng, [address] (#[phone])  on 11/19/2021 at 5:18 PM

## 2021-11-19 NOTE — PROGRESS NOTES
Physician Progress Note      Yolie Gray  CSN #:                  [id number]  :                       1931  ADMIT DATE:       2021 5:01 PM  [facility] DATE:  RESPONDING  PROVIDER #:        Vicente Brownlee MD          QUERY TEXT:    Pt admitted with UTI and has CHF documented. If possible, please document in   progress notes and discharge summary further specificity regarding the type   and acuity of CHF:    The medical record reflects the following:  Risk Factors: hx chf,  Clinical Indicators: Documentation per Cardiology consult of  concern is CHF;    proBNP is elevated 1392 however she has had much higher levels in the past EF   is normal with valvular regurgitation Has been treated with IV Lasix with   good diuresis. Per pn's  Possible acute on chronic congestive heart failure   exacerbation. ECHO-Ejection fraction is visually estimated to be 50%. ?Moderate to severe mitral regurgitation. ? The left atrium is severely   dilated. ?Moderate aortic regurgitation. ? Severly dilated right   ventricle. ?Right ventricular systolic function is mildly reduced . Treatment: iv lasix, low sodium, fluid restricted diet, daily wts, i/o    Thank You, Mouna Ellis RN CDS [email or web address]  Options provided:  -- Acute on Chronic Systolic CHF/HFrEF  -- Acute on Chronic Diastolic CHF/HFpEF  -- Acute on Chronic Systolic and Diastolic CHF  -- Chronic Systolic CHF/HFrEF  -- Chronic Diastolic CHF/HFpEF  -- Chronic Systolic and Diastolic CHF  -- Other - I will add my own diagnosis  -- Disagree - Not applicable / Not valid  -- Disagree - Clinically unable to determine / Unknown  -- Refer to Clinical Documentation Reviewer    PROVIDER RESPONSE TEXT:    This patient is in acute on chronic diastolic CHF/HFpEF. Query created by:  Zachary Ellis on 2021 12:51 PM      Electronically signed by:  Vicente Brownlee MD 2021 12:25 PM

## 2021-11-19 NOTE — PROGRESS NOTES
Hospitalist Progress Note      PCP: Benita Hall, APRN - CNP    Date of Admission: 11/14/2021        Subjective: CONFUSED THIS AM.        Medications:  Reviewed    Infusion Medications    sodium chloride 50 mL/hr at 11/19/21 0240    sodium chloride 25 mL (11/17/21 1557)     Scheduled Medications    ampicillin-sulbactam  1,500 mg IntraVENous Q6H    sodium chloride flush  10 mL IntraVENous 2 times per day    calcium elemental  500 mg Oral Daily    metoprolol succinate  12.5 mg Oral Daily    budesonide-formoterol  2 puff Inhalation BID    therapeutic multivitamin-minerals  1 tablet Oral Daily    rivaroxaban  15 mg Oral Daily with breakfast    Vitamin D  1,000 Units Oral Daily    [Held by provider] furosemide  20 mg IntraVENous BID    melatonin  6 mg Oral Nightly     PRN Meds: sodium chloride flush, sodium chloride, potassium chloride, magnesium sulfate, ondansetron **OR** ondansetron, magnesium hydroxide, acetaminophen **OR** acetaminophen, albuterol      Intake/Output Summary (Last 24 hours) at 11/19/2021 1224  Last data filed at 11/19/2021 0919  Gross per 24 hour   Intake 600 ml   Output 700 ml   Net -100 ml       Physical Exam Performed:    /66   Pulse 78   Temp 97.3 °F (36.3 °C) (Oral)   Resp 16   Ht 5' 3\" (1.6 m)   Wt 181 lb 14.1 oz (82.5 kg)   SpO2 97%   BMI 32.22 kg/m²     General appearance: No apparent distress  Respiratory:  Normal respiratory effort. Clear to auscultation, bilaterally without Rales/Wheezes/Rhonchi. Cardiovascular: Regular rate and rhythm with normal S1/S2 without murmurs, rubs or gallops. Abdomen: Soft, non-tender  Musculoskeletal: No clubbing, cyanosis   Skin: Skin color, texture, turgor normal.  No rashes or lesions.   Neurologic:  Moving all her extremities   Psychiatric: Awake   Capillary Refill: Brisk,3 seconds, normal   Peripheral Pulses: +2 palpable, equal bilaterally       Labs:   Recent Labs     11/17/21  0617 11/18/21  0635 11/19/21  0732   WBC 6.2 to 1.1 this morning  4.  Essential hypertension, continue p.o. medications  5.  A. fib, will continue oral anticoagulation    Diet: ADULT DIET; Regular; 4 carb choices (60 gm/meal);  Low Sodium (2 gm); 1800 ml  Code Status: DNR-YANET Cutler Courser, MD

## 2021-11-20 LAB
ANION GAP SERPL CALCULATED.3IONS-SCNC: 11 MMOL/L (ref 3–16)
BUN BLDV-MCNC: 31 MG/DL (ref 7–20)
CALCIUM SERPL-MCNC: 9.6 MG/DL (ref 8.3–10.6)
CHLORIDE BLD-SCNC: 110 MMOL/L (ref 99–110)
CO2: 21 MMOL/L (ref 21–32)
CREAT SERPL-MCNC: 1 MG/DL (ref 0.6–1.2)
GFR AFRICAN AMERICAN: >60
GFR NON-AFRICAN AMERICAN: 52
GLUCOSE BLD-MCNC: 90 MG/DL (ref 70–99)
HCT VFR BLD CALC: 38.1 % (ref 36–48)
HEMOGLOBIN: 12 G/DL (ref 12–16)
MCH RBC QN AUTO: 28 PG (ref 26–34)
MCHC RBC AUTO-ENTMCNC: 31.6 G/DL (ref 31–36)
MCV RBC AUTO: 88.4 FL (ref 80–100)
PDW BLD-RTO: 14.8 % (ref 12.4–15.4)
PLATELET # BLD: 190 K/UL (ref 135–450)
PMV BLD AUTO: 9.4 FL (ref 5–10.5)
POTASSIUM REFLEX MAGNESIUM: 3.9 MMOL/L (ref 3.5–5.1)
RBC # BLD: 4.3 M/UL (ref 4–5.2)
SODIUM BLD-SCNC: 142 MMOL/L (ref 136–145)
WBC # BLD: 6.8 K/UL (ref 4–11)

## 2021-11-20 PROCEDURE — 1200000000 HC SEMI PRIVATE

## 2021-11-20 PROCEDURE — 36415 COLL VENOUS BLD VENIPUNCTURE: CPT

## 2021-11-20 PROCEDURE — 85027 COMPLETE CBC AUTOMATED: CPT

## 2021-11-20 PROCEDURE — 6360000002 HC RX W HCPCS: Performed by: INTERNAL MEDICINE

## 2021-11-20 PROCEDURE — 2580000003 HC RX 258: Performed by: INTERNAL MEDICINE

## 2021-11-20 PROCEDURE — 6370000000 HC RX 637 (ALT 250 FOR IP): Performed by: INTERNAL MEDICINE

## 2021-11-20 PROCEDURE — 94640 AIRWAY INHALATION TREATMENT: CPT

## 2021-11-20 PROCEDURE — 94760 N-INVAS EAR/PLS OXIMETRY 1: CPT

## 2021-11-20 PROCEDURE — 6370000000 HC RX 637 (ALT 250 FOR IP): Performed by: STUDENT IN AN ORGANIZED HEALTH CARE EDUCATION/TRAINING PROGRAM

## 2021-11-20 PROCEDURE — 80048 BASIC METABOLIC PNL TOTAL CA: CPT

## 2021-11-20 RX ADMIN — RIVAROXABAN 15 MG: 15 TABLET, FILM COATED ORAL at 08:55

## 2021-11-20 RX ADMIN — SODIUM CHLORIDE, PRESERVATIVE FREE 10 ML: 5 INJECTION INTRAVENOUS at 09:00

## 2021-11-20 RX ADMIN — SODIUM CHLORIDE: 9 INJECTION, SOLUTION INTRAVENOUS at 02:42

## 2021-11-20 RX ADMIN — SODIUM CHLORIDE 1500 MG: 900 INJECTION INTRAVENOUS at 08:57

## 2021-11-20 RX ADMIN — SODIUM CHLORIDE 1500 MG: 900 INJECTION INTRAVENOUS at 15:06

## 2021-11-20 RX ADMIN — METOPROLOL SUCCINATE 12.5 MG: 25 TABLET, EXTENDED RELEASE ORAL at 08:55

## 2021-11-20 RX ADMIN — Medication 6 MG: at 21:40

## 2021-11-20 RX ADMIN — BUDESONIDE AND FORMOTEROL FUMARATE DIHYDRATE 2 PUFF: 160; 4.5 AEROSOL RESPIRATORY (INHALATION) at 20:21

## 2021-11-20 RX ADMIN — SODIUM CHLORIDE 1500 MG: 900 INJECTION INTRAVENOUS at 21:45

## 2021-11-20 RX ADMIN — MULTIPLE VITAMINS W/ MINERALS TAB 1 TABLET: TAB at 08:55

## 2021-11-20 RX ADMIN — CALCIUM 500 MG: 500 TABLET ORAL at 08:55

## 2021-11-20 RX ADMIN — SODIUM CHLORIDE 1500 MG: 900 INJECTION INTRAVENOUS at 02:43

## 2021-11-20 RX ADMIN — Medication 1000 UNITS: at 08:55

## 2021-11-20 ASSESSMENT — PAIN SCALES - GENERAL
PAINLEVEL_OUTOF10: 0

## 2021-11-20 NOTE — PROGRESS NOTES
Hospitalist Progress Note      PCP: TASHI Shankar CNP    Date of Admission: 11/14/2021        Subjective: More awake today alert to self and partially to place not to time      Medications:  Reviewed    Infusion Medications    sodium chloride 50 mL/hr at 11/20/21 0242    sodium chloride 25 mL (11/17/21 1557)     Scheduled Medications    ampicillin-sulbactam  1,500 mg IntraVENous Q6H    sodium chloride flush  10 mL IntraVENous 2 times per day    calcium elemental  500 mg Oral Daily    metoprolol succinate  12.5 mg Oral Daily    budesonide-formoterol  2 puff Inhalation BID    therapeutic multivitamin-minerals  1 tablet Oral Daily    rivaroxaban  15 mg Oral Daily with breakfast    Vitamin D  1,000 Units Oral Daily    [Held by provider] furosemide  20 mg IntraVENous BID    melatonin  6 mg Oral Nightly     PRN Meds: sodium chloride flush, sodium chloride, potassium chloride, magnesium sulfate, ondansetron **OR** ondansetron, magnesium hydroxide, acetaminophen **OR** acetaminophen, albuterol      Intake/Output Summary (Last 24 hours) at 11/20/2021 0617  Last data filed at 11/19/2021 1821  Gross per 24 hour   Intake 420 ml   Output 600 ml   Net -180 ml       Physical Exam Performed:    BP (!) 152/68   Pulse 74   Temp 97.4 °F (36.3 °C) (Axillary)   Resp 18   Ht 5' 3\" (1.6 m)   Wt 185 lb 10 oz (84.2 kg)   SpO2 93%   BMI 32.88 kg/m²     General appearance: No apparent distress  Respiratory:  Normal respiratory effort. Clear to auscultation, bilaterally without Rales/Wheezes/Rhonchi. Cardiovascular: Regular rate and rhythm with normal S1/S2 without murmurs, rubs or gallops. Abdomen: Soft, non-tender, non-distended   Musculoskeletal: No clubbing, cyanosis, bilateral lower extremity edema. Skin: Skin color, texture, turgor normal.  No rashes or lesions.   Neurologic: Moving all 4 extremities spontaneously  Psychiatric: Alert and oriented  Capillary Refill: Brisk,3 seconds, normal   Peripheral Pulses: +2 palpable, equal bilaterally       Labs:   Recent Labs     11/18/21  0635 11/19/21  0732   WBC 6.3 6.2   HGB 12.5 12.3   HCT 38.8 38.9    164     Recent Labs     11/18/21  0635 11/19/21  0732    144   K 4.3 3.8    110   CO2 25 23   BUN 37* 33*   CREATININE 1.1 1.1   CALCIUM 9.9 9.7     No results for input(s): AST, ALT, BILIDIR, BILITOT, ALKPHOS in the last 72 hours. No results for input(s): INR in the last 72 hours. No results for input(s): Indira Dross in the last 72 hours. Urinalysis:      Lab Results   Component Value Date    NITRU Negative 11/14/2021    WBCUA 44 11/14/2021    BACTERIA 1+ 11/14/2021    RBCUA 0 11/14/2021    BLOODU TRACE-INTACT 11/14/2021    SPECGRAV 1.015 11/14/2021    GLUCOSEU Negative 11/14/2021       Radiology:  XR CHEST PORTABLE   Final Result   Findings suggest congestive heart failure         CT Head WO Contrast   Final Result   No acute intracranial abnormality. 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  Acute metabolic encephalopathy, likely on top of baseline dementia, could be triggered by infection, currently treated for UTI and congestive heart failure, continue to monitor for now also will at risk for delirium.  Fluctuating, some improvement noted this morning again, called her son and discussed case, all questions answered  2.  UTI, started on IV antibiotics, ceftriaxone, urine culture positive for Enterococcus, changing antibiotics to Unasyn, sensitivity noted.   3.  Possible

## 2021-11-20 NOTE — PLAN OF CARE
Problem: Falls - Risk of: Bed alarm and call light within reach  Goal: Will remain free from falls  Description: Will remain free from falls  Outcome: Ongoing  Goal: Absence of physical injury  Description: Absence of physical injury  Outcome: Ongoing     Problem: Confusion - Acute:  Goal: Absence of continued neurological deterioration signs and symptoms  Description: Absence of continued neurological deterioration signs and symptoms  Outcome: Ongoing  Goal: Mental status will be restored to baseline  Description: Mental status will be restored to baseline  Outcome: Ongoing     Problem: Discharge Planning:  Goal: Ability to perform activities of daily living will improve  Description: Ability to perform activities of daily living will improve  Outcome: Ongoing  Goal: Participates in care planning  Description: Participates in care planning  Outcome: Ongoing     Problem: Injury - Risk of, Physical Injury:  Goal: Will remain free from falls  Description: Will remain free from falls  Outcome: Ongoing  Goal: Absence of physical injury  Description: Absence of physical injury  Outcome: Ongoing     Problem: Mood - Altered:  Goal: Mood stable  Description: Mood stable  Outcome: Ongoing  Goal: Absence of abusive behavior  Description: Absence of abusive behavior  Outcome: Ongoing  Goal: Verbalizations of feeling emotionally comfortable while being cared for will increase  Description: Verbalizations of feeling emotionally comfortable while being cared for will increase  Outcome: Ongoing     Problem: Psychomotor Activity - Altered:  Goal: Absence of psychomotor disturbance signs and symptoms  Description: Absence of psychomotor disturbance signs and symptoms  Outcome: Ongoing     Problem: Sensory Perception - Impaired:  Goal: Demonstrations of improved sensory functioning will increase  Description: Demonstrations of improved sensory functioning will increase  Outcome: Ongoing  Goal: Decrease in sensory misperception frequency  Description: Decrease in sensory misperception frequency  Outcome: Ongoing  Goal: Able to refrain from responding to false sensory perceptions  Description: Able to refrain from responding to false sensory perceptions  Outcome: Ongoing  Goal: Demonstrates accurate environmental perceptions  Description: Demonstrates accurate environmental perceptions  Outcome: Ongoing  Goal: Able to distinguish between reality-based and nonreality-based thinking  Description: Able to distinguish between reality-based and nonreality-based thinking  Outcome: Ongoing  Goal: Able to interrupt nonreality-based thinking  Description: Able to interrupt nonreality-based thinking  Outcome: Ongoing     Problem: Sleep Pattern Disturbance:  Goal: Appears well-rested  Description: Appears well-rested  Outcome: Ongoing     Problem: Sensory:  Goal: General experience of comfort will improve  Description: General experience of comfort will improve  Outcome: Ongoing     Problem: Urinary Elimination:  Goal: Signs and symptoms of infection will decrease  Description: Signs and symptoms of infection will decrease  Outcome: Ongoing  Goal: Ability to reestablish a normal urinary elimination pattern will improve - after catheter removal  Description: Ability to reestablish a normal urinary elimination pattern will improve  Outcome: Ongoing  Goal: Complications related to the disease process, condition or treatment will be avoided or minimized  Description: Complications related to the disease process, condition or treatment will be avoided or minimized  Outcome: Ongoing     Problem: Skin Integrity:  Goal: Will show no infection signs and symptoms  Description: Will show no infection signs and symptoms  Outcome: Ongoing  Goal: Absence of new skin breakdown  Description: Absence of new skin breakdown  Outcome: Ongoing     Problem: OXYGENATION/RESPIRATORY FUNCTION  Goal: Patient will maintain patent airway  Outcome: Ongoing  Goal: Patient will achieve/maintain normal respiratory rate/effort  Description: Respiratory rate and effort will be within normal limits for the patient  Outcome: Ongoing     Problem: HEMODYNAMIC STATUS  Goal: Patient has stable vital signs and fluid balance  Outcome: Ongoing     Problem: FLUID AND ELECTROLYTE IMBALANCE  Goal: Fluid and electrolyte balance are achieved/maintained  Outcome: Ongoing     Problem: ACTIVITY INTOLERANCE/IMPAIRED MOBILITY  Goal: Mobility/activity is maintained at optimum level for patient  Outcome: Ongoing

## 2021-11-20 NOTE — PROGRESS NOTES
Pt is lethargic confused refusing AM lab draws. RN made attempt to educate pt regarding lab testing. Patient did not accept teaching to evidence of learning.l Phleb will re-attempt later.

## 2021-11-20 NOTE — PLAN OF CARE
Problem: Falls - Risk of:  Goal: Will remain free from falls  Description: Will remain free from falls  11/20/2021 1548 by Suzan Robles RN  Outcome: Met This Shift     Problem: Falls - Risk of:  Goal: Absence of physical injury  Description: Absence of physical injury  11/20/2021 1548 by Suzan Robles RN  Outcome: Met This Shift     Problem: Confusion - Acute:  Goal: Absence of continued neurological deterioration signs and symptoms  Description: Absence of continued neurological deterioration signs and symptoms  11/20/2021 1548 by Suzan Robles RN  Outcome: Ongoing     Problem: Confusion - Acute:  Goal: Mental status will be restored to baseline  Description: Mental status will be restored to baseline  11/20/2021 1548 by Suzan Robles RN  Outcome: Ongoing     Problem: Discharge Planning:  Goal: Ability to perform activities of daily living will improve  Description: Ability to perform activities of daily living will improve  11/20/2021 1548 by Suzan Robles RN  Outcome: Ongoing     Problem: Discharge Planning:  Goal: Participates in care planning  Description: Participates in care planning  11/20/2021 1548 by Suzan Robles RN  Outcome: Ongoing     Problem: Injury - Risk of, Physical Injury:  Goal: Will remain free from falls  Description: Will remain free from falls  11/20/2021 1548 by Suzan Robles RN  Outcome: Met This Shift     Problem: Injury - Risk of, Physical Injury:  Goal: Absence of physical injury  Description: Absence of physical injury  11/20/2021 1548 by Suzan Robles RN  Outcome: Met This Shift     Problem: Mood - Altered:  Goal: Mood stable  Description: Mood stable  11/20/2021 1548 by Suzan Robles RN  Outcome: Ongoing     Problem: Mood - Altered:  Goal: Absence of abusive behavior  Description: Absence of abusive behavior  11/20/2021 1548 by Suzan Robles RN  Outcome: Ongoing     Problem: Psychomotor Activity - Altered:  Goal: Absence of psychomotor disturbance signs and symptoms  Description: Absence of psychomotor disturbance signs and symptoms  11/20/2021 1548 by Bruna Juares RN  Outcome: Ongoing     Problem: Sensory Perception - Impaired:  Goal: Demonstrations of improved sensory functioning will increase  Description: Demonstrations of improved sensory functioning will increase  11/20/2021 1548 by Bruna Juares RN  Outcome: Ongoing     Problem: Sensory Perception - Impaired:  Goal: Decrease in sensory misperception frequency  Description: Decrease in sensory misperception frequency  11/20/2021 1548 by Bruna Juares RN  Outcome: Ongoing     Problem: Sensory Perception - Impaired:  Goal: Able to refrain from responding to false sensory perceptions  Description: Able to refrain from responding to false sensory perceptions  11/20/2021 1548 by Bruna Juares RN  Outcome: Ongoing     Problem: Sensory Perception - Impaired:  Goal: Demonstrates accurate environmental perceptions  Description: Demonstrates accurate environmental perceptions  11/20/2021 1548 by Bruna Juares RN  Outcome: Ongoing     Problem: Sensory Perception - Impaired:  Goal: Able to distinguish between reality-based and nonreality-based thinking  Description: Able to distinguish between reality-based and nonreality-based thinking  11/20/2021 1548 by Bruna Juares RN  Outcome: Ongoing     Problem: Sensory Perception - Impaired:  Goal: Able to interrupt nonreality-based thinking  Description: Able to interrupt nonreality-based thinking  11/20/2021 1548 by Bruna Juares RN  Outcome: Ongoing     Problem: Sleep Pattern Disturbance:  Goal: Appears well-rested  Description: Appears well-rested  11/20/2021 1548 by Bruna Juares RN  Outcome: Ongoing     Problem: Sensory:  Goal: General experience of comfort will improve  Description: General experience of comfort will improve  11/20/2021 1548 by Bruna Juares RN  Outcome: Ongoing     Problem: Urinary Elimination:  Goal: Signs and symptoms of infection will decrease  Description: Signs and symptoms of infection will decrease  11/20/2021 1548 by Estevan Waggoner RN  Outcome: Ongoing     Problem: Urinary Elimination:  Goal: Ability to reestablish a normal urinary elimination pattern will improve - after catheter removal  Description: Ability to reestablish a normal urinary elimination pattern will improve  11/20/2021 1548 by Estevan Waggoner RN  Outcome: Ongoing     Problem: Urinary Elimination:  Goal: Complications related to the disease process, condition or treatment will be avoided or minimized  Description: Complications related to the disease process, condition or treatment will be avoided or minimized  11/20/2021 1548 by Estevan Waggoner RN  Outcome: Ongoing     Problem: Skin Integrity:  Goal: Will show no infection signs and symptoms  Description: Will show no infection signs and symptoms  11/20/2021 1548 by Estevan Waggoner RN  Outcome: Ongoing     Problem: Skin Integrity:  Goal: Absence of new skin breakdown  Description: Absence of new skin breakdown  11/20/2021 1548 by Estevan Waggoner RN  Outcome: Ongoing     Problem: OXYGENATION/RESPIRATORY FUNCTION  Goal: Patient will maintain patent airway  11/20/2021 1548 by Estevan Waggoner RN  Outcome: Ongoing     Problem: OXYGENATION/RESPIRATORY FUNCTION  Goal: Patient will achieve/maintain normal respiratory rate/effort  Description: Respiratory rate and effort will be within normal limits for the patient  11/20/2021 1548 by Estevan Waggoner RN  Outcome: Ongoing     Problem: HEMODYNAMIC STATUS  Goal: Patient has stable vital signs and fluid balance  11/20/2021 1548 by Estevan Waggoner RN  Outcome: Ongoing     Problem: FLUID AND ELECTROLYTE IMBALANCE  Goal: Fluid and electrolyte balance are achieved/maintained  11/20/2021 1548 by Estevan Waggoner RN  Outcome: Ongoing     Problem: ACTIVITY INTOLERANCE/IMPAIRED MOBILITY  Goal: Mobility/activity is maintained at optimum level for patient  11/20/2021 1548 by Estevan Waggoner RN  Outcome: Ongoing

## 2021-11-21 ENCOUNTER — APPOINTMENT (OUTPATIENT)
Dept: GENERAL RADIOLOGY | Age: 86
DRG: 689 | End: 2021-11-21
Payer: MEDICARE

## 2021-11-21 LAB
ANION GAP SERPL CALCULATED.3IONS-SCNC: 10 MMOL/L (ref 3–16)
BUN BLDV-MCNC: 26 MG/DL (ref 7–20)
CALCIUM SERPL-MCNC: 9.6 MG/DL (ref 8.3–10.6)
CHLORIDE BLD-SCNC: 111 MMOL/L (ref 99–110)
CO2: 21 MMOL/L (ref 21–32)
CREAT SERPL-MCNC: 1 MG/DL (ref 0.6–1.2)
GFR AFRICAN AMERICAN: >60
GFR NON-AFRICAN AMERICAN: 52
GLUCOSE BLD-MCNC: 82 MG/DL (ref 70–99)
HCT VFR BLD CALC: 38.1 % (ref 36–48)
HEMOGLOBIN: 12.1 G/DL (ref 12–16)
MCH RBC QN AUTO: 28.2 PG (ref 26–34)
MCHC RBC AUTO-ENTMCNC: 31.8 G/DL (ref 31–36)
MCV RBC AUTO: 88.6 FL (ref 80–100)
PDW BLD-RTO: 14.8 % (ref 12.4–15.4)
PLATELET # BLD: 177 K/UL (ref 135–450)
PMV BLD AUTO: 9.6 FL (ref 5–10.5)
POTASSIUM REFLEX MAGNESIUM: 4.1 MMOL/L (ref 3.5–5.1)
RBC # BLD: 4.29 M/UL (ref 4–5.2)
SODIUM BLD-SCNC: 142 MMOL/L (ref 136–145)
WBC # BLD: 5.3 K/UL (ref 4–11)

## 2021-11-21 PROCEDURE — 85027 COMPLETE CBC AUTOMATED: CPT

## 2021-11-21 PROCEDURE — 2500000003 HC RX 250 WO HCPCS: Performed by: INTERNAL MEDICINE

## 2021-11-21 PROCEDURE — 6360000002 HC RX W HCPCS: Performed by: INTERNAL MEDICINE

## 2021-11-21 PROCEDURE — 6370000000 HC RX 637 (ALT 250 FOR IP): Performed by: INTERNAL MEDICINE

## 2021-11-21 PROCEDURE — 1200000000 HC SEMI PRIVATE

## 2021-11-21 PROCEDURE — 94760 N-INVAS EAR/PLS OXIMETRY 1: CPT

## 2021-11-21 PROCEDURE — 74018 RADEX ABDOMEN 1 VIEW: CPT

## 2021-11-21 PROCEDURE — 2580000003 HC RX 258: Performed by: INTERNAL MEDICINE

## 2021-11-21 PROCEDURE — 80048 BASIC METABOLIC PNL TOTAL CA: CPT

## 2021-11-21 PROCEDURE — 6370000000 HC RX 637 (ALT 250 FOR IP): Performed by: STUDENT IN AN ORGANIZED HEALTH CARE EDUCATION/TRAINING PROGRAM

## 2021-11-21 PROCEDURE — 36415 COLL VENOUS BLD VENIPUNCTURE: CPT

## 2021-11-21 PROCEDURE — 94640 AIRWAY INHALATION TREATMENT: CPT

## 2021-11-21 RX ORDER — FUROSEMIDE 20 MG/1
20 TABLET ORAL DAILY
Status: DISCONTINUED | OUTPATIENT
Start: 2021-11-21 | End: 2021-11-23 | Stop reason: HOSPADM

## 2021-11-21 RX ADMIN — CALCIUM 500 MG: 500 TABLET ORAL at 09:01

## 2021-11-21 RX ADMIN — METOPROLOL SUCCINATE 12.5 MG: 25 TABLET, EXTENDED RELEASE ORAL at 09:02

## 2021-11-21 RX ADMIN — RIVAROXABAN 15 MG: 15 TABLET, FILM COATED ORAL at 09:02

## 2021-11-21 RX ADMIN — METRONIDAZOLE 500 MG: 500 INJECTION, SOLUTION INTRAVENOUS at 16:48

## 2021-11-21 RX ADMIN — MULTIPLE VITAMINS W/ MINERALS TAB 1 TABLET: TAB at 09:01

## 2021-11-21 RX ADMIN — SODIUM CHLORIDE, PRESERVATIVE FREE 10 ML: 5 INJECTION INTRAVENOUS at 21:32

## 2021-11-21 RX ADMIN — BUDESONIDE AND FORMOTEROL FUMARATE DIHYDRATE 2 PUFF: 160; 4.5 AEROSOL RESPIRATORY (INHALATION) at 08:03

## 2021-11-21 RX ADMIN — SODIUM CHLORIDE, PRESERVATIVE FREE 10 ML: 5 INJECTION INTRAVENOUS at 09:07

## 2021-11-21 RX ADMIN — SODIUM CHLORIDE 1500 MG: 900 INJECTION INTRAVENOUS at 09:10

## 2021-11-21 RX ADMIN — Medication 1000 UNITS: at 09:02

## 2021-11-21 RX ADMIN — Medication 6 MG: at 21:31

## 2021-11-21 RX ADMIN — FUROSEMIDE 20 MG: 20 TABLET ORAL at 09:02

## 2021-11-21 RX ADMIN — SODIUM CHLORIDE 1500 MG: 900 INJECTION INTRAVENOUS at 04:02

## 2021-11-21 ASSESSMENT — PAIN SCALES - GENERAL
PAINLEVEL_OUTOF10: 0

## 2021-11-21 ASSESSMENT — PAIN SCALES - WONG BAKER
WONGBAKER_NUMERICALRESPONSE: 0
WONGBAKER_NUMERICALRESPONSE: 0

## 2021-11-21 NOTE — PROGRESS NOTES
Pt is alert confused soiled loose stool RN will make Dr. Bryan Vega aware. [facility] complete bed change gown change performed. Periarea excoriated zinc paste applied. Will continue to monitor. TO given to RN by Dr. Bryan Vega see new orders.

## 2021-11-21 NOTE — PLAN OF CARE
Problem: Falls - Risk of:  Goal: Will remain free from falls  Description: Will remain free from falls  Outcome: Met This Shift  Goal: Absence of physical injury  Description: Absence of physical injury  Outcome: Met This Shift     Problem: Confusion - Acute:  Goal: Absence of continued neurological deterioration signs and symptoms  Description: Absence of continued neurological deterioration signs and symptoms  Outcome: Ongoing  Goal: Mental status will be restored to baseline  Description: Mental status will be restored to baseline  Outcome: Ongoing     Problem: Discharge Planning:  Goal: Ability to perform activities of daily living will improve  Description: Ability to perform activities of daily living will improve  Outcome: Ongoing  Goal: Participates in care planning  Description: Participates in care planning  Outcome: Ongoing     Problem: Injury - Risk of, Physical Injury:  Goal: Will remain free from falls  Description: Will remain free from falls  Outcome: Met This Shift  Goal: Absence of physical injury  Description: Absence of physical injury  Outcome: Met This Shift     Problem: Mood - Altered:  Goal: Mood stable  Description: Mood stable  Outcome: Ongoing  Goal: Absence of abusive behavior  Description: Absence of abusive behavior  Outcome: Ongoing  Goal: Verbalizations of feeling emotionally comfortable while being cared for will increase  Description: Verbalizations of feeling emotionally comfortable while being cared for will increase  Outcome: Ongoing     Problem: Psychomotor Activity - Altered:  Goal: Absence of psychomotor disturbance signs and symptoms  Description: Absence of psychomotor disturbance signs and symptoms  Outcome: Ongoing     Problem: Sensory Perception - Impaired:  Goal: Demonstrations of improved sensory functioning will increase  Description: Demonstrations of improved sensory functioning will increase  Outcome: Ongoing  Goal: Decrease in sensory misperception frequency  Description: Decrease in sensory misperception frequency  Outcome: Ongoing  Goal: Able to refrain from responding to false sensory perceptions  Description: Able to refrain from responding to false sensory perceptions  Outcome: Ongoing  Goal: Demonstrates accurate environmental perceptions  Description: Demonstrates accurate environmental perceptions  Outcome: Ongoing  Goal: Able to distinguish between reality-based and nonreality-based thinking  Description: Able to distinguish between reality-based and nonreality-based thinking  Outcome: Ongoing  Goal: Able to interrupt nonreality-based thinking  Description: Able to interrupt nonreality-based thinking  Outcome: Ongoing     Problem: Sleep Pattern Disturbance:  Goal: Appears well-rested  Description: Appears well-rested  Outcome: Ongoing     Problem: Sensory:  Goal: General experience of comfort will improve  Description: General experience of comfort will improve  Outcome: Ongoing     Problem: Urinary Elimination:  Goal: Signs and symptoms of infection will decrease  Description: Signs and symptoms of infection will decrease  Outcome: Ongoing  Goal: Ability to reestablish a normal urinary elimination pattern will improve - after catheter removal  Description: Ability to reestablish a normal urinary elimination pattern will improve  Outcome: Ongoing  Goal: Complications related to the disease process, condition or treatment will be avoided or minimized  Description: Complications related to the disease process, condition or treatment will be avoided or minimized  Outcome: Ongoing     Problem: Skin Integrity:  Goal: Will show no infection signs and symptoms  Description: Will show no infection signs and symptoms  Outcome: Ongoing  Goal: Absence of new skin breakdown  Description: Absence of new skin breakdown  Outcome: Ongoing     Problem: OXYGENATION/RESPIRATORY FUNCTION  Goal: Patient will maintain patent airway  Outcome: Ongoing  Goal: Patient will achieve/maintain normal respiratory rate/effort  Description: Respiratory rate and effort will be within normal limits for the patient  Outcome: Ongoing     Problem: HEMODYNAMIC STATUS  Goal: Patient has stable vital signs and fluid balance  Outcome: Ongoing     Problem: FLUID AND ELECTROLYTE IMBALANCE  Goal: Fluid and electrolyte balance are achieved/maintained  Outcome: Ongoing     Problem: ACTIVITY INTOLERANCE/IMPAIRED MOBILITY  Goal: Mobility/activity is maintained at optimum level for patient  Outcome: Ongoing

## 2021-11-21 NOTE — PROGRESS NOTES
Hospitalist Progress Note      PCP: Robert Wilson, APRN - CNP    Date of Admission: 11/14/2021      Subjective: more awake, alert, denies fever or chills, no nausea or vomiting. Medications:  Reviewed    Infusion Medications    sodium chloride 50 mL/hr at 11/20/21 0242    sodium chloride 25 mL (11/17/21 1557)     Scheduled Medications    ampicillin-sulbactam  1,500 mg IntraVENous Q6H    sodium chloride flush  10 mL IntraVENous 2 times per day    calcium elemental  500 mg Oral Daily    metoprolol succinate  12.5 mg Oral Daily    budesonide-formoterol  2 puff Inhalation BID    therapeutic multivitamin-minerals  1 tablet Oral Daily    rivaroxaban  15 mg Oral Daily with breakfast    Vitamin D  1,000 Units Oral Daily    [Held by provider] furosemide  20 mg IntraVENous BID    melatonin  6 mg Oral Nightly     PRN Meds: sodium chloride flush, sodium chloride, potassium chloride, magnesium sulfate, ondansetron **OR** ondansetron, magnesium hydroxide, acetaminophen **OR** acetaminophen, albuterol    No intake or output data in the 24 hours ending 11/21/21 0514    Physical Exam Performed:    BP (!) 151/74   Pulse 116   Temp 97.4 °F (36.3 °C) (Oral)   Resp 18   Ht 5' 3\" (1.6 m)   Wt 185 lb 10 oz (84.2 kg)   SpO2 95%   BMI 32.88 kg/m²     General appearance: No apparent distress  Respiratory:  Normal respiratory effort. Clear to auscultation, bilaterally without Rales/Wheezes/Rhonchi. Cardiovascular: Regular rate and rhythm with normal S1/S2 without murmurs, rubs or gallops. Abdomen: Soft, non-tender  Musculoskeletal: No clubbing, cyanosis   Skin: Skin color, texture, turgor normal.  No rashes or lesions. Neurologic: moving all extremities spontaneously.    Psychiatric: more coherent this am   Capillary Refill: Brisk,3 seconds, normal   Peripheral Pulses: +2 palpable, equal bilaterally       Labs:   Recent Labs     11/18/21  0635 11/19/21  0732 11/20/21  0624   WBC 6.3 6.2 6.8   HGB 12.5 12.3 12.0   HCT 38.8 38.9 38.1    164 190     Recent Labs     11/18/21  0635 11/19/21  0732 11/20/21  0624    144 142   K 4.3 3.8 3.9    110 110   CO2 25 23 21   BUN 37* 33* 31*   CREATININE 1.1 1.1 1.0   CALCIUM 9.9 9.7 9.6     No results for input(s): AST, ALT, BILIDIR, BILITOT, ALKPHOS in the last 72 hours. No results for input(s): INR in the last 72 hours. No results for input(s): [place] in the last 72 hours. Urinalysis:      Lab Results   Component Value Date    NITRU Negative 11/14/2021    WBCUA 44 11/14/2021    BACTERIA 1+ 11/14/2021    RBCUA 0 11/14/2021    BLOODU TRACE-INTACT 11/14/2021    SPECGRAV 1.015 11/14/2021    GLUCOSEU Negative 11/14/2021       Radiology:  XR CHEST PORTABLE   Final Result   Findings suggest congestive heart failure         CT Head WO Contrast   Final Result   No acute intracranial abnormality. 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  Acute metabolic encephalopathy, likely on top of baseline dementia, could be triggered by infection, currently treated for UTI and congestive heart failure, continue to monitor for now also will at risk for delirium.  More coherent today, hopefully will keep improving, I am suspecting she might be more cooperative with PT, OT now, suspecting will need SNF upon discharge  called her son yesterday and discussed case, all questions answered  2.  UTI, started on IV antibiotics, ceftriaxone, urine culture positive for Enterococcus, changed antibiotics to Unasyn, sensitivity noted. 3.  Possible acute on chronic congestive heart failure exacerbation, mainly diastolic started on IV Lasix, creatinine increased to 1.3 , then trended down. At this time patient seems near euvolemic. Will restart po lasix for now. 4.  Essential hypertension, continue p.o. medications  5.  A. fib, will continue oral anticoagulation    Diet: ADULT DIET; Regular; 4 carb choices (60 gm/meal);  Low Sodium (2 gm); 1800 ml  Code Status: DNR-YANET Sewell MD

## 2021-11-21 NOTE — PLAN OF CARE
Problem: Falls - Risk of: Bed alarm in place and call light within reach.   Goal: Will remain free from falls  Description: Will remain free from falls  11/21/2021 0141 by Melina Barba RN  Outcome: Ongoing  11/20/2021 1548 by Brandy Mabry RN  Outcome: Met This Shift  Goal: Absence of physical injury  Description: Absence of physical injury  11/21/2021 0141 by Melina Barba RN  Outcome: Ongoing  11/20/2021 1548 by Brandy Mabry RN  Outcome: Met This Shift     Problem: Confusion - Acute:  Goal: Absence of continued neurological deterioration signs and symptoms  Description: Absence of continued neurological deterioration signs and symptoms  11/21/2021 0141 by Melina Barba RN  Outcome: Ongoing  11/20/2021 1548 by Brandy Mabry RN  Outcome: Ongoing  Goal: Mental status will be restored to baseline  Description: Mental status will be restored to baseline  11/21/2021 0141 by Melina Barba RN  Outcome: Ongoing  11/20/2021 1548 by Brandy Mabry RN  Outcome: Ongoing     Problem: Discharge Planning:  Goal: Ability to perform activities of daily living will improve  Description: Ability to perform activities of daily living will improve  11/21/2021 0141 by Melina Barba RN  Outcome: Ongoing  11/20/2021 1548 by Brandy Mabry RN  Outcome: Ongoing  Goal: Participates in care planning  Description: Participates in care planning  11/21/2021 0141 by Melina Barba RN  Outcome: Ongoing  11/20/2021 1548 by Brandy Mabry RN  Outcome: Ongoing     Problem: Injury - Risk of, Physical Injury:  Goal: Will remain free from falls  Description: Will remain free from falls  11/21/2021 0141 by Melina Barba RN  Outcome: Ongoing  11/20/2021 1548 by Brandy Mabry RN  Outcome: Met This Shift  Goal: Absence of physical injury  Description: Absence of physical injury  11/21/2021 0141 by Melina Barba RN  Outcome: Ongoing  11/20/2021 1548 by Brandy Mabry RN  Outcome: Met This Shift     Problem: Mood - Altered:  Goal: Mood to distinguish between reality-based and nonreality-based thinking  Description: Able to distinguish between reality-based and nonreality-based thinking  11/21/2021 0141 by Darrel Brooks RN  Outcome: Ongoing  11/20/2021 1548 by Michael Lanier RN  Outcome: Ongoing  Goal: Able to interrupt nonreality-based thinking  Description: Able to interrupt nonreality-based thinking  11/21/2021 0141 by Darrel Brooks RN  Outcome: Ongoing  11/20/2021 1548 by Michael Lanier RN  Outcome: Ongoing     Problem: Sleep Pattern Disturbance:  Goal: Appears well-rested  Description: Appears well-rested  11/21/2021 0141 by Darrel Brooks RN  Outcome: Ongoing  11/20/2021 1548 by Michael Lanier RN  Outcome: Ongoing     Problem: Sensory:  Goal: General experience of comfort will improve  Description: General experience of comfort will improve  11/21/2021 0141 by Darrel Brooks RN  Outcome: Ongoing  11/20/2021 1548 by Michael Lanier RN  Outcome: Ongoing     Problem: Urinary Elimination:  Goal: Signs and symptoms of infection will decrease  Description: Signs and symptoms of infection will decrease  11/21/2021 0141 by Darrel Brooks RN  Outcome: Ongoing  11/20/2021 1548 by Michael Lanier RN  Outcome: Ongoing  Goal: Ability to reestablish a normal urinary elimination pattern will improve - after catheter removal  Description: Ability to reestablish a normal urinary elimination pattern will improve  11/21/2021 0141 by Darrel Brooks RN  Outcome: Ongoing  11/20/2021 1548 by Michael Lanier RN  Outcome: Ongoing  Goal: Complications related to the disease process, condition or treatment will be avoided or minimized  Description: Complications related to the disease process, condition or treatment will be avoided or minimized  11/21/2021 0141 by Darrel Brooks RN  Outcome: Ongoing  11/20/2021 1548 by Michael Lanier RN  Outcome: Ongoing     Problem: Skin Integrity:  Goal: Will show no infection signs and symptoms  Description: Will show no infection signs and symptoms  11/21/2021 0141 by Juan Alvarez RN  Outcome: Ongoing  11/20/2021 1548 by Yash Ordaz RN  Outcome: Ongoing  Goal: Absence of new skin breakdown  Description: Absence of new skin breakdown  11/21/2021 0141 by Juan Alvarez RN  Outcome: Ongoing  11/20/2021 1548 by Yash Ordaz RN  Outcome: Ongoing     Problem: OXYGENATION/RESPIRATORY FUNCTION  Goal: Patient will maintain patent airway  11/21/2021 0141 by Juan Alvarez RN  Outcome: Ongoing  11/20/2021 1548 by Yash Ordaz RN  Outcome: Ongoing  Goal: Patient will achieve/maintain normal respiratory rate/effort  Description: Respiratory rate and effort will be within normal limits for the patient  11/21/2021 0141 by Juan Alvarez RN  Outcome: Ongoing  11/20/2021 1548 by Yash Ordaz RN  Outcome: Ongoing     Problem: HEMODYNAMIC STATUS  Goal: Patient has stable vital signs and fluid balance  11/21/2021 0141 by Juan Alvarez RN  Outcome: Ongoing  11/20/2021 1548 by Yash Ordaz RN  Outcome: Ongoing     Problem: FLUID AND ELECTROLYTE IMBALANCE  Goal: Fluid and electrolyte balance are achieved/maintained  11/21/2021 0141 by Juan Alvarez RN  Outcome: Ongoing  11/20/2021 1548 by Yash Ordaz RN  Outcome: Ongoing     Problem: ACTIVITY INTOLERANCE/IMPAIRED MOBILITY  Goal: Mobility/activity is maintained at optimum level for patient  11/21/2021 0141 by Juan Alvarez RN  Outcome: Ongoing  11/20/2021 1548 by Yash Ordaz RN  Outcome: Ongoing

## 2021-11-21 NOTE — PROGRESS NOTES
Pt is alert confused soiled playing in stool with wipes. [facility] complete bed change gown change performed. Periarea excoriated zinc paste applied. Will continue to monitor.

## 2021-11-22 LAB
ANION GAP SERPL CALCULATED.3IONS-SCNC: 11 MMOL/L (ref 3–16)
BUN BLDV-MCNC: 20 MG/DL (ref 7–20)
CALCIUM SERPL-MCNC: 9.5 MG/DL (ref 8.3–10.6)
CHLORIDE BLD-SCNC: 111 MMOL/L (ref 99–110)
CO2: 20 MMOL/L (ref 21–32)
CREAT SERPL-MCNC: 0.8 MG/DL (ref 0.6–1.2)
GFR AFRICAN AMERICAN: >60
GFR NON-AFRICAN AMERICAN: >60
GLUCOSE BLD-MCNC: 82 MG/DL (ref 70–99)
HCT VFR BLD CALC: 38.3 % (ref 36–48)
HEMOGLOBIN: 12.1 G/DL (ref 12–16)
MCH RBC QN AUTO: 27.8 PG (ref 26–34)
MCHC RBC AUTO-ENTMCNC: 31.6 G/DL (ref 31–36)
MCV RBC AUTO: 88.1 FL (ref 80–100)
PDW BLD-RTO: 14.6 % (ref 12.4–15.4)
PLATELET # BLD: 183 K/UL (ref 135–450)
PMV BLD AUTO: 9.4 FL (ref 5–10.5)
POTASSIUM REFLEX MAGNESIUM: 3.8 MMOL/L (ref 3.5–5.1)
PRO-BNP: 3048 PG/ML (ref 0–449)
RBC # BLD: 4.35 M/UL (ref 4–5.2)
SODIUM BLD-SCNC: 142 MMOL/L (ref 136–145)
WBC # BLD: 4.8 K/UL (ref 4–11)

## 2021-11-22 PROCEDURE — 6360000002 HC RX W HCPCS: Performed by: INTERNAL MEDICINE

## 2021-11-22 PROCEDURE — 97530 THERAPEUTIC ACTIVITIES: CPT

## 2021-11-22 PROCEDURE — 97535 SELF CARE MNGMENT TRAINING: CPT

## 2021-11-22 PROCEDURE — 2580000003 HC RX 258: Performed by: INTERNAL MEDICINE

## 2021-11-22 PROCEDURE — 2500000003 HC RX 250 WO HCPCS: Performed by: INTERNAL MEDICINE

## 2021-11-22 PROCEDURE — 1200000000 HC SEMI PRIVATE

## 2021-11-22 PROCEDURE — 6370000000 HC RX 637 (ALT 250 FOR IP): Performed by: STUDENT IN AN ORGANIZED HEALTH CARE EDUCATION/TRAINING PROGRAM

## 2021-11-22 PROCEDURE — 36415 COLL VENOUS BLD VENIPUNCTURE: CPT

## 2021-11-22 PROCEDURE — 6370000000 HC RX 637 (ALT 250 FOR IP): Performed by: INTERNAL MEDICINE

## 2021-11-22 PROCEDURE — 85027 COMPLETE CBC AUTOMATED: CPT

## 2021-11-22 PROCEDURE — 83880 ASSAY OF NATRIURETIC PEPTIDE: CPT

## 2021-11-22 PROCEDURE — 80048 BASIC METABOLIC PNL TOTAL CA: CPT

## 2021-11-22 RX ORDER — FUROSEMIDE 10 MG/ML
20 INJECTION INTRAMUSCULAR; INTRAVENOUS ONCE
Status: COMPLETED | OUTPATIENT
Start: 2021-11-22 | End: 2021-11-22

## 2021-11-22 RX ORDER — METOPROLOL SUCCINATE 25 MG/1
25 TABLET, EXTENDED RELEASE ORAL DAILY
Status: DISCONTINUED | OUTPATIENT
Start: 2021-11-23 | End: 2021-11-23 | Stop reason: HOSPADM

## 2021-11-22 RX ORDER — METOPROLOL TARTRATE 5 MG/5ML
5 INJECTION INTRAVENOUS ONCE
Status: COMPLETED | OUTPATIENT
Start: 2021-11-22 | End: 2021-11-22

## 2021-11-22 RX ADMIN — CALCIUM 500 MG: 500 TABLET ORAL at 08:57

## 2021-11-22 RX ADMIN — MULTIPLE VITAMINS W/ MINERALS TAB 1 TABLET: TAB at 08:57

## 2021-11-22 RX ADMIN — SODIUM CHLORIDE 1500 MG: 900 INJECTION INTRAVENOUS at 02:23

## 2021-11-22 RX ADMIN — METOPROLOL SUCCINATE 12.5 MG: 25 TABLET, EXTENDED RELEASE ORAL at 08:57

## 2021-11-22 RX ADMIN — METRONIDAZOLE 500 MG: 500 INJECTION, SOLUTION INTRAVENOUS at 00:50

## 2021-11-22 RX ADMIN — Medication 6 MG: at 20:32

## 2021-11-22 RX ADMIN — SODIUM CHLORIDE 1500 MG: 900 INJECTION INTRAVENOUS at 12:58

## 2021-11-22 RX ADMIN — METOPROLOL TARTRATE 5 MG: 1 INJECTION, SOLUTION INTRAVENOUS at 20:31

## 2021-11-22 RX ADMIN — METRONIDAZOLE 500 MG: 500 INJECTION, SOLUTION INTRAVENOUS at 17:22

## 2021-11-22 RX ADMIN — Medication 1000 UNITS: at 08:57

## 2021-11-22 RX ADMIN — METRONIDAZOLE 500 MG: 500 INJECTION, SOLUTION INTRAVENOUS at 09:00

## 2021-11-22 RX ADMIN — SODIUM CHLORIDE 25 ML: 9 INJECTION, SOLUTION INTRAVENOUS at 17:21

## 2021-11-22 RX ADMIN — RIVAROXABAN 15 MG: 15 TABLET, FILM COATED ORAL at 08:57

## 2021-11-22 RX ADMIN — FUROSEMIDE 20 MG: 20 TABLET ORAL at 08:57

## 2021-11-22 RX ADMIN — SODIUM CHLORIDE 1500 MG: 900 INJECTION INTRAVENOUS at 20:34

## 2021-11-22 RX ADMIN — SODIUM CHLORIDE, PRESERVATIVE FREE 10 ML: 5 INJECTION INTRAVENOUS at 20:32

## 2021-11-22 RX ADMIN — SODIUM CHLORIDE, PRESERVATIVE FREE 10 ML: 5 INJECTION INTRAVENOUS at 08:58

## 2021-11-22 RX ADMIN — SODIUM CHLORIDE 1500 MG: 900 INJECTION INTRAVENOUS at 06:32

## 2021-11-22 RX ADMIN — FUROSEMIDE 20 MG: 10 INJECTION, SOLUTION INTRAMUSCULAR; INTRAVENOUS at 17:17

## 2021-11-22 ASSESSMENT — PAIN SCALES - WONG BAKER
WONGBAKER_NUMERICALRESPONSE: 0

## 2021-11-22 ASSESSMENT — PAIN SCALES - PAIN ASSESSMENT IN ADVANCED DEMENTIA (PAINAD)
FACIALEXPRESSION: 0
BODYLANGUAGE: 0
BREATHING: 0
NEGVOCALIZATION: 0
NEGVOCALIZATION: 0
BREATHING: 0
CONSOLABILITY: 0
NEGVOCALIZATION: 0
TOTALSCORE: 0
FACIALEXPRESSION: 0
FACIALEXPRESSION: 0
CONSOLABILITY: 0
TOTALSCORE: 0
BREATHING: 0
BODYLANGUAGE: 0
TOTALSCORE: 0
BODYLANGUAGE: 0
CONSOLABILITY: 0

## 2021-11-22 ASSESSMENT — PAIN SCALES - GENERAL
PAINLEVEL_OUTOF10: 0

## 2021-11-22 NOTE — CARE COORDINATION
11/22 Call placed to patient's son Kaushik Neighbours to discuss discharge plan. He refuses ECF and plans to bring patient back home.  Electronically signed by Ke Cruz RN on 11/22/2021 at 8:59 AM

## 2021-11-22 NOTE — PLAN OF CARE
Problem: Falls - Risk of:  Goal: Will remain free from falls  Description: Will remain free from falls  11/22/2021 0423 by Jorgito Holder RN  Outcome: Ongoing  11/21/2021 1750 by Marnie Abad RN  Outcome: Met This Shift  Goal: Absence of physical injury  Description: Absence of physical injury  11/22/2021 0423 by Jorgito Holder RN  Outcome: Ongoing  11/21/2021 1750 by Marnie Abad RN  Outcome: Met This Shift     Problem: Confusion - Acute:  Goal: Absence of continued neurological deterioration signs and symptoms  Description: Absence of continued neurological deterioration signs and symptoms  11/22/2021 0423 by Jorgito Holder RN  Outcome: Ongoing  11/21/2021 1750 by Marnie Abad RN  Outcome: Ongoing  Goal: Mental status will be restored to baseline  Description: Mental status will be restored to baseline  11/22/2021 0423 by Jorgito Holder RN  Outcome: Ongoing  11/21/2021 1750 by Marnie Abad RN  Outcome: Ongoing     Problem: Discharge Planning:  Goal: Ability to perform activities of daily living will improve  Description: Ability to perform activities of daily living will improve  11/22/2021 0423 by Jorgito Holder RN  Outcome: Ongoing  11/21/2021 1750 by Marnie Abad RN  Outcome: Ongoing  Goal: Participates in care planning  Description: Participates in care planning  11/22/2021 0423 by Jorgito Holder RN  Outcome: Ongoing  11/21/2021 1750 by Marnie Abad RN  Outcome: Ongoing     Problem: Injury - Risk of, Physical Injury:  Goal: Will remain free from falls  Description: Will remain free from falls  11/22/2021 0423 by Jorgito Holder RN  Outcome: Ongoing  11/21/2021 1750 by Marnie Abad RN  Outcome: Met This Shift  Goal: Absence of physical injury  Description: Absence of physical injury  11/22/2021 0423 by Jorgito Holder RN  Outcome: Ongoing  11/21/2021 1750 by Marnie Abad RN  Outcome: Met This Shift     Problem: Mood - Altered:  Goal: Mood stable  Description: Mood stable  11/22/2021 0423 by Jorgito Holder distinguish between reality-based and nonreality-based thinking  11/22/2021 0423 by Gonzalo Catalan RN  Outcome: Ongoing  11/21/2021 1750 by Nayeli Anglin RN  Outcome: Ongoing  Goal: Able to interrupt nonreality-based thinking  Description: Able to interrupt nonreality-based thinking  11/22/2021 0423 by Gonzalo Catalan RN  Outcome: Ongoing  11/21/2021 1750 by Nayeli Anglin RN  Outcome: Ongoing     Problem: Sleep Pattern Disturbance:  Goal: Appears well-rested  Description: Appears well-rested  11/22/2021 0423 by Gonzalo Catalan RN  Outcome: Ongoing  11/21/2021 1750 by Nayeli Anglin RN  Outcome: Ongoing     Problem: Sensory:  Goal: General experience of comfort will improve  Description: General experience of comfort will improve  11/22/2021 0423 by Gonzalo Catalan RN  Outcome: Ongoing  11/21/2021 1750 by Nayeli Anglin RN  Outcome: Ongoing     Problem: Urinary Elimination:  Goal: Signs and symptoms of infection will decrease  Description: Signs and symptoms of infection will decrease  11/22/2021 0423 by Gonzalo Catalan RN  Outcome: Ongoing  11/21/2021 1750 by Nayeli Anglin RN  Outcome: Ongoing  Goal: Ability to reestablish a normal urinary elimination pattern will improve - after catheter removal  Description: Ability to reestablish a normal urinary elimination pattern will improve  11/22/2021 0423 by Gonzalo Catalan RN  Outcome: Ongoing  11/21/2021 1750 by Nayeli Anglin RN  Outcome: Ongoing  Goal: Complications related to the disease process, condition or treatment will be avoided or minimized  Description: Complications related to the disease process, condition or treatment will be avoided or minimized  11/22/2021 0423 by Gonzalo Catalan RN  Outcome: Ongoing  11/21/2021 1750 by Nayeli Anglin RN  Outcome: Ongoing     Problem: Skin Integrity:  Goal: Will show no infection signs and symptoms  Description: Will show no infection signs and symptoms  11/22/2021 0423 by Gonzalo Catalan RN  Outcome: Ongoing  11/21/2021 1750 by Fern Echevarria Alona Oleary RN  Outcome: Ongoing  Goal: Absence of new skin breakdown  Description: Absence of new skin breakdown  11/22/2021 0423 by Hair Mercado RN  Outcome: Ongoing  11/21/2021 1750 by Enzo Barlow RN  Outcome: Ongoing     Problem: OXYGENATION/RESPIRATORY FUNCTION  Goal: Patient will maintain patent airway  11/22/2021 0423 by Hair Mercado RN  Outcome: Ongoing  11/21/2021 1750 by Enzo Barlow RN  Outcome: Ongoing  Goal: Patient will achieve/maintain normal respiratory rate/effort  Description: Respiratory rate and effort will be within normal limits for the patient  11/22/2021 0423 by Hair Mercado RN  Outcome: Ongoing  11/21/2021 1750 by Enzo Barlow RN  Outcome: Ongoing     Problem: HEMODYNAMIC STATUS  Goal: Patient has stable vital signs and fluid balance  11/22/2021 0423 by Hair Mercado RN  Outcome: Ongoing  11/21/2021 1750 by Enzo Barlow RN  Outcome: Ongoing     Problem: FLUID AND ELECTROLYTE IMBALANCE  Goal: Fluid and electrolyte balance are achieved/maintained  11/22/2021 0423 by Hair Mercado RN  Outcome: Ongoing  11/21/2021 1750 by Enzo Barlow RN  Outcome: Ongoing     Problem: ACTIVITY INTOLERANCE/IMPAIRED MOBILITY  Goal: Mobility/activity is maintained at optimum level for patient  11/22/2021 0423 by Hair Mercado RN  Outcome: Ongoing  11/21/2021 1750 by Enzo Barlow RN  Outcome: Ongoing

## 2021-11-22 NOTE — PROGRESS NOTES
Hospitalist Progress Note      PCP: TASHI Stern - CNP    Date of Admission: 11/14/2021      Subjective:  Awake, alert, asking to go home. However, very confused. Not answering my questions. Medications:  Reviewed    Infusion Medications    sodium chloride 25 mL (11/22/21 1721)     Scheduled Medications    furosemide  20 mg Oral Daily    metroNIDAZOLE  500 mg IntraVENous Q8H    ampicillin-sulbactam  1,500 mg IntraVENous Q6H    sodium chloride flush  10 mL IntraVENous 2 times per day    calcium elemental  500 mg Oral Daily    metoprolol succinate  12.5 mg Oral Daily    budesonide-formoterol  2 puff Inhalation BID    therapeutic multivitamin-minerals  1 tablet Oral Daily    rivaroxaban  15 mg Oral Daily with breakfast    Vitamin D  1,000 Units Oral Daily    melatonin  6 mg Oral Nightly     PRN Meds: sodium chloride flush, sodium chloride, potassium chloride, magnesium sulfate, ondansetron **OR** ondansetron, magnesium hydroxide, acetaminophen **OR** acetaminophen, albuterol      Intake/Output Summary (Last 24 hours) at 11/22/2021 1804  Last data filed at 11/22/2021 9727  Gross per 24 hour   Intake 100 ml   Output --   Net 100 ml       Physical Exam Performed:    /82   Pulse 71   Temp 97.3 °F (36.3 °C) (Axillary)   Resp 29   Ht 5' 3\" (1.6 m)   Wt 189 lb 2.5 oz (85.8 kg)   SpO2 99%   BMI 33.51 kg/m²     General appearance: No apparent distress  Respiratory:  Normal respiratory effort. Clear to auscultation, bilaterally without Rales/Wheezes/Rhonchi. Cardiovascular: Regular rate and rhythm with normal S1/S2 without murmurs, rubs or gallops. Abdomen: Soft, non-tender  Musculoskeletal: No clubbing, cyanosis   Skin: Skin color, texture, turgor normal.  No rashes or lesions. Neurologic: moving all extremities spontaneously.    Psychiatric: more coherent this am   Capillary Refill: Brisk,3 seconds, normal   Peripheral Pulses: +2 palpable, equal bilaterally       Labs:   Recent Labs     11/20/21  0624 11/21/21  0554 11/22/21  0738   WBC 6.8 5.3 4.8   HGB 12.0 12.1 12.1   HCT 38.1 38.1 38.3    177 183     Recent Labs     11/20/21  0624 11/21/21  0554 11/22/21  0738    142 142   K 3.9 4.1 3.8    111* 111*   CO2 21 21 20*   BUN 31* 26* 20   CREATININE 1.0 1.0 0.8   CALCIUM 9.6 9.6 9.5     No results for input(s): AST, ALT, BILIDIR, BILITOT, ALKPHOS in the last 72 hours. No results for input(s): INR in the last 72 hours. No results for input(s): Ardelle Chalk in the last 72 hours. Urinalysis:      Lab Results   Component Value Date    NITRU Negative 11/14/2021    WBCUA 44 11/14/2021    BACTERIA 1+ 11/14/2021    RBCUA 0 11/14/2021    BLOODU TRACE-INTACT 11/14/2021    SPECGRAV 1.015 11/14/2021    GLUCOSEU Negative 11/14/2021       Radiology:  XR ABDOMEN (KUB) (SINGLE AP VIEW)   Final Result   Enteric catheter not visualized in the lower chest or abdomen. XR CHEST PORTABLE   Final Result   Findings suggest congestive heart failure         CT Head WO Contrast   Final Result   No acute intracranial abnormality. 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  Acute metabolic encephalopathy, likely on top of baseline dementia  could be triggered by infection, currently treated for UTI and congestive heart failure,   - continue to monitor for now also will at risk for delirium  - unclear if currently at baseline now  - will discuss with pt's son      2.  UTI  - urine culture positive for Enterococcus  - continue Unasyn    3.  Acute on chronic congestive heart failure exacerbation, mild  mainly diastolic started on IV Lasix, creatinine increased to 1.3 , then trended down. BNP trending up today.   - add lasix 20mg IV x1  - continue PO lasix  - continue Toprol XL    4.  Essential hypertension, elevated  - metoprolol 5mg IV x1  - will increase Toprol XL to 25mg daily     5.  A. Fib, stable, controlled  - continue Xarelto      Diet: ADULT DIET; Regular; 4 carb choices (60 gm/meal); Low Sodium (2 gm); 1800 ml  Code Status: DNR-CCA   Dispo:   Anticipate discharge tomorrow        Naman Fine MD

## 2021-11-22 NOTE — PROGRESS NOTES
Occupational Therapy  Facility/Department: [facility]  Daily Treatment Note  NAME: Anneliese Clemons  : 1931  MRN: [id number]    Date of Service: 2021    Discharge Recommendations:  24 hour supervision or assist, 2-3 sessions per week, S Level 3       Anneliese Clemons scored a 11/24 on the AM-PAC ADL Inpatient form. Current research shows that an AM-PAC score of 18 or greater is typically associated with a discharge to the patient's home setting. Based on the patient's AM-PAC score, and their current ADL deficits, it is recommended that the patient have 2-3 sessions per week of Occupational Therapy at d/c to increase the patient's independence. At this time, this patient demonstrates the endurance and safety to discharge home with home (home vs OP services) and a follow up treatment frequency of 2-3x/wk. Please see assessment section for further patient specific details. If patient discharges prior to next session this note will serve as a discharge summary. Please see below for the latest assessment towards goals. HOME HEALTH CARE: LEVEL 3 SAFETY        -Initial home health evaluation to occur within 24-48 hours, in patient home    -Home health agency to establish plan of care for patient over 60 day period    -Medication Reconciliation    -PT/OT/Speech evaluations in home within 24-48 hours of discharge; including  -DME and home safety    -Frontload therapy 5 days, then 3x a week    -OT to evaluate if patient has [address] needs for personal care    - evaluation within 24-48 hours, includes evaluation of resources   and insurance to determine [place], [place], LTC, and Medicaid options    -PCP Visit scheduled within three to seven days of discharge       Assessment: Discussed with OTR am pac score is 11 which does indicate need for continued skilled OT.  However despite am pac score patients son is planning to have patient return home and provide 24/7 assist. Patient completed rolling right and left with Min A. Supine to sit with Mod A, Min A for sit to supine. Min A of 2 for sit<>stand from EOB to diamond stedy to EOB. Patient dependent for lower body dressing and toileting of large amount of loose stool and urine. Will continue with POC. Patient Diagnosis(es): The primary encounter diagnosis was Disorientation. Diagnoses of Acute cystitis without hematuria and Acute on chronic combined systolic and diastolic congestive heart failure ([facility] [address] were also pertinent to this visit. 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 has no past surgical history on file. Restrictions  Restrictions/Precautions  Restrictions/Precautions: Fall Risk  Position Activity Restriction  Other position/activity restrictions: h/o of severe dementia. [place]; IV  Subjective   General  Chart Reviewed: Yes  Additional Pertinent Hx: Per Brandon Jones DO's H&P 11/14: \"The patient is a 80 y.o. female w/ pmhx as below who presents to [facility] with worsening mental status and aggression since 2 days but worse in past 24 hrs. Unable to obtain hx from patient at this time but noted per phone discussion with son that it has progressively worsened. Initially noted per son patient was less altered 10 dys ago and here in ED and treated w/ IV abx and sent home and then PCP had started her on oral macrobid for partial culture sens E coli. Patient was taking abx per son but never became extremely improved but was better. Then past 2 days patient has been more confused, possibly hallucinating, and not eating or drinking much. Son concerned that UTI might still be present or worsening. Son unable to confirm other symptoms of fever/chills/sweats/dizziness/N/V/D.  \"  Response to previous treatment: Patient with no complaints from previous session  Family / Caregiver Present: No  Referring short term memory; Decreased recall of recent events  Safety Judgement: Decreased awareness of need for safety  Problem Solving: Decreased awareness of errors; Assistance required to identify errors made; Assistance required to generate solutions; Assistance required to correct errors made; Assistance required to implement solutions  Insights: Not aware of deficits  Initiation: Requires cues for all  Sequencing: Requires cues for all  Cognition Comment: [facility] also        Plan   Plan  Times per week: 3-5  Current Treatment Recommendations: Strengthening, Balance Training, Functional Mobility Training, Safety Education & Training, Self-Care / ADL, Cognitive Reorientation, Cognitive/Perceptual Training, Endurance Training      AM-PAC Score        AM-PAC Inpatient Daily Activity Raw Score: 11 (11/22/21 1432)  AM-PAC Inpatient ADL T-Scale Score : 29.04 (11/22/21 1432)  ADL Inpatient CMS 0-100% Score: 70.42 (11/22/21 1432)  ADL Inpatient CMS G-Code Modifier : CL (11/22/21 1432)    Goals  Short term goals  Time Frame for Short term goals: Prior to d/c: all goals ongoing  Short term goal 1: Pt will complete fxl transfer to/from ADL surfaces with min A x2 using AD prn. Short term goal 2: Pt will participate in grooming with min A. Short term goal 3: Pt will complete bed mobility with min A in prep for ADL task. Short term goal 4: Pt will complete feeding with set-up/supervision. Short term goal 5: Pt will tolerate standing >3 minutes for functional task with CGA/min A to improve standing tolerance for ADLs. Long term goals  Time Frame for Long term goals : STGs=LTGs  Patient Goals   Patient goals : pt unable to verbalize personal therapy goal d/t decrease cognition.        Therapy Time   Individual Concurrent Group Co-treatment   Time In 7213         Time Out 1400         Minutes 55                 Electronically signed by Brayden Childers, [id number] on 11/22/2021 at 2:44 PM

## 2021-11-22 NOTE — PROGRESS NOTES
Nutrition Assessment     Type and Reason for Visit: Initial, RD Nutrition Re-Screen/LOS    Nutrition Recommendations/Plan:   Continue on 4 carb, 2 gm Na 1800 ml fluid restricted diet  Monitor meal intake       Nutrition Assessment:  Pt admitted with CHF, AMS, UTI. PMH includes A Fib and [place]. No family present to obtain information. Pt has been eating >50% of meals recorded. No nutritional concerns pta. Pt on 4 carb, 2 gm Na 1800 ml fluid restricted diet. Malnutrition Assessment:  Malnutrition Status: Insufficient data    Nutrition Related Findings: BLE trace edema, BM 11/21      Current Nutrition Therapies:    ADULT DIET; Regular; 4 carb choices (60 gm/meal);  Low Sodium (2 gm); 1800 ml    Anthropometric Measures:  · Height: 5' 3\" (160 cm)  · Current Body Wt: 189 lb 2.5 oz (85.8 kg)   · BMI: 33.5    Nutrition Diagnosis:   · No nutrition diagnosis at this time related to   as evidenced by        Nutrition Interventions:   Food and/or Nutrient Delivery:  Continue Current Diet  Nutrition Education/Counseling:  No recommendation at this time   Coordination of Nutrition Care:  Continue to monitor while inpatient    Goals:  Continued intake >50%       Nutrition Monitoring and Evaluation:   Behavioral-Environmental Outcomes:  None Identified   Food/Nutrient Intake Outcomes:  Food and Nutrient Intake  Physical Signs/Symptoms Outcomes:  Biochemical Data, Fluid Status or Edema, Nutrition Focused Physical Findings, Skin, Weight, Diarrhea     Discharge Planning:    No discharge needs at this time     Electronically signed by Patricio Hernandez RD, LD on 11/22/21 at 2:19 PM EST    Contact: [phone]

## 2021-11-22 NOTE — PROGRESS NOTES
Stool sample did not meet C-diff specimen collection critera, specimen not sent. RN made Dr. Bala Pineda aware will continue to monitor.

## 2021-11-22 NOTE — PROGRESS NOTES
Physical Therapy    Facility/Department: [address] MED SURG  Daily Note  (cotx)  If patient discharges prior to next session this note will serve as a discharge summary. Please see below for the latest assessment towards goals. NAME: Theresa Alexis  : 1931  MRN: [id number]    Date of Service: 2021    Discharge Recommendations:  24 hour supervision or assist, Patient would benefit from continued therapy after discharge, S Level 1, Home with Home health PT   Theresa Alexis scored a  on the AM-PAC short mobility form. Please see assessment section for further patient specific details. PT Equipment Recommendations  Other: hospital bed, BSC (if she doesn't already have them)    Assessment   Assessment: Today, the pt demonstrated that she con't to need significant asisst for bed mobility and transfers; she would have needed LIFT equipment to get out of bed to the chair and do not feel she could have walked today. The session was limited by the need for a dependent clean-up due to the pt being incontinent of BM in the bed. Due to the pt's limited cognitive ability, she was unable to assist in clean-up and actually was playing in the [facility] prior to therapy coming in the room. Per social service, the pt's son insists on taking her home and refuses a SNF. He will need a hospital bed, BSC and HHA to assist in her care at home. Home PT will be needed to assess her in her own environment and to maximize her functional capacity and lessen her burden of care. Will con't to follow. Specific instructions for Next Treatment: cotx  Prognosis: Fair; Guarded  PT Education: PT Role; General Safety  Barriers to Learning: Cognitive., Ramona  REQUIRES PT FOLLOW UP: Yes  Activity Tolerance  Activity Tolerance: Patient limited by cognitive status       Patient Diagnosis(es): The primary encounter diagnosis was Disorientation.  Diagnoses of Acute cystitis without hematuria and Acute on chronic combined systolic and diastolic info from past admissions in  and ; the pt unable to give any current info re: PLOF and family not in the room to verify    Objective  Bed mobility  Rolling to Left: Minimal assistance  Rolling to Right: Minimal assistance  Supine to Sit: Moderate assistance  Sit to Supine: Minimal assistance  Scootin Person assistance; Maximal assistance  Transfers  Sit to Stand: Minimal Assistance; 2 Person Assistance  Stand to sit: Minimal Assistance  Bed to Chair: Unable to assess (unable to get the pt to the chair today)  Ambulation  Ambulation?: No     Balance  Sitting - Static: Good; -  Sitting - Dynamic: Fair  Standing - Static: Fair; +  Comments: the pt sat on the EOB with CGA; static  the stedy with CGA x 30 seconds        Plan   Plan  Times per week: 2-3x/week  Specific instructions for Next Treatment: cotx  Current Treatment Recommendations: Functional Mobility Training, Transfer Training, Gait Training, Safety Education & Training, Patient/Caregiver Education & Training  Safety Devices  Type of devices: Bed alarm in place, Call light within reach, Patient at risk for falls, Left in bed, Nurse notified      AM-PAC Score  AM-PAC Inpatient Mobility Raw Score : 9 (21 [facility])  -PAC Inpatient T-Scale Score : 30.55 (21 [facility])  Mobility Inpatient CMS 0-100% Score: 81.38 (21 [facility])  Mobility Inpatient CMS G-Code Modifier : CM (21 [facility])          Goals  Short term goals  Time Frame for Short term goals: upon d/c  (goals are ongoing)  Short term goal 1: Bed mobility min A of 1-2. Short term goal 2: Transfers sit <> stand with min A of 1-2. 2021 goal met and ongoing. Short term goal 3: Ambulate with HHA of 2 10 feet with min A of 2.   Patient Goals   Patient goals : none stated per pt due to level of dementia       Therapy Time   Individual Concurrent Group Co-treatment   Time In 1305         Time Out 1400         Minutes 55               Electronically signed by Kay Neal, PT [id number] on 11/22/2021 at 2:09 PM

## 2021-11-22 NOTE — PLAN OF CARE
Problem: Falls - Risk of:  Goal: Will remain free from falls  Description: Will remain free from falls  11/22/2021 1227 by Philippe Lynn RN  Outcome: Ongoing  11/22/2021 0423 by Lindsay Lopez RN  Outcome: Ongoing  Goal: Absence of physical injury  Description: Absence of physical injury  11/22/2021 1227 by Philippe Lynn RN  Outcome: Ongoing  11/22/2021 0423 by Lindsay Lopez RN  Outcome: Ongoing     Problem: Confusion - Acute:  Goal: Absence of continued neurological deterioration signs and symptoms  Description: Absence of continued neurological deterioration signs and symptoms  11/22/2021 1227 by Philippe Lynn RN  Outcome: Ongoing  11/22/2021 0423 by Lindsay Lopez RN  Outcome: Ongoing  Goal: Mental status will be restored to baseline  Description: Mental status will be restored to baseline  11/22/2021 1227 by Philippe Lynn RN  Outcome: Ongoing  11/22/2021 0423 by Lindsay Lopez RN  Outcome: Ongoing     Problem: Discharge Planning:  Goal: Ability to perform activities of daily living will improve  Description: Ability to perform activities of daily living will improve  11/22/2021 1227 by Philippe Lynn RN  Outcome: Ongoing  11/22/2021 0423 by Lindsay Lopez RN  Outcome: Ongoing  Goal: Participates in care planning  Description: Participates in care planning  11/22/2021 1227 by Philippe Lynn RN  Outcome: Ongoing  11/22/2021 0423 by Lindsay Lopez RN  Outcome: Ongoing     Problem: Injury - Risk of, Physical Injury:  Goal: Will remain free from falls  Description: Will remain free from falls  11/22/2021 1227 by Philippe Lynn RN  Outcome: Ongoing  11/22/2021 0423 by Lindsay Lopez RN  Outcome: Ongoing  Goal: Absence of physical injury  Description: Absence of physical injury  11/22/2021 1227 by Philippe Lynn RN  Outcome: Ongoing  11/22/2021 0423 by Lindsay Lopez RN  Outcome: Ongoing     Problem: Mood - Altered:  Goal: Mood stable  Description: Mood stable  11/22/2021 1227 by Philippe Lynn RN  Outcome: Ongoing  11/22/2021 0423 by [place] Skylar Olmos RN  Outcome: Ongoing  Goal: Absence of abusive behavior  Description: Absence of abusive behavior  11/22/2021 1227 by Azalia Lopez RN  Outcome: Ongoing  11/22/2021 0423 by Jaylin Pineda RN  Outcome: Ongoing  Goal: Verbalizations of feeling emotionally comfortable while being cared for will increase  Description: Verbalizations of feeling emotionally comfortable while being cared for will increase  11/22/2021 1227 by Azalia Lopez RN  Outcome: Ongoing  11/22/2021 0423 by Jaylin Pineda RN  Outcome: Ongoing     Problem: Psychomotor Activity - Altered:  Goal: Absence of psychomotor disturbance signs and symptoms  Description: Absence of psychomotor disturbance signs and symptoms  11/22/2021 1227 by Azalia Lopez RN  Outcome: Ongoing  11/22/2021 0423 by Jaylin Pineda RN  Outcome: Ongoing     Problem: Sensory Perception - Impaired:  Goal: Demonstrations of improved sensory functioning will increase  Description: Demonstrations of improved sensory functioning will increase  11/22/2021 1227 by Azalia Lopez RN  Outcome: Ongoing  11/22/2021 0423 by Jaylin Pineda RN  Outcome: Ongoing  Goal: Decrease in sensory misperception frequency  Description: Decrease in sensory misperception frequency  11/22/2021 1227 by Azalia Lopez RN  Outcome: Ongoing  11/22/2021 0423 by Jaylin Pineda RN  Outcome: Ongoing  Goal: Able to refrain from responding to false sensory perceptions  Description: Able to refrain from responding to false sensory perceptions  11/22/2021 1227 by Azalia Lopez RN  Outcome: Ongoing  11/22/2021 0423 by Jaylin Pineda RN  Outcome: Ongoing  Goal: Demonstrates accurate environmental perceptions  Description: Demonstrates accurate environmental perceptions  11/22/2021 1227 by Azalia Lopez RN  Outcome: Ongoing  11/22/2021 0423 by Jaylin Pineda RN  Outcome: Ongoing  Goal: Able to distinguish between reality-based and nonreality-based thinking  Description: Able to distinguish between reality-based and nonreality-based thinking  11/22/2021 1227 by Ciro Morgan RN  Outcome: Ongoing  11/22/2021 0423 by Kurtis Matthews RN  Outcome: Ongoing  Goal: Able to interrupt nonreality-based thinking  Description: Able to interrupt nonreality-based thinking  11/22/2021 1227 by Ciro Morgan RN  Outcome: Ongoing  11/22/2021 0423 by Kurtis Matthews RN  Outcome: Ongoing     Problem: Sleep Pattern Disturbance:  Goal: Appears well-rested  Description: Appears well-rested  11/22/2021 1227 by Ciro Morgan RN  Outcome: Ongoing  11/22/2021 0423 by Kurtis Matthews RN  Outcome: Ongoing     Problem: Sensory:  Goal: General experience of comfort will improve  Description: General experience of comfort will improve  11/22/2021 1227 by Ciro Morgan RN  Outcome: Ongoing  11/22/2021 0423 by Kurtis Matthews RN  Outcome: Ongoing     Problem: Urinary Elimination:  Goal: Signs and symptoms of infection will decrease  Description: Signs and symptoms of infection will decrease  11/22/2021 1227 by Ciro Morgan RN  Outcome: Ongoing  11/22/2021 0423 by Kurtis Matthews RN  Outcome: Ongoing  Goal: Ability to reestablish a normal urinary elimination pattern will improve - after catheter removal  Description: Ability to reestablish a normal urinary elimination pattern will improve  11/22/2021 1227 by Ciro Morgan RN  Outcome: Ongoing  11/22/2021 0423 by Kurtis Matthews RN  Outcome: Ongoing  Goal: Complications related to the disease process, condition or treatment will be avoided or minimized  Description: Complications related to the disease process, condition or treatment will be avoided or minimized  11/22/2021 1227 by Ciro Morgan RN  Outcome: Ongoing  11/22/2021 0423 by Kurtis Matthews RN  Outcome: Ongoing     Problem: Skin Integrity:  Goal: Will show no infection signs and symptoms  Description: Will show no infection signs and symptoms  11/22/2021 1227 by Ciro Morgan RN  Outcome: Ongoing  11/22/2021 0423 by Kurtis Matthews RN  Outcome: Ongoing  Goal: Absence of new skin breakdown  Description: Absence of new skin breakdown  11/22/2021 1227 by David López RN  Outcome: Ongoing  11/22/2021 0423 by Monalisa Stiles RN  Outcome: Ongoing     Problem: OXYGENATION/RESPIRATORY FUNCTION  Goal: Patient will maintain patent airway  11/22/2021 1227 by David López RN  Outcome: Ongoing  11/22/2021 0423 by Monalisa Stiles RN  Outcome: Ongoing  Goal: Patient will achieve/maintain normal respiratory rate/effort  Description: Respiratory rate and effort will be within normal limits for the patient  11/22/2021 1227 by David López RN  Outcome: Ongoing  11/22/2021 0423 by Monalisa Stiles RN  Outcome: Ongoing     Problem: HEMODYNAMIC STATUS  Goal: Patient has stable vital signs and fluid balance  11/22/2021 1227 by David López RN  Outcome: Ongoing  11/22/2021 0423 by Monalisa Stiles RN  Outcome: Ongoing     Problem: FLUID AND ELECTROLYTE IMBALANCE  Goal: Fluid and electrolyte balance are achieved/maintained  11/22/2021 1227 by David López RN  Outcome: Ongoing  11/22/2021 0423 by Monalisa Stiles RN  Outcome: Ongoing     Problem: ACTIVITY INTOLERANCE/IMPAIRED MOBILITY  Goal: Mobility/activity is maintained at optimum level for patient  11/22/2021 1227 by David López RN  Outcome: Ongoing  11/22/2021 0423 by Monalisa Stiles RN  Outcome: Ongoing

## 2021-11-23 VITALS
HEART RATE: 78 BPM | WEIGHT: 184.75 LBS | RESPIRATION RATE: 16 BRPM | DIASTOLIC BLOOD PRESSURE: 70 MMHG | OXYGEN SATURATION: 92 % | BODY MASS INDEX: 32.73 KG/M2 | HEIGHT: 63 IN | SYSTOLIC BLOOD PRESSURE: 152 MMHG | TEMPERATURE: 97.5 F

## 2021-11-23 LAB
ANION GAP SERPL CALCULATED.3IONS-SCNC: 11 MMOL/L (ref 3–16)
BUN BLDV-MCNC: 19 MG/DL (ref 7–20)
CALCIUM SERPL-MCNC: 9.6 MG/DL (ref 8.3–10.6)
CHLORIDE BLD-SCNC: 106 MMOL/L (ref 99–110)
CO2: 23 MMOL/L (ref 21–32)
CREAT SERPL-MCNC: 1 MG/DL (ref 0.6–1.2)
GFR AFRICAN AMERICAN: >60
GFR NON-AFRICAN AMERICAN: 52
GLUCOSE BLD-MCNC: 86 MG/DL (ref 70–99)
HCT VFR BLD CALC: 39 % (ref 36–48)
HEMOGLOBIN: 12.3 G/DL (ref 12–16)
MCH RBC QN AUTO: 27.7 PG (ref 26–34)
MCHC RBC AUTO-ENTMCNC: 31.5 G/DL (ref 31–36)
MCV RBC AUTO: 87.8 FL (ref 80–100)
PDW BLD-RTO: 14.6 % (ref 12.4–15.4)
PLATELET # BLD: 225 K/UL (ref 135–450)
PMV BLD AUTO: 9.8 FL (ref 5–10.5)
POTASSIUM REFLEX MAGNESIUM: 3.8 MMOL/L (ref 3.5–5.1)
RBC # BLD: 4.45 M/UL (ref 4–5.2)
SODIUM BLD-SCNC: 140 MMOL/L (ref 136–145)
WBC # BLD: 7 K/UL (ref 4–11)

## 2021-11-23 PROCEDURE — 6370000000 HC RX 637 (ALT 250 FOR IP): Performed by: INTERNAL MEDICINE

## 2021-11-23 PROCEDURE — 2500000003 HC RX 250 WO HCPCS: Performed by: INTERNAL MEDICINE

## 2021-11-23 PROCEDURE — 94640 AIRWAY INHALATION TREATMENT: CPT

## 2021-11-23 PROCEDURE — 85027 COMPLETE CBC AUTOMATED: CPT

## 2021-11-23 PROCEDURE — 36415 COLL VENOUS BLD VENIPUNCTURE: CPT

## 2021-11-23 PROCEDURE — 2580000003 HC RX 258: Performed by: INTERNAL MEDICINE

## 2021-11-23 PROCEDURE — 80048 BASIC METABOLIC PNL TOTAL CA: CPT

## 2021-11-23 PROCEDURE — 94761 N-INVAS EAR/PLS OXIMETRY MLT: CPT

## 2021-11-23 PROCEDURE — 6360000002 HC RX W HCPCS: Performed by: INTERNAL MEDICINE

## 2021-11-23 RX ORDER — METOPROLOL SUCCINATE 25 MG/1
25 TABLET, EXTENDED RELEASE ORAL DAILY
Qty: 30 TABLET | Refills: 1 | Status: ON HOLD | OUTPATIENT
Start: 2021-11-24 | End: 2022-10-26 | Stop reason: HOSPADM

## 2021-11-23 RX ADMIN — RIVAROXABAN 15 MG: 15 TABLET, FILM COATED ORAL at 08:14

## 2021-11-23 RX ADMIN — METRONIDAZOLE 500 MG: 500 INJECTION, SOLUTION INTRAVENOUS at 00:14

## 2021-11-23 RX ADMIN — MULTIPLE VITAMINS W/ MINERALS TAB 1 TABLET: TAB at 08:14

## 2021-11-23 RX ADMIN — METOPROLOL SUCCINATE 25 MG: 25 TABLET, EXTENDED RELEASE ORAL at 08:19

## 2021-11-23 RX ADMIN — SODIUM CHLORIDE 1500 MG: 900 INJECTION INTRAVENOUS at 02:04

## 2021-11-23 RX ADMIN — CALCIUM 500 MG: 500 TABLET ORAL at 08:14

## 2021-11-23 RX ADMIN — FUROSEMIDE 20 MG: 20 TABLET ORAL at 08:14

## 2021-11-23 RX ADMIN — BUDESONIDE AND FORMOTEROL FUMARATE DIHYDRATE 2 PUFF: 160; 4.5 AEROSOL RESPIRATORY (INHALATION) at 08:34

## 2021-11-23 RX ADMIN — METRONIDAZOLE 500 MG: 500 INJECTION, SOLUTION INTRAVENOUS at 08:20

## 2021-11-23 RX ADMIN — Medication 1000 UNITS: at 08:14

## 2021-11-23 RX ADMIN — SODIUM CHLORIDE 1500 MG: 900 INJECTION INTRAVENOUS at 05:48

## 2021-11-23 ASSESSMENT — PAIN SCALES - PAIN ASSESSMENT IN ADVANCED DEMENTIA (PAINAD)
TOTALSCORE: 0
FACIALEXPRESSION: 0
CONSOLABILITY: 0
BREATHING: 0
CONSOLABILITY: 0
FACIALEXPRESSION: 0
TOTALSCORE: 0
BODYLANGUAGE: 0
BREATHING: 0
BODYLANGUAGE: 0
BREATHING: 0
BREATHING: 0
FACIALEXPRESSION: 0
CONSOLABILITY: 0
NEGVOCALIZATION: 0
NEGVOCALIZATION: 0
BODYLANGUAGE: 0
TOTALSCORE: 0
FACIALEXPRESSION: 0
TOTALSCORE: 0
NEGVOCALIZATION: 0
FACIALEXPRESSION: 0
BODYLANGUAGE: 0
NEGVOCALIZATION: 0
TOTALSCORE: 0
CONSOLABILITY: 0
TOTALSCORE: 0
BREATHING: 0
NEGVOCALIZATION: 0
NEGVOCALIZATION: 0
BREATHING: 0
FACIALEXPRESSION: 0
BODYLANGUAGE: 0
BODYLANGUAGE: 0

## 2021-11-23 ASSESSMENT — PAIN SCALES - WONG BAKER
WONGBAKER_NUMERICALRESPONSE: 0

## 2021-11-23 ASSESSMENT — PAIN SCALES - GENERAL: PAINLEVEL_OUTOF10: 0

## 2021-11-23 NOTE — CARE COORDINATION
[facility]    DC order noted, all docs needed have been faxed to [facility] for home care services.     Home care to see patient by 11/25/21    Conchis Blanco RN, BSN CTN  [facility] [phone]

## 2021-11-23 NOTE — CARE COORDINATION
DISCHARGE SUMMARY     DATE OF DISCHARGE: 11/23/21    DISCHARGE DESTINATION: Home with Son    HOME CARE: Yes    Agency Name: [facility]    Fax Number: [phone]    Phone Number: [phone]    HEMODIALYSIS: No    TRANSPORTATION: Private Car    NEW DME ORDERED: no    COMMENTS: Discharge to home with Laddie Elders.  Electronically signed by Meet Soliman RN on 11/23/2021 at 11:36 AM

## 2021-11-23 NOTE — CONSULTS
Pharmacy Heart Failure Medication Reconciliation Note    Pt discharged from [facility] today. Chief Complaint   Patient presents with    Altered Mental Status       Johanne Kent has a diagnosis of diastolic heart failure (last EF = 50% on 11/30/20). Pertinent Labs:  BMP:   Lab Results   Component Value Date     11/23/2021    K 3.8 11/23/2021     11/23/2021    CO2 23 11/23/2021    BUN 19 11/23/2021    CREATININE 1.0 11/23/2021     BNP:   Lab Results   Component Value Date    PROBNP 3,048 11/22/2021    PROBNP 1,392 11/14/2021    PROBNP 1,828 11/04/2021       Patient taking an ACEI / ARB / Entresto for EF </= 40%:  No: HFpEF     Patient taking a BETA BLOCKER (Coreg, Toprol XL or bisoprolol) for EF </= 40%:  Yes  Patient taking a LOOP DIURETIC: Yes  Patient taking a ALDOSTERONE RECEPTOR ANTAGONIST for EF </= 35%: No: HFpEF  Patient taking an SGLT2I for EF </= 40%: No, HFpEF    Patient has a diagnosis of Atrial Fibrillation: Yes.  If yes, patient is on appropriate anticoagulation: Yes    Patient has a diagnosis of type 2 diabetes:  No. If yes, patient prescribed the following anti-hyperglycemic medication at discharge: N/A      Corrections to discharge medications include:  none    Discharge Medications:         Medication List      CHANGE how you take these medications    metoprolol succinate 25 MG extended release tablet  Commonly known as: TOPROL XL  Take 1 tablet by mouth daily  Start taking on: November 24, 2021  What changed: how much to take        CONTINUE taking these medications    calcium carbonate 500 MG Tabs tablet  Commonly known as: OSCAL     Cranberry 250 MG Chew  Take 2 tablets by mouth 2 times daily (with meals)     Dulera 200-5 MCG/ACT inhaler  Generic drug: mometasone-formoterol     furosemide 20 MG tablet  Commonly known as: Lasix  Take 1 tablet by mouth every other day     multivitamin with minerals tablet     rivaroxaban 15 MG Tabs tablet  Commonly known as: Xarelto  Take 1 tablet by mouth daily (with breakfast)     Ventolin  (90 Base) MCG/ACT inhaler  Generic drug: albuterol sulfate HFA     vitamin D 25 MCG (1000 UT) Caps           Where to Get Your Medications      These medications were sent to [address] [place] [address], [address] [place] [address] & [place] [id number] [place] [address], [address] [facility]    Phone: [phone]   · metoprolol succinate 25 MG extended release tablet         Roland Forrest, PharmD  Heart Failure Discharge Medication Reconciliation Program  [phone]

## 2021-11-23 NOTE — PLAN OF CARE
Problem: Falls - Risk of:  Goal: Will remain free from falls  Description: Will remain free from falls  11/22/2021 2252 by Rosenda Haq RN  Outcome: Ongoing  11/22/2021 1227 by Valentina Villanueva RN  Outcome: Ongoing  Goal: Absence of physical injury  Description: Absence of physical injury  11/22/2021 2252 by Rosenda Haq RN  Outcome: Ongoing  11/22/2021 1227 by Valentina Villanueva RN  Outcome: Ongoing     Problem: Confusion - Acute:  Goal: Absence of continued neurological deterioration signs and symptoms  Description: Absence of continued neurological deterioration signs and symptoms  11/22/2021 2252 by Rosenda Haq RN  Outcome: Ongoing  11/22/2021 1227 by Valentina Villanueva RN  Outcome: Ongoing  Goal: Mental status will be restored to baseline  Description: Mental status will be restored to baseline  11/22/2021 2252 by Rosenda Haq RN  Outcome: Ongoing  11/22/2021 1227 by Valentina Villanueva RN  Outcome: Ongoing     Problem: Discharge Planning:  Goal: Ability to perform activities of daily living will improve  Description: Ability to perform activities of daily living will improve  11/22/2021 2252 by Rosenda Haq RN  Outcome: Ongoing  11/22/2021 1227 by Valentina Villanueva RN  Outcome: Ongoing  Goal: Participates in care planning  Description: Participates in care planning  11/22/2021 2252 by Rosenda Haq RN  Outcome: Ongoing  11/22/2021 1227 by Valentina Villanueva RN  Outcome: Ongoing     Problem: Injury - Risk of, Physical Injury:  Goal: Will remain free from falls  Description: Will remain free from falls  11/22/2021 2252 by Rosenda Haq RN  Outcome: Ongoing  11/22/2021 1227 by Valentina Villanueva RN  Outcome: Ongoing  Goal: Absence of physical injury  Description: Absence of physical injury  11/22/2021 2252 by Rosenda Haq RN  Outcome: Ongoing  11/22/2021 1227 by Valentina Villanueva RN  Outcome: Ongoing     Problem: Mood - Altered:  Goal: Mood stable  Description: Mood stable  11/22/2021 2252 by Rosenda Haq RN  Outcome: Ongoing  11/22/2021 1227 by Shon Roe RN  Outcome: Ongoing  Goal: Absence of abusive behavior  Description: Absence of abusive behavior  11/22/2021 2252 by Yulia Coleman RN  Outcome: Ongoing  11/22/2021 1227 by Shon Roe RN  Outcome: Ongoing  Goal: Verbalizations of feeling emotionally comfortable while being cared for will increase  Description: Verbalizations of feeling emotionally comfortable while being cared for will increase  11/22/2021 2252 by Yulia Coleman RN  Outcome: Ongoing  11/22/2021 1227 by Shon Roe RN  Outcome: Ongoing     Problem: Psychomotor Activity - Altered:  Goal: Absence of psychomotor disturbance signs and symptoms  Description: Absence of psychomotor disturbance signs and symptoms  11/22/2021 2252 by Yulia Coleman RN  Outcome: Ongoing  11/22/2021 1227 by Shon Roe RN  Outcome: Ongoing     Problem: Sensory Perception - Impaired:  Goal: Demonstrations of improved sensory functioning will increase  Description: Demonstrations of improved sensory functioning will increase  11/22/2021 2252 by Yulia Coleman RN  Outcome: Ongoing  11/22/2021 1227 by Shon Roe RN  Outcome: Ongoing  Goal: Decrease in sensory misperception frequency  Description: Decrease in sensory misperception frequency  11/22/2021 2252 by Yulia Coleman RN  Outcome: Ongoing  11/22/2021 1227 by Shon Roe RN  Outcome: Ongoing  Goal: Able to refrain from responding to false sensory perceptions  Description: Able to refrain from responding to false sensory perceptions  11/22/2021 2252 by Yulia Coleman RN  Outcome: Ongoing  11/22/2021 1227 by Shon Roe RN  Outcome: Ongoing  Goal: Demonstrates accurate environmental perceptions  Description: Demonstrates accurate environmental perceptions  11/22/2021 2252 by Yulia Coleman RN  Outcome: Ongoing  11/22/2021 1227 by Shon Roe RN  Outcome: Ongoing  Goal: Able to distinguish between reality-based and nonreality-based thinking  Description: Able to distinguish between reality-based and nonreality-based thinking  11/22/2021 2252 by Yadira Yoder RN  Outcome: Ongoing  11/22/2021 1227 by Dewain Goldmann, RN  Outcome: Ongoing  Goal: Able to interrupt nonreality-based thinking  Description: Able to interrupt nonreality-based thinking  11/22/2021 2252 by Yadira Yoder RN  Outcome: Ongoing  11/22/2021 1227 by Dewain Goldmann, RN  Outcome: Ongoing     Problem: Sleep Pattern Disturbance:  Goal: Appears well-rested  Description: Appears well-rested  11/22/2021 2252 by Yadira Yoder RN  Outcome: Ongoing  11/22/2021 1227 by Dewain Goldmann, RN  Outcome: Ongoing

## 2021-11-23 NOTE — PROGRESS NOTES
DC order noted. HF dc instructions placed on both AVS as well as on the [address] [place] as plan is to return home with son as caregiver and [facility]. I called and spoke with son Dana Plascencia just now. The plan is for [address] NP to do Telehealth visits (tomorrow at noon) and a cardiology hospital follow up appt is in place on 12/6 at 4:00 per son Dana Plascencia.  Appts are on AVS.

## 2021-11-23 NOTE — DISCHARGE SUMMARY
Hospital Medicine   Discharge Summary    Patient: Maria Anderson   MRN: [id number]  : 1931     Admit Date: 2021   Discharge Date:   2021  Disposition:  Home or Self Care   Condition at Discharge:  Stable      Admitting Physician: Ny Gautam MD  Discharge Physician: Bryan Berman MD     Discharge Diagnosis:     Active Hospital Problems    Diagnosis     CHF (congestive heart failure), NYHA class I, acute on chronic, combined ([address] [I50.43]     AMS (altered mental status) [R41.82]     UTI (urinary tract infection) [N39.0]     Chronic atrial fibrillation ([facility] [address] [I48.20]     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 female w/ pmhx as below who presents to [facility] with worsening mental status and aggression since 2 days but worse in past 24 hrs. Unable to obtain hx from patient at this time but noted per phone discussion with son that it has progressively worsened. Initially noted per son patient was less altered 10 dys ago and here in ED and treated w/ IV abx and sent home and then PCP had started her on oral macrobid for partial culture sens E coli. Patient was taking abx per son but never became extremely improved but was better. Then past 2 days patient has been more confused, possibly hallucinating, and not eating or drinking much. Son concerned that UTI might still be present or worsening. Following issues were addressed this admission:    Acute metabolic encephalopathy, in setting of baseline dementia  Was likely triggered by infection, improved by time of discharge     UTI  Urine cx positive for Enterococcus 2021. Completed 7-day course of Unasyn.      Acute on chronic congestive heart failure exacerbation, mild  Mainly diastolic, started on IV Lasix. Her creatinine improved to normal by time of discharge.    - continue lasix PO 20mg every other day  - continue Toprol XL     Essential hypertension  Elevated at times, controlled with home meds and lasix. - continue Toprol XL on discharge     A. Fib, stable, controlled  - continue Toprol XL and Xarelto      Medications:     Current Discharge Medication List        Current Discharge Medication List      CONTINUE these medications which have NOT CHANGED    Details   furosemide (LASIX) 20 MG tablet Take 1 tablet by mouth every other day  Qty: 30 tablet, Refills: 3      metoprolol succinate (TOPROL XL) 25 MG extended release tablet Take 0.5 tablets by mouth daily  Qty: 30 tablet, Refills: 3      rivaroxaban (XARELTO) 15 MG TABS tablet Take 1 tablet by mouth daily (with breakfast)  Qty: 90 tablet, Refills: 1      vitamin D 25 MCG (1000 UT) CAPS Take 1,000 Units by mouth daily      mometasone-formoterol (DULERA) 200-5 MCG/ACT inhaler Inhale 2 puffs into the lungs 2 times daily as needed (shortness of breath)       Multiple Vitamins-Minerals (MULTIVITAMIN WITH MINERALS) tablet Take 1 tablet by mouth daily      Cranberry 250 MG CHEW Take 2 tablets by mouth 2 times daily (with meals)  Qty: 120 tablet, Refills: 0      albuterol sulfate HFA (VENTOLIN HFA) 108 (90 Base) MCG/ACT inhaler Inhale 2 puffs into the lungs every 6 hours as needed for Wheezing      calcium carbonate (OSCAL) 500 MG TABS tablet Take 500 mg by mouth daily           Current Discharge Medication List            PHYSICAL EXAM:   BP (!) 152/70   Pulse 78   Temp 97.5 °F (36.4 °C) (Oral)   Resp 18   Ht 5' 3\" (1.6 m)   Wt 184 lb 11.9 oz (83.8 kg)   SpO2 97%   BMI 32.73 kg/m²     Physical Exam  Vitals reviewed. Constitutional:       General: She is not in acute distress. Appearance: Normal appearance. She is well-developed. She is not diaphoretic. HENT:      Head: Normocephalic and atraumatic. Mouth/Throat:      Mouth: Mucous membranes are moist.   Eyes:      Extraocular Movements: Extraocular movements intact.       Pupils: Pupils are equal, round, and reactive to light. Cardiovascular:      Rate and Rhythm: Normal rate. Rhythm irregular. Pulses: Normal pulses. Heart sounds: Normal heart sounds. No murmur heard. No friction rub. No gallop. Pulmonary:      Effort: Pulmonary effort is normal. No respiratory distress. Breath sounds: Normal breath sounds. No wheezing or rales. Chest:      Chest wall: No tenderness. Abdominal:      General: Bowel sounds are normal. There is no distension. Palpations: Abdomen is soft. There is no mass. Tenderness: There is no abdominal tenderness. There is no guarding. Musculoskeletal:         General: No tenderness or deformity. Cervical back: Normal range of motion and neck supple. Right lower leg: No edema. Left lower leg: No edema. Skin:     General: Skin is warm and dry. Capillary Refill: Capillary refill takes less than 2 seconds. Neurological:      General: No focal deficit present. Mental Status: She is alert. She is disoriented. Psychiatric:         Behavior: Behavior normal.         Consults / Procedures:     IP CONSULT TO HOSPITALIST  IP CONSULT TO CARDIOLOGY  IP CONSULT TO NEUROLOGY      Discharge Instructions:     - continue physical therapy at home 3-5x weekly   - discussed with pt's son, Gabriel Diaz, noting pt to need 2-person assist upon discharge until physical mobility improves  - should follow up with PCP and cardiology within 2-3 weeks. Julia Shell MD   Hospitalist      11/23/2021      Patient was seen and examined on day of discharge. Discharge summary is in conjunction with any daily progress note from day of discharge. Time spent on discharge is more than 45 minutes in the examination, evaluation, counseling, and review of medications and discharge plan.

## 2021-11-23 NOTE — PLAN OF CARE
Problem: Falls - Risk of:  Goal: Will remain free from falls  Description: Will remain free from falls  2021 0856 by Holly Reyes RN  Outcome: Ongoing  2021 225 by Ronney Spatz, RN  Outcome: Ongoing  Goal: Absence of physical injury  Description: Absence of physical injury  2021 0856 by Holly Reyes RN  Outcome: Ongoing  2021 225 by Ronney Spatz, RN  Outcome: Ongoing     Problem: Confusion - Acute:  Goal: Absence of continued neurological deterioration signs and symptoms  Description: Absence of continued neurological deterioration signs and symptoms  2021 0856 by Holly Reyes RN  Outcome: Ongoing  2021 225 by Ronney Spatz, RN  Outcome: Ongoing  Goal: Mental status will be restored to baseline  Description: Mental status will be restored to baseline  2021 0856 by Holly Reyes RN  Outcome: Ongoing  2021 225 by Ronney Spatz, RN  Outcome: Ongoing     Problem: Discharge Planning:  Goal: Ability to perform activities of daily living will improve  Description: Ability to perform activities of daily living will improve  2021 0856 by Holly Reyes RN  Outcome: Ongoing  2021 2252 by Ronney Spatz, RN  Outcome: Ongoing  Goal: Participates in care planning  Description: Participates in care planning  2021 0856 by Holly Reyes RN  Outcome: Ongoing  2021 225 by Ronney Spatz, RN  Outcome: Ongoing     Problem: Injury - Risk of, Physical Injury:  Goal: Will remain free from falls  Description: Will remain free from falls  2021 0856 by Holly Reyes RN  Outcome: Ongoing  2021 225 by Ronney Spatz, RN  Outcome: Ongoing  Goal: Absence of physical injury  Description: Absence of physical injury  2021 0856 by Holly Reyes RN  Outcome: Ongoing  2021 225 by Ronney Spatz, RN  Outcome: Ongoing     Problem: Mood - Altered:  Goal: Mood stable  Description: Mood stable  11/23/2021 0856 by Raymond Bustos RN  Outcome: Ongoing  11/22/2021 2252 by Yoseph Antonio RN  Outcome: Ongoing  Goal: Absence of abusive behavior  Description: Absence of abusive behavior  11/23/2021 0856 by Raymond Bustos RN  Outcome: Ongoing  11/22/2021 2252 by Yoseph Antonio RN  Outcome: Ongoing  Goal: Verbalizations of feeling emotionally comfortable while being cared for will increase  Description: Verbalizations of feeling emotionally comfortable while being cared for will increase  11/23/2021 0856 by Raymond Bustos RN  Outcome: Ongoing  11/22/2021 2252 by Yoseph Antonio RN  Outcome: Ongoing     Problem: Psychomotor Activity - Altered:  Goal: Absence of psychomotor disturbance signs and symptoms  Description: Absence of psychomotor disturbance signs and symptoms  11/23/2021 0856 by Raymond Bustos RN  Outcome: Ongoing  11/22/2021 2252 by Yoseph Antonio RN  Outcome: Ongoing     Problem: Sensory Perception - Impaired:  Goal: Demonstrations of improved sensory functioning will increase  Description: Demonstrations of improved sensory functioning will increase  11/23/2021 0856 by Raymond Bustos RN  Outcome: Ongoing  11/22/2021 2252 by Yoseph Antonio RN  Outcome: Ongoing  Goal: Decrease in sensory misperception frequency  Description: Decrease in sensory misperception frequency  11/23/2021 0856 by Raymond Bustos RN  Outcome: Ongoing  11/22/2021 2252 by Yoseph Antonio RN  Outcome: Ongoing  Goal: Able to refrain from responding to false sensory perceptions  Description: Able to refrain from responding to false sensory perceptions  11/23/2021 0856 by Raymond Bustos RN  Outcome: Ongoing  11/22/2021 2252 by Yoseph Antonio RN  Outcome: Ongoing  Goal: Demonstrates accurate environmental perceptions  Description: Demonstrates accurate environmental perceptions  11/23/2021 0856 by Raymond Bustos RN  Outcome: Ongoing  11/22/2021 2252 by Yoseph Antonio RN  Outcome: Ongoing  Goal: Able to distinguish between reality-based and nonreality-based thinking  Description: Able to distinguish between reality-based and nonreality-based thinking  11/23/2021 0856 by Savanna Guzman RN  Outcome: Ongoing  11/22/2021 2252 by Bea Chong RN  Outcome: Ongoing  Goal: Able to interrupt nonreality-based thinking  Description: Able to interrupt nonreality-based thinking  11/23/2021 0856 by Savanna Guzman RN  Outcome: Ongoing  11/22/2021 2252 by Bea Chong RN  Outcome: Ongoing     Problem: Sleep Pattern Disturbance:  Goal: Appears well-rested  Description: Appears well-rested  11/23/2021 0856 by Savanna Guzman RN  Outcome: Ongoing  11/22/2021 2252 by Bea Chong RN  Outcome: Ongoing     Problem: Sensory:  Goal: General experience of comfort will improve  Description: General experience of comfort will improve  11/23/2021 0856 by Savanna Guzman RN  Outcome: Ongoing  11/22/2021 2252 by Bea Chong RN  Outcome: Ongoing     Problem: Urinary Elimination:  Goal: Signs and symptoms of infection will decrease  Description: Signs and symptoms of infection will decrease  11/23/2021 0856 by Savanna Guzman RN  Outcome: Ongoing  11/22/2021 2252 by Bea Chong RN  Outcome: Ongoing  Goal: Ability to reestablish a normal urinary elimination pattern will improve - after catheter removal  Description: Ability to reestablish a normal urinary elimination pattern will improve  11/23/2021 0856 by Savanna Guzman RN  Outcome: Ongoing  11/22/2021 2252 by Bea Chong RN  Outcome: Ongoing  Goal: Complications related to the disease process, condition or treatment will be avoided or minimized  Description: Complications related to the disease process, condition or treatment will be avoided or minimized  11/23/2021 0856 by Savanna Guzman RN  Outcome: Ongoing  11/22/2021 2252 by Bea Chong RN  Outcome: Ongoing     Problem: Skin Integrity:  Goal: Will show no infection signs and symptoms  Description: Will show no infection signs and symptoms  11/23/2021 0856 by Alayna Rice RN  Outcome: Ongoing  11/22/2021 2252 by Cliff Tijerina RN  Outcome: Ongoing  Goal: Absence of new skin breakdown  Description: Absence of new skin breakdown  11/23/2021 0856 by Alayna Rice RN  Outcome: Ongoing  11/22/2021 2252 by Cliff Tijerina RN  Outcome: Ongoing     Problem: OXYGENATION/RESPIRATORY FUNCTION  Goal: Patient will maintain patent airway  11/23/2021 0856 by Alayna Rice RN  Outcome: Ongoing  11/22/2021 2252 by Cliff Tijerina RN  Outcome: Ongoing  Goal: Patient will achieve/maintain normal respiratory rate/effort  Description: Respiratory rate and effort will be within normal limits for the patient  11/23/2021 0856 by Alayna Rice RN  Outcome: Ongoing  11/22/2021 2252 by Cliff Tijerina RN  Outcome: Ongoing     Problem: HEMODYNAMIC STATUS  Goal: Patient has stable vital signs and fluid balance  11/23/2021 0856 by Alayna Rice RN  Outcome: Ongoing  11/22/2021 2252 by Cliff Tijerina RN  Outcome: Ongoing     Problem: FLUID AND ELECTROLYTE IMBALANCE  Goal: Fluid and electrolyte balance are achieved/maintained  11/23/2021 0856 by Alayna Rice RN  Outcome: Ongoing  11/22/2021 2252 by Cliff Tijerina RN  Outcome: Ongoing     Problem: ACTIVITY INTOLERANCE/IMPAIRED MOBILITY  Goal: Mobility/activity is maintained at optimum level for patient  11/23/2021 0856 by Alayna Rice RN  Outcome: Ongoing  11/22/2021 2252 by Cliff Tijerina RN  Outcome: Ongoing

## 2021-11-24 ENCOUNTER — SCHEDULED TELEPHONE ENCOUNTER (OUTPATIENT)
Dept: PHARMACY | Age: 86
End: 2021-11-24

## 2021-11-24 NOTE — TELEPHONE ENCOUNTER
Outbound call from the outpatient wellness center for scheduled heart failure hospital follow-up. Patient was discharged from [facility] yesterday 11/23/2021. She has advanced dementia and is unable to perform a virtual visit independently. Lalit Banerjee and his partner are patient's caregivers around the clock. She was hospitalized recently for UTI which led to heart failure exacerbation. She has all of her medications and does not need refills at this time. They have a PCP visit scheduled as well as cardiology on 12/6/2021. Also [facility] is coming to evaluate for services in early December. She also has physical therapy and home health ordered and an appointment scheduled for Saturday for assessment. Reminded lalit Banerjee of the importance of weighing mom daily, monitoring and restricting sodium to 2000 mg a day and fluids at 64 ounces. States that it is hard to get her on the scale daily but they do limit her sodium and restrict fluids. Phone number given to the outpatient wellness center for any questions or concerns they may have going forward.

## 2021-12-14 PROBLEM — N39.0 UTI (URINARY TRACT INFECTION): Status: RESOLVED | Noted: 2021-11-14 | Resolved: 2021-12-14

## 2022-02-09 ENCOUNTER — APPOINTMENT (OUTPATIENT)
Dept: CT IMAGING | Age: 87
DRG: 871 | End: 2022-02-09
Payer: MEDICARE

## 2022-02-09 ENCOUNTER — APPOINTMENT (OUTPATIENT)
Dept: GENERAL RADIOLOGY | Age: 87
DRG: 871 | End: 2022-02-09
Payer: MEDICARE

## 2022-02-09 ENCOUNTER — HOSPITAL ENCOUNTER (INPATIENT)
Age: 87
LOS: 13 days | Discharge: NURSING, REHAB OR LONG-TERM CARE | DRG: 871 | End: 2022-02-22
Attending: EMERGENCY MEDICINE | Admitting: STUDENT IN AN ORGANIZED HEALTH CARE EDUCATION/TRAINING PROGRAM
Payer: MEDICARE

## 2022-02-09 DIAGNOSIS — R41.82 ALTERED MENTAL STATUS, UNSPECIFIED ALTERED MENTAL STATUS TYPE: ICD-10-CM

## 2022-02-09 DIAGNOSIS — I46.9 CARDIOPULMONARY ARREST WITH SUCCESSFUL RESUSCITATION (HCC): Primary | ICD-10-CM

## 2022-02-09 DIAGNOSIS — R56.9 SEIZURE (HCC): ICD-10-CM

## 2022-02-09 PROBLEM — I50.9 CHF (CONGESTIVE HEART FAILURE) (HCC): Status: ACTIVE | Noted: 2022-02-09

## 2022-02-09 LAB
A/G RATIO: 1.3 (ref 1.1–2.2)
ALBUMIN SERPL-MCNC: 3.5 G/DL (ref 3.4–5)
ALP BLD-CCNC: 182 U/L (ref 40–129)
ALT SERPL-CCNC: 21 U/L (ref 10–40)
ANION GAP SERPL CALCULATED.3IONS-SCNC: 9 MMOL/L (ref 3–16)
AST SERPL-CCNC: 37 U/L (ref 15–37)
BACTERIA: ABNORMAL /HPF
BASE EXCESS ARTERIAL: -11.6 MMOL/L (ref -3–3)
BASOPHILS ABSOLUTE: 0 K/UL (ref 0–0.2)
BASOPHILS RELATIVE PERCENT: 0.8 %
BILIRUB SERPL-MCNC: 1 MG/DL (ref 0–1)
BILIRUBIN URINE: NEGATIVE
BLOOD, URINE: ABNORMAL
BUN BLDV-MCNC: 20 MG/DL (ref 7–20)
CALCIUM SERPL-MCNC: 9.7 MG/DL (ref 8.3–10.6)
CARBOXYHEMOGLOBIN ARTERIAL: 1 % (ref 0–1.5)
CHLORIDE BLD-SCNC: 108 MMOL/L (ref 99–110)
CLARITY: ABNORMAL
CO2: 22 MMOL/L (ref 21–32)
COLOR: YELLOW
COMMENT UA: ABNORMAL
CREAT SERPL-MCNC: 0.8 MG/DL (ref 0.6–1.2)
EKG ATRIAL RATE: 326 BPM
EKG ATRIAL RATE: 64 BPM
EKG DIAGNOSIS: NORMAL
EKG DIAGNOSIS: NORMAL
EKG Q-T INTERVAL: 284 MS
EKG Q-T INTERVAL: 418 MS
EKG QRS DURATION: 90 MS
EKG QRS DURATION: 98 MS
EKG QTC CALCULATION (BAZETT): 398 MS
EKG QTC CALCULATION (BAZETT): 444 MS
EKG R AXIS: 30 DEGREES
EKG R AXIS: 36 DEGREES
EKG T AXIS: -68 DEGREES
EKG T AXIS: 3 DEGREES
EKG VENTRICULAR RATE: 118 BPM
EKG VENTRICULAR RATE: 68 BPM
EOSINOPHILS ABSOLUTE: 0.1 K/UL (ref 0–0.6)
EOSINOPHILS RELATIVE PERCENT: 1.2 %
EPITHELIAL CELLS, UA: 2 /HPF (ref 0–5)
GFR AFRICAN AMERICAN: >60
GFR NON-AFRICAN AMERICAN: >60
GLUCOSE BLD-MCNC: 115 MG/DL (ref 70–99)
GLUCOSE URINE: 100 MG/DL
HCO3 ARTERIAL: 18 MMOL/L (ref 21–29)
HCT VFR BLD CALC: 37.1 % (ref 36–48)
HEMOGLOBIN, ART, EXTENDED: 13.2 G/DL (ref 12–16)
HEMOGLOBIN: 11.6 G/DL (ref 12–16)
HYALINE CASTS: 19 /LPF (ref 0–8)
KETONES, URINE: NEGATIVE MG/DL
LACTIC ACID, SEPSIS: 1.6 MMOL/L (ref 0.4–1.9)
LEUKOCYTE ESTERASE, URINE: NEGATIVE
LYMPHOCYTES ABSOLUTE: 0.7 K/UL (ref 1–5.1)
LYMPHOCYTES RELATIVE PERCENT: 12.2 %
MCH RBC QN AUTO: 26.6 PG (ref 26–34)
MCHC RBC AUTO-ENTMCNC: 31.3 G/DL (ref 31–36)
MCV RBC AUTO: 84.9 FL (ref 80–100)
METHEMOGLOBIN ARTERIAL: 0.5 %
MICROSCOPIC EXAMINATION: YES
MONOCYTES ABSOLUTE: 0.8 K/UL (ref 0–1.3)
MONOCYTES RELATIVE PERCENT: 13.8 %
NEUTROPHILS ABSOLUTE: 4 K/UL (ref 1.7–7.7)
NEUTROPHILS RELATIVE PERCENT: 72 %
NITRITE, URINE: NEGATIVE
O2 SAT, ARTERIAL: 99.7 %
O2 THERAPY: ABNORMAL
PCO2 ARTERIAL: 55.7 MMHG (ref 35–45)
PDW BLD-RTO: 16.5 % (ref 12.4–15.4)
PH ARTERIAL: 7.12 (ref 7.35–7.45)
PH UA: 7.5 (ref 5–8)
PLATELET # BLD: 153 K/UL (ref 135–450)
PMV BLD AUTO: 10.1 FL (ref 5–10.5)
PO2 ARTERIAL: 234 MMHG (ref 75–108)
POTASSIUM SERPL-SCNC: 4.6 MMOL/L (ref 3.5–5.1)
PRO-BNP: 1953 PG/ML (ref 0–449)
PROTEIN UA: >=300 MG/DL
RBC # BLD: 4.37 M/UL (ref 4–5.2)
RBC UA: ABNORMAL /HPF (ref 0–4)
SARS-COV-2, NAAT: NOT DETECTED
SODIUM BLD-SCNC: 139 MMOL/L (ref 136–145)
SPECIFIC GRAVITY UA: 1.01 (ref 1–1.03)
TCO2 ARTERIAL: 19.8 MMOL/L
TOTAL PROTEIN: 6.2 G/DL (ref 6.4–8.2)
TROPONIN: <0.01 NG/ML
URINE REFLEX TO CULTURE: YES
URINE TYPE: ABNORMAL
UROBILINOGEN, URINE: 0.2 E.U./DL
WBC # BLD: 5.6 K/UL (ref 4–11)
WBC UA: 99 /HPF (ref 0–5)

## 2022-02-09 PROCEDURE — 99285 EMERGENCY DEPT VISIT HI MDM: CPT

## 2022-02-09 PROCEDURE — 83605 ASSAY OF LACTIC ACID: CPT

## 2022-02-09 PROCEDURE — 0BH17EZ INSERTION OF ENDOTRACHEAL AIRWAY INTO TRACHEA, VIA NATURAL OR ARTIFICIAL OPENING: ICD-10-PCS | Performed by: INTERNAL MEDICINE

## 2022-02-09 PROCEDURE — 80053 COMPREHEN METABOLIC PANEL: CPT

## 2022-02-09 PROCEDURE — 92950 HEART/LUNG RESUSCITATION CPR: CPT

## 2022-02-09 PROCEDURE — 85025 COMPLETE CBC W/AUTO DIFF WBC: CPT

## 2022-02-09 PROCEDURE — 81001 URINALYSIS AUTO W/SCOPE: CPT

## 2022-02-09 PROCEDURE — 5A1945Z RESPIRATORY VENTILATION, 24-96 CONSECUTIVE HOURS: ICD-10-PCS | Performed by: INTERNAL MEDICINE

## 2022-02-09 PROCEDURE — 36600 WITHDRAWAL OF ARTERIAL BLOOD: CPT

## 2022-02-09 PROCEDURE — 82803 BLOOD GASES ANY COMBINATION: CPT

## 2022-02-09 PROCEDURE — 83880 ASSAY OF NATRIURETIC PEPTIDE: CPT

## 2022-02-09 PROCEDURE — 94761 N-INVAS EAR/PLS OXIMETRY MLT: CPT

## 2022-02-09 PROCEDURE — 94002 VENT MGMT INPAT INIT DAY: CPT

## 2022-02-09 PROCEDURE — 70450 CT HEAD/BRAIN W/O DYE: CPT

## 2022-02-09 PROCEDURE — 84484 ASSAY OF TROPONIN QUANT: CPT

## 2022-02-09 PROCEDURE — 87040 BLOOD CULTURE FOR BACTERIA: CPT

## 2022-02-09 PROCEDURE — 87077 CULTURE AEROBIC IDENTIFY: CPT

## 2022-02-09 PROCEDURE — 6360000002 HC RX W HCPCS: Performed by: STUDENT IN AN ORGANIZED HEALTH CARE EDUCATION/TRAINING PROGRAM

## 2022-02-09 PROCEDURE — 71045 X-RAY EXAM CHEST 1 VIEW: CPT

## 2022-02-09 PROCEDURE — 93005 ELECTROCARDIOGRAM TRACING: CPT | Performed by: EMERGENCY MEDICINE

## 2022-02-09 PROCEDURE — 31500 INSERT EMERGENCY AIRWAY: CPT

## 2022-02-09 PROCEDURE — 96375 TX/PRO/DX INJ NEW DRUG ADDON: CPT

## 2022-02-09 PROCEDURE — 96376 TX/PRO/DX INJ SAME DRUG ADON: CPT

## 2022-02-09 PROCEDURE — 2500000003 HC RX 250 WO HCPCS: Performed by: STUDENT IN AN ORGANIZED HEALTH CARE EDUCATION/TRAINING PROGRAM

## 2022-02-09 PROCEDURE — 96361 HYDRATE IV INFUSION ADD-ON: CPT

## 2022-02-09 PROCEDURE — 2580000003 HC RX 258: Performed by: EMERGENCY MEDICINE

## 2022-02-09 PROCEDURE — 6360000002 HC RX W HCPCS

## 2022-02-09 PROCEDURE — 87635 SARS-COV-2 COVID-19 AMP PRB: CPT

## 2022-02-09 PROCEDURE — 87086 URINE CULTURE/COLONY COUNT: CPT

## 2022-02-09 PROCEDURE — 6360000002 HC RX W HCPCS: Performed by: EMERGENCY MEDICINE

## 2022-02-09 PROCEDURE — 96374 THER/PROPH/DIAG INJ IV PUSH: CPT

## 2022-02-09 PROCEDURE — 2100000000 HC CCU R&B

## 2022-02-09 PROCEDURE — 2700000000 HC OXYGEN THERAPY PER DAY

## 2022-02-09 PROCEDURE — 93010 ELECTROCARDIOGRAM REPORT: CPT | Performed by: INTERNAL MEDICINE

## 2022-02-09 PROCEDURE — 36415 COLL VENOUS BLD VENIPUNCTURE: CPT

## 2022-02-09 PROCEDURE — 51702 INSERT TEMP BLADDER CATH: CPT

## 2022-02-09 PROCEDURE — 5A12012 PERFORMANCE OF CARDIAC OUTPUT, SINGLE, MANUAL: ICD-10-PCS | Performed by: INTERNAL MEDICINE

## 2022-02-09 RX ORDER — DEXTROSE MONOHYDRATE 50 MG/ML
100 INJECTION, SOLUTION INTRAVENOUS PRN
Status: DISCONTINUED | OUTPATIENT
Start: 2022-02-09 | End: 2022-02-22 | Stop reason: HOSPADM

## 2022-02-09 RX ORDER — HEPARIN SODIUM 5000 [USP'U]/ML
5000 INJECTION, SOLUTION INTRAVENOUS; SUBCUTANEOUS EVERY 8 HOURS SCHEDULED
Status: DISCONTINUED | OUTPATIENT
Start: 2022-02-09 | End: 2022-02-12

## 2022-02-09 RX ORDER — MIDAZOLAM HYDROCHLORIDE 1 MG/ML
1-10 INJECTION, SOLUTION INTRAVENOUS CONTINUOUS
Status: DISCONTINUED | OUTPATIENT
Start: 2022-02-09 | End: 2022-02-11

## 2022-02-09 RX ORDER — MIDAZOLAM HYDROCHLORIDE 1 MG/ML
1-10 INJECTION, SOLUTION INTRAVENOUS CONTINUOUS
Status: DISCONTINUED | OUTPATIENT
Start: 2022-02-09 | End: 2022-02-09 | Stop reason: SDUPTHER

## 2022-02-09 RX ORDER — LORAZEPAM 2 MG/ML
INJECTION INTRAMUSCULAR
Status: DISCONTINUED
Start: 2022-02-09 | End: 2022-02-09 | Stop reason: WASHOUT

## 2022-02-09 RX ORDER — ACETAMINOPHEN 325 MG/1
650 TABLET ORAL EVERY 6 HOURS PRN
Status: DISCONTINUED | OUTPATIENT
Start: 2022-02-09 | End: 2022-02-22 | Stop reason: HOSPADM

## 2022-02-09 RX ORDER — MIDAZOLAM HYDROCHLORIDE 1 MG/ML
5 INJECTION INTRAMUSCULAR; INTRAVENOUS ONCE
Status: COMPLETED | OUTPATIENT
Start: 2022-02-09 | End: 2022-02-09

## 2022-02-09 RX ORDER — SODIUM CHLORIDE 0.9 % (FLUSH) 0.9 %
5-40 SYRINGE (ML) INJECTION PRN
Status: DISCONTINUED | OUTPATIENT
Start: 2022-02-09 | End: 2022-02-22 | Stop reason: HOSPADM

## 2022-02-09 RX ORDER — DEXTROSE MONOHYDRATE 25 G/50ML
12.5 INJECTION, SOLUTION INTRAVENOUS PRN
Status: DISCONTINUED | OUTPATIENT
Start: 2022-02-09 | End: 2022-02-22 | Stop reason: HOSPADM

## 2022-02-09 RX ORDER — FENTANYL CITRATE 50 UG/ML
25 INJECTION, SOLUTION INTRAMUSCULAR; INTRAVENOUS
Status: DISCONTINUED | OUTPATIENT
Start: 2022-02-09 | End: 2022-02-15

## 2022-02-09 RX ORDER — MIDAZOLAM HYDROCHLORIDE 1 MG/ML
INJECTION INTRAMUSCULAR; INTRAVENOUS
Status: COMPLETED
Start: 2022-02-09 | End: 2022-02-09

## 2022-02-09 RX ORDER — FENTANYL CITRATE-0.9 % NACL/PF 10 MCG/ML
12.5-2 PLASTIC BAG, INJECTION (ML) INTRAVENOUS CONTINUOUS
Status: DISCONTINUED | OUTPATIENT
Start: 2022-02-09 | End: 2022-02-14

## 2022-02-09 RX ORDER — LANOLIN ALCOHOL/MO/W.PET/CERES
3 CREAM (GRAM) TOPICAL NIGHTLY PRN
COMMUNITY
Start: 2022-01-12

## 2022-02-09 RX ORDER — SODIUM CHLORIDE 9 MG/ML
25 INJECTION, SOLUTION INTRAVENOUS PRN
Status: DISCONTINUED | OUTPATIENT
Start: 2022-02-09 | End: 2022-02-10

## 2022-02-09 RX ORDER — SODIUM CHLORIDE 9 MG/ML
INJECTION, SOLUTION INTRAVENOUS CONTINUOUS
Status: DISCONTINUED | OUTPATIENT
Start: 2022-02-09 | End: 2022-02-09

## 2022-02-09 RX ORDER — SODIUM CHLORIDE 0.9 % (FLUSH) 0.9 %
5-40 SYRINGE (ML) INJECTION EVERY 12 HOURS SCHEDULED
Status: DISCONTINUED | OUTPATIENT
Start: 2022-02-09 | End: 2022-02-10

## 2022-02-09 RX ORDER — NICOTINE POLACRILEX 4 MG
15 LOZENGE BUCCAL PRN
Status: DISCONTINUED | OUTPATIENT
Start: 2022-02-09 | End: 2022-02-22 | Stop reason: HOSPADM

## 2022-02-09 RX ORDER — LEVETIRACETAM 10 MG/ML
1000 INJECTION INTRAVASCULAR ONCE
Status: COMPLETED | OUTPATIENT
Start: 2022-02-09 | End: 2022-02-09

## 2022-02-09 RX ORDER — ACETAMINOPHEN 650 MG/1
650 SUPPOSITORY RECTAL EVERY 6 HOURS PRN
Status: DISCONTINUED | OUTPATIENT
Start: 2022-02-09 | End: 2022-02-22 | Stop reason: HOSPADM

## 2022-02-09 RX ORDER — 0.9 % SODIUM CHLORIDE 0.9 %
500 INTRAVENOUS SOLUTION INTRAVENOUS ONCE
Status: COMPLETED | OUTPATIENT
Start: 2022-02-09 | End: 2022-02-09

## 2022-02-09 RX ADMIN — MIDAZOLAM HYDROCHLORIDE 5 MG: 1 INJECTION INTRAMUSCULAR; INTRAVENOUS at 17:15

## 2022-02-09 RX ADMIN — CEFTRIAXONE 1000 MG: 1 INJECTION, POWDER, FOR SOLUTION INTRAMUSCULAR; INTRAVENOUS at 18:39

## 2022-02-09 RX ADMIN — LEVETIRACETAM 1000 MG: 10 INJECTION, SOLUTION INTRAVENOUS at 16:15

## 2022-02-09 RX ADMIN — MIDAZOLAM 5 MG: 1 INJECTION INTRAMUSCULAR; INTRAVENOUS at 17:15

## 2022-02-09 RX ADMIN — SODIUM CHLORIDE 500 ML: 9 INJECTION, SOLUTION INTRAVENOUS at 17:15

## 2022-02-09 RX ADMIN — MIDAZOLAM HYDROCHLORIDE 1 MG/HR: 1 INJECTION, SOLUTION INTRAVENOUS at 17:42

## 2022-02-09 RX ADMIN — MIDAZOLAM 5 MG: 1 INJECTION INTRAMUSCULAR; INTRAVENOUS at 15:55

## 2022-02-09 RX ADMIN — MIDAZOLAM HYDROCHLORIDE 3 MG/HR: 1 INJECTION, SOLUTION INTRAVENOUS at 22:44

## 2022-02-09 RX ADMIN — Medication 2 MCG/MIN: at 22:46

## 2022-02-09 RX ADMIN — SODIUM CHLORIDE: 9 INJECTION, SOLUTION INTRAVENOUS at 17:39

## 2022-02-09 ASSESSMENT — PULMONARY FUNCTION TESTS
PIF_VALUE: 20
PIF_VALUE: 22
PIF_VALUE: 20
PIF_VALUE: 20
PIF_VALUE: 24
PIF_VALUE: 22
PIF_VALUE: 25
PIF_VALUE: 23
PIF_VALUE: 19
PIF_VALUE: 21
PIF_VALUE: 19
PIF_VALUE: 26

## 2022-02-09 NOTE — Clinical Note
Patient Class: Inpatient [101]  REQUIRED: Diagnosis: Cardiac arrest ([facility] [address]. ICD-9-CM]  Estimated Length of Stay: Estimated stay of more than 2 midnights  Future Attending Provider: Danielle Conti [[id number]]

## 2022-02-09 NOTE — PROGRESS NOTES
Pt began seizing, Assisted MD in intubation. Intubated with 7.0 tube 23 @ the lip. No complications noted  Electronically signed by Darren Lamb RCP on 2/9/2022 at 3:45 PM

## 2022-02-09 NOTE — ED NOTES
Temp 94.0 rectal.  Jacey urbina warming blanket applied, Dr. Poon Headings updated.       Jacinto Callahan RN  02/09/22 4946

## 2022-02-09 NOTE — ED NOTES
Bed: B-10  Expected date:   Expected time:   Means of arrival:   Comments:  [address], RN  02/09/22 0311

## 2022-02-09 NOTE — ED NOTES
1523- Pt began seizing and then had loss of pulse. CPR started, pt ventilated via bag valve mask. 1525-  Epi given x1, ROSC obtained. Pt transferred to ED rm 14 to be intubated.       Ruthy Meigs, RN  02/09/22 [id number]

## 2022-02-09 NOTE — ED NOTES
Pt intubated by Dr. Cleve Rg at this time. 7.0 ETT, 22 at the lip. Positive color change, breath sounds present bilaterally. 15 mg of etomidate and 55 mg of rocuronium given pre-sedation, verbal order per Dr. Cleve Rg.       Jacinto Callahan RN  02/09/22 2320

## 2022-02-09 NOTE — ED PROVIDER NOTES
[facility]  eMERGENCY dEPARTMENT eNCOUnter      Pt Name: Adriane Russell  MRN: [id number]  [place] 5/9/1931  Date of evaluation: 2/9/2022  Provider: Mookie Roe MD    [address] [place] [address]       Chief Complaint   Patient presents with    Altered Mental Status         CRITICAL CARE TIME   Total Critical Care time was a minimum of 60 minutes, excluding separately reportable procedures. There was a high probability of clinically significant/life threatening deterioration in the patient's condition which required my urgent intervention. Critical care time includes my initial evaluation, ongoing reassessment, review of old records, contacting the patient's son for additional medical history, review of laboratory, EKG, chest x-ray, CT scan, consultation with Neurology and Hospitalist for admission. No procedure time was included. HISTORY OF PRESENT ILLNESS  (Location/Symptom, Timing/Onset, Context/Setting, Quality, Duration, Modifying Factors, Severity.)   Adriane Rusesll is a 80 y.o. female who presents to the emergency department with her son stating she has been more confused and more agitated. She lives at home. She has a history of dementia. The patient is oriented to person only and cannot provide any additional history. The son also stated that she gets this way when she gets a urinary tract infection. He is also concerned that \"her congestive heart failure is acting up\". Nursing Notes were reviewed and I agree. REVIEW OF SYSTEMS    (2-9 systems for level 4, 10 or more for level 5)     Unable to obtain due to her dementia, oriented to person only. Except as noted above the remainder of the review of systems was reviewed and negative.        PAST MEDICAL HISTORY     Past Medical History:   Diagnosis Date    Alzheimer's dementia [facility])     Atrial fibrillation ([facility]     Benign essential HTN     Gout     Multiple drug resistant organism (MDRO) culture positive 11/04/2021    Escherichia coli. Urine         SURGICAL HISTORY     No past surgical history on file. CURRENT MEDICATIONS       Current Discharge Medication List      CONTINUE these medications which have NOT CHANGED    Details   melatonin 3 MG TABS tablet Take 3 mg by mouth nightly as needed for Sleep      diclofenac sodium (VOLTAREN) 1 % GEL Apply 2 g topically 4 times daily as needed for Pain      metoprolol succinate (TOPROL XL) 25 MG extended release tablet Take 1 tablet by mouth daily  Qty: 30 tablet, Refills: 1    Comments: Please ensure further refills be directed to pt's PCP      furosemide (LASIX) 20 MG tablet Take 1 tablet by mouth every other day  Qty: 30 tablet, Refills: 3      Cranberry 250 MG CHEW Take 2 tablets by mouth 2 times daily (with meals)  Qty: 120 tablet, Refills: 0      rivaroxaban (XARELTO) 15 MG TABS tablet Take 1 tablet by mouth daily (with breakfast)  Qty: 90 tablet, Refills: 1      vitamin D 25 MCG (1000 UT) CAPS Take 1,000 Units by mouth daily      albuterol sulfate HFA (VENTOLIN HFA) 108 (90 Base) MCG/ACT inhaler Inhale 2 puffs into the lungs every 6 hours as needed for Wheezing      mometasone-formoterol (DULERA) 200-5 MCG/ACT inhaler Inhale 2 puffs into the lungs 2 times daily       Multiple Vitamins-Minerals (MULTIVITAMIN WITH MINERALS) tablet Take 1 tablet by mouth daily      calcium carbonate (OSCAL) 500 MG TABS tablet Take 500 mg by mouth daily             ALLERGIES     Chlorpromazine and Hydrochlorothiazide    FAMILY HISTORY     No family history on file.        SOCIAL HISTORY       Social History     Socioeconomic History    Marital status: Single     Spouse name: Not on file    Number of children: Not on file    Years of education: Not on file    Highest education level: Not on file   Occupational History    Not on file   Tobacco Use    Smoking status: Never Smoker    Smokeless tobacco: Never Used   Vaping Use    Vaping Use: Never used   Substance and Sexual Activity    Alcohol use: Never  Drug use: Never    Sexual activity: Not Currently   Other Topics Concern    Not on file   Social History Narrative    Not on file     Social Determinants of Health     Financial Resource Strain:     Difficulty of Paying Living Expenses: Not on file   Food Insecurity:     Worried About Running Out of Food in the Last Year: Not on file    Caitlin of Food in the Last Year: Not on file   Transportation Needs:     Lack of Transportation (Medical): Not on file    Lack of Transportation (Non-Medical): Not on file   Physical Activity:     Days of Exercise per Week: Not on file    Minutes of Exercise per Session: Not on file   Stress:     Feeling of Stress : Not on file   Social Connections:     Frequency of Communication with Friends and Family: Not on file    Frequency of Social Gatherings with Friends and Family: Not on file    Attends Restorationism Services: Not on file    Active Member of [address] [place] [address] [employer] or Organizations: Not on file    Attends Club or Organization Meetings: Not on file    Marital Status: Not on file   Intimate Partner Violence:     Fear of Current or Ex-Partner: Not on file    Emotionally Abused: Not on file    Physically Abused: Not on file    Sexually Abused: Not on file   Housing Stability:     Unable to Pay for Housing in the Last Year: Not on file    Number of [place] in the Last Year: Not on file    Unstable Housing in the Last Year: Not on file         PHYSICAL EXAM    (up to 7 for level 4, 8 or more for level 5)     ED Triage Vitals [02/09/22 1349]   BP Temp Temp Source Pulse Resp SpO2 Height Weight   (!) 159/83 98 °F (36.7 °C) Oral 59 13 97 % 5' 3\" (1.6 m) 205 lb 4 oz (93.1 kg)       General: Alert white female no acute distress. Intermittently agitated when questioned. Head: Atraumatic and normocephalic. Eyes: No conjunctival injection. Pupils equal round reactive. No pallor. ENT: TMs normal.  Nose clear. Oropharynx moist without erythema.   Neck: Supple, no adenopathy, no apparent tenderness. Heart: Regular rate and rhythm. No murmurs or gallops noted. Lungs: Breath sounds equal bilaterally and clear. Abdomen: Obese, soft, some mild poorly localized lower abdominal tenderness without guarding or rebound. Musculoskeletal: 1+ nonpitting edema lower extremities below the knees bilaterally. Extremities are warm. Good capillary refill. Dorsalis pedis and posterior tibial pulses are not palpable. Radial pulses are symmetrical.  Skin: Warm and dry, fair turgor. No pallor or cyanosis. No open wounds on the lower extremities. No decubiti on the back, buttocks, or sacral area. Neuro: Awake, alert, oriented to person only. Pupils equal round reactive. Extraocular movements are intact. Moves upper and lower extremities symmetrically. Verbalization appears to be normal.  Exam limited due to her dementia. DIFFERENTIAL DIAGNOSIS   Differential includes but is not limited to urinary tract infection, dehydration, electrolyte abnormality, subdural hematoma, other. DIAGNOSTIC RESULTS     EKG: All EKG's are interpreted by Herlinda Garcia MD in the absence of a cardiologist.    Atrial fibrillation with a rate of 68. No acute ST-T wave changes. Rhythm strip shows atrial fibrillation with a rate of 68, a QRS of 90 ms with no other ectopy as interpreted by me. This compared to an EKG dated 11/4/2021, no significant change noted. EKG #2: Atrial fibrillation with RVR, rate of 118, nonspecific T wave changes. Rhythm strip shows atrial fibrillation with RVR with a rate of 118, QRS 98 ms with no other ectopy as interpreted by me. This compared to an EKG done earlier today shows an increased rate, and some nonspecific T wave flattening which is new.     RADIOLOGY:   Non-plain film images such as CT, Ultrasound and MRI are read by the radiologist. Plain radiographic images are visualized and preliminarily interpreted Herlinda Garcia MD with the below findings:      Interpretation per the Radiologist below, if available at the time of this note:    CT HEAD WO CONTRAST   Final Result   1. No acute intracranial abnormality. 2. Moderate chronic white matter microvascular ischemic changes. 3. 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 XR CHEST PORTABLE   Final Result   Interval placement of a nasogastric tube and endotracheal tubes which are in   appropriate position. Mild edema with small bilateral pleural effusions and adjacent bibasilar   airspace disease, likely atelectasis in the absence of clinical signs of   pneumonia. XR CHEST PORTABLE   Final Result   Moderate pulmonary vascular congestive change. Cardiomegaly. Small-to-moderate right pleural effusion. CT HEAD WO CONTRAST   Final Result   No acute intracranial abnormality. Remote right parietal craniotomy and encephalomalacia in the right   temporoparietal region, similar in appearance.          MRI BRAIN WO CONTRAST    (Results Pending)         ED BEDSIDE ULTRASOUND:   Performed by ED Physician - none    LABS:  Labs Reviewed   CBC WITH AUTO DIFFERENTIAL - Abnormal; Notable for the following components:       Result Value    Hemoglobin 11.6 (*)     RDW 16.5 (*)     Lymphocytes Absolute 0.7 (*)     All other components within normal limits    Narrative:     Performed at:  [address] [address]   Phone [phone]   COMPREHENSIVE METABOLIC PANEL - Abnormal; Notable for the following components:    Glucose 115 (*)     Total Protein 6.2 (*)     Alkaline Phosphatase 182 (*)     All other components within normal limits    Narrative:     Performed at:  [address] [employer] [address]   Phone [phone]   URINE RT REFLEX TO CULTURE - Abnormal; Notable for the following components:    Clarity, UA CLOUDY (*)     Glucose, Ur 100 (*)     Blood, Urine LARGE (*)     Protein, UA >=300 (*)     All other components within normal limits    Narrative:     Performed at:  [address] [place] [address]   Phone [phone]   BRAIN NATRIURETIC PEPTIDE - Abnormal; Notable for the following components:    Pro-BNP 1,953 (*)     All other components within normal limits    Narrative:     Performed at:  [address] [place] [facility] [address]   Phone [phone]   BLOOD GAS, ARTERIAL - Abnormal; Notable for the following components:    pH, Arterial 7.119 (*)     pCO2, Arterial 55.7 (*)     pO2, Arterial 234.0 (*)     HCO3, Arterial 18.0 (*)     Base Excess, Arterial -11.6 (*)     All other components within normal limits    Narrative:     Frederickniranjan Washingtonon tel. [phone],  Chemistry results called to and read back by Tera Gallagher RN. , 02/09/2022 16:09,  by Yue Simms  Performed at:  [address] [place] [address]   Phone [phone]   MICROSCOPIC URINALYSIS - Abnormal; Notable for the following components:    RBC, UA  (*)     Bacteria, UA 1+ (*)     Hyaline Casts, UA 19 (*)     WBC, UA 99 (*)     All other components within normal limits    Narrative:     Performed at:  [address] [place] [address]   Phone [phone] RAPID    Narrative:     Performed at:  [address] [place] [address]   Phone [phone]   CULTURE, URINE   CULTURE, BLOOD 2   CULTURE, BLOOD 1   LACTATE, SEPSIS    Narrative:     Performed at:  [address] [place] [address]   Phone [phone]   TROPONIN    Narrative:     Performed at:  [facility]  [address] [facility] [place] [address]   Phone [phone]   BLOOD GAS, VENOUS   CBC WITH AUTO DIFFERENTIAL   COMPREHENSIVE METABOLIC PANEL   MAGNESIUM   TROPONIN   TROPONIN   TROPONIN   POCT GLUCOSE   POCT GLUCOSE   POCT GLUCOSE   POCT GLUCOSE       All other labs were within normal range or not returned as of this dictation. EMERGENCY DEPARTMENT COURSE and DIFFERENTIAL DIAGNOSIS/MDM:   Vitals:    Vitals:    02/09/22 2037 02/09/22 2038 02/09/22 2054 02/09/22 2257   BP:  (!) 113/48 120/61 (!) 144/64   Pulse: 71 68 77 68   Resp: 16 15 14 14   Temp:       TempSrc:       SpO2: 100% 100% 100% 100%   Weight:       Height: This patient presented with a history that she has dementia. She lives at home with her son. Her son reported that she was more agitated and confused than usual.  He was concerned that she might have a urinary tract infection. On initial presentation and evaluation she was somewhat agitated but conversant. She was oriented to person only. She had no other significant abnormal findings on exam.  Her work-up was in progress and her laboratory results were for the most part completed when I was called to the patient's room for a seizure. When I arrived to the room the patient was having a grand mal seizure. She was cyanotic. IV Ativan was ordered, but within 30 seconds the seizure stopped. Immediately after the seizure stopped she became bradycardic and asystolic. She had no pulse. CPR was initiated. She was ventilated with a bag valve mask. She was given epinephrine IV. After a few minutes of CPR she again had a rhythm. We checked her pulse. She did have a pulse. We continued the ventilator with a bag-valve-mask, she had spontaneous respirations, but she was still unresponsive with no gag reflex. She was intubated. Patient was also bolused with Keppra 1 g. After the patient seizure and cardiac arrest I contacted the patient's son Opal Avila.   I explained to him the patient's ER course including her seizure and cardiac arrest and current condition on the ventilator. We discussed her CODE STATUS and his desires for treatment. He wants to continue full code. He wants to keep her on the ventilator. He want CPR if indicated. 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 He states at that time he believes she may have had a seizure. He states she has had no problems since then and she is not on seizure medication. 1800: [facility] hospitalist responded to perfect serve consult. He request consult neurology to check for appropriateness of admission at this facility. She may require continuous EEG monitoring. 1815:  Discussed with Dr. Cecelia Spangler, Neurology. Recommends transfer to [facility] will need continuous EEG monitoring. 1830:  Patients son, Katie Calvert updated by phone and is agreeable with plan. 1920:  Discussed with Dr. Ghassan Mercado, Neurology from [facility], [facility]..  Discussed with patient. Based on the discussion and the fact that the patient is on IV Versed for sedation he feels that continuous EEG monitoring is not necessary at this time. He feels the patient is okay to be admitted here. Discussed with [facility], Dr. Kam Pierre. Requested a repeat CT head and he will then see the patient and admit as long as CT shows no changes from previous study. Repeat CT Head unchanged. Discussed with Dr. Kam Pierre, he will admit.     CONSULTS:  IP CONSULT TO HOSPITALIST  IP CONSULT TO NEUROLOGY  IP CONSULT TO HOSPITALIST  IP CONSULT TO CARDIOLOGY  IP CONSULT TO NEUROLOGY    PROCEDURES:  Intubation    Date/Time: 2/9/2022 5:42 PM  Performed by: Livier Llanes MD  Authorized by: Livier Llanes MD     Consent:     Consent obtained:  Emergent situation  Pre-procedure details:     Patient status:  Unresponsive    Mallampati score:  II    Paralytics:  Rocuronium  Procedure details:     Preoxygenation:  Bag valve mask    CPR in progress: no      Intubation method:  Oral    Oral intubation technique:  Direct    Laryngoscope blade: Mac 3    Tube size (mm):  7.0    Tube type:  Cuffed    Number of attempts:  1    Cricoid pressure: no      Tube visualized through cords: yes    Placement assessment:     ETT to lip:  22    Tube secured with:  ETT bro    Breath sounds:  Equal and absent over the epigastrium    Placement verification: chest rise, CXR verification, direct visualization, equal breath sounds and ETCO2 detector      CXR findings:  ETT in proper place  Post-procedure details:     Patient tolerance of procedure: Tolerated well, no immediate complications          FINAL IMPRESSION      1. Cardiopulmonary arrest with successful resuscitation ([facility] [address]    2. Altered mental status, unspecified altered mental status type    3. Seizure ([address]          DISPOSITION/PLAN   DISPOSITION        PATIENT REFERRED TO:  No follow-up provider specified.     DISCHARGE MEDICATIONS:  Current Discharge Medication List          (Please note that portions of this note were completed with a voice recognition program.  Efforts were made to edit the dictations but occasionally words are mis-transcribed.)    Weston Fish MD  Attending Emergency Physician        Meme Bell MD  02/09/22 3934

## 2022-02-10 ENCOUNTER — APPOINTMENT (OUTPATIENT)
Dept: GENERAL RADIOLOGY | Age: 87
DRG: 871 | End: 2022-02-10
Payer: MEDICARE

## 2022-02-10 LAB
A/G RATIO: 1.3 (ref 1.1–2.2)
ALBUMIN SERPL-MCNC: 2.9 G/DL (ref 3.4–5)
ALP BLD-CCNC: 157 U/L (ref 40–129)
ALT SERPL-CCNC: 25 U/L (ref 10–40)
ANION GAP SERPL CALCULATED.3IONS-SCNC: 11 MMOL/L (ref 3–16)
ANION GAP SERPL CALCULATED.3IONS-SCNC: 13 MMOL/L (ref 3–16)
AST SERPL-CCNC: 39 U/L (ref 15–37)
BASE EXCESS VENOUS: -0.3 MMOL/L
BASE EXCESS VENOUS: 0.8 MMOL/L
BASOPHILS ABSOLUTE: 0.1 K/UL (ref 0–0.2)
BASOPHILS RELATIVE PERCENT: 0.9 %
BILIRUB SERPL-MCNC: 1.2 MG/DL (ref 0–1)
BUN BLDV-MCNC: 24 MG/DL (ref 7–20)
BUN BLDV-MCNC: 26 MG/DL (ref 7–20)
CALCIUM SERPL-MCNC: 9.2 MG/DL (ref 8.3–10.6)
CALCIUM SERPL-MCNC: 9.2 MG/DL (ref 8.3–10.6)
CARBOXYHEMOGLOBIN: 0.3 %
CARBOXYHEMOGLOBIN: 1.2 %
CHLORIDE BLD-SCNC: 107 MMOL/L (ref 99–110)
CHLORIDE BLD-SCNC: 110 MMOL/L (ref 99–110)
CO2: 19 MMOL/L (ref 21–32)
CO2: 22 MMOL/L (ref 21–32)
CREAT SERPL-MCNC: 1.2 MG/DL (ref 0.6–1.2)
CREAT SERPL-MCNC: 1.5 MG/DL (ref 0.6–1.2)
EOSINOPHILS ABSOLUTE: 0.1 K/UL (ref 0–0.6)
EOSINOPHILS RELATIVE PERCENT: 0.7 %
GFR AFRICAN AMERICAN: 39
GFR AFRICAN AMERICAN: 51
GFR NON-AFRICAN AMERICAN: 33
GFR NON-AFRICAN AMERICAN: 42
GLUCOSE BLD-MCNC: 104 MG/DL (ref 70–99)
GLUCOSE BLD-MCNC: 111 MG/DL (ref 70–99)
GLUCOSE BLD-MCNC: 129 MG/DL (ref 70–99)
GLUCOSE BLD-MCNC: 131 MG/DL (ref 70–99)
GLUCOSE BLD-MCNC: 136 MG/DL (ref 70–99)
GLUCOSE BLD-MCNC: 146 MG/DL (ref 70–99)
GLUCOSE BLD-MCNC: 96 MG/DL (ref 70–99)
HCO3 VENOUS: 23 MMOL/L (ref 23–29)
HCO3 VENOUS: 25 MMOL/L (ref 23–29)
HCT VFR BLD CALC: 37.7 % (ref 36–48)
HEMOGLOBIN: 11.9 G/DL (ref 12–16)
LYMPHOCYTES ABSOLUTE: 0.5 K/UL (ref 1–5.1)
LYMPHOCYTES RELATIVE PERCENT: 5.3 %
MAGNESIUM: 1.8 MG/DL (ref 1.8–2.4)
MCH RBC QN AUTO: 26.8 PG (ref 26–34)
MCHC RBC AUTO-ENTMCNC: 31.6 G/DL (ref 31–36)
MCV RBC AUTO: 84.8 FL (ref 80–100)
METHEMOGLOBIN VENOUS: 0.4 %
METHEMOGLOBIN VENOUS: 0.7 %
MONOCYTES ABSOLUTE: 0.8 K/UL (ref 0–1.3)
MONOCYTES RELATIVE PERCENT: 9.3 %
NEUTROPHILS ABSOLUTE: 7.2 K/UL (ref 1.7–7.7)
NEUTROPHILS RELATIVE PERCENT: 83.8 %
O2 SAT, VEN: 78 %
O2 SAT, VEN: 98 %
O2 THERAPY: ABNORMAL
O2 THERAPY: ABNORMAL
PCO2, VEN: 34 MMHG (ref 40–50)
PCO2, VEN: 36 MMHG (ref 40–50)
PDW BLD-RTO: 16.3 % (ref 12.4–15.4)
PERFORMED ON: ABNORMAL
PERFORMED ON: NORMAL
PH VENOUS: 7.44 (ref 7.35–7.45)
PH VENOUS: 7.45 (ref 7.35–7.45)
PLATELET # BLD: 151 K/UL (ref 135–450)
PMV BLD AUTO: 10.2 FL (ref 5–10.5)
PO2, VEN: 125 MMHG
PO2, VEN: 43 MMHG
POTASSIUM SERPL-SCNC: 3.9 MMOL/L (ref 3.5–5.1)
POTASSIUM SERPL-SCNC: 4.4 MMOL/L (ref 3.5–5.1)
RBC # BLD: 4.45 M/UL (ref 4–5.2)
SODIUM BLD-SCNC: 140 MMOL/L (ref 136–145)
SODIUM BLD-SCNC: 142 MMOL/L (ref 136–145)
TCO2 CALC VENOUS: 24 MMOL/L
TCO2 CALC VENOUS: 26 MMOL/L
TOTAL PROTEIN: 5.2 G/DL (ref 6.4–8.2)
TROPONIN: 0.02 NG/ML
URINE CULTURE, ROUTINE: NORMAL
WBC # BLD: 8.6 K/UL (ref 4–11)

## 2022-02-10 PROCEDURE — 99291 CRITICAL CARE FIRST HOUR: CPT | Performed by: INTERNAL MEDICINE

## 2022-02-10 PROCEDURE — 94761 N-INVAS EAR/PLS OXIMETRY MLT: CPT

## 2022-02-10 PROCEDURE — 2500000003 HC RX 250 WO HCPCS: Performed by: INTERNAL MEDICINE

## 2022-02-10 PROCEDURE — 2580000003 HC RX 258: Performed by: STUDENT IN AN ORGANIZED HEALTH CARE EDUCATION/TRAINING PROGRAM

## 2022-02-10 PROCEDURE — 2100000000 HC CCU R&B

## 2022-02-10 PROCEDURE — 84484 ASSAY OF TROPONIN QUANT: CPT

## 2022-02-10 PROCEDURE — 6360000002 HC RX W HCPCS: Performed by: NURSE PRACTITIONER

## 2022-02-10 PROCEDURE — 82803 BLOOD GASES ANY COMBINATION: CPT

## 2022-02-10 PROCEDURE — 94003 VENT MGMT INPAT SUBQ DAY: CPT

## 2022-02-10 PROCEDURE — 2700000000 HC OXYGEN THERAPY PER DAY

## 2022-02-10 PROCEDURE — 76937 US GUIDE VASCULAR ACCESS: CPT

## 2022-02-10 PROCEDURE — 6370000000 HC RX 637 (ALT 250 FOR IP): Performed by: NURSE PRACTITIONER

## 2022-02-10 PROCEDURE — C1751 CATH, INF, PER/CENT/MIDLINE: HCPCS

## 2022-02-10 PROCEDURE — 80053 COMPREHEN METABOLIC PANEL: CPT

## 2022-02-10 PROCEDURE — 85025 COMPLETE CBC W/AUTO DIFF WBC: CPT

## 2022-02-10 PROCEDURE — 83735 ASSAY OF MAGNESIUM: CPT

## 2022-02-10 PROCEDURE — 71045 X-RAY EXAM CHEST 1 VIEW: CPT

## 2022-02-10 PROCEDURE — 6360000002 HC RX W HCPCS: Performed by: STUDENT IN AN ORGANIZED HEALTH CARE EDUCATION/TRAINING PROGRAM

## 2022-02-10 PROCEDURE — 02HV33Z INSERTION OF INFUSION DEVICE INTO SUPERIOR VENA CAVA, PERCUTANEOUS APPROACH: ICD-10-PCS | Performed by: INTERNAL MEDICINE

## 2022-02-10 PROCEDURE — 36569 INSJ PICC 5 YR+ W/O IMAGING: CPT

## 2022-02-10 PROCEDURE — 6360000002 HC RX W HCPCS: Performed by: INTERNAL MEDICINE

## 2022-02-10 PROCEDURE — APPNB15 APP NON BILLABLE TIME 0-15 MINS: Performed by: NURSE PRACTITIONER

## 2022-02-10 PROCEDURE — 2580000003 HC RX 258: Performed by: INTERNAL MEDICINE

## 2022-02-10 PROCEDURE — 2580000003 HC RX 258: Performed by: NURSE PRACTITIONER

## 2022-02-10 PROCEDURE — 6370000000 HC RX 637 (ALT 250 FOR IP): Performed by: INTERNAL MEDICINE

## 2022-02-10 PROCEDURE — 36415 COLL VENOUS BLD VENIPUNCTURE: CPT

## 2022-02-10 PROCEDURE — 2500000003 HC RX 250 WO HCPCS: Performed by: NURSE PRACTITIONER

## 2022-02-10 RX ORDER — SODIUM CHLORIDE 0.9 % (FLUSH) 0.9 %
5-40 SYRINGE (ML) INJECTION EVERY 12 HOURS SCHEDULED
Status: DISCONTINUED | OUTPATIENT
Start: 2022-02-10 | End: 2022-02-22 | Stop reason: HOSPADM

## 2022-02-10 RX ORDER — SODIUM CHLORIDE 9 MG/ML
25 INJECTION, SOLUTION INTRAVENOUS PRN
Status: DISCONTINUED | OUTPATIENT
Start: 2022-02-10 | End: 2022-02-22 | Stop reason: HOSPADM

## 2022-02-10 RX ORDER — MIDAZOLAM HYDROCHLORIDE 1 MG/ML
2 INJECTION INTRAMUSCULAR; INTRAVENOUS
Status: DISPENSED | OUTPATIENT
Start: 2022-02-10 | End: 2022-02-10

## 2022-02-10 RX ORDER — SODIUM CHLORIDE 0.9 % (FLUSH) 0.9 %
5-40 SYRINGE (ML) INJECTION PRN
Status: DISCONTINUED | OUTPATIENT
Start: 2022-02-10 | End: 2022-02-10

## 2022-02-10 RX ORDER — MAGNESIUM SULFATE IN WATER 40 MG/ML
2000 INJECTION, SOLUTION INTRAVENOUS ONCE
Status: COMPLETED | OUTPATIENT
Start: 2022-02-10 | End: 2022-02-10

## 2022-02-10 RX ORDER — LACTOBACILLUS RHAMNOSUS GG 10B CELL
2 CAPSULE ORAL 2 TIMES DAILY WITH MEALS
Status: DISCONTINUED | OUTPATIENT
Start: 2022-02-10 | End: 2022-02-22 | Stop reason: HOSPADM

## 2022-02-10 RX ORDER — CHLORHEXIDINE GLUCONATE 0.12 MG/ML
15 RINSE ORAL 2 TIMES DAILY
Status: DISCONTINUED | OUTPATIENT
Start: 2022-02-10 | End: 2022-02-14

## 2022-02-10 RX ORDER — SODIUM CHLORIDE, SODIUM LACTATE, POTASSIUM CHLORIDE, CALCIUM CHLORIDE 600; 310; 30; 20 MG/100ML; MG/100ML; MG/100ML; MG/100ML
INJECTION, SOLUTION INTRAVENOUS CONTINUOUS
Status: DISCONTINUED | OUTPATIENT
Start: 2022-02-10 | End: 2022-02-16

## 2022-02-10 RX ORDER — LIDOCAINE HYDROCHLORIDE 10 MG/ML
5 INJECTION, SOLUTION EPIDURAL; INFILTRATION; INTRACAUDAL; PERINEURAL ONCE
Status: DISCONTINUED | OUTPATIENT
Start: 2022-02-10 | End: 2022-02-10

## 2022-02-10 RX ADMIN — HEPARIN SODIUM 5000 UNITS: 5000 INJECTION INTRAVENOUS; SUBCUTANEOUS at 00:57

## 2022-02-10 RX ADMIN — SODIUM CHLORIDE, PRESERVATIVE FREE 10 ML: 5 INJECTION INTRAVENOUS at 20:09

## 2022-02-10 RX ADMIN — Medication 12.5 MCG/HR: at 14:26

## 2022-02-10 RX ADMIN — SODIUM CHLORIDE 25 ML: 9 INJECTION, SOLUTION INTRAVENOUS at 00:34

## 2022-02-10 RX ADMIN — CHLORHEXIDINE GLUCONATE 0.12% ORAL RINSE 15 ML: 1.2 LIQUID ORAL at 08:29

## 2022-02-10 RX ADMIN — HEPARIN SODIUM 5000 UNITS: 5000 INJECTION INTRAVENOUS; SUBCUTANEOUS at 06:58

## 2022-02-10 RX ADMIN — MUPIROCIN: 20 OINTMENT TOPICAL at 10:45

## 2022-02-10 RX ADMIN — HEPARIN SODIUM 5000 UNITS: 5000 INJECTION INTRAVENOUS; SUBCUTANEOUS at 21:51

## 2022-02-10 RX ADMIN — FAMOTIDINE 20 MG: 10 INJECTION, SOLUTION INTRAVENOUS at 08:29

## 2022-02-10 RX ADMIN — PIPERACILLIN AND TAZOBACTAM 3375 MG: 3; .375 INJECTION, POWDER, LYOPHILIZED, FOR SOLUTION INTRAVENOUS at 00:56

## 2022-02-10 RX ADMIN — SODIUM CHLORIDE, PRESERVATIVE FREE 10 ML: 5 INJECTION INTRAVENOUS at 08:32

## 2022-02-10 RX ADMIN — Medication 2 CAPSULE: at 17:36

## 2022-02-10 RX ADMIN — CEFEPIME HYDROCHLORIDE 1000 MG: 1 INJECTION, POWDER, FOR SOLUTION INTRAMUSCULAR; INTRAVENOUS at 10:21

## 2022-02-10 RX ADMIN — Medication 2 CAPSULE: at 10:59

## 2022-02-10 RX ADMIN — MAGNESIUM SULFATE HEPTAHYDRATE 2000 MG: 40 INJECTION, SOLUTION INTRAVENOUS at 11:12

## 2022-02-10 RX ADMIN — SODIUM CHLORIDE 25 ML: 9 INJECTION, SOLUTION INTRAVENOUS at 00:40

## 2022-02-10 RX ADMIN — PIPERACILLIN AND TAZOBACTAM 3375 MG: 3; .375 INJECTION, POWDER, LYOPHILIZED, FOR SOLUTION INTRAVENOUS at 08:52

## 2022-02-10 RX ADMIN — PIPERACILLIN AND TAZOBACTAM 3375 MG: 3; .375 INJECTION, POWDER, LYOPHILIZED, FOR SOLUTION INTRAVENOUS at 00:41

## 2022-02-10 RX ADMIN — CHLORHEXIDINE GLUCONATE 0.12% ORAL RINSE 15 ML: 1.2 LIQUID ORAL at 20:09

## 2022-02-10 RX ADMIN — CEFEPIME HYDROCHLORIDE 1000 MG: 1 INJECTION, POWDER, FOR SOLUTION INTRAMUSCULAR; INTRAVENOUS at 21:51

## 2022-02-10 RX ADMIN — SODIUM CHLORIDE 25 ML: 9 INJECTION, SOLUTION INTRAVENOUS at 00:54

## 2022-02-10 RX ADMIN — MUPIROCIN: 20 OINTMENT TOPICAL at 20:09

## 2022-02-10 RX ADMIN — SODIUM CHLORIDE, POTASSIUM CHLORIDE, SODIUM LACTATE AND CALCIUM CHLORIDE: 600; 310; 30; 20 INJECTION, SOLUTION INTRAVENOUS at 10:16

## 2022-02-10 RX ADMIN — SODIUM CHLORIDE, POTASSIUM CHLORIDE, SODIUM LACTATE AND CALCIUM CHLORIDE: 600; 310; 30; 20 INJECTION, SOLUTION INTRAVENOUS at 17:33

## 2022-02-10 RX ADMIN — Medication 1250 MG: at 11:08

## 2022-02-10 RX ADMIN — MIDAZOLAM HYDROCHLORIDE 4 MG/HR: 1 INJECTION, SOLUTION INTRAVENOUS at 10:29

## 2022-02-10 RX ADMIN — HEPARIN SODIUM 5000 UNITS: 5000 INJECTION INTRAVENOUS; SUBCUTANEOUS at 14:07

## 2022-02-10 ASSESSMENT — PULMONARY FUNCTION TESTS
PIF_VALUE: 22
PIF_VALUE: 21
PIF_VALUE: 24
PIF_VALUE: 21
PIF_VALUE: 20
PIF_VALUE: 21
PIF_VALUE: 19
PIF_VALUE: 21
PIF_VALUE: 22
PIF_VALUE: 23
PIF_VALUE: 20
PIF_VALUE: 21
PIF_VALUE: 31
PIF_VALUE: 21
PIF_VALUE: 20
PIF_VALUE: 22
PIF_VALUE: 20
PIF_VALUE: 20
PIF_VALUE: 22
PIF_VALUE: 22
PIF_VALUE: 21
PIF_VALUE: 21
PIF_VALUE: 20
PIF_VALUE: 24
PIF_VALUE: 29
PIF_VALUE: 25
PIF_VALUE: 22
PIF_VALUE: 23
PIF_VALUE: 22
PIF_VALUE: 21
PIF_VALUE: 22
PIF_VALUE: 20
PIF_VALUE: 22
PIF_VALUE: 22

## 2022-02-10 NOTE — PROGRESS NOTES
Order for MRI. MRI set up for 1100. Pt placed on MRI pumps and became hypotensive. Even with increase in levo pt still hypotensive. CVU RNs cancelled MRI due to concern in patient status. Will try for tomorrow.

## 2022-02-10 NOTE — PROGRESS NOTES
Hospitalist Progress Note      PCP: Sixto Omer APRN - CNP    Date of Admission: 2/9/2022        Subjective: Sedated intubated      Medications:  Reviewed    Infusion Medications    sodium chloride      lactated ringers 150 mL/hr at 02/10/22 1016    fentaNYL      midazolam 4 mg/hr (02/10/22 1029)    dextrose      norepinephrine 8 mcg/min (02/10/22 1050)     Scheduled Medications    sodium chloride flush  5-40 mL IntraVENous 2 times per day    famotidine (PEPCID) injection  20 mg IntraVENous Daily    chlorhexidine  15 mL Mouth/Throat BID    magnesium sulfate  2,000 mg IntraVENous Once    lactobacillus  2 capsule Oral BID WC    cefepime  1,000 mg IntraVENous Q12H    vancomycin (VANCOCIN) intermittent dosing (placeholder)   Other RX Placeholder    vancomycin  1,250 mg IntraVENous Once    mupirocin   Nasal BID    insulin lispro  0-6 Units SubCUTAneous Q4H    heparin (porcine)  5,000 Units SubCUTAneous 3 times per day     PRN Meds: sodium chloride, midazolam, fentanNYL, sodium chloride flush, acetaminophen **OR** acetaminophen, glucose, dextrose, glucagon (rDNA), dextrose      Intake/Output Summary (Last 24 hours) at 2/10/2022 1119  Last data filed at 2/10/2022 1100  Gross per 24 hour   Intake 1142.04 ml   Output 340 ml   Net 802.04 ml       Physical Exam Performed:    BP (!) 107/58   Pulse 70   Temp 98.7 °F (37.1 °C) (Oral)   Resp 16   Ht 5' 3\" (1.6 m)   Wt 195 lb 1.7 oz (88.5 kg)   SpO2 98%   BMI 34.56 kg/m²     General appearance: No apparent distress  Respiratory:  Normal respiratory effort. Clear to auscultation, bilaterally without Rales/Wheezes/Rhonchi. Cardiovascular: Regular rate and rhythm with normal S1/S2 without murmurs, rubs or gallops. Abdomen: Soft, non-tender, non-distended with normal bowel sounds. Musculoskeletal: No clubbing  Skin: Skin color, texture, turgor normal.  No rashes or lesions.   Neurologic:  No focal weakness  Psychiatric: Alert and oriented  Capillary Refill: Brisk,3 seconds, normal   Peripheral Pulses: +2 palpable, equal bilaterally       Labs:   Recent Labs     02/09/22  1415 02/10/22  0354   WBC 5.6 8.6   HGB 11.6* 11.9*   HCT 37.1 37.7    151     Recent Labs     02/09/22  1415 02/10/22  0354    142   K 4.6 4.4    110   CO2 22 19*   BUN 20 26*   CREATININE 0.8 1.2   CALCIUM 9.7 9.2     Recent Labs     02/09/22  1415 02/10/22  0354   AST 37 39*   ALT 21 25   BILITOT 1.0 1.2*   ALKPHOS 182* 157*     No results for input(s): INR in the last 72 hours. Recent Labs     02/09/22  1415 02/10/22  0017 02/10/22  0354   TROPONINI <0.01 0.02* 0.02*       Urinalysis:      Lab Results   Component Value Date    NITRU Negative 02/09/2022    WBCUA 99 02/09/2022    BACTERIA 1+ 02/09/2022    RBCUA  02/09/2022    BLOODU LARGE 02/09/2022    SPECGRAV 1.012 02/09/2022    GLUCOSEU 100 02/09/2022       Radiology:  XR CHEST PORTABLE   Final Result   Appropriate positioning of PICC line. Slightly improved aeration of the   lungs. CT HEAD WO CONTRAST   Final Result   1. No acute intracranial abnormality. 2. Moderate chronic white matter microvascular ischemic changes. 3. 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 XR CHEST PORTABLE   Final Result   Interval placement of a nasogastric tube and endotracheal tubes which are in   appropriate position. Mild edema with small bilateral pleural effusions and adjacent bibasilar   airspace disease, likely atelectasis in the absence of clinical signs of   pneumonia. XR CHEST PORTABLE   Final Result   Moderate pulmonary vascular congestive change. Cardiomegaly. Small-to-moderate right pleural effusion. CT HEAD WO CONTRAST   Final Result   No acute intracranial abnormality. Remote right parietal craniotomy and encephalomalacia in the right   temporoparietal region, similar in appearance.          MRI BRAIN WO CONTRAST    (Results Pending)   XR CHEST PORTABLE    (Results Pending)           Assessment/Plan:    Active Hospital Problems    Diagnosis     [facility] [I46.9]     Cardiac arrest ([facility] [I46.9]     Seizure ([facility] [R56.9]     CHF (congestive heart failure) ([facility] [I50.9]     Atrial fibrillation ([facility] [I48.91]     AMS (altered mental status) [R41.82]     Chronic atrial fibrillation ([facility] [I48.20]      1. Cardiac arrest, no clear etiology, status post ROSC  2. UTI with likely septic shock, on Zosyn, currently on Levophed  3.  metabolic acidosis, likely due to shock, currently on lactated [place]. 4.  Acute respiratory failure intubated  5. A. fib no acute issues  6.   Seizure on admission, neurology consulted, EEG ordered      Diet: Diet NPO  ADULT TUBE FEEDING; Orogastric; Standard with Fiber; Continuous; 10; Yes; 10; Q 4 hours; 40; 30; Q 4 hours  Code Status: Full Code        Josette Ring MD

## 2022-02-10 NOTE — ED NOTES
Report called to CVU. Respiratory called to help transport pt.       Laurie Gonzalez, [address] [facility]  02/09/22 1367

## 2022-02-10 NOTE — ACP (ADVANCE CARE PLANNING)
Advance Care Planning     Advance Care Planning Activator (Inpatient)  Conversation Note      Date of ACP Conversation: 2/10/2022     Conversation Conducted with:    Two Sons:    Terese Mast and Ernie Farah    ACP Activator: [id number] Decision Maker:     Current Designated Health Care Decision Maker:     Primary Decision Maker: John Steele - Child - [phone]  Click here to complete Healthcare Decision Makers including section of the Healthcare Decision Maker Relationship (ie \"Primary\")      Care Preferences    Ventilation: \"If you were in your present state of health and suddenly became very ill and were unable to breathe on your own, what would your preference be about the use of a ventilator (breathing machine) if it were available to you? \"      Would the patient desire the use of ventilator (breathing machine)?:   \"YES\"    \"If your health worsens and it becomes clear that your chance of recovery is unlikely, what would your preference be about the use of a ventilator (breathing machine) if it were available to you? \"     Would the patient desire the use of ventilator (breathing machine)?:   \"NO\"      Resuscitation  \"CPR works best to restart the heart when there is a sudden event, like a heart attack, in someone who is otherwise healthy. Unfortunately, CPR does not typically restart the heart for people who have serious health conditions or who are very sick. \"    \"In the event your heart stopped as a result of an underlying serious health condition, would you want attempts to be made to restart your heart (answer \"yes\" for attempt to resuscitate) or would you prefer a natural death (answer \"no\" for do not attempt to resuscitate)? \"   \"Yes but will want to discuss with MD about all options\"       [] Yes   [] No   Educated Patient / Othelia Else regarding differences between Advance Directives and portable DNR orders.     Length of ACP Conversation in minutes:  4 minutes    Conversation Outcomes:  [x] ACP discussion completed  [] Existing advance directive reviewed with patient; no changes to patient's previously recorded wishes  [] New Advance Directive completed  [] Portable Do Not Rescitate prepared for Provider review and signature  [] POLST/POST/MOLST/MOST prepared for Provider review and signature      Follow-up plan:    [] Schedule follow-up conversation to continue planning  [] Referred individual to Provider for additional questions/concerns   [] Advised patient/agent/surrogate to review completed ACP document and update if needed with changes in condition, patient preferences or care setting    [] This note routed to one or more involved healthcare providers      SonSyeda is reported as being the [facility] agent. Completed ACP with him and he states he wants to ensure the best decisions for her at the time he needs to make the decisions. He would want to be informed of any decisions he needs to make and wants to ensure they are in line with pt's [employer] beliefs. Perfect Serve sent to Dr Rich Steel to inform.     [facility]     Case Management   [phone]    2/10/2022  11:59 AM

## 2022-02-10 NOTE — CARE COORDINATION
Chart Reviewed. Pt on Vent. Call placed to sons Jake Perez and Reagan Duenas over the phone to introduce  role, initiate discussion regarding DC planning and to complete ACP. See separate note for ACP. INITIAL CASE MANAGEMENT ASSESSMENT    Living Situation:   Patient and both sons live together in a house with 5 steps entry. Due to Dementia, pt needs help from sons for all ADLs needs and homemaking. She does walk with 4 wh walker. She recently had used [facility] and would want to have them return if needed. Son reports she has Seasonal depression and during winter months she usually does not do a lot of physical things. He does have her do exercises daily. Both sons are with her 24 hours of the day. ADLs:      Needs help with all ADLs. DME:   4 wh walker, shower chair, brain shower, bar around toilet. PT/OT Recs:   TBD     Active Services:  [facility] just released her 3 weeks ago. If she needs home care again, she will want to use them again as she has used them several times. Transportation:  sons     Medications:     No problems getting medications    PCP:   Dr Dot Montemayor      HD/PD:    None. PLAN/COMMENTS:   Goal is to return home with both sons. If home care is warranted, they want to use [facility].       [facility]     Case Management   [phone]    2/10/2022  1:50 PM

## 2022-02-10 NOTE — H&P
Hospital Medicine History & Physical      PCP: TASHI Gilliam CNP    Date of Admission: 2/9/2022    Date of Service: Pt seen/examined on 2/9/2022 and Admitted to Inpatient       Chief Complaint: Altered mental status      History Of Present Illness: The patient is a 80 y.o. female with past medical history as below who presents to [facility] via EMS for concern for altered mental status. Patient was intubated upon my interview with patient and so unable to obtain further review of systems or history. Per the story per the ED provider, patient came in with altered mental status with confusion and agitation although she is known to live at home and has a history of dementia. Son had sent patient for evaluation for possible UTI or possible concern for congestive heart failure. Patient apparently arrived agitated in the ED. Patient apparently had a witnessed seizure in the ED which was initially going to get treated with Ativan but then patient became hypoxic and cyanotic and went into bradycardia and then progressed to asystole. Pulse was noted to be lost and CPR was initiated with 1 dose of epinephrine given. Patient was never shocked and patient was resuscitated after about 1 to 2 minutes of CPR. Patient was still unresponsive and so was intubated in the ED. it was initially planned for patient to go to [facility] for possible need for video EEG but discussion with neurologist here felt that it was appropriate but then neurologist at [facility] was contacted and did not feel patient needed urgent transfer for video EEG at this time that could be managed here in this facility.     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 Urine       Past Surgical History:    No past surgical history on file. Medications Prior to Admission:    Prior to Admission medications    Medication Sig Start Date End Date Taking? Authorizing Provider   melatonin 3 MG TABS tablet Take 3 mg by mouth nightly as needed for Sleep 1/12/22  Yes Historical Provider, MD   diclofenac sodium (VOLTAREN) 1 % GEL Apply 2 g topically 4 times daily as needed for Pain   Yes Historical Provider, MD   metoprolol succinate (TOPROL XL) 25 MG extended release tablet Take 1 tablet by mouth daily 11/24/21  Yes Nithin Gonsalves MD   furosemide (LASIX) 20 MG tablet Take 1 tablet by mouth every other day 4/4/21  Yes Celia Alex DO   Cranberry 250 MG CHEW Take 2 tablets by mouth 2 times daily (with meals) 4/1/21  Yes Celia Alex DO   rivaroxaban (XARELTO) 15 MG TABS tablet Take 1 tablet by mouth daily (with breakfast) 2/16/21  Yes Amrit Cardenas MD   vitamin D 25 MCG (1000 UT) CAPS Take 1,000 Units by mouth daily   Yes Historical Provider, MD   albuterol sulfate HFA (VENTOLIN HFA) 108 (90 Base) MCG/ACT inhaler Inhale 2 puffs into the lungs every 6 hours as needed for Wheezing   Yes Historical Provider, MD   mometasone-formoterol (DULERA) 200-5 MCG/ACT inhaler Inhale 2 puffs into the lungs 2 times daily    Yes Historical Provider, MD   Multiple Vitamins-Minerals (MULTIVITAMIN WITH MINERALS) tablet Take 1 tablet by mouth daily   Yes Historical Provider, MD   calcium carbonate (OSCAL) 500 MG TABS tablet Take 500 mg by mouth daily   Yes Historical Provider, MD       Allergies:  Chlorpromazine and Hydrochlorothiazide    Social History:  The patient currently lives home    TOBACCO:   reports that she has never smoked. She has never used smokeless tobacco.  ETOH:   reports no history of alcohol use. Family History:  Reviewed in detail and negative for DM, Early CAD, Cancer, CVA.  Positive as follows:    No family history on file. REVIEW OF SYSTEMS:   Unable to obtain    PHYSICAL EXAM:    BP (!) 113/55   Pulse 69   Temp 98.6 °F (37 °C) (Rectal)   Resp 18   Ht 5' 3\" (1.6 m)   Wt 195 lb 1.7 oz (88.5 kg)   SpO2 100%   BMI 34.56 kg/m²     General appearance: Intubated, chronically ill-appearing,  HEENT Normal cephalic, atraumatic without obvious deformity. Pupils are equal and round and reactive to light although somewhat sluggish, dry mucous membranes  Neck: Supple, no JVD  Lungs: Coarse breath sounds, some slight crackles to the bases but seem to be symmetric  Heart: Regular rate and rhythm with Normal S1/S2 without murmurs, rubs or gallops, point of maximum impulse non-displaced  Abdomen: Soft, nontender, nondistended, active bowel sounds  Extremities: No edema  Skin: No rashes  Neurologic: Unable to evaluate  Mental status: Unable to evaluate  Capillary Refill: Acceptable  < 3 seconds  Peripheral Pulses: +3 Easily felt, not easily obliterated with pressure  02/09/22 1530  XR CHEST PORTABLE   Performed: 02/09/22 1504  Final        Impression: Moderate pulmonary vascular congestive change. Cardiomegaly. Small-to-moderate right pleural effusion. 02/09/22 1507  CT HEAD WO CONTRAST   Performed: 02/09/22 1428  Final        Impression: No acute intracranial abnormality. Remote right parietal craniotomy and encephalomalacia in the right temporoparietal region, similar in appearance. Chest x-ray:  Interval placement of a nasogastric tube and endotracheal tubes which are in   appropriate position.       Mild edema with small bilateral pleural effusions and adjacent bibasilar   airspace disease, likely atelectasis in the absence of clinical signs of   pneumonia. CT head repeat without contrast:  1. No acute intracranial abnormality. 2. Moderate chronic white matter microvascular ischemic changes. 3. 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          CBC Recent Labs     02/09/22  1415 02/10/22  0354   WBC 5.6 8.6   HGB 11.6* 11.9*   HCT 37.1 37.7    151      RENAL  Recent Labs     02/09/22  1415 02/10/22  0354    142   K 4.6 4.4    110   CO2 22 19*   BUN 20 26*   CREATININE 0.8 1.2     LFT'S  Recent Labs     02/09/22  1415 02/10/22  0354   AST 37 39*   ALT 21 25   BILITOT 1.0 1.2*   ALKPHOS 182* 157*     COAG  No results for input(s): INR in the last 72 hours. CARDIAC ENZYMES  Recent Labs     02/09/22  1415 02/10/22  0017 02/10/22  0354   TROPONINI <0.01 0.02* 0.02*       U/A:    Lab Results   Component Value Date    COLORU YELLOW 02/09/2022    WBCUA 99 02/09/2022    RBCUA  02/09/2022    MUCUS Rare 11/14/2021    BACTERIA 1+ 02/09/2022    CLARITYU CLOUDY 02/09/2022    SPECGRAV 1.012 02/09/2022    LEUKOCYTESUR Negative 02/09/2022    BLOODU LARGE 02/09/2022    GLUCOSEU 100 02/09/2022       ABG    Lab Results   Component Value Date    ZAW0ZPF 18.0 02/09/2022    BEART -11.6 02/09/2022    D5KOVITM 99.7 02/09/2022    PHART 7.119 02/09/2022    FRS1HHW 55.7 02/09/2022    PO2ART 234.0 02/09/2022    ZEH2ZFA 19.8 02/09/2022           Active Hospital Problems    Diagnosis Date Noted    Cardiac arrest ([facility] [address] [I46.9] 02/09/2022    Seizure ([facility] [address] [R56.9] 02/09/2022    CHF (congestive heart failure) ([facility] [address] [I50.9] 02/09/2022    Atrial fibrillation (HCC) [I48.91]     AMS (altered mental status) [R41.82] 11/14/2021    Chronic atrial fibrillation ([facility] [address] [I48.20]          PHYSICIANS CERTIFICATION:    I certify that Gwendolyn Ohara is expected to be hospitalized for greater than 2 midnights based on the following assessment and plan:      ASSESSMENT/PLAN:  · Altered mental status  · Cardiac arrest  · Seizure  · CHF  · Atrial fibrillation    Plan:  · Per the story per the ED provider, patient came in with altered mental status with confusion and agitation although she is known to live at home and has a history of dementia.   Son had sent patient for evaluation for possible UTI or possible concern for congestive heart failure. Patient apparently arrived agitated in the ED. Patient apparently had a witnessed seizure in the ED which was initially going to get treated with Ativan but then patient became hypoxic and cyanotic and went into bradycardia and then progressed to asystole. Pulse was noted to be lost and CPR was initiated with 1 dose of epinephrine given. Patient was never shocked and patient was resuscitated after about 1 to 2 minutes of CPR. Patient was still unresponsive and so was intubated in the ED. Patient was not deemed suitable for transfer for video EEG at this time is did not seem to be in status and would not require video EEG for now  · Start patient on Zosyn for empiric coverage of possible aspiration pneumonia and/or some other component of infectious symptoms  · At this time patient is sedated on Versed and fentanyl  · Levophed as needed for hypotension  · PICC line ordered  · Patient was given 1 dose of Keppra in the ED, only noted to have 1 seizure at this time and only a remote history of a previous seizure in the past, will not continue any antiepileptics at this time  · Ordering MRI of the brain without contrast for further evaluation of mental status  · EEG for the morning to evaluate for seizure  · Neurology consult for seizure and cardiac arrest  · Cardiology consult for PEA arrest  · Critical care consult  · Repeat labs in the morning  · Trending troponins      DVT Prophylaxis: Heparin  Diet: Diet NPO  Code Status: Full Code  PT/OT Eval Status: Uncertain    Dispo -pending clinical course       Saurabh Rocha DO    Thank you TASHI Araujo - LAILA for the opportunity to be involved in this patient's care. If you have any questions or concerns please feel free to contact me at [phone].

## 2022-02-10 NOTE — CONSULTS
Comprehensive Nutrition Assessment    Type and Reason for Visit:  Consult (TF ordering and management)    Nutrition Recommendations/Plan:   Start TF. · Vital HP with goal rate of 65 mL/hr. Flush per provider. Nutrition Assessment:  Consult \"TF ordering and management\". Pt with PMH of dementia presented with AMS. Pt had seizure in ED then became hypoxic, lost pulse and then was intubated. No sedation on board. Currently on Levo. OG in place. Will start TF. Malnutrition Assessment:  Malnutrition Status:  Insufficient data      Estimated Daily Nutrient Needs:  Energy (kcal):  2335-4983 kcal (11-15 kcal/kg 88 kg CBW)  Protein (g):  105 gm (2gm/kg IBW)   Fluid (ml/day):  per provider    Nutrition Related Findings:  trace generalized edema; No BM; Glu 131      Wounds:  None       Current Nutrition Therapies:    Diet NPO  ADULT TUBE FEEDING; Orogastric; Standard with Fiber; Continuous; 10; Yes; 10; Q 4 hours; 40; 30; Q 4 hours  Current Tube Feeding (TF) Recommendations:  · Goal TF & Flush Orders Provides: Vital HP with goal rate of 65 mL/hr. Flush per provider. TF regimen provides: 1300 mL TV, 1300 kcal, 114 gm pro, 1087 mL free water. Anthropometric Measures:  · Height: 5' 3\" (160 cm)  · Current Body Weight: 195 lb (88.5 kg)   · Admission Body Weight: 205 lb (93 kg)      · Ideal Body Weight: 115 lbs; % Ideal Body Weight 169.6 %   · BMI: 34.6  · BMI Categories: Obese Class 1 (BMI 30.0-34. 9)       Nutrition Diagnosis:   · Inadequate oral intake related to impaired respiratory function as evidenced by intubation      Nutrition Interventions:   Food and/or Nutrient Delivery:  Start Tube Feeding  Nutrition Education/Counseling:  No recommendation at this time   Coordination of Nutrition Care:  Continue to monitor while inpatient    Goals:   Tolerate TF at goal rate       Nutrition Monitoring and Evaluation:   Food/Nutrient Intake Outcomes:  Enteral Nutrition Intake/Tolerance  Physical Signs/Symptoms Outcomes: Biochemical Data,Weight,Skin,Nutrition Focused Physical Findings,Fluid Status or Edema,Hemodynamic Status     Discharge Planning:     Too soon to determine     Electronically signed by Otis Oh RD, LD on 2/10/22 at 1:36 PM EST    Contact: [phone]

## 2022-02-10 NOTE — ED NOTES
Patient returned from [address] with this RN and RT Robi Ballard.      Leanne Malave RN  02/09/22 2031

## 2022-02-10 NOTE — PROGRESS NOTES
4 Eyes Skin Assessment     NAME:  Ryan Duffy  YOB: 1931  MEDICAL RECORD NUMBER:  [id number]    The patient is being assess for  Admission    I agree that 2 RN's have performed a thorough Head to Toe Skin Assessment on the patient. ALL assessment sites listed below have been assessed. Areas assessed by both nurses:    Head, Face, Ears, Shoulders, Back, Chest, Arms, Elbows, Hands, Sacrum. Buttock, Coccyx, Ischium and Legs. Feet and Heels         Does the Patient have a Wound? No noted wound(s). Noted reddened donna area and reddened heels, blanchable.        Eddie Prevention initiated:  Yes   Wound Care Orders initiated:  No    Pressure Injury (Stage 3,4, Unstageable, DTI, NWPT, and Complex wounds) if present place consult order under [de-identified] NA    New and Established Ostomies if present place consult order under : NA      Nurse 1 eSignature: Electronically signed by Kaelyn Eli RN on 2/10/22 at 6:22 AM EST    **SHARE this note so that the co-signing nurse is able to place an eSignature**    Nurse 2 eSignature: Electronically signed by Michi Forde RN on 2/10/22 at 7:41 AM EST

## 2022-02-10 NOTE — PLAN OF CARE
Problem: Nutrition  Goal: Optimal nutrition therapy  Outcome: Ongoing   Nutrition Problem #1: Inadequate oral intake  Intervention: Food and/or Nutrient Delivery: Start Tube Feeding  Nutritional Goals:  Tolerate TF at goal rate

## 2022-02-10 NOTE — CONSULTS
Clinical Pharmacy Note  Vancomycin Consult    Faith Fortune is a 80 y.o. female ordered Vancomycin for UTI; consult received from Dr. Nora Musa to manage therapy. Also receiving cefepime. 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 ([address]    Acute pyelonephritis    Essential hypertension    Current use of long term anticoagulation    Mild malnutrition ([address]    Fall at home, initial encounter    Acute metabolic encephalopathy    Acute on chronic congestive heart failure (HCC)    Sepsis ([address]    Bilateral pleural effusion    Chronic atrial fibrillation (HCC)    Acute encephalopathy    Ataxia    Pleural effusion    CHF (congestive heart failure), NYHA class I, acute on chronic, combined ([address]    AMS (altered mental status)    Cardiac arrest ([address]    Seizure ([address]    CHF (congestive heart failure) (HCC)    Atrial fibrillation ([facility])       Allergies:  Chlorpromazine and Hydrochlorothiazide     Temp max:  Temp (24hrs), Av.3 °F (36.3 °C), Min:94 °F (34.4 °C), Max:98.7 °F (37.1 °C)      Recent Labs     22  1415 02/10/22  0354   WBC 5.6 8.6       Recent Labs     22  1415 02/10/22  0354   BUN 20 26*   CREATININE 0.8 1.2         Intake/Output Summary (Last 24 hours) at 2/10/2022 7208  Last data filed at 2/10/2022 0900  Gross per 24 hour   Intake 1142.04 ml   Output 285 ml   Net 857.04 ml       Culture Results:  Pending    Ht Readings from Last 1 Encounters:   22 5' 3\" (1.6 m)        Wt Readings from Last 1 Encounters:   22 195 lb 1.7 oz (88.5 kg)         Estimated Creatinine Clearance: 33 mL/min (based on SCr of 1.2 mg/dL). Assessment/Plan:    Vanco day # 1  Patient with RHIANNA  Vanco 1250 mg x 1 today  Random vanco tomorrow am    Thank you for the consult.      Amara GonzalezD.  2/10/2022  9:23 AM

## 2022-02-10 NOTE — CONSULTS
Neurology Consult Note  Reason for Consult: seizure    Chief complaint: unable to obtain from the patient    Dr Sam Lamas, DO asked me to see Lyndsey Sow in consultation for evaluation of seizure    History of Present Illness:  Lyndsey Sow is a 80 y.o. female who presents with AMS. I obtained my information via chart review and discussion w/ the patient's RN. The patient has a hx of dementia, atrial fibrillation, and ?brain surgery in the 90s. It is my understanding that the patient was brought to the ED yesterday given worsening mental status and concerns for possible UTI which has been a common occurrence. Initial BP was OK though has been having some hypotension requiring pressor support. No fevers. It looks like she may have a UTI. CT head w/out any definitive acute findings. While in the ED she apparently had a witnessed seizure. She was administered versed and 1g of Keppra. She went into asystole after the event and required CPR for a few minutes before achieving ROSC. She has had two CT scans of her head which have been stable. The decision was made to have the patient admitted here rather than to another facility for cvEEG. Currently she is intubated on a versed infusion. She is reactive. No further clinical seizure have been observed. Medical History:  Past Medical History:   Diagnosis Date    [facility]'s [facility] ([facility]     Atrial fibrillation ([facility]     Benign essential HTN     Gout     Multiple drug resistant organism (MDRO) culture positive 11/04/2021    Escherichia coli. Urine     No past surgical history on file.      Scheduled Meds:   lidocaine 1 % injection  5 mL IntraDERmal Once    sodium chloride flush  5-40 mL IntraVENous 2 times per day    famotidine (PEPCID) injection  20 mg IntraVENous Daily    chlorhexidine  15 mL Mouth/Throat BID    sodium chloride flush  5-40 mL IntraVENous 2 times per day    insulin lispro  0-6 Units SubCUTAneous Q4H    heparin (porcine)  5,000 Units SubCUTAneous 3 times per day    piperacillin-tazobactam  3,375 mg IntraVENous Q8H     Continuous Infusions:   midazolam 3 mg/hr (02/10/22 0630)    sodium chloride 10 mL/hr at 02/10/22 0723    norepinephrine 4 mcg/min (02/10/22 0723)     Medications Prior to Admission:   melatonin 3 MG TABS tablet, Take 3 mg by mouth nightly as needed for Sleep  diclofenac sodium (VOLTAREN) 1 % GEL, Apply 2 g topically 4 times daily as needed for Pain  metoprolol succinate (TOPROL XL) 25 MG extended release tablet, Take 1 tablet by mouth daily  furosemide (LASIX) 20 MG tablet, Take 1 tablet by mouth every other day  Cranberry 250 MG CHEW, Take 2 tablets by mouth 2 times daily (with meals)  rivaroxaban (XARELTO) 15 MG TABS tablet, Take 1 tablet by mouth daily (with breakfast)  vitamin D 25 MCG (1000 UT) CAPS, Take 1,000 Units by mouth daily  albuterol sulfate HFA (VENTOLIN HFA) 108 (90 Base) MCG/ACT inhaler, Inhale 2 puffs into the lungs every 6 hours as needed for Wheezing  mometasone-formoterol (DULERA) 200-5 MCG/ACT inhaler, Inhale 2 puffs into the lungs 2 times daily   Multiple Vitamins-Minerals (MULTIVITAMIN WITH MINERALS) tablet, Take 1 tablet by mouth daily  calcium carbonate (OSCAL) 500 MG TABS tablet, Take 500 mg by mouth daily    Allergies   Allergen Reactions    Chlorpromazine Other (See Comments)    Hydrochlorothiazide Other (See Comments)     Gout     No family history on file. Social History     Tobacco Use   Smoking Status Never Smoker   Smokeless Tobacco Never Used     Social History     Substance and Sexual Activity   Drug Use Never     Social History     Substance and Sexual Activity   Alcohol Use Never     ROS - chart reviewed    Exam:  Blood pressure (!) 109/40, pulse 77, temperature 98.7 °F (37.1 °C), resp. rate 16, height 5' 3\" (1.6 m), weight 195 lb 1.7 oz (88.5 kg), SpO2 100 %.   Constitutional    Vital signs: BP, HR, and RR reviewed   General depressed mental status, intubated. Eyes: unable to visualize the fundi. Cardiovascular: no peripheral edema. Psychiatric: limited exam given encephalopathy  Neurologic  Mental status:   orientation vitor due to mental status. General fund of knowledge vitor due to mental status. Memory vitor due to mental status. Attention poor  Language vitor due to mental status. Comprehension not following any commands  CN2: vitor visual fields. CN 3,4,6: no forced gaze deviation or preference. Pupils are equal, round, reactive. CN5: facial sensation vitor. CN7: face symmetric but exam limited by ET tube  CN8: hearing exam limited due to encephalopathy  CN9: + gag  CN11: limited exam given encephalopathy  CN12: limited exam given encephalopathy  Strength: vitor due to mental status. Sensory: she does grimace and is reactive to pain stimulus. Cerebellar/coordination:limited exam given encephalopathy  Tone: no increased tone or rigidity. Gait: limited exam given encephalopathy and intubated with ventilator. Labs  Glucose 111  Na 142  K 4.4  Mg 1.80  BUN 26  Cr 1.2    ALT 25  AST 39    WBC 8.6K  Hg 11.9  Platelets 344    COVID negative  UA 99 WBCs, 1+ bacteria. Culture pending  Blood cultures pending    Studies  CT head w/o 2/9/22, independently reviewed  1. No acute intracranial abnormality. 2. Moderate chronic white matter microvascular ischemic changes. 3. 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 Impression  1. Reported seizure in the setting of UTI, dementia, and R parietal encephalomalacia w/ brief period of asystole afterwards. No further clinical seizures. She is currently intubated on versed though is reactive. 2.  Atrial fibrillation. 3.  UTI. 4.  Dementia. Recommendations  1. Agree w/ MRI brain and EEG. 2.  Would attempt to wean versed gtt as able.     3.  Will hold off on an antiepileptic for now though may reconsider pending testing and clinical course as she is higher risk for further seizures. 4.  Seizure precautions.      Silver Dyson NP  [address] [facility] [address] Neurology    A copy of this note was provided for Dr Haily Johnson, DO Pt is a 65 yo female who presents to the ED with a cc of finger laceration.   PMHx of CAD.  Pt reports that she was at work today attempting to open a box with a knife when she cut her left thumb.  Pt is right hand dominant.  Tetanus is UTD.  Denies numbness or tingling in hand.  On exam pt is lying in bed NAD, heart RRR, lungs CTA, abd soft NT/ND.  Left thumb: 2.2cm laceration on thumb deep to subcutaneous layer with necrotic tissue. Tendon injury noted, sensation grossly intact, +radial pulse, cap refill less then 2 seconds.  Will obtain x-ray, and hand consult.  Agree with above

## 2022-02-10 NOTE — PROGRESS NOTES
Orders for PICC line due to multiple infusions and poor access, PICC team called to e be here after 0700, consent on file.   Electronically signed by Jing Rodriguez RN on 2/10/2022 at 6:21 AM

## 2022-02-10 NOTE — PROCEDURES
PICC PLACEMENT:  Preprocedure & timeout done w primary RN Anat Hubbard; no difficulty accessing R Basilic vein; picc tip is verified using [employer] 3cg Tip Confirmation System w positive pwaves and pt has intermittent AFIB; pwave changes are erratic and not definitive of tip confirmation; stat cxr ordered and results are pending at this time; reported same to primary RN; Nursing Order for PICC OK to use will be placed in EMR once cxr is resulted; pt appeared to have tolerated procedure very well, as she is intubated/sedated; she will remain in bed at rest post procedure in order to promote hemostasis to the insertion site; pt is left in a position of comfort w siderails up x3, call light in reach and bed is locked in lowest position. NURSES:  ONCE PICC TIP IS VERIFIED AND ORDER FOR OK TO USE RECEIVED:  *please replace all existing IV tubing with new IV tubing prior to using the PICC for current IV infusions. *please remove any PIVs from RIGHT arm. *please refrain from obtaining BPs in the PICC arm. All of the above may be sources of infection or damage to the PICC line/site.     TASHA Maier RN, BSN, [place].

## 2022-02-10 NOTE — ED NOTES
Report given to receiving DANIA Regalado, all questions answered.       Apoorva Iyer RN  02/09/22 9167

## 2022-02-10 NOTE — PROGRESS NOTES
Pt has been stable on 4mcgs of levo remained  sedated with Versed 3mg/hr, did not have to use Fentanyl. Had a call from the Son @ 0120 to get update on pt, made aware pt admitted to CVU @ and still intubated, sedated and stable, I was told he in ER and aware of admission. Son voiced concern as to why no one called him to tell him pt didn't transfer to [facility]. as per last update. I apologized for the short fall in communication and notified him that pt didn't transfer to [facility] because MD determined that she didn't need the EEG. Pt was medicated for the Seizures and has not had anymore since then and had heat  CT showed no acute change and that MD will f/u with him in am.  Son appreciated call.   Electronically signed by Yuki Morales RN on 2/10/2022 at 6:19 AM

## 2022-02-10 NOTE — ED NOTES
RT called for assistance in transport to [facility], RN  02/09/22 2006

## 2022-02-10 NOTE — PROGRESS NOTES
Pt admitted from ER in with altered MS changes, handoff from [facility], Pt s/p Seizure and Cardiac arrest while in ER, pt intubated, on Versed 3mg/hr for sedation. Placed on tele monitor, responds to pain, stated. Nurse stated son came with pt and left but is aware pt is getting admitted. . Pt came with bear hugger for temp was 94 in ER. Unable to register axillary temp rectal temp place reading 95 degrees, Bear hugger continued. Bp  WNL. s for now, AFIB on monitor,   Electronically signed by Kaelyn Eli RN on 2/10/2022 at 2:30 AM  ,

## 2022-02-10 NOTE — PROGRESS NOTES
Results for Sima Gill (MRN [id number]) as of 2/10/2022 05:24   Ref.  Range 2/10/2022 03:54   pH, Albino Latest Ref Range: 7.350 - 7.450  7.445   pCO2, Albino Latest Ref Range: 40.0 - 50.0 mmHg 34.0 (L)   pO2, Albino Latest Ref Range: Not Established mmHg 125   HCO3, Venous Latest Ref Range: 23 - 29 mmol/L 23     Current ventilator settings are AC/VC 16/400/60/+5

## 2022-02-10 NOTE — CONSULTS
REASON FOR CONSULTATION/CC: sepsis       Consult at request of Dusty Treadwell DO for     PCP: TASHI Steve - CNP  Established Pulmonologist:  None    HISTORY OF PRESENT ILLNESS: Karine Valverde is a 80y.o. year old female with a history of  who presents with     History of dementia. Patient presented to the emergency room last night secondary to being confused and agitated. Initially there was concern for UTI. In the emergency room, patient had a seizure treated with Ativan. Seizure activity stopped. Unfortunately, the patient became bradycardic and then asystole. With ACLS, ROSC was achieved. This note may have been  transcribed using [id number] [place] [employer]. Please disregard any translational errors. Assessment:          Plan:      Hospital Day 1     UTI with shock   * Zosyn  *Change cefepime vancomycin based on prior cultures  * levo at 4. Metabolic acidosis  *Repeat renal panel at noon. Consider bicarb drip. *Ventilator adjusted to help with acidemia. *Start lactated Ringer's at 150 an hour. Mechanical ventilation  *Increased rate this morning  *Decrease rate back to 12 secondary to repeat VBG. Atrial fibrillation  *Currently rate controlled. Heparin subcu. On Xarelto prior to admission  *No plans procedures. Consider Restart Xarelto       Dementia  *Baseline level of dementia. *Change sedation from Versed to fentanyl. Minimize benzodiazepines as able       Seizure? * EEG ordered   * on Versed  *Responding to sternal rub. Not currently having seizures. * MRI today. * head CT negative. Electrolytes  - K:   - Ca:  - Mg:  - Phos:    Prophylaxis  - GI - pepcid    - DVT - heparin   - VAP - peridex  - C. Diff - culturelle   - Nasal Decolonization - Bactroban    Nutrition  - Diet NPO   * start tube feeding     Mobility       Access  Arterial      PICC         PICC Triple Lumen 64/69/96 Right Basilic (Active)   Continued need for line?  Yes 02/10/22 9079   Is this a power PICC? Yes 02/10/22 0730   Site Assessment Clean;Dry; Intact; Flushed well 02/10/22 0730   Red Lumen Status Brisk blood return;Flushed 02/10/22 0730   White Lumen Status Brisk blood return;Flushed 02/10/22 0730   Gray Lumen Status Brisk blood return;Flushed 02/10/22 0730   Exposed Catheter (cm) 0 cm 02/10/22 0730   Extremity Circumference (cm) 28 cm 02/10/22 0730   Dressing Status Clean;Dry; Intact 02/10/22 0730   Dressing Type Transparent; Anti-microbial patch; Securing device 02/10/22 0730   Dressing Change Due 02/17/22 02/10/22 0730   Number of days: 0     CVC               I spent 40 minutes of critical care time with this patient excluding any procedures. This note was transcribed using [id number] [employer]. Please disregard any translational errors. Thank you for the consult    Shree Davis Pulmonary, Sleep and Critical Care  [phone]             Data:     PAST MEDICAL HISTORY:  Past Medical History:   Diagnosis Date    Alzheimer's dementia [facility])     Atrial fibrillation ([facility] [address]     Benign essential HTN     Gout     Multiple drug resistant organism (MDRO) culture positive 11/04/2021    Escherichia coli. Urine       PAST SURGICAL HISTORY:  No past surgical history on file. FAMILY HISTORY:  family history is not on file. SOCIAL HISTORY:   reports that she has never smoked.  She has never used smokeless tobacco.    Scheduled Meds:   lidocaine 1 % injection  5 mL IntraDERmal Once    sodium chloride flush  5-40 mL IntraVENous 2 times per day    famotidine (PEPCID) injection  20 mg IntraVENous Daily    chlorhexidine  15 mL Mouth/Throat BID    sodium chloride flush  5-40 mL IntraVENous 2 times per day    insulin lispro  0-6 Units SubCUTAneous Q4H    heparin (porcine)  5,000 Units SubCUTAneous 3 times per day    piperacillin-tazobactam  3,375 mg IntraVENous Q8H       Continuous Infusions:   sodium chloride      fentaNYL      midazolam 3 mg/hr (02/10/22 0630)  sodium chloride 25 mL (02/10/22 0054)    dextrose      norepinephrine 4 mcg/min (02/10/22 0630)       PRN Meds:  sodium chloride flush, sodium chloride, fentanNYL, sodium chloride flush, sodium chloride, acetaminophen **OR** acetaminophen, glucose, dextrose, glucagon (rDNA), dextrose    ALLERGIES:  Patient is allergic to chlorpromazine and hydrochlorothiazide. REVIEW OF SYSTEMS:       Objective:   PHYSICAL EXAM:  Blood pressure (!) 113/55, pulse 69, temperature 98.6 °F (37 °C), temperature source Rectal, resp. rate 18, height 5' 3\" (1.6 m), weight 195 lb 1.7 oz (88.5 kg), SpO2 100 %.'  Gen: sedated   Eyes: PERRL. No sclera icterus. No conjunctival injection. ENT: No discharge. Pharynx clear. External appearance of ears and nose normal.  Neck: Trachea midline. No obvious mass. Resp: No accessory muscle use. No crackles. No wheezes. No rhonchi. CV: Regular rate. Regular rhythm. No murmur or rub. No edema. GI: Non-tender. Non-distended. No hernia. Skin: Warm, dry, normal texture and turgor. No nodule on exposed extremities. Lymph: No cervical LAD. No supraclavicular LAD. M/S: No cyanosis. No clubbing. No joint deformity. Neuro:    Psych: sedate      Data Reviewed:   LABS:  CBC:   Recent Labs     02/09/22  1415 02/10/22  0354   WBC 5.6 8.6   HGB 11.6* 11.9*   HCT 37.1 37.7   MCV 84.9 84.8    151     BMP:   Recent Labs     02/09/22  1415 02/10/22  0354    142   K 4.6 4.4    110   CO2 22 19*   BUN 20 26*   CREATININE 0.8 1.2     LIVER PROFILE:   Recent Labs     02/09/22  1415 02/10/22  0354   AST 37 39*   ALT 21 25   BILITOT 1.0 1.2*   ALKPHOS 182* 157*     PT/INR: No results for input(s): PROTIME, INR in the last 72 hours. APTT: No results for input(s): APTT in the last 72 hours.   UA:  Recent Labs     02/09/22  1720   COLORU YELLOW   PHUR 7.5   WBCUA 99*   RBCUA *   BACTERIA 1+*   CLARITYU CLOUDY*   SPECGRAV 1.012   LEUKOCYTESUR Negative   UROBILINOGEN 0.2 BILIRUBINUR Negative   BLOODU LARGE*   GLUCOSEU 100*     Recent Labs     02/09/22  1550   PHART 7.119*   SZN1QQC 55.7*   PO2ART 234.0*       Vent Information  $Ventilation: $Initial Day  Skin Assessment: Clean, dry, & intact  Equipment ID: [id number]  Vent Type: [phone]  Vent Mode: AC/VC  Vt Ordered: 400 mL  Rate Set: 16 bmp  Peak Flow: 50 L/min  Pressure Support: 0 cmH20  FiO2 : 60 %  SpO2: 100 %  SpO2/FiO2 ratio: 166.67  Sensitivity: 3  PEEP/CPAP: 5  Humidification Source: Heated wire  Humidification Temp: 37  Humidification Temp Measured: 37    Radiology Review:  Pertinent images / reports were reviewed as a part of this visit. CT Chest w/ contrast: No results found for this or any previous visit. CT Chest w/o contrast: No results found for this or any previous visit. CTPA: Results for orders placed during the hospital encounter of 04/13/21    CT CHEST PULMONARY EMBOLISM W CONTRAST    Narrative  EXAMINATION:  CTA OF THE CHEST, 4/14/2021 10:43 am    TECHNIQUE:  CTA of the chest was performed after the administration of intravenous  contrast.  Multiplanar reformatted images are provided for review. MIP  images are provided for review. Dose modulation, iterative reconstruction,  and/or weight based adjustment of the mA/kV was utilized to reduce the  radiation dose to as low as reasonably achievable. COMPARISON:  None    HISTORY:  ORDERING SYSTEM PROVIDED HISTORY: SOB suspect pulmonary embolus    TECHNOLOGIST PROVIDED HISTORY:    Reason for exam:  SOB suspect pulmonary embolus    Reason for Exam:  SOB suspect pulmonary embolus    Acuity: Acute    Type of Exam: Initial    FINDINGS:  Pulmonary Arteries: Pulmonary arteries are adequately opacified for  evaluation. No evidence of intraluminal filling defect to suggest pulmonary  embolism. Main pulmonary artery is normal in caliber. Mediastinum: The thoracic aorta is normal in course and caliber with mild  atherosclerotic plaque.   Cardiomegaly with no significant pericardial  effusion. Calcification at the level of the mitral annulus. Small to  moderate-sized hiatal hernia. No significant mediastinal adenopathy. Lungs/pleura: Moderate to large right pleural effusion and moderate-sized  left pleural effusion with gravity dependent lower lobe atelectasis. Both  effusions are simple in attenuation. No acute pulmonary infiltrate. Mild  interlobular septal thickening is noted, possibly mild interstitial edema. The central airways are patent. Upper Abdomen: Reflux opacification of the hepatic veins and IVC suggesting  elevated right heart pressures. The visualized upper abdominal viscera are  unremarkable. Soft Tissues/Bones: No acute bone or soft tissue abnormality. Impression  1. No evidence of pulmonary embolic disease. 2. Moderate to large right and moderate left pleural effusions, simple in  attenuation. Gravity dependent bibasilar atelectasis. Mild interstitial  edema with interlobular septal thickening. CXR PA/LAT: No results found for this or any previous visit. CXR portable: Results for orders placed during the hospital encounter of 02/09/22    XR CHEST PORTABLE    Narrative  EXAMINATION:  ONE XRAY VIEW OF THE CHEST    2/9/2022 3:50 pm    COMPARISON:  Chest radiograph February 9, 2022 and priors. HISTORY:  ORDERING SYSTEM PROVIDED HISTORY: intubation  TECHNOLOGIST PROVIDED HISTORY:  Reason for exam:->intubation  Reason for Exam: intubation    FINDINGS:  There has been placement endotracheal tube with the tip in the midthoracic  trachea approximately 3.1 cm above the remington. There has also been placement  nasogastric tube with side hole well beyond the GE junction and tip over the  distal stomach, not well visualized on this study. Hazy opacities are seen  in both lung bases with probable small bilateral pleural effusions and  bibasilar airspace disease. No pneumothorax.   Cardiac and mediastinal  contours are without acute process. No acute osseous abnormality. Impression  Interval placement of a nasogastric tube and endotracheal tubes which are in  appropriate position. Mild edema with small bilateral pleural effusions and adjacent bibasilar  airspace disease, likely atelectasis in the absence of clinical signs of  pneumonia.

## 2022-02-11 ENCOUNTER — APPOINTMENT (OUTPATIENT)
Dept: GENERAL RADIOLOGY | Age: 87
DRG: 871 | End: 2022-02-11
Payer: MEDICARE

## 2022-02-11 ENCOUNTER — APPOINTMENT (OUTPATIENT)
Dept: MRI IMAGING | Age: 87
DRG: 871 | End: 2022-02-11
Payer: MEDICARE

## 2022-02-11 LAB
A/G RATIO: 1.2 (ref 1.1–2.2)
ALBUMIN SERPL-MCNC: 2.6 G/DL (ref 3.4–5)
ALP BLD-CCNC: 146 U/L (ref 40–129)
ALT SERPL-CCNC: 19 U/L (ref 10–40)
ANION GAP SERPL CALCULATED.3IONS-SCNC: 9 MMOL/L (ref 3–16)
AST SERPL-CCNC: 25 U/L (ref 15–37)
BASE EXCESS ARTERIAL: -2.2 MMOL/L (ref -3–3)
BASOPHILS ABSOLUTE: 0 K/UL (ref 0–0.2)
BASOPHILS RELATIVE PERCENT: 0.5 %
BILIRUB SERPL-MCNC: 1 MG/DL (ref 0–1)
BUN BLDV-MCNC: 31 MG/DL (ref 7–20)
CALCIUM SERPL-MCNC: 8.5 MG/DL (ref 8.3–10.6)
CARBOXYHEMOGLOBIN ARTERIAL: 0.7 % (ref 0–1.5)
CHLORIDE BLD-SCNC: 108 MMOL/L (ref 99–110)
CO2: 21 MMOL/L (ref 21–32)
CREAT SERPL-MCNC: 1.4 MG/DL (ref 0.6–1.2)
EOSINOPHILS ABSOLUTE: 0.2 K/UL (ref 0–0.6)
EOSINOPHILS RELATIVE PERCENT: 1.8 %
GFR AFRICAN AMERICAN: 43
GFR NON-AFRICAN AMERICAN: 35
GLUCOSE BLD-MCNC: 100 MG/DL (ref 70–99)
GLUCOSE BLD-MCNC: 104 MG/DL (ref 70–99)
GLUCOSE BLD-MCNC: 121 MG/DL (ref 70–99)
GLUCOSE BLD-MCNC: 130 MG/DL (ref 70–99)
GLUCOSE BLD-MCNC: 145 MG/DL (ref 70–99)
GLUCOSE BLD-MCNC: 160 MG/DL (ref 70–99)
GLUCOSE BLD-MCNC: 167 MG/DL (ref 70–99)
HCO3 ARTERIAL: 23.1 MMOL/L (ref 21–29)
HCT VFR BLD CALC: 36.6 % (ref 36–48)
HEMOGLOBIN, ART, EXTENDED: 12.2 G/DL (ref 12–16)
HEMOGLOBIN: 11.7 G/DL (ref 12–16)
LYMPHOCYTES ABSOLUTE: 0.4 K/UL (ref 1–5.1)
LYMPHOCYTES RELATIVE PERCENT: 4.5 %
MAGNESIUM: 1.9 MG/DL (ref 1.8–2.4)
MCH RBC QN AUTO: 26.7 PG (ref 26–34)
MCHC RBC AUTO-ENTMCNC: 31.9 G/DL (ref 31–36)
MCV RBC AUTO: 83.8 FL (ref 80–100)
METHEMOGLOBIN ARTERIAL: 0.6 %
MONOCYTES ABSOLUTE: 0.9 K/UL (ref 0–1.3)
MONOCYTES RELATIVE PERCENT: 9.7 %
MRSA SCREEN RT-PCR: NORMAL
NEUTROPHILS ABSOLUTE: 7.8 K/UL (ref 1.7–7.7)
NEUTROPHILS RELATIVE PERCENT: 83.5 %
O2 SAT, ARTERIAL: 95.1 %
O2 THERAPY: NORMAL
PCO2 ARTERIAL: 41 MMHG (ref 35–45)
PDW BLD-RTO: 16.5 % (ref 12.4–15.4)
PERFORMED ON: ABNORMAL
PH ARTERIAL: 7.36 (ref 7.35–7.45)
PLATELET # BLD: 163 K/UL (ref 135–450)
PMV BLD AUTO: 10.8 FL (ref 5–10.5)
PO2 ARTERIAL: 80.4 MMHG (ref 75–108)
POTASSIUM SERPL-SCNC: 3.8 MMOL/L (ref 3.5–5.1)
PROCALCITONIN: 0.14 NG/ML (ref 0–0.15)
RBC # BLD: 4.37 M/UL (ref 4–5.2)
SODIUM BLD-SCNC: 138 MMOL/L (ref 136–145)
TCO2 ARTERIAL: 24.4 MMOL/L
TOTAL PROTEIN: 4.8 G/DL (ref 6.4–8.2)
VANCOMYCIN RANDOM: 10.9 UG/ML
WBC # BLD: 9.4 K/UL (ref 4–11)

## 2022-02-11 PROCEDURE — 6370000000 HC RX 637 (ALT 250 FOR IP): Performed by: NURSE PRACTITIONER

## 2022-02-11 PROCEDURE — 71045 X-RAY EXAM CHEST 1 VIEW: CPT

## 2022-02-11 PROCEDURE — 80053 COMPREHEN METABOLIC PANEL: CPT

## 2022-02-11 PROCEDURE — 99291 CRITICAL CARE FIRST HOUR: CPT | Performed by: INTERNAL MEDICINE

## 2022-02-11 PROCEDURE — 2580000003 HC RX 258: Performed by: NURSE PRACTITIONER

## 2022-02-11 PROCEDURE — 95819 EEG AWAKE AND ASLEEP: CPT

## 2022-02-11 PROCEDURE — 2580000003 HC RX 258: Performed by: INTERNAL MEDICINE

## 2022-02-11 PROCEDURE — 2580000003 HC RX 258: Performed by: STUDENT IN AN ORGANIZED HEALTH CARE EDUCATION/TRAINING PROGRAM

## 2022-02-11 PROCEDURE — 87641 MR-STAPH DNA AMP PROBE: CPT

## 2022-02-11 PROCEDURE — 2100000000 HC CCU R&B

## 2022-02-11 PROCEDURE — 6360000002 HC RX W HCPCS: Performed by: INTERNAL MEDICINE

## 2022-02-11 PROCEDURE — 2500000003 HC RX 250 WO HCPCS: Performed by: INTERNAL MEDICINE

## 2022-02-11 PROCEDURE — 6360000002 HC RX W HCPCS: Performed by: STUDENT IN AN ORGANIZED HEALTH CARE EDUCATION/TRAINING PROGRAM

## 2022-02-11 PROCEDURE — 84145 PROCALCITONIN (PCT): CPT

## 2022-02-11 PROCEDURE — 2500000003 HC RX 250 WO HCPCS: Performed by: NURSE PRACTITIONER

## 2022-02-11 PROCEDURE — 36415 COLL VENOUS BLD VENIPUNCTURE: CPT

## 2022-02-11 PROCEDURE — 85025 COMPLETE CBC W/AUTO DIFF WBC: CPT

## 2022-02-11 PROCEDURE — 80202 ASSAY OF VANCOMYCIN: CPT

## 2022-02-11 PROCEDURE — 36600 WITHDRAWAL OF ARTERIAL BLOOD: CPT

## 2022-02-11 PROCEDURE — 51702 INSERT TEMP BLADDER CATH: CPT

## 2022-02-11 PROCEDURE — 94761 N-INVAS EAR/PLS OXIMETRY MLT: CPT

## 2022-02-11 PROCEDURE — 2700000000 HC OXYGEN THERAPY PER DAY

## 2022-02-11 PROCEDURE — 83735 ASSAY OF MAGNESIUM: CPT

## 2022-02-11 PROCEDURE — 6370000000 HC RX 637 (ALT 250 FOR IP): Performed by: STUDENT IN AN ORGANIZED HEALTH CARE EDUCATION/TRAINING PROGRAM

## 2022-02-11 PROCEDURE — 94003 VENT MGMT INPAT SUBQ DAY: CPT

## 2022-02-11 PROCEDURE — 82803 BLOOD GASES ANY COMBINATION: CPT

## 2022-02-11 PROCEDURE — 6370000000 HC RX 637 (ALT 250 FOR IP): Performed by: INTERNAL MEDICINE

## 2022-02-11 PROCEDURE — 70551 MRI BRAIN STEM W/O DYE: CPT

## 2022-02-11 RX ORDER — PROPOFOL 10 MG/ML
5-50 INJECTION, EMULSION INTRAVENOUS
Status: DISCONTINUED | OUTPATIENT
Start: 2022-02-11 | End: 2022-02-12

## 2022-02-11 RX ADMIN — HEPARIN SODIUM 5000 UNITS: 5000 INJECTION INTRAVENOUS; SUBCUTANEOUS at 06:07

## 2022-02-11 RX ADMIN — Medication 50 MCG/HR: at 06:35

## 2022-02-11 RX ADMIN — CHLORHEXIDINE GLUCONATE 0.12% ORAL RINSE 15 ML: 1.2 LIQUID ORAL at 08:01

## 2022-02-11 RX ADMIN — Medication 2 CAPSULE: at 17:32

## 2022-02-11 RX ADMIN — Medication 2 CAPSULE: at 07:51

## 2022-02-11 RX ADMIN — SODIUM CHLORIDE, POTASSIUM CHLORIDE, SODIUM LACTATE AND CALCIUM CHLORIDE: 600; 310; 30; 20 INJECTION, SOLUTION INTRAVENOUS at 00:18

## 2022-02-11 RX ADMIN — HEPARIN SODIUM 5000 UNITS: 5000 INJECTION INTRAVENOUS; SUBCUTANEOUS at 14:22

## 2022-02-11 RX ADMIN — CEFEPIME HYDROCHLORIDE 1000 MG: 1 INJECTION, POWDER, FOR SOLUTION INTRAMUSCULAR; INTRAVENOUS at 22:09

## 2022-02-11 RX ADMIN — PROPOFOL 10 MCG/KG/MIN: 10 INJECTION, EMULSION INTRAVENOUS at 09:39

## 2022-02-11 RX ADMIN — MUPIROCIN: 20 OINTMENT TOPICAL at 20:12

## 2022-02-11 RX ADMIN — SODIUM CHLORIDE, PRESERVATIVE FREE 10 ML: 5 INJECTION INTRAVENOUS at 08:02

## 2022-02-11 RX ADMIN — VANCOMYCIN HYDROCHLORIDE 1250 MG: 10 INJECTION, POWDER, LYOPHILIZED, FOR SOLUTION INTRAVENOUS at 09:28

## 2022-02-11 RX ADMIN — SODIUM CHLORIDE, POTASSIUM CHLORIDE, SODIUM LACTATE AND CALCIUM CHLORIDE: 600; 310; 30; 20 INJECTION, SOLUTION INTRAVENOUS at 20:42

## 2022-02-11 RX ADMIN — INSULIN LISPRO 1 UNITS: 100 INJECTION, SOLUTION INTRAVENOUS; SUBCUTANEOUS at 17:31

## 2022-02-11 RX ADMIN — FAMOTIDINE 20 MG: 10 INJECTION, SOLUTION INTRAVENOUS at 08:01

## 2022-02-11 RX ADMIN — MUPIROCIN: 20 OINTMENT TOPICAL at 08:01

## 2022-02-11 RX ADMIN — HEPARIN SODIUM 5000 UNITS: 5000 INJECTION INTRAVENOUS; SUBCUTANEOUS at 22:14

## 2022-02-11 RX ADMIN — CHLORHEXIDINE GLUCONATE 0.12% ORAL RINSE 15 ML: 1.2 LIQUID ORAL at 20:26

## 2022-02-11 RX ADMIN — CEFEPIME HYDROCHLORIDE 1000 MG: 1 INJECTION, POWDER, FOR SOLUTION INTRAMUSCULAR; INTRAVENOUS at 11:47

## 2022-02-11 RX ADMIN — INSULIN LISPRO 1 UNITS: 100 INJECTION, SOLUTION INTRAVENOUS; SUBCUTANEOUS at 00:01

## 2022-02-11 ASSESSMENT — PULMONARY FUNCTION TESTS
PIF_VALUE: 21
PIF_VALUE: 10
PIF_VALUE: 11
PIF_VALUE: 24
PIF_VALUE: 23
PIF_VALUE: 22
PIF_VALUE: 21
PIF_VALUE: 10
PIF_VALUE: 21
PIF_VALUE: 21
PIF_VALUE: 11
PIF_VALUE: 21
PIF_VALUE: 24
PIF_VALUE: 22
PIF_VALUE: 22
PIF_VALUE: 21
PIF_VALUE: 23
PIF_VALUE: 23
PIF_VALUE: 10
PIF_VALUE: 10
PIF_VALUE: 21
PIF_VALUE: 23
PIF_VALUE: 22
PIF_VALUE: 21
PIF_VALUE: 26
PIF_VALUE: 21
PIF_VALUE: 10
PIF_VALUE: 23
PIF_VALUE: 21
PIF_VALUE: 23
PIF_VALUE: 21
PIF_VALUE: 22
PIF_VALUE: 19
PIF_VALUE: 22
PIF_VALUE: 22
PIF_VALUE: 24
PIF_VALUE: 23
PIF_VALUE: 21
PIF_VALUE: 5
PIF_VALUE: 21
PIF_VALUE: 11
PIF_VALUE: 25
PIF_VALUE: 10
PIF_VALUE: 10
PIF_VALUE: 25
PIF_VALUE: 23
PIF_VALUE: 21
PIF_VALUE: 11
PIF_VALUE: 22
PIF_VALUE: 10
PIF_VALUE: 23
PIF_VALUE: 11
PIF_VALUE: 10
PIF_VALUE: 21
PIF_VALUE: 10
PIF_VALUE: 21

## 2022-02-11 ASSESSMENT — PAIN SCALES - GENERAL: PAINLEVEL_OUTOF10: 4

## 2022-02-11 NOTE — PROGRESS NOTES
equal bilaterally       Labs:   Recent Labs     02/09/22  1415 02/10/22  0354   WBC 5.6 8.6   HGB 11.6* 11.9*   HCT 37.1 37.7    151     Recent Labs     02/09/22  1415 02/10/22  0354 02/10/22  1300    142 140   K 4.6 4.4 3.9    110 107   CO2 22 19* 22   BUN 20 26* 24*   CREATININE 0.8 1.2 1.5*   CALCIUM 9.7 9.2 9.2     Recent Labs     02/09/22  1415 02/10/22  0354   AST 37 39*   ALT 21 25   BILITOT 1.0 1.2*   ALKPHOS 182* 157*     No results for input(s): INR in the last 72 hours. Recent Labs     02/10/22  0017 02/10/22  0354 02/10/22  0850   TROPONINI 0.02* 0.02* 0.02*       Urinalysis:      Lab Results   Component Value Date    NITRU Negative 02/09/2022    WBCUA 99 02/09/2022    BACTERIA 1+ 02/09/2022    RBCUA  02/09/2022    BLOODU LARGE 02/09/2022    SPECGRAV 1.012 02/09/2022    GLUCOSEU 100 02/09/2022       Radiology:  XR CHEST PORTABLE   Final Result   Appropriate positioning of PICC line. Slightly improved aeration of the   lungs. CT HEAD WO CONTRAST   Final Result   1. No acute intracranial abnormality. 2. Moderate chronic white matter microvascular ischemic changes. 3. 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 XR CHEST PORTABLE   Final Result   Interval placement of a nasogastric tube and endotracheal tubes which are in   appropriate position. Mild edema with small bilateral pleural effusions and adjacent bibasilar   airspace disease, likely atelectasis in the absence of clinical signs of   pneumonia. XR CHEST PORTABLE   Final Result   Moderate pulmonary vascular congestive change. Cardiomegaly. Small-to-moderate right pleural effusion. CT HEAD WO CONTRAST   Final Result   No acute intracranial abnormality. Remote right parietal craniotomy and encephalomalacia in the right   temporoparietal region, similar in appearance.          MRI BRAIN WO CONTRAST    (Results Pending)   XR CHEST PORTABLE    (Results Pending)           Assessment/Plan:    Active Hospital Problems    Diagnosis     [facility] [I46.9]     [facility]iac [facility] [I46.9]     Seizure ([facility] [R56.9]     CHF (congestive heart failure) ([facility] [I50.9]     Atrial fibrillation ([facility] [I48.91]     AMS (altered mental status) [R41.82]     Chronic atrial fibrillation ([facility] [I48.20]      1. Cardiac arrest, no clear etiology, status post ROSC  2. UTI with likely septic shock, on Zosyn. Critical care team following  3. Metabolic acidosis, likely due to shock, currently on lactated [place]. 4.  Acute respiratory failure intubated  5. A. fib no acute issues  6. Seizure on admission, neurology consulted, EEG ordered and report pending.        Diet: Diet NPO  ADULT TUBE FEEDING; Orogastric; Peptide Based High Protein; Continuous; 25; Yes; 20; Q 4 hours; 65; 30; Q 4 hours  Code Status: Full Code        Laury Armstrong MD

## 2022-02-11 NOTE — PROCEDURES
0 [address] 16                          ELECTROENCEPHALOGRAM REPORT    PATIENT NAME: Mercy Madsen                     :        1931  MED REC NO:   [id number]                          ROOM:       [address] [place] [address]  ACCOUNT NO:   [de-identified]                           ADMIT DATE: 2022  PROVIDER:     Darryl Miles DO    DATE OF EE2022    REFERRING PROVIDER:  Kamar Brady DO    REASON FOR STUDY:  Altered mental status. BRIEF HISTORY AND NEUROLOGIC FINDINGS:  The patient is a 80-year-old  female being evaluated for reported altered mental status. MEDICATIONS:  The patient's centrally acting medications listed include  fentanyl. EEG FINDINGS:  This is a 20-channel digital EEG performed utilizing  bipolar and referential montages. Wakefulness, drowsiness and sleep  were obtained during the recording. During maximum wakefulness, there  was a moderate voltage, symmetric, disorganized though reactive 6 to 7  Hz posterior background rhythm. The anterior background consisted of  low to moderate voltage mixed frequencies. Drowsiness was manifested by  attenuation of the waking background rhythms. Sleep was manifested by  theta and delta frequencies with somewhat poorly formed K-complexes. Significant myogenic artifact was present at times during the recording. There was also electrical artifact and ventilator artifact noted as  well. Photic stimulation was performed at various flash frequencies and did  not evoke a significant posterior driving response. Hyperventilation  exercise was not performed due to the patient's age and clinical  history. A 1-channel EKG rhythm strip was reviewed and showed a somewhat  irregular cardiac rhythm. EEG DIAGNOSIS:  This EEG is abnormal due to the presence of mild  background slowing and disorganization.     CLINICAL INTERPRETATION:  The background slowing and disorganization is  nonspecific and consistent with a mild encephalopathy. This may be seen  in multiple settings including toxic, metabolic, or degenerative  conditions, as well as with medication effect. No definite epileptiform  activity was present during this recording. This EEG is somewhat  limited secondary to significant myogenic and electrical artifact which  often obscured many of the underlying background rhythms. An incidental  note is made of an irregular cardiac rhythm. Clinically correlate for  possible atrial fibrillation. If seizures remain a clinical concern,  then a repeat EEG may be of further benefit. Again, clinical  correlation is advised.         Leoan Mina DO    D: 02/11/2022 15:26:16       T: 02/11/2022 15:28:44     /[email or web address]  Job#: [id number]     Doc#: [id number]    CC:

## 2022-02-11 NOTE — PROGRESS NOTES
Clinical Pharmacy Note  Vancomycin Consult    He Wasserman is a 80 y.o. female ordered Vancomycin for UTI; consult received from Dr. Ahsan Velasco to manage therapy. Also receiving cefepime. 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 ([address]    Acute pyelonephritis    Essential hypertension    Current use of long term anticoagulation    Mild malnutrition ([address]    Fall at home, initial encounter    Acute metabolic encephalopathy    Acute on chronic congestive heart failure (HCC)    Sepsis ([address]    Bilateral pleural effusion    Chronic atrial fibrillation (HCC)    Acute encephalopathy    Ataxia    Pleural effusion    CHF (congestive heart failure), NYHA class I, acute on chronic, combined ([address]    AMS (altered mental status)    Cardiac arrest ([address]    Seizure ([address]    CHF (congestive heart failure) ([address]    Atrial fibrillation ([address]    Cardiopulmonary arrest with successful resuscitation ([facility]       Allergies:  Chlorpromazine and Hydrochlorothiazide     Temp max:  Temp (24hrs), Av.8 °F (36.6 °C), Min:97.6 °F (36.4 °C), Max:98.5 °F (36.9 °C)      Recent Labs     22  1415 02/10/22  0354 22  0515   WBC 5.6 8.6 9.4       Recent Labs     02/10/22  0354 02/10/22  1300 22  0515   BUN 26* 24* 31*   CREATININE 1.2 1.5* 1.4*         Intake/Output Summary (Last 24 hours) at 2022 1235  Last data filed at 2022 1200  Gross per 24 hour   Intake 4529.83 ml   Output 590 ml   Net 3939.83 ml       Culture Results:  pending    Ht Readings from Last 1 Encounters:   02/10/22 5' 3\" (1.6 m)        Wt Readings from Last 1 Encounters:   22 198 lb 13.7 oz (90.2 kg)         Estimated Creatinine Clearance: 28 mL/min (A) (based on SCr of 1.4 mg/dL (H)). Assessment:  Day # 2 of vancomycin.   Current regimen: Intermittent dosing based on levels due to RHIANNA  Random level: 10.9 mg/L    Plan:  Vancomycin 1250 mg IV x 1 today  Random vancomycin level tomorrow in AM.    Thank you for the consult.      Chris Claudio, [facility], PharmD, BCCCP

## 2022-02-11 NOTE — PROGRESS NOTES
Neurology Progress Note    Updates  No seizures. Currently undergoing SBT. 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 Urine     No past surgical history on file.      Current Facility-Administered Medications:   propofol injection, 5-50 mcg/kg/min, IntraVENous, Titrated  sodium chloride flush 0.9 % injection 5-40 mL, 5-40 mL, IntraVENous, 2 times per day  0.9 % sodium chloride infusion, 25 mL, IntraVENous, PRN  famotidine (PEPCID) injection 20 mg, 20 mg, IntraVENous, Daily  chlorhexidine (PERIDEX) 0.12 % solution 15 mL, 15 mL, Mouth/Throat, BID  lactobacillus (CULTURELLE) capsule 2 capsule, 2 capsule, Oral, BID WC  cefepime (MAXIPIME) 1000 mg IVPB minibag, 1,000 mg, IntraVENous, Q12H  vancomycin (VANCOCIN) intermittent dosing (placeholder), , Other, RX Placeholder  lactated ringers infusion, , IntraVENous, Continuous  mupirocin (BACTROBAN) 2 % ointment, , Nasal, BID  fentaNYL (SUBLIMAZE) 1,000 mcg in sodium chloride 0.9% 100 mL infusion, 12.5-200 mcg/hr, IntraVENous, Continuous  fentaNYL (SUBLIMAZE) injection 25 mcg, 25 mcg, IntraVENous, Q1H PRN  sodium chloride flush 0.9 % injection 5-40 mL, 5-40 mL, IntraVENous, PRN  acetaminophen (TYLENOL) tablet 650 mg, 650 mg, Oral, Q6H PRN **OR** acetaminophen (TYLENOL) suppository 650 mg, 650 mg, Rectal, Q6H PRN  insulin lispro (HUMALOG) injection vial 0-6 Units, 0-6 Units, SubCUTAneous, Q4H  glucose (GLUTOSE) 40 % oral gel 15 g, 15 g, Oral, PRN  dextrose 50 % IV solution, 12.5 g, IntraVENous, PRN  glucagon (rDNA) injection 1 mg, 1 mg, IntraMUSCular, PRN  dextrose 5 % solution, 100 mL/hr, IntraVENous, PRN  norepinephrine (LEVOPHED) 16 mg in dextrose 5% 250 mL infusion, 2-100 mcg/min, IntraVENous, Continuous  heparin (porcine) injection 5,000 Units, 5,000 Units, SubCUTAneous, 3 times per day    Medications Prior to Admission:   melatonin 3 MG TABS tablet, Take 3 mg by mouth nightly as needed for Sleep  diclofenac sodium (VOLTAREN) 1 % GEL, Apply 2 g topically 4 times daily as needed for Pain  metoprolol succinate (TOPROL XL) 25 MG extended release tablet, Take 1 tablet by mouth daily  furosemide (LASIX) 20 MG tablet, Take 1 tablet by mouth every other day  Cranberry 250 MG CHEW, Take 2 tablets by mouth 2 times daily (with meals)  rivaroxaban (XARELTO) 15 MG TABS tablet, Take 1 tablet by mouth daily (with breakfast)  vitamin D 25 MCG (1000 UT) CAPS, Take 1,000 Units by mouth daily  albuterol sulfate HFA (VENTOLIN HFA) 108 (90 Base) MCG/ACT inhaler, Inhale 2 puffs into the lungs every 6 hours as needed for Wheezing  mometasone-formoterol (DULERA) 200-5 MCG/ACT inhaler, Inhale 2 puffs into the lungs 2 times daily   Multiple Vitamins-Minerals (MULTIVITAMIN WITH MINERALS) tablet, Take 1 tablet by mouth daily  calcium carbonate (OSCAL) 500 MG TABS tablet, Take 500 mg by mouth daily    Allergies   Allergen Reactions    Chlorpromazine Other (See Comments)    Hydrochlorothiazide Other (See Comments)     Gout     ROS - chart reviewed. Afebrile. Exam:  Blood pressure (!) 103/33, pulse 66, temperature 98.5 °F (36.9 °C), temperature source Oral, resp. rate 15, height 5' 3\" (1.6 m), weight 198 lb 13.7 oz (90.2 kg), SpO2 98 %. Constitutional    Vital signs: BP, HR, and RR reviewed   General depressed mental status, intubated. Eyes: unable to visualize the fundi. Cardiovascular: no peripheral edema. Psychiatric: limited exam given encephalopathy  Neurologic  Mental status:   orientation vitor due to mental status. Attention poor  Language vitor due to mental status. Comprehension not following any commands  CN2: vitor visual fields. CN 3,4,6: no forced gaze deviation or preference. Pupils are equal, round, reactive.     CN7: face symmetric but exam limited by ET tube  CN8: hearing exam limited due to encephalopathy  CN9: + gag  CN11: limited exam given encephalopathy  CN12: limited exam given encephalopathy  Strength: vitor due to mental status. Sensory: she does grimace and is reactive to pain stimulus. Cerebellar/coordination:limited exam given encephalopathy  Tone: no increased tone or rigidity. Gait: limited exam given encephalopathy and intubated with ventilator. Labs  Glucose 121  Na 138  K 3.8  Mg 1.90    Cr 1.4    ALT 19  AST 25    WBC 9.4K  Hg 11.7  Platelets 658    COVID negative  UA 99 WBCs, 1+ bacteria. Blood cultures NG    Studies  MRI brain w/o 2/11/22, independently reviewed  Chronic microvascular disease and cerebral atrophy with remote right parietal lobe infarct. Prominence of the ventricular system and correlation with symptomatology for   normal pressure hydrocephalus is needed. Fluid in the left mastoid air cells. Mild chronic sinusitis. MOTION DEGRADED     CT head w/o 2/9/22  1. No acute intracranial abnormality. 2. Moderate chronic white matter microvascular ischemic changes. 3. 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 Impression  1. Reported seizure like activity w/ subsequent period of asystole in the setting of possible UTI, dementia, and R parietal encephalomalacia. No further clinical seizures. MRI brain is motion degraded though w/out any definitive acute findings. Recommendations  1. EEG pending.     2.  Limit sedating medications in order to follow exam.      Jasmyn Lay NP  [address] [place] [address] [phone] Neurology    A copy of this note was provided for Dr Laury Armstrong MD

## 2022-02-11 NOTE — PROGRESS NOTES
Pt to MRI this AM. Propofol on for MRI when back to room propofol turned off. SBT completed. MD Gould to bedside to assess pt. Pt moving spontaneously and agitated at times. Opens eyes to pain briefly. Unable to follow commands. Decision to wait to extubate till tomorrow per MD Aguayo May. Will continue to minimize sedation as possible. Lalit Forte called and updated.

## 2022-02-11 NOTE — PROGRESS NOTES
Patient received the sacrament of [facility] by [facility], on Feb. 10, 2022.

## 2022-02-11 NOTE — PROGRESS NOTES
Pulmonary Progress Note    Date of Admission: 2/9/2022   LOS: 2 days       CC:  Seizure    Subjective:  Cannot complete MRI yesterday secondary to hypotension after changing IV contrast.  Plan for today. No other seizure activities. ROS:   Sedated       Assessment:         Plan: This note may have been transcribed using [id number] [address] [employer]. Please disregard any translational errors. Hospital Day: 2     UTI with shock   *cefepime vancomycin  * levo at 10. *Check MRSA nasal probe. Follow cultures. *Blood pressure worsening. May be secondary to sedation. Recheck after SBT. Consider arterial line.     Metabolic acidosis  * Bicarb improved. * lactated Ringer's     Acute kidney injury  *Creatinine peaked at 1.5, 1.4 currently. Urine output continues to be low. *4.7 L positive  *Decrease fluids to 75 an hour.     Mechanical ventilation  *AC VC 14, 400, 5, 40%  *Check ABG. SBT after MRI. *Discontinue Versed drip           Atrial fibrillation  *Currently rate controlled. Heparin subcu. On Xarelto prior to admission  *No plans procedures. With creatinine starting to improve, restart Xarelto tomorrow.        Dementia  *Baseline level of dementia. *Discontinue Versed  *Fentanyl  *Consider propofol only for MRI if needed.        Seizure? * EEG ordered   *Discontinue Versed  *Responding to sternal rub. Not currently having seizures. * MRI today. * head CT negative.     Prophylaxis  - GI - pepcid    - DVT - heparin *  - VAP - peridex  - C.  Diff - Lactobacillus  - Nasal Decolonization - Bactroban    Nutrition  - Diet NPO  ADULT TUBE FEEDING; Orogastric; Peptide Based High Protein; Continuous; 25; Yes; 20; Q 4 hours; 65; 30; Q 4 hours  -   Intake/Output Summary (Last 24 hours) at 2/11/2022 0717  Last data filed at 2/11/2022 0600  Gross per 24 hour   Intake 4547.83 ml   Output 615 ml   Net 3932.83 ml       Mobility       Access  Arterial      PICC         PICC Triple Lumen 02/10/22 Right Basilic (Active)   Continued need for line? Yes 02/11/22 0600   Is this a power PICC? Yes 02/11/22 0600   Site Assessment Clean;Dry; Intact 02/11/22 0600   Red Lumen Status Infusing 02/11/22 0600   White Lumen Status Infusing 02/11/22 0600   Black Lumen Status Infusing 02/10/22 1600   Gray Lumen Status Infusing 02/11/22 0600   Exposed Catheter (cm) 0 cm 02/10/22 0730   Extremity Circumference (cm) 28 cm 02/10/22 0730   Dressing Status Clean;Dry; Intact 02/11/22 0600   Dressing Type Transparent; Anti-microbial patch; Securing device 02/11/22 0600   Dressing Change Due 02/16/22 02/11/22 0600   Number of days: 1     CVC                      I spent 34 minutes of critical care time with this patient excluding any procedures. Data:        PHYSICAL EXAM:   Blood pressure (!) 102/29, pulse 65, temperature 97.8 °F (36.6 °C), temperature source Axillary, resp. rate 16, height 5' 3\" (1.6 m), weight 198 lb 13.7 oz (90.2 kg), SpO2 99 %.'  Body mass index is 35.23 kg/m². Gen: No distress. ENT:   Resp: No accessory muscle use. No crackles. No wheezes. No rhonchi. CV: Regular rate. Regular rhythm. No murmur or rub. No edema. Skin: Warm, dry, normal texture and turgor. No nodule on exposed extremities. M/S: No cyanosis. No clubbing. No joint deformity.   Psych: Grimace to pain      Medications:    Scheduled Meds:   sodium chloride flush  5-40 mL IntraVENous 2 times per day    famotidine (PEPCID) injection  20 mg IntraVENous Daily    chlorhexidine  15 mL Mouth/Throat BID    lactobacillus  2 capsule Oral BID WC    cefepime  1,000 mg IntraVENous Q12H    vancomycin (VANCOCIN) intermittent dosing (placeholder)   Other RX Placeholder    mupirocin   Nasal BID    insulin lispro  0-6 Units SubCUTAneous Q4H    heparin (porcine)  5,000 Units SubCUTAneous 3 times per day       Continuous Infusions:   sodium chloride      lactated ringers 150 mL/hr at 02/11/22 0436    fentaNYL 50 mcg/hr (02/11/22 0635)    midazolam 2 mg/hr (02/11/22 0436)    dextrose      norepinephrine 10 mcg/min (02/11/22 0201)       PRN Meds:  sodium chloride, fentanNYL, sodium chloride flush, acetaminophen **OR** acetaminophen, glucose, dextrose, glucagon (rDNA), dextrose    Labs reviewed:  CBC:   Recent Labs     02/09/22  1415 02/10/22  0354 02/11/22  0515   WBC 5.6 8.6 9.4   HGB 11.6* 11.9* 11.7*   HCT 37.1 37.7 36.6   MCV 84.9 84.8 83.8    151 163     BMP:   Recent Labs     02/10/22  0354 02/10/22  1300 02/11/22  0515    140 138   K 4.4 3.9 3.8    107 108   CO2 19* 22 21   BUN 26* 24* 31*   CREATININE 1.2 1.5* 1.4*     LIVER PROFILE:   Recent Labs     02/09/22  1415 02/10/22  0354 02/11/22  0515   AST 37 39* 25   ALT 21 25 19   BILITOT 1.0 1.2* 1.0   ALKPHOS 182* 157* 146*     PT/INR: No results for input(s): PROTIME, INR in the last 72 hours. APTT: No results for input(s): APTT in the last 72 hours. UA:  Recent Labs     02/09/22  1720   COLORU YELLOW   PHUR 7.5   WBCUA 99*   RBCUA *   BACTERIA 1+*   CLARITYU CLOUDY*   SPECGRAV 1.012   LEUKOCYTESUR Negative   UROBILINOGEN 0.2   BILIRUBINUR Negative   BLOODU LARGE*   GLUCOSEU 100*     No results for input(s): PH, PCO2, PO2 in the last 72 hours. Cx:      Films:  Radiology Review:  Pertinent images / reports were reviewed as a part of this visit. CT Chest w/ contrast: No results found for this or any previous visit. CT Chest w/o contrast: No results found for this or any previous visit. CTPA: Results for orders placed during the hospital encounter of 04/13/21    CT CHEST PULMONARY EMBOLISM W CONTRAST    Narrative  EXAMINATION:  CTA OF THE CHEST, 4/14/2021 10:43 am    TECHNIQUE:  CTA of the chest was performed after the administration of intravenous  contrast.  Multiplanar reformatted images are provided for review. MIP  images are provided for review.  Dose modulation, iterative reconstruction,  and/or weight based adjustment of the mA/kV was utilized to reduce the  radiation dose to as low as reasonably achievable. COMPARISON:  None    HISTORY:  ORDERING SYSTEM PROVIDED HISTORY: SOB suspect pulmonary embolus    TECHNOLOGIST PROVIDED HISTORY:    Reason for exam:  SOB suspect pulmonary embolus    Reason for Exam:  SOB suspect pulmonary embolus    Acuity: Acute    Type of Exam: Initial    FINDINGS:  Pulmonary Arteries: Pulmonary arteries are adequately opacified for  evaluation. No evidence of intraluminal filling defect to suggest pulmonary  embolism. Main pulmonary artery is normal in caliber. Mediastinum: The thoracic aorta is normal in course and caliber with mild  atherosclerotic plaque. Cardiomegaly with no significant pericardial  effusion. Calcification at the level of the mitral annulus. Small to  moderate-sized hiatal hernia. No significant mediastinal adenopathy. Lungs/pleura: Moderate to large right pleural effusion and moderate-sized  left pleural effusion with gravity dependent lower lobe atelectasis. Both  effusions are simple in attenuation. No acute pulmonary infiltrate. Mild  interlobular septal thickening is noted, possibly mild interstitial edema. The central airways are patent. Upper Abdomen: Reflux opacification of the hepatic veins and IVC suggesting  elevated right heart pressures. The visualized upper abdominal viscera are  unremarkable. Soft Tissues/Bones: No acute bone or soft tissue abnormality. Impression  1. No evidence of pulmonary embolic disease. 2. Moderate to large right and moderate left pleural effusions, simple in  attenuation. Gravity dependent bibasilar atelectasis. Mild interstitial  edema with interlobular septal thickening. CXR PA/LAT: No results found for this or any previous visit.       CXR portable: Results for orders placed during the hospital encounter of 02/09/22    XR CHEST PORTABLE    Narrative  EXAMINATION:  ONE XRAY VIEW OF THE CHEST    2/11/2022 4:52 am    COMPARISON:  2/10/2022    HISTORY:  ORDERING SYSTEM PROVIDED HISTORY: intubated  TECHNOLOGIST PROVIDED HISTORY:  Reason for exam:->intubated  Reason for Exam: intubated    FINDINGS:  Lines and tubes appear unchanged. Heart size is stable. There is improved aeration of the lungs bilaterally with decreased hazy  opacity throughout the lungs. Impression  Improved aeration of the lungs. There is decreased pleural-parenchymal  disease             This note was transcribed using [id number] Walk-in Appointment Scheduler. Please disregard any translational errors.       Shree Davis Pulmonary, [facility] and [facility] [phone]

## 2022-02-12 LAB
A/G RATIO: 1 (ref 1.1–2.2)
ALBUMIN SERPL-MCNC: 2.4 G/DL (ref 3.4–5)
ALP BLD-CCNC: 155 U/L (ref 40–129)
ALT SERPL-CCNC: 16 U/L (ref 10–40)
ANION GAP SERPL CALCULATED.3IONS-SCNC: 12 MMOL/L (ref 3–16)
AST SERPL-CCNC: 20 U/L (ref 15–37)
BASE EXCESS ARTERIAL: -1 MMOL/L (ref -3–3)
BASOPHILS ABSOLUTE: 0 K/UL (ref 0–0.2)
BASOPHILS RELATIVE PERCENT: 0.3 %
BILIRUB SERPL-MCNC: 0.8 MG/DL (ref 0–1)
BUN BLDV-MCNC: 34 MG/DL (ref 7–20)
CALCIUM SERPL-MCNC: 8.5 MG/DL (ref 8.3–10.6)
CARBOXYHEMOGLOBIN ARTERIAL: 1.1 % (ref 0–1.5)
CHLORIDE BLD-SCNC: 105 MMOL/L (ref 99–110)
CO2: 20 MMOL/L (ref 21–32)
CREAT SERPL-MCNC: 1.4 MG/DL (ref 0.6–1.2)
EOSINOPHILS ABSOLUTE: 0.3 K/UL (ref 0–0.6)
EOSINOPHILS RELATIVE PERCENT: 3.6 %
GFR AFRICAN AMERICAN: 43
GFR NON-AFRICAN AMERICAN: 35
GLUCOSE BLD-MCNC: 118 MG/DL (ref 70–99)
GLUCOSE BLD-MCNC: 125 MG/DL (ref 70–99)
GLUCOSE BLD-MCNC: 146 MG/DL (ref 70–99)
GLUCOSE BLD-MCNC: 164 MG/DL (ref 70–99)
GLUCOSE BLD-MCNC: 176 MG/DL (ref 70–99)
GLUCOSE BLD-MCNC: 85 MG/DL (ref 70–99)
GLUCOSE BLD-MCNC: 93 MG/DL (ref 70–99)
HCO3 ARTERIAL: 23.6 MMOL/L (ref 21–29)
HCT VFR BLD CALC: 36.6 % (ref 36–48)
HEMOGLOBIN, ART, EXTENDED: 12.1 G/DL (ref 12–16)
HEMOGLOBIN: 11.6 G/DL (ref 12–16)
LYMPHOCYTES ABSOLUTE: 0.4 K/UL (ref 1–5.1)
LYMPHOCYTES RELATIVE PERCENT: 5 %
MAGNESIUM: 1.7 MG/DL (ref 1.8–2.4)
MCH RBC QN AUTO: 26.6 PG (ref 26–34)
MCHC RBC AUTO-ENTMCNC: 31.6 G/DL (ref 31–36)
MCV RBC AUTO: 84 FL (ref 80–100)
METHEMOGLOBIN ARTERIAL: 0.5 %
MONOCYTES ABSOLUTE: 1.1 K/UL (ref 0–1.3)
MONOCYTES RELATIVE PERCENT: 13.2 %
NEUTROPHILS ABSOLUTE: 6.3 K/UL (ref 1.7–7.7)
NEUTROPHILS RELATIVE PERCENT: 77.9 %
O2 SAT, ARTERIAL: 99.9 %
O2 THERAPY: ABNORMAL
PCO2 ARTERIAL: 37.9 MMHG (ref 35–45)
PDW BLD-RTO: 16.5 % (ref 12.4–15.4)
PERFORMED ON: ABNORMAL
PERFORMED ON: NORMAL
PERFORMED ON: NORMAL
PH ARTERIAL: 7.4 (ref 7.35–7.45)
PLATELET # BLD: 165 K/UL (ref 135–450)
PMV BLD AUTO: 10.5 FL (ref 5–10.5)
PO2 ARTERIAL: 144 MMHG (ref 75–108)
POTASSIUM SERPL-SCNC: 3.8 MMOL/L (ref 3.5–5.1)
RBC # BLD: 4.35 M/UL (ref 4–5.2)
SODIUM BLD-SCNC: 137 MMOL/L (ref 136–145)
TCO2 ARTERIAL: 24.7 MMOL/L
TOTAL PROTEIN: 4.9 G/DL (ref 6.4–8.2)
VANCOMYCIN RANDOM: 14 UG/ML
WBC # BLD: 8.1 K/UL (ref 4–11)

## 2022-02-12 PROCEDURE — 6360000002 HC RX W HCPCS: Performed by: INTERNAL MEDICINE

## 2022-02-12 PROCEDURE — 2580000003 HC RX 258: Performed by: STUDENT IN AN ORGANIZED HEALTH CARE EDUCATION/TRAINING PROGRAM

## 2022-02-12 PROCEDURE — 80053 COMPREHEN METABOLIC PANEL: CPT

## 2022-02-12 PROCEDURE — 94761 N-INVAS EAR/PLS OXIMETRY MLT: CPT

## 2022-02-12 PROCEDURE — 2700000000 HC OXYGEN THERAPY PER DAY

## 2022-02-12 PROCEDURE — 2500000003 HC RX 250 WO HCPCS: Performed by: NURSE PRACTITIONER

## 2022-02-12 PROCEDURE — 6370000000 HC RX 637 (ALT 250 FOR IP): Performed by: NURSE PRACTITIONER

## 2022-02-12 PROCEDURE — 2580000003 HC RX 258: Performed by: INTERNAL MEDICINE

## 2022-02-12 PROCEDURE — 6370000000 HC RX 637 (ALT 250 FOR IP): Performed by: INTERNAL MEDICINE

## 2022-02-12 PROCEDURE — 82803 BLOOD GASES ANY COMBINATION: CPT

## 2022-02-12 PROCEDURE — 94003 VENT MGMT INPAT SUBQ DAY: CPT

## 2022-02-12 PROCEDURE — 99291 CRITICAL CARE FIRST HOUR: CPT | Performed by: INTERNAL MEDICINE

## 2022-02-12 PROCEDURE — 6370000000 HC RX 637 (ALT 250 FOR IP): Performed by: STUDENT IN AN ORGANIZED HEALTH CARE EDUCATION/TRAINING PROGRAM

## 2022-02-12 PROCEDURE — 2500000003 HC RX 250 WO HCPCS: Performed by: INTERNAL MEDICINE

## 2022-02-12 PROCEDURE — 6360000002 HC RX W HCPCS

## 2022-02-12 PROCEDURE — 2100000000 HC CCU R&B

## 2022-02-12 PROCEDURE — 80202 ASSAY OF VANCOMYCIN: CPT

## 2022-02-12 PROCEDURE — 6360000002 HC RX W HCPCS: Performed by: STUDENT IN AN ORGANIZED HEALTH CARE EDUCATION/TRAINING PROGRAM

## 2022-02-12 PROCEDURE — 83735 ASSAY OF MAGNESIUM: CPT

## 2022-02-12 PROCEDURE — 36592 COLLECT BLOOD FROM PICC: CPT

## 2022-02-12 PROCEDURE — 36600 WITHDRAWAL OF ARTERIAL BLOOD: CPT

## 2022-02-12 PROCEDURE — 85025 COMPLETE CBC W/AUTO DIFF WBC: CPT

## 2022-02-12 RX ORDER — MAGNESIUM SULFATE IN WATER 40 MG/ML
2000 INJECTION, SOLUTION INTRAVENOUS ONCE
Status: COMPLETED | OUTPATIENT
Start: 2022-02-12 | End: 2022-02-12

## 2022-02-12 RX ORDER — PROPOFOL 10 MG/ML
5-50 INJECTION, EMULSION INTRAVENOUS
Status: DISCONTINUED | OUTPATIENT
Start: 2022-02-12 | End: 2022-02-14

## 2022-02-12 RX ORDER — PROPOFOL 10 MG/ML
INJECTION, EMULSION INTRAVENOUS
Status: COMPLETED
Start: 2022-02-12 | End: 2022-02-12

## 2022-02-12 RX ADMIN — MUPIROCIN: 20 OINTMENT TOPICAL at 21:09

## 2022-02-12 RX ADMIN — CEFEPIME HYDROCHLORIDE 1000 MG: 1 INJECTION, POWDER, FOR SOLUTION INTRAMUSCULAR; INTRAVENOUS at 10:47

## 2022-02-12 RX ADMIN — FAMOTIDINE 20 MG: 10 INJECTION, SOLUTION INTRAVENOUS at 08:48

## 2022-02-12 RX ADMIN — Medication 11 MCG/MIN: at 08:59

## 2022-02-12 RX ADMIN — SODIUM CHLORIDE, PRESERVATIVE FREE 10 ML: 5 INJECTION INTRAVENOUS at 04:45

## 2022-02-12 RX ADMIN — Medication 75 MCG/HR: at 04:26

## 2022-02-12 RX ADMIN — PROPOFOL 10 MCG/KG/MIN: 10 INJECTION, EMULSION INTRAVENOUS at 15:45

## 2022-02-12 RX ADMIN — CHLORHEXIDINE GLUCONATE 0.12% ORAL RINSE 15 ML: 1.2 LIQUID ORAL at 19:23

## 2022-02-12 RX ADMIN — APIXABAN 5 MG: 5 TABLET, FILM COATED ORAL at 21:00

## 2022-02-12 RX ADMIN — INSULIN LISPRO 1 UNITS: 100 INJECTION, SOLUTION INTRAVENOUS; SUBCUTANEOUS at 04:49

## 2022-02-12 RX ADMIN — MUPIROCIN: 20 OINTMENT TOPICAL at 08:33

## 2022-02-12 RX ADMIN — MAGNESIUM SULFATE HEPTAHYDRATE 2000 MG: 40 INJECTION, SOLUTION INTRAVENOUS at 08:39

## 2022-02-12 RX ADMIN — Medication 2 CAPSULE: at 16:52

## 2022-02-12 RX ADMIN — CEFEPIME HYDROCHLORIDE 1000 MG: 1 INJECTION, POWDER, FOR SOLUTION INTRAMUSCULAR; INTRAVENOUS at 22:07

## 2022-02-12 RX ADMIN — APIXABAN 5 MG: 5 TABLET, FILM COATED ORAL at 08:38

## 2022-02-12 RX ADMIN — Medication 2 CAPSULE: at 08:38

## 2022-02-12 RX ADMIN — SODIUM CHLORIDE, POTASSIUM CHLORIDE, SODIUM LACTATE AND CALCIUM CHLORIDE: 600; 310; 30; 20 INJECTION, SOLUTION INTRAVENOUS at 10:45

## 2022-02-12 RX ADMIN — HEPARIN SODIUM 5000 UNITS: 5000 INJECTION INTRAVENOUS; SUBCUTANEOUS at 06:30

## 2022-02-12 RX ADMIN — CHLORHEXIDINE GLUCONATE 0.12% ORAL RINSE 15 ML: 1.2 LIQUID ORAL at 08:34

## 2022-02-12 RX ADMIN — SODIUM CHLORIDE, PRESERVATIVE FREE 10 ML: 5 INJECTION INTRAVENOUS at 21:00

## 2022-02-12 RX ADMIN — SODIUM CHLORIDE, PRESERVATIVE FREE 10 ML: 5 INJECTION INTRAVENOUS at 08:33

## 2022-02-12 RX ADMIN — INSULIN LISPRO 1 UNITS: 100 INJECTION, SOLUTION INTRAVENOUS; SUBCUTANEOUS at 21:00

## 2022-02-12 ASSESSMENT — PAIN SCALES - GENERAL
PAINLEVEL_OUTOF10: 1
PAINLEVEL_OUTOF10: 2
PAINLEVEL_OUTOF10: 6
PAINLEVEL_OUTOF10: 0
PAINLEVEL_OUTOF10: 2

## 2022-02-12 ASSESSMENT — PULMONARY FUNCTION TESTS
PIF_VALUE: 21
PIF_VALUE: 15
PIF_VALUE: 21
PIF_VALUE: 21
PIF_VALUE: 16
PIF_VALUE: 24
PIF_VALUE: 17
PIF_VALUE: 13
PIF_VALUE: 21
PIF_VALUE: 23
PIF_VALUE: 21
PIF_VALUE: 11
PIF_VALUE: 15
PIF_VALUE: 11
PIF_VALUE: 23
PIF_VALUE: 25
PIF_VALUE: 25
PIF_VALUE: 21
PIF_VALUE: 21
PIF_VALUE: 11
PIF_VALUE: 11
PIF_VALUE: 22
PIF_VALUE: 29
PIF_VALUE: 23
PIF_VALUE: 24
PIF_VALUE: 22
PIF_VALUE: 18
PIF_VALUE: 22
PIF_VALUE: 11
PIF_VALUE: 25
PIF_VALUE: 24
PIF_VALUE: 21
PIF_VALUE: 12

## 2022-02-12 NOTE — PROGRESS NOTES
Pulmonary Progress Note    Date of Admission: 2/9/2022   LOS: 3 days       CC:  Seizure    Subjective:  MRI completed. .    ROS:   Sedated       Assessment:         Plan: This note may have been transcribed using [id number]. Please disregard any translational errors. Hospital Day: 3     UTI with shock   *cefepime    * levo. * MRSA nasal probe, negative . D/c vanc      Metabolic acidosis     * lactated Ringer's , @ 75. Acute kidney injury  *Creatinine peaked at 1.5, 1.4 currently. Urine output continues to be low. *4.7 L positive  *Decrease fluids to 75 an hour.     Mechanical ventilation  *AC VC 14, 400, 5, 40%  * SBT      Mental status  * fentanyl  * not following commands  * stop fentanyl. SBT. Extubate.         Atrial fibrillation  *Currently rate controlled. Heparin subcu. On Xarelto prior to admission  *No plans procedures. With creatinine, change to Eliquis           Dementia  *Baseline level of dementia. *Discontinue Versed  *Fentanyl  *Consider propofol only for MRI if needed.        Seizure? * EEG negative   * MRI negative . * head CT negative. * monitor         Prophylaxis  - GI - pepcid    - DVT - Eliquis   - VAP - peridex  - C. Diff - Lactobacillus  - Nasal Decolonization - Bactroban    Nutrition  - Diet NPO  ADULT TUBE FEEDING; Orogastric; Peptide Based High Protein; Continuous; 25; Yes; 20; Q 4 hours; 65; 30; Q 4 hours  -     Intake/Output Summary (Last 24 hours) at 2/12/2022 0734  Last data filed at 2/12/2022 0645  Gross per 24 hour   Intake 4359.03 ml   Output 1005 ml   Net 3354.03 ml       Mobility       Access  Arterial      PICC         PICC Triple Lumen 57/25/13 Right Basilic (Active)   Continued need for line? Yes 02/11/22 1800   Is this a power PICC? Yes 02/11/22 1800   Site Assessment Clean;Dry; Intact 02/11/22 1800   Red Lumen Status Infusing 02/11/22 1800   White Lumen Status Infusing 02/11/22 1800   Black Lumen Status Infusing 02/10/22 1600 [place] Lumen Status Infusing 02/11/22 1800   Exposed Catheter (cm) 0 cm 02/10/22 0730   Extremity Circumference (cm) 28 cm 02/10/22 0730   Dressing Status Intact; Old drainage;Dry 02/11/22 1800   Dressing Type Transparent; Anti-microbial patch; Securing device 02/11/22 1800   Dressing Change Due 02/16/22 02/11/22 1800   Number of days: 2     CVC                    I spent 32 minutes of critical care time with this patient excluding any procedures. Data:        PHYSICAL EXAM:   Blood pressure (!) 105/43, pulse 79, temperature 97.5 °F (36.4 °C), temperature source Oral, resp. rate 18, height 5' 3\" (1.6 m), weight 210 lb 1.6 oz (95.3 kg), SpO2 99 %.'  Body mass index is 37.22 kg/m². Gen: No distress. ENT:   Resp: No accessory muscle use. No crackles. No wheezes. No rhonchi. CV: Regular rate. Regular rhythm. No murmur or rub. No edema. Skin: Warm, dry, normal texture and turgor. No nodule on exposed extremities. M/S: No cyanosis. No clubbing. No joint deformity.   Psych: Grimace to pain,       Medications:    Scheduled Meds:   sodium chloride flush  5-40 mL IntraVENous 2 times per day    famotidine (PEPCID) injection  20 mg IntraVENous Daily    chlorhexidine  15 mL Mouth/Throat BID    lactobacillus  2 capsule Oral BID WC    cefepime  1,000 mg IntraVENous Q12H    vancomycin (VANCOCIN) intermittent dosing (placeholder)   Other RX Placeholder    mupirocin   Nasal BID    insulin lispro  0-6 Units SubCUTAneous Q4H    heparin (porcine)  5,000 Units SubCUTAneous 3 times per day       Continuous Infusions:   propofol Stopped (02/11/22 1135)    sodium chloride      lactated ringers 75 mL/hr at 02/12/22 0645    fentaNYL 75 mcg/hr (02/12/22 0645)    dextrose      norepinephrine 11 mcg/min (02/12/22 0645)       PRN Meds:  sodium chloride, fentanNYL, sodium chloride flush, acetaminophen **OR** acetaminophen, glucose, dextrose, glucagon (rDNA), dextrose    Labs reviewed:  CBC:   Recent Labs 02/10/22  0354 02/11/22  0515 02/12/22  0442   WBC 8.6 9.4 8.1   HGB 11.9* 11.7* 11.6*   HCT 37.7 36.6 36.6   MCV 84.8 83.8 84.0    163 165     BMP:   Recent Labs     02/10/22  1300 02/11/22  0515 02/12/22  0442    138 137   K 3.9 3.8 3.8    108 105   CO2 22 21 20*   BUN 24* 31* 34*   CREATININE 1.5* 1.4* 1.4*     LIVER PROFILE:   Recent Labs     02/10/22  0354 02/11/22  0515 02/12/22  0442   AST 39* 25 20   ALT 25 19 16   BILITOT 1.2* 1.0 0.8   ALKPHOS 157* 146* 155*     PT/INR: No results for input(s): PROTIME, INR in the last 72 hours. APTT: No results for input(s): APTT in the last 72 hours. UA:  Recent Labs     02/09/22  1720   COLORU YELLOW   PHUR 7.5   WBCUA 99*   RBCUA *   BACTERIA 1+*   CLARITYU CLOUDY*   SPECGRAV 1.012   LEUKOCYTESUR Negative   UROBILINOGEN 0.2   BILIRUBINUR Negative   BLOODU LARGE*   GLUCOSEU 100*     No results for input(s): PH, PCO2, PO2 in the last 72 hours. Cx:      Films: This note was transcribed using [id number] [address]. Please disregard any translational errors.       Shree Davis [facility], [facility] [phone]

## 2022-02-12 NOTE — PROGRESS NOTES
Late entry due to patient care. Pt. with uneventful shift. This RN titrated her Levophed drip to meet SBP goals (see MAR). Her urine output had been at least 30 ml or more per hour. Her Fentanyl drip had to be increased around midnight due to her being really agitated and coughing against the ventilator. She hasn't followed any commands.     Electronically signed by Johnnie Daugherty RN on 2/12/2022 at 8:33 AM

## 2022-02-12 NOTE — PROGRESS NOTES
Patient becoming more restless. Thrashing legs in bed and kicking. 's RASS +2. Dr Ahsan Velasco updated order for propofol for sedation. Updated on urine with slight pink tinged, continues with good output. Will continue to monitor.

## 2022-02-12 NOTE — PROGRESS NOTES
Late entry due to patient care. AM rounds w/ Dr. Rosetta Bright and AM shift RN Yue HAYDEN completed. Dr Joie Huffman was updated of events from last night's shift. Bedside shift hand-off done. This RN handed-off the pt. in a stable condition.     Electronically signed by Mylene Garcia RN on 2/12/2022 at 8:37 AM

## 2022-02-12 NOTE — PROGRESS NOTES
Late entry due to patient care. @ 2015 to 2030 - Shift assessment completed. When this RN called her name, she moved a little, but didn't follow any commands. She moves all of her extremities spontaneously and also to painful stimuli. She gets agitated w/ nursing interventions, thrashes her legs and moves her head [id number]. This RN was able to suction small amounts of thick and tan ETT secretions. Oral care was provided.     Electronically signed by Tushar Brownlee RN on 2/12/2022 at 8:28 AM

## 2022-02-12 NOTE — PROGRESS NOTES
Dr Nancy Hughes updated with ABG results. Patient not following commands and remains drowsy, RASS -2. Will continue SBT and reevaluate.

## 2022-02-12 NOTE — PROGRESS NOTES
Patient becoming increasingly agitated. Alarming ventilator. Switched to AC/VC and starting propofol for sedation.

## 2022-02-12 NOTE — PROGRESS NOTES
Hospitalist Progress Note      PCP: TASHI Mcpherson - CNP    Date of Admission: 2/9/2022        Subjective: Intubated      Medications:  Reviewed    Infusion Medications    sodium chloride      lactated ringers 75 mL/hr at 02/12/22 0645    fentaNYL Stopped (02/12/22 0736)    dextrose      norepinephrine 11 mcg/min (02/12/22 0645)     Scheduled Medications    magnesium sulfate  2,000 mg IntraVENous Once    apixaban  5 mg Oral BID    sodium chloride flush  5-40 mL IntraVENous 2 times per day    famotidine (PEPCID) injection  20 mg IntraVENous Daily    chlorhexidine  15 mL Mouth/Throat BID    lactobacillus  2 capsule Oral BID WC    cefepime  1,000 mg IntraVENous Q12H    mupirocin   Nasal BID    insulin lispro  0-6 Units SubCUTAneous Q4H     PRN Meds: sodium chloride, fentanNYL, sodium chloride flush, acetaminophen **OR** acetaminophen, glucose, dextrose, glucagon (rDNA), dextrose      Intake/Output Summary (Last 24 hours) at 2/12/2022 0844  Last data filed at 2/12/2022 0645  Gross per 24 hour   Intake 4293.32 ml   Output 970 ml   Net 3323.32 ml       Physical Exam Performed:    BP (!) 122/39   Pulse 83   Temp 97.5 °F (36.4 °C) (Oral)   Resp 21   Ht 5' 3\" (1.6 m)   Wt 210 lb 1.6 oz (95.3 kg) Comment: 96.8 minus 1.5 kg (lift pad)  SpO2 99%   BMI 37.22 kg/m²     General appearance: No apparent distress  Neck: Supple   Respiratory:  Normal respiratory effort. Clear to auscultation, bilaterally without Rales/Wheezes/Rhonchi. Cardiovascular: Regular rate and rhythm with normal S1/S2 without murmurs, rubs or gallops. Abdomen: Soft  Musculoskeletal: No clubbing  Skin: Skin color, texture, turgor normal.  No rashes or lesions.   Neurologic: Moving extremities  Psychiatric: Not following commands  Capillary Refill: Brisk,3 seconds, normal   Peripheral Pulses: +2 palpable, equal bilaterally       Labs:   Recent Labs     02/10/22  0354 02/11/22  0515 02/12/22  0442   WBC 8.6 9.4 8.1   HGB 11.9* 11.7* 11.6*   HCT 37.7 36.6 36.6    163 165     Recent Labs     02/10/22  1300 02/11/22  0515 02/12/22  0442    138 137   K 3.9 3.8 3.8    108 105   CO2 22 21 20*   BUN 24* 31* 34*   CREATININE 1.5* 1.4* 1.4*   CALCIUM 9.2 8.5 8.5     Recent Labs     02/10/22  0354 02/11/22  0515 02/12/22  0442   AST 39* 25 20   ALT 25 19 16   BILITOT 1.2* 1.0 0.8   ALKPHOS 157* 146* 155*     No results for input(s): INR in the last 72 hours. Recent Labs     02/10/22  0017 02/10/22  0354 02/10/22  0850   TROPONINI 0.02* 0.02* 0.02*       Urinalysis:      Lab Results   Component Value Date    NITRU Negative 02/09/2022    WBCUA 99 02/09/2022    BACTERIA 1+ 02/09/2022    RBCUA  02/09/2022    BLOODU LARGE 02/09/2022    SPECGRAV 1.012 02/09/2022    GLUCOSEU 100 02/09/2022       Radiology:  MRI BRAIN WO CONTRAST   Final Result   Chronic microvascular disease and cerebral atrophy with remote right parietal   lobe infarct. Prominence of the ventricular system and correlation with symptomatology for   normal pressure hydrocephalus is needed. Fluid in the left mastoid air cells. Mild chronic sinusitis. RECOMMENDATIONS:   Unavailable         XR CHEST PORTABLE   Final Result   Improved aeration of the lungs. There is decreased pleural-parenchymal   disease         XR CHEST PORTABLE   Final Result   Appropriate positioning of PICC line. Slightly improved aeration of the   lungs. CT HEAD WO CONTRAST   Final Result   1. No acute intracranial abnormality. 2. Moderate chronic white matter microvascular ischemic changes. 3. 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 XR CHEST PORTABLE   Final Result   Interval placement of a nasogastric tube and endotracheal tubes which are in   appropriate position.       Mild edema with small bilateral pleural effusions and adjacent bibasilar   airspace disease, likely atelectasis in the absence of clinical signs of   pneumonia. XR CHEST PORTABLE   Final Result   Moderate pulmonary vascular congestive change. Cardiomegaly. Small-to-moderate right pleural effusion. CT HEAD WO CONTRAST   Final Result   No acute intracranial abnormality. Remote right parietal craniotomy and encephalomalacia in the right   temporoparietal region, similar in appearance. XR CHEST PORTABLE    (Results Pending)           Assessment/Plan:    Active Hospital Problems    Diagnosis     Cardiopulmonary arrest with successful resuscitation [address] [address] [I46.9]     Cardiac arrest ([address] [I46.9]     Seizure ([address] [R56.9]     CHF (congestive heart failure) ([address] [I50.9]     Atrial fibrillation ([address] [I48.91]     AMS (altered mental status) [R41.82]     Chronic atrial fibrillation ([address] [I48.20]      1.  Cardiac arrest, no clear etiology, status post ROSC, happened after seizure, discussed with cardiology yesterday apparently no cardiology cause. 2.  UTI with likely septic shock, on Zosyn. Critical care team following  3.  Metabolic acidosis, likely due to shock, currently on lactated [place]. 4.  Acute respiratory failure intubated  5.  A. fib no acute issues  6.  Seizure on admission, neurology consulted, EEG ordered and report pending.      Diet: Diet NPO  ADULT TUBE FEEDING; Orogastric; Peptide Based High Protein; Continuous; 25; Yes; 20; Q 4 hours; 65; 30; Q 4 hours  Code Status: Full Code        Carole Portillo MD

## 2022-02-12 NOTE — PROGRESS NOTES
Late entry due to patient care    Patient turns head to voice and opens eyes. Does not follow commands or track, RASS +1.

## 2022-02-13 ENCOUNTER — APPOINTMENT (OUTPATIENT)
Dept: GENERAL RADIOLOGY | Age: 87
DRG: 871 | End: 2022-02-13
Payer: MEDICARE

## 2022-02-13 LAB
A/G RATIO: 0.9 (ref 1.1–2.2)
ALBUMIN SERPL-MCNC: 2.4 G/DL (ref 3.4–5)
ALP BLD-CCNC: 167 U/L (ref 40–129)
ALT SERPL-CCNC: 15 U/L (ref 10–40)
ANION GAP SERPL CALCULATED.3IONS-SCNC: 13 MMOL/L (ref 3–16)
AST SERPL-CCNC: 25 U/L (ref 15–37)
BASE EXCESS ARTERIAL: -0.2 MMOL/L (ref -3–3)
BASOPHILS ABSOLUTE: 0 K/UL (ref 0–0.2)
BASOPHILS RELATIVE PERCENT: 0.4 %
BILIRUB SERPL-MCNC: 0.7 MG/DL (ref 0–1)
BLOOD CULTURE, ROUTINE: NORMAL
BUN BLDV-MCNC: 30 MG/DL (ref 7–20)
CALCIUM SERPL-MCNC: 8.8 MG/DL (ref 8.3–10.6)
CARBOXYHEMOGLOBIN ARTERIAL: 0.6 % (ref 0–1.5)
CHLORIDE BLD-SCNC: 102 MMOL/L (ref 99–110)
CO2: 20 MMOL/L (ref 21–32)
CREAT SERPL-MCNC: 1.3 MG/DL (ref 0.6–1.2)
EOSINOPHILS ABSOLUTE: 0.3 K/UL (ref 0–0.6)
EOSINOPHILS RELATIVE PERCENT: 4.2 %
GFR AFRICAN AMERICAN: 46
GFR NON-AFRICAN AMERICAN: 38
GLUCOSE BLD-MCNC: 107 MG/DL (ref 70–99)
GLUCOSE BLD-MCNC: 109 MG/DL (ref 70–99)
GLUCOSE BLD-MCNC: 113 MG/DL (ref 70–99)
GLUCOSE BLD-MCNC: 136 MG/DL (ref 70–99)
GLUCOSE BLD-MCNC: 200 MG/DL (ref 70–99)
GLUCOSE BLD-MCNC: 93 MG/DL (ref 70–99)
GLUCOSE BLD-MCNC: 99 MG/DL (ref 70–99)
HCO3 ARTERIAL: 24.7 MMOL/L (ref 21–29)
HCT VFR BLD CALC: 35.3 % (ref 36–48)
HEMOGLOBIN, ART, EXTENDED: 11.8 G/DL (ref 12–16)
HEMOGLOBIN: 11.4 G/DL (ref 12–16)
LYMPHOCYTES ABSOLUTE: 0.5 K/UL (ref 1–5.1)
LYMPHOCYTES RELATIVE PERCENT: 8.2 %
MAGNESIUM: 2 MG/DL (ref 1.8–2.4)
MCH RBC QN AUTO: 27 PG (ref 26–34)
MCHC RBC AUTO-ENTMCNC: 32.3 G/DL (ref 31–36)
MCV RBC AUTO: 83.5 FL (ref 80–100)
METHEMOGLOBIN ARTERIAL: 0.4 %
MONOCYTES ABSOLUTE: 0.8 K/UL (ref 0–1.3)
MONOCYTES RELATIVE PERCENT: 13 %
NEUTROPHILS ABSOLUTE: 4.7 K/UL (ref 1.7–7.7)
NEUTROPHILS RELATIVE PERCENT: 74.2 %
O2 SAT, ARTERIAL: 96.5 %
O2 THERAPY: ABNORMAL
PCO2 ARTERIAL: 40.4 MMHG (ref 35–45)
PDW BLD-RTO: 17 % (ref 12.4–15.4)
PERFORMED ON: ABNORMAL
PERFORMED ON: NORMAL
PERFORMED ON: NORMAL
PH ARTERIAL: 7.39 (ref 7.35–7.45)
PLATELET # BLD: 159 K/UL (ref 135–450)
PMV BLD AUTO: 10.5 FL (ref 5–10.5)
PO2 ARTERIAL: 86.6 MMHG (ref 75–108)
POTASSIUM SERPL-SCNC: 3.9 MMOL/L (ref 3.5–5.1)
RBC # BLD: 4.23 M/UL (ref 4–5.2)
SODIUM BLD-SCNC: 135 MMOL/L (ref 136–145)
TCO2 ARTERIAL: 25.9 MMOL/L
TOTAL PROTEIN: 5.1 G/DL (ref 6.4–8.2)
WBC # BLD: 6.4 K/UL (ref 4–11)

## 2022-02-13 PROCEDURE — 2580000003 HC RX 258: Performed by: STUDENT IN AN ORGANIZED HEALTH CARE EDUCATION/TRAINING PROGRAM

## 2022-02-13 PROCEDURE — 6370000000 HC RX 637 (ALT 250 FOR IP): Performed by: NURSE PRACTITIONER

## 2022-02-13 PROCEDURE — 2100000000 HC CCU R&B

## 2022-02-13 PROCEDURE — 2700000000 HC OXYGEN THERAPY PER DAY

## 2022-02-13 PROCEDURE — 99291 CRITICAL CARE FIRST HOUR: CPT | Performed by: INTERNAL MEDICINE

## 2022-02-13 PROCEDURE — 85025 COMPLETE CBC W/AUTO DIFF WBC: CPT

## 2022-02-13 PROCEDURE — 2500000003 HC RX 250 WO HCPCS: Performed by: INTERNAL MEDICINE

## 2022-02-13 PROCEDURE — 2580000003 HC RX 258: Performed by: INTERNAL MEDICINE

## 2022-02-13 PROCEDURE — 83735 ASSAY OF MAGNESIUM: CPT

## 2022-02-13 PROCEDURE — 6360000002 HC RX W HCPCS: Performed by: INTERNAL MEDICINE

## 2022-02-13 PROCEDURE — 82803 BLOOD GASES ANY COMBINATION: CPT

## 2022-02-13 PROCEDURE — 80053 COMPREHEN METABOLIC PANEL: CPT

## 2022-02-13 PROCEDURE — 71045 X-RAY EXAM CHEST 1 VIEW: CPT

## 2022-02-13 PROCEDURE — 6370000000 HC RX 637 (ALT 250 FOR IP): Performed by: INTERNAL MEDICINE

## 2022-02-13 PROCEDURE — 94003 VENT MGMT INPAT SUBQ DAY: CPT

## 2022-02-13 PROCEDURE — 94761 N-INVAS EAR/PLS OXIMETRY MLT: CPT

## 2022-02-13 RX ADMIN — CEFEPIME HYDROCHLORIDE 1000 MG: 1 INJECTION, POWDER, FOR SOLUTION INTRAMUSCULAR; INTRAVENOUS at 21:54

## 2022-02-13 RX ADMIN — CHLORHEXIDINE GLUCONATE 0.12% ORAL RINSE 15 ML: 1.2 LIQUID ORAL at 08:17

## 2022-02-13 RX ADMIN — APIXABAN 5 MG: 5 TABLET, FILM COATED ORAL at 08:12

## 2022-02-13 RX ADMIN — FAMOTIDINE 20 MG: 10 INJECTION, SOLUTION INTRAVENOUS at 08:12

## 2022-02-13 RX ADMIN — Medication 2 CAPSULE: at 16:38

## 2022-02-13 RX ADMIN — PROPOFOL 15 MCG/KG/MIN: 10 INJECTION, EMULSION INTRAVENOUS at 00:50

## 2022-02-13 RX ADMIN — SODIUM CHLORIDE, PRESERVATIVE FREE 10 ML: 5 INJECTION INTRAVENOUS at 20:34

## 2022-02-13 RX ADMIN — CEFEPIME HYDROCHLORIDE 1000 MG: 1 INJECTION, POWDER, FOR SOLUTION INTRAMUSCULAR; INTRAVENOUS at 09:42

## 2022-02-13 RX ADMIN — Medication 2 CAPSULE: at 08:12

## 2022-02-13 RX ADMIN — CHLORHEXIDINE GLUCONATE 0.12% ORAL RINSE 15 ML: 1.2 LIQUID ORAL at 20:35

## 2022-02-13 RX ADMIN — APIXABAN 5 MG: 5 TABLET, FILM COATED ORAL at 20:38

## 2022-02-13 RX ADMIN — MUPIROCIN: 20 OINTMENT TOPICAL at 08:17

## 2022-02-13 RX ADMIN — MUPIROCIN: 20 OINTMENT TOPICAL at 20:33

## 2022-02-13 ASSESSMENT — PULMONARY FUNCTION TESTS
PIF_VALUE: 19
PIF_VALUE: 20
PIF_VALUE: 21
PIF_VALUE: 11
PIF_VALUE: 20
PIF_VALUE: 21
PIF_VALUE: 11
PIF_VALUE: 22
PIF_VALUE: 11
PIF_VALUE: 25
PIF_VALUE: 25

## 2022-02-13 NOTE — SEDATION DOCUMENTATION
Narcotic Waste Documentation    Administered 200 mcg of fentanyl gtt and wasted 800 mcg per [facility].       Electronically signed by Viviana Melgar RN on 2/12/2022 at 11:22 PM     Electronically signed by Omar Chicas RN on 2/12/2022 at 11:23 PM

## 2022-02-13 NOTE — PROGRESS NOTES
8901: JACQUI bustillos served to Dr. Ramila Prescott. Okay to place NG tube and extubate. 6212: NG placed at this time at the [address]  . Chest x-ray ordered to verify placement    0920: Pt extubated to 5L. Does not follow commands. Moves around in bed and moves head back and forth. No stridor noted. Will wean O2 as tolerated. Pt remains in restraints to prevent pulling out newly placed NG tube.

## 2022-02-13 NOTE — PROGRESS NOTES
Pulmonary Progress Note    Date of Admission: 2/9/2022   LOS: 4 days       CC:  Seizure    Subjective:  SBT completed yesterday. Mechanically passed but mental status is too poor. Restarted on propofol after him agitated. ROS:   Sedated       Assessment:         Plan: This note may have been transcribed using [address] [employer]. Please disregard any translational errors. Hospital Day: 4     UTI with shock   *cefepime    * levo. * MRSA nasal probe, negative       Metabolic acidosis     * lactated Ringer's   *Monitor    Acute kidney injury  *Creatinine slowly improving. *8.2  L positive  *Decreased to 50 an hour        Mechanical ventilation  *AC VC 14, 400, 5, 40%  * SBT again today. *      Mental status  * Still not following commands moving all extremities. Diffusely agitated.        Atrial fibrillation  *  Eliquis           Dementia  *Baseline level of dementia. *Wakes and not partially agitated. Will extubate today if wakes and agitated         Seizure? * EEG negative   * MRI negative . * head CT negative. * monitor         Prophylaxis  - GI - pepcid    - DVT - Eliquis   - VAP - peridex  - C. Diff - Lactobacillus  - Nasal Decolonization - Bactroban    Nutrition  - Diet NPO  ADULT TUBE FEEDING; Orogastric; Peptide Based High Protein; Continuous; 25; Yes; 20; Q 4 hours; 65; 30; Q 4 hours  -     Intake/Output Summary (Last 24 hours) at 2/13/2022 5504  Last data filed at 2/13/2022 0485  Gross per 24 hour   Intake 3091.64 ml   Output 2950 ml   Net 141.64 ml       Mobility       Access  Arterial      PICC         PICC Triple Lumen 49/34/19 Right Basilic (Active)   Continued need for line? Yes 02/11/22 1800   Is this a power PICC? Yes 02/11/22 1800   Site Assessment Clean;Dry; Intact 02/11/22 1800   Red Lumen Status Infusing 02/11/22 1800   White Lumen Status Infusing 02/11/22 1800   Black Lumen Status Infusing 02/10/22 1600   Gray Lumen Status Infusing 02/11/22 1800   Exposed Catheter (cm) 0 cm 02/10/22 0730   Extremity Circumference (cm) 28 cm 02/10/22 0730   Dressing Status Intact; Old drainage;Dry 02/11/22 1800   Dressing Type Transparent; Anti-microbial patch; Securing device 02/11/22 1800   Dressing Change Due 02/16/22 02/11/22 1800   Number of days: 2     CVC             discussed with nurse          I spent 32 minutes of critical care time with this patient excluding any procedures. Data:        PHYSICAL EXAM:   Blood pressure (!) 103/36, pulse (!) 44, temperature 97.4 °F (36.3 °C), temperature source Axillary, resp. rate 16, height 5' 3\" (1.6 m), weight 212 lb 15.4 oz (96.6 kg), SpO2 99 %.'  Body mass index is 37.72 kg/m². Gen: agitated . ENT:   Resp: No accessory muscle use. No crackles. No wheezes. No rhonchi. CV: Regular rate. Regular rhythm. No murmur or rub. No edema. Skin: Warm, dry, normal texture and turgor. No nodule on exposed extremities. M/S: No cyanosis. No clubbing. No joint deformity.   Psych: Grimace to pain,       Medications:    Scheduled Meds:   apixaban  5 mg Oral BID    sodium chloride flush  5-40 mL IntraVENous 2 times per day    famotidine (PEPCID) injection  20 mg IntraVENous Daily    chlorhexidine  15 mL Mouth/Throat BID    lactobacillus  2 capsule Oral BID WC    cefepime  1,000 mg IntraVENous Q12H    mupirocin   Nasal BID    insulin lispro  0-6 Units SubCUTAneous Q4H       Continuous Infusions:   propofol 15 mcg/kg/min (02/13/22 0201)    sodium chloride      lactated ringers 75 mL/hr at 02/13/22 0201    fentaNYL Stopped (02/12/22 0736)    dextrose      norepinephrine 12 mcg/min (02/13/22 0148)       PRN Meds:  sodium chloride, fentanNYL, sodium chloride flush, acetaminophen **OR** acetaminophen, glucose, dextrose, glucagon (rDNA), dextrose    Labs reviewed:  CBC:   Recent Labs     02/11/22  0515 02/12/22  0442 02/13/22  0340   WBC 9.4 8.1 6.4   HGB 11.7* 11.6* 11.4*   HCT 36.6 36.6 35.3*   MCV 83.8 84.0 83.5    165 159     BMP: Recent Labs     02/11/22  0515 02/12/22  0442 02/13/22  0340    137 135*   K 3.8 3.8 3.9    105 102   CO2 21 20* 20*   BUN 31* 34* 30*   CREATININE 1.4* 1.4* 1.3*     LIVER PROFILE:   Recent Labs     02/11/22  0515 02/12/22  0442 02/13/22  0340   AST 25 20 25   ALT 19 16 15   BILITOT 1.0 0.8 0.7   ALKPHOS 146* 155* 167*     PT/INR: No results for input(s): PROTIME, INR in the last 72 hours. APTT: No results for input(s): APTT in the last 72 hours. UA:  No results for input(s): NITRITE, COLORU, PHUR, LABCAST, WBCUA, RBCUA, MUCUS, TRICHOMONAS, YEAST, BACTERIA, CLARITYU, SPECGRAV, LEUKOCYTESUR, UROBILINOGEN, BILIRUBINUR, BLOODU, GLUCOSEU, AMORPHOUS in the last 72 hours. Invalid input(s): [place]  No results for input(s): PH, PCO2, PO2 in the last 72 hours. Cx:      Films: This note was transcribed using [id number] [employer]. Please disregard any translational errors.       Shree Davis Pulmonary, Sleep and [facility] [phone]

## 2022-02-13 NOTE — PROGRESS NOTES
Hospitalist Progress Note      PCP: Todd Gama, APRN - CNP    Date of Admission: 2/9/2022        Subjective: awake, no family member at bedside. Medications:  Reviewed    Infusion Medications    propofol Stopped (02/13/22 0710)    sodium chloride      lactated ringers 50 mL/hr at 02/13/22 0754    fentaNYL Stopped (02/12/22 0736)    dextrose      norepinephrine 10 mcg/min (02/13/22 0714)     Scheduled Medications    apixaban  5 mg Oral BID    sodium chloride flush  5-40 mL IntraVENous 2 times per day    famotidine (PEPCID) injection  20 mg IntraVENous Daily    chlorhexidine  15 mL Mouth/Throat BID    lactobacillus  2 capsule Oral BID WC    cefepime  1,000 mg IntraVENous Q12H    mupirocin   Nasal BID    insulin lispro  0-6 Units SubCUTAneous Q4H     PRN Meds: sodium chloride, fentanNYL, sodium chloride flush, acetaminophen **OR** acetaminophen, glucose, dextrose, glucagon (rDNA), dextrose      Intake/Output Summary (Last 24 hours) at 2/13/2022 1037  Last data filed at 2/13/2022 0800  Gross per 24 hour   Intake 3551.16 ml   Output 2875 ml   Net 676.16 ml       Physical Exam Performed:    BP (!) 142/53   Pulse 113   Temp 98 °F (36.7 °C) (Axillary)   Resp 23   Ht 5' 3\" (1.6 m)   Wt 212 lb 15.4 oz (96.6 kg)   SpO2 92%   BMI 37.72 kg/m²     General appearance: No apparent distress  Neck: Supple  Respiratory:  Normal respiratory effort. Clear to auscultation, bilaterally without Rales/Wheezes/Rhonchi. Cardiovascular: Regular rate and rhythm with normal S1/S2 without murmurs, rubs or gallops.   Abdomen: Soft, non-tender  Musculoskeletal: No clubbing  Neurologic: moving extremities   Psychiatric: Awake   Capillary Refill: Brisk,3 seconds, normal   Peripheral Pulses: +2 palpable, equal bilaterally       Labs:   Recent Labs     02/11/22  0515 02/12/22  0442 02/13/22  0340   WBC 9.4 8.1 6.4   HGB 11.7* 11.6* 11.4*   HCT 36.6 36.6 35.3*    165 159     Recent Labs     02/11/22  0515 02/12/22  0442 02/13/22  0340    137 135*   K 3.8 3.8 3.9    105 102   CO2 21 20* 20*   BUN 31* 34* 30*   CREATININE 1.4* 1.4* 1.3*   CALCIUM 8.5 8.5 8.8     Recent Labs     02/11/22  0515 02/12/22  0442 02/13/22  0340   AST 25 20 25   ALT 19 16 15   BILITOT 1.0 0.8 0.7   ALKPHOS 146* 155* 167*     No results for input(s): INR in the last 72 hours. No results for input(s): Germain Leeer in the last 72 hours. Urinalysis:      Lab Results   Component Value Date    NITRU Negative 02/09/2022    WBCUA 99 02/09/2022    BACTERIA 1+ 02/09/2022    RBCUA  02/09/2022    BLOODU LARGE 02/09/2022    SPECGRAV 1.012 02/09/2022    GLUCOSEU 100 02/09/2022       Radiology:  XR CHEST PORTABLE   Final Result   Enteric feeding tube projects as transpyloric, at least to the 2nd portion of   the duodenum. Stable bibasilar opacities, likely pleural effusions with atelectasis or   airspace disease. XR CHEST PORTABLE   Final Result   Spectrum of findings would favor CHF and developing pulmonary edema or ARDS. An underlying viral pattern of pneumonia may be considered. MRI BRAIN WO CONTRAST   Final Result   Chronic microvascular disease and cerebral atrophy with remote right parietal   lobe infarct. Prominence of the ventricular system and correlation with symptomatology for   normal pressure hydrocephalus is needed. Fluid in the left mastoid air cells. Mild chronic sinusitis. RECOMMENDATIONS:   Unavailable         XR CHEST PORTABLE   Final Result   Improved aeration of the lungs. There is decreased pleural-parenchymal   disease         XR CHEST PORTABLE   Final Result   Appropriate positioning of PICC line. Slightly improved aeration of the   lungs. CT HEAD WO CONTRAST   Final Result   1. No acute intracranial abnormality. 2. Moderate chronic white matter microvascular ischemic changes.    3. 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 XR CHEST PORTABLE   Final Result   Interval placement of a nasogastric tube and endotracheal tubes which are in   appropriate position. Mild edema with small bilateral pleural effusions and adjacent bibasilar   airspace disease, likely atelectasis in the absence of clinical signs of   pneumonia. XR CHEST PORTABLE   Final Result   Moderate pulmonary vascular congestive change. Cardiomegaly. Small-to-moderate right pleural effusion. CT HEAD WO CONTRAST   Final Result   No acute intracranial abnormality. Remote right parietal craniotomy and encephalomalacia in the right   temporoparietal region, similar in appearance. Assessment/Plan:    Active Hospital Problems    Diagnosis     Cardiopulmonary arrest with successful resuscitation [facility]) [I46.9]     Cardiac arrest ([facility] [address] [I46.9]     Seizure ([facility] [address] [R56.9]     CHF (congestive heart failure) ([facility] [address] [I50.9]     Atrial fibrillation ([facility] [address] [I48.91]     AMS (altered mental status) [R41.82]     Chronic atrial fibrillation ([facility]) [I48.20]      1.  Cardiac arrest, no clear etiology, status post ROSC, happened after seizure, discussed with cardiology, apparently no cardiology cause. 2.  UTI with likely septic shock, on Zosyn. Critical care team following. 3.  Metabolic acidosis, likely due to shock, currently on lactated [place]. 4.  Acute respiratory failure extubated now  5.  A. fib no acute issues  6.  Seizure on admission, neurology consulted, EEG.   Encephalopathic, some eye contact if any, but overall not following commands, but moving her extremities      Diet: Diet NPO  ADULT TUBE FEEDING; Orogastric; Peptide Based High Protein; Continuous; 25; Yes; 20; Q 4 hours; 65; 30; Q 4 hours  Code Status: Full Code          Kiki King MD

## 2022-02-14 PROBLEM — R65.21 SEPTIC SHOCK (HCC): Status: ACTIVE | Noted: 2021-02-10

## 2022-02-14 LAB
A/G RATIO: 1.2 (ref 1.1–2.2)
ALBUMIN SERPL-MCNC: 2.9 G/DL (ref 3.4–5)
ALP BLD-CCNC: 180 U/L (ref 40–129)
ALT SERPL-CCNC: 15 U/L (ref 10–40)
AMMONIA: 26 UMOL/L (ref 11–51)
ANION GAP SERPL CALCULATED.3IONS-SCNC: 11 MMOL/L (ref 3–16)
AST SERPL-CCNC: 21 U/L (ref 15–37)
BASOPHILS ABSOLUTE: 0 K/UL (ref 0–0.2)
BASOPHILS RELATIVE PERCENT: 0.2 %
BILIRUB SERPL-MCNC: 0.7 MG/DL (ref 0–1)
BUN BLDV-MCNC: 26 MG/DL (ref 7–20)
CALCIUM SERPL-MCNC: 9.2 MG/DL (ref 8.3–10.6)
CHLORIDE BLD-SCNC: 108 MMOL/L (ref 99–110)
CO2: 23 MMOL/L (ref 21–32)
CREAT SERPL-MCNC: 1.1 MG/DL (ref 0.6–1.2)
CULTURE, BLOOD 2: NORMAL
EOSINOPHILS ABSOLUTE: 0.2 K/UL (ref 0–0.6)
EOSINOPHILS RELATIVE PERCENT: 3.6 %
GFR AFRICAN AMERICAN: 56
GFR NON-AFRICAN AMERICAN: 47
GLUCOSE BLD-MCNC: 108 MG/DL (ref 70–99)
GLUCOSE BLD-MCNC: 110 MG/DL (ref 70–99)
GLUCOSE BLD-MCNC: 113 MG/DL (ref 70–99)
GLUCOSE BLD-MCNC: 116 MG/DL (ref 70–99)
GLUCOSE BLD-MCNC: 121 MG/DL (ref 70–99)
GLUCOSE BLD-MCNC: 91 MG/DL (ref 70–99)
GLUCOSE BLD-MCNC: 95 MG/DL (ref 70–99)
HCT VFR BLD CALC: 32.3 % (ref 36–48)
HEMOGLOBIN: 10.3 G/DL (ref 12–16)
LYMPHOCYTES ABSOLUTE: 0.3 K/UL (ref 1–5.1)
LYMPHOCYTES RELATIVE PERCENT: 4.8 %
MAGNESIUM: 1.7 MG/DL (ref 1.8–2.4)
MCH RBC QN AUTO: 26.6 PG (ref 26–34)
MCHC RBC AUTO-ENTMCNC: 31.8 G/DL (ref 31–36)
MCV RBC AUTO: 83.7 FL (ref 80–100)
MONOCYTES ABSOLUTE: 0.8 K/UL (ref 0–1.3)
MONOCYTES RELATIVE PERCENT: 12 %
NEUTROPHILS ABSOLUTE: 5.1 K/UL (ref 1.7–7.7)
NEUTROPHILS RELATIVE PERCENT: 79.4 %
PDW BLD-RTO: 16.5 % (ref 12.4–15.4)
PERFORMED ON: ABNORMAL
PERFORMED ON: NORMAL
PERFORMED ON: NORMAL
PHOSPHORUS: 2.9 MG/DL (ref 2.5–4.9)
PLATELET # BLD: 140 K/UL (ref 135–450)
PMV BLD AUTO: 9.3 FL (ref 5–10.5)
POTASSIUM SERPL-SCNC: 4.1 MMOL/L (ref 3.5–5.1)
RBC # BLD: 3.86 M/UL (ref 4–5.2)
SODIUM BLD-SCNC: 142 MMOL/L (ref 136–145)
TOTAL PROTEIN: 5.4 G/DL (ref 6.4–8.2)
WBC # BLD: 6.4 K/UL (ref 4–11)

## 2022-02-14 PROCEDURE — 2580000003 HC RX 258: Performed by: STUDENT IN AN ORGANIZED HEALTH CARE EDUCATION/TRAINING PROGRAM

## 2022-02-14 PROCEDURE — 83735 ASSAY OF MAGNESIUM: CPT

## 2022-02-14 PROCEDURE — 6360000002 HC RX W HCPCS: Performed by: INTERNAL MEDICINE

## 2022-02-14 PROCEDURE — 2100000000 HC CCU R&B

## 2022-02-14 PROCEDURE — 36415 COLL VENOUS BLD VENIPUNCTURE: CPT

## 2022-02-14 PROCEDURE — 36592 COLLECT BLOOD FROM PICC: CPT

## 2022-02-14 PROCEDURE — 85025 COMPLETE CBC W/AUTO DIFF WBC: CPT

## 2022-02-14 PROCEDURE — APPNB15 APP NON BILLABLE TIME 0-15 MINS: Performed by: NURSE PRACTITIONER

## 2022-02-14 PROCEDURE — 80053 COMPREHEN METABOLIC PANEL: CPT

## 2022-02-14 PROCEDURE — 6370000000 HC RX 637 (ALT 250 FOR IP): Performed by: NURSE PRACTITIONER

## 2022-02-14 PROCEDURE — 82140 ASSAY OF AMMONIA: CPT

## 2022-02-14 PROCEDURE — 2500000003 HC RX 250 WO HCPCS: Performed by: INTERNAL MEDICINE

## 2022-02-14 PROCEDURE — 6370000000 HC RX 637 (ALT 250 FOR IP): Performed by: INTERNAL MEDICINE

## 2022-02-14 PROCEDURE — 84100 ASSAY OF PHOSPHORUS: CPT

## 2022-02-14 PROCEDURE — 99233 SBSQ HOSP IP/OBS HIGH 50: CPT | Performed by: INTERNAL MEDICINE

## 2022-02-14 RX ORDER — MAGNESIUM SULFATE 1 G/100ML
1000 INJECTION INTRAVENOUS ONCE
Status: COMPLETED | OUTPATIENT
Start: 2022-02-14 | End: 2022-02-14

## 2022-02-14 RX ADMIN — Medication 2 CAPSULE: at 17:33

## 2022-02-14 RX ADMIN — CHLORHEXIDINE GLUCONATE 0.12% ORAL RINSE 15 ML: 1.2 LIQUID ORAL at 08:05

## 2022-02-14 RX ADMIN — MUPIROCIN: 20 OINTMENT TOPICAL at 08:05

## 2022-02-14 RX ADMIN — MUPIROCIN: 20 OINTMENT TOPICAL at 21:49

## 2022-02-14 RX ADMIN — Medication 2 CAPSULE: at 08:05

## 2022-02-14 RX ADMIN — SODIUM CHLORIDE, PRESERVATIVE FREE 10 ML: 5 INJECTION INTRAVENOUS at 08:05

## 2022-02-14 RX ADMIN — APIXABAN 5 MG: 5 TABLET, FILM COATED ORAL at 21:49

## 2022-02-14 RX ADMIN — APIXABAN 5 MG: 5 TABLET, FILM COATED ORAL at 08:05

## 2022-02-14 RX ADMIN — FAMOTIDINE 20 MG: 10 INJECTION, SOLUTION INTRAVENOUS at 08:05

## 2022-02-14 RX ADMIN — MAGNESIUM SULFATE HEPTAHYDRATE 1000 MG: 1 INJECTION, SOLUTION INTRAVENOUS at 07:18

## 2022-02-14 ASSESSMENT — PAIN SCALES - GENERAL
PAINLEVEL_OUTOF10: 4
PAINLEVEL_OUTOF10: 4
PAINLEVEL_OUTOF10: 3

## 2022-02-14 NOTE — PROGRESS NOTES
Hospitalist Progress Note      PCP: Chandler Baum, APRN - CNP    Date of Admission: 2/9/2022      Subjective: more awake this morning, still not following commands, nurse at bedside. Medications:  Reviewed    Infusion Medications    propofol Stopped (02/13/22 0710)    sodium chloride      lactated ringers 50 mL/hr at 02/13/22 1700    fentaNYL Stopped (02/12/22 0736)    dextrose      norepinephrine Stopped (02/13/22 1359)     Scheduled Medications    apixaban  5 mg Oral BID    sodium chloride flush  5-40 mL IntraVENous 2 times per day    famotidine (PEPCID) injection  20 mg IntraVENous Daily    chlorhexidine  15 mL Mouth/Throat BID    lactobacillus  2 capsule Oral BID WC    cefepime  1,000 mg IntraVENous Q12H    mupirocin   Nasal BID    insulin lispro  0-6 Units SubCUTAneous Q4H     PRN Meds: sodium chloride, fentanNYL, sodium chloride flush, acetaminophen **OR** acetaminophen, glucose, dextrose, glucagon (rDNA), dextrose      Intake/Output Summary (Last 24 hours) at 2/14/2022 0531  Last data filed at 2/14/2022 0521  Gross per 24 hour   Intake 2621.99 ml   Output 2655 ml   Net -33.01 ml       Physical Exam Performed:    BP (!) 156/56   Pulse 106   Temp 97.6 °F (36.4 °C) (Oral)   Resp 22   Ht 5' 3\" (1.6 m)   Wt 212 lb 15.4 oz (96.6 kg)   SpO2 97%   BMI 37.72 kg/m²     General appearance: No apparent distress  Respiratory:  Normal respiratory effort. Clear to auscultation, bilaterally without Rales/Wheezes/Rhonchi. Cardiovascular: Regular rate and rhythm with normal S1/S2 without murmurs, rubs or gallops. Abdomen: Soft  Musculoskeletal: No clubbing, cyanosis   Skin: Skin color, texture, turgor normal.  No rashes or lesions. Neurologic:  Moving her extremities spontaneously.    Psychiatric: Awake   Capillary Refill: Brisk,3 seconds, normal   Peripheral Pulses: +2 palpable, equal bilaterally       Labs:   Recent Labs     02/12/22  0442 02/13/22  0340 02/14/22  0422   WBC 8.1 6.4 6.4 HGB 11.6* 11.4* 10.3*   HCT 36.6 35.3* 32.3*    159 140     Recent Labs     02/12/22 0442 02/13/22 0340 02/14/22 0422    135* 142   K 3.8 3.9 4.1    102 108   CO2 20* 20* 23   BUN 34* 30* 26*   CREATININE 1.4* 1.3* 1.1   CALCIUM 8.5 8.8 9.2     Recent Labs     02/12/22 0442 02/13/22 0340 02/14/22 0422   AST 20 25 21   ALT 16 15 15   BILITOT 0.8 0.7 0.7   ALKPHOS 155* 167* 180*     No results for input(s): INR in the last 72 hours. No results for input(s): Chyrel Lions in the last 72 hours. Urinalysis:      Lab Results   Component Value Date    NITRU Negative 02/09/2022    WBCUA 99 02/09/2022    BACTERIA 1+ 02/09/2022    RBCUA  02/09/2022    BLOODU LARGE 02/09/2022    SPECGRAV 1.012 02/09/2022    GLUCOSEU 100 02/09/2022       Radiology:  XR CHEST PORTABLE   Final Result   Enteric feeding tube projects as transpyloric, at least to the 2nd portion of   the duodenum. Stable bibasilar opacities, likely pleural effusions with atelectasis or   airspace disease. XR CHEST PORTABLE   Final Result   Spectrum of findings would favor CHF and developing pulmonary edema or ARDS. An underlying viral pattern of pneumonia may be considered. MRI BRAIN WO CONTRAST   Final Result   Chronic microvascular disease and cerebral atrophy with remote right parietal   lobe infarct. Prominence of the ventricular system and correlation with symptomatology for   normal pressure hydrocephalus is needed. Fluid in the left mastoid air cells. Mild chronic sinusitis. RECOMMENDATIONS:   Unavailable         XR CHEST PORTABLE   Final Result   Improved aeration of the lungs. There is decreased pleural-parenchymal   disease         XR CHEST PORTABLE   Final Result   Appropriate positioning of PICC line. Slightly improved aeration of the   lungs. CT HEAD WO CONTRAST   Final Result   1. No acute intracranial abnormality.    2. Moderate chronic white matter microvascular ischemic changes. 3. 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 XR CHEST PORTABLE   Final Result   Interval placement of a nasogastric tube and endotracheal tubes which are in   appropriate position. Mild edema with small bilateral pleural effusions and adjacent bibasilar   airspace disease, likely atelectasis in the absence of clinical signs of   pneumonia. XR CHEST PORTABLE   Final Result   Moderate pulmonary vascular congestive change. Cardiomegaly. Small-to-moderate right pleural effusion. CT HEAD WO CONTRAST   Final Result   No acute intracranial abnormality. Remote right parietal craniotomy and encephalomalacia in the right   temporoparietal region, similar in appearance. Assessment/Plan:    Active Hospital Problems    Diagnosis     Cardiopulmonary arrest with successful resuscitation ([facility] [I46.9]     Cardiac arrest ([address] [I46.9]     Seizure ([address] [R56.9]     CHF (congestive heart failure) ([address] [I50.9]     Atrial fibrillation ([address] [I48.91]     AMS (altered mental status) [R41.82]     Chronic atrial fibrillation ([address] [I48.20]      1.  Cardiac arrest, with no clear etiology, status post ROSC, happened after seizure, discussed with cardiology, apparently no cardiology cause. Consider cardiology consult if needed. 2.  UTI with likely septic shock, currently on cefepime. Critical care team following. 3.  Metabolic acidosis, likely due to shock, currently on lactated Hunter. 4.  Acute respiratory failure extubated. 5.  A. fib no acute issues  6.  Seizure on admission, neurology consulted, EEG.   Encephalopathic, some eye contact if any, but overall not following commands, but moving her extremities      Diet: Diet NPO  ADULT TUBE FEEDING; Orogastric; Peptide Based High Protein; Continuous; 25; Yes; 20; Q 4 hours; 65; 30; Q 4 hours  Code Status: Full Code        Carole Portillo MD

## 2022-02-14 NOTE — PROGRESS NOTES
Progress Note    Updates  Now extubated. Still encephalopathic. Moving frequently around the bed.       Active Ambulatory Problems     Diagnosis Date Noted    Acute on chronic combined systolic and diastolic CHF, NYHA class 3 ([facility] [address] 06/05/2020    Dementia in Alzheimer's disease with delirium ([facility] [address] 06/05/2020    Acute pyelonephritis 06/05/2020    Essential hypertension 06/05/2020    Current use of long term anticoagulation 06/05/2020    Mild malnutrition ([address] 06/09/2020    Fall at home, initial encounter 21/66/7563    Metabolic encephalopathy 40/90/5691    Acute on chronic congestive heart failure (HCC)     Septic shock ([address] 02/10/2021    Bilateral pleural effusion     Chronic atrial fibrillation (HCC)     Acute encephalopathy 03/29/2021    Ataxia 04/13/2021    Pleural effusion 04/14/2021    CHF (congestive heart failure), NYHA class I, acute on chronic, combined ([address] 11/14/2021    AMS (altered mental status) 11/14/2021     Past Medical History:   Diagnosis Date    Alzheimer's dementia ([facility] [address]     Atrial fibrillation (HCC)     Benign essential HTN     Gout     Multiple drug resistant organism (MDRO) culture positive 11/04/2021     Current Facility-Administered Medications:     apixaban (ELIQUIS) tablet 5 mg, 5 mg, Oral, BID, Danika Piña MD, 5 mg at 02/14/22 0805    sodium chloride flush 0.9 % injection 5-40 mL, 5-40 mL, IntraVENous, 2 times per day, Saurabh Fisher DO, 10 mL at 02/14/22 0805    0.9 % sodium chloride infusion, 25 mL, IntraVENous, PRN, Saurabh Fisher DO    famotidine (PEPCID) injection 20 mg, 20 mg, IntraVENous, Daily, Danika Piña MD, 20 mg at 02/14/22 0805    lactobacillus (CULTURELLE) capsule 2 capsule, 2 capsule, Oral, BID WC, Kelly Mandujano, APRN - CNP, 2 capsule at 02/14/22 0805    lactated ringers infusion, , IntraVENous, Continuous, Danika Piña MD, Last Rate: 50 mL/hr at 02/13/22 1700, Rate Verify at 02/13/22 1700    mupirocin (BACTROBAN) 2 % ointment, , Nasal, BID, Zuleika Garcia, APRN - CNP, Given at 02/14/22 0805    fentaNYL (SUBLIMAZE) injection 25 mcg, 25 mcg, IntraVENous, Q1H PRN, Saurabh M Natalia, DO    sodium chloride flush 0.9 % injection 5-40 mL, 5-40 mL, IntraVENous, PRN, Saurabh M Natalia, DO, 10 mL at 02/12/22 0445    acetaminophen (TYLENOL) tablet 650 mg, 650 mg, Oral, Q6H PRN **OR** acetaminophen (TYLENOL) suppository 650 mg, 650 mg, Rectal, Q6H PRN, Saurabh M Natalia, DO    insulin lispro (HUMALOG) injection vial 0-6 Units, 0-6 Units, SubCUTAneous, Q4H, Saurabh M Natalia, DO, 1 Units at 02/12/22 2100    glucose (GLUTOSE) 40 % oral gel 15 g, 15 g, Oral, PRN, Saurabh M Natalia, DO    dextrose 50 % IV solution, 12.5 g, IntraVENous, PRN, Saurabh M Natalia, DO    glucagon (rDNA) injection 1 mg, 1 mg, IntraMUSCular, PRN, Saurabh  Natalia, DO    dextrose 5 % solution, 100 mL/hr, IntraVENous, PRN, Saurabh M Natalia, DO    Exam  Blood pressure (!) 163/81, pulse 112, temperature 97.5 °F (36.4 °C), temperature source Axillary, resp. rate 27, height 5' 3\" (1.6 m), weight 211 lb 10.3 oz (96 kg), SpO2 91 %. Constitutional    Vital signs: BP, HR, and RR reviewed   General no acute distress. Eyes: unable to visualize the fundi  Cardiovascular: no peripheral edema. Psychiatric: no psychotic behavior noted. Neurologic  Mental status:   orientation vitor due to encephalopathy. Attention poor   Language mumbling at times. Comprehension unable to follow any commands. Cranial nerves:   CN2: actively resisting passive eyelid opening. CN 3,4,6: gaze appears conjugate w/in limitation of patient cooperation. CN7: face grossly symmetric  CN8: hearing vitor. CN12: tongue protrusion vitor. Strength: appears to be moving all 4 limbs w/out any focal paralysis. Sensory: she does react to noxious pain stimulus in all 4 limbs and w/ trapezius pressure. Cerebellar/coordination: finger nose finger vitor.     Tone: normal in all 4 extremities  Gait: deferred at this time due to encephalopathy. ROS - afebrile. Chart reviewed. Labs  Glucose 113  Na 142  K 4.1  Mg 1.70  BUN 26  Cr 1.1    Ammonia 26    WBC 6.4K  Hg 10.3  Platelets 483    UA 99 WBCs, 1+ bacteria. Culture unrevealing. COVID negative  Blood cultures NG    Studies  MRI brain w/o 2/11/22  Chronic microvascular disease and cerebral atrophy with remote right parietal lobe infarct. Prominence of the ventricular system and correlation with symptomatology for   normal pressure hydrocephalus is needed. Fluid in the left mastoid air cells. Mild chronic sinusitis. MOTION DEGRADED     EEG 2/11/22  Mild background slowing and disorganization. Impression  1. Reported seizure like activity w/ subsequent period of asystole in the setting of possible UTI, dementia, and R parietal encephalomalacia. No further clinical seizures. MRI brain is motion degraded though w/out any definitive acute findings. EEG w/ slowing. She is now extubated and off all sedation since yesterday though still remains quite encephalopathic. Cefepime stopped. Recommendations  1. Continue supportive care. She may need time. Will continue to follow sporadically. If she does not show signs of improvement we may consider additional/repeat testing.       Nery Arguelles NP  [address] [place] [address] [phone] Neurology    A copy of this note was provided for Dr Lurdes Law MD

## 2022-02-14 NOTE — PROGRESS NOTES
Comprehensive Nutrition Assessment    Type and Reason for Visit:  Reassess    Nutrition Recommendations/Plan:   - Modify EN to Jevity 1.5 @ 50ml/hr  - Water flush per provider  - Monitor POC: oral diet per SLP vs nutrition support w/ PEG    Nutrition Assessment:  Follow-up. Pt was extubated yesterday. NG in place for nutrition. Currently receiving Vital HP formula. Will modify EN to a standard formula that meets needs. Will monitor POC: oral diet per SLP vs nutrition support w/ PEG. Malnutrition Assessment:  Malnutrition Status:  No malnutrition    Context:  Acute Illness (Currently Tube Fed)       Estimated Daily Nutrient Needs:  Energy (kcal):  3620-1973 (15-18kcal/88kg); Weight Used for Energy Requirements:  Admission     Protein (g):  62-73 (1.2-1.4g/52kg); Weight Used for Protein Requirements:  Ideal        Fluid (ml/day):  per provider    Nutrition Related Findings:  +7.6 liters. Disoriented x 4. Nonverbal. +BM 2/12. Rectal tube. Wounds:  None       Current Nutrition Therapies:    Diet NPO  ADULT TUBE FEEDING; Orogastric; Peptide Based High Protein; Continuous; 25; Yes; 20; Q 4 hours; 65; 30; Q 4 hours  Current Tube Feeding (TF) Orders:  · Feeding Route: Nasoenteric  · Formula: Standard with Fiber  · Schedule: Continuous  · Water Flushes: per provider  · Current TF & Flush Orders Provides: Vital HP with goal rate of 65 mL/hr. Flush per provider. TF regimen provides: 1300 mL TV, 1300 kcal, 114 gm pro, 1087 mL free water. · Goal TF & Flush Orders Provides: Jevity 1.5 @ 50ml/hr providing 1000ml TV, 1500 kcals, 64 grams protein, and 760ml free water.  (Calculated x 20 hours to account for routine nursing care)    Anthropometric Measures:  · Height: 5' 3\" (160 cm)  · Current Body Weight: 211 lb (95.7 kg)   · Admission Body Weight: 195 lb (88.5 kg)    · Ideal Body Weight: 115 lbs; % Ideal Body Weight 183.5 %   · BMI: 37.4  · BMI Categories: Obese Class 1 (BMI 30.0-34.9) (Using admission wt) Nutrition Diagnosis:   · Inadequate oral intake related to cognitive or neurological impairment as evidenced by NPO or clear liquid status due to medical condition,nutrition support - enteral nutrition    Nutrition Interventions:   Food and/or Nutrient Delivery:  Continue NPO,Modify Tube Feeding  Nutrition Education/Counseling:  Education not indicated   Coordination of Nutrition Care:  Speech Therapy    Goals: Tolerate TF at goal rate       Nutrition Monitoring and Evaluation:   Behavioral-Environmental Outcomes:  None Identified   Food/Nutrient Intake Outcomes:  Diet Advancement/Tolerance,Enteral Nutrition Intake/Tolerance  Physical Signs/Symptoms Outcomes:  Biochemical Data,Chewing or Swallowing,GI Status,Fluid Status or Edema,Hemodynamic Status,Meal Time Behavior,Skin,Weight     Discharge Planning:     Too soon to determine     Electronically signed by Melina Gleason RD, LD on 2/14/22 at 11:08 AM EST    Contact: [phone]

## 2022-02-14 NOTE — PROGRESS NOTES
Pulmonary Progress Note    CC:  Follow up respiratory failure, UTI, septic shock    Subjective:  Extubated yesterday. Currently on room air  +7.8 liters   Not able to provide CC or HPI     Intake/Output Summary (Last 24 hours) at 2/14/2022 0726  Last data filed at 2/14/2022 0521  Gross per 24 hour   Intake 1599.47 ml   Output 2530 ml   Net -930.53 ml         PHYSICAL EXAM:  Blood pressure (!) 156/68, pulse 97, temperature 97.6 °F (36.4 °C), temperature source Oral, resp. rate (!) 34, height 5' 3\" (1.6 m), weight 211 lb 10.3 oz (96 kg), SpO2 95 %.'  Gen: Altered   Eyes: Resists eye opening   ENT: No discharge. Pharynx with NG  Neck: Trachea midline. No obvious mass. Resp: No crackles. No wheezes. No rhonchi. No dullness on percussion. CV: Regular rate. Regular rhythm. No murmur or rub. GI: Non-tender. Non-distended. No hernia. Skin: Warm, dry, normal texture and turgor. No nodule on exposed extremities. Lymph: No cervical LAD. No supraclavicular LAD. M/S: No cyanosis. No clubbing. No joint deformity. Neuro: Moaning, moving extremities.  Will not follow commands   Ext:   no edema    Medications:    Scheduled Meds:   magnesium sulfate  1,000 mg IntraVENous Once    apixaban  5 mg Oral BID    sodium chloride flush  5-40 mL IntraVENous 2 times per day    famotidine (PEPCID) injection  20 mg IntraVENous Daily    chlorhexidine  15 mL Mouth/Throat BID    lactobacillus  2 capsule Oral BID     cefepime  1,000 mg IntraVENous Q12H    mupirocin   Nasal BID    insulin lispro  0-6 Units SubCUTAneous Q4H       Continuous Infusions:   propofol Stopped (02/13/22 0710)    sodium chloride      lactated ringers 50 mL/hr at 02/13/22 1700    fentaNYL Stopped (02/12/22 0736)    dextrose      norepinephrine Stopped (02/13/22 1359)       PRN Meds:  sodium chloride, fentanNYL, sodium chloride flush, acetaminophen **OR** acetaminophen, glucose, dextrose, glucagon (rDNA), dextrose    Labs:  CBC:   Recent Labs 22   WBC 8.1 6.4 6.4   HGB 11.6* 11.4* 10.3*   HCT 36.6 35.3* 32.3*   MCV 84.0 83.5 83.7    159 140     BMP:   Recent Labs     22    135* 142   K 3.8 3.9 4.1    102 108   CO2 20* 20* 23   BUN 34* 30* 26*   CREATININE 1.4* 1.3* 1.1     LIVER PROFILE:   Recent Labs     22   AST 20 25 21   ALT 16 15 15   BILITOT 0.8 0.7 0.7   ALKPHOS 155* 167* 180*     PT/INR: No results for input(s): PROTIME, INR in the last 72 hours. APTT: No results for input(s): APTT in the last 72 hours. UA:No results for input(s): NITRITE, COLORU, PHUR, LABCAST, WBCUA, RBCUA, MUCUS, TRICHOMONAS, YEAST, BACTERIA, CLARITYU, SPECGRAV, LEUKOCYTESUR, UROBILINOGEN, BILIRUBINUR, BLOODU, GLUCOSEU, AMORPHOUS in the last 72 hours. Invalid input(s): Aleatha Brush  No results for input(s): PH, PCO2, PO2 in the last 72 hours. Films:  Chest imaging reports were reviewed and imaging was reviewed by me and showed Gastric tube and PICC in place.    Bilateral effusions, worse in the right lungs       AB.39/40/87    Cultures:  Negative to date     I reviewed the labs and images listed above    Assessment/Plan:    Acute Hypoxic/Hypercapnic Respiratory Failure with saturations less than 90% on room air and pH less than 7.35, extubated on    Septic Shock due to acute cystitis   o Day #5 of cefepime, will stop due to strep viridans in culture and cefepime may be causing encephalopathy    RHIANNA   Metabolic Encephalopathy, unclear  o Stop cefepime (may be contributing to encephalopathy)  o Check ammonia       Enteral feeding   DVT prophylaxis  Yandy Ramsay, DO  [facility]

## 2022-02-14 NOTE — PLAN OF CARE
Problem: MECHANICAL VENTILATION  Goal: Patient will maintain patent airway  Outcome: Met This Shift     Problem: SKIN INTEGRITY  Goal: Skin integrity is maintained or improved  Outcome: Ongoing     Problem: Nutrition  Goal: Optimal nutrition therapy  Outcome: Ongoing     Problem: Non-Violent Restraints  Goal: Removal from restraints as soon as assessed to be safe  Outcome: Ongoing

## 2022-02-14 NOTE — PLAN OF CARE
Problem: Nutrition  Goal: Optimal nutrition therapy  2/14/2022 1110 by Sam Huggins RD, LD  Outcome: Ongoing     Nutrition Problem #1: Inadequate oral intake  Intervention: Food and/or Nutrient Delivery: Continue NPO,Modify Tube Feeding  Nutritional Goals:  Tolerate TF at goal rate

## 2022-02-14 NOTE — PROGRESS NOTES
Reassessed pt's need for continued restraints. Restraints discontinued @2100. Pt does not raise arms or attempt to pull at lines, her legs are restless and she continues to move head back and forth. She appears to be having a hard time coughing mucus up. RR in the high 20's-30s, & SPO2 drops to 88% when she attempts to cough and returns to mid 90's, messaged Dr. Celia Lynn & contacted respiratory therapy.

## 2022-02-14 NOTE — PROGRESS NOTES
1600: Upon 1600 assessment, this RN yelled patient name and patient responded \"what\". This is the first time patient has responded verbally to name. When this RN asked additional orientation questions patient did not respond, did not respond to command either.    Electronically signed by Fransisco Fair RN on 2/14/2022 at 4:54 PM

## 2022-02-14 NOTE — PROGRESS NOTES
Pt. becoming increasingly agitated and restless and is now attempting to pull at lines. /65. SPO2 sating 89%-90% on RA. RR in 35s. Pt. now on 1L oxygen via nasal canula. Restraints applied to prevent pt. from pulling NG tube and PICC line.

## 2022-02-15 LAB
A/G RATIO: 1.1 (ref 1.1–2.2)
ALBUMIN SERPL-MCNC: 3.1 G/DL (ref 3.4–5)
ALP BLD-CCNC: 209 U/L (ref 40–129)
ALT SERPL-CCNC: 18 U/L (ref 10–40)
ANION GAP SERPL CALCULATED.3IONS-SCNC: 9 MMOL/L (ref 3–16)
AST SERPL-CCNC: 25 U/L (ref 15–37)
BASOPHILS ABSOLUTE: 0 K/UL (ref 0–0.2)
BASOPHILS RELATIVE PERCENT: 0.3 %
BILIRUB SERPL-MCNC: 0.6 MG/DL (ref 0–1)
BUN BLDV-MCNC: 28 MG/DL (ref 7–20)
CALCIUM SERPL-MCNC: 10 MG/DL (ref 8.3–10.6)
CHLORIDE BLD-SCNC: 108 MMOL/L (ref 99–110)
CO2: 24 MMOL/L (ref 21–32)
CREAT SERPL-MCNC: 1 MG/DL (ref 0.6–1.2)
EOSINOPHILS ABSOLUTE: 0.2 K/UL (ref 0–0.6)
EOSINOPHILS RELATIVE PERCENT: 2.2 %
GFR AFRICAN AMERICAN: >60
GFR NON-AFRICAN AMERICAN: 52
GLUCOSE BLD-MCNC: 102 MG/DL (ref 70–99)
GLUCOSE BLD-MCNC: 114 MG/DL (ref 70–99)
GLUCOSE BLD-MCNC: 121 MG/DL (ref 70–99)
GLUCOSE BLD-MCNC: 125 MG/DL (ref 70–99)
GLUCOSE BLD-MCNC: 138 MG/DL (ref 70–99)
GLUCOSE BLD-MCNC: 139 MG/DL (ref 70–99)
GLUCOSE BLD-MCNC: 140 MG/DL (ref 70–99)
HCT VFR BLD CALC: 32.4 % (ref 36–48)
HEMOGLOBIN: 10.5 G/DL (ref 12–16)
LYMPHOCYTES ABSOLUTE: 0.5 K/UL (ref 1–5.1)
LYMPHOCYTES RELATIVE PERCENT: 6.4 %
MAGNESIUM: 1.8 MG/DL (ref 1.8–2.4)
MCH RBC QN AUTO: 27.2 PG (ref 26–34)
MCHC RBC AUTO-ENTMCNC: 32.3 G/DL (ref 31–36)
MCV RBC AUTO: 84.2 FL (ref 80–100)
MONOCYTES ABSOLUTE: 1.1 K/UL (ref 0–1.3)
MONOCYTES RELATIVE PERCENT: 15.3 %
NEUTROPHILS ABSOLUTE: 5.6 K/UL (ref 1.7–7.7)
NEUTROPHILS RELATIVE PERCENT: 75.8 %
PDW BLD-RTO: 16.8 % (ref 12.4–15.4)
PERFORMED ON: ABNORMAL
PHOSPHORUS: 2.5 MG/DL (ref 2.5–4.9)
PLATELET # BLD: 142 K/UL (ref 135–450)
PMV BLD AUTO: 9.3 FL (ref 5–10.5)
POTASSIUM SERPL-SCNC: 4.4 MMOL/L (ref 3.5–5.1)
RBC # BLD: 3.84 M/UL (ref 4–5.2)
SODIUM BLD-SCNC: 141 MMOL/L (ref 136–145)
TOTAL PROTEIN: 6 G/DL (ref 6.4–8.2)
WBC # BLD: 7.4 K/UL (ref 4–11)

## 2022-02-15 PROCEDURE — 2700000000 HC OXYGEN THERAPY PER DAY

## 2022-02-15 PROCEDURE — 80053 COMPREHEN METABOLIC PANEL: CPT

## 2022-02-15 PROCEDURE — 99233 SBSQ HOSP IP/OBS HIGH 50: CPT | Performed by: INTERNAL MEDICINE

## 2022-02-15 PROCEDURE — 84100 ASSAY OF PHOSPHORUS: CPT

## 2022-02-15 PROCEDURE — 85025 COMPLETE CBC W/AUTO DIFF WBC: CPT

## 2022-02-15 PROCEDURE — 6370000000 HC RX 637 (ALT 250 FOR IP): Performed by: NURSE PRACTITIONER

## 2022-02-15 PROCEDURE — 2580000003 HC RX 258: Performed by: STUDENT IN AN ORGANIZED HEALTH CARE EDUCATION/TRAINING PROGRAM

## 2022-02-15 PROCEDURE — 94761 N-INVAS EAR/PLS OXIMETRY MLT: CPT

## 2022-02-15 PROCEDURE — 6370000000 HC RX 637 (ALT 250 FOR IP): Performed by: INTERNAL MEDICINE

## 2022-02-15 PROCEDURE — 2100000000 HC CCU R&B

## 2022-02-15 PROCEDURE — 2500000003 HC RX 250 WO HCPCS: Performed by: INTERNAL MEDICINE

## 2022-02-15 PROCEDURE — 2580000003 HC RX 258: Performed by: INTERNAL MEDICINE

## 2022-02-15 PROCEDURE — 83735 ASSAY OF MAGNESIUM: CPT

## 2022-02-15 RX ADMIN — APIXABAN 5 MG: 5 TABLET, FILM COATED ORAL at 21:00

## 2022-02-15 RX ADMIN — SODIUM CHLORIDE, POTASSIUM CHLORIDE, SODIUM LACTATE AND CALCIUM CHLORIDE: 600; 310; 30; 20 INJECTION, SOLUTION INTRAVENOUS at 09:42

## 2022-02-15 RX ADMIN — Medication 2 CAPSULE: at 17:03

## 2022-02-15 RX ADMIN — Medication 2 CAPSULE: at 08:48

## 2022-02-15 RX ADMIN — SODIUM CHLORIDE, PRESERVATIVE FREE 10 ML: 5 INJECTION INTRAVENOUS at 11:19

## 2022-02-15 RX ADMIN — FAMOTIDINE 20 MG: 10 INJECTION, SOLUTION INTRAVENOUS at 08:48

## 2022-02-15 RX ADMIN — APIXABAN 5 MG: 5 TABLET, FILM COATED ORAL at 08:48

## 2022-02-15 ASSESSMENT — PAIN SCALES - GENERAL
PAINLEVEL_OUTOF10: 4

## 2022-02-15 NOTE — PROGRESS NOTES
Pt restless. Followed commands with RUE at 1200 assessment for RN. Not following with other extremities. Pt does intermittently yell at nurse to \"stop it\" or \"leave me alone\" when oral care and chong care being preformed. Renate AKINS called to make aware of pts agitation. No new orders at this time.  VSS

## 2022-02-15 NOTE — PROGRESS NOTES
Pulmonary Progress Note    CC:  Follow up respiratory failure, UTI, septic shock    Subjective:  Discontinued cefepime due to continued encephalopathy. Might be slightly more responsive but for the most part will not say anything or follow commands. Ammonia WNL    Intake/Output Summary (Last 24 hours) at 2/15/2022 1342  Last data filed at 2/15/2022 1300  Gross per 24 hour   Intake 1563.14 ml   Output 1325 ml   Net 238.14 ml         PHYSICAL EXAM:  Blood pressure (!) 149/58, pulse 88, temperature 98.4 °F (36.9 °C), temperature source Axillary, resp. rate 30, height 5' 3\" (1.6 m), weight 215 lb 6.2 oz (97.7 kg), SpO2 96 %.'  Gen: Altered, opens eyes to name  Eyes: eyes more spontaneously awake   ENT: No discharge. Pharynx with NG  Neck: Trachea midline. No obvious mass. Resp: Scattered wheezes . CV: Regular rate. Regular rhythm. No murmur or rub. GI: Non-tender. Non-distended. No hernia. Skin: Warm, dry, normal texture and turgor. No nodule on exposed extremities. Lymph: No cervical LAD. No supraclavicular LAD. M/S: No cyanosis. No clubbing. No joint deformity.     Neuro: Moaning, eye opening to name, will verbalize at times   Ext:   no edema    Medications:    Scheduled Meds:   apixaban  5 mg Oral BID    sodium chloride flush  5-40 mL IntraVENous 2 times per day    famotidine (PEPCID) injection  20 mg IntraVENous Daily    lactobacillus  2 capsule Oral BID     insulin lispro  0-6 Units SubCUTAneous Q4H       Continuous Infusions:   sodium chloride      lactated ringers 50 mL/hr at 02/15/22 0942    dextrose         PRN Meds:  sodium chloride, fentanNYL, sodium chloride flush, acetaminophen **OR** acetaminophen, glucose, dextrose, glucagon (rDNA), dextrose    Labs:  CBC:   Recent Labs     02/13/22 0340 02/14/22  0422 02/15/22  0500   WBC 6.4 6.4 7.4   HGB 11.4* 10.3* 10.5*   HCT 35.3* 32.3* 32.4*   MCV 83.5 83.7 84.2    140 142     BMP:   Recent Labs     02/13/22  0340 02/14/22  0422 02/15/22  0500   * 142 141   K 3.9 4.1 4.4    108 108   CO2 20* 23 24   PHOS  --  2.9 2.5   BUN 30* 26* 28*   CREATININE 1.3* 1.1 1.0     LIVER PROFILE:   Recent Labs     22  0340 22  0422 02/15/22  0500   AST 25 21 25   ALT 15 15 18   BILITOT 0.7 0.7 0.6   ALKPHOS 167* 180* 209*     PT/INR: No results for input(s): PROTIME, INR in the last 72 hours. APTT: No results for input(s): APTT in the last 72 hours. UA:No results for input(s): NITRITE, COLORU, PHUR, LABCAST, WBCUA, RBCUA, MUCUS, TRICHOMONAS, YEAST, BACTERIA, CLARITYU, SPECGRAV, LEUKOCYTESUR, UROBILINOGEN, BILIRUBINUR, BLOODU, GLUCOSEU, AMORPHOUS in the last 72 hours. Invalid input(s): Nikhil Poon  No results for input(s): PH, PCO2, PO2 in the last 72 hours. Films:  Chest imaging reports were reviewed and imaging was reviewed by me and showed Gastric tube and PICC in place. Bilateral effusions, worse in the right lungs       AB.    Cultures:  Negative to date     I reviewed the labs and images listed above    Assessment/Plan:    Acute Hypoxic/Hypercapnic Respiratory Failure with saturations less than 90% on room air and pH less than 7.35, extubated on    Septic Shock due to acute cystitis   o Completed 5 days of cefepime   RHIANNA   Metabolic Encephalopathy, unclear.   Might be slightly better than yesterday  o Avoid centrally acting meds/meds with long half life  o Ammonia WNL      Enteral feeding to continue   DVT prophylaxis  Yandy Landeros DO  [phone]

## 2022-02-15 NOTE — PROGRESS NOTES
Family member Alis Anon called and updated. All questions answered. States he will be up later to visit.

## 2022-02-15 NOTE — PROGRESS NOTES
Hospitalist Progress Note      PCP: Anderson Chan APRN - CNP    Date of Admission: 2/9/2022      Subjective: Pt S/E. She is more awake this morning, now more herself, pushing away treatment and calling out. Still not really following commands however. On 1L nc. Medications:  Reviewed    Infusion Medications    sodium chloride      lactated ringers 50 mL/hr at 02/13/22 1700    dextrose       Scheduled Medications    apixaban  5 mg Oral BID    sodium chloride flush  5-40 mL IntraVENous 2 times per day    famotidine (PEPCID) injection  20 mg IntraVENous Daily    lactobacillus  2 capsule Oral BID WC    insulin lispro  0-6 Units SubCUTAneous Q4H     PRN Meds: sodium chloride, fentanNYL, sodium chloride flush, acetaminophen **OR** acetaminophen, glucose, dextrose, glucagon (rDNA), dextrose      Intake/Output Summary (Last 24 hours) at 2/15/2022 0816  Last data filed at 2/15/2022 0894  Gross per 24 hour   Intake 2456.14 ml   Output 1265 ml   Net 1191.14 ml       Physical Exam Performed:    BP (!) 115/47   Pulse 73   Temp 99 °F (37.2 °C) (Axillary)   Resp 19   Ht 5' 3\" (1.6 m)   Wt 215 lb 6.2 oz (97.7 kg)   SpO2 97%   BMI 38.15 kg/m²     General appearance: No acute distress, appears very uncomfortable  HEENT: ngt in place, sclera anicteric  Respiratory:  Normal respiratory effort. Clear to auscultation, bilaterally without Rales/Wheezes/Rhonchi. Cardiovascular: Regular rate and rhythm with normal S1/S2 without murmurs, rubs or gallops. Abdomen: Soft  Musculoskeletal: No clubbing, cyanosis   Skin: Skin color, texture, turgor normal.  No rashes or lesions. Neurologic:  Moving her extremities spontaneously.   No focal deficits  Psychiatric: Awake, confused and agitated  Capillary Refill: Brisk,3 seconds, normal   Peripheral Pulses: +2 palpable, equal bilaterally       Labs:   Recent Labs     02/13/22  0340 02/14/22  0422 02/15/22  0500   WBC 6.4 6.4 7.4   HGB 11.4* 10.3* 10.5*   HCT 35.3* 32.3* 32.4*    140 142     Recent Labs     02/13/22  0340 02/14/22  0422 02/15/22  0500   * 142 141   K 3.9 4.1 4.4    108 108   CO2 20* 23 24   BUN 30* 26* 28*   CREATININE 1.3* 1.1 1.0   CALCIUM 8.8 9.2 10.0   PHOS  --  2.9 2.5     Recent Labs     02/13/22  0340 02/14/22  0422 02/15/22  0500   AST 25 21 25   ALT 15 15 18   BILITOT 0.7 0.7 0.6   ALKPHOS 167* 180* 209*     No results for input(s): INR in the last 72 hours. No results for input(s): [employer] in the last 72 hours. Urinalysis:      Lab Results   Component Value Date    NITRU Negative 02/09/2022    WBCUA 99 02/09/2022    BACTERIA 1+ 02/09/2022    RBCUA  02/09/2022    BLOODU LARGE 02/09/2022    SPECGRAV 1.012 02/09/2022    GLUCOSEU 100 02/09/2022       Radiology:  XR CHEST PORTABLE   Final Result   Enteric feeding tube projects as transpyloric, at least to the 2nd portion of   the duodenum. Stable bibasilar opacities, likely pleural effusions with atelectasis or   airspace disease. XR CHEST PORTABLE   Final Result   Spectrum of findings would favor CHF and developing pulmonary edema or ARDS. An underlying viral pattern of pneumonia may be considered. MRI BRAIN WO CONTRAST   Final Result   Chronic microvascular disease and cerebral atrophy with remote right parietal   lobe infarct. Prominence of the ventricular system and correlation with symptomatology for   normal pressure hydrocephalus is needed. Fluid in the left mastoid air cells. Mild chronic sinusitis. RECOMMENDATIONS:   Unavailable         XR CHEST PORTABLE   Final Result   Improved aeration of the lungs. There is decreased pleural-parenchymal   disease         XR CHEST PORTABLE   Final Result   Appropriate positioning of PICC line. Slightly improved aeration of the   lungs. CT HEAD WO CONTRAST   Final Result   1. No acute intracranial abnormality. 2. Moderate chronic white matter microvascular ischemic changes. 3. 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 XR CHEST PORTABLE   Final Result   Interval placement of a nasogastric tube and endotracheal tubes which are in   appropriate position. Mild edema with small bilateral pleural effusions and adjacent bibasilar   airspace disease, likely atelectasis in the absence of clinical signs of   pneumonia. XR CHEST PORTABLE   Final Result   Moderate pulmonary vascular congestive change. Cardiomegaly. Small-to-moderate right pleural effusion. CT HEAD WO CONTRAST   Final Result   No acute intracranial abnormality. Remote right parietal craniotomy and encephalomalacia in the right   temporoparietal region, similar in appearance. Hospital course:  A 79 yo female admitted with agitation, possibly increased confusion over her baseline. Her Son had sent patient for evaluation for possible UTI or possible concern for congestive heart failure. Patient apparently had a witnessed seizure in the ED and then became hypoxic and went into bradycardia, progressed to asystole. Pulse was noted to be lost but was never shocked, was resuscitated after about 1 to 2 minutes of CPR and 1 round of epi. she was intubated in the ED. Since then she has been treated for a uti with cefepime, and an isacc with ivf. Neurology was consulted as well. Per dc summary, one year ago. \"An 81 Yo female admitted with a uti and hypoactive delirium.   This has been a recurrent cycle for Mrs Nathanael Daniel. She was recently discharged her on Feb 16 and very similar admission in Dec as well. she has end stage Alzheimer's with UTIs unfortunately just adding to her mental status limitations. During her last admission her depakote was stopped due to oversedation and treated her with nightly seroquel with very good response.  She spends most of her days sleeping - not eating or drinking - then develops UTI due to poor urine output and returns to the hospital.     Placement was discussed with son at her last admission - he refused. He also appealed discharge last time, though I am still not sure what his true expectation was. He thought she will become \"normal\", though I am not sure what he meant by that with severe and progressive baseline underlying dementia. At the very least he is unrealistic in his expectation. \"       Assessment/Plan:    Active Hospital Problems    Diagnosis     Acute respiratory failure with hypoxia and hypercapnia (HCC) [J96.01, J96.02]     Acute cystitis without hematuria [N30.00]     RHIANNA (acute kidney injury) ([address] [N17.9]     Cardiopulmonary arrest with successful resuscitation ([address] [I46.9]     Cardiac arrest ([address] [I46.9]     Seizure ([address] [R56.9]     CHF (congestive heart failure) ([address] [I50.9]     Atrial fibrillation ([address] [I48.91]     AMS (altered mental status) [R41.82]     Chronic atrial fibrillation ([address] [I48.20]     Septic shock ([address] [A41.9, Q76.99]     Metabolic encephalopathy [W29.06]      Cardiac arrest  - no clear etiology, happened after seizure  - per cardiology, apparent no cardiac cause.     Seizure  - several factors were present that could lower the seizure threshold: uti, sepsis, on concomitant encephalomalacia   - mri without acute findings   - neurology consulted  - EEG without epilptiform activity, slowing    Acute encephalopathy - slowly improving  - toxic vs metabolic, due to infection  - head CT negative  - cefepime stopped  - TSH, NH3, VBG  - treat associated conditions  - avoid sedating meds as able  - supportive care    UTI with likely septic shock  - completed cefepime  - urine culture negative    Metabolic acidosis  - likely due to shock, treated with ivf  - improved    Acute hypoxic, hypercarbic respiratory failure - extubated 2/13 now on 1L     Chronic conditions - continue home medications unless otherwise stated  A. fib       Diet: Diet NPO  ADULT TUBE FEEDING; Nasoenteric; Standard with Fiber; Continuous; 25; Yes; 25; Q 4 hours; 50; 30; Q 4 hours  Code Status: Full Code  Dispo: very poor. I have had code status discussion with her son in the past (see admission notes 4/2021) and she was made dnr-cca, but he was still hoping she would recover. Her status has only worsened.  I will consult palliative medicine now       Celia Alex, DO

## 2022-02-16 LAB
A/G RATIO: 1.1 (ref 1.1–2.2)
ALBUMIN SERPL-MCNC: 2.8 G/DL (ref 3.4–5)
ALP BLD-CCNC: 200 U/L (ref 40–129)
ALT SERPL-CCNC: 18 U/L (ref 10–40)
ANION GAP SERPL CALCULATED.3IONS-SCNC: 11 MMOL/L (ref 3–16)
AST SERPL-CCNC: 27 U/L (ref 15–37)
BASOPHILS ABSOLUTE: 0 K/UL (ref 0–0.2)
BASOPHILS RELATIVE PERCENT: 0.4 %
BILIRUB SERPL-MCNC: 0.6 MG/DL (ref 0–1)
BUN BLDV-MCNC: 23 MG/DL (ref 7–20)
CALCIUM SERPL-MCNC: 9.1 MG/DL (ref 8.3–10.6)
CHLORIDE BLD-SCNC: 109 MMOL/L (ref 99–110)
CO2: 24 MMOL/L (ref 21–32)
CREAT SERPL-MCNC: 0.9 MG/DL (ref 0.6–1.2)
EOSINOPHILS ABSOLUTE: 0.1 K/UL (ref 0–0.6)
EOSINOPHILS RELATIVE PERCENT: 1.9 %
GFR AFRICAN AMERICAN: >60
GFR NON-AFRICAN AMERICAN: 59
GLUCOSE BLD-MCNC: 101 MG/DL (ref 70–99)
GLUCOSE BLD-MCNC: 110 MG/DL (ref 70–99)
GLUCOSE BLD-MCNC: 119 MG/DL (ref 70–99)
GLUCOSE BLD-MCNC: 135 MG/DL (ref 70–99)
GLUCOSE BLD-MCNC: 149 MG/DL (ref 70–99)
GLUCOSE BLD-MCNC: 89 MG/DL (ref 70–99)
HCT VFR BLD CALC: 30.3 % (ref 36–48)
HEMOGLOBIN: 9.7 G/DL (ref 12–16)
LYMPHOCYTES ABSOLUTE: 0.5 K/UL (ref 1–5.1)
LYMPHOCYTES RELATIVE PERCENT: 8 %
MAGNESIUM: 1.6 MG/DL (ref 1.8–2.4)
MCH RBC QN AUTO: 27 PG (ref 26–34)
MCHC RBC AUTO-ENTMCNC: 32 G/DL (ref 31–36)
MCV RBC AUTO: 84.4 FL (ref 80–100)
MONOCYTES ABSOLUTE: 1 K/UL (ref 0–1.3)
MONOCYTES RELATIVE PERCENT: 16.5 %
NEUTROPHILS ABSOLUTE: 4.6 K/UL (ref 1.7–7.7)
NEUTROPHILS RELATIVE PERCENT: 73.2 %
PDW BLD-RTO: 16.2 % (ref 12.4–15.4)
PERFORMED ON: ABNORMAL
PERFORMED ON: NORMAL
PHOSPHORUS: 2.1 MG/DL (ref 2.5–4.9)
PLATELET # BLD: 129 K/UL (ref 135–450)
PMV BLD AUTO: 9.2 FL (ref 5–10.5)
POTASSIUM SERPL-SCNC: 4.4 MMOL/L (ref 3.5–5.1)
RBC # BLD: 3.59 M/UL (ref 4–5.2)
SODIUM BLD-SCNC: 144 MMOL/L (ref 136–145)
TOTAL PROTEIN: 5.3 G/DL (ref 6.4–8.2)
WBC # BLD: 6.2 K/UL (ref 4–11)

## 2022-02-16 PROCEDURE — 6370000000 HC RX 637 (ALT 250 FOR IP): Performed by: STUDENT IN AN ORGANIZED HEALTH CARE EDUCATION/TRAINING PROGRAM

## 2022-02-16 PROCEDURE — 6370000000 HC RX 637 (ALT 250 FOR IP): Performed by: INTERNAL MEDICINE

## 2022-02-16 PROCEDURE — 2500000003 HC RX 250 WO HCPCS: Performed by: FAMILY MEDICINE

## 2022-02-16 PROCEDURE — 83735 ASSAY OF MAGNESIUM: CPT

## 2022-02-16 PROCEDURE — 2100000000 HC CCU R&B

## 2022-02-16 PROCEDURE — 2700000000 HC OXYGEN THERAPY PER DAY

## 2022-02-16 PROCEDURE — 85025 COMPLETE CBC W/AUTO DIFF WBC: CPT

## 2022-02-16 PROCEDURE — 2500000003 HC RX 250 WO HCPCS: Performed by: INTERNAL MEDICINE

## 2022-02-16 PROCEDURE — 84100 ASSAY OF PHOSPHORUS: CPT

## 2022-02-16 PROCEDURE — 2580000003 HC RX 258: Performed by: FAMILY MEDICINE

## 2022-02-16 PROCEDURE — 6360000002 HC RX W HCPCS: Performed by: FAMILY MEDICINE

## 2022-02-16 PROCEDURE — 94761 N-INVAS EAR/PLS OXIMETRY MLT: CPT

## 2022-02-16 PROCEDURE — 2580000003 HC RX 258: Performed by: STUDENT IN AN ORGANIZED HEALTH CARE EDUCATION/TRAINING PROGRAM

## 2022-02-16 PROCEDURE — 6370000000 HC RX 637 (ALT 250 FOR IP): Performed by: NURSE PRACTITIONER

## 2022-02-16 PROCEDURE — 80053 COMPREHEN METABOLIC PANEL: CPT

## 2022-02-16 RX ORDER — MAGNESIUM SULFATE IN WATER 40 MG/ML
2000 INJECTION, SOLUTION INTRAVENOUS ONCE
Status: COMPLETED | OUTPATIENT
Start: 2022-02-16 | End: 2022-02-16

## 2022-02-16 RX ORDER — FUROSEMIDE 10 MG/ML
20 INJECTION INTRAMUSCULAR; INTRAVENOUS ONCE
Status: COMPLETED | OUTPATIENT
Start: 2022-02-16 | End: 2022-02-16

## 2022-02-16 RX ADMIN — FAMOTIDINE 20 MG: 10 INJECTION, SOLUTION INTRAVENOUS at 07:56

## 2022-02-16 RX ADMIN — Medication 2 CAPSULE: at 07:56

## 2022-02-16 RX ADMIN — SODIUM CHLORIDE, PRESERVATIVE FREE 10 ML: 5 INJECTION INTRAVENOUS at 21:28

## 2022-02-16 RX ADMIN — FUROSEMIDE 20 MG: 10 INJECTION, SOLUTION INTRAMUSCULAR; INTRAVENOUS at 16:19

## 2022-02-16 RX ADMIN — SODIUM CHLORIDE, PRESERVATIVE FREE 10 ML: 5 INJECTION INTRAVENOUS at 21:27

## 2022-02-16 RX ADMIN — SODIUM CHLORIDE, PRESERVATIVE FREE 10 ML: 5 INJECTION INTRAVENOUS at 07:56

## 2022-02-16 RX ADMIN — MAGNESIUM SULFATE HEPTAHYDRATE 2000 MG: 40 INJECTION, SOLUTION INTRAVENOUS at 08:29

## 2022-02-16 RX ADMIN — INSULIN LISPRO 1 UNITS: 100 INJECTION, SOLUTION INTRAVENOUS; SUBCUTANEOUS at 08:09

## 2022-02-16 RX ADMIN — SODIUM PHOSPHATE, MONOBASIC, MONOHYDRATE AND SODIUM PHOSPHATE, DIBASIC, ANHYDROUS 15.54 MMOL: 276; 142 INJECTION, SOLUTION INTRAVENOUS at 10:34

## 2022-02-16 RX ADMIN — APIXABAN 5 MG: 5 TABLET, FILM COATED ORAL at 08:09

## 2022-02-16 ASSESSMENT — PAIN SCALES - GENERAL
PAINLEVEL_OUTOF10: 4
PAINLEVEL_OUTOF10: 1
PAINLEVEL_OUTOF10: 0
PAINLEVEL_OUTOF10: 4
PAINLEVEL_OUTOF10: 0
PAINLEVEL_OUTOF10: 0

## 2022-02-16 NOTE — CARE COORDINATION
Chart Reviewed. Palliative Care Consulted. Goal has been to return home with two sons who provide 24 hour hands on care for her. If home care is ordered, they want to use [facility]. Following for DC needs.   [facility]     Case Management   [phone]    2/16/2022  3:34 PM

## 2022-02-16 NOTE — PROGRESS NOTES
Assumed care of pt at 0730 from [address] [place] [address]. Pt repositioned. Pt is very hard ondf hearing but was able to follow some commands after several prompts. Pt moving all extremities. Pt audibly said \"I want to go home\" and \"do no clean my mouth\" to this RN. No family at bedside this am. Pt tolerating TF's. BL wrist restraints in place as pt thrashes arms around when restraints are loosened for repositioning and she may incidentally remove tubes and lines. Pt noted to have a strong moist cough. O2 titrated down to 1 L NC and sats > than 96%. Will continue to monitor pt closely.

## 2022-02-16 NOTE — PROGRESS NOTES
Hospitalist Progress Note      PCP: Todd Gama, APRN - CNP    Date of Admission: 2/9/2022      Subjective: Pt S/E. She is agitated today, pushing the RN away and refusing treatments. She is still very confused and not following commands. However, this is more her normal, but not quite back to baseline. On room air. Medications:  Reviewed    Infusion Medications    sodium chloride      lactated ringers 50 mL/hr at 02/15/22 0942    dextrose       Scheduled Medications    magnesium sulfate  2,000 mg IntraVENous Once    apixaban  5 mg Oral BID    sodium chloride flush  5-40 mL IntraVENous 2 times per day    famotidine (PEPCID) injection  20 mg IntraVENous Daily    lactobacillus  2 capsule Oral BID WC    insulin lispro  0-6 Units SubCUTAneous Q4H     PRN Meds: sodium phosphate IVPB **OR** sodium phosphate IVPB, sodium chloride, sodium chloride flush, acetaminophen **OR** acetaminophen, glucose, dextrose, glucagon (rDNA), dextrose      Intake/Output Summary (Last 24 hours) at 2/16/2022 0811  Last data filed at 2/16/2022 0600  Gross per 24 hour   Intake 2887.97 ml   Output 985 ml   Net 1902.97 ml       Physical Exam Performed:    /72   Pulse 84   Temp 98.9 °F (37.2 °C) (Axillary)   Resp (!) 33   Ht 5' 3\" (1.6 m)   Wt 214 lb 1.1 oz (97.1 kg)   SpO2 97%   BMI 37.92 kg/m²     General appearance: No acute distress, appears uncomfortable  HEENT: ngt in place, sclera anicteric  Respiratory: + tachypnea, Clear to auscultation, bilaterally without Rales/Wheezes/Rhonchi. Cardiovascular: Regular rate and rhythm with normal S1/S2 without murmurs, rubs or gallops. Abdomen: Soft  Musculoskeletal: No clubbing, cyanosis   Skin: Skin color, texture, turgor normal.    Neurologic:  Moving her extremities spontaneously.   No focal deficits  Psychiatric: Awake, confused and agitated  Capillary Refill: Brisk,3 seconds, normal   Peripheral Pulses: +2 palpable, equal bilaterally       Labs:   Recent Labs 02/14/22 0422 02/15/22  0500 02/16/22  0535   WBC 6.4 7.4 6.2   HGB 10.3* 10.5* 9.7*   HCT 32.3* 32.4* 30.3*    142 129*     Recent Labs     02/14/22 0422 02/15/22  0500 02/16/22  0535    141 144   K 4.1 4.4 4.4    108 109   CO2 23 24 24   BUN 26* 28* 23*   CREATININE 1.1 1.0 0.9   CALCIUM 9.2 10.0 9.1   PHOS 2.9 2.5 2.1*     Recent Labs     02/14/22  0422 02/15/22  0500 02/16/22  0535   AST 21 25 27   ALT 15 18 18   BILITOT 0.7 0.6 0.6   ALKPHOS 180* 209* 200*     No results for input(s): INR in the last 72 hours. No results for input(s): [employer] in the last 72 hours. Urinalysis:      Lab Results   Component Value Date    NITRU Negative 02/09/2022    WBCUA 99 02/09/2022    BACTERIA 1+ 02/09/2022    RBCUA  02/09/2022    BLOODU LARGE 02/09/2022    SPECGRAV 1.012 02/09/2022    GLUCOSEU 100 02/09/2022       Radiology:  XR CHEST PORTABLE   Final Result   Enteric feeding tube projects as transpyloric, at least to the 2nd portion of   the duodenum. Stable bibasilar opacities, likely pleural effusions with atelectasis or   airspace disease. XR CHEST PORTABLE   Final Result   Spectrum of findings would favor CHF and developing pulmonary edema or ARDS. An underlying viral pattern of pneumonia may be considered. MRI BRAIN WO CONTRAST   Final Result   Chronic microvascular disease and cerebral atrophy with remote right parietal   lobe infarct. Prominence of the ventricular system and correlation with symptomatology for   normal pressure hydrocephalus is needed. Fluid in the left mastoid air cells. Mild chronic sinusitis. RECOMMENDATIONS:   Unavailable         XR CHEST PORTABLE   Final Result   Improved aeration of the lungs. There is decreased pleural-parenchymal   disease         XR CHEST PORTABLE   Final Result   Appropriate positioning of PICC line. Slightly improved aeration of the   lungs. CT HEAD WO CONTRAST   Final Result   1. No acute intracranial abnormality. 2. Moderate chronic white matter microvascular ischemic changes. 3. 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 XR CHEST PORTABLE   Final Result   Interval placement of a nasogastric tube and endotracheal tubes which are in   appropriate position. Mild edema with small bilateral pleural effusions and adjacent bibasilar   airspace disease, likely atelectasis in the absence of clinical signs of   pneumonia. XR CHEST PORTABLE   Final Result   Moderate pulmonary vascular congestive change. Cardiomegaly. Small-to-moderate right pleural effusion. CT HEAD WO CONTRAST   Final Result   No acute intracranial abnormality. Remote right parietal craniotomy and encephalomalacia in the right   temporoparietal region, similar in appearance. Hospital course:  A 81 yo female admitted with agitation, possibly increased confusion over her baseline. Her Son had sent patient for evaluation for possible UTI or possible concern for congestive heart failure. Patient apparently had a witnessed seizure in the ED and then became hypoxic and went into bradycardia, progressed to asystole. Pulse was noted to be lost but was never shocked, was resuscitated after about 1 to 2 minutes of CPR and 1 round of epi. she was intubated in the ED. Since then she has been treated for a uti with cefepime, and an isacc with ivf. Neurology was consulted as well. Per dc summary, one year ago. \"An 81 Yo female admitted with a uti and hypoactive delirium.   This has been a recurrent cycle for Mrs Meenu Wilson. She was recently discharged her on Feb 16 and very similar admission in Dec as well. she has end stage Alzheimer's with UTIs unfortunately just adding to her mental status limitations. During her last admission her depakote was stopped due to oversedation and treated her with nightly seroquel with very good response.  She spends most of her days sleeping - not eating or drinking - then develops UTI due to poor urine output and returns to the hospital.     Placement was discussed with son at her last admission - he refused. He also appealed discharge last time, though I am still not sure what his true expectation was. He thought she will become \"normal\", though I am not sure what he meant by that with severe and progressive baseline underlying dementia. At the very least he is unrealistic in his expectation. \"       Assessment/Plan:    Active Hospital Problems    Diagnosis     Acute respiratory failure with hypoxia and hypercapnia (HCC) [J96.01, J96.02]     Acute cystitis without hematuria [N30.00]     RHIANNA (acute kidney injury) ([address] [N17.9]     Cardiopulmonary arrest with successful resuscitation ([address] [I46.9]     Cardiac arrest ([address] [I46.9]     Seizure ([address] [R56.9]     CHF (congestive heart failure) ([address] [I50.9]     Atrial fibrillation ([address] [I48.91]     AMS (altered mental status) [R41.82]     Chronic atrial fibrillation ([address] [I48.20]     Septic shock ([address] [A41.9, W36.48]     Metabolic encephalopathy [Y78.84]      Acute encephalopathy - slowly improving, but now with multifactorial delirium due to acute medical issues, s/p cardiac arrest, sedation, on baseline dementia - will likely have a new baseline, which was already poor mental status and functioning   - toxic vs metabolic, due to infection  - head CT negative  - cefepime stopped  - nh3 normal  - treat associated conditions  - avoid sedating meds as able  - supportive care    Cardiac arrest  - no clear etiology, happened after seizure  - per cardiology, apparent no cardiac cause.     Seizure  - several factors were present that could lower the seizure threshold: uti, sepsis, on concomitant encephalomalacia   - mri without acute findings   - neurology consulted  - EEG without epilptiform activity, slowing    UTI with likely septic shock  - completed cefepime  - urine culture negative    Metabolic acidosis  - likely due to shock, treated with ivf  - improved    Acute hypoxic, hypercarbic respiratory failure - extubated 2/13 now on 1L - room air    Chronic conditions - continue home medications unless otherwise stated  A. fib       Diet: Diet NPO  ADULT TUBE FEEDING; Nasoenteric; Standard with Fiber; Continuous; 25; Yes; 25; Q 4 hours; 50; 30; Q 4 hours  Code Status: Full Code  Dispo: very poor. I have had code status discussion with her son in the past (see admission notes 4/2021) and she was made dnr-cca, but he was still hoping she would recover. Her status has only worsened.  I will consult palliative medicine now       Celia Alex, DO

## 2022-02-16 NOTE — PROGRESS NOTES
Pt's son Everet Curly at bedside vocalizing concerns about swelling and pink/cherry tinge urine. He inquired if we had restarted her Lasix that she takes at home ever other day. Farzana Malone also at bedside. All of Norberto's questions and concerns addressed. Dr. Josiane Major also updated and a one time order of Lasix 20 mg IVP given. Okay to not replace NGT today per Dr Josiane Major as pt removed tube during an episode of agitation.

## 2022-02-16 NOTE — PLAN OF CARE
Problem: SKIN INTEGRITY  Goal: Skin integrity is maintained or improved  Outcome: Ongoing     Problem: Nutrition  Goal: Optimal nutrition therapy  Outcome: Ongoing     Problem: Non-Violent Restraints  Goal: Removal from restraints as soon as assessed to be safe  Outcome: Ongoing  Goal: No harm/injury to patient while restraints in use  Outcome: Ongoing  Goal: Patient's dignity will be maintained  Outcome: Ongoing       Problem: Pain:  Goal: Pain level will decrease  Description: Pain level will decrease  Outcome: Ongoing  Goal: Control of acute pain  Description: Control of acute pain  Outcome: Ongoing  Goal: Control of chronic pain  Description: Control of chronic pain  Outcome: Ongoing     Problem: Falls - Risk of:  Goal: Will remain free from falls  Description: Will remain free from falls  Outcome: Ongoing  Goal: Absence of physical injury  Description: Absence of physical injury  Outcome: Ongoing

## 2022-02-17 LAB
A/G RATIO: 1.1 (ref 1.1–2.2)
ALBUMIN SERPL-MCNC: 2.8 G/DL (ref 3.4–5)
ALP BLD-CCNC: 183 U/L (ref 40–129)
ALT SERPL-CCNC: 16 U/L (ref 10–40)
ANION GAP SERPL CALCULATED.3IONS-SCNC: 12 MMOL/L (ref 3–16)
AST SERPL-CCNC: 23 U/L (ref 15–37)
BASOPHILS ABSOLUTE: 0.1 K/UL (ref 0–0.2)
BASOPHILS RELATIVE PERCENT: 0.7 %
BILIRUB SERPL-MCNC: 0.8 MG/DL (ref 0–1)
BUN BLDV-MCNC: 23 MG/DL (ref 7–20)
CALCIUM SERPL-MCNC: 9.3 MG/DL (ref 8.3–10.6)
CHLORIDE BLD-SCNC: 105 MMOL/L (ref 99–110)
CO2: 25 MMOL/L (ref 21–32)
CREAT SERPL-MCNC: 1 MG/DL (ref 0.6–1.2)
EOSINOPHILS ABSOLUTE: 0.2 K/UL (ref 0–0.6)
EOSINOPHILS RELATIVE PERCENT: 2.3 %
GFR AFRICAN AMERICAN: >60
GFR NON-AFRICAN AMERICAN: 52
GLUCOSE BLD-MCNC: 73 MG/DL (ref 70–99)
GLUCOSE BLD-MCNC: 74 MG/DL (ref 70–99)
GLUCOSE BLD-MCNC: 74 MG/DL (ref 70–99)
GLUCOSE BLD-MCNC: 81 MG/DL (ref 70–99)
GLUCOSE BLD-MCNC: 82 MG/DL (ref 70–99)
GLUCOSE BLD-MCNC: 82 MG/DL (ref 70–99)
GLUCOSE BLD-MCNC: 90 MG/DL (ref 70–99)
HCT VFR BLD CALC: 31.4 % (ref 36–48)
HEMOGLOBIN: 9.7 G/DL (ref 12–16)
LYMPHOCYTES ABSOLUTE: 0.8 K/UL (ref 1–5.1)
LYMPHOCYTES RELATIVE PERCENT: 10.4 %
MAGNESIUM: 1.6 MG/DL (ref 1.8–2.4)
MCH RBC QN AUTO: 26.2 PG (ref 26–34)
MCHC RBC AUTO-ENTMCNC: 31 G/DL (ref 31–36)
MCV RBC AUTO: 84.6 FL (ref 80–100)
MONOCYTES ABSOLUTE: 1.3 K/UL (ref 0–1.3)
MONOCYTES RELATIVE PERCENT: 17.1 %
NEUTROPHILS ABSOLUTE: 5.4 K/UL (ref 1.7–7.7)
NEUTROPHILS RELATIVE PERCENT: 69.5 %
PDW BLD-RTO: 16.8 % (ref 12.4–15.4)
PERFORMED ON: NORMAL
PHOSPHORUS: 2.7 MG/DL (ref 2.5–4.9)
PLATELET # BLD: 160 K/UL (ref 135–450)
PMV BLD AUTO: 9.2 FL (ref 5–10.5)
POTASSIUM SERPL-SCNC: 4.2 MMOL/L (ref 3.5–5.1)
RBC # BLD: 3.72 M/UL (ref 4–5.2)
SODIUM BLD-SCNC: 142 MMOL/L (ref 136–145)
TOTAL PROTEIN: 5.4 G/DL (ref 6.4–8.2)
WBC # BLD: 7.8 K/UL (ref 4–11)

## 2022-02-17 PROCEDURE — 94761 N-INVAS EAR/PLS OXIMETRY MLT: CPT

## 2022-02-17 PROCEDURE — 85025 COMPLETE CBC W/AUTO DIFF WBC: CPT

## 2022-02-17 PROCEDURE — 80053 COMPREHEN METABOLIC PANEL: CPT

## 2022-02-17 PROCEDURE — 97162 PT EVAL MOD COMPLEX 30 MIN: CPT

## 2022-02-17 PROCEDURE — 2500000003 HC RX 250 WO HCPCS: Performed by: INTERNAL MEDICINE

## 2022-02-17 PROCEDURE — 97530 THERAPEUTIC ACTIVITIES: CPT

## 2022-02-17 PROCEDURE — 2580000003 HC RX 258: Performed by: STUDENT IN AN ORGANIZED HEALTH CARE EDUCATION/TRAINING PROGRAM

## 2022-02-17 PROCEDURE — 36592 COLLECT BLOOD FROM PICC: CPT

## 2022-02-17 PROCEDURE — 84100 ASSAY OF PHOSPHORUS: CPT

## 2022-02-17 PROCEDURE — 2100000000 HC CCU R&B

## 2022-02-17 PROCEDURE — 6360000002 HC RX W HCPCS: Performed by: FAMILY MEDICINE

## 2022-02-17 PROCEDURE — 2700000000 HC OXYGEN THERAPY PER DAY

## 2022-02-17 PROCEDURE — 83735 ASSAY OF MAGNESIUM: CPT

## 2022-02-17 PROCEDURE — 97167 OT EVAL HIGH COMPLEX 60 MIN: CPT

## 2022-02-17 RX ORDER — METOPROLOL SUCCINATE 25 MG/1
25 TABLET, EXTENDED RELEASE ORAL DAILY
Status: DISCONTINUED | OUTPATIENT
Start: 2022-02-17 | End: 2022-02-22 | Stop reason: HOSPADM

## 2022-02-17 RX ORDER — FUROSEMIDE 10 MG/ML
40 INJECTION INTRAMUSCULAR; INTRAVENOUS DAILY
Status: DISCONTINUED | OUTPATIENT
Start: 2022-02-17 | End: 2022-02-20

## 2022-02-17 RX ORDER — MAGNESIUM SULFATE IN WATER 40 MG/ML
2000 INJECTION, SOLUTION INTRAVENOUS ONCE
Status: COMPLETED | OUTPATIENT
Start: 2022-02-17 | End: 2022-02-17

## 2022-02-17 RX ADMIN — FUROSEMIDE 40 MG: 10 INJECTION, SOLUTION INTRAMUSCULAR; INTRAVENOUS at 09:58

## 2022-02-17 RX ADMIN — SODIUM CHLORIDE, PRESERVATIVE FREE 10 ML: 5 INJECTION INTRAVENOUS at 20:05

## 2022-02-17 RX ADMIN — SODIUM CHLORIDE, PRESERVATIVE FREE 10 ML: 5 INJECTION INTRAVENOUS at 20:04

## 2022-02-17 RX ADMIN — SODIUM CHLORIDE, PRESERVATIVE FREE 10 ML: 5 INJECTION INTRAVENOUS at 04:14

## 2022-02-17 RX ADMIN — MAGNESIUM SULFATE HEPTAHYDRATE 2000 MG: 40 INJECTION, SOLUTION INTRAVENOUS at 08:21

## 2022-02-17 RX ADMIN — SODIUM CHLORIDE, PRESERVATIVE FREE 10 ML: 5 INJECTION INTRAVENOUS at 08:52

## 2022-02-17 RX ADMIN — FAMOTIDINE 20 MG: 10 INJECTION, SOLUTION INTRAVENOUS at 08:22

## 2022-02-17 ASSESSMENT — PAIN SCALES - GENERAL
PAINLEVEL_OUTOF10: 0
PAINLEVEL_OUTOF10: 1
PAINLEVEL_OUTOF10: 0
PAINLEVEL_OUTOF10: 2
PAINLEVEL_OUTOF10: 2
PAINLEVEL_OUTOF10: 0
PAINLEVEL_OUTOF10: 2
PAINLEVEL_OUTOF10: 2
PAINLEVEL_OUTOF10: 0
PAINLEVEL_OUTOF10: 0

## 2022-02-17 NOTE — PROGRESS NOTES
Late entry due to patient care. Bedside shift hand-off done w/ off-going DANIA Salcedo. has her eyes closed, but easily arousable w/ verbal stimuli, she is tachypneic w/ RR on the high 20s to the 30s (not new from previous shifts), on room air, Atrial Fibrillation on the monitor, has a BRANDI PICC harika, and a chong catheter.     Electronically signed by Mylene Garcia RN on 2/16/2022 at 10:44 PM

## 2022-02-17 NOTE — PROGRESS NOTES
Hospitalist Progress Note      PCP: TASHI Aburto - CNP    Date of Admission: 2/9/2022      Subjective: Pt S/E. Not much change in mental status today. She is on 1L nc. She still gets agitated very easily. Medications:  Reviewed    Infusion Medications    sodium chloride      dextrose       Scheduled Medications    furosemide  40 mg IntraVENous Daily    metoprolol succinate  25 mg Oral Daily    apixaban  5 mg Oral BID    sodium chloride flush  5-40 mL IntraVENous 2 times per day    famotidine (PEPCID) injection  20 mg IntraVENous Daily    lactobacillus  2 capsule Oral BID WC    insulin lispro  0-6 Units SubCUTAneous Q4H     PRN Meds: sodium phosphate IVPB **OR** sodium phosphate IVPB, sodium chloride, sodium chloride flush, acetaminophen **OR** acetaminophen, glucose, dextrose, glucagon (rDNA), dextrose      Intake/Output Summary (Last 24 hours) at 2/17/2022 1337  Last data filed at 2/17/2022 1300  Gross per 24 hour   Intake 432.76 ml   Output 3610 ml   Net -3177.24 ml       Physical Exam Performed:    BP (!) 134/54   Pulse 87   Temp 98 °F (36.7 °C) (Axillary)   Resp 30   Ht 5' 3\" (1.6 m)   Wt 205 lb 11 oz (93.3 kg) Comment: 94.8 kg minus 1.5=93.3 kg  SpO2 99%   BMI 36.44 kg/m²     General appearance: No acute distress  HEENT: ngt in place, sclera anicteric  Respiratory: + tachypnea, Clear to auscultation, bilaterally without Rales/Wheezes/Rhonchi. Cardiovascular: irregular rhythm, regular rate with normal S1/S2   Abdomen: Soft  Musculoskeletal: No clubbing, cyanosis   Skin: Skin color, texture, turgor normal.    Neurologic:  Moving her extremities spontaneously.   No focal deficits  Psychiatric: Asleep, easily awakened, confused and agitated  Capillary Refill: Brisk,3 seconds, normal   Peripheral Pulses: +2 palpable, equal bilaterally       Labs:   Recent Labs     02/15/22  0500 02/16/22  0535 02/17/22  0414   WBC 7.4 6.2 7.8   HGB 10.5* 9.7* 9.7*   HCT 32.4* 30.3* 31.4*    129* 160     Recent Labs     02/15/22  0500 02/16/22  0535 02/17/22  0414    144 142   K 4.4 4.4 4.2    109 105   CO2 24 24 25   BUN 28* 23* 23*   CREATININE 1.0 0.9 1.0   CALCIUM 10.0 9.1 9.3   PHOS 2.5 2.1* 2.7     Recent Labs     02/15/22  0500 02/16/22  0535 02/17/22  0414   AST 25 27 23   ALT 18 18 16   BILITOT 0.6 0.6 0.8   ALKPHOS 209* 200* 183*     No results for input(s): INR in the last 72 hours. No results for input(s): [employer] in the last 72 hours. Urinalysis:      Lab Results   Component Value Date    NITRU Negative 02/09/2022    WBCUA 99 02/09/2022    BACTERIA 1+ 02/09/2022    RBCUA  02/09/2022    BLOODU LARGE 02/09/2022    SPECGRAV 1.012 02/09/2022    GLUCOSEU 100 02/09/2022       Radiology:  XR CHEST PORTABLE   Final Result   Enteric feeding tube projects as transpyloric, at least to the 2nd portion of   the duodenum. Stable bibasilar opacities, likely pleural effusions with atelectasis or   airspace disease. XR CHEST PORTABLE   Final Result   Spectrum of findings would favor CHF and developing pulmonary edema or ARDS. An underlying viral pattern of pneumonia may be considered. MRI BRAIN WO CONTRAST   Final Result   Chronic microvascular disease and cerebral atrophy with remote right parietal   lobe infarct. Prominence of the ventricular system and correlation with symptomatology for   normal pressure hydrocephalus is needed. Fluid in the left mastoid air cells. Mild chronic sinusitis. RECOMMENDATIONS:   Unavailable         XR CHEST PORTABLE   Final Result   Improved aeration of the lungs. There is decreased pleural-parenchymal   disease         XR CHEST PORTABLE   Final Result   Appropriate positioning of PICC line. Slightly improved aeration of the   lungs. CT HEAD WO CONTRAST   Final Result   1. No acute intracranial abnormality. 2. Moderate chronic white matter microvascular ischemic changes.    3. Right the hospital.     Placement was discussed with son at her last admission - he refused. He also appealed discharge last time, though I am still not sure what his true expectation was. He thought she will become \"normal\", though I am not sure what he meant by that with severe and progressive baseline underlying dementia. At the very least he is unrealistic in his expectation. \"       Assessment/Plan:    Active Hospital Problems    Diagnosis     Acute respiratory failure with hypoxia and hypercapnia (HCC) [J96.01, J96.02]     Acute cystitis without hematuria [N30.00]     RHIANNA (acute kidney injury) ([address] [N17.9]     Cardiopulmonary arrest with successful resuscitation ([address] [I46.9]     Cardiac arrest ([address] [I46.9]     Seizure ([address] [R56.9]     CHF (congestive heart failure) ([address] [I50.9]     Atrial fibrillation ([address] [I48.91]     AMS (altered mental status) [R41.82]     Chronic atrial fibrillation ([address] [I48.20]     Septic shock ([address] [A41.9, L46.83]     Metabolic encephalopathy [E87.82]      Acute encephalopathy - slowly improving, but now with multifactorial delirium due to acute medical issues, s/p cardiac arrest, sedation, on baseline dementia - will likely have a new baseline, which was already poor mental status and functioning   - toxic vs metabolic, due to infection  - head CT negative  - cefepime stopped  - nh3 normal  - treat associated conditions  - avoid sedating meds as able  - supportive care  - needs st, pt, ot and oob    Cardiac arrest  - no clear etiology, happened after seizure  - per cardiology, apparent no cardiac cause.     Seizure - no further events  - several factors were present that could lower the seizure threshold: uti, sepsis, on concomitant encephalomalacia   - mri without acute findings   - neurology consulted  - EEG without epilptiform activity, slowing    UTI with likely septic shock - improved  - completed cefepime  - urine culture negative    Metabolic acidosis  - likely due to shock, treated with ivf    Acute hypoxic, hypercarbic respiratory failure - extubated 2/13 now on 1L - room air    Chronic conditions - continue home medications unless otherwise stated  A. fib       Diet: Diet NPO  ADULT TUBE FEEDING; Nasoenteric; Standard with Fiber; Continuous; 25; Yes; 25; Q 4 hours; 50; 30; Q 4 hours  Code Status: Full Code  Dispo: very poor. I have had code status discussion with her son in the past (see admission notes 4/2021) and she was made dnr-cca, but he was still hoping she would recover. Her status has only worsened.  I will consult palliative medicine now       Celia Alex, DO

## 2022-02-17 NOTE — PLAN OF CARE
Problem: SKIN INTEGRITY  Goal: Skin integrity is maintained or improved  Outcome: Ongoing     Problem: Nutrition  Goal: Optimal nutrition therapy  Outcome: Ongoing     Problem: Non-Violent Restraints  Goal: Removal from restraints as soon as assessed to be safe  Outcome: Ongoing  Goal: No harm/injury to patient while restraints in use  Outcome: Ongoing  Goal: Patient's dignity will be maintained  Outcome: Ongoing     Problem: Pain:  Goal: Pain level will decrease  Description: Pain level will decrease  Outcome: Ongoing  Goal: Control of acute pain  Description: Control of acute pain  Outcome: Ongoing  Goal: Control of chronic pain  Description: Control of chronic pain  Outcome: Ongoing     Problem: Falls - Risk of:  Goal: Will remain free from falls  Description: Will remain free from falls  Outcome: Ongoing  Goal: Absence of physical injury  Description: Absence of physical injury  Outcome: Ongoing     Problem: Skin Integrity:  Goal: Will show no infection signs and symptoms  Description: Will show no infection signs and symptoms  Outcome: Ongoing  Goal: Absence of new skin breakdown  Description: Absence of new skin breakdown  Outcome: Ongoing

## 2022-02-17 NOTE — PROGRESS NOTES
Late entry due to patient care. @ 2055 to 2115 - Shift assessment completed. She has bilateral soft wrist restraints on for safety and to prevent her from accidentally pulling tubes, lines, & monitoring cables. She opens her eyes when this RN called her name, she followed commands like wiggling her toes and squeezing this RN's hand w/ her hand, and seems to get more agitated when this RN started assessing her. This RN oriented her to situation, where she is, and the time right now. She became more agitated when this RN started doing oral care, but calmed-down a little after a few minutes. She always swings her legs to the side of the bed. HOB kept at least 30 degrees to prevent aspiration and aid in her breathing. Her O2 sat went down to 89% on room air, while she was agitated. This RN placed her on O2 @ 1L/min per nasal cannula.     Electronically signed by Gail Isbell RN on 2/16/2022 at 10:54 PM

## 2022-02-17 NOTE — PROGRESS NOTES
Occupational Therapy   Occupational Therapy Initial Assessment  Date: 2022   Patient Name: Nahid Candelaria  MRN: [id number]     : 1931    Date of Service: 2022    Discharge Recommendations:  Patient would benefit from continued therapy after discharge,3-5 sessions per week  OT Equipment Recommendations  Other: will continue to assess    Assessment   Performance deficits / Impairments: Decreased functional mobility ; Decreased strength;Decreased ADL status; Decreased safe awareness;Decreased endurance;Decreased high-level IADLs;Decreased cognition;Decreased balance;Decreased ROM  Assessment: 79 y/o female admit 2022 with Altered Mental Status, Possible UTI. Upon arrival in ED : Cardiac Arrest, Seizure. -2022 Pt Intubated. CT Head negative acute. PMHx: Dementia. PTA pt living with son in home; unclear level of assist/support. Currently,  pt confused, unable to follow simple commands; dependent to/from eob. [place] brief eob assist with min-max A  based on cognition. Anticipate pt will require up to max A for ADLs d/t cognition. At this time, recommend 3-5 until able to determine PLOF and family support. Prognosis: Fair  Decision Making: High Complexity  OT Education: OT Role;Plan of Care;Transfer Training;Orientation  Barriers to Learning: cognition  REQUIRES OT FOLLOW UP: Yes  Activity Tolerance  Activity Tolerance: Treatment limited secondary to decreased cognition  Safety Devices  Safety Devices in place: Yes  Type of devices: Left in bed;Nurse notified;Call light within reach; Bed alarm in place  Restraints  Initially in place: Yes  Restraints: diandra wrist restraints           Patient Diagnosis(es): The primary encounter diagnosis was Cardiopulmonary arrest with successful resuscitation ([address] Diagnoses of Altered mental status, unspecified altered mental status type and Seizure [facility]) were also pertinent to this visit.      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 has no past surgical history on file. Restrictions  Restrictions/Precautions  Restrictions/Precautions: Fall Risk  Position Activity Restriction  Other position/activity restrictions: O2 1L via NC. Subjective   General  Chart Reviewed: Yes  Patient assessed for rehabilitation services?: Yes  Additional Pertinent Hx: 81 y/o female admit 2022 with Altered Mental Status, Possible UTI. Upon arrival in ED : Cardiac Arrest, Seizure. -2022 Pt Intubated. CT Head negative acute. PMHx: Dementia. Family / Caregiver Present: No  Referring Practitioner: Dr. Tino Garcia: Pt seen bedside. Difficult to arouse but with slight improvement once EOB. Pt with no command following or verbalizations. General Comment  Comments: Per RN ok for therapy. Social/Functional History  Social/Functional History  Lives With: Family (Son Nahun Edward) and his partner Bishnu Sinclair). )  Type of Home: House  Home Layout: One level  Home Access: Stairs to enter with rails  Entrance Stairs - Number of Steps: 4-5  Bathroom Shower/Tub: Walk-in shower  Bathroom Toilet: Standard  Bathroom Equipment: Grab bars in [facility]  Home Equipment: 4 wheeled walker,Cane  ADL Assistance:  (Son assists with bathing/dressing/toileting - patient feeds self)  Homemaking Assistance:  (son completes IADLs)  Ambulation Assistance: Needs assistance (With Rollator.)  Transfer Assistance: Needs assistance  Active : No  Additional Comments: Info from past admissions in  and ; the pt unable to give any current info re: PLOF and family not in the room to verify       Objective   Vision:  (Pt opens eyes; unable to fully assess.)  Hearing:  (Appears adequate although unable to fully assess.)    Orientation  Overall Orientation Status:  (pt able to state last name and  (not year).  otherwise did not answer orientation questions)     Balance  Sitting Balance:  (Pt mario eob ~ 5 min  requiring min-max A depend pt cognition.)  Functional Mobility  Functional Mobility Comments: Anticipate pt would require maxi etta lift for transfers at this time     ADL  Additional Comments: Anticipate pt will be dependent for ADLs based on cognition observed        Bed mobility  Rolling to Left: Dependent/Total  Rolling to Right: Dependent/Total  Supine to Sit: Dependent/Total;2 Person assistance  Sit to Supine: Dependent/Total;2 Person assistance  Transfers  Transfer Comments: defer standing 2/2 cognition     Cognition  Overall Cognitive Status: Exceptions  Arousal/Alertness: Delayed responses to stimuli  Following Commands: Does not follow commands  Attention Span: Unable to maintain attention  Insights: Not aware of deficits  Initiation: Requires cues for all  Sequencing: Requires cues for all                 LUE AROM (degrees)  LUE General AROM: difficult to assess 2/2 cognition, however observed pt moving UEs in bed and able to bring hand to mouth  RUE AROM (degrees)  RUE General AROM: difficult to assess 2/2 cognition, however observed pt moving UEs in bed and able to bring hand to mouth         Plan   Plan  Times per week: 2-3x  Current Treatment Recommendations: Strengthening,Functional Mobility Training,Gait Training,Endurance Training,Balance Training,ROM,Self-Care / ADL,Cognitive Reorientation    AM-PAC Score  AM-PAC Inpatient Daily Activity Raw Score: 6 (02/17/22 1130)  AM-PAC Inpatient ADL T-Scale Score : 17.07 (02/17/22 1130)  ADL Inpatient CMS 0-100% Score: 100 (02/17/22 1130)  ADL Inpatient CMS G-Code Modifier : CN (02/17/22 1130)    Goals  Short term goals  Time Frame for Short term goals: Prior to DC:   Short term goal 1: Pt will complete bed mobility with mod A  Short term goal 2: Pt will tolerate sitting EOB > 5 min in prep for transfers with min A consistently  Short term goal 3: Pt will complete grooming tasks with set up  Short term goal 4: Pt will complete ADL transfers with stedy and mod A x 2. Patient Goals   Patient goals : no goal stated 2/2 cognition       Therapy Time   Individual Concurrent Group Co-treatment   Time In 0900         Time Out 0940         Minutes 40         Timed Code Treatment Minutes: 25 Minutes     This note to serve as OT d/c summary if pt is d/c-ed prior to next therapy session.     Franky Jacobs, OTR/L

## 2022-02-17 NOTE — PROGRESS NOTES
Late entry due to patient care. Reassessment unchanged, except, her urine color is pinkish this time compared to the previous assessments. Per report, this had been happening to her urine since a few days ago. Her vitals had been stable, she had been tachypneic, and easily gets agitated and restless w/ minimal stimuli or nursing interventions.     Electronically signed by Terry Hutchins RN on 2/17/2022 at 6:23 AM

## 2022-02-17 NOTE — CONSULTS
[facility]  Palliative Care   Consult Note    NAME:  Michelle Jimenez RECORD NUMBER:  [id number]  AGE: 80 y.o. GENDER: female  : 1931  TODAY'S DATE:  2022    Subjective     Reason for Consult:  goals of care  Visit Type: Initial Consult      Alena Leigh is a 80 y.o. female referred by:   [x] Physician    PAST MEDICAL HISTORY      Diagnosis Date    Alzheimer's dementia ([facility]     Atrial fibrillation ([facility]     Benign essential HTN     Gout     Multiple drug resistant organism (MDRO) culture positive 2021    Escherichia coli. Urine       PAST SURGICAL HISTORY  No past surgical history on file. FAMILY HISTORY  No family history on file. SOCIAL HISTORY  Social History     Tobacco Use    Smoking status: Never Smoker    Smokeless tobacco: Never Used   Vaping Use    Vaping Use: Never used   Substance Use Topics    Alcohol use: Never    Drug use: Never       ALLERGIES  Allergies   Allergen Reactions    Chlorpromazine Other (See Comments)    Hydrochlorothiazide Other (See Comments)     Gout       MEDICATIONS  No current facility-administered medications on file prior to encounter.      Current Outpatient Medications on File Prior to Encounter   Medication Sig Dispense Refill    melatonin 3 MG TABS tablet Take 3 mg by mouth nightly as needed for Sleep      diclofenac sodium (VOLTAREN) 1 % GEL Apply 2 g topically 4 times daily as needed for Pain      metoprolol succinate (TOPROL XL) 25 MG extended release tablet Take 1 tablet by mouth daily 30 tablet 1    furosemide (LASIX) 20 MG tablet Take 1 tablet by mouth every other day 30 tablet 3    Cranberry 250 MG CHEW Take 2 tablets by mouth 2 times daily (with meals) 120 tablet 0    rivaroxaban (XARELTO) 15 MG TABS tablet Take 1 tablet by mouth daily (with breakfast) 90 tablet 1    vitamin D 25 MCG (1000 UT) CAPS Take 1,000 Units by mouth daily      albuterol sulfate HFA (VENTOLIN HFA) 108 (90 Base) MCG/ACT inhaler Inhale 2 puffs into the lungs every 6 hours as needed for Wheezing      mometasone-formoterol (DULERA) 200-5 MCG/ACT inhaler Inhale 2 puffs into the lungs 2 times daily       Multiple Vitamins-Minerals (MULTIVITAMIN WITH MINERALS) tablet Take 1 tablet by mouth daily      calcium carbonate (OSCAL) 500 MG TABS tablet Take 500 mg by mouth daily         Objective         BP (!) 135/54   Pulse 85   Temp 98 °F (36.7 °C) (Axillary)   Resp (!) 33   Ht 5' 3\" (1.6 m)   Wt 205 lb 11 oz (93.3 kg) Comment: 94.8 kg minus 1.5=93.3 kg  SpO2 97%   BMI 36.44 kg/m²     Code Status: Full Code    Advanced Directives:  Completed requested copy her son Chelle Fernandez is HPOA    Assessment        Management and Education    Persons available for education:     [] Self     [x] Caregiver       [] Spouse       [] Other Family Member   [x]  Other    Spiritual History:  notified: Yes,     Does the patient have a Primary Care Physician? Yes  Level of patient/caregiver understanding able to:       [x] Verbalize Understanding   [] Demonstrate Understanding       [] Teach Back       [] Needs Reinforcement     []  Other:      Teaching Time:  1hours  0 minutes     Plan        Social Service Consult Made:  Yes  Assistance filling out Living Will/HPOA:  No      Discharge Plan:  Education/support to family  Providing support for coping/adaptation/distress of family  Clarification of medical condition to patient and family  Palliative care orders introduced    Discharge Environment:  [] Hospice Consult Agency:  [] Inpatient Hospice    [] Home with Hospice Care   [] ECF with Hospice  [] ECF skilled care with Hospice to follow   [] Other:    Discussion: Patient admitted for AMS from home, in ED had a witnessed seizure resulting in cardiac arrest, achieved ROSC quickly and now off vent. Patient resting quietly, at present time oriented to self and place.  Since off vent she has not been cooperative with staff and pulled out feeding tube. I reached out to her son/HPOA Gabriele Navarro and his significant other Mara uKmar on phone, she lives with them. Gabriele Navarro gives a history of her doing well at home, she needs assistance with ADL's, walks with walker, can go to kitchen to get a snack, feeds herself, is mostly continent of bowel and bladder except for HS and will initiate a conversation. We have again addressed code status and he is not sure what to do now. I will follow up with him tomorrow. He plans to be here later this afternoon and is agreeable to work with speech if possible with his mom. I will continue to follow Ms. Adorno's care as needed. Thank you for allowing me to participate in the care of Ms. Adorno .      Electronically signed by Emili Dodson RN, [id number] on 2/17/2022 at 10:33 AM  Palliative Care Nurse [facility]  Office: [phone]

## 2022-02-17 NOTE — PROGRESS NOTES
Physical Therapy    Facility/Department: [address] CVICU  Initial Assessment    NAME: Samantha Eli  : 1931  MRN: [id number]    Date of Service: 2022    Discharge Recommendations:  Continue to assess pending progress,3-5 sessions per week   PT Equipment Recommendations  Other: Defer to next level of care. Samantha Eli scored a / on the AM-PAC short mobility form. Current research shows that an AM-PAC score of 17 or less is typically not associated with a discharge to the patient's home setting. Based on the patient's AM-PAC score and their current functional mobility deficits, it is recommended that the patient have 3-5 sessions per week of Physical Therapy at d/c to increase the patient's independence. Please see assessment section for further patient specific details. If patient discharges prior to next session this note will serve as a discharge summary. Please see below for the latest assessment towards goals. Assessment   Body structures, Functions, Activity limitations: Decreased functional mobility ; Decreased strength;Decreased safe awareness;Decreased cognition;Decreased endurance;Decreased balance  Assessment: 79 y/o female admit 2022 with Altered Mental Status, Possible UTI. Upon arrival in ED : Cardiac Arrest, Seizure. -2022 Pt Intubated. CT Head negative acute. PMH as noted including A-Fib, UTI, Gout, Dementia. PTA pt living with son in home; unclear level of assist/support. Currently,   Pt confused, unable to follow simple commands; dependent to/from eob. [place] brief eob assist from Max to brief Min. At this time, anticipate need cont PT (3-5) upon d/c. Will cont to monitor pt's progress. Prognosis: Fair  Decision Making: Medium Complexity  History: 79 y/o female admit 2022 with Altered Mental Status, Possible UTI. Upon arrival in ED : Cardiac Arrest, Seizure. -2022 Pt Intubated. CT Head negative acute.   PMH as noted including A-Fib, UTI, Gout, Dementia. Exam: See above. Clinical Presentation: See above. Patient Education: Role of PT, POC, Need to call for assist.  Barriers to Learning: Cognition. REQUIRES PT FOLLOW UP: Yes  Activity Tolerance  Activity Tolerance: Patient limited by cognitive status  Activity Tolerance: Pt confused, unable to follow simple commands; dependent to/from eob. [place] brief eob assist from Max to brief Min. Patient Diagnosis(es): The primary encounter diagnosis was Cardiopulmonary arrest with successful resuscitation ([facility] [address] Diagnoses of Altered mental status, unspecified altered mental status type and Seizure [facility]) were also pertinent to this visit. 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 has no past surgical history on file. Restrictions  Restrictions/Precautions  Restrictions/Precautions: Fall Risk  Position Activity Restriction  Other position/activity restrictions: O2 1L via NC. Vision/Hearing  Vision:  (Pt opens eyes; unable to fully assess.)  Hearing:  (Appears adequate although unable to fully assess.)     Subjective  General  Chart Reviewed: Yes  Patient assessed for rehabilitation services?: Yes  Additional Pertinent Hx: 81 y/o female admit 2/9/2022 with Altered Mental Status, Possible UTI. Upon arrival in ED : Cardiac Arrest, Seizure. 2/9-2/13/2022 Pt Intubated. CT Head negative acute. PMH as noted including A-Fib, UTI, Gout, Dementia. Family / Caregiver Present: No  Referring Practitioner: Darvin Singh D.O. Diagnosis: Cardiac Arrest, Seizure. Other (Comment): Pt unable to follow any simple commands. Subjective  Subjective: Pt confused, unable to follow any simple commands.   Pain Screening  Patient Currently in Pain: Other (comment) (CPOT)          Orientation  Orientation  Overall Orientation Status: Impaired  Orientation Level: Oriented to person;Disoriented to place; Disoriented to time;Disoriented to situation (Pt alert to first/last name.)  Social/Functional History  Social/Functional History  Lives With: Family (Son Chi Romero) and his partner Bogdan Salinas). )  Type of Home: House  Home Layout: One level  Home Access: Stairs to enter with rails  Entrance Stairs - Number of Steps: 4-5  Bathroom Shower/Tub: Walk-in shower  Bathroom Toilet: Standard  Bathroom Equipment: Grab bars in [place] [facility]  Home Equipment: 4 wheeled walker,Cane  ADL Assistance:  (Son assists with bathing/dressing/toileting - patient feeds self)  Homemaking Assistance:  (son completes IADLs)  Ambulation Assistance: Needs assistance (With Rollator.)  Transfer Assistance: Needs assistance  Active : No  Additional Comments: Info from past admissions in 2020 and 2021; the pt unable to give any current info re: PLOF and family not in the room to verify  Cognition   Cognition  Overall Cognitive Status: Exceptions  Arousal/Alertness: Delayed responses to stimuli  Following Commands: Does not follow commands  Attention Span: Unable to maintain attention  Insights: Not aware of deficits  Initiation: Does not require cues  Sequencing: Does not require cues    Objective          PROM RLE (degrees)  RLE PROM: WFL  PROM LLE (degrees)  LLE PROM: WFL  PROM RUE (degrees)  RUE PROM: WFL  PROM LUE (degrees)  LUE PROM: WFL  Strength Other  Other: Pt able to move UEs/LEs although unable to follow any simple commands. Bed mobility  Rolling to Left: Dependent/Total  Rolling to Right: Dependent/Total  Supine to Sit: Dependent/Total  Sit to Supine: Dependent/Total  Transfers  Bed to Chair: Unable to assess (Defer oob at this time due to confusion/safety concerns.)        Balance  Comments: PT mario eob ~ 5 min Max to [address] assist depend pt cognition. Plan   Plan  Times per week: 2-3x week while in acute care setting.   Current Treatment Recommendations: Strengthening,Functional Mobility Training,Transfer Training,Gait Training,Safety Education & Training,Patient/Caregiver Education & Training  Safety Devices  Type of devices: Bed alarm in place,Call light within reach,Left in bed,Nurse notified      AM-PAC Score  AM-PAC Inpatient Mobility Raw Score : 6 (02/17/22 1033)  AM-PAC Inpatient T-Scale Score : 23.55 (02/17/22 1033)  Mobility Inpatient CMS 0-100% Score: 100 (02/17/22 1033)  Mobility Inpatient CMS G-Code Modifier : CN (02/17/22 1033)          Goals  Short term goals  Time Frame for Short term goals: Upon d/c acute care setting. Short term goal 1: Bed Mob Mod assist x 2. Short term goal 2: EOB ~ 5 min ongoing Min assist in prep oob activities. Short term goal 3: Transfer via [place] Mod assist x 2. Short term goal 4: Pt participating in approp Strength Exs. Patient Goals   Patient goals : None stated.        Therapy Time   Individual Concurrent Group Co-treatment   Time In 0900         Time Out 0940         Minutes [facility] Eveline Schlatter

## 2022-02-17 NOTE — PROGRESS NOTES
Bedside shift hand-off done w/ oncoming DANIA Dwyer, to assume pt. care. This RN handed-off the pt. in a stable condition.     Electronically signed by Johnnie Daugherty RN on 2/17/2022 at 7:19 AM

## 2022-02-17 NOTE — PROGRESS NOTES
Speech Language Pathology    Patient discussed with RN. Plan to hold swallow evaluation ordered this afternoon until 2/18/2022 AM d/t current mental status. ST to re-attempt 2/18/2022 pending status unless otherwise notified. Thank you. Kanchan Harris, [id number] [facility], [id number]  Speech-Language Pathologist  Portable phone: [phone]

## 2022-02-18 ENCOUNTER — APPOINTMENT (OUTPATIENT)
Dept: GENERAL RADIOLOGY | Age: 87
DRG: 871 | End: 2022-02-18
Payer: MEDICARE

## 2022-02-18 LAB
A/G RATIO: 1 (ref 1.1–2.2)
ALBUMIN SERPL-MCNC: 2.7 G/DL (ref 3.4–5)
ALP BLD-CCNC: 174 U/L (ref 40–129)
ALT SERPL-CCNC: 14 U/L (ref 10–40)
ANION GAP SERPL CALCULATED.3IONS-SCNC: 11 MMOL/L (ref 3–16)
AST SERPL-CCNC: 21 U/L (ref 15–37)
BASOPHILS ABSOLUTE: 0 K/UL (ref 0–0.2)
BASOPHILS RELATIVE PERCENT: 0.7 %
BILIRUB SERPL-MCNC: 0.9 MG/DL (ref 0–1)
BUN BLDV-MCNC: 25 MG/DL (ref 7–20)
CALCIUM SERPL-MCNC: 8.9 MG/DL (ref 8.3–10.6)
CHLORIDE BLD-SCNC: 105 MMOL/L (ref 99–110)
CO2: 26 MMOL/L (ref 21–32)
CREAT SERPL-MCNC: 1.1 MG/DL (ref 0.6–1.2)
EOSINOPHILS ABSOLUTE: 0.2 K/UL (ref 0–0.6)
EOSINOPHILS RELATIVE PERCENT: 2.6 %
GFR AFRICAN AMERICAN: 56
GFR NON-AFRICAN AMERICAN: 47
GLUCOSE BLD-MCNC: 111 MG/DL (ref 70–99)
GLUCOSE BLD-MCNC: 167 MG/DL (ref 70–99)
GLUCOSE BLD-MCNC: 66 MG/DL (ref 70–99)
GLUCOSE BLD-MCNC: 70 MG/DL (ref 70–99)
GLUCOSE BLD-MCNC: 74 MG/DL (ref 70–99)
GLUCOSE BLD-MCNC: 77 MG/DL (ref 70–99)
GLUCOSE BLD-MCNC: 81 MG/DL (ref 70–99)
GLUCOSE BLD-MCNC: 81 MG/DL (ref 70–99)
HCT VFR BLD CALC: 32.4 % (ref 36–48)
HEMOGLOBIN: 10.1 G/DL (ref 12–16)
LYMPHOCYTES ABSOLUTE: 0.6 K/UL (ref 1–5.1)
LYMPHOCYTES RELATIVE PERCENT: 9.6 %
MAGNESIUM: 1.8 MG/DL (ref 1.8–2.4)
MCH RBC QN AUTO: 26.5 PG (ref 26–34)
MCHC RBC AUTO-ENTMCNC: 31.2 G/DL (ref 31–36)
MCV RBC AUTO: 84.8 FL (ref 80–100)
MONOCYTES ABSOLUTE: 1.2 K/UL (ref 0–1.3)
MONOCYTES RELATIVE PERCENT: 18.5 %
NEUTROPHILS ABSOLUTE: 4.3 K/UL (ref 1.7–7.7)
NEUTROPHILS RELATIVE PERCENT: 68.6 %
PDW BLD-RTO: 16.4 % (ref 12.4–15.4)
PERFORMED ON: ABNORMAL
PERFORMED ON: NORMAL
PHOSPHORUS: 2.9 MG/DL (ref 2.5–4.9)
PLATELET # BLD: 167 K/UL (ref 135–450)
PMV BLD AUTO: 9.4 FL (ref 5–10.5)
POTASSIUM SERPL-SCNC: 3.7 MMOL/L (ref 3.5–5.1)
RBC # BLD: 3.82 M/UL (ref 4–5.2)
SODIUM BLD-SCNC: 142 MMOL/L (ref 136–145)
TOTAL PROTEIN: 5.3 G/DL (ref 6.4–8.2)
WBC # BLD: 6.3 K/UL (ref 4–11)

## 2022-02-18 PROCEDURE — 74230 X-RAY XM SWLNG FUNCJ C+: CPT

## 2022-02-18 PROCEDURE — 6370000000 HC RX 637 (ALT 250 FOR IP): Performed by: STUDENT IN AN ORGANIZED HEALTH CARE EDUCATION/TRAINING PROGRAM

## 2022-02-18 PROCEDURE — 6370000000 HC RX 637 (ALT 250 FOR IP): Performed by: INTERNAL MEDICINE

## 2022-02-18 PROCEDURE — 83735 ASSAY OF MAGNESIUM: CPT

## 2022-02-18 PROCEDURE — 36592 COLLECT BLOOD FROM PICC: CPT

## 2022-02-18 PROCEDURE — 84100 ASSAY OF PHOSPHORUS: CPT

## 2022-02-18 PROCEDURE — 2140000000 HC CCU INTERMEDIATE R&B

## 2022-02-18 PROCEDURE — 92611 MOTION FLUOROSCOPY/SWALLOW: CPT

## 2022-02-18 PROCEDURE — 6370000000 HC RX 637 (ALT 250 FOR IP): Performed by: NURSE PRACTITIONER

## 2022-02-18 PROCEDURE — 2580000003 HC RX 258: Performed by: STUDENT IN AN ORGANIZED HEALTH CARE EDUCATION/TRAINING PROGRAM

## 2022-02-18 PROCEDURE — 2500000003 HC RX 250 WO HCPCS: Performed by: INTERNAL MEDICINE

## 2022-02-18 PROCEDURE — 92610 EVALUATE SWALLOWING FUNCTION: CPT

## 2022-02-18 PROCEDURE — 80053 COMPREHEN METABOLIC PANEL: CPT

## 2022-02-18 PROCEDURE — 85025 COMPLETE CBC W/AUTO DIFF WBC: CPT

## 2022-02-18 PROCEDURE — 2500000003 HC RX 250 WO HCPCS: Performed by: STUDENT IN AN ORGANIZED HEALTH CARE EDUCATION/TRAINING PROGRAM

## 2022-02-18 PROCEDURE — 6360000002 HC RX W HCPCS: Performed by: FAMILY MEDICINE

## 2022-02-18 RX ORDER — ONDANSETRON 2 MG/ML
4 INJECTION INTRAMUSCULAR; INTRAVENOUS EVERY 6 HOURS PRN
Status: DISCONTINUED | OUTPATIENT
Start: 2022-02-18 | End: 2022-02-22 | Stop reason: HOSPADM

## 2022-02-18 RX ADMIN — ACETAMINOPHEN 650 MG: 325 TABLET ORAL at 19:00

## 2022-02-18 RX ADMIN — Medication 12.5 G: at 00:37

## 2022-02-18 RX ADMIN — SODIUM CHLORIDE, PRESERVATIVE FREE 10 ML: 5 INJECTION INTRAVENOUS at 04:16

## 2022-02-18 RX ADMIN — APIXABAN 5 MG: 5 TABLET, FILM COATED ORAL at 21:33

## 2022-02-18 RX ADMIN — INSULIN LISPRO 1 UNITS: 100 INJECTION, SOLUTION INTRAVENOUS; SUBCUTANEOUS at 21:33

## 2022-02-18 RX ADMIN — SODIUM CHLORIDE, PRESERVATIVE FREE 10 ML: 5 INJECTION INTRAVENOUS at 21:33

## 2022-02-18 RX ADMIN — FUROSEMIDE 40 MG: 10 INJECTION, SOLUTION INTRAMUSCULAR; INTRAVENOUS at 09:05

## 2022-02-18 RX ADMIN — FAMOTIDINE 20 MG: 10 INJECTION, SOLUTION INTRAVENOUS at 09:05

## 2022-02-18 RX ADMIN — SODIUM CHLORIDE, PRESERVATIVE FREE 10 ML: 5 INJECTION INTRAVENOUS at 00:37

## 2022-02-18 RX ADMIN — Medication 2 CAPSULE: at 18:59

## 2022-02-18 RX ADMIN — SODIUM CHLORIDE, PRESERVATIVE FREE 10 ML: 5 INJECTION INTRAVENOUS at 09:05

## 2022-02-18 ASSESSMENT — PAIN SCALES - GENERAL
PAINLEVEL_OUTOF10: 2
PAINLEVEL_OUTOF10: 0
PAINLEVEL_OUTOF10: 1
PAINLEVEL_OUTOF10: 0
PAINLEVEL_OUTOF10: 0

## 2022-02-18 ASSESSMENT — PAIN SCALES - PAIN ASSESSMENT IN ADVANCED DEMENTIA (PAINAD)
FACIALEXPRESSION: 0
CONSOLABILITY: 0
BREATHING: 0
NEGVOCALIZATION: 0
TOTALSCORE: 0
BODYLANGUAGE: 0

## 2022-02-18 NOTE — PROGRESS NOTES
Blood sugar= 66 mg/dL. She's asymptomatic and level of consciousness is still the same. This RN called Pharmacy to send a D 50-50 syringe so this could be given to the pt. (current available D 50-50 syringes at the [facility] best use date was 2/17/22). Dextrose 12.5 g IV was administered (see MAR) once this was sent to CVU.     Electronically signed by Terry Hutchins RN on 2/18/2022 at 12:45 AM

## 2022-02-18 NOTE — PROGRESS NOTES
@ 2000 to 2010 - Shift assessment completed. Her eyes are closed, but she easily opens her eyes by calling her name. She was able to tell this RN her name and sometimes, she knows she's in the hospital, sometimes no. This RN re-oriented her. She follows commands like squeezing this RNs hands and moving her toes. She gets agitated w/ nursing interventions like repositioning in bed or oral care.     Electronically signed by Mason Gresham RN on 2/17/2022 at 8:13 PM

## 2022-02-18 NOTE — PROGRESS NOTES
Speech Language Pathology    Attempt to see patient for a clinical swallow evaluation. RN requesting to hold evaluation until later this AM when patient's son is present due to variable agitation.      Kristofer Cameron, [address] [place] [address]  Speech-Language Pathologist  On 02/18/22 at 7:53 AM

## 2022-02-18 NOTE — PLAN OF CARE
Continue Flovent twice daily, albuterol as needed  I have placed a referral for pulmonology with [facility], since you have already established care there  Please call to make that appointment at your earliest convenience    Let us know if you have difficulty making that appointment Problem: SKIN INTEGRITY  Goal: Skin integrity is maintained or improved  Outcome: Ongoing  A: Skin assessment. Turn/ reposition every 2 hours and PRN. Place preventative Mepilex border on the sacrum and heels. Keep the heels elevated off the bed. Use pillows to off-load pressure points. Check for incontinence at least every 2 hours. Use barrier cream cloths for donna care. Place InterdryAg as skin fold management. Apply Zinc Paste to pt.'s incontinence-related dermatitis. Utilize a low air loss and alternating pressure-relief mattress. Problem: Non-Violent Restraints  Goal: Removal from restraints as soon as assessed to be safe  Outcome: Ongoing  A: Educate the pt. and his family of the indication for the restraint and criteria for removal. Perform visual checks, monitor for S/Sx of restraint-related injury, circulation & elimination, check for need for fluids/ food, and perform ROM every 2 hours. Discontinue restraints as soon as assessed to be safe. Problem: Falls - Risk of:  Goal: Will remain free from falls  Description: Will remain free from falls  Outcome: Ongoing  A: Fall prevention education. Soft wrist restraints in place. Activate the bed alarm. Keep bed locked at the lowest position. Intentional rounding.     Electronically signed by Sanford Irizarry RN on 2/17/2022 at 10:40 PM

## 2022-02-18 NOTE — PROGRESS NOTES
Comprehensive Nutrition Assessment    Type and Reason for Visit:  Reassess    Nutrition Recommendations/Plan:   Monitor for start of nutrition. Needs access for EN to support nutrition needs. · If feeding access achieved, recommend Jevity 1.5 @ 50 ml/hr. Flush per provider. Nutrition Assessment:  Follow-up. Pt pulled NG out on 2/16 and was not replaced d/t agitation. SLP following but unable to eval d/t variable agitation. Pt remains NPO. Palliative care following. Pt needs alternative nutrition. Need to determine most appropriate access for EN. Malnutrition Assessment:  Malnutrition Status: At risk for malnutrition    Context:  Acute Illness      Findings of the 6 clinical characteristics of malnutrition:  Energy Intake:  Mild decrease in energy intake (TF off for two days and NPO)  Weight Loss:  Unable to assess (fluid shifts)     Body Fat Loss:  No significant body fat loss     Muscle Mass Loss:  No significant muscle mass loss    Fluid Accumulation:  No significant fluid accumulation     Strength:  Not Performed    Estimated Daily Nutrient Needs:  Energy (kcal):  6013-7721 (15-18kcal/88kg); Weight Used for Energy Requirements:  Admission     Protein (g):  62-73 (1.2-1.4g/52kg); Weight Used for Protein Requirements:  Ideal        Fluid (ml/day):  per provider     Nutrition Related Findings:  Low BS; Non pitting BLE and +1 BUE edema; BM 2/18; +6.5 L fluid      Wounds:  None       Current Nutrition Therapies:    Diet NPO  Current Tube Feeding (TF) Recommendations:  · Goal TF & Flush Orders Provides: Jevity 1.5 @ 50ml/hr providing 1000ml TV, 1500 kcals, 64 grams protein, and 760ml free water.  (Calculated x 20 hours to account for routine nursing care)      Anthropometric Measures:  · Height: 5' 3\" (160 cm)  · Current Body Weight: 196 lb (88.9 kg)   · Admission Body Weight: 195 lb (88.5 kg)    · Ideal Body Weight: 115 lbs; % Ideal Body Weight 170.4 %   · BMI: 34.7  · BMI Categories: Obese Class 1 (BMI 30.0-34.9) (Using admission wt)       Nutrition Diagnosis:   · Inadequate oral intake related to cognitive or neurological impairment as evidenced by NPO or clear liquid status due to medical condition      Nutrition Interventions:   Food and/or Nutrient Delivery:  Continue NPO  Nutrition Education/Counseling:  Education not indicated   Coordination of Nutrition Care:  Speech Therapy    Goals:  new goal- intitiate most appropriate form of nutrition per MD       Nutrition Monitoring and Evaluation:   Food/Nutrient Intake Outcomes:  Diet Advancement/Tolerance  Physical Signs/Symptoms Outcomes:  Biochemical Data,Weight,Skin,Nutrition Focused Physical Findings,Meal Time Behavior,Chewing or Swallowing     Discharge Planning:     Too soon to determine     Electronically signed by Sharon Lozano RD, LD on 2/18/22 at 8:57 AM EST    Contact: [phone]

## 2022-02-18 NOTE — CARE COORDINATION
[facility]  Palliative Care   Progress Note    NAME:  Michelle Jimenez RECORD NUMBER:  [id number]  AGE: 80 y.o. GENDER: female  : 1931  TODAY'S DATE:  2022    Subjective: Patient more alert and oriented to self only today, just wants to go home. Objective:    Vitals:    22 1500   BP:    Pulse: 90   Resp: 30   Temp:    SpO2:      Lab Results   Component Value Date    WBC 6.3 2022    HGB 10.1 (L) 2022    HCT 32.4 (L) 2022    MCV 84.8 2022     2022     Lab Results   Component Value Date    CREATININE 1.1 2022    BUN 25 (H) 2022     2022    K 3.7 2022     2022    CO2 26 2022     Lab Results   Component Value Date    ALT 14 2022    AST 21 2022    ALKPHOS 174 (H) 2022    BILITOT 0.9 2022       Plan: I have spoken her son again and he is still undecided about code status, he does not want chest compressions and I attempted to explain chest compressions and intubation go hand in hand and one without the other doesn't do much good. He is going go consult with a physician friend. Code Status: Full Code  Discharge Environment:  [] Hospice Consult Agency:  [] Inpatient Hospice    [] Home with [address] [place] [address]   [] ECF with Hospice  [] ECF skilled care with Hospice to follow   [] Other:    Teaching Time:  0hours  30 min     I will continue to follow Ms. Adorno's care as needed. Thank you for allowing me to participate in the care of Ms. Adorno .      Electronically signed by Airam Owens RN, [facility] on 2022 at 3:46 PM  [address] [place] [address]  Office: [phone]

## 2022-02-18 NOTE — PROGRESS NOTES
Speech Language Pathology  Facility/Department: [facility] 3C CVICU   CLINICAL BEDSIDE SWALLOW EVALUATION    NAME: Juliana Caraballo  : 1931  MRN: [id number]    ADMISSION DATE: 2022  ADMITTING DIAGNOSIS: has Acute on chronic combined systolic and diastolic CHF, NYHA class 3 ([address] Dementia in Alzheimer's disease with delirium ([address] Acute pyelonephritis; Essential hypertension; Current use of long term anticoagulation; Mild malnutrition ([address] Fall at home, initial encounter; Metabolic encephalopathy; Acute on chronic congestive heart failure ([address] Septic shock ([address] Bilateral pleural effusion; Chronic atrial fibrillation ([address] Acute encephalopathy; Ataxia; Pleural effusion; CHF (congestive heart failure), NYHA class I, acute on chronic, combined ([address] AMS (altered mental status); Cardiac arrest ([facility] [address] Seizure [facility]); CHF (congestive heart failure) ([address] Atrial fibrillation ([address] Cardiopulmonary arrest with successful resuscitation [facility]); Acute respiratory failure with hypoxia and hypercapnia ([address] Acute cystitis without hematuria; and RHIANNA (acute kidney injury) ([address] on their problem list.   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 ONSET DATE: 2022    Hospital course:  A 81 yo female admitted with agitation, possibly increased confusion over her baseline. Her Son had sent patient for evaluation for possible UTI or possible concern for congestive heart failure.  Patient apparently had a witnessed seizure in the ED and then became hypoxic and went into bradycardia, progressed to asystole.  Pulse was noted to be lost but was never shocked, was resuscitated after about 1 to 2 minutes of CPR and 1 round of epi. she was intubated in the ED. Since then she has been treated for a uti with cefepime, and an rhianna with ivf.    Neurology was consulted as well.   Per dc summary, one year ago. \"An 81 Yo female admitted with a uti and hypoactive delirium.   This has been a recurrent cycle for Mrs Pooja Byrne. She was recently discharged her on Feb 16 and very similar admission in Dec as well. she has end stage Alzheimer's with UTIs unfortunately just adding to her mental status limitations. During her last admission her depakote was stopped due to oversedation and treated her with nightly seroquel with very good response. She spends most of her days sleeping - not eating or drinking - then develops UTI due to poor urine output and returns to the hospital.  Placement was discussed with son at her last admission - he refused. He also appealed discharge last time, though I am still not sure what his true expectation was. He thought she will become \"normal\", though I am not sure what he meant by that with severe and progressive baseline underlying dementia. At the very least he is unrealistic in his expectation. \"   · Intubated 2/9-2/13/2022    Dysphagia Hx:  · Feb 2021 at [facility]: Recommended minced/moist and thin liquids at time of discharge; Dysphagia status stable/slowly resolving as cog status improved    Recent Chest Xray 2/13/2022  Impression   Enteric feeding tube projects as transpyloric, at least to the 2nd portion of   the duodenum.       Stable bibasilar opacities, likely pleural effusions with atelectasis or   airspace disease. MRI Brain 2/11/2022  Impression   Chronic microvascular disease and cerebral atrophy with remote right parietal   lobe infarct.       Prominence of the ventricular system and correlation with symptomatology for   normal pressure hydrocephalus is needed.       Fluid in the left mastoid air cells.       Mild chronic sinusitis.        Date of Eval: 2/18/2022  Evaluating Therapist: Claudia Bianchi, SLP    Current Diet level:  Current Diet : NPO  Current Liquid Diet : NPO    Primary Complaint  Patient Complaint: concern for safe PO initiation s/p intubation 2/9-2/13. Pt with hx dysphagia and AMS in 2020 and 2021, but was tolerating regular/thin for past year at home per son report. Hx Alzheimers    Pain:  Pain Assessment  Pain Assessment: CPOT  Pain Level: 0  Patient's Stated Pain Goal: Other (Comment) (AXEL)  RASS Score: Restless    Reason for Referral  Joya Eden was referred for a bedside swallow evaluation to assess the efficiency of her swallow function, identify signs and symptoms of aspiration and make recommendations regarding safe dietary consistencies, effective compensatory strategies, and safe eating environment. Impression  Dysphagia Diagnosis: Moderate oral stage dysphagia; Severe pharyngeal stage dysphagia; Suspected needs further assessment  Dysphagia Impression : Pt alert and awake, very hard of hearing, follows one step motor commands with repetition d/t hearing, oriented to self and birthdate only. On room air with stable O2 saturations throughout; bilateral wrist restraints. She maintains alertness for adequate participation for exam, but needs frequent rest breaks; she demonstrates functional task recognition for PO acceptance from spoon and cup. · Suspected moderate oral and mod-severe pharyngeal phase dysphagia warranting further assessment, characterized by reduced lingual manipulation affecting bolus control and AP transit, suspected premature loss, delayed swallow initiation, and decreased laryngeal elevation. · Concern for reduced airway protection and delayed/reduced sensation to potential residue, posing increased risk for aspiration. Inconsistent delayed throat clear/cough with puree, nectar and honey via cup. · Recommend NPO and Modified Barium Swallow to fully assess integrity of pharyngeal phase of the swallow. D/W son and RN. Dysphagia Outcome Severity Scale: Level 1: Severe dysphagia- NPO.  Unable to tolerate any PO safely     Treatment Plan  Requires SLP Intervention: Yes  Duration/Frequency of Treatment: 3-5x/wk while on acute  D/C Recommendations: To be determined     Recommended Diet and Intervention  Diet Solids Recommendation: NPO  Liquid Consistency Recommendation: NPO  Recommended Form of Meds: Via alternative means of nutrition; may crush essential meds in puree if deemed indicated  Recommendations: Modified barium swallow study; Dysphagia treatment  Therapeutic Interventions: Therapeutic PO trials with SLP    Compensatory Swallowing Strategies  Oral care    Treatment/Goals  Dysphagia Goals: The patient will tolerate repeat BSE when able. ;The patient will tolerate instrumental swallowing procedure    General  Chart Reviewed: Yes  Behavior/Cognition: Alert; Cooperative;Confused; Requires cueing  Respiratory Status: Room air  Communication Observation:  (simple responses; very [place]; oriented to name, birthdate only; baseline dementia)  Follows Directions: Simple (hard of hearing impacts)  Dentition: Edentulous (dentures at bedside but not inserted for assessment)  Patient Positioning: Upright in bed  Baseline Vocal Quality: Dysphonic  Volitional Cough: Strong  Volitional Swallow: Delayed  Consistencies Administered: Dysphagia Pureed (Dysphagia I); Honey - cup;Nectar - cup; Ice Chips     Vision/Hearing  Vision  Vision:  (functional for assessment needs)  Hearing  Hearing:  (very hard of hearing)    Oral Motor Deficits  Oral/Motor  Oral Motor: Exceptions to [facility] (limited assessment 2/2 hard of hearing)  Lingual ROM: Reduced left; Reduced right  Lingual Strength: Reduced  Lingual Coordination: Reduced  Gag: Unable to assess    Oral Phase Dysfunction  Oral Phase  Oral Phase: Exceptions  Oral Phase Dysfunction  Decreased Anterior to Posterior Transit: All  Suspected Premature Bolus Loss: Honey; Nectar;Ice chips     Indicators of Pharyngeal Phase Dysfunction   Pharyngeal Phase  Pharyngeal Phase: Exceptions  Indicators of Pharyngeal Phase Dysfunction  Delayed Swallow:  All  Decreased Laryngeal Elevation: All  Cough - Delayed: Puree; Honey; Nectar    Prognosis  Prognosis  Prognosis for safe diet advancement: fair  Barriers to reach goals: age;cognitive deficits  Individuals consulted  Consulted and agree with results and recommendations: Patient; Family member;RN  Family member consulted: son Enriqueta Mckeon    Education  Patient Education: results  Patient Education Response: Needs reinforcement; No evidence of learning        Therapy Time  SLP Individual Minutes  Time In: 0930  Time Out: 1015  Minutes: 45     This note serves as a D/C Summary in the event that this patient is discharged prior to the next therapy session.     Santy Rain MS, CCC-SLP #[id number]  Speech Language Pathologist  2/18/2022 10:15 AM

## 2022-02-18 NOTE — PROGRESS NOTES
Reassessment unchanged, except she had been talking more this AM and saying, \"Get me out. Get me out\" or \"Leave me alone\". She had been so agitated and swinging her legs outside the bed. After this RN bathed her and cleaned her incontinent BM, she calmed-down. Her vitals had been stable.     Electronically signed by Tc Levin RN on 2/18/2022 at 4:32 AM

## 2022-02-18 NOTE — PROGRESS NOTES
Hospitalist Progress Note      PCP: Bong Lugo, APRN - CNP    Date of Admission: 2/9/2022      Subjective: Pt S/E. Pt more alert today. She says she's hungry. Medications:  Reviewed    Infusion Medications    sodium chloride      dextrose       Scheduled Medications    furosemide  40 mg IntraVENous Daily    metoprolol succinate  25 mg Oral Daily    apixaban  5 mg Oral BID    sodium chloride flush  5-40 mL IntraVENous 2 times per day    famotidine (PEPCID) injection  20 mg IntraVENous Daily    lactobacillus  2 capsule Oral BID WC    insulin lispro  0-6 Units SubCUTAneous Q4H     PRN Meds: sodium phosphate IVPB **OR** sodium phosphate IVPB, sodium chloride, sodium chloride flush, acetaminophen **OR** acetaminophen, glucose, dextrose, glucagon (rDNA), dextrose      Intake/Output Summary (Last 24 hours) at 2/18/2022 0803  Last data filed at 2/18/2022 4331  Gross per 24 hour   Intake 474.64 ml   Output 3885 ml   Net -3410.36 ml       Physical Exam Performed:    BP (!) 149/68   Pulse 87   Temp 98.6 °F (37 °C) (Axillary)   Resp 26   Ht 5' 3\" (1.6 m)   Wt 196 lb 13.9 oz (89.3 kg) Comment: 90.8 kg minus 1.5= 89.3 kg  SpO2 99%   BMI 34.87 kg/m²     General appearance: No acute distress, awake  HEENT: ngt in place, sclera anicteric  Respiratory: + tachypnea, Clear to auscultation, bilaterally without Rales/Wheezes/Rhonchi. Cardiovascular: irregular rhythm, regular rate with normal S1/S2   Abdomen: Soft, nondistended  Musculoskeletal: No clubbing, cyanosis   Skin: Skin color, texture, turgor normal.    Neurologic:  Moving her extremities spontaneously.   No focal deficits  Psychiatric: Awake, answers simple questions and follows some commands  Capillary Refill: Brisk,3 seconds, normal   Peripheral Pulses: +2 palpable, equal bilaterally       Labs:   Recent Labs     02/16/22  0535 02/17/22  0414 02/18/22  0418   WBC 6.2 7.8 6.3   HGB 9.7* 9.7* 10.1*   HCT 30.3* 31.4* 32.4*   * 160 167 Recent Labs     02/16/22  0535 02/17/22  0414 02/18/22  0418    142 142   K 4.4 4.2 3.7    105 105   CO2 24 25 26   BUN 23* 23* 25*   CREATININE 0.9 1.0 1.1   CALCIUM 9.1 9.3 8.9   PHOS 2.1* 2.7 2.9     Recent Labs     02/16/22  0535 02/17/22  0414 02/18/22  0418   AST 27 23 21   ALT 18 16 14   BILITOT 0.6 0.8 0.9   ALKPHOS 200* 183* 174*     No results for input(s): INR in the last 72 hours. No results for input(s): Karuna Chevak in the last 72 hours. Urinalysis:      Lab Results   Component Value Date    NITRU Negative 02/09/2022    WBCUA 99 02/09/2022    BACTERIA 1+ 02/09/2022    RBCUA  02/09/2022    BLOODU LARGE 02/09/2022    SPECGRAV 1.012 02/09/2022    GLUCOSEU 100 02/09/2022       Radiology:  XR CHEST PORTABLE   Final Result   Enteric feeding tube projects as transpyloric, at least to the 2nd portion of   the duodenum. Stable bibasilar opacities, likely pleural effusions with atelectasis or   airspace disease. XR CHEST PORTABLE   Final Result   Spectrum of findings would favor CHF and developing pulmonary edema or ARDS. An underlying viral pattern of pneumonia may be considered. MRI BRAIN WO CONTRAST   Final Result   Chronic microvascular disease and cerebral atrophy with remote right parietal   lobe infarct. Prominence of the ventricular system and correlation with symptomatology for   normal pressure hydrocephalus is needed. Fluid in the left mastoid air cells. Mild chronic sinusitis. RECOMMENDATIONS:   Unavailable         XR CHEST PORTABLE   Final Result   Improved aeration of the lungs. There is decreased pleural-parenchymal   disease         XR CHEST PORTABLE   Final Result   Appropriate positioning of PICC line. Slightly improved aeration of the   lungs. CT HEAD WO CONTRAST   Final Result   1. No acute intracranial abnormality. 2. Moderate chronic white matter microvascular ischemic changes.    3. 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 XR CHEST PORTABLE   Final Result   Interval placement of a nasogastric tube and endotracheal tubes which are in   appropriate position. Mild edema with small bilateral pleural effusions and adjacent bibasilar   airspace disease, likely atelectasis in the absence of clinical signs of   pneumonia. XR CHEST PORTABLE   Final Result   Moderate pulmonary vascular congestive change. Cardiomegaly. Small-to-moderate right pleural effusion. CT HEAD WO CONTRAST   Final Result   No acute intracranial abnormality. Remote right parietal craniotomy and encephalomalacia in the right   temporoparietal region, similar in appearance. Hospital course:  A 81 yo female admitted with agitation, possibly increased confusion over her baseline. Her Son had sent patient for evaluation for possible UTI or possible concern for congestive heart failure. Patient apparently had a witnessed seizure in the ED and then became hypoxic and went into bradycardia, progressed to asystole. Pulse was noted to be lost but was never shocked, was resuscitated after about 1 to 2 minutes of CPR and 1 round of epi. she was intubated in the ED. Since then she has been treated for a uti with cefepime, and an isacc with ivf. Neurology was consulted as well. Per dc summary, one year ago. \"An 81 Yo female admitted with a uti and hypoactive delirium.   This has been a recurrent cycle for Mrs Brodie Barnett. She was recently discharged her on Feb 16 and very similar admission in Dec as well. she has end stage Alzheimer's with UTIs unfortunately just adding to her mental status limitations. During her last admission her depakote was stopped due to oversedation and treated her with nightly seroquel with very good response.  She spends most of her days sleeping - not eating or drinking - then develops UTI due to poor urine output and returns to the hospital.     Placement was discussed with son at her last admission - he refused. He also appealed discharge last time, though I am still not sure what his true expectation was. He thought she will become \"normal\", though I am not sure what he meant by that with severe and progressive baseline underlying dementia. At the very least he is unrealistic in his expectation. \"       Assessment/Plan:    Active Hospital Problems    Diagnosis     Acute respiratory failure with hypoxia and hypercapnia (HCC) [J96.01, J96.02]     Acute cystitis without hematuria [N30.00]     RHIANNA (acute kidney injury) ([address] [N17.9]     Cardiopulmonary arrest with successful resuscitation ([address] [I46.9]     Cardiac arrest ([address] [I46.9]     Seizure ([address] [R56.9]     CHF (congestive heart failure) ([address] [I50.9]     Atrial fibrillation ([address] [I48.91]     AMS (altered mental status) [R41.82]     Chronic atrial fibrillation ([address] [I48.20]     Septic shock ([address] [A41.9, X05.13]     Metabolic encephalopathy [S45.11]      Acute encephalopathy - slowly improving, but now with multifactorial delirium due to acute medical issues, s/p cardiac arrest, sedation, on baseline dementia - will likely have a new baseline, which was already poor mental status and functioning   - toxic vs metabolic, due to infection  - head CT negative  - cefepime stopped  - nh3 normal  - treat associated conditions  - avoid sedating meds as able  - supportive care  - needs st, pt, ot and oob  - mbs today, hope to advance to po diet    Acute hypoxic, hypercarbic respiratory failure - extubated 2/13 now on 1L - room air  - cont lasix 40 mg iv  - I/os: - 3.4L x 24 hrs, still + 6.5L    Cardiac arrest  - no clear etiology, happened after seizure  - per cardiology, apparent no cardiac cause.     Seizure - no further events  - several factors were present that could lower the seizure threshold: uti, sepsis, on concomitant encephalomalacia   - mri without acute findings   - neurology consulted  - EEG without epilptiform activity, slowing    UTI with likely septic shock - improved  - completed cefepime  - urine culture negative    Metabolic acidosis  - likely due to shock, treated with ivf    Chronic conditions - continue home medications unless otherwise stated  A. fib       Diet: Diet NPO  ADULT TUBE FEEDING; Nasoenteric; Standard with Fiber; Continuous; 25; Yes; 25; Q 4 hours; 50; 30; Q 4 hours  Code Status: Full Code  Dispo: very poor. I have had code status discussion with her son in the past (see admission notes 4/2021) and she was made dnr-cca, but he was still hoping she would recover. Her status has only worsened.  I will consult palliative medicine now       Celia Alex, DO

## 2022-02-18 NOTE — PROGRESS NOTES
Had a small, soft, and brown incontinent BM. Munira care and bed linen change were done. She was very agitated all throughout the process, even if this RN continuously was orienting her of what was being done. Monitored closely.     Electronically signed by Tc Levin RN on 2/18/2022 at 1:02 AM

## 2022-02-18 NOTE — PROCEDURES
[facility]   Speech Therapy   MODIFIED BARIUM SWALLOW      Kim Adorno  AGE: 80 y.o. 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 PAST SURGICAL HISTORY  History reviewed. No pertinent surgical history. ALLERGIES  Allergies   Allergen Reactions    Chlorpromazine Other (See Comments)    Hydrochlorothiazide Other (See Comments)     Gout     Hospital course:  A 81 yo female admitted with agitation, possibly increased confusion over her baseline. Her Son had sent patient for evaluation for possible UTI or possible concern for congestive heart failure.  Patient apparently had a witnessed seizure in the ED and then became hypoxic and went into bradycardia, progressed to asystole.  Pulse was noted to be lost but was never shocked, was resuscitated after about 1 to 2 minutes of CPR and 1 round of epi. she was intubated in the ED. Since then she has been treated for a uti with cefepime, and an isacc with ivf. Neurology was consulted as well.   Per dc summary, one year ago. \"An 81 Yo female admitted with a uti and hypoactive delirium.   This has been a recurrent cycle for Mrs Victor Hugo Cruz. She was recently discharged her on  and very similar admission in Dec as well. she has end stage Alzheimer's with UTIs unfortunately just adding to her mental status limitations. During her last admission her depakote was stopped due to oversedation and treated her with nightly seroquel with very good response.  She spends most of her days sleeping - not eating or drinking - then develops UTI due to poor urine output and returns to the hospital.  Placement was discussed with son at her last admission - he refused. He also appealed discharge last time, though I am still not sure what his true expectation was. He thought she will become \"normal\", though I am not sure what he meant by that with severe and progressive baseline underlying dementia. At the very least he is unrealistic in his expectation. \"   · Intubated 2/9-2/13/2022     Dysphagia Hx:  · Feb 2021 at [facility]: Recommended minced/moist and thin liquids at time of discharge; Dysphagia status stable/slowly resolving as cog status improved     Recent Chest Xray 2/13/2022  Impression   Enteric feeding tube projects as transpyloric, at least to the 2nd portion of   the duodenum.       Stable bibasilar opacities, likely pleural effusions with atelectasis or   airspace disease.      MRI Brain 2/11/2022  Impression   Chronic microvascular disease and cerebral atrophy with remote right parietal   lobe infarct.       Prominence of the ventricular system and correlation with symptomatology for   normal pressure hydrocephalus is needed.       Fluid in the left mastoid air cells.       Mild chronic sinusitis.        Subjective:     Reason for referral: Pipe Hines was referred for a Modified Barium Swallow study to assess  the efficiency of swallow function, rule out aspiration and make recommendations regarding safe dietary consistencies, effective compensatory strategies, and safe eating environment. PATIENT AND FAMILY / STAFF COMPLAINTS:   Concern for silent aspiration at bedside 2/18 AM; inconsistent throat clear with all PO   Diet prior to study:   Current Diet Solid Consistency: NPO  Current Diet Liquid Consistency: NPO    Objective: Impressions and Recommendations     IMPRESSIONS:    1. Behavior/Cognition: Alert,Cooperative,Confused,Requires cueing.   Initially uncooperative for PO trials, but once able to self-administer PO, pt accepted all trials. Pt is very [place] and had difficulty following commands. Dentures not present for exam; son previously reported pt takes PO at home with dentures. 2.Dysphagia Diagnosis: Moderate oral and mild pharyngeal phase dysphagia characterized by prolonged mastication of soft solid, reduced lingual manipulation with all, reduced bolus formation of textured solid, reduced bolus control, reduced AP transit with episodic lingual pumping, delayed swallow initiation and decreased laryngeal elevation. · Premature loss to valleculae with all  · Oral residue with soft solid > minced/moist solid  · NO penetration or aspiration; trace episodic vallecular residue  · Suspect potential for solid texture upgrade once dentures are present and pt's generalized weakness improves    Dysphagia Score: Dysphagia Outcome Severity Scale: Level 4: Mild moderate dysphagia- Intermittent supervision/cueing. One - two diet consistencies restricted    Aspiration/Penetration Risk:   Penetration-Aspiration Scale (PAS): 1 - Material does not enter the airway    Diet Recommendations:  Solid consistency: Dysphagia Minced and Moist (Dysphagia II)   Liquid consistency: Thin   Medication administration: PO (one at a time in puree or with liquid)     Compensatory Swallow Strategies:   Compensatory Swallowing Strategies: Upright as possible for all oral intake,Remain upright for 30-45 minutes after meals,Small bites/sips,Eat/Feed slowly    Therapy:  Requires SLP Intervention: Yes  Duration/Frequency of Treatment: 3-5x/wk while on acute    Therapy Interventions:   Therapeutic Interventions: Diet tolerance monitoring,Therapeutic PO trials with SLP,Patient/Family education    Goals:   Dysphagia Goals:  The patient will tolerate recommended diet without observed clinical signs of aspiration,The patient/caregiver will demonstrate understanding of compensatory strategies for improved swallowing safety. ,The patient will tolerate mechanical soft foods 10/10. Prognosis:  Prognosis for safe diet advancement: fair  Barriers to reach goals: age,cognitive deficits  Consulted and agree with results and recommendations: Patient,RN    Education:  Consulted and agree with results and recommendations: Patient,RN  Patient Education: results, recommendations  Patient Education Response: Needs reinforcement,No evidence of learning    D/C Recommendations: D/C Recommendations: To be determined   Supervision: Close    Assessment: Test Data   General  Cognitive/Behavior  General  Chart Reviewed: Yes  Behavior/Cognition  Behavior/Cognition: Alert; Cooperative;Confused; Requires cueing    Vision/Hearing  Vision  Vision:  (functional for assessment needs)  Hearing  Hearing:  (very hard of hearing)    Consistencies Assessed    Consistencies Administered: Dysphagia Soft and Bite-Sized (Dysphagia III),Dysphagia Minced and Moist (Dysphagia II),Dysphagia Pureed (Dysphagia I),Nectar cup,Nectar straw,Thin cup,Thin straw    Positioning   Upright lateral in chair    Indicators of Oral Phase Dysfunction   Oral Preparation / Oral Phase  Oral Phase: Impaired  Oral Phase - Major Contributing Deficits  Poor Mastication: Soft solid  Weak Lingual Manipulation: Soft solid,Mechanical soft solid  Lingual Pumping: All  Reduced Posterior Propulsion: All  Reduced Bolus Control: All  Decreased Bolus Cohesion: Soft solid,Mechanical soft solid  Lingual / Palatal Residue: Soft solid,Mechanical soft solid  Reduced Tongue Base Retraction: All    Indicators of Pharyngeal Phase Dysfunction  Pharyngeal Phase  Pharyngeal Phase: Impaired  Pharyngeal Phase: Impaired  Pharyngeal Phase - Major Contributing Deficits  Delayed Swallow Initiation: All  Premature Spillage to Valleculae:  All  Reduced Laryngeal Elevation: All    Upper Esophageal Phase   Upper Esophageal Screen  Esophageal Screen: [facility]  Esophageal Screen: [facility]       MINUTES/CHARGES  Time In: 1415  SLP Individual Minutes  Time In: 6725  Time Out: 5662  Minutes: 40  Coded treatment time  5     Electronically signed by  Kayce Argueta MS, CCC-SLP #[id number]  Speech Language Pathologist    on 2/18/2022 at 2:58 PM

## 2022-02-18 NOTE — PROGRESS NOTES
Bedside shift hand-off done w/ off-going DANIA Herr. Pt. is awake, alert, w/ Atrial Fibrillation on the monitor, on room air, tachypneic (on the 30s- not new this shift), w/ intact RUE PICC line, and w/ a chong catheter.     Electronically signed by Mason Gresham RN on 2/17/2022 at 7:38 PM

## 2022-02-18 NOTE — PROGRESS NOTES
Speech Therapy at bedside this am for swallow evaluation. Recommendation is for a modified barium swallow this afternoon. Pt remains NPO for now. Pt is alert this am. Weaned to RA and sating > than 95%. Pt following commands and responsive to simple questions asked. She is oriented to self only. Staff reorients pt frequently. Pt attempts to pull at lines and tubes. BL wrist restraints continued. Pt's son Evelin Brenner at bedside, discussed plan of care, questions and concerned addressed.

## 2022-02-18 NOTE — PROGRESS NOTES
Pt transported via stretcher to radiology department for modified barium swallow. Pt returned at 1500. aCrol Dickson called and updated this RN on diet recommendations. Diet Recommendations:  Solid consistency: Dysphagia Minced and Moist (Dysphagia II)   Liquid consistency: Thin   Medication administration: PO (one at a time in puree or with liquid)     Pt reoriented to room and verbalized she was in the hospital. Linens and donna care completed. Pt agitated with turning in bed. Pt settled down once care completed. Currently resting comfortably in bed with eyes closed. Will continue to monitor.

## 2022-02-18 NOTE — PLAN OF CARE
Problem: Nutrition  Goal: Optimal nutrition therapy  Outcome: Ongoing   Nutrition Problem #1: Inadequate oral intake  Intervention: Food and/or Nutrient Delivery: Continue NPO  Nutritional Goals: new goal- intitiate most appropriate form of nutrition per MD

## 2022-02-19 LAB
A/G RATIO: 1 (ref 1.1–2.2)
ALBUMIN SERPL-MCNC: 2.9 G/DL (ref 3.4–5)
ALP BLD-CCNC: 192 U/L (ref 40–129)
ALT SERPL-CCNC: 15 U/L (ref 10–40)
ANION GAP SERPL CALCULATED.3IONS-SCNC: 9 MMOL/L (ref 3–16)
AST SERPL-CCNC: 24 U/L (ref 15–37)
BASOPHILS ABSOLUTE: 0 K/UL (ref 0–0.2)
BASOPHILS RELATIVE PERCENT: 0.4 %
BILIRUB SERPL-MCNC: 0.8 MG/DL (ref 0–1)
BUN BLDV-MCNC: 27 MG/DL (ref 7–20)
CALCIUM SERPL-MCNC: 9.1 MG/DL (ref 8.3–10.6)
CHLORIDE BLD-SCNC: 103 MMOL/L (ref 99–110)
CO2: 30 MMOL/L (ref 21–32)
CREAT SERPL-MCNC: 1.1 MG/DL (ref 0.6–1.2)
EOSINOPHILS ABSOLUTE: 0.2 K/UL (ref 0–0.6)
EOSINOPHILS RELATIVE PERCENT: 2.2 %
GFR AFRICAN AMERICAN: 56
GFR NON-AFRICAN AMERICAN: 47
GLUCOSE BLD-MCNC: 130 MG/DL (ref 70–99)
GLUCOSE BLD-MCNC: 132 MG/DL (ref 70–99)
GLUCOSE BLD-MCNC: 175 MG/DL (ref 70–99)
GLUCOSE BLD-MCNC: 63 MG/DL (ref 70–99)
GLUCOSE BLD-MCNC: 80 MG/DL (ref 70–99)
GLUCOSE BLD-MCNC: 80 MG/DL (ref 70–99)
GLUCOSE BLD-MCNC: 85 MG/DL (ref 70–99)
GLUCOSE BLD-MCNC: 96 MG/DL (ref 70–99)
HCT VFR BLD CALC: 34.5 % (ref 36–48)
HEMOGLOBIN: 10.9 G/DL (ref 12–16)
LYMPHOCYTES ABSOLUTE: 0.8 K/UL (ref 1–5.1)
LYMPHOCYTES RELATIVE PERCENT: 10.3 %
MAGNESIUM: 1.6 MG/DL (ref 1.8–2.4)
MCH RBC QN AUTO: 26.8 PG (ref 26–34)
MCHC RBC AUTO-ENTMCNC: 31.7 G/DL (ref 31–36)
MCV RBC AUTO: 84.5 FL (ref 80–100)
MONOCYTES ABSOLUTE: 1.2 K/UL (ref 0–1.3)
MONOCYTES RELATIVE PERCENT: 15.8 %
NEUTROPHILS ABSOLUTE: 5.6 K/UL (ref 1.7–7.7)
NEUTROPHILS RELATIVE PERCENT: 71.3 %
PDW BLD-RTO: 16.7 % (ref 12.4–15.4)
PERFORMED ON: ABNORMAL
PERFORMED ON: NORMAL
PHOSPHORUS: 2.8 MG/DL (ref 2.5–4.9)
PLATELET # BLD: 224 K/UL (ref 135–450)
PMV BLD AUTO: 9.3 FL (ref 5–10.5)
POTASSIUM SERPL-SCNC: 3.6 MMOL/L (ref 3.5–5.1)
RBC # BLD: 4.08 M/UL (ref 4–5.2)
SODIUM BLD-SCNC: 142 MMOL/L (ref 136–145)
TOTAL PROTEIN: 5.7 G/DL (ref 6.4–8.2)
WBC # BLD: 7.9 K/UL (ref 4–11)

## 2022-02-19 PROCEDURE — 2060000000 HC ICU INTERMEDIATE R&B

## 2022-02-19 PROCEDURE — 6370000000 HC RX 637 (ALT 250 FOR IP): Performed by: INTERNAL MEDICINE

## 2022-02-19 PROCEDURE — 6360000002 HC RX W HCPCS: Performed by: FAMILY MEDICINE

## 2022-02-19 PROCEDURE — 85025 COMPLETE CBC W/AUTO DIFF WBC: CPT

## 2022-02-19 PROCEDURE — 2500000003 HC RX 250 WO HCPCS: Performed by: INTERNAL MEDICINE

## 2022-02-19 PROCEDURE — 2580000003 HC RX 258: Performed by: STUDENT IN AN ORGANIZED HEALTH CARE EDUCATION/TRAINING PROGRAM

## 2022-02-19 PROCEDURE — 6370000000 HC RX 637 (ALT 250 FOR IP): Performed by: STUDENT IN AN ORGANIZED HEALTH CARE EDUCATION/TRAINING PROGRAM

## 2022-02-19 PROCEDURE — 2500000003 HC RX 250 WO HCPCS: Performed by: STUDENT IN AN ORGANIZED HEALTH CARE EDUCATION/TRAINING PROGRAM

## 2022-02-19 PROCEDURE — 6370000000 HC RX 637 (ALT 250 FOR IP): Performed by: FAMILY MEDICINE

## 2022-02-19 PROCEDURE — 84100 ASSAY OF PHOSPHORUS: CPT

## 2022-02-19 PROCEDURE — 80053 COMPREHEN METABOLIC PANEL: CPT

## 2022-02-19 PROCEDURE — 36592 COLLECT BLOOD FROM PICC: CPT

## 2022-02-19 PROCEDURE — 94760 N-INVAS EAR/PLS OXIMETRY 1: CPT

## 2022-02-19 PROCEDURE — 6370000000 HC RX 637 (ALT 250 FOR IP): Performed by: NURSE PRACTITIONER

## 2022-02-19 PROCEDURE — 83735 ASSAY OF MAGNESIUM: CPT

## 2022-02-19 RX ORDER — MAGNESIUM SULFATE IN WATER 40 MG/ML
4000 INJECTION, SOLUTION INTRAVENOUS ONCE
Status: COMPLETED | OUTPATIENT
Start: 2022-02-19 | End: 2022-02-19

## 2022-02-19 RX ADMIN — APIXABAN 5 MG: 5 TABLET, FILM COATED ORAL at 21:44

## 2022-02-19 RX ADMIN — APIXABAN 5 MG: 5 TABLET, FILM COATED ORAL at 07:57

## 2022-02-19 RX ADMIN — Medication 2 CAPSULE: at 17:16

## 2022-02-19 RX ADMIN — SODIUM CHLORIDE, PRESERVATIVE FREE 10 ML: 5 INJECTION INTRAVENOUS at 07:57

## 2022-02-19 RX ADMIN — MAGNESIUM SULFATE HEPTAHYDRATE 4000 MG: 40 INJECTION, SOLUTION INTRAVENOUS at 08:58

## 2022-02-19 RX ADMIN — SODIUM CHLORIDE, PRESERVATIVE FREE 10 ML: 5 INJECTION INTRAVENOUS at 21:44

## 2022-02-19 RX ADMIN — FUROSEMIDE 40 MG: 10 INJECTION, SOLUTION INTRAMUSCULAR; INTRAVENOUS at 07:57

## 2022-02-19 RX ADMIN — Medication 12.5 G: at 07:56

## 2022-02-19 RX ADMIN — INSULIN LISPRO 1 UNITS: 100 INJECTION, SOLUTION INTRAVENOUS; SUBCUTANEOUS at 11:33

## 2022-02-19 RX ADMIN — Medication 2 CAPSULE: at 07:57

## 2022-02-19 RX ADMIN — FAMOTIDINE 20 MG: 10 INJECTION, SOLUTION INTRAVENOUS at 07:57

## 2022-02-19 RX ADMIN — METOPROLOL SUCCINATE 25 MG: 25 TABLET, EXTENDED RELEASE ORAL at 07:57

## 2022-02-19 ASSESSMENT — PAIN SCALES - PAIN ASSESSMENT IN ADVANCED DEMENTIA (PAINAD)
FACIALEXPRESSION: 0
NEGVOCALIZATION: 0
CONSOLABILITY: 0
TOTALSCORE: 1
BREATHING: 1
BODYLANGUAGE: 0
TOTALSCORE: 0
BREATHING: 0
CONSOLABILITY: 0
FACIALEXPRESSION: 0
BODYLANGUAGE: 0
NEGVOCALIZATION: 0

## 2022-02-19 ASSESSMENT — PAIN SCALES - GENERAL
PAINLEVEL_OUTOF10: 0

## 2022-02-19 NOTE — PROGRESS NOTES
Pt with transfer orders to PCU. Bed ready 0871. Report called to [facility]. All questions answered. Pt transported with all belongs by burke RN and Garrett NAJERA to new room.   Bedside handoff with [facility]

## 2022-02-19 NOTE — PLAN OF CARE
Problem: SKIN INTEGRITY  Goal: Skin integrity is maintained or improved  2/19/2022 0935 by Kym Morales RN  Outcome: Ongoing   Skin assessment completed every shift. Pt assessed for incontinence, appropriate barrier cream applied prn. Pt encouraged to turn/rotate every 2 hours. Assistance provided if pt unable to do so themselves. Problem: Nutrition  Goal: Optimal nutrition therapy  2/19/2022 0935 by Kym Morales RN  Outcome: Ongoing     Problem: Pain:  Goal: Pain level will decrease  Description: Pain level will decrease  2/19/2022 0935 by Kym Morales RN  Outcome: Ongoing   Pain/discomfort being managed with PRN analgesics per MD orders. Pt able to express presence and absence of pain and rate pain appropriately using numerical scale. Problem: Falls - Risk of:  Goal: Will remain free from falls  Description: Will remain free from falls  2/19/2022 0935 by Kym Morales RN  Outcome: Ongoing   Fall risk assessment completed every shift. All precautions in place. Pt has call light within reach at all times. Room clear of clutter. Pt aware to call for assistance when getting up. Bed locked in lowest position, alarm engaged, call light within reach, will continue to monitor.

## 2022-02-19 NOTE — PROGRESS NOTES
Pt to 5111 via CVU bed with all personal belongings. Oriented to room and role as RN. Telemonitor on and verified by Shantelle. Pt transfered to bed, alert and oriented to place and person. Assessment of pt in progress. POC and education initiated per protocol. Call light within reach. Bed in lowest position and wheels locked. Room is free of clutter. Personal belongings within reach. No current complaints at this time. Pt denies pain, SOB, vomiting, nausea, diarrhea. Will continue to monitor.  Electronically signed by Adelfo Luke RN on 2/19/2022 at 7:52 AM

## 2022-02-19 NOTE — PLAN OF CARE
Problem: SKIN INTEGRITY  Goal: Skin integrity is maintained or improved  Outcome: Ongoing     Problem: Nutrition  Goal: Optimal nutrition therapy  Outcome: Ongoing     Problem: Non-Violent Restraints  Goal: Removal from restraints as soon as assessed to be safe  Outcome: Ongoing  Goal: No harm/injury to patient while restraints in use  Outcome: Ongoing  Goal: Patient's dignity will be maintained  Outcome: Ongoing     Problem: Pain:  Goal: Pain level will decrease  Outcome: Ongoing  Goal: Control of acute pain  Outcome: Ongoing  Goal: Control of chronic pain  Outcome: Ongoing     Problem: Falls - Risk of:  Goal: Will remain free from falls  Outcome: Ongoing  Goal: Absence of physical injury  Outcome: Ongoing     Problem: Skin Integrity:  Goal: Will show no infection signs and symptoms  Outcome: Ongoing  Goal: Absence of new skin breakdown  Outcome: Ongoing

## 2022-02-19 NOTE — PROGRESS NOTES
7.8 6.3 7.9   HGB 9.7* 10.1* 10.9*   HCT 31.4* 32.4* 34.5*    167 224     Recent Labs     02/17/22  0414 02/18/22 0418 02/19/22  0430    142 142   K 4.2 3.7 3.6    105 103   CO2 25 26 30   BUN 23* 25* 27*   CREATININE 1.0 1.1 1.1   CALCIUM 9.3 8.9 9.1   PHOS 2.7 2.9 2.8     Recent Labs     02/17/22  0414 02/18/22  0418 02/19/22  0430   AST 23 21 24   ALT 16 14 15   BILITOT 0.8 0.9 0.8   ALKPHOS 183* 174* 192*     No results for input(s): INR in the last 72 hours. No results for input(s): [place] in the last 72 hours. Urinalysis:      Lab Results   Component Value Date    NITRU Negative 02/09/2022    WBCUA 99 02/09/2022    BACTERIA 1+ 02/09/2022    RBCUA  02/09/2022    BLOODU LARGE 02/09/2022    SPECGRAV 1.012 02/09/2022    GLUCOSEU 100 02/09/2022       Radiology:  FL MODIFIED BARIUM SWALLOW W VIDEO   Final Result   Delayed initiation, without jacques aspiration. Please see separate speech pathology report for full discussion of findings   and recommendations. RECOMMENDATIONS:   Unavailable         XR CHEST PORTABLE   Final Result   Enteric feeding tube projects as transpyloric, at least to the 2nd portion of   the duodenum. Stable bibasilar opacities, likely pleural effusions with atelectasis or   airspace disease. XR CHEST PORTABLE   Final Result   Spectrum of findings would favor CHF and developing pulmonary edema or ARDS. An underlying viral pattern of pneumonia may be considered. MRI BRAIN WO CONTRAST   Final Result   Chronic microvascular disease and cerebral atrophy with remote right parietal   lobe infarct. Prominence of the ventricular system and correlation with symptomatology for   normal pressure hydrocephalus is needed. Fluid in the left mastoid air cells. Mild chronic sinusitis. RECOMMENDATIONS:   Unavailable         XR CHEST PORTABLE   Final Result   Improved aeration of the lungs.   There is decreased pleural-parenchymal   disease         XR CHEST PORTABLE   Final Result   Appropriate positioning of PICC line. Slightly improved aeration of the   lungs. CT HEAD WO CONTRAST   Final Result   1. No acute intracranial abnormality. 2. Moderate chronic white matter microvascular ischemic changes. 3. 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 XR CHEST PORTABLE   Final Result   Interval placement of a nasogastric tube and endotracheal tubes which are in   appropriate position. Mild edema with small bilateral pleural effusions and adjacent bibasilar   airspace disease, likely atelectasis in the absence of clinical signs of   pneumonia. XR CHEST PORTABLE   Final Result   Moderate pulmonary vascular congestive change. Cardiomegaly. Small-to-moderate right pleural effusion. CT HEAD WO CONTRAST   Final Result   No acute intracranial abnormality. Remote right parietal craniotomy and encephalomalacia in the right   temporoparietal region, similar in appearance. Hospital course:  A 79 yo female admitted with agitation, possibly increased confusion over her baseline. Her Son had sent patient for evaluation for possible UTI or possible concern for congestive heart failure. Patient apparently had a witnessed seizure in the ED and then became hypoxic and went into bradycardia, progressed to asystole. Pulse was noted to be lost but was never shocked, was resuscitated after about 1 to 2 minutes of CPR and 1 round of epi. she was intubated in the ED. Since then she has been treated for a uti with cefepime, and an isacc with ivf. Neurology was consulted as well. Per dc summary, one year ago. \"An 81 Yo female admitted with a uti and hypoactive delirium.   This has been a recurrent cycle for Mrs Shantel Alfaro.  She was recently discharged her on Feb 16 and very similar admission in Dec as well. she has end stage Alzheimer's with UTIs unfortunately just adding to her mental status limitations. During her last admission her depakote was stopped due to oversedation and treated her with nightly seroquel with very good response. She spends most of her days sleeping - not eating or drinking - then develops UTI due to poor urine output and returns to the hospital.     Placement was discussed with son at her last admission - he refused. He also appealed discharge last time, though I am still not sure what his true expectation was. He thought she will become \"normal\", though I am not sure what he meant by that with severe and progressive baseline underlying dementia. At the very least he is unrealistic in his expectation. \"       Assessment/Plan:    Active Hospital Problems    Diagnosis     Acute respiratory failure with hypoxia and hypercapnia (HCC) [J96.01, J96.02]     Acute cystitis without hematuria [N30.00]     RHIANNA (acute kidney injury) ([address] [N17.9]     Cardiopulmonary arrest with successful resuscitation ([address] [I46.9]     Cardiac arrest ([address] [I46.9]     Seizure ([address] [R56.9]     CHF (congestive heart failure) ([address] [I50.9]     Atrial fibrillation ([address] [I48.91]     AMS (altered mental status) [R41.82]     Chronic atrial fibrillation ([address] [I48.20]     Septic shock ([address] [A41.9, G16.67]     Metabolic encephalopathy [R87.84]      Acute encephalopathy - slowly improving, but now with multifactorial delirium due to acute medical issues, s/p cardiac arrest, sedation, on baseline dementia - will likely have a new baseline, which was already poor mental status and functioning   - toxic vs metabolic, due to infection  - head CT negative  - cefepime stopped  - nh3 normal  - treat associated conditions  - avoid sedating meds as able  - supportive care  - needs st, pt, ot and oob  - mbs completed, on minced and moist diet, thin liquids     Acute hypoxic, hypercarbic respiratory failure   - extubated 2/13 now room air  - cont lasix 40 mg iv  -

## 2022-02-19 NOTE — PROGRESS NOTES
Pt stable overnight. Continues to only be alert to self but more awake. Became agitated at one time overnight but was able to be soothed. Afib on monitor. Adequate urine output. Stable Vitals. Tolerating minced and moist diet.

## 2022-02-19 NOTE — PROGRESS NOTES
4 Eyes Skin Assessment     NAME:  He Wasserman  YOB: 1931  MEDICAL RECORD NUMBER:  [id number]    The patient is being assess for  Transfer to New Unit    I agree that 2 RN's have performed a thorough Head to Toe Skin Assessment on the patient. ALL assessment sites listed below have been assessed. Areas assessed by both nurses:    Head, Face, Ears, Shoulders, Back, Chest, Arms, Elbows, Hands, Sacrum. Buttock, Coccyx, Ischium and Legs. Feet and Heels        Does the Patient have a Wound? Yes wound(s) were present on assessment.  LDA wound assessment was Initiated and completed        Eddie Prevention initiated:  Yes   Wound Care Orders initiated:  No    Pressure Injury (Stage 3,4, Unstageable, DTI, NWPT, and Complex wounds) if present place consult order under [de-identified] No    New and Established Ostomies if present place consult order under : No      Nurse 1 eSignature: Electronically signed by John Pacheco RN on 2/19/22 at 7:52 AM EST    **SHARE this note so that the co-signing nurse is able to place an eSignature**    Nurse 2 eSignature: Electronically signed by Jennyfer Martin RN on 2/19/22 at 7:57 AM EST

## 2022-02-19 NOTE — CARE COORDINATION
MD planning on d/c Sunday 2/20 with home care and reports sons aware and in agreement. Plan is [facility] per notes.      Electronically signed by Margarita Murillo on 2/19/2022 at 5:33 PM

## 2022-02-20 LAB
A/G RATIO: 1.1 (ref 1.1–2.2)
ALBUMIN SERPL-MCNC: 2.9 G/DL (ref 3.4–5)
ALP BLD-CCNC: 160 U/L (ref 40–129)
ALT SERPL-CCNC: 12 U/L (ref 10–40)
ANION GAP SERPL CALCULATED.3IONS-SCNC: 13 MMOL/L (ref 3–16)
AST SERPL-CCNC: 20 U/L (ref 15–37)
BASOPHILS ABSOLUTE: 0.1 K/UL (ref 0–0.2)
BASOPHILS RELATIVE PERCENT: 0.8 %
BILIRUB SERPL-MCNC: 0.7 MG/DL (ref 0–1)
BUN BLDV-MCNC: 23 MG/DL (ref 7–20)
CALCIUM SERPL-MCNC: 8.8 MG/DL (ref 8.3–10.6)
CHLORIDE BLD-SCNC: 103 MMOL/L (ref 99–110)
CO2: 28 MMOL/L (ref 21–32)
CREAT SERPL-MCNC: 1.1 MG/DL (ref 0.6–1.2)
EOSINOPHILS ABSOLUTE: 0.1 K/UL (ref 0–0.6)
EOSINOPHILS RELATIVE PERCENT: 1.9 %
GFR AFRICAN AMERICAN: 56
GFR NON-AFRICAN AMERICAN: 47
GLUCOSE BLD-MCNC: 102 MG/DL (ref 70–99)
GLUCOSE BLD-MCNC: 102 MG/DL (ref 70–99)
GLUCOSE BLD-MCNC: 62 MG/DL (ref 70–99)
GLUCOSE BLD-MCNC: 88 MG/DL (ref 70–99)
GLUCOSE BLD-MCNC: 89 MG/DL (ref 70–99)
GLUCOSE BLD-MCNC: 92 MG/DL (ref 70–99)
GLUCOSE BLD-MCNC: 96 MG/DL (ref 70–99)
HCT VFR BLD CALC: 34.1 % (ref 36–48)
HEMOGLOBIN: 10.8 G/DL (ref 12–16)
LYMPHOCYTES ABSOLUTE: 0.8 K/UL (ref 1–5.1)
LYMPHOCYTES RELATIVE PERCENT: 11.5 %
MAGNESIUM: 1.9 MG/DL (ref 1.8–2.4)
MAGNESIUM: 2.1 MG/DL (ref 1.8–2.4)
MCH RBC QN AUTO: 26.9 PG (ref 26–34)
MCHC RBC AUTO-ENTMCNC: 31.8 G/DL (ref 31–36)
MCV RBC AUTO: 84.7 FL (ref 80–100)
MONOCYTES ABSOLUTE: 1 K/UL (ref 0–1.3)
MONOCYTES RELATIVE PERCENT: 14.3 %
NEUTROPHILS ABSOLUTE: 5.1 K/UL (ref 1.7–7.7)
NEUTROPHILS RELATIVE PERCENT: 71.5 %
PDW BLD-RTO: 16.9 % (ref 12.4–15.4)
PERFORMED ON: ABNORMAL
PERFORMED ON: ABNORMAL
PERFORMED ON: NORMAL
PHOSPHORUS: 2.4 MG/DL (ref 2.5–4.9)
PLATELET # BLD: 241 K/UL (ref 135–450)
PMV BLD AUTO: 9.3 FL (ref 5–10.5)
POTASSIUM SERPL-SCNC: 3.3 MMOL/L (ref 3.5–5.1)
RBC # BLD: 4.03 M/UL (ref 4–5.2)
SODIUM BLD-SCNC: 144 MMOL/L (ref 136–145)
TOTAL PROTEIN: 5.5 G/DL (ref 6.4–8.2)
WBC # BLD: 7.1 K/UL (ref 4–11)

## 2022-02-20 PROCEDURE — 85025 COMPLETE CBC W/AUTO DIFF WBC: CPT

## 2022-02-20 PROCEDURE — 6370000000 HC RX 637 (ALT 250 FOR IP): Performed by: INTERNAL MEDICINE

## 2022-02-20 PROCEDURE — 6370000000 HC RX 637 (ALT 250 FOR IP): Performed by: FAMILY MEDICINE

## 2022-02-20 PROCEDURE — 6370000000 HC RX 637 (ALT 250 FOR IP): Performed by: NURSE PRACTITIONER

## 2022-02-20 PROCEDURE — 83735 ASSAY OF MAGNESIUM: CPT

## 2022-02-20 PROCEDURE — 80053 COMPREHEN METABOLIC PANEL: CPT

## 2022-02-20 PROCEDURE — 36592 COLLECT BLOOD FROM PICC: CPT

## 2022-02-20 PROCEDURE — 2500000003 HC RX 250 WO HCPCS: Performed by: INTERNAL MEDICINE

## 2022-02-20 PROCEDURE — 94760 N-INVAS EAR/PLS OXIMETRY 1: CPT

## 2022-02-20 PROCEDURE — 2060000000 HC ICU INTERMEDIATE R&B

## 2022-02-20 PROCEDURE — 2580000003 HC RX 258: Performed by: STUDENT IN AN ORGANIZED HEALTH CARE EDUCATION/TRAINING PROGRAM

## 2022-02-20 PROCEDURE — 84100 ASSAY OF PHOSPHORUS: CPT

## 2022-02-20 RX ADMIN — APIXABAN 5 MG: 5 TABLET, FILM COATED ORAL at 08:25

## 2022-02-20 RX ADMIN — SODIUM CHLORIDE, PRESERVATIVE FREE 10 ML: 5 INJECTION INTRAVENOUS at 08:25

## 2022-02-20 RX ADMIN — METOPROLOL SUCCINATE 25 MG: 25 TABLET, EXTENDED RELEASE ORAL at 08:25

## 2022-02-20 RX ADMIN — Medication 2 CAPSULE: at 08:25

## 2022-02-20 RX ADMIN — FAMOTIDINE 20 MG: 10 INJECTION, SOLUTION INTRAVENOUS at 08:25

## 2022-02-20 RX ADMIN — SODIUM CHLORIDE, PRESERVATIVE FREE 10 ML: 5 INJECTION INTRAVENOUS at 21:10

## 2022-02-20 ASSESSMENT — PAIN SCALES - PAIN ASSESSMENT IN ADVANCED DEMENTIA (PAINAD)
BREATHING: 0
TOTALSCORE: 0
FACIALEXPRESSION: 0
NEGVOCALIZATION: 0
FACIALEXPRESSION: 0
FACIALEXPRESSION: 0
BODYLANGUAGE: 0
CONSOLABILITY: 0
NEGVOCALIZATION: 0
TOTALSCORE: 0
NEGVOCALIZATION: 0
CONSOLABILITY: 0
BODYLANGUAGE: 0
BREATHING: 0
BREATHING: 0
TOTALSCORE: 0
BODYLANGUAGE: 0
CONSOLABILITY: 0

## 2022-02-20 ASSESSMENT — PAIN SCALES - GENERAL
PAINLEVEL_OUTOF10: 0
PAINLEVEL_OUTOF10: 0

## 2022-02-20 NOTE — DISCHARGE INSTR - COC
Continuity of Care Form    Patient Name: Oscar Almazan   :  1931  MRN:  [id number]    Admit date:  2022  Discharge date:  22    Code Status Order: Full Code   Advance Directives:      Admitting Physician:  Saintclair Donalds, DO  PCP: TASHI Araujo CNP    Discharging Nurse: [facility] Unit/Room#: [address] Unit Phone Number: [phone]   Emergency Contact:   Extended Emergency Contact Information  Primary Emergency Contact: Lucius Gomessher, [address] [facility] Phone: [phone]  Mobile Phone: [phone]  Relation: Child   needed? No  Secondary Emergency Contact: [address] [facility] Phone: [phone]  Relation: Child    Past Surgical History:  History reviewed. No pertinent surgical history. Immunization History: There is no immunization history for the selected administration types on file for this patient. Active Problems:  Patient Active Problem List   Diagnosis Code    Acute on chronic combined systolic and diastolic CHF, NYHA class 3 ([facility]) I50.43    Dementia in Alzheimer's disease with delirium ([address] G30.9, F02.80, F05    Acute pyelonephritis N10    Essential hypertension I10    Current use of long term anticoagulation Z79.01    Mild malnutrition ([address] E44.1    Fall at home, initial encounter [address] Mandy William, B27.457    Metabolic encephalopathy M00.63    Acute on chronic congestive heart failure (HCC) I50.9    Septic shock ([facility]) A41.9, R65.21    Bilateral pleural effusion J90    Chronic atrial fibrillation (HCC) I48.20    Acute encephalopathy G93.40    Ataxia R27.0    Pleural effusion J90    CHF (congestive heart failure), NYHA class I, acute on chronic, combined ([facility]) I50.43    AMS (altered mental status) R41.82    Cardiac arrest ([facility]) I46.9    Seizure ([facility]) R56.9    CHF (congestive heart failure) (HCC) I50.9    Atrial fibrillation ([address] I48.91    Cardiopulmonary arrest with successful resuscitation ([address] I46.9    Acute respiratory failure with SIGNATURE:  Electronically signed by Maria Luisa Mitchell RN on 2/22/22 at 12:56 PM EST    CASE MANAGEMENT/SOCIAL WORK SECTION    Inpatient Status Date: 2/9/22    Readmission Risk Assessment Score:  Readmission Risk              Risk of Unplanned Readmission:  29         Discharging to Facility/ Agency   Name:  [facility]    Address: [address] [place] [address], [address] [place] [address]  Phone: [phone]  Fax: [phone]     / signature: Electronically signed by Stella Escalera RN on 2/22/22 at 1:13 PM EST    PHYSICIAN SECTION    Prognosis: Guarded    Condition at Discharge: Stable    Rehab Potential (if transferring to Rehab): Fair    Recommended Labs or Other Treatments After Discharge: pt,  ot, speech therapy, nurse    Physician Certification: I certify the above information and transfer of Crystal Lawson  is necessary for the continuing treatment of the diagnosis listed and that she requires [place] for less 30 days.      Update Admission H&P: No change in H&P    PHYSICIAN SIGNATURE:  Electronically signed by María Elena Suárez DO on 2/20/22 at 12:31 PM EST

## 2022-02-20 NOTE — PROGRESS NOTES
Hospitalist Progress Note      PCP: TASHI Devine - CNP    Date of Admission: 2/9/2022    Subjective: Pt S/E. Pt wake today. Her son now says she is too weak to take home and wants a snf at [place]. She was feeding herself yesterday but is still very weak. Medications:  Reviewed    Infusion Medications    sodium chloride      dextrose       Scheduled Medications    [START ON 2/21/2022] rivaroxaban  15 mg Oral Daily with breakfast    metoprolol succinate  25 mg Oral Daily    sodium chloride flush  5-40 mL IntraVENous 2 times per day    famotidine (PEPCID) injection  20 mg IntraVENous Daily    lactobacillus  2 capsule Oral BID WC    insulin lispro  0-6 Units SubCUTAneous Q4H     PRN Meds: ondansetron, sodium phosphate IVPB **OR** sodium phosphate IVPB, sodium chloride, sodium chloride flush, acetaminophen **OR** acetaminophen, glucose, dextrose, glucagon (rDNA), dextrose      Intake/Output Summary (Last 24 hours) at 2/20/2022 1231  Last data filed at 2/20/2022 1026  Gross per 24 hour   Intake 261.86 ml   Output 1200 ml   Net -938.14 ml       Physical Exam Performed:    BP (!) 145/78   Pulse 79   Temp 97.5 °F (36.4 °C) (Axillary)   Resp 14   Ht 5' 3\" (1.6 m)   Wt 189 lb 6 oz (85.9 kg)   SpO2 92%   BMI 33.55 kg/m²     General appearance: No acute distress, appears comfortable  HEENT: ngt in place, sclera anicteric  Respiratory: Clear to auscultation, bilaterally without Rales/Wheezes/Rhonchi. No accessory muscle use  Cardiovascular: irregular rhythm, regular rate with normal S1/S2   Abdomen: Soft, nondistended  Musculoskeletal: No clubbing, cyanosis   Skin: Skin color, texture, turgor normal.    Neurologic:  Moving her extremities spontaneously.   No focal deficits  Psychiatric: Awake, oriented to name, place, answers simple questions and follows commands  Capillary Refill: Brisk,3 seconds, normal   Peripheral Pulses: +2 palpable, equal bilaterally       Labs:   Recent Labs     02/18/22  2116 02/19/22 0430   WBC 6.3 7.9   HGB 10.1* 10.9*   HCT 32.4* 34.5*    224     Recent Labs     02/18/22  0418 02/19/22 0430    142   K 3.7 3.6    103   CO2 26 30   BUN 25* 27*   CREATININE 1.1 1.1   CALCIUM 8.9 9.1   PHOS 2.9 2.8     Recent Labs     02/18/22  0418 02/19/22 0430   AST 21 24   ALT 14 15   BILITOT 0.9 0.8   ALKPHOS 174* 192*     No results for input(s): INR in the last 72 hours. No results for input(s): Ellene Lazier in the last 72 hours. Urinalysis:      Lab Results   Component Value Date    NITRU Negative 02/09/2022    WBCUA 99 02/09/2022    BACTERIA 1+ 02/09/2022    RBCUA  02/09/2022    BLOODU LARGE 02/09/2022    SPECGRAV 1.012 02/09/2022    GLUCOSEU 100 02/09/2022       Radiology:  FL MODIFIED BARIUM SWALLOW W VIDEO   Final Result   Delayed initiation, without jacques aspiration. Please see separate speech pathology report for full discussion of findings   and recommendations. RECOMMENDATIONS:   Unavailable         XR CHEST PORTABLE   Final Result   Enteric feeding tube projects as transpyloric, at least to the 2nd portion of   the duodenum. Stable bibasilar opacities, likely pleural effusions with atelectasis or   airspace disease. XR CHEST PORTABLE   Final Result   Spectrum of findings would favor CHF and developing pulmonary edema or ARDS. An underlying viral pattern of pneumonia may be considered. MRI BRAIN WO CONTRAST   Final Result   Chronic microvascular disease and cerebral atrophy with remote right parietal   lobe infarct. Prominence of the ventricular system and correlation with symptomatology for   normal pressure hydrocephalus is needed. Fluid in the left mastoid air cells. Mild chronic sinusitis. RECOMMENDATIONS:   Unavailable         XR CHEST PORTABLE   Final Result   Improved aeration of the lungs.   There is decreased pleural-parenchymal   disease         XR CHEST PORTABLE   Final Result   Appropriate positioning of PICC line. Slightly improved aeration of the   lungs. CT HEAD WO CONTRAST   Final Result   1. No acute intracranial abnormality. 2. Moderate chronic white matter microvascular ischemic changes. 3. 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 XR CHEST PORTABLE   Final Result   Interval placement of a nasogastric tube and endotracheal tubes which are in   appropriate position. Mild edema with small bilateral pleural effusions and adjacent bibasilar   airspace disease, likely atelectasis in the absence of clinical signs of   pneumonia. XR CHEST PORTABLE   Final Result   Moderate pulmonary vascular congestive change. Cardiomegaly. Small-to-moderate right pleural effusion. CT HEAD WO CONTRAST   Final Result   No acute intracranial abnormality. Remote right parietal craniotomy and encephalomalacia in the right   temporoparietal region, similar in appearance. Hospital course:  A 79 yo female admitted with agitation, possibly increased confusion over her baseline. Her Son had sent patient for evaluation for possible UTI or possible concern for congestive heart failure. Patient apparently had a witnessed seizure in the ED and then became hypoxic and went into bradycardia, progressed to asystole. Pulse was noted to be lost but was never shocked, was resuscitated after about 1 to 2 minutes of CPR and 1 round of epi. she was intubated in the ED. Since then she has been treated for a uti with cefepime, and an isacc with ivf. Neurology was consulted as well. Per dc summary, one year ago. \"An 79 Yo female admitted with a uti and hypoactive delirium.   This has been a recurrent cycle for Mrs Adriana Harper. She was recently discharged her on Feb 16 and very similar admission in Dec as well. she has end stage Alzheimer's with UTIs unfortunately just adding to her mental status limitations.    During her last admission her depakote was stopped due to oversedation and treated her with nightly seroquel with very good response. She spends most of her days sleeping - not eating or drinking - then develops UTI due to poor urine output and returns to the hospital.     Placement was discussed with son at her last admission - he refused. He also appealed discharge last time, though I am still not sure what his true expectation was. He thought she will become \"normal\", though I am not sure what he meant by that with severe and progressive baseline underlying dementia. At the very least he is unrealistic in his expectation. \"   He now wants to try to dc to snf at [place]. Assessment/Plan:    Active Hospital Problems    Diagnosis     Acute respiratory failure with hypoxia and hypercapnia (HCC) [J96.01, J96.02]     Acute cystitis without hematuria [N30.00]     RHIANNA (acute kidney injury) ([address] [N17.9]     Cardiopulmonary arrest with successful resuscitation ([address] [I46.9]     Cardiac arrest ([address] [I46.9]     Seizure ([address] [R56.9]     CHF (congestive heart failure) ([address] [I50.9]     Atrial fibrillation ([address] [I48.91]     AMS (altered mental status) [R41.82]     Chronic atrial fibrillation ([address] [I48.20]     Septic shock ([address] [A41.9, F64.75]     Metabolic encephalopathy [L15.31]      Acute encephalopathy - slowly improving, but now with multifactorial delirium due to acute medical issues, s/p cardiac arrest, sedation, on baseline dementia - will likely have a new baseline, which was already poor mental status and functioning. She has improved, now feeding herself and talking but refusing some care and meds.    - toxic vs metabolic, due to infection  - head CT negative  - cefepime stopped  - nh3 normal  - treat associated conditions  - avoid sedating meds as able  - supportive care  - needs st, pt, ot and oob  - mbs completed, on minced and moist diet, thin liquids     Acute hypoxic, hypercarbic respiratory failure   - extubated 2/13 now room air  - stop lasix 40 mg iv, change to po lasix 20 mg tomorrow  - I/os: - 2.2 L x 24 hrs, now fluid negative    Cardiac arrest  - no clear etiology, happened after seizure  - per cardiology, apparent no cardiac cause. Seizure - no further events  - several factors were present that could lower the seizure threshold: uti, sepsis, on concomitant encephalomalacia   - mri without acute findings   - neurology consulted  - EEG without epilptiform activity, slowing    UTI with likely septic shock - resolved  - completed cefepime  - urine culture negative    Chronic conditions - continue home medications unless otherwise stated  A. fib       Diet: ADULT DIET; Dysphagia - Minced and Moist  Code Status: Full Code  Dispo: very poor. I have had code status discussion with her son in the past (see admission notes 4/2021) and she was made dnr-cca, but he was still hoping she would recover. Her status has only worsened. consulted palliative medicine. I spoke with her son in law whom agrees dnr-cc is p appropriate, but her poa makes the decisions. I will Readdress with him.       Celia Scales, DO

## 2022-02-20 NOTE — PROGRESS NOTES
RN spoke with son Yoselin Robertson multiple times throughout shift and updated on pt POC. All questions answered.  Electronically signed by Samantha Wang RN on 2/20/2022 at 6:49 PM

## 2022-02-20 NOTE — CARE COORDINATION
Talked with anna Groves & he states he was unaware of possible dc today. He needs her to get 4 steps to get into the house and concerned about her not being out of bed for several days. Per report from nursing, pt was not cooperative on getting up yesterday. Asked bedside nurse to try & get her up once son comes in to see is she will be more cooperative for him. Will touch base with son after he gets her. Luz Saldivar RN, BSN,   [phone]  Electronically signed by Luz Saldivar RN on 2/20/2022 at 9:06 AM     Per conversation with provider, son agreeable to SNF. Attempted to meet with son but he still has not arrived to the hospital, CMS star rated SNF list left at bedside & nurse notified & asked to be called once son arrives. The Plan for Transition of Care is related to the following treatment goals: strengthening before returning home    The Patient representative anna Groves (left at bedside, will discuss when he arrives) was provided with a choice of provider and agrees with the discharge plan. [x] Yes [] No    Freedom of choice list was provided with basic dialogue that supports the patient's individualized plan of care/goals, treatment preferences and shares the quality data associated with the providers. [x] Yes [] No    Luz Saldivar RN, BSN,   [phone]  Electronically signed by Luz Saldivar RN on 2/20/2022 at 1:46 PM     Bedside nurse called, anna Groves at bedside & he chose [facility]. [address]; I asked for another option in case they don't have a bed but he stated he had just talked to them & they were holding a bed for her. Referral sent & called to Naomi [facility] left. Luz Saldivar RN, BSN,   [phone]  Electronically signed by Luz Saldivar RN on 2/20/2022 at 2:02 PM     Received call from [facility]. [address], they are unable to accept.     Reached out to son, his second & third choices are [address]; referrals sent.    Macho Whiting RN, BSN,   [phone]  Electronically signed by Macho Whiting RN on 2/20/2022 at 2:43 PM

## 2022-02-20 NOTE — PLAN OF CARE
Problem: SKIN INTEGRITY  Goal: Skin integrity is maintained or improved  Outcome: Ongoing     Problem: Nutrition  Goal: Optimal nutrition therapy  Outcome: Ongoing     Problem: Pain:  Goal: Pain level will decrease  Outcome: Ongoing     Problem: Falls - Risk of:  Goal: Absence of physical injury  Outcome: Ongoing     Problem: Non-Violent Restraints  Goal: Removal from restraints as soon as assessed to be safe  Outcome: Completed

## 2022-02-21 LAB
A/G RATIO: 1.2 (ref 1.1–2.2)
ALBUMIN SERPL-MCNC: 3.1 G/DL (ref 3.4–5)
ALP BLD-CCNC: 156 U/L (ref 40–129)
ALT SERPL-CCNC: 11 U/L (ref 10–40)
ANION GAP SERPL CALCULATED.3IONS-SCNC: 13 MMOL/L (ref 3–16)
AST SERPL-CCNC: 19 U/L (ref 15–37)
BASOPHILS ABSOLUTE: 0.1 K/UL (ref 0–0.2)
BASOPHILS RELATIVE PERCENT: 0.8 %
BILIRUB SERPL-MCNC: 0.9 MG/DL (ref 0–1)
BUN BLDV-MCNC: 21 MG/DL (ref 7–20)
CALCIUM SERPL-MCNC: 9.3 MG/DL (ref 8.3–10.6)
CHLORIDE BLD-SCNC: 105 MMOL/L (ref 99–110)
CO2: 25 MMOL/L (ref 21–32)
CREAT SERPL-MCNC: 0.9 MG/DL (ref 0.6–1.2)
EOSINOPHILS ABSOLUTE: 0.2 K/UL (ref 0–0.6)
EOSINOPHILS RELATIVE PERCENT: 2.4 %
GFR AFRICAN AMERICAN: >60
GFR NON-AFRICAN AMERICAN: 59
GLUCOSE BLD-MCNC: 116 MG/DL (ref 70–99)
GLUCOSE BLD-MCNC: 120 MG/DL (ref 70–99)
GLUCOSE BLD-MCNC: 168 MG/DL (ref 70–99)
GLUCOSE BLD-MCNC: 91 MG/DL (ref 70–99)
GLUCOSE BLD-MCNC: 92 MG/DL (ref 70–99)
HCT VFR BLD CALC: 34.8 % (ref 36–48)
HEMOGLOBIN: 10.9 G/DL (ref 12–16)
LYMPHOCYTES ABSOLUTE: 0.6 K/UL (ref 1–5.1)
LYMPHOCYTES RELATIVE PERCENT: 8.7 %
MAGNESIUM: 1.7 MG/DL (ref 1.8–2.4)
MCH RBC QN AUTO: 26.8 PG (ref 26–34)
MCHC RBC AUTO-ENTMCNC: 31.5 G/DL (ref 31–36)
MCV RBC AUTO: 85.1 FL (ref 80–100)
MONOCYTES ABSOLUTE: 0.7 K/UL (ref 0–1.3)
MONOCYTES RELATIVE PERCENT: 10.8 %
NEUTROPHILS ABSOLUTE: 5.2 K/UL (ref 1.7–7.7)
NEUTROPHILS RELATIVE PERCENT: 77.3 %
PDW BLD-RTO: 16.3 % (ref 12.4–15.4)
PERFORMED ON: ABNORMAL
PERFORMED ON: NORMAL
PHOSPHORUS: 2.7 MG/DL (ref 2.5–4.9)
PLATELET # BLD: 245 K/UL (ref 135–450)
PMV BLD AUTO: 8.9 FL (ref 5–10.5)
POTASSIUM SERPL-SCNC: 3.7 MMOL/L (ref 3.5–5.1)
RBC # BLD: 4.08 M/UL (ref 4–5.2)
SODIUM BLD-SCNC: 143 MMOL/L (ref 136–145)
TOTAL PROTEIN: 5.7 G/DL (ref 6.4–8.2)
WBC # BLD: 6.8 K/UL (ref 4–11)

## 2022-02-21 PROCEDURE — 6370000000 HC RX 637 (ALT 250 FOR IP): Performed by: FAMILY MEDICINE

## 2022-02-21 PROCEDURE — 85025 COMPLETE CBC W/AUTO DIFF WBC: CPT

## 2022-02-21 PROCEDURE — 6370000000 HC RX 637 (ALT 250 FOR IP): Performed by: NURSE PRACTITIONER

## 2022-02-21 PROCEDURE — 2060000000 HC ICU INTERMEDIATE R&B

## 2022-02-21 PROCEDURE — 97530 THERAPEUTIC ACTIVITIES: CPT | Performed by: PHYSICAL THERAPIST

## 2022-02-21 PROCEDURE — 80053 COMPREHEN METABOLIC PANEL: CPT

## 2022-02-21 PROCEDURE — 83735 ASSAY OF MAGNESIUM: CPT

## 2022-02-21 PROCEDURE — 84100 ASSAY OF PHOSPHORUS: CPT

## 2022-02-21 PROCEDURE — 36592 COLLECT BLOOD FROM PICC: CPT

## 2022-02-21 PROCEDURE — 97116 GAIT TRAINING THERAPY: CPT | Performed by: PHYSICAL THERAPIST

## 2022-02-21 PROCEDURE — 2580000003 HC RX 258: Performed by: STUDENT IN AN ORGANIZED HEALTH CARE EDUCATION/TRAINING PROGRAM

## 2022-02-21 PROCEDURE — 97535 SELF CARE MNGMENT TRAINING: CPT

## 2022-02-21 PROCEDURE — 6360000002 HC RX W HCPCS: Performed by: FAMILY MEDICINE

## 2022-02-21 PROCEDURE — 2500000003 HC RX 250 WO HCPCS: Performed by: INTERNAL MEDICINE

## 2022-02-21 PROCEDURE — 97530 THERAPEUTIC ACTIVITIES: CPT

## 2022-02-21 RX ORDER — MAGNESIUM SULFATE IN WATER 40 MG/ML
2000 INJECTION, SOLUTION INTRAVENOUS ONCE
Status: COMPLETED | OUTPATIENT
Start: 2022-02-21 | End: 2022-02-21

## 2022-02-21 RX ADMIN — MAGNESIUM SULFATE HEPTAHYDRATE 2000 MG: 40 INJECTION, SOLUTION INTRAVENOUS at 09:12

## 2022-02-21 RX ADMIN — INSULIN LISPRO 1 UNITS: 100 INJECTION, SOLUTION INTRAVENOUS; SUBCUTANEOUS at 17:52

## 2022-02-21 RX ADMIN — RIVAROXABAN 15 MG: 15 TABLET, FILM COATED ORAL at 08:44

## 2022-02-21 RX ADMIN — FAMOTIDINE 20 MG: 10 INJECTION, SOLUTION INTRAVENOUS at 08:45

## 2022-02-21 RX ADMIN — METOPROLOL SUCCINATE 25 MG: 25 TABLET, EXTENDED RELEASE ORAL at 08:45

## 2022-02-21 RX ADMIN — SODIUM CHLORIDE, PRESERVATIVE FREE 10 ML: 5 INJECTION INTRAVENOUS at 08:47

## 2022-02-21 RX ADMIN — Medication 2 CAPSULE: at 08:44

## 2022-02-21 ASSESSMENT — PAIN SCALES - GENERAL: PAINLEVEL_OUTOF10: 0

## 2022-02-21 NOTE — DISCHARGE SUMMARY
Hospital Medicine Discharge Summary    Patient: Raegan Martino     Gender: female  : 1931   Age: 80 y.o. MRN: [id number]    Code Status: Full Code     Primary Care Provider: TASHI Li CNP    Admit Date: 2022   Discharge Date:   2022    DC was ordered 22, and dc summary completed 22, but Pt was held up due to family refusing dc. I was not present on the actual day of discharge. Admitting Physician: Jojo Crenshaw DO  Discharge Physician: Anne-Marie Rivas DO     Discharge Diagnoses: Active Hospital Problems    Diagnosis Date Noted    Acute respiratory failure with hypoxia and hypercapnia (HCC) [J96.01, J96.02]     Acute cystitis without hematuria [N30.00]     RHIANNA (acute kidney injury) ([facility] [address] [N17.9]     Cardiopulmonary arrest with successful resuscitation [facility]) [I46.9]     Cardiac arrest ([address] [I46.9] 2022    Seizure ([address] [R56.9] 2022    CHF (congestive heart failure) ([address] [I50.9] 2022    Atrial fibrillation ([address] [I48.91]     AMS (altered mental status) [R41.82] 2021    Chronic atrial fibrillation ([address] [I48.20]     Septic shock ([facility] [address] [A41.9, R65.21]     Metabolic encephalopathy [C51.11] 2020       Hospital Course:   A 81 yo female admitted with agitation, possibly increased confusion over her baseline. Her Son had sent patient for evaluation for possible UTI or possible concern for congestive heart failure.  Patient apparently had a witnessed seizure in the ED and then became hypoxic and went into bradycardia, progressed to asystole.  Pulse was noted to be lost but was never shocked, was resuscitated after about 1 to 2 minutes of CPR and 1 round of epi. she was intubated in the ED. Since then she has been treated for a uti with cefepime, and an rhianna with ivf. Neurology was consulted as well.     Per dc summary, one year ago.  \"An 79 Yo female admitted with a uti and hypoactive delirium.   This has been a recurrent cycle for Mrs Evelin Sawant. She was recently discharged her on Feb 16 and very similar admission in Dec as well. she has end stage Alzheimer's with UTIs unfortunately just adding to her mental status limitations. During her last admission her depakote was stopped due to oversedation and treated her with nightly seroquel with very good response. She spends most of her days sleeping - not eating or drinking - then develops UTI due to poor urine output and returns to the hospital.     Placement was discussed with son at her last admission - he refused. He also appealed discharge last time, though I am still not sure what his true expectation was. He thought she will become \"normal\", though I am not sure what he meant by that with severe and progressive baseline underlying dementia. At the very least he is unrealistic in his expectation. \"   He now wants to try to dc to sn[place] at [place]. I again had an honest conversation with her son and explained that her prognosis is poor. I discussed quality of life vs quantity. He was not ready to deescalate care at this time, and wants her to remain full code. Work up completed, and improved with treatment as below. was discharged today in stable condition. Acute encephalopathy - slowly improving, but now with multifactorial delirium due to acute medical issues, s/p cardiac arrest, sedation, on baseline dementia - will likely have a new baseline, which was already poor mental status and functioning. She has improved, now feeding herself and talking. This is near her baseline per her son.   - toxic vs metabolic, due to infection  - head CT negative  - cefepime stopped  - nh3 normal  - needs st, pt, ot, speech therapy and oob  - mbs completed, on minced and moist diet, thin liquids      Acute hypoxic, hypercarbic respiratory failure   - extubated 2/13 now room air  - changed to home dose po lasix 20 mg today    Cardiac arrest  - no clear etiology, happened after seizure  - per cardiology, apparent no cardiac cause.     Seizure - no further events  - several factors were present that could lower the seizure threshold: uti, sepsis, on concomitant encephalomalacia   - mri without acute findings   - neurology consulted  - EEG without epilptiform activity, slowing     UTI with likely septic shock - resolved  - completed cefepime  - urine culture negative     Chronic conditions - continue home medications unless otherwise stated  A. fib         Disposition: To a non-[facility] facility    Exam:     /74   Pulse 76   Temp 98.2 °F (36.8 °C) (Oral)   Resp 16   Ht 5' 3\" (1.6 m)   Wt 189 lb 6 oz (85.9 kg)   SpO2 94%   BMI 33.55 kg/m²     General appearance:  No apparent distress, appears comfortable  HEENT:  Normal cephalic, atraumatic without obvious deformity. Pupils equal, round, and reactive to light. Extra ocular muscles intact. Conjunctivae/corneas clear. Neck: Supple, with full range of motion. No jugular venous distention. Trachea midline. Respiratory:  Normal respiratory effort. Clear to auscultation, bilaterally without Rales/Wheezes/Rhonchi. Cardiovascular:  Regular rate and rhythm with normal S1/S2 without murmurs, rubs or gallops. Abdomen: Soft, non-tender, non-distended with normal bowel sounds. Musculoskeletal:  No clubbing, cyanosis or edema bilaterally. Skin: Skin color, texture, turgor normal.  No rashes or lesions. Neurologic:  Neurovascularly intact without any focal sensory/motor deficits. Cranial nerves: II-XII intact, grossly non-focal.  Psychiatric:  Alert and sitting up in a chair. Not taking much, very confused. Consults:     IP CONSULT TO NEUROLOGY  IP CONSULT TO CARDIOLOGY  IP CONSULT TO NEUROLOGY  IP CONSULT TO CRITICAL CARE  IP CONSULT TO DIETITIAN  IP CONSULT TO PALLIATIVE CARE  IP CONSULT TO HOME CARE NEEDS    Labs:  For convenience and continuity at follow-up the following most recent labs are provided:    Lab Results   Component Value Date WBC 6.8 02/21/2022    HGB 10.9 02/21/2022    HCT 34.8 02/21/2022    MCV 85.1 02/21/2022     02/21/2022     02/21/2022    K 3.7 02/21/2022    K 3.8 11/23/2021     02/21/2022    CO2 25 02/21/2022    BUN 21 02/21/2022    CREATININE 0.9 02/21/2022    CALCIUM 9.3 02/21/2022    PHOS 2.7 02/21/2022    ALKPHOS 156 02/21/2022    ALT 11 02/21/2022    AST 19 02/21/2022    BILITOT 0.9 02/21/2022    BILIDIR <0.2 02/10/2021    LABALBU 3.1 02/21/2022    LDLCALC 49 06/06/2020    TRIG 82 06/06/2020     No results found for: INR    Radiology:  FL MODIFIED BARIUM SWALLOW W VIDEO   Final Result   Delayed initiation, without jacques aspiration. Please see separate speech pathology report for full discussion of findings   and recommendations. RECOMMENDATIONS:   Unavailable         XR CHEST PORTABLE   Final Result   Enteric feeding tube projects as transpyloric, at least to the 2nd portion of   the duodenum. Stable bibasilar opacities, likely pleural effusions with atelectasis or   airspace disease. XR CHEST PORTABLE   Final Result   Spectrum of findings would favor CHF and developing pulmonary edema or ARDS. An underlying viral pattern of pneumonia may be considered. MRI BRAIN WO CONTRAST   Final Result   Chronic microvascular disease and cerebral atrophy with remote right parietal   lobe infarct. Prominence of the ventricular system and correlation with symptomatology for   normal pressure hydrocephalus is needed. Fluid in the left mastoid air cells. Mild chronic sinusitis. RECOMMENDATIONS:   Unavailable         XR CHEST PORTABLE   Final Result   Improved aeration of the lungs. There is decreased pleural-parenchymal   disease         XR CHEST PORTABLE   Final Result   Appropriate positioning of PICC line. Slightly improved aeration of the   lungs. CT HEAD WO CONTRAST   Final Result   1. No acute intracranial abnormality.    2. Moderate chronic white matter microvascular ischemic changes. 3. 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 XR CHEST PORTABLE   Final Result   Interval placement of a nasogastric tube and endotracheal tubes which are in   appropriate position. Mild edema with small bilateral pleural effusions and adjacent bibasilar   airspace disease, likely atelectasis in the absence of clinical signs of   pneumonia. XR CHEST PORTABLE   Final Result   Moderate pulmonary vascular congestive change. Cardiomegaly. Small-to-moderate right pleural effusion. CT HEAD WO CONTRAST   Final Result   No acute intracranial abnormality. Remote right parietal craniotomy and encephalomalacia in the right   temporoparietal region, similar in appearance.              Discharge Medications:   Current Discharge Medication List        Current Discharge Medication List        Current Discharge Medication List      CONTINUE these medications which have NOT CHANGED    Details   melatonin 3 MG TABS tablet Take 3 mg by mouth nightly as needed for Sleep      diclofenac sodium (VOLTAREN) 1 % GEL Apply 2 g topically 4 times daily as needed for Pain      metoprolol succinate (TOPROL XL) 25 MG extended release tablet Take 1 tablet by mouth daily  Qty: 30 tablet, Refills: 1    Comments: Please ensure further refills be directed to pt's PCP      furosemide (LASIX) 20 MG tablet Take 1 tablet by mouth every other day  Qty: 30 tablet, Refills: 3      Cranberry 250 MG CHEW Take 2 tablets by mouth 2 times daily (with meals)  Qty: 120 tablet, Refills: 0      rivaroxaban (XARELTO) 15 MG TABS tablet Take 1 tablet by mouth daily (with breakfast)  Qty: 90 tablet, Refills: 1      vitamin D 25 MCG (1000 UT) CAPS Take 1,000 Units by mouth daily      albuterol sulfate HFA (VENTOLIN HFA) 108 (90 Base) MCG/ACT inhaler Inhale 2 puffs into the lungs every 6 hours as needed for Wheezing      mometasone-formoterol (DULERA) 200-5 MCG/ACT inhaler Inhale 2 puffs into the lungs 2 times daily       Multiple Vitamins-Minerals (MULTIVITAMIN WITH MINERALS) tablet Take 1 tablet by mouth daily      calcium carbonate (OSCAL) 500 MG TABS tablet Take 500 mg by mouth daily           Current Discharge Medication List          Follow-up appointments:  two weeks    Provider Follow-up:    pcp    Condition at Discharge:  Stable    The patient was seen and examined on day of discharge and this discharge summary is in conjunction with any daily progress note from day of discharge. Time Spent on discharge is 1 hour  in the examination, evaluation, counseling and review of medications and discharge plan. Signed:    Arely Dunbar DO   2/21/2022      Thank you TASHI Manning CNP for the opportunity to be involved in this patient's care. If you have any questions or concerns please feel free to contact me at [phone].

## 2022-02-21 NOTE — PROGRESS NOTES
Occupational Therapy  Facility/Department: [facility] 5W PROGRESSIVE CARE  Daily Treatment Note  NAME: Pipe Hines  : 1931  MRN: [id number]    Date of Service: 2022    Discharge Recommendations:  Patient would benefit from continued therapy after discharge,3-5 sessions per week  OT Equipment Recommendations  Other: defer to [address] scored a 10/24 on the AM-PAC ADL Inpatient form. Current research shows that an AM-PAC score of 17 or less is typically not associated with a discharge to the patient's home setting. Based on the patient's AM-PAC score and their current ADL deficits, it is recommended that the patient have 3-5 sessions per week of Occupational Therapy at d/c to increase the patient's independence. Please see assessment section for further patient specific details. If patient discharges prior to next session this note will serve as a discharge summary. Please see below for the latest assessment towards goals. Assessment   Performance deficits / Impairments: Decreased functional mobility ; Decreased strength;Decreased ADL status; Decreased safe awareness;Decreased endurance;Decreased high-level IADLs;Decreased cognition;Decreased balance;Decreased ROM  Assessment: 79 y/o female admit 2022 with Altered Mental Status, Possible UTI. Upon arrival in ED : Cardiac Arrest, Seizure. -2022 Pt Intubated. CT Head negative acute. PMHx: Dementia. PTA pt living with son in home; son reports pt can ambulate with RW and someone close by and requires assistance for ADLs. Today, pt pleasantly confused but able to follow intermittent commands. Pt required mod/max A x 2 to stand to RW and take a few steps bed > chair. Pt with posterior lean initially when standing. Anticipate pt will require up to max A for ADLs d/t cognition. Pt is functioning below baseline and benefit from skilled therapy.   Prognosis: Fair  OT Education: OT Role;Plan of Care;Transfer Training;Orientation  Barriers to Learning: cognition  REQUIRES OT FOLLOW UP: Yes  Activity Tolerance  Activity Tolerance: Treatment limited secondary to decreased cognition  Safety Devices  Safety Devices in place: Yes  Type of devices: Nurse notified;Call light within reach; Left in chair;Chair alarm in place;Gait belt  Restraints  Initially in place: No         Patient Diagnosis(es): The primary encounter diagnosis was Cardiopulmonary arrest with successful resuscitation ([facility] [address] Diagnoses of Altered mental status, unspecified altered mental status type and Seizure [facility]) were also pertinent to this visit. 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 has no past surgical history on file. Restrictions  Restrictions/Precautions  Restrictions/Precautions: Fall Risk  Position Activity Restriction  Other position/activity restrictions: hx dementia  Subjective   General  Chart Reviewed: Yes  Patient assessed for rehabilitation services?: Yes  Additional Pertinent Hx: 79 y/o female admit 2/9/2022 with Altered Mental Status, Possible UTI. Upon arrival in ED : Cardiac Arrest, Seizure. 2/9-2/13/2022 Pt Intubated. CT Head negative acute. PMHx: Dementia. Family / Caregiver Present: Yes  Referring Practitioner: Dr. Lea Karimi: Pt seen bedside. Pt intermittently follows commands. General Comment  Comments: Per RN ok for therapy.       Orientation  Orientation  Overall Orientation Status: Impaired  Orientation Level: Oriented to person;Disoriented to situation;Disoriented to time;Disoriented to place     Objective    ADL  Toileting: Dependent/Total (rolled R/L to change soiled depends.)  Additional Comments: Anticipate pt will be dependent for ADLs based on cognition observed        Balance  Sitting Balance: Contact guard assistance (EOB in prep for transfer)  Standing Balance: Maximum assistance  Standing Balance  Time: stood prn with RW prior to ambulation- posterior lean with static standing  Functional Mobility  Functional Mobility Comments: few steps bed > chair with RW and mod/max A x 2. Recommend use of stedy transfer back to bed with nursing     Bed mobility  Rolling to Left: Maximum assistance;2 Person assistance  Rolling to Right: 2 Person assistance;Maximum assistance  Supine to Sit: Maximum assistance;2 Person assistance (HOB elevated)  Sit to Supine:  (pt in chair at end of session)  Comment: Rolled R/L to change soiled depends. Transfers  Sit to stand: Moderate assistance;2 Person assistance;Maximum assistance  Stand to sit: Maximum assistance;2 Person assistance; Moderate assistance  Transfer Comments: stood from bed and chair to/from Rw- cuing for hand placement; posterior lean when standing           Cognition  Overall Cognitive Status: Exceptions  Arousal/Alertness: Appropriate responses to stimuli  Following Commands: Follows one step commands with repetition; Follows one step commands with increased time; Inconsistently follows commands  Attention Span: Attends with cues to redirect  Memory: Decreased short term memory;Decreased recall of recent events  Safety Judgement: Decreased awareness of need for safety;Decreased awareness of need for assistance  Problem Solving: Decreased awareness of errors  Insights: Not aware of deficits  Initiation: Requires cues for some  Sequencing: Requires cues for some        Plan   Plan  Times per week: 2-3x  Current Treatment Recommendations: Strengthening,Functional Mobility Training,Gait Training,Endurance Training,Balance Training,ROM,Self-Care / ADL,Cognitive Reorientation    AM-PAC Score   AM-[facility] Inpatient Daily Activity Raw Score: 10 (02/21/22 1247)  AM-PAC Inpatient ADL T-Scale Score : 27.31 (02/21/22 1247)  ADL Inpatient CMS 0-100% Score: 74.7 (02/21/22 1247)  ADL Inpatient CMS G-Code Modifier : CL (02/21/22 1247)    Goals  Short term goals  Time Frame for Short term goals: Prior to DC: goals ongoing  Short term goal 1: Pt will complete bed mobility with mod A  Short term goal 2: Pt will tolerate sitting EOB > 5 min in prep for transfers with min A consistently  Short term goal 3: Pt will complete grooming tasks with set up  Short term goal 4: Pt will complete ADL transfers with stedy and mod A x 2. Patient Goals   Patient goals : no goal stated 2/2 cognition       Therapy Time   Individual Concurrent Group Co-treatment   Time In 1155         Time Out 1235         Minutes 40         Timed Code Treatment Minutes: 40 Minutes     This note to serve as OT d/c summary if pt is d/c-ed prior to next therapy session.     Estee Robert, OTR/L

## 2022-02-21 NOTE — PROGRESS NOTES
RN spoke with son Cristian Mancilla) with an update. All questions answered. Will continue to monitor.     Electronically signed by Cain Fothergill, RN on 2/21/2022 at 10:46 AM

## 2022-02-21 NOTE — PROGRESS NOTES
Physical Therapy  Facility/Department: [facility] 5 PROGRESSIVE CARE  Daily Treatment Note  NAME: Steph Vogt  : 1931  MRN: [id number]    Date of Service: 2022    Discharge Recommendations:  3-5 sessions per week   PT Equipment Recommendations  Other: Defer to next level of care. Steph Vogt scored a  on the AM-PAC short mobility form. Current research shows that an AM-PAC score of 17 or less is typically not associated with a discharge to the patient's home setting. Based on the patient's AM-PAC score and their current functional mobility deficits, it is recommended that the patient have 3-5 sessions per week of Physical Therapy at d/c to increase the patient's independence. Please see assessment section for further patient specific details. If patient discharges prior to next session this note will serve as a discharge summary. Please see below for the latest assessment towards goals. Assessment   Body structures, Functions, Activity limitations: Decreased functional mobility ; Decreased strength;Decreased safe awareness;Decreased cognition;Decreased endurance;Decreased balance  Assessment: 81 y/o female admit 2022 with Altered Mental Status, Possible UTI. Upon arrival in ED : Cardiac Arrest, Seizure. -2022 Pt Intubated. CT Head negative acute. PMH as noted including A-Fib, UTI, Gout, Dementia. PTA pt living with son in home with assist with all mobility tasks using a 4WW; pt currently is a high fall risk and not safe to return directly home at discharge; recommend continued therapy 3-5x/wk to address deficits to allow pt to return home when able  Prognosis: Fair  History: 81 y/o female admit 2022 with Altered Mental Status, Possible UTI. Upon arrival in ED : Cardiac Arrest, Seizure. -2022 Pt Intubated. CT Head negative acute. PMH as noted including A-Fib, UTI, Gout, Dementia.   PT Education: General Safety  Patient Education: reviewed call light and not getting up without assist  Barriers to Learning: Cognition. REQUIRES PT FOLLOW UP: Yes  Activity Tolerance  Activity Tolerance: Patient limited by cognitive status     Patient Diagnosis(es): The primary encounter diagnosis was Cardiopulmonary arrest with successful resuscitation ([facility] [address] Diagnoses of Altered mental status, unspecified altered mental status type and Seizure [facility]) were also pertinent to this visit. 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 has no past surgical history on file. Social/Functional History  Lives With: Family (Son oJe Souza) and his partner Luz Majano). )  Type of Home: House  Home Layout: One level  Home Access: Stairs to enter with rails  Entrance Stairs - Number of Steps: 4-5  Bathroom Shower/Tub: Walk-in shower  Bathroom Toilet: Standard  Bathroom Equipment: Grab bars in [facility]  Home Equipment: 4 wheeled walker,Cane  ADL Assistance:  (Son assists with bathing/dressing/toileting - patient feeds self)  Homemaking Assistance:  (son completes IADLs)  Ambulation Assistance: Needs assistance (With Rollator.)  Transfer Assistance: Needs assistance  Active : No  Additional Comments: Info from past admissions in 2020 and 2021; the pt unable to give any current info re: PLOF and family not in the room to verify    Restrictions  Restrictions/Precautions  Restrictions/Precautions: Fall Risk  Position Activity Restriction  Other position/activity restrictions: hx dementia  Subjective   General  Chart Reviewed: Yes  Additional Pertinent Hx: 81 y/o female admit 2/9/2022 with Altered Mental Status, Possible UTI. Upon arrival in ED : Cardiac Arrest, Seizure. 2/9-2/13/2022 Pt Intubated. CT Head negative acute. PMH as noted including A-Fib, UTI, Gout, Dementia.   Response To Previous Treatment: Not applicable  Family / Caregiver Present: Yes (son and his partner in room)  Referring Practitioner: Ana Holland D.O. Subjective  Subjective: Pt confused, but able to follow simple commands with increased time and repetition; pt intermittently gets irritated by overall agreeable to working with therapy; no c/o pain during session          Orientation  Orientation  Overall Orientation Status: Impaired  Orientation Level: Oriented to person;Disoriented to place; Disoriented to time;Disoriented to situation  Cognition   Cognition  Overall Cognitive Status: Exceptions  Arousal/Alertness: Appropriate responses to stimuli  Following Commands: Follows one step commands with repetition; Follows one step commands with increased time; Inconsistently follows commands  Attention Span: Attends with cues to redirect  Memory: Decreased short term memory;Decreased recall of recent events  Safety Judgement: Decreased awareness of need for safety;Decreased awareness of need for assistance  Problem Solving: Decreased awareness of errors  Insights: Not aware of deficits  Initiation: Requires cues for some  Sequencing: Requires cues for some  Objective   Bed mobility  Supine to Sit: Moderate assistance;Maximum assistance;2 Person assistance  Transfers  Sit to Stand: Moderate Assistance;Maximum Assistance;2 Person Assistance  Stand to sit: Moderate Assistance;Maximum Assistance;2 Person Assistance  Ambulation  Ambulation?: Yes  Ambulation 1  Surface: level tile  Device: Rolling Walker  Assistance:  Moderate assistance;Maximum assistance;2 Person assistance  Quality of Gait: unsteady with posterior lean  Distance: 3' to [facility] chair     Balance  Comments: CGA for sitting balance; mod A of 2 for standing with RW, has posterior lean                           G-Code     OutComes Score                                                     AM-PAC Score  AM-PAC Inpatient Mobility Raw Score : 11 (02/21/22 1306)  AM-PAC Inpatient T-Scale Score : 33.86 (02/21/22 1306)  Mobility Inpatient CMS 0-100% Score: 72.57 (02/21/22 1306)  Mobility Inpatient CMS G-Code Modifier : CL (02/21/22 1306)          Goals  Short term goals  Time Frame for Short term goals: Upon d/c acute care setting. Short term goal 1: Bed Mob Mod assist x 2. Short term goal 2: EOB ~ 5 min ongoing Min assist in prep oob activities. Short term goal 3: Transfer via [place] Mod assist x 2. Short term goal 4: Pt participating in approp Strength Exs. Patient Goals   Patient goals : None stated. Plan    Plan  Times per week: 2-3x week while in acute care setting.   Current Treatment Recommendations: Strengthening,Functional Mobility Training,Transfer Training,Gait Training,Safety Education & Training,Patient/Caregiver Education & Training  Safety Devices  Type of devices: Call light within reach,Chair alarm in place,Left in chair,Nurse notified,All fall risk precautions in place     Therapy Time   Individual Concurrent Group Co-treatment   Time In 1150         Time Out 6612         Minutes 39                 EUSEBIO DUKE PT    Electronically signed by EUSEBIO DUKE PT on 2/21/2022 at 1:07 PM

## 2022-02-22 VITALS
DIASTOLIC BLOOD PRESSURE: 65 MMHG | SYSTOLIC BLOOD PRESSURE: 142 MMHG | OXYGEN SATURATION: 91 % | BODY MASS INDEX: 34.02 KG/M2 | WEIGHT: 192.02 LBS | HEIGHT: 63 IN | TEMPERATURE: 97.5 F | RESPIRATION RATE: 16 BRPM | HEART RATE: 72 BPM

## 2022-02-22 LAB
A/G RATIO: 1.1 (ref 1.1–2.2)
ALBUMIN SERPL-MCNC: 3.1 G/DL (ref 3.4–5)
ALP BLD-CCNC: 172 U/L (ref 40–129)
ALT SERPL-CCNC: 11 U/L (ref 10–40)
ANION GAP SERPL CALCULATED.3IONS-SCNC: 10 MMOL/L (ref 3–16)
AST SERPL-CCNC: 19 U/L (ref 15–37)
BASOPHILS ABSOLUTE: 0.1 K/UL (ref 0–0.2)
BASOPHILS RELATIVE PERCENT: 0.8 %
BILIRUB SERPL-MCNC: 0.7 MG/DL (ref 0–1)
BUN BLDV-MCNC: 30 MG/DL (ref 7–20)
CALCIUM SERPL-MCNC: 9.7 MG/DL (ref 8.3–10.6)
CHLORIDE BLD-SCNC: 108 MMOL/L (ref 99–110)
CO2: 26 MMOL/L (ref 21–32)
CREAT SERPL-MCNC: 1 MG/DL (ref 0.6–1.2)
EOSINOPHILS ABSOLUTE: 0.1 K/UL (ref 0–0.6)
EOSINOPHILS RELATIVE PERCENT: 1.7 %
GFR AFRICAN AMERICAN: >60
GFR NON-AFRICAN AMERICAN: 52
GLUCOSE BLD-MCNC: 101 MG/DL (ref 70–99)
GLUCOSE BLD-MCNC: 116 MG/DL (ref 70–99)
GLUCOSE BLD-MCNC: 96 MG/DL (ref 70–99)
HCT VFR BLD CALC: 35 % (ref 36–48)
HEMOGLOBIN: 11.2 G/DL (ref 12–16)
LYMPHOCYTES ABSOLUTE: 0.7 K/UL (ref 1–5.1)
LYMPHOCYTES RELATIVE PERCENT: 8.8 %
MAGNESIUM: 2 MG/DL (ref 1.8–2.4)
MCH RBC QN AUTO: 27.1 PG (ref 26–34)
MCHC RBC AUTO-ENTMCNC: 32 G/DL (ref 31–36)
MCV RBC AUTO: 84.6 FL (ref 80–100)
MONOCYTES ABSOLUTE: 0.8 K/UL (ref 0–1.3)
MONOCYTES RELATIVE PERCENT: 10.5 %
NEUTROPHILS ABSOLUTE: 6.1 K/UL (ref 1.7–7.7)
NEUTROPHILS RELATIVE PERCENT: 78.2 %
PDW BLD-RTO: 16.7 % (ref 12.4–15.4)
PERFORMED ON: ABNORMAL
PERFORMED ON: NORMAL
PHOSPHORUS: 2.4 MG/DL (ref 2.5–4.9)
PLATELET # BLD: 269 K/UL (ref 135–450)
PMV BLD AUTO: 9.1 FL (ref 5–10.5)
POTASSIUM SERPL-SCNC: 3.9 MMOL/L (ref 3.5–5.1)
RBC # BLD: 4.14 M/UL (ref 4–5.2)
SARS-COV-2, NAAT: NOT DETECTED
SODIUM BLD-SCNC: 144 MMOL/L (ref 136–145)
TOTAL PROTEIN: 5.9 G/DL (ref 6.4–8.2)
WBC # BLD: 7.7 K/UL (ref 4–11)

## 2022-02-22 PROCEDURE — 6370000000 HC RX 637 (ALT 250 FOR IP): Performed by: FAMILY MEDICINE

## 2022-02-22 PROCEDURE — 6370000000 HC RX 637 (ALT 250 FOR IP): Performed by: NURSE PRACTITIONER

## 2022-02-22 PROCEDURE — 84100 ASSAY OF PHOSPHORUS: CPT

## 2022-02-22 PROCEDURE — 87635 SARS-COV-2 COVID-19 AMP PRB: CPT

## 2022-02-22 PROCEDURE — 2580000003 HC RX 258: Performed by: STUDENT IN AN ORGANIZED HEALTH CARE EDUCATION/TRAINING PROGRAM

## 2022-02-22 PROCEDURE — 83735 ASSAY OF MAGNESIUM: CPT

## 2022-02-22 PROCEDURE — 94760 N-INVAS EAR/PLS OXIMETRY 1: CPT

## 2022-02-22 PROCEDURE — 97530 THERAPEUTIC ACTIVITIES: CPT | Performed by: PHYSICAL THERAPIST

## 2022-02-22 PROCEDURE — 92526 ORAL FUNCTION THERAPY: CPT

## 2022-02-22 PROCEDURE — 85025 COMPLETE CBC W/AUTO DIFF WBC: CPT

## 2022-02-22 PROCEDURE — 2500000003 HC RX 250 WO HCPCS: Performed by: INTERNAL MEDICINE

## 2022-02-22 PROCEDURE — 80053 COMPREHEN METABOLIC PANEL: CPT

## 2022-02-22 RX ADMIN — RIVAROXABAN 15 MG: 15 TABLET, FILM COATED ORAL at 07:58

## 2022-02-22 RX ADMIN — FAMOTIDINE 20 MG: 10 INJECTION, SOLUTION INTRAVENOUS at 07:58

## 2022-02-22 RX ADMIN — METOPROLOL SUCCINATE 25 MG: 25 TABLET, EXTENDED RELEASE ORAL at 07:58

## 2022-02-22 RX ADMIN — Medication 2 CAPSULE: at 07:58

## 2022-02-22 RX ADMIN — SODIUM CHLORIDE, PRESERVATIVE FREE 10 ML: 5 INJECTION INTRAVENOUS at 07:59

## 2022-02-22 ASSESSMENT — PAIN SCALES - PAIN ASSESSMENT IN ADVANCED DEMENTIA (PAINAD)
CONSOLABILITY: 0
BREATHING: 0
TOTALSCORE: 0
TOTALSCORE: 0
NEGVOCALIZATION: 0
CONSOLABILITY: 0
FACIALEXPRESSION: 0
NEGVOCALIZATION: 0
BREATHING: 0
BODYLANGUAGE: 0
BODYLANGUAGE: 0
FACIALEXPRESSION: 0

## 2022-02-22 NOTE — CARE COORDINATION
CASE MANAGEMENT DISCHARGE SUMMARY: Received call from Chance Jurado at [facility]. Per [employer] Rhiannon, after discussing placement with the son Nisreen Hardwick, the patient will be discharging to [place] [employer] [facility] today. Nisreen Hardwick will be transporting the patient there himself.      DISCHARGE DATE: 2/22/2022    DISCHARGED TO:     Discharging to Facility/ Agency   · Name: [facility]  · Address:  [address] [facility] [address]   · Phone: [phone]  · Fax: [phone]                  REPORT NUMBER: [id number] NUMBER: [phone]    TRANSPORTATION: Lalit Sarmiento             TIME: 1330       HENS/PASAAR COMPLETED: Yes     Yes CAR Updated   Yes Case Management   Yes Physician   Yes Nurse    Yes Destination updated (SNF/HHC)    Yes  Whiteboard Note Updated with above    Debbie LAWLER RN  Case Management  [phone]    Electronically signed by Debbie Arnett RN on 2/22/2022 at 1:21 PM

## 2022-02-22 NOTE — CARE COORDINATION
CM called the son Annie Padilla to notify him that [facility] denied patient admission stating they cannot meet her needs. Annie Padilla became upset stating he did not understand why no facility except for [facility] would accept his mother. Annie Padilla asked if there was anything [facility] was not telling him about his mother's condition. CM explained not having been privy to all conversations with the MD's, could not verify this, however there would be no reason for any physician or medical personnel to hold back information from his as her next of kin. CM explained all discharging facilities are given the same information to review for admission, and these were facilities he chose. Annie Padilla stated he would be ok with his mother transferring to [facility] however he wanted to wait for a private room to become available. CM explained the patient has a discharge order in place, is medically cleared by the MD for discharge, and does not have a medical reason to stay at this level of care. Attempted to remind him we discussed this on 2/21/22. At this point, Annie Padilla became upset, stating loudly \"Fine, I have had enough! This started when that doctor came in the room yesterday talking to me about changing my mom's code status and not bringing her back to the hospital and then this morning when the nurse was nasty with me on the phone! I will be there at 1:30pm to pick her up and leave her in the car until we decide what to do with her! \"    LUIS FELIPE attempted to ask him if he would be agreeable to discharging to [facility] with a semi-private room and he again yelled, \"I will be there at 1:30pm to pick her up and then I will decide after we leave! \" Annie Padilla then hung up the phone.      Ingrid LAWLER RN  Case Management  [phone]    Electronically signed by Ingrid Baxter RN on 2/22/2022 at 12:46 PM

## 2022-02-22 NOTE — PROGRESS NOTES
[facility]   Speech Therapy  Daily Dysphagia Treatment Note    Raegan Martino  AGE: 80 y.o. GENDER: female  : 1931  [id number]  EPISODE DATE:  2022  Patient Active Problem List   Diagnosis    Acute on chronic combined systolic and diastolic CHF, NYHA class 3 ([address]    Dementia in Alzheimer's disease with delirium ([address]    Acute pyelonephritis    Essential hypertension    Current use of long term anticoagulation    Mild malnutrition ([address]    Fall at home, initial encounter    Metabolic encephalopathy    Acute on chronic congestive heart failure (HCC)    Septic shock (HCC)    Bilateral pleural effusion    Chronic atrial fibrillation (HCC)    Acute encephalopathy    Ataxia    Pleural effusion    CHF (congestive heart failure), NYHA class I, acute on chronic, combined ([address]    AMS (altered mental status)    Cardiac arrest ([address]    Seizure ([address]    CHF (congestive heart failure) (HCC)    Atrial fibrillation ([address]    Cardiopulmonary arrest with successful resuscitation ([address]    Acute respiratory failure with hypoxia and hypercapnia (HCC)    Acute cystitis without hematuria    RHIANNA (acute kidney injury) (HCC)     Allergies   Allergen Reactions    Chlorpromazine Other (See Comments)    Hydrochlorothiazide Other (See Comments)     Gout     Treatment Diagnosis: Dysphagia     Hospital course:  A 79 yo female admitted with agitation, possibly increased confusion over her baseline. Her Son had sent patient for evaluation for possible UTI or possible concern for congestive heart failure.  Patient apparently had a witnessed seizure in the ED and then became hypoxic and went into bradycardia, progressed to asystole.  Pulse was noted to be lost but was never shocked, was resuscitated after about 1 to 2 minutes of CPR and 1 round of epi. she was intubated in the ED. Since then she has been treated for a uti with cefepime, and an rhianna with ivf.    Neurology was consulted as well.   Per dc summary, one year ago. \"An 79 Yo female admitted with a uti and hypoactive delirium.   This has been a recurrent cycle for Mrs Nancy Edward. She was recently discharged her on Feb 16 and very similar admission in Dec as well. she has end stage Alzheimer's with UTIs unfortunately just adding to her mental status limitations. During her last admission her depakote was stopped due to oversedation and treated her with nightly seroquel with very good response. She spends most of her days sleeping - not eating or drinking - then develops UTI due to poor urine output and returns to the hospital.  Placement was discussed with son at her last admission - he refused. He also appealed discharge last time, though I am still not sure what his true expectation was. He thought she will become \"normal\", though I am not sure what he meant by that with severe and progressive baseline underlying dementia. At the very least he is unrealistic in his expectation. \"   · Intubated 2/9-2/13/2022     Dysphagia Hx:  · Feb 2021 at [facility]: Recommended minced/moist and thin liquids at time of discharge; Dysphagia status stable/slowly resolving as cog status improved     Recent Chest Xray 2/13/2022  Impression   Enteric feeding tube projects as transpyloric, at least to the 2nd portion of   the duodenum.       Stable bibasilar opacities, likely pleural effusions with atelectasis or   airspace disease.      MRI Brain 2/11/2022  Impression   Chronic microvascular disease and cerebral atrophy with remote right parietal   lobe infarct.       Prominence of the ventricular system and correlation with symptomatology for   normal pressure hydrocephalus is needed.       Fluid in the left mastoid air cells.       Mild chronic sinusitis.      MBS procedure 2/18/22  Dysphagia Diagnosis: Moderate oral and mild pharyngeal phase dysphagia characterized by prolonged mastication of soft solid, reduced lingual manipulation with all, reduced bolus formation of textured solid, reduced bolus control, reduced AP transit with episodic lingual pumping, delayed swallow initiation and decreased laryngeal elevation. · Premature loss to valleculae with all  · Oral residue with soft solid > minced/moist solid  · NO penetration or aspiration; trace episodic vallecular residue  · Suspect potential for solid texture upgrade once dentures are present and pt's generalized weakness improves     Dysphagia Score: Dysphagia Outcome Severity Scale: Level 4: Mild moderate dysphagia- Intermittent supervision/cueing. One - two diet consistencies restricted     Aspiration/Penetration Risk:   Penetration-Aspiration Scale (PAS): 1 - Material does not enter the airway     Diet Recommendations:  Solid consistency: Dysphagia Minced and Moist (Dysphagia II)   Liquid consistency: Thin   Medication administration: PO (one at a time in puree or with liquid)     Subjective:     Current diet Minced and moist/thin liquids    Comments regarding tolerating Current Diet: RN denies concerns with swallowing on current diet  Objective:     Pain   Patient Currently in Pain: Denies    Cognitive/Behavior   Behavior/Cognition: Alert,Cooperative,Confused,Requires cueing    Positioning   Upright in chair    PO Trials:  · Thin Liquids: delayed swallow initiation, decreased laryngeal elevation and no overt s/s of aspiration or penetration  · Nectar thick liquids: DNT  · Honey Thick liquids: DNT  · Puree : DNT  · Minced and moist: reduced lingual manipulation, reduced bolus formation, decreased AP transit with mild lingual residue cleared with liquid wash, delayed swallow, decreased laryngeal elevation and no overt s/s of aspiration or penetration  · Regular food: DNT    Dysphagia Tx:   Direct Dysphagia tx: PO tolerance as indicated.  Tolerated with no overt s/s of aspiration or penetration   Dysphagia ex: unable to follow directions  Training in compensatory strategies: educated on small single sips/bites  Pt response to ex/training: no evidence of learning    Goals:   Dysphagia Goals: The patient will tolerate recommended diet without observed clinical signs of aspiration ongoing  The patient/caregiver will demonstrate understanding of compensatory strategies for improved swallowing safety. ongoing  The patient will tolerate mechanical soft foods 10/10. ongoing    Assessment:   Impressions:   Dysphagia Diagnosis: Moderate oral stage dysphagia,Severe pharyngeal stage dysphagia,Suspected needs further assessment   · Accepted and tolerated tx at bedside. Pt cooperative and confused. · Moderate oral and mild pharyngeal phase dysphagia characterized by prolonged formation of bolus, reduced lingual manipulation with all, decreased AP transit, delayed swallow initiation and decreased laryngeal elevation. Suspected premature loss of bolus with all trials. NO penetration or aspiration  · Swallow function appears most comparable to MBS results 2/18/22  · Continue minced and moist/thin liquids. Suspect once generalized weakness improves patient may be able to tolerate soft textures  · ST to continue to follow during acute admission     Diet Recommendations:  Solid consistency: Dysphagia Minced and Moist (Dysphagia II)   Liquid consistency:  Thin   Medication administration: PO (one at a time in puree or with liquid)   Recommended Form of Meds: Via alternative means of nutrition    Strategies:   Compensatory Swallowing Strategies: Upright as possible for all oral intake,Remain upright for 30-45 minutes after meals,Small bites/sips,Eat/Feed slowly    Education:  Consulted and agree with results and recommendations: Patient,RN  Patient Education: results, recommendations  Patient Education Response: Needs reinforcement,No evidence of learning    Prognosis:   Good for diet tolerance    Plan:     Continue Dysphagia Therapy:   Interventions: Therapeutic Interventions: Diet tolerance monitoring,Therapeutic PO trials with SLP,Patient/Family education  Duration/Frequency of therapy while on unit: Duration/Frequency of Treatment  Duration/Frequency of Treatment: 3-5x/wk while on acute  Discharge Instructions:   Anticipate NEED for further skilled Speech Therapy for Dysphagia at discharge    This note serves as a D/C Summary in the event that this patient is discharged prior to the next therapy session.     Coded treatment time: 0  Total treatment time: [address] [place] [address],[address] [place] [address], [address] [place] [address]  Speech-Language Pathologist  On 02/22/22 at 11:05 AM

## 2022-02-22 NOTE — PROGRESS NOTES
1420 D/c order received, pt to d/c to [facility]. D/c instructions prepared and given to pt's son, pt SONA. Attempted calling report to [facility] x2, no answer. Will try again later. Pt belongings gathered, telemetry box and PICC removed without complications. Disposition is [facility], transported with son via private vehicle. 1434 Attempted to call report again. Phone number given for them to call when ready for report. 65 Tabby Burns called this RN for report. All questions answered.      Electronically signed by Mary Shore RN on 2/22/2022 at 2:51 PM

## 2022-02-22 NOTE — PROGRESS NOTES
Physical Therapy  Steph Vogt  [id number]  [id number]    Nursing requesting assist to get pt up to chair; pt supine in bed upon arrival; initially refusing to get up however explained to pt that her son likes her to get up OOB and she was more agreeable. Supine ->sit EOB mod/max A; pt's adult brief ripped therefore while sitting EOB applied new brief to pull up once standing; Pt needed mod A of 2 to stand with walker and has significant posterior lean requiring min/mod A of 2 to stand while pulling up and changing adult brief. Pt amb 3' to [facility] chair with RW and mod A of 2; stand ->sit in chair mod A of 2 as pt does not control her descent . Assisted with positioning in chair for comfort with LEs elevated and pillows for support; all needs in reach and chair alarm engaged; pt reported being thirsty therefore given fadi mist per pt request.  Will continue to follow; Continue to recommend 3-5x/wk therapy to address deficits as pt currently is a high fall risk and likely needing more assist than son can provide at home.     Total time spent with pt: 24 min  Electronically signed by EUSEBIO DUKE PT on 2/22/2022 at 10:27 AM

## 2022-02-22 NOTE — PROGRESS NOTES
This RN received a call from patients son, Yoselin Robertson. Yoselin Robertson updated on plan of care. All questions answered.      Electronically signed by Ford Herman RN on 2/22/2022 at 9:00 AM

## 2022-02-23 NOTE — CARE COORDINATION
[facility]    Patient was discharged to [facility] 2/22/22. Son called  morning of 2/23/22-he took her home from [facility] evening of 2/22. [facility]. Orders obtained via PCP Dr. Alley Ferguson for SN, PT,OT,ST. Home care to see patient within 24-48hrs.     Tona Herrera RN, BSN CTN  [facility] [phone]

## 2022-04-20 ENCOUNTER — HOSPITAL ENCOUNTER (OUTPATIENT)
Age: 87
Setting detail: SPECIMEN
Discharge: HOME | End: 2022-04-20

## 2022-04-20 LAB
BACTERIA: ABNORMAL /HPF
BILIRUBIN URINE: NEGATIVE
BLOOD, URINE: ABNORMAL
CLARITY: ABNORMAL
COLOR: YELLOW
EPITHELIAL CELLS, UA: 23 /HPF (ref 0–5)
GLUCOSE URINE: NEGATIVE MG/DL
HYALINE CASTS: 6 /LPF (ref 0–8)
KETONES, URINE: NEGATIVE MG/DL
LEUKOCYTE ESTERASE, URINE: NEGATIVE
MICROSCOPIC EXAMINATION: YES
NITRITE, URINE: NEGATIVE
PH UA: 6.5 (ref 5–8)
PROTEIN UA: NEGATIVE MG/DL
RBC UA: ABNORMAL /HPF (ref 0–4)
SPECIFIC GRAVITY UA: 1.02 (ref 1–1.03)
URINE REFLEX TO CULTURE: ABNORMAL
URINE TYPE: ABNORMAL
UROBILINOGEN, URINE: 1 E.U./DL
WBC UA: 6 /HPF (ref 0–5)

## 2022-04-20 PROCEDURE — 81001 URINALYSIS AUTO W/SCOPE: CPT

## 2022-04-22 ENCOUNTER — APPOINTMENT (OUTPATIENT)
Dept: GENERAL RADIOLOGY | Age: 87
End: 2022-04-22

## 2022-04-22 ENCOUNTER — HOSPITAL ENCOUNTER (EMERGENCY)
Age: 87
Discharge: HOME | End: 2022-04-22
Attending: EMERGENCY MEDICINE

## 2022-04-22 VITALS
HEART RATE: 74 BPM | SYSTOLIC BLOOD PRESSURE: 131 MMHG | OXYGEN SATURATION: 96 % | WEIGHT: 201.06 LBS | DIASTOLIC BLOOD PRESSURE: 92 MMHG | RESPIRATION RATE: 18 BRPM | TEMPERATURE: 97.6 F | BODY MASS INDEX: 35.62 KG/M2 | HEIGHT: 63 IN

## 2022-04-22 DIAGNOSIS — E86.0 MILD DEHYDRATION: ICD-10-CM

## 2022-04-22 DIAGNOSIS — R41.82 ALTERED MENTAL STATUS, UNSPECIFIED ALTERED MENTAL STATUS TYPE: Primary | ICD-10-CM

## 2022-04-22 LAB
A/G RATIO: 1.2 (ref 1.1–2.2)
ALBUMIN SERPL-MCNC: 3.4 G/DL (ref 3.4–5)
ALP BLD-CCNC: 259 U/L (ref 40–129)
ALT SERPL-CCNC: 34 U/L (ref 10–40)
AMPHETAMINE SCREEN, URINE: NORMAL
ANION GAP SERPL CALCULATED.3IONS-SCNC: 12 MMOL/L (ref 3–16)
AST SERPL-CCNC: 47 U/L (ref 15–37)
BACTERIA: ABNORMAL /HPF
BARBITURATE SCREEN URINE: NORMAL
BASOPHILS ABSOLUTE: 0 K/UL (ref 0–0.2)
BASOPHILS RELATIVE PERCENT: 0.7 %
BENZODIAZEPINE SCREEN, URINE: NORMAL
BILIRUB SERPL-MCNC: 0.9 MG/DL (ref 0–1)
BILIRUBIN URINE: NEGATIVE
BLOOD, URINE: ABNORMAL
BUN BLDV-MCNC: 24 MG/DL (ref 7–20)
CALCIUM SERPL-MCNC: 10.1 MG/DL (ref 8.3–10.6)
CANNABINOID SCREEN URINE: NORMAL
CHLORIDE BLD-SCNC: 108 MMOL/L (ref 99–110)
CLARITY: CLEAR
CO2: 18 MMOL/L (ref 21–32)
COCAINE METABOLITE SCREEN URINE: NORMAL
COLOR: ABNORMAL
COMMENT UA: ABNORMAL
CREAT SERPL-MCNC: 0.9 MG/DL (ref 0.6–1.2)
EOSINOPHILS ABSOLUTE: 0.1 K/UL (ref 0–0.6)
EOSINOPHILS RELATIVE PERCENT: 2.3 %
EPITHELIAL CELLS, UA: 3 /HPF (ref 0–5)
GFR AFRICAN AMERICAN: >60
GFR NON-AFRICAN AMERICAN: 59
GLUCOSE BLD-MCNC: 80 MG/DL (ref 70–99)
GLUCOSE URINE: NEGATIVE MG/DL
HCT VFR BLD CALC: 32.7 % (ref 36–48)
HEMOGLOBIN: 10.4 G/DL (ref 12–16)
HYALINE CASTS: 0 /LPF (ref 0–8)
KETONES, URINE: NEGATIVE MG/DL
LEUKOCYTE ESTERASE, URINE: ABNORMAL
LYMPHOCYTES ABSOLUTE: 0.9 K/UL (ref 1–5.1)
LYMPHOCYTES RELATIVE PERCENT: 17.4 %
Lab: NORMAL
MCH RBC QN AUTO: 26.3 PG (ref 26–34)
MCHC RBC AUTO-ENTMCNC: 31.6 G/DL (ref 31–36)
MCV RBC AUTO: 82.9 FL (ref 80–100)
METHADONE SCREEN, URINE: NORMAL
MICROSCOPIC EXAMINATION: YES
MONOCYTES ABSOLUTE: 0.8 K/UL (ref 0–1.3)
MONOCYTES RELATIVE PERCENT: 16.3 %
NEUTROPHILS ABSOLUTE: 3.2 K/UL (ref 1.7–7.7)
NEUTROPHILS RELATIVE PERCENT: 63.3 %
NITRITE, URINE: NEGATIVE
OPIATE SCREEN URINE: NORMAL
OXYCODONE URINE: NORMAL
PDW BLD-RTO: 18.8 % (ref 12.4–15.4)
PH UA: 6 (ref 5–8)
PH UA: 7
PHENCYCLIDINE SCREEN URINE: NORMAL
PLATELET # BLD: 136 K/UL (ref 135–450)
PMV BLD AUTO: 10.7 FL (ref 5–10.5)
POTASSIUM REFLEX MAGNESIUM: 5 MMOL/L (ref 3.5–5.1)
PRO-BNP: 2534 PG/ML (ref 0–449)
PROPOXYPHENE SCREEN: NORMAL
PROTEIN UA: NEGATIVE MG/DL
RBC # BLD: 3.95 M/UL (ref 4–5.2)
RBC UA: 6 /HPF (ref 0–4)
SODIUM BLD-SCNC: 138 MMOL/L (ref 136–145)
SPECIFIC GRAVITY UA: 1.01 (ref 1–1.03)
TOTAL PROTEIN: 6.3 G/DL (ref 6.4–8.2)
TROPONIN: <0.01 NG/ML
URINE REFLEX TO CULTURE: ABNORMAL
URINE TYPE: ABNORMAL
UROBILINOGEN, URINE: 0.2 E.U./DL
WBC # BLD: 5.1 K/UL (ref 4–11)
WBC UA: 2 /HPF (ref 0–5)

## 2022-04-22 PROCEDURE — 71045 X-RAY EXAM CHEST 1 VIEW: CPT

## 2022-04-22 PROCEDURE — 84484 ASSAY OF TROPONIN QUANT: CPT

## 2022-04-22 PROCEDURE — 99285 EMERGENCY DEPT VISIT HI MDM: CPT

## 2022-04-22 PROCEDURE — 80307 DRUG TEST PRSMV CHEM ANLYZR: CPT

## 2022-04-22 PROCEDURE — 81001 URINALYSIS AUTO W/SCOPE: CPT

## 2022-04-22 PROCEDURE — 96360 HYDRATION IV INFUSION INIT: CPT

## 2022-04-22 PROCEDURE — 93005 ELECTROCARDIOGRAM TRACING: CPT | Performed by: EMERGENCY MEDICINE

## 2022-04-22 PROCEDURE — 36415 COLL VENOUS BLD VENIPUNCTURE: CPT

## 2022-04-22 PROCEDURE — 85025 COMPLETE CBC W/AUTO DIFF WBC: CPT

## 2022-04-22 PROCEDURE — 2580000003 HC RX 258: Performed by: EMERGENCY MEDICINE

## 2022-04-22 PROCEDURE — 83880 ASSAY OF NATRIURETIC PEPTIDE: CPT

## 2022-04-22 PROCEDURE — 80053 COMPREHEN METABOLIC PANEL: CPT

## 2022-04-22 RX ORDER — 0.9 % SODIUM CHLORIDE 0.9 %
500 INTRAVENOUS SOLUTION INTRAVENOUS ONCE
Status: COMPLETED | OUTPATIENT
Start: 2022-04-22 | End: 2022-04-22

## 2022-04-22 RX ADMIN — SODIUM CHLORIDE 500 ML: 9 INJECTION, SOLUTION INTRAVENOUS at 17:09

## 2022-04-22 ASSESSMENT — PAIN - FUNCTIONAL ASSESSMENT: PAIN_FUNCTIONAL_ASSESSMENT: NONE - DENIES PAIN

## 2022-04-22 NOTE — ED PROVIDER NOTES
[facility]    CHIEF COMPLAINT  Altered Mental Status (altered mental status sometime this morning, seemed tired today, abd distended.)       HISTORY OF PRESENT ILLNESS  Nicole Ochoa is a 80 y.o. female who presents to the ED with AMS. Presents from home by EMS. BG 98 prehospital. IV placed prehospital. Pt is a poor historian secondary to history of Alzheimer's dementia. Denies fever, chest pain, SOB, nausea, vomiting, diarrhea, abdominal pain, dysuria. Son, Nancy Earl, arrives and states mother was in usual state of health until today, at which time he noted mother was more lethargic than usual. Recent blood work was reportedly normal. Home nurse visited today and was concerned that patient may have been retaining water as her \"stomach was hard\". Last BM at 1300. Doubled lasix dose this morning. Baseline mental status - oriented to person/place. 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 Ambulates with a strollator. Extended Emergency Contact Information  Primary Emergency Contact: [employer]  Home Phone: [phone]  Mobile Phone: [phone]  Relation: Child   needed? No  Secondary Emergency Contact: [address] [facility] [place] [address] Phone: [phone]  Relation: Child     Nancy Earl confirms patient DNR-CCA. No other complaints, modifying factors or associated symptoms. I have reviewed the following from the nursing documentation:    Past Medical History:   Diagnosis Date    Alzheimer's dementia ([address]     Atrial fibrillation ([facility] [address]     Benign essential HTN     Gout     Multiple drug resistant organism (MDRO) culture positive 11/04/2021    Escherichia coli. Urine     No past surgical history on file. No family history on file.   Social History     Socioeconomic History    Marital status: Single     Spouse name: Not on file    Number of children: Not on file    Years of education: Not on file    Highest education level: Not on file   Occupational History    Not on file   Tobacco Use    Smoking status: Never Smoker    Smokeless tobacco: Never Used   Vaping Use    Vaping Use: Never used   Substance and Sexual Activity    Alcohol use: Never    Drug use: Never    Sexual activity: Not Currently   Other Topics Concern    Not on file   Social History Narrative    Not on file     Social Determinants of Health     Financial Resource Strain:     Difficulty of Paying Living Expenses: Not on file   Food Insecurity:     Worried About Running Out of Food in the Last Year: Not on file    Caitlin of Food in the Last Year: Not on file   Transportation Needs:     Lack of Transportation (Medical): Not on file    Lack of Transportation (Non-Medical): Not on file   Physical Activity:     Days of Exercise per Week: Not on file    Minutes of Exercise per Session: Not on file   Stress:     Feeling of Stress : Not on file   Social Connections:     Frequency of Communication with Friends and Family: Not on file    Frequency of Social Gatherings with Friends and Family: Not on file    Attends Jew Services: Not on file    Active Member of [address] [place] [address] or Organizations: Not on file    Attends Club or Organization Meetings: Not on file    Marital Status: Not on file   Intimate Partner Violence:     Fear of Current or Ex-Partner: Not on file    Emotionally Abused: Not on file    Physically Abused: Not on file    Sexually Abused: Not on file   Housing Stability:     Unable to Pay for Housing in the Last Year: Not on file    Number of [place] in the Last Year: Not on file    Unstable Housing in the Last Year: Not on file     No current facility-administered medications for this encounter.      Current Outpatient Medications   Medication Sig Dispense Refill    melatonin 3 MG TABS tablet Take 3 mg by mouth nightly as needed for Sleep      diclofenac sodium (VOLTAREN) 1 % GEL Apply 2 g topically 4 times daily as needed for Pain      metoprolol succinate (TOPROL XL) 25 MG extended release tablet Take 1 tablet by mouth daily 30 tablet 1    furosemide (LASIX) 20 MG tablet Take 1 tablet by mouth every other day 30 tablet 3    Cranberry 250 MG CHEW Take 2 tablets by mouth 2 times daily (with meals) 120 tablet 0    rivaroxaban (XARELTO) 15 MG TABS tablet Take 1 tablet by mouth daily (with breakfast) 90 tablet 1    vitamin D 25 MCG (1000 UT) CAPS Take 1,000 Units by mouth daily      albuterol sulfate HFA (VENTOLIN HFA) 108 (90 Base) MCG/ACT inhaler Inhale 2 puffs into the lungs every 6 hours as needed for Wheezing      mometasone-formoterol (DULERA) 200-5 MCG/ACT inhaler Inhale 2 puffs into the lungs 2 times daily       Multiple Vitamins-Minerals (MULTIVITAMIN WITH MINERALS) tablet Take 1 tablet by mouth daily      calcium carbonate (OSCAL) 500 MG TABS tablet Take 500 mg by mouth daily       Allergies   Allergen Reactions    Chlorpromazine Other (See Comments)    Hydrochlorothiazide Other (See Comments)     Gout       REVIEW OF SYSTEMS  10 systems reviewed, pertinent positives and negatives per HPI, otherwise noted to be negative. PHYSICAL EXAM  ED Triage Vitals   Enc Vitals Group      BP 04/22/22 1657 118/71      Pulse 04/22/22 1657 62      Resp 04/22/22 1657 18      Temp 04/22/22 1839 97.6 °F (36.4 °C)      Temp Source 04/22/22 1839 Axillary      SpO2 04/22/22 1657 97 %      Weight 04/22/22 1657 201 lb 1 oz (91.2 kg)      Height 04/22/22 1657 5' 3\" (1.6 m)      Head Circumference --       Peak Flow --       Pain Score --       Pain Loc --       Pain Edu? --       Excl. in [address]? --         General appearance: Awake and alert. Cooperative. No acute distress. HENT: Normocephalic. Atraumatic. Mucous membranes are tacky. Neck: Supple. No meningismus. Eyes: PERRL. EOMI. Heart/Chest: Reg rate. Irreg irreg rhythm. Lungs: Respirations unlabored. CTAB. Good air exchange. Abdomen: Soft. Non-tender. Non-distended. No rebound or guarding.    Musculoskeletal: No extremity edema. No deformity. No tenderness in the extremities. All extremities neurovascularly intact. Skin: Warm and dry. No acute rashes. Neurological: Alert and oriented to person/hospital. CN II-XII intact except [place]. No pronator drift. Full / symmetric strength bilateral leg raise. Sensation intact to light touch. Gait not assessed. Psychiatric: Mood/affect: normal for situation      LABS  I have reviewed all labs for this visit.    Results for orders placed or performed during the hospital encounter of 04/22/22   CBC with Auto Differential   Result Value Ref Range    WBC 5.1 4.0 - 11.0 K/uL    RBC 3.95 (L) 4.00 - 5.20 M/uL    Hemoglobin 10.4 (L) 12.0 - 16.0 g/dL    Hematocrit 32.7 (L) 36.0 - 48.0 %    MCV 82.9 80.0 - 100.0 fL    MCH 26.3 26.0 - 34.0 pg    MCHC 31.6 31.0 - 36.0 g/dL    RDW 18.8 (H) 12.4 - 15.4 %    Platelets 384 730 - 685 K/uL    MPV 10.7 (H) 5.0 - 10.5 fL    Neutrophils % 63.3 %    Lymphocytes % 17.4 %    Monocytes % 16.3 %    Eosinophils % 2.3 %    Basophils % 0.7 %    Neutrophils Absolute 3.2 1.7 - 7.7 K/uL    Lymphocytes Absolute 0.9 (L) 1.0 - 5.1 K/uL    Monocytes Absolute 0.8 0.0 - 1.3 K/uL    Eosinophils Absolute 0.1 0.0 - 0.6 K/uL    Basophils Absolute 0.0 0.0 - 0.2 K/uL   Comprehensive Metabolic Panel w/ Reflex to MG   Result Value Ref Range    Sodium 138 136 - 145 mmol/L    Potassium reflex Magnesium 5.0 3.5 - 5.1 mmol/L    Chloride 108 99 - 110 mmol/L    CO2 18 (L) 21 - 32 mmol/L    Anion Gap 12 3 - 16    Glucose 80 70 - 99 mg/dL    BUN 24 (H) 7 - 20 mg/dL    CREATININE 0.9 0.6 - 1.2 mg/dL    GFR Non- 59 (A) >60    GFR African American >60 >60    Calcium 10.1 8.3 - 10.6 mg/dL    Total Protein 6.3 (L) 6.4 - 8.2 g/dL    Albumin 3.4 3.4 - 5.0 g/dL    Albumin/Globulin Ratio 1.2 1.1 - 2.2    Total Bilirubin 0.9 0.0 - 1.0 mg/dL    Alkaline Phosphatase 259 (H) 40 - 129 U/L    ALT 34 10 - 40 U/L    AST 47 (H) 15 - 37 U/L   Troponin   Result Value Ref Range    Troponin <0.01 <0.01 ng/mL   Brain Natriuretic Peptide   Result Value Ref Range    Pro-BNP 2,534 (H) 0 - 449 pg/mL   Urinalysis with Reflex to Culture    Specimen: Urine, clean catch   Result Value Ref Range    Color, UA Straw Straw/Yellow    Clarity, UA Clear Clear    Glucose, Ur Negative Negative mg/dL    Bilirubin Urine Negative Negative    Ketones, Urine Negative Negative mg/dL    Specific Gravity, UA 1.010 1.005 - 1.030    Blood, Urine TRACE-INTACT (A) Negative    pH, UA 6.0 5.0 - 8.0    Protein, UA Negative Negative mg/dL    Urobilinogen, Urine 0.2 <2.0 E.U./dL    Nitrite, Urine Negative Negative    Leukocyte Esterase, Urine TRACE (A) Negative    Microscopic Examination YES     Urine Type NotGiven     Urine Reflex to Culture Not Indicated    Urine Drug Screen   Result Value Ref Range    Drug Screen Comment: see below    Microscopic Urinalysis   Result Value Ref Range    Bacteria, UA 1+ (A) None Seen /HPF    Urinalysis Comments see below     Hyaline Casts, UA 0 0 - 8 /LPF    WBC, UA 2 0 - 5 /HPF    RBC, UA 6 (H) 0 - 4 /HPF    Epithelial Cells, UA 3 0 - 5 /HPF       RADIOLOGY  I have reviewed all radiographic studies for this visit. XR CHEST PORTABLE   Final Result   Cardiomegaly      Possible bilateral pleural effusions              ECG  EKG interpreted by myself. Rate: normal  Rhythm: a-fib  Axis: normal  Intervals: within normal limits  ST Segments: no acute abnormality  T waves: no acute abnormality  Comparison: Compared to 2/9/22, rate slower by 57 bpm  Impression: Atrial fibrillation with controlled ventricular response and low voltage QRS       ED COURSE/MDM  Patient seen and evaluated. Old records reviewed. Labs and imaging reviewed and results discussed with patient/family to extent possible. This is a 51-year-old female who presents with concern for altered mental status. On arrival the patient is afebrile and nontoxic in appearance with otherwise reassuring vital signs. Blood glucose is reassuring. Renal panel notable for bicarb 18, slightly below the lower limit of normal.  No significant anion gap elevation. BNP is elevated at 2500. Overall, however, the patient appears slightly volume down, with tachy mucous membranes. This is occurring in the setting of the patient's family having increase the patient's Lasix dosing. BUN is elevated at 24. CBC without leukocytosis. Mild anemia, clinically insignificant. Urinalysis without evidence of urinary tract infection. Urine toxicology pan negative. EKG shows atrial fibrillation without acute ischemic abnormality. Known history of A. fib. The patient was administered a normal saline bolus. Her mental status, to the assessment of her family at bedside, improved thereafter. Overall, I suspect the patient's presentation is likely consistent with a mild dehydration from having increased her Lasix dosing. X-ray does show cardiomegaly with possible bilateral pleural effusions. The patient's lung exam is nonfocal and she is maintaining adequate oxygenation on room air. On balance, I believe it would be appropriate to resume the patient's prescribed Lasix course and withhold any additional doses of the diuretic. Otherwise, given reassuring exam, vital signs, and diagnostics I believe she is appropriate for discharged home with close follow-up with PCP in several days. Usual strict return precautions were communicated to patient and family members at bedside. During the patient's ED course, the patient was given:  Medications   0.9 % sodium chloride bolus (0 mLs IntraVENous Stopped 4/22/22 1815)        All questions were answered and the patient/family expressed understanding and agreement with the plan. PROCEDURES  None    CRITICAL CARE  N/A    CLINICAL IMPRESSION  1. Altered mental status, unspecified altered mental status type    2.  Mild dehydration        DISPOSITION   discharge     Pedro Preston MD    Note: This chart was created using voice recognition dictation software. Efforts were made by me to ensure accuracy, however some errors may be present due to limitations of this technology and occasionally words are not transcribed correctly.         Al Stubbs MD  04/23/22 1400

## 2022-04-22 NOTE — CARE COORDINATION
Critical access hospital  Patient is active with [facility], Will follow for discharge to home with orders to resume care.       Cathi Montgomery RN, BSN CTN  Critical access hospital [phone]

## 2022-04-23 LAB
EKG ATRIAL RATE: 326 BPM
EKG DIAGNOSIS: NORMAL
EKG Q-T INTERVAL: 442 MS
EKG QRS DURATION: 90 MS
EKG QTC CALCULATION (BAZETT): 444 MS
EKG R AXIS: 54 DEGREES
EKG T AXIS: -5 DEGREES
EKG VENTRICULAR RATE: 61 BPM

## 2022-04-23 PROCEDURE — 93010 ELECTROCARDIOGRAM REPORT: CPT | Performed by: INTERNAL MEDICINE

## 2022-05-03 NOTE — PLAN OF CARE
Problem: Skin Integrity:  Goal: Will show no infection signs and symptoms  2/16/2021 1003 by Marco A Pike RN  Outcome: Ongoing     Problem: Skin Integrity:  Goal: Absence of new skin breakdown  2/16/2021 1003 by Marco A Pike RN  Outcome: Ongoing     Problem: Restraint Use - Nonviolent/Non-Self-Destructive Behavior:  Goal: Absence of restraint indications  2/16/2021 1003 by Marco A Pike RN  Outcome: Ongoing     Problem: Restraint Use - Nonviolent/Non-Self-Destructive Behavior:  Goal: Absence of restraint-related injury  2/16/2021 1003 by Marco A Pike RN  Outcome: Ongoing     Problem: Discharge Planning:  Goal: Discharged to appropriate level of care  2/16/2021 1003 by Marco A Pike RN  Outcome: Ongoing     Problem: Gas Exchange - Impaired:  Goal: Levels of oxygenation will improve  2/16/2021 1003 by Marco A Pike RN  Outcome: Ongoing     Problem: Infection, Septic Shock:  Goal: Will show no infection signs and symptoms  2/16/2021 1003 by Marco A Pike RN  Outcome: Ongoing     Problem: Infection - Ventilator-Associated Pneumonia:  Goal: Absence of pulmonary infection  2/16/2021 1003 by Marco A Pike RN  Outcome: Ongoing     Problem: Serum Glucose Level - Abnormal:  Goal: Ability to maintain appropriate glucose levels will improve  2/16/2021 1003 by Marco A Pike RN  Outcome: Ongoing     Problem: Tissue Perfusion, Altered:  Goal: Circulatory function within specified parameters  2/16/2021 1003 by Marco A Pike RN  Outcome: Ongoing     Problem: Venous Thromboembolism:  Goal: Will show no signs or symptoms of venous thromboembolism  2/16/2021 1003 by Marco A Pike RN  Outcome: Ongoing     Problem: Venous Thromboembolism:  Goal: Absence of signs or symptoms of impaired coagulation  2/16/2021 1003 by Marco A Pike RN  Outcome: Ongoing     Problem: Falls - Risk of:  Goal: Will remain free from falls  2/16/2021 1003 by Marco A Pike RN  Outcome: Ongoing     Problem: Falls - Risk of:  Goal: Absence of physical injury  2/16/2021 1003 by Reji Sepulveda RN  Outcome: Ongoing     Problem: OXYGENATION/RESPIRATORY FUNCTION  Goal: Patient will maintain patent airway  2/16/2021 1003 by Reji Sepulveda RN  Outcome: Ongoing     Problem: OXYGENATION/RESPIRATORY FUNCTION  Goal: Patient will achieve/maintain normal respiratory rate/effort  2/16/2021 1003 by Reji Sepulveda RN  Outcome: Ongoing     Problem: HEMODYNAMIC STATUS  Goal: Patient has stable vital signs and fluid balance  2/16/2021 1003 by Reji Sepulveda RN  Outcome: Ongoing     Problem: FLUID AND ELECTROLYTE IMBALANCE  Goal: Fluid and electrolyte balance are achieved/maintained  2/16/2021 1003 by Reji Sepulveda RN  Outcome: Ongoing     Problem: ACTIVITY INTOLERANCE/IMPAIRED MOBILITY  Goal: Mobility/activity is maintained at optimum level for patient  2/16/2021 1003 by Reji Sepulveda RN  Outcome: Ongoing None felt

## 2022-10-18 ENCOUNTER — APPOINTMENT (OUTPATIENT)
Dept: CT IMAGING | Age: 87
DRG: 689 | End: 2022-10-18
Payer: MEDICARE

## 2022-10-18 ENCOUNTER — APPOINTMENT (OUTPATIENT)
Dept: GENERAL RADIOLOGY | Age: 87
DRG: 689 | End: 2022-10-18
Payer: MEDICARE

## 2022-10-18 ENCOUNTER — HOSPITAL ENCOUNTER (INPATIENT)
Age: 87
LOS: 8 days | Discharge: NURSING, REHAB OR LONG-TERM CARE | DRG: 689 | End: 2022-10-26
Attending: EMERGENCY MEDICINE | Admitting: INTERNAL MEDICINE
Payer: MEDICARE

## 2022-10-18 DIAGNOSIS — T68.XXXA HYPOTHERMIA, INITIAL ENCOUNTER: ICD-10-CM

## 2022-10-18 DIAGNOSIS — R41.82 ALTERED MENTAL STATUS, UNSPECIFIED ALTERED MENTAL STATUS TYPE: Primary | ICD-10-CM

## 2022-10-18 PROBLEM — N39.0 UTI (URINARY TRACT INFECTION): Status: ACTIVE | Noted: 2022-10-18

## 2022-10-18 LAB
A/G RATIO: 1.1 (ref 1.1–2.2)
ALBUMIN SERPL-MCNC: 3.4 G/DL (ref 3.4–5)
ALP BLD-CCNC: 155 U/L (ref 40–129)
ALT SERPL-CCNC: 17 U/L (ref 10–40)
AMMONIA: 27 UMOL/L (ref 11–51)
ANION GAP SERPL CALCULATED.3IONS-SCNC: 12 MMOL/L (ref 3–16)
AST SERPL-CCNC: 28 U/L (ref 15–37)
BACTERIA: ABNORMAL /HPF
BASOPHILS ABSOLUTE: 0 K/UL (ref 0–0.2)
BASOPHILS RELATIVE PERCENT: 0.7 %
BILIRUB SERPL-MCNC: 1.1 MG/DL (ref 0–1)
BILIRUBIN URINE: NEGATIVE
BLOOD, URINE: ABNORMAL
BUN BLDV-MCNC: 22 MG/DL (ref 7–20)
CALCIUM SERPL-MCNC: 10.2 MG/DL (ref 8.3–10.6)
CHLORIDE BLD-SCNC: 105 MMOL/L (ref 99–110)
CLARITY: ABNORMAL
CO2: 22 MMOL/L (ref 21–32)
COLOR: YELLOW
CREAT SERPL-MCNC: 1 MG/DL (ref 0.6–1.2)
EKG DIAGNOSIS: NORMAL
EKG Q-T INTERVAL: 440 MS
EKG QRS DURATION: 90 MS
EKG QTC CALCULATION (BAZETT): 464 MS
EKG R AXIS: 20 DEGREES
EKG T AXIS: -5 DEGREES
EKG VENTRICULAR RATE: 67 BPM
EOSINOPHILS ABSOLUTE: 0.1 K/UL (ref 0–0.6)
EOSINOPHILS RELATIVE PERCENT: 1.1 %
EPITHELIAL CELLS, UA: 10 /HPF (ref 0–5)
GFR SERPL CREATININE-BSD FRML MDRD: 53 ML/MIN/{1.73_M2}
GLUCOSE BLD-MCNC: 119 MG/DL (ref 70–99)
GLUCOSE BLD-MCNC: 92 MG/DL (ref 70–99)
GLUCOSE BLD-MCNC: 93 MG/DL (ref 70–99)
GLUCOSE URINE: NEGATIVE MG/DL
HCT VFR BLD CALC: 37.6 % (ref 36–48)
HEMOGLOBIN: 12.1 G/DL (ref 12–16)
HYALINE CASTS: 2 /LPF (ref 0–8)
KETONES, URINE: NEGATIVE MG/DL
LACTIC ACID: 1.2 MMOL/L (ref 0.4–2)
LACTIC ACID: 4.9 MMOL/L (ref 0.4–2)
LEUKOCYTE ESTERASE, URINE: ABNORMAL
LIPASE: 12 U/L (ref 13–60)
LYMPHOCYTES ABSOLUTE: 0.7 K/UL (ref 1–5.1)
LYMPHOCYTES RELATIVE PERCENT: 12.8 %
MCH RBC QN AUTO: 28.2 PG (ref 26–34)
MCHC RBC AUTO-ENTMCNC: 32.3 G/DL (ref 31–36)
MCV RBC AUTO: 87.2 FL (ref 80–100)
MICROSCOPIC EXAMINATION: YES
MONOCYTES ABSOLUTE: 0.6 K/UL (ref 0–1.3)
MONOCYTES RELATIVE PERCENT: 10.9 %
NEUTROPHILS ABSOLUTE: 4.2 K/UL (ref 1.7–7.7)
NEUTROPHILS RELATIVE PERCENT: 74.5 %
NITRITE, URINE: NEGATIVE
PDW BLD-RTO: 15.7 % (ref 12.4–15.4)
PERFORMED ON: ABNORMAL
PERFORMED ON: NORMAL
PH UA: 6.5 (ref 5–8)
PLATELET # BLD: 191 K/UL (ref 135–450)
PMV BLD AUTO: 9.8 FL (ref 5–10.5)
POTASSIUM REFLEX MAGNESIUM: 4.5 MMOL/L (ref 3.5–5.1)
PROLACTIN: 86.7 NG/ML
PROTEIN UA: NEGATIVE MG/DL
RAPID INFLUENZA  B AGN: NEGATIVE
RAPID INFLUENZA A AGN: NEGATIVE
RBC # BLD: 4.31 M/UL (ref 4–5.2)
RBC UA: 5 /HPF (ref 0–4)
SARS-COV-2, NAAT: NOT DETECTED
SODIUM BLD-SCNC: 139 MMOL/L (ref 136–145)
SPECIFIC GRAVITY UA: 1.01 (ref 1–1.03)
TOTAL CK: 149 U/L (ref 26–192)
TOTAL CK: 152 U/L (ref 26–192)
TOTAL PROTEIN: 6.5 G/DL (ref 6.4–8.2)
TROPONIN: <0.01 NG/ML
TSH REFLEX: 2.31 UIU/ML (ref 0.27–4.2)
URINE REFLEX TO CULTURE: ABNORMAL
URINE TYPE: ABNORMAL
UROBILINOGEN, URINE: 1 E.U./DL
WBC # BLD: 5.6 K/UL (ref 4–11)
WBC UA: 7 /HPF (ref 0–5)

## 2022-10-18 PROCEDURE — 2580000003 HC RX 258: Performed by: INTERNAL MEDICINE

## 2022-10-18 PROCEDURE — 2060000000 HC ICU INTERMEDIATE R&B

## 2022-10-18 PROCEDURE — 6360000004 HC RX CONTRAST MEDICATION: Performed by: PHYSICIAN ASSISTANT

## 2022-10-18 PROCEDURE — 99285 EMERGENCY DEPT VISIT HI MDM: CPT

## 2022-10-18 PROCEDURE — 83690 ASSAY OF LIPASE: CPT

## 2022-10-18 PROCEDURE — 80053 COMPREHEN METABOLIC PANEL: CPT

## 2022-10-18 PROCEDURE — 84443 ASSAY THYROID STIM HORMONE: CPT

## 2022-10-18 PROCEDURE — 71045 X-RAY EXAM CHEST 1 VIEW: CPT

## 2022-10-18 PROCEDURE — 87804 INFLUENZA ASSAY W/OPTIC: CPT

## 2022-10-18 PROCEDURE — 93005 ELECTROCARDIOGRAM TRACING: CPT | Performed by: PHYSICIAN ASSISTANT

## 2022-10-18 PROCEDURE — 84484 ASSAY OF TROPONIN QUANT: CPT

## 2022-10-18 PROCEDURE — 82550 ASSAY OF CK (CPK): CPT

## 2022-10-18 PROCEDURE — 85025 COMPLETE CBC W/AUTO DIFF WBC: CPT

## 2022-10-18 PROCEDURE — 70450 CT HEAD/BRAIN W/O DYE: CPT

## 2022-10-18 PROCEDURE — 74177 CT ABD & PELVIS W/CONTRAST: CPT

## 2022-10-18 PROCEDURE — 82140 ASSAY OF AMMONIA: CPT

## 2022-10-18 PROCEDURE — 6360000002 HC RX W HCPCS: Performed by: INTERNAL MEDICINE

## 2022-10-18 PROCEDURE — 2580000003 HC RX 258: Performed by: PHYSICIAN ASSISTANT

## 2022-10-18 PROCEDURE — 83605 ASSAY OF LACTIC ACID: CPT

## 2022-10-18 PROCEDURE — 81001 URINALYSIS AUTO W/SCOPE: CPT

## 2022-10-18 PROCEDURE — 87635 SARS-COV-2 COVID-19 AMP PRB: CPT

## 2022-10-18 PROCEDURE — 84146 ASSAY OF PROLACTIN: CPT

## 2022-10-18 RX ORDER — ENOXAPARIN SODIUM 100 MG/ML
40 INJECTION SUBCUTANEOUS DAILY
Status: DISCONTINUED | OUTPATIENT
Start: 2022-10-19 | End: 2022-10-22

## 2022-10-18 RX ORDER — ONDANSETRON 2 MG/ML
4 INJECTION INTRAMUSCULAR; INTRAVENOUS EVERY 6 HOURS PRN
Status: DISCONTINUED | OUTPATIENT
Start: 2022-10-18 | End: 2022-10-26 | Stop reason: HOSPADM

## 2022-10-18 RX ORDER — 0.9 % SODIUM CHLORIDE 0.9 %
500 INTRAVENOUS SOLUTION INTRAVENOUS ONCE
Status: COMPLETED | OUTPATIENT
Start: 2022-10-18 | End: 2022-10-18

## 2022-10-18 RX ORDER — POLYETHYLENE GLYCOL 3350 17 G/17G
17 POWDER, FOR SOLUTION ORAL DAILY PRN
Status: DISCONTINUED | OUTPATIENT
Start: 2022-10-18 | End: 2022-10-26 | Stop reason: HOSPADM

## 2022-10-18 RX ORDER — SODIUM CHLORIDE 0.9 % (FLUSH) 0.9 %
5-40 SYRINGE (ML) INJECTION PRN
Status: DISCONTINUED | OUTPATIENT
Start: 2022-10-18 | End: 2022-10-26 | Stop reason: HOSPADM

## 2022-10-18 RX ORDER — ACETAMINOPHEN 650 MG/1
650 SUPPOSITORY RECTAL EVERY 6 HOURS PRN
Status: DISCONTINUED | OUTPATIENT
Start: 2022-10-18 | End: 2022-10-26 | Stop reason: HOSPADM

## 2022-10-18 RX ORDER — ONDANSETRON 4 MG/1
4 TABLET, ORALLY DISINTEGRATING ORAL EVERY 8 HOURS PRN
Status: DISCONTINUED | OUTPATIENT
Start: 2022-10-18 | End: 2022-10-26 | Stop reason: HOSPADM

## 2022-10-18 RX ORDER — SODIUM CHLORIDE 0.9 % (FLUSH) 0.9 %
5-40 SYRINGE (ML) INJECTION EVERY 12 HOURS SCHEDULED
Status: DISCONTINUED | OUTPATIENT
Start: 2022-10-18 | End: 2022-10-26 | Stop reason: HOSPADM

## 2022-10-18 RX ORDER — LORAZEPAM 2 MG/ML
1 INJECTION INTRAMUSCULAR
Status: DISCONTINUED | OUTPATIENT
Start: 2022-10-18 | End: 2022-10-21

## 2022-10-18 RX ORDER — SODIUM CHLORIDE 9 MG/ML
INJECTION, SOLUTION INTRAVENOUS CONTINUOUS
Status: DISCONTINUED | OUTPATIENT
Start: 2022-10-18 | End: 2022-10-21

## 2022-10-18 RX ORDER — ALBUTEROL SULFATE 90 UG/1
2 AEROSOL, METERED RESPIRATORY (INHALATION) EVERY 6 HOURS PRN
Status: DISCONTINUED | OUTPATIENT
Start: 2022-10-18 | End: 2022-10-26 | Stop reason: HOSPADM

## 2022-10-18 RX ORDER — ACETAMINOPHEN 325 MG/1
650 TABLET ORAL EVERY 6 HOURS PRN
Status: DISCONTINUED | OUTPATIENT
Start: 2022-10-18 | End: 2022-10-26 | Stop reason: HOSPADM

## 2022-10-18 RX ORDER — SODIUM CHLORIDE 9 MG/ML
INJECTION, SOLUTION INTRAVENOUS PRN
Status: DISCONTINUED | OUTPATIENT
Start: 2022-10-18 | End: 2022-10-26 | Stop reason: HOSPADM

## 2022-10-18 RX ADMIN — IOPAMIDOL 75 ML: 755 INJECTION, SOLUTION INTRAVENOUS at 14:09

## 2022-10-18 RX ADMIN — SODIUM CHLORIDE, PRESERVATIVE FREE 10 ML: 5 INJECTION INTRAVENOUS at 22:32

## 2022-10-18 RX ADMIN — SODIUM CHLORIDE: 9 INJECTION, SOLUTION INTRAVENOUS at 22:31

## 2022-10-18 RX ADMIN — CEFEPIME 2000 MG: 2 INJECTION, POWDER, FOR SOLUTION INTRAVENOUS at 23:13

## 2022-10-18 RX ADMIN — SODIUM CHLORIDE 500 ML: 9 INJECTION, SOLUTION INTRAVENOUS at 14:57

## 2022-10-18 ASSESSMENT — PAIN SCALES - WONG BAKER
WONGBAKER_NUMERICALRESPONSE: 6
WONGBAKER_NUMERICALRESPONSE: 6
WONGBAKER_NUMERICALRESPONSE: 4

## 2022-10-18 ASSESSMENT — PAIN DESCRIPTION - LOCATION: LOCATION: GENERALIZED

## 2022-10-18 ASSESSMENT — PAIN - FUNCTIONAL ASSESSMENT
PAIN_FUNCTIONAL_ASSESSMENT: WONG-BAKER FACES
PAIN_FUNCTIONAL_ASSESSMENT: PREVENTS OR INTERFERES SOME ACTIVE ACTIVITIES AND ADLS
PAIN_FUNCTIONAL_ASSESSMENT: 0-10
PAIN_FUNCTIONAL_ASSESSMENT: PREVENTS OR INTERFERES SOME ACTIVE ACTIVITIES AND ADLS

## 2022-10-18 ASSESSMENT — PAIN DESCRIPTION - FREQUENCY: FREQUENCY: INTERMITTENT

## 2022-10-18 ASSESSMENT — PAIN DESCRIPTION - DESCRIPTORS: DESCRIPTORS: ACHING

## 2022-10-18 NOTE — ED PROVIDER NOTES
I independently evaluated and obtained a history and physical on Nerissa Valerio. All diagnostic, treatment, and disposition assistants were made to myself in conjunction the advanced practice provider. For further details of this patient's emergency department encounter, please see the advanced practice provider's documentation. History: This is a patient with a history of dementia. She got a flu shot late last week. According to her care providers she has been more combative and agitated and actually hallucinating this weekend. Patient has a history of dementia so she is unable to provide further historical details or review of systems. Physician Exam: Pleasant early female in no acute distress. She is not hypotensive. Not tachycardic. Cardiac exam is regular. Abdomen she seems to wince a little bit with epigastric palpation. No pulsatile mass. No rebound or guarding. The Ekg interpreted by me shows  atrial fibrillation with a rate of 67  Axis is   Normal  QTc is   464 msec  Intervals and Durations are unremarkable. ST Segments: normal  No significant change from prior EKG dated the 22nd 2022        I personally saw the patient and performed a substantive portion of the visit including all aspects of the medical decision making. MDM:     DDX: CVA, TIA, seizure, brain mass, infection, hypothyroidism, myxedema coma, other    Work-up of the patient really was fairly unremarkable. Her ammonia was unremarkable. Her lactic acid was unremarkable. Rapid flu and COVID were negative. Her TSH came back normal.  Her CK came back normal.  Her urinalysis showed less than 10 white blood cells and I am not convinced that she has a urinary tract infection. White count was normal.  There is no left shift. Her BUN was mildly elevated at 22 but a normal creatinine.   Her troponin was normal.    At time of disposition the patient still had a slightly decreased temperature but I am not convinced that she has a bladder infection. We have talked to medicine at this time there can admit and do a further work-up. At 8:05 PM I was notified the patient had what appeared to be generalized tonic-clonic seizure for about 10 seconds and now she is drowsy. The family member who accompanies the patient states that she has had a few times where he has noticed that she would stare for a few seconds but never had any generalized tonic-clonic activity. The hospitalist who the patient has been admitted to an is in the ED along with this and for now we will do seizure precautions.     \     Javier Vaughan MD  10/18/22 2012

## 2022-10-18 NOTE — ED PROVIDER NOTES
[address]  [address] [address]  Dept: [phone]  Loc: [phone]  eMERGENCYdEPARTMENT eNCOUnter      Pt Name: Nehemias Pastrana  MRN: [id number]  Susantrongfgreg 5/9/1931  Date of evaluation: 10/18/2022  Provider:Livier Granados PA-C    CHIEF COMPLAINT       Chief Complaint   Patient presents with    Urinary Tract Infection     Or possible reaction to Flu shot per family. CRITICAL CARE TIME   Total Critical Care time was 25 minutes, excluding separately reportable procedures. There was a high probability of clinically significant/life threatening deterioration in the patient's condition which required my urgentintervention. HISTORY OF PRESENT ILLNESS  (Location/Symptom, Timing/Onset, Context/Setting, Quality, Duration,Modifying Factors, Severity.)   Nehemias Pastrana is a 80 y.o. female who presents to the emergency department with his son. Patient with dementia and altered mental status, son provide HPI. Son states over the past 3 days patient has become aware lethargic, generally weak, and confused. He states she is having visual hallucinations. She has a history of UTIs, states this is how she typically presents. Patient's had 2 falls in the past 3 days, these were witnessed by son, they were able to catch her. No injuries from falls. Did not hit her head, no loss of consciousness. Patient awake, but not contribute to HPI. Son has not noticed any other symptoms. Patient received influenza vaccine on 10/15. Nursing Notes were reviewedand agreed with or any disagreements were addressed in the HPI. REVIEW OF SYSTEMS    (2-9 systems for level 4, 10 or more for level 5)     Review of Systems   Constitutional:  Positive for fatigue. Psychiatric/Behavioral:  Positive for confusion. Except as noted above the remainder of the review of systems was reviewed and negative.        PAST MEDICAL HISTORY Diagnosis Date    Alzheimer's dementia [facility])     Atrial fibrillation ([address]     Benign essential HTN     Gout     Multiple drug resistant organism (MDRO) culture positive 11/04/2021    Escherichia coli. Urine       SURGICAL HISTORY     History reviewed. No pertinent surgical history. CURRENT MEDICATIONS     [unfilled]    ALLERGIES     Chlorpromazine and Hydrochlorothiazide    FAMILY HISTORY     History reviewed. No pertinent family history. No family status information on file. SOCIAL HISTORY      reports that she has never smoked. She has never used smokeless tobacco. She reports that she does not drink alcohol and does not use drugs. PHYSICAL EXAM    (up to 7 for level 4, 8 or more for level 5)     ED Triage Vitals   Enc Vitals Group      BP 10/18/22 1008 (!) 93/58      Heart Rate 10/18/22 1008 60      Resp 10/18/22 1008 16      Temp 10/18/22 1029 (!) 94.2 °F (34.6 °C)      Temp Source 10/18/22 1029 Rectal      SpO2 10/18/22 1008 97 %      Weight 10/18/22 1008 189 lb 9.5 oz (86 kg)      Height 10/18/22 1008 5' 3\" (1.6 m)      Head Circumference --       Peak Flow --       Pain Score --       Pain Loc --       Pain Edu? --       Excl. in [address]? --         Physical Exam  Constitutional:       General: She is not in acute distress. HENT:      Head: Normocephalic and atraumatic. Cardiovascular:      Rate and Rhythm: Regular rhythm. Pulmonary:      Effort: Pulmonary effort is normal. No respiratory distress. Breath sounds: Normal breath sounds. Abdominal:      General: There is no distension. Palpations: Abdomen is soft. Comments: Generalized tenderness to palpation throughout abdomen   Musculoskeletal:         General: Normal range of motion. Cervical back: Normal range of motion and neck supple. Comments: Isael Law with palpation throughout all 4 extremities/body, no focal region appeared injured or with increased tenderness   Skin:     General: Skin is warm.    Neurological: Mental Status: She is alert. Comments: Obey some commands, no gross asymmetry, spontaneously moving extremities   Psychiatric:         Behavior: Behavior normal.         DIAGNOSTIC RESULTS     EKG: All EKG's are interpreted by the Emergency Department Physician who either signs or Co-signs this chart in the absence of a cardiologist.    RADIOLOGY:   Non-plain film images such as CT, Ultrasound and MRI are read by the radiologist. Plain radiographic images are visualized and preliminarilyinterpreted by the emergency physician with the below findings:    Interpretation per the Radiologist below,if available at the time of this note:    CT ABDOMEN PELVIS W IV CONTRAST Additional Contrast? None   Final Result   1. No acute abdominal or pelvic findings. 2.  Small to moderate bilateral pleural effusions with associated bibasilar   atelectasis. 3.  Moderate hiatal hernia. XR CHEST PORTABLE   Final Result   No acute findings. Mild cardiomegaly         CT HEAD WO CONTRAST   Preliminary Result   No evidence of acute intracranial abnormality on a study limited as described   above.                LABS:  Labs Reviewed   CBC WITH AUTO DIFFERENTIAL - Abnormal; Notable for the following components:       Result Value    RDW 15.7 (*)     Lymphocytes Absolute 0.7 (*)     All other components within normal limits   COMPREHENSIVE METABOLIC PANEL W/ REFLEX TO MG FOR LOW K - Abnormal; Notable for the following components:    BUN 22 (*)     Est, Glom Filt Rate 53 (*)     Total Bilirubin 1.1 (*)     Alkaline Phosphatase 155 (*)     All other components within normal limits   URINALYSIS WITH REFLEX TO CULTURE - Abnormal; Notable for the following components:    Clarity, UA CLOUDY (*)     Blood, Urine TRACE (*)     Leukocyte Esterase, Urine SMALL (*)     All other components within normal limits   LIPASE - Abnormal; Notable for the following components:    Lipase 12.0 (*)     All other components within normal limits   MICROSCOPIC URINALYSIS - Abnormal; Notable for the following components:    Bacteria, UA 1+ (*)     WBC, UA 7 (*)     RBC, UA 5 (*)     Epithelial Cells, UA 10 (*)     All other components within normal limits   COVID-19, RAPID   RAPID INFLUENZA A/B ANTIGENS   TROPONIN   LACTIC ACID   CK   AMMONIA   TSH WITH REFLEX       All other labs were within normal range or not returned as of this dictation. EMERGENCY DEPARTMENT COURSE and DIFFERENTIAL DIAGNOSIS/MDM:   Vitals:    Vitals:    10/18/22 1341 10/18/22 1354 10/18/22 1728 10/18/22 1730   BP: 121/68   120/62   Pulse: 71 74 78 76   Resp: 21 21 16 17   Temp:   (!) 94.1 °F (34.5 °C)    TempSrc:   Rectal    SpO2: 93% 97% 97% 98%   Weight:       Height:           MDM    Patient presents ED with HPI noted above. Patient hypothermic but remainder vitals within normal limits. Warming blankets applied. Given altered mental status and history suspected infectious process. Urine showed 1+ bacteria, 7 WBCs, 10 epithelial cells. This is underwhelming at this time as source. Chest x-ray showed no acute process. She has no leukocytosis. COVID and flu negative. Lactic acid normal.  No clear source of infection at this time as cause. CT head without contrast showed no acute intercranial abnormality. Considered hypothyroidism, TSH normal.  Patient was sensitive to touch throughout her body. Did yell with palpation throughout abdomen, CT obtained showed no acute process. She was given 500 cc fluid bolus in the ED with slight improvement. Regardless, given hypothermia and altered mental status will admit for further inpatient treatment, work-up and evaluation. Patient seen and evaluated in the ED with Dr. Balaji Vidales. Dr. Niru Kraus kindly admitted patient. 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 Is this patient to be included in the SEP-1 Core Measure due to severe sepsis or septic shock?    No   Exclusion criteria - the patient is NOT to be

## 2022-10-18 NOTE — ED TRIAGE NOTES
Pt. presents to ED via EMT d/t family c/o pt. possibly a UTI or a reaction to the flu shot she received. Pt.'s rectal temp 94.2. Warming blankets applied x 3.

## 2022-10-18 NOTE — PROGRESS NOTES
Medication Reconciliation    List of medications patient is currently taking is complete. Source of information: 1. Conversation with patient/family at bedside                                      2. EPIC records      Allergies  Chlorpromazine and Hydrochlorothiazide     Notes regarding home medications:   1. Patient did not take any of her home medications prior to arrival to the ER today. 2. Patient is chronically anticoagulated with Xarelto 15 mg po daily.   Indication: KVNG Brown [facility], PharmD, BCPS  10/18/2022 1:58 PM

## 2022-10-18 NOTE — H&P
Hospital Medicine History & Physical      PCP: Milady Lancaster, APRN - CNP    Date of Admission: 10/18/2022    Date of Service: Pt seen/examined on 10/18/2022   and Admitted to Inpatient with expected LOS greater than two midnights due to medical therapy. Chief Complaint: Altered mental status      History Of Present Illness:      80 y.o. female who presented to [facility] [place] with altered mental status. Patient was brought in by her son after she has had increasing confusion at home. Patient has some baseline dementia but over the last several days she has been much more disoriented. She has been yelling and hard to control. This is not typical for her. Family reports that this is usually the result of urinary tract infection. Patient was noted to be hypothermic in the emergency room. She is yelling and hysterical and very paranoid and will not allow me to examine her  She is combative  Past Medical History:          Diagnosis Date    Alzheimer's dementia [facility])     Atrial fibrillation ([address]     Benign essential HTN     Gout     Multiple drug resistant organism (MDRO) culture positive 11/04/2021    Escherichia coli. Urine       Past Surgical History:      History reviewed. No pertinent surgical history. Medications Prior to Admission:      Prior to Admission medications    Medication Sig Start Date End Date Taking?  Authorizing Provider   melatonin 3 MG TABS tablet Take 3 mg by mouth nightly as needed for Sleep 1/12/22   Historical Provider, MD   metoprolol succinate (TOPROL XL) 25 MG extended release tablet Take 1 tablet by mouth daily 11/24/21   Darryl Hdz MD   furosemide (LASIX) 20 MG tablet Take 1 tablet by mouth every other day 4/4/21   Celia Alex DO   Cranberry 250 MG CHEW Take 2 tablets by mouth 2 times daily (with meals) 4/1/21   Celia Alex DO   rivaroxaban (XARELTO) 15 MG TABS tablet Take 1 tablet by mouth daily (with breakfast) 2/16/21   Vanessa Milligan MD   vitamin D 25 MCG (1000 UT) CAPS Take 1,000 Units by mouth daily    Historical Provider, MD   albuterol sulfate HFA (PROVENTIL;VENTOLIN;PROAIR) 108 (90 Base) MCG/ACT inhaler Inhale 2 puffs into the lungs every 6 hours as needed for Wheezing    Historical Provider, MD   mometasone-formoterol (DULERA) 200-5 MCG/ACT inhaler Inhale 2 puffs into the lungs 2 times daily    Historical Provider, MD   Multiple Vitamins-Minerals (MULTIVITAMIN WITH MINERALS) tablet Take 1 tablet by mouth daily    Historical Provider, MD   calcium carbonate (OSCAL) 500 MG TABS tablet Take 500 mg by mouth daily    Historical Provider, MD       Allergies:  Chlorpromazine and Hydrochlorothiazide    Social History:      The patient currently lives with her family    TOBACCO:   reports that she has never smoked. She has never used smokeless tobacco.  ETOH:   reports no history of alcohol use. E-cigarette/Vaping       Questions Responses    E-cigarette/Vaping Use Never User    Start Date     Passive Exposure No    Quit Date     Counseling Given No    Comments               Family History:       Reviewed and negative in regards to presenting illness/complaint. History reviewed. No pertinent family history. REVIEW OF SYSTEMS COMPLETED:   Pertinent positives as noted in the HPI. All other systems reviewed and negative. PHYSICAL EXAM PERFORMED:    /70   Pulse 98   Temp (!) 94.1 °F (34.5 °C) (Rectal)   Resp 23   Ht 5' 3\" (1.6 m)   Wt 186 lb 11.7 oz (84.7 kg)   SpO2 95%   BMI 33.08 kg/m²     General appearance: Agitated paranoid female she is combative yelling and screaming uncontrollably  HEENT:  Normal cephalic, atraumatic without obvious deformity. Pupils equal, round, and reactive to light. Extra ocular muscles intact. Conjunctivae/corneas clear. Neck: Supple, with full range of motion. No jugular venous distention. Trachea midline.   Respiratory: Unable to examine lungs due to agitation  Cardiovascular:  Regular rate and rhythm with normal S1/S2 without murmurs, rubs or gallops. Abdomen: Unable to examine due to agitation  Musculoskeletal:  No clubbing, cyanosis or edema bilaterally. Full range of motion without deformity. Skin: Skin color, texture, turgor normal.  No rashes or lesions. Neurologic: Cranial nerves intact grossly and moving all 4 extremities  Psychiatric: Agitated screaming disoriented  Capillary Refill: Brisk,3 seconds, normal  Peripheral Pulses: +2 palpable, equal bilaterally       Labs:     Recent Labs     10/18/22  1128   WBC 5.6   HGB 12.1   HCT 37.6        Recent Labs     10/18/22  1128      K 4.5      CO2 22   BUN 22*   CREATININE 1.0   CALCIUM 10.2     Recent Labs     10/18/22  1128   AST 28   ALT 17   BILITOT 1.1*   ALKPHOS 155*     No results for input(s): INR in the last 72 hours. Recent Labs     10/18/22  1128   CKTOTAL 149   TROPONINI <0.01       Urinalysis:      Lab Results   Component Value Date/Time    NITRU Negative 10/18/2022 11:28 AM    WBCUA 7 10/18/2022 11:28 AM    BACTERIA 1+ 10/18/2022 11:28 AM    RBCUA 5 10/18/2022 11:28 AM    BLOODU TRACE 10/18/2022 11:28 AM    SPECGRAV 1.009 10/18/2022 11:28 AM    GLUCOSEU Negative 10/18/2022 11:28 AM       Radiology:       CT ABDOMEN PELVIS W IV CONTRAST Additional Contrast? None   Final Result   1. No acute abdominal or pelvic findings. 2.  Small to moderate bilateral pleural effusions with associated bibasilar   atelectasis. 3.  Moderate hiatal hernia. XR CHEST PORTABLE   Final Result   No acute findings. Mild cardiomegaly         CT HEAD WO CONTRAST   Preliminary Result   No evidence of acute intracranial abnormality on a study limited as described   above. Consults:    None    ASSESSMENT:    Acute delirium in the setting of dementia  Hypothermia  History of UTIs  Atrial fibrillation  On continuous oral anticoagulation Xarelto  PLAN:    History of recurring UTIs. She could have culture-negative UTI.   Given presentation suspicion remains that there could be cystitis. Continue antibiotics for now cefepime. Await culture result  Warming blanket ordered  Geodon ordered for severe agitation  Palliative care consult  Case management may need placement    DVT Prophylaxis: Xarelto  Diet: No diet orders on file  Code Status: Prior    PT/OT Eval Status:     Dispo -1 to 2 days       Dora Witt MD    Thank you TASHI Aquino CNP for the opportunity to be involved in this patient's care. If you have any questions or concerns please feel free to contact me at [phone].

## 2022-10-18 NOTE — ED NOTES
1st attempt to call report to  5W. Nurse still receiving report on other pt.'s and will call back.       Lee Person, RN  10/18/22 0199

## 2022-10-18 NOTE — ED NOTES
Pt. has been covered up with several warm blankets numerous times and will take them and or kick them off.        Raynell Crigler, RN  10/18/22 1920

## 2022-10-19 LAB
ANION GAP SERPL CALCULATED.3IONS-SCNC: 11 MMOL/L (ref 3–16)
BASOPHILS ABSOLUTE: 0 K/UL (ref 0–0.2)
BASOPHILS RELATIVE PERCENT: 0.5 %
BUN BLDV-MCNC: 20 MG/DL (ref 7–20)
CALCIUM SERPL-MCNC: 10.1 MG/DL (ref 8.3–10.6)
CHLORIDE BLD-SCNC: 108 MMOL/L (ref 99–110)
CO2: 23 MMOL/L (ref 21–32)
CREAT SERPL-MCNC: 1.2 MG/DL (ref 0.6–1.2)
EOSINOPHILS ABSOLUTE: 0 K/UL (ref 0–0.6)
EOSINOPHILS RELATIVE PERCENT: 0.8 %
GFR SERPL CREATININE-BSD FRML MDRD: 43 ML/MIN/{1.73_M2}
GLUCOSE BLD-MCNC: 65 MG/DL (ref 70–99)
GLUCOSE BLD-MCNC: 71 MG/DL (ref 70–99)
GLUCOSE BLD-MCNC: 73 MG/DL (ref 70–99)
GLUCOSE BLD-MCNC: 76 MG/DL (ref 70–99)
GLUCOSE BLD-MCNC: 78 MG/DL (ref 70–99)
GLUCOSE BLD-MCNC: 86 MG/DL (ref 70–99)
HCT VFR BLD CALC: 34.4 % (ref 36–48)
HEMOGLOBIN: 11.1 G/DL (ref 12–16)
LACTIC ACID: 1.3 MMOL/L (ref 0.4–2)
LYMPHOCYTES ABSOLUTE: 0.6 K/UL (ref 1–5.1)
LYMPHOCYTES RELATIVE PERCENT: 10.3 %
MCH RBC QN AUTO: 28 PG (ref 26–34)
MCHC RBC AUTO-ENTMCNC: 32.3 G/DL (ref 31–36)
MCV RBC AUTO: 86.8 FL (ref 80–100)
MONOCYTES ABSOLUTE: 0.8 K/UL (ref 0–1.3)
MONOCYTES RELATIVE PERCENT: 13.9 %
NEUTROPHILS ABSOLUTE: 4.1 K/UL (ref 1.7–7.7)
NEUTROPHILS RELATIVE PERCENT: 74.5 %
PDW BLD-RTO: 15.5 % (ref 12.4–15.4)
PERFORMED ON: ABNORMAL
PERFORMED ON: NORMAL
PLATELET # BLD: 184 K/UL (ref 135–450)
PMV BLD AUTO: 9.8 FL (ref 5–10.5)
POTASSIUM REFLEX MAGNESIUM: 4.8 MMOL/L (ref 3.5–5.1)
RBC # BLD: 3.96 M/UL (ref 4–5.2)
SODIUM BLD-SCNC: 142 MMOL/L (ref 136–145)
WBC # BLD: 5.5 K/UL (ref 4–11)

## 2022-10-19 PROCEDURE — 83605 ASSAY OF LACTIC ACID: CPT

## 2022-10-19 PROCEDURE — 6360000002 HC RX W HCPCS: Performed by: INTERNAL MEDICINE

## 2022-10-19 PROCEDURE — 2580000003 HC RX 258: Performed by: INTERNAL MEDICINE

## 2022-10-19 PROCEDURE — 80048 BASIC METABOLIC PNL TOTAL CA: CPT

## 2022-10-19 PROCEDURE — 85025 COMPLETE CBC W/AUTO DIFF WBC: CPT

## 2022-10-19 PROCEDURE — 2060000000 HC ICU INTERMEDIATE R&B

## 2022-10-19 PROCEDURE — 94760 N-INVAS EAR/PLS OXIMETRY 1: CPT

## 2022-10-19 PROCEDURE — 36415 COLL VENOUS BLD VENIPUNCTURE: CPT

## 2022-10-19 RX ORDER — LANOLIN ALCOHOL/MO/W.PET/CERES
3 CREAM (GRAM) TOPICAL NIGHTLY
Status: DISCONTINUED | OUTPATIENT
Start: 2022-10-19 | End: 2022-10-26 | Stop reason: HOSPADM

## 2022-10-19 RX ORDER — ACETAMINOPHEN 500 MG
1000 TABLET ORAL EVERY 8 HOURS
Status: DISCONTINUED | OUTPATIENT
Start: 2022-10-19 | End: 2022-10-21

## 2022-10-19 RX ORDER — QUETIAPINE FUMARATE 25 MG/1
12.5 TABLET, FILM COATED ORAL EVERY 12 HOURS
Status: DISCONTINUED | OUTPATIENT
Start: 2022-10-19 | End: 2022-10-21

## 2022-10-19 RX ADMIN — SODIUM CHLORIDE, PRESERVATIVE FREE 10 ML: 5 INJECTION INTRAVENOUS at 08:42

## 2022-10-19 RX ADMIN — ENOXAPARIN SODIUM 40 MG: 100 INJECTION SUBCUTANEOUS at 08:40

## 2022-10-19 RX ADMIN — CEFEPIME 2000 MG: 2 INJECTION, POWDER, FOR SOLUTION INTRAVENOUS at 20:09

## 2022-10-19 RX ADMIN — SODIUM CHLORIDE: 9 INJECTION, SOLUTION INTRAVENOUS at 11:11

## 2022-10-19 ASSESSMENT — PAIN SCALES - WONG BAKER
WONGBAKER_NUMERICALRESPONSE: 0

## 2022-10-19 NOTE — CONSULTS
Neurology Consult Note  Reason for Consult: New onset seizure, altered mental status     Chief complaint: UTI     Dr Bonita Melendez MD asked me to see Leah Lafleur in consultation for evaluation of new onset seizure     History of Present Illness:  Leah Lafleur is a 80 y.o. female who presents with dementia and altered mental status in the setting of UTI and hypothermia. Unable to gather information from patient during exam. Pt appears frightened saying no no no. Per ED report son was at bedside on arrival and stated pt has been increasingly lethargic over the past 3 days confused, having visual hallucinations. She has a history with similar presentation, which we seen her for in February of 2022 and June 2020 where she underwent seizure work up in the setting of cardiac arrest, UTI, acute encephalopathy. ED BP 93/58, HR 60, abdominal tenderness, UA +, CT head no acute abnormalities, lactic 4.9, tsh wnl, 500 cc IVF blous, hypothermic warming blanket 94.2, afib, cefapime UTI, 2mg ativan last night for tonic clonic activity. Medical History:  Past Medical History:   Diagnosis Date    Alzheimer's dementia [facility])     Atrial fibrillation ([facility] [address]     Benign essential HTN     Gout     Multiple drug resistant organism (MDRO) culture positive 11/04/2021    Escherichia coli. Urine     History reviewed. No pertinent surgical history.   Scheduled Meds:   sodium chloride flush  5-40 mL IntraVENous 2 times per day    enoxaparin  40 mg SubCUTAneous Daily    cefepime  2,000 mg IntraVENous Q24H     Continuous Infusions:   sodium chloride      sodium chloride 100 mL/hr at 10/18/22 2231     PRN Meds:.albuterol sulfate HFA, sodium chloride flush, sodium chloride, ondansetron **OR** ondansetron, acetaminophen **OR** acetaminophen, polyethylene glycol, LORazepam    Medications Prior to Admission: melatonin 3 MG TABS tablet, Take 3 mg by mouth nightly as needed for Sleep  metoprolol succinate (TOPROL XL) 25 MG extended release tablet, Take 1 tablet by mouth daily  furosemide (LASIX) 20 MG tablet, Take 1 tablet by mouth every other day  Cranberry 250 MG CHEW, Take 2 tablets by mouth 2 times daily (with meals)  rivaroxaban (XARELTO) 15 MG TABS tablet, Take 1 tablet by mouth daily (with breakfast)  vitamin D 25 MCG (1000 UT) CAPS, Take 1,000 Units by mouth daily  albuterol sulfate HFA (PROVENTIL;VENTOLIN;PROAIR) 108 (90 Base) MCG/ACT inhaler, Inhale 2 puffs into the lungs every 6 hours as needed for Wheezing  mometasone-formoterol (DULERA) 200-5 MCG/ACT inhaler, Inhale 2 puffs into the lungs 2 times daily  Multiple Vitamins-Minerals (MULTIVITAMIN WITH MINERALS) tablet, Take 1 tablet by mouth daily  calcium carbonate (OSCAL) 500 MG TABS tablet, Take 500 mg by mouth daily    Allergies   Allergen Reactions    Chlorpromazine Other (See Comments)    Hydrochlorothiazide Other (See Comments)     Gout       History reviewed. No pertinent family history. Social History     Tobacco Use   Smoking Status Never   Smokeless Tobacco Never     Social History     Substance and Sexual Activity   Drug Use Never     Social History     Substance and Sexual Activity   Alcohol Use Never       ROS:  Constitutional- No weight loss or fevers  Eyes- No diplopia. No photophobia. Ears/nose/throat- No dysphagia. No Dysarthria  Cardiovascular- No palpitations. No chest pain  Respiratory- No dyspnea. No Cough  Gastrointestinal- No Abdominal pain. No Vomiting. Genitourinary- No incontinence. No urinary retention  Musculoskeletal- No myalgia. No arthralgia  Skin- No rash. No easy bruising. Psychiatric- No depression. No anxiety  Endocrine- No diabetes. No thyroid issues. Hematologic- No bleeding difficulty. No fatigue  Neurologic- No weakness. No Headache. Exam:  Blood pressure 108/85, pulse (!) 111, temperature 98.7 °F (37.1 °C), temperature source Axillary, resp. rate 22, height 5' 3\" (1.6 m), weight 181 lb 14.1 oz (82.5 kg), SpO2 96 %.   Constitutional   Vital signs: BP, HR, and RR reviewed   General Alert, anxious, well-nourished  Eyes: unable to visualize the fundi  Cardiovascular: No peripheral edema  Psychiatric: limited examination d/t cooperation  Neurologic  Mental status:   orientation to person and place   General fund of knowledge grossly intact   Memory grossly intact   Attention intact as able to attend well to the exam     Language fluent in conversation   Comprehension intact; follows simple commands  Cranial nerves:   CN2: limited exam able to cross midline, blinks with confrontation. CN 3,4,6: extraocular muscles intact  CN5: facial sensation symmetric   CN7:face symmetric without dysarthria,   CN8: Limited exam   CN9: palate elevated symmetrically  CN11: Limited exam   CN12: Limited exam   Strength: Moving against some resistance, exam limited r/t cooperation. Deep tendon reflexes: Limited exam   Sensory: Limited exam   Cerebellar/coordination: Limited exam   Tone: normal in all 4 extremities  Gait: deferred for saftey    Labs   Latest Reference Range & Units 10/18/22 11:28   Sodium 136 - 145 mmol/L 139   Potassium 3.5 - 5.1 mmol/L 4.5   Chloride 99 - 110 mmol/L 105   CO2 21 - 32 mmol/L 22   BUN,BUNPL 7 - 20 mg/dL 22 (H)   Creatinine 0.6 - 1.2 mg/dL 1.0   Anion Gap 3 - 16  12   Est, Glom Filt Rate >60  53 !    Glucose, Random 70 - 99 mg/dL 92   CALCIUM, SERUM, 959058 8.3 - 10.6 mg/dL 10.2   Total Protein 6.4 - 8.2 g/dL 6.5   Total CK 26 - 192 U/L 149   Troponin <0.01 ng/mL <0.01   Albumin 3.4 - 5.0 g/dL 3.4   Albumin/Globulin Ratio 1.1 - 2.2  1.1   Alk Phos 40 - 129 U/L 155 (H)   ALT 10 - 40 U/L 17   AST 15 - 37 U/L 28   Bilirubin 0.0 - 1.0 mg/dL 1.1 (H)   Lipase 13.0 - 60.0 U/L 12.0 (L)   TSH 0.27 - 4.20 uIU/mL 2.31   WBC 4.0 - 11.0 K/uL 5.6   RBC 4.00 - 5.20 M/uL 4.31   Hemoglobin Quant 12.0 - 16.0 g/dL 12.1   Hematocrit 36.0 - 48.0 % 37.6   MCV 80.0 - 100.0 fL 87.2   MCH 26.0 - 34.0 pg 28.2   MCHC 31.0 - 36.0 g/dL 32.3   MPV 5.0 - 10.5 fL 9.8   RDW 12.4 - 15.4 % 15.7 (H)   Platelet Count 555 - 450 K/uL 191   Neutrophils % % 74.5   Lymphocyte % % 12.8   Monocytes % % 10.9   Eosinophils % % 1.1   Basophils % % 0.7   Neutrophils Absolute 1.7 - 7.7 K/uL 4.2   Lymphocytes Absolute 1.0 - 5.1 K/uL 0.7 (L)   Monocytes Absolute 0.0 - 1.3 K/uL 0.6   Eosinophils Absolute 0.0 - 0.6 K/uL 0.1   Basophils Absolute 0.0 - 0.2 K/uL 0.0   Urine Reflex to Culture  Not Indicated   Color, UA Straw/Yellow  Yellow   Clarity, UA Clear  CLOUDY ! Glucose, UA Negative mg/dL Negative   Bilirubin, Urine Negative  Negative   Ketones, Urine Negative mg/dL Negative   Specific Gravity, UA 1.005 - 1.030  1.009   Blood, Urine Negative  TRACE !   pH, UA 5.0 - 8.0  6.5   Protein, UA Negative mg/dL Negative   Urobilinogen, Urine <2.0 E.U./dL 1.0   Nitrite, Urine Negative  Negative   Leukocyte Esterase, Urine Negative  SMALL ! Urine Type  NotGiven   Hyaline Casts, UA 0 - 8 /LPF 2   WBC, UA 0 - 5 /HPF 7 (H)   RBC, UA 0 - 4 /HPF 5 (H)   Epithelial Cells, UA 0 - 5 /HPF 10 (H)   Bacteria, UA None Seen /HPF 1+ ! Microscopic Examination  YES       Studies  Ct head wo contrast 10/18/2022  Impression   No evidence of acute intracranial abnormality on a study limited as described   above. Impression  1. Presented with AMS, hypothermia, dementia, afib, and positive UTI. In the past pt is known to have seizure activity in presence of UTI. Ct head without acute findings, an acute component can not be fully excluded. MRI and EEG can not be completed dt pt cooperation. Altered mental status likely due to UTI. 2.  Dementia. 3.  UTI. 4.  AF on anticoagulation. Recommendations  MRI and EEG can not be completed d/t pt cooperation/altered mental status. Seizure precautions   Resolve UTI, currently being treated. Palliative care consulted.      Brittny Ca NP  [address] [place] [address] [address] Neurology    A copy of this note was provided for Dr Milagro Akhtar MD

## 2022-10-19 NOTE — PLAN OF CARE
Problem: Pain  Goal: Verbalizes/displays adequate comfort level or baseline comfort level  Outcome: Progressing       Problem: Safety - Adult  Goal: Free from fall injury  Outcome: Progressing  Seizure precautions in place,telesitter in use, bed alarm on     Problem: ABCDS Injury Assessment  Goal: Absence of physical injury  Outcome: Progressing

## 2022-10-19 NOTE — ED NOTES
Pt. to be admitted to [address] room 5102. Report called to [facility], RN and transport to be initiated after lab work ordered from MD post seizure has been collected.        Navarro Hernández RN  10/18/22 2038
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
## 2022-10-19 NOTE — PROGRESS NOTES
Hospitalist Progress Note    Patient:  Carrie Farnsworth  Unit/Bed:E6N-3743/5102-01   YOB: 1931       MRN: [id number] Acct: [de-identified]  PCP: TASHI Jon CNP    Date of Admission: 10/18/2022  --------------------------    Chief Complaint:     Confusion    Hospital Course:     Carrie Farnsworth is a 80 y.o. female hospitalized on 10/18/2022 increased confusion, patient has history of dementia. Assessment/plan:     Acute toxic metabolic encephalopathy, superimposing dementia    -Hyperactive delirium, continue reorientation measures, delirium order set activated       Seizure-like activity  Follow EEG  Neurology consulted, unable to obtain MRI secondary patient cooperation       ? UTI, history of MDRO  Follow culture data    History of atrial fibrillation  Anticoagulated with Xarelto      Hypothermic, multifactorial  Resolved  Code Status: DNR-CCA         DVT prophylaxis: Lovenox     Disposition: ECF         Discussed with RN      ----------------      Subjective:     Patient seen and examined  Overnight events noted  RN and ancillary staff note reviewed    Patient agitated, uncooperative confused      Diet: Diet NPO    OBJECTIVE     Exam:  BP (!) 136/44   Pulse 87   Temp 98.7 °F (37.1 °C) (Oral)   Resp 17   Ht 5' 3\" (1.6 m)   Wt 181 lb 14.1 oz (82.5 kg)   SpO2 98%   BMI 32.22 kg/m²            Gen: Confused and agitated  Head: Normocephalic. Atraumatic. Eyes: Conjunctivae/corneas clear. ENT: Oral mucosa moist  Neck: No JVD. No obvious thyromegaly. CVS: Nml S1S2,  b murmur     Pulmomary: [address] air entry. Gastrointestinal: Soft, no grimace. Musculoskeletal: No edema.  Warm  Neuro: Alert but confused, moving all extremities  Psychiatry: Agitated, delirious        Medications:  Reviewed    Infusion Medications    sodium chloride      sodium chloride 100 mL/hr at 10/19/22 1111     Scheduled Medications    sodium chloride flush  5-40 mL IntraVENous 2 times per day    enoxaparin  40 mg SubCUTAneous Daily    cefepime  2,000 mg IntraVENous Q24H     PRN Meds: albuterol sulfate HFA, sodium chloride flush, sodium chloride, ondansetron **OR** ondansetron, acetaminophen **OR** acetaminophen, polyethylene glycol, LORazepam      Intake/Output Summary (Last 24 hours) at 10/19/2022 1618  Last data filed at 10/19/2022 1400  Gross per 24 hour   Intake 10 ml   Output 900 ml   Net -890 ml             Labs:   Recent Labs     10/18/22  1128 10/19/22  0426   WBC 5.6 5.5   HGB 12.1 11.1*   HCT 37.6 34.4*    184     Recent Labs     10/18/22  1128 10/19/22  0426    142   K 4.5 4.8    108   CO2 22 23   BUN 22* 20   CREATININE 1.0 1.2   CALCIUM 10.2 10.1     Recent Labs     10/18/22  1128   AST 28   ALT 17   BILITOT 1.1*   ALKPHOS 155*     No results for input(s): INR in the last 72 hours. Recent Labs     10/18/22  1128 10/18/22  2040   CKTOTAL 149 152   TROPONINI <0.01  --        Urinalysis:      Lab Results   Component Value Date/Time    NITRU Negative 10/18/2022 11:28 AM    WBCUA 7 10/18/2022 11:28 AM    BACTERIA 1+ 10/18/2022 11:28 AM    RBCUA 5 10/18/2022 11:28 AM    BLOODU TRACE 10/18/2022 11:28 AM    SPECGRAV 1.009 10/18/2022 11:28 AM    GLUCOSEU Negative 10/18/2022 11:28 AM       Radiology:  CT ABDOMEN PELVIS W IV CONTRAST Additional Contrast? None   Final Result   1. No acute abdominal or pelvic findings. 2.  Small to moderate bilateral pleural effusions with associated bibasilar   atelectasis. 3.  Moderate hiatal hernia. XR CHEST PORTABLE   Final Result   No acute findings. Mild cardiomegaly         CT HEAD WO CONTRAST   Preliminary Result   No evidence of acute intracranial abnormality on a study limited as described   above.          MRI brain with and without contrast    (Results Pending)               Electronically signed by Tin Monae MD on 10/19/2022 at 4:18 PM

## 2022-10-19 NOTE — PROGRESS NOTES
10/19/22 1716   Encounter Summary   Encounter Overview/Reason  Initial Encounter   Service Provided For: Patient   Referral/Consult From: Family   Support System Family members   Last Encounter  10/19/22  (10/19 sos ,  cm)   Complexity of Encounter Low   Begin Time 1700   End Time  1718   Total Time Calculated 18 min   Encounter    Type Initial Screen/Assessment   Spiritual/Emotional needs   Type Spiritual Support   Rituals, Rites and Sacraments   Type Anointing;Sacrament of Sick   Grief, Loss, and Adjustments   Type Adjustment to illness   Assessment/Intervention/Outcome   Assessment Unable to assess  (sleeping)   Patient was sleeping.  provided sos Electronically signed by Oneyda Antonio on 10/19/2022 at 5:21 PM

## 2022-10-19 NOTE — SIGNIFICANT EVENT
Patient admitted by MD on swing shift, admitted for AMS, thought related to UTI, had hypothermia and agitation while in ED but waxing and waning in orientation per nursing. Came to bedside much later, family member noted to nursing patient had a seizure, some tonic clonic activity noted but initially family member noted patient started them and then started staring to other side and was unresponsive. Episode lasted about a minute. Patient currently postictal on my arrival. She was hypothermic earlier but not certain at this time. No fevers thus far. Ordered seizure precautions, MRI brain and neuro consult tomorrow. No AED therapy for now unless patient has another seizure.  Ativan pRN for now

## 2022-10-19 NOTE — PROGRESS NOTES
4 Eyes Skin Assessment     The patient is being assess for  Admission    I agree that 2 RN's have performed a thorough Head to Toe Skin Assessment on the patient. ALL assessment sites listed below have been assessed. Areas assessed by both nurses:   [x]   Head, Face, and Ears   [x]   Shoulders, Back, and Chest  [x]   Arms, Elbows, and Hands   [x]   Coccyx, Sacrum, and IschIum  [x]   Legs, Feet, and Heels        Does the Patient have Skin Breakdown?   No         Eddie Prevention initiated:  Yes   Wound Care Orders initiated:  No      [facility] nurse consulted for Pressure Injury (Stage 3,4, Unstageable, DTI, NWPT, and Complex wounds), New and Established Ostomies:  No      Nurse 1 eSignature: Electronically signed by Gerhardt Mayotte, RN on 10/18/22 at 10:23 PM EDT    **SHARE this note so that the co-signing nurse is able to place an eSignature**    Nurse 2 eSignature: Electronically signed by Sofi Faith RN on 10/18/22 at 10:25 PM EDT

## 2022-10-20 LAB
BASOPHILS ABSOLUTE: 0 K/UL (ref 0–0.2)
BASOPHILS RELATIVE PERCENT: 0.4 %
EOSINOPHILS ABSOLUTE: 0.1 K/UL (ref 0–0.6)
EOSINOPHILS RELATIVE PERCENT: 1.5 %
GLUCOSE BLD-MCNC: 109 MG/DL (ref 70–99)
GLUCOSE BLD-MCNC: 117 MG/DL (ref 70–99)
GLUCOSE BLD-MCNC: 58 MG/DL (ref 70–99)
GLUCOSE BLD-MCNC: 87 MG/DL (ref 70–99)
GLUCOSE BLD-MCNC: 87 MG/DL (ref 70–99)
GLUCOSE BLD-MCNC: 99 MG/DL (ref 70–99)
HCT VFR BLD CALC: 33 % (ref 36–48)
HEMOGLOBIN: 10.7 G/DL (ref 12–16)
LYMPHOCYTES ABSOLUTE: 0.9 K/UL (ref 1–5.1)
LYMPHOCYTES RELATIVE PERCENT: 17.4 %
MCH RBC QN AUTO: 28.5 PG (ref 26–34)
MCHC RBC AUTO-ENTMCNC: 32.6 G/DL (ref 31–36)
MCV RBC AUTO: 87.5 FL (ref 80–100)
MONOCYTES ABSOLUTE: 0.9 K/UL (ref 0–1.3)
MONOCYTES RELATIVE PERCENT: 17.9 %
NEUTROPHILS ABSOLUTE: 3.3 K/UL (ref 1.7–7.7)
NEUTROPHILS RELATIVE PERCENT: 62.8 %
PDW BLD-RTO: 15.4 % (ref 12.4–15.4)
PERFORMED ON: ABNORMAL
PERFORMED ON: NORMAL
PLATELET # BLD: 158 K/UL (ref 135–450)
PMV BLD AUTO: 9.4 FL (ref 5–10.5)
RBC # BLD: 3.77 M/UL (ref 4–5.2)
WBC # BLD: 5.3 K/UL (ref 4–11)

## 2022-10-20 PROCEDURE — 36415 COLL VENOUS BLD VENIPUNCTURE: CPT

## 2022-10-20 PROCEDURE — 2580000003 HC RX 258: Performed by: STUDENT IN AN ORGANIZED HEALTH CARE EDUCATION/TRAINING PROGRAM

## 2022-10-20 PROCEDURE — 6360000002 HC RX W HCPCS: Performed by: INTERNAL MEDICINE

## 2022-10-20 PROCEDURE — 2580000003 HC RX 258: Performed by: INTERNAL MEDICINE

## 2022-10-20 PROCEDURE — 85025 COMPLETE CBC W/AUTO DIFF WBC: CPT

## 2022-10-20 PROCEDURE — 2060000000 HC ICU INTERMEDIATE R&B

## 2022-10-20 RX ORDER — DEXTROSE MONOHYDRATE 100 MG/ML
INJECTION, SOLUTION INTRAVENOUS CONTINUOUS PRN
Status: DISCONTINUED | OUTPATIENT
Start: 2022-10-20 | End: 2022-10-26 | Stop reason: HOSPADM

## 2022-10-20 RX ADMIN — SODIUM CHLORIDE: 9 INJECTION, SOLUTION INTRAVENOUS at 19:04

## 2022-10-20 RX ADMIN — ENOXAPARIN SODIUM 40 MG: 100 INJECTION SUBCUTANEOUS at 08:12

## 2022-10-20 RX ADMIN — SODIUM CHLORIDE, PRESERVATIVE FREE 10 ML: 5 INJECTION INTRAVENOUS at 08:13

## 2022-10-20 RX ADMIN — CEFEPIME 2000 MG: 2 INJECTION, POWDER, FOR SOLUTION INTRAVENOUS at 20:01

## 2022-10-20 RX ADMIN — SODIUM CHLORIDE: 9 INJECTION, SOLUTION INTRAVENOUS at 01:30

## 2022-10-20 RX ADMIN — DEXTROSE MONOHYDRATE 125 ML: 100 INJECTION, SOLUTION INTRAVENOUS at 04:40

## 2022-10-20 ASSESSMENT — PAIN SCALES - WONG BAKER
WONGBAKER_NUMERICALRESPONSE: 0
WONGBAKER_NUMERICALRESPONSE: 0

## 2022-10-20 NOTE — CARE COORDINATION
INITIAL CASE MANAGEMENT ASSESSMENT    Reviewed chart, patient confused, spoke to son Darren Dial to assess possible discharge needs. Explained Case Management role/services. Living Situation: Confirmed address, lives with son & his partner, in a 1 level house, 5 steps w/ railing to enter. ADLs: Patient able to ambulate with Rollator, son assists with dressing/bathing. Patient able to feed self but meals prepared by son. Son & partner provide 24/7 care. DME: Rollator, grab bars    PT/OT Recs: Not ordered     Active Services: None     Transportation: Son transports PRN     Medications: Uses     PCP: Gigi Pond MD      HD/PD: n/a    PLAN/COMMENTS: Son's goal is to have his mother return home. Son would like home care and to use [address] as he has used this agency in the past. Declines needing a home care list.  Son states patient is very stubborn and only does things when she wants to. Has baseline dementia and gets worse in unfamiliar areas. Son upset that dentures were lost which he worries this impact to eating will worsen her dementia as well. Call place to security, no dentures. Call placed to ED charge RN who will look for dentures in ED. Will provide son the number to the patient advocate [phone] to discuss loss of dentures if not found. The Plan for Transition of Care is related to the following treatment goals: strengthening    The patient's son was provided with a choice of provider and agrees   with the discharge plan. [x] Yes [] No    Freedom of choice list was provided with basic dialogue that supports the patient's individualized plan of care/goals, treatment preferences and shares the quality data associated with the providers. [x] Yes [] No    SW/CM provided contact information for patient or family to call with any questions. SW/CM will follow and assist as needed.      Electronically signed by Escobar Almanza RN Case Management [phone] on 10/20/2022 at 4:04 PM

## 2022-10-20 NOTE — PROGRESS NOTES
Phan More NP aware of patient blood glucose 65 with no PRN orders. No new orders at this time. Will recheck blood glucose at midnight and make NP aware of results.

## 2022-10-20 NOTE — PROGRESS NOTES
Hospitalist Progress Note    Patient:  Griselda Vicente  Unit/Bed:[id number]/5102-01   YOB: 1931       MRN: [id number] Acct: [de-identified]  PCP: TASHI French CNP    Date of Admission: 10/18/2022  --------------------------    Chief Complaint:     Confusion    Hospital Course:     Griselda Vicente is a 80 y.o. female hospitalized on 10/18/2022 increased confusion, patient has history of dementia. Assessment/plan:     Acute toxic metabolic encephalopathy, superimposing dementia    -Overall mental status slightly better, continue monitoring  -Seroquel for severe agitation  -Continue delirium measures       Seizure-like activity  Neurology team following  Awaiting EEG  ? Keppra         ? UTI, history of MDRO  Continue cefepime for 3 days. UA did not reflex to culture. History of atrial fibrillation  Anticoagulated with Xarelto      Hypothermic, multifactorial  Resolved      Code Status: DNR-CCA         DVT prophylaxis: Lovenox     Disposition: ECF         Discussed with RN      ----------------      Subjective:     Patient seen and examined  Overnight events noted  RN and ancillary staff note reviewed  Less agitated than yesterday  Remain confused  Discussed with bedside nurse      Diet: Diet NPO    OBJECTIVE     Exam:  BP (!) 144/69   Pulse 87   Temp 99.1 °F (37.3 °C) (Axillary)   Resp 18   Ht 5' 3\" (1.6 m)   Wt 181 lb 7 oz (82.3 kg)   SpO2 94%   BMI 32.14 kg/m²        Slightly improvement in the agitation otherwise unchanged as documented below  Gen: Confused and agitated  Head: Normocephalic. Atraumatic. Eyes: Conjunctivae/corneas clear. ENT: Oral mucosa moist  Neck: No JVD. No obvious thyromegaly. CVS: Nml S1S2,  b murmur     Pulmomary: [address] air entry. Gastrointestinal: Soft, no grimace. Musculoskeletal: No edema.  Warm  Neuro: Alert but confused, moving all extremities  Psychiatry: Agitated, delirious        Medications:  Reviewed    Infusion Medications    dextrose sodium chloride      sodium chloride 100 mL/hr at 10/20/22 1973     Scheduled Medications    acetaminophen  1,000 mg Oral Q8H    melatonin  3 mg Oral Nightly    QUEtiapine  12.5 mg Oral Q12H    sodium chloride flush  5-40 mL IntraVENous 2 times per day    enoxaparin  40 mg SubCUTAneous Daily    cefepime  2,000 mg IntraVENous Q24H     PRN Meds: glucose, dextrose bolus **OR** dextrose bolus, glucagon (rDNA), dextrose, albuterol sulfate HFA, sodium chloride flush, sodium chloride, ondansetron **OR** ondansetron, acetaminophen **OR** acetaminophen, polyethylene glycol, LORazepam      Intake/Output Summary (Last 24 hours) at 10/20/2022 1239  Last data filed at 10/20/2022 7814  Gross per 24 hour   Intake 2592.12 ml   Output 750 ml   Net 1842.12 ml             Labs:   Recent Labs     10/18/22  1128 10/19/22  0426 10/20/22  0409   WBC 5.6 5.5 5.3   HGB 12.1 11.1* 10.7*   HCT 37.6 34.4* 33.0*    184 158     Recent Labs     10/18/22  1128 10/19/22  0426    142   K 4.5 4.8    108   CO2 22 23   BUN 22* 20   CREATININE 1.0 1.2   CALCIUM 10.2 10.1     Recent Labs     10/18/22  1128   AST 28   ALT 17   BILITOT 1.1*   ALKPHOS 155*     No results for input(s): INR in the last 72 hours. Recent Labs     10/18/22  1128 10/18/22  2040   CKTOTAL 149 152   TROPONINI <0.01  --        Urinalysis:      Lab Results   Component Value Date/Time    NITRU Negative 10/18/2022 11:28 AM    WBCUA 7 10/18/2022 11:28 AM    BACTERIA 1+ 10/18/2022 11:28 AM    RBCUA 5 10/18/2022 11:28 AM    BLOODU TRACE 10/18/2022 11:28 AM    SPECGRAV 1.009 10/18/2022 11:28 AM    GLUCOSEU Negative 10/18/2022 11:28 AM       Radiology:  CT ABDOMEN PELVIS W IV CONTRAST Additional Contrast? None   Final Result   1. No acute abdominal or pelvic findings. 2.  Small to moderate bilateral pleural effusions with associated bibasilar   atelectasis. 3.  Moderate hiatal hernia. XR CHEST PORTABLE   Final Result   No acute findings.   Mild cardiomegaly         CT HEAD WO CONTRAST   Preliminary Result   No evidence of acute intracranial abnormality on a study limited as described   above.          MRI brain with and without contrast    (Results Pending)               Electronically signed by Rosetta Valadez MD on 10/20/2022 at 12:39 PM

## 2022-10-20 NOTE — PLAN OF CARE
Problem: Discharge Planning  Goal: Discharge to home or other facility with appropriate resources  Outcome: Progressing     Problem: Pain  Goal: Verbalizes/displays adequate comfort level or baseline comfort level  10/19/2022 2112 by Hallie Ruiz RN  Outcome: Progressing      Problem: Skin/Tissue Integrity  Goal: Absence of new skin breakdown  Description: 1. Monitor for areas of redness and/or skin breakdown  2. Assess vascular access sites hourly  3. Every 4-6 hours minimum:  Change oxygen saturation probe site  4. Every 4-6 hours:  If on nasal continuous positive airway pressure, respiratory therapy assess nares and determine need for appliance change or resting period.   Outcome: Progressing     Problem: Safety - Adult  Goal: Free from fall injury  10/19/2022 2112 by Hallie Ruiz RN  Outcome: Progressing         Problem: ABCDS Injury Assessment  Goal: Absence of physical injury  10/19/2022 2112 by Hallie Ruiz RN  Outcome: Progressing         Problem: Respiratory - Adult  Goal: Achieves optimal ventilation and oxygenation  Outcome: Progressing     Problem: Metabolic/Fluid and Electrolytes - Adult  Goal: Electrolytes maintained within normal limits  Outcome: Progressing

## 2022-10-20 NOTE — PROGRESS NOTES
Patient resting in bed , nonverbal and does not follow nursing commands. IV fluids infusing per order. Midnight blood glucose 71, NP aware. Pure wick patent and draining jose urine. Tele sitter at bedside for safety. Seizure precautions in place. Patient safe with call light in reach and bed alarm on for safety.

## 2022-10-20 NOTE — PROGRESS NOTES
Patient observed to be restless/ agitated and pulled at lines. This nurse replaced lines and attempted to reorient patient. Patient safe with call light in reach, bed alarm on and tele sitter at bedside for safety concerns.

## 2022-10-20 NOTE — CONSULTS
[facility]  Palliative Care   Consult Note    NAME:  Nerissa Valerio  MEDICAL RECORD NUMBER:  [id number]  AGE: 80 y.o. GENDER: female  : 1931  TODAY'S DATE:  10/20/2022    Subjective     Reason for Consult:  goals of care  Visit Type: Initial Consult      Nerissa aVlerio is a 80 y.o. female referred by:   [x] Physician    PAST MEDICAL HISTORY      Diagnosis Date    Alzheimer's dementia ([facility] [address]     Atrial fibrillation ([facility] [address]     Benign essential HTN     Gout     Multiple drug resistant organism (MDRO) culture positive 2021    Escherichia coli. Urine       PAST SURGICAL HISTORY  History reviewed. No pertinent surgical history. FAMILY HISTORY  History reviewed. No pertinent family history. SOCIAL HISTORY  Social History     Tobacco Use    Smoking status: Never    Smokeless tobacco: Never   Vaping Use    Vaping Use: Never used   Substance Use Topics    Alcohol use: Never    Drug use: Never       ALLERGIES  Allergies   Allergen Reactions    Chlorpromazine Other (See Comments)    Hydrochlorothiazide Other (See Comments)     Gout       MEDICATIONS  No current facility-administered medications on file prior to encounter.      Current Outpatient Medications on File Prior to Encounter   Medication Sig Dispense Refill    melatonin 3 MG TABS tablet Take 3 mg by mouth nightly as needed for Sleep      metoprolol succinate (TOPROL XL) 25 MG extended release tablet Take 1 tablet by mouth daily 30 tablet 1    furosemide (LASIX) 20 MG tablet Take 1 tablet by mouth every other day 30 tablet 3    Cranberry 250 MG CHEW Take 2 tablets by mouth 2 times daily (with meals) 120 tablet 0    rivaroxaban (XARELTO) 15 MG TABS tablet Take 1 tablet by mouth daily (with breakfast) 90 tablet 1    vitamin D 25 MCG (1000 UT) CAPS Take 1,000 Units by mouth daily      albuterol sulfate HFA (PROVENTIL;VENTOLIN;PROAIR) 108 (90 Base) MCG/ACT inhaler Inhale 2 puffs into the lungs every 6 hours as needed for Wheezing mometasone-formoterol (DULERA) 200-5 MCG/ACT inhaler Inhale 2 puffs into the lungs 2 times daily      Multiple Vitamins-Minerals (MULTIVITAMIN WITH MINERALS) tablet Take 1 tablet by mouth daily      calcium carbonate (OSCAL) 500 MG TABS tablet Take 500 mg by mouth daily         Objective         BP (!) 144/69   Pulse 87   Temp 99.1 °F (37.3 °C) (Axillary)   Resp 18   Ht 5' 3\" (1.6 m)   Wt 181 lb 7 oz (82.3 kg)   SpO2 94%   BMI 32.14 kg/m²     Code Status: DNR-CCA    Advanced Directives:  completed     Assessment        Management and Education    Persons available for education:       [] Self     [] Caregiver       [] Spouse       [x] Other Family Member   []  Other    Spiritual History:  notified: Yes,    Does the patient have a Primary Care Physician? Yes    Palliative Performance Scale:  60% [] Ambulation reduced; Significant disease; Can't do hobbies/housework; intake normal or reduced; occasional assist; LOC full/confusion  50% [] Mainly sit/lie; Extensive disease; Can't do any work; Considerable assist; intake normal or reduced; LOC full/confusion  40% [x] Mainly in bed; Extensive disease; Mainly assist; intake normal or reduced; occasional assist; LOC full/confusion  30% [] Bed Bound; Extensive disease; Total care; intake reduced; LOC full/confusion  20% [] Bed Bound; Extensive disease; Total care; intake minimal; Drowsy/coma  10% [] Bed Bound; Extensive disease;  Total care; Mouth care only; Drowsy/coma  0 [] Death    Level of patient/caregiver understanding able to:        [x] Verbalize Understanding   [] Demonstrate Understanding       [] Teach Back       [] Needs Reinforcement     []  Other:      Teaching Time:  0hours  30 minutes     Plan        Social Service Consult Made:  Yes  Assistance filling out Living Will/HPOA:  No      Discharge Plan:  Education/support to family  Providing support for coping/adaptation/distress of family  Clarification of medical condition to patient and family  Code status clarified: [facility]  Palliative care orders introduced    Discharge Environment:  [x] Home home care     Discussion: Patient admitted from home for AMS with UTI. I have met with family in past. Today the patient is confused and unable to answer questions. I have called her son Sheridan Sheriff and he state they have been doing well and keeping her out of the hospital.  He will take her home with home care and assistance of his partner they provide 24/7 care. He did verify code status of DNR. I will continue to follow Ms. Adorno's care as needed. Thank you for allowing me to participate in the care of Ms. Adorno .      Electronically signed by Ady Panda RN, BSN, formerly [facility] on 10/20/2022 at [address] [place] [address]   Office: [phone]

## 2022-10-20 NOTE — CARE COORDINATION
[facility]    Referral received from  to follow for home care services. I will follow for needs, and speak with patient to verify demos.     Jane Erickson RN, BSN CTN  Formerly [facility] [phone]

## 2022-10-20 NOTE — PLAN OF CARE
Problem: Pain  Goal: Verbalizes/displays adequate comfort level or baseline comfort level  Outcome: Progressing  No s/s of pain     Problem: Skin/Tissue Integrity  Goal: Absence of new skin breakdown  Description: 1. Monitor for areas of redness and/or skin breakdown  2. Assess vascular access sites hourly  3. Every 4-6 hours minimum:  Change oxygen saturation probe site  4. Every 4-6 hours:  If on nasal continuous positive airway pressure, respiratory therapy assess nares and determine need for appliance change or resting period. Outcome: Progressing   Patient being turned every two hours, sides of bed padded for seizure precaution    Problem: Safety - Adult  Goal: Free from fall injury  Outcome: Progressing  Bed alarm on, telesitter in use.

## 2022-10-21 ENCOUNTER — APPOINTMENT (OUTPATIENT)
Dept: MRI IMAGING | Age: 87
DRG: 689 | End: 2022-10-21
Payer: MEDICARE

## 2022-10-21 LAB
BASOPHILS ABSOLUTE: 0 K/UL (ref 0–0.2)
BASOPHILS RELATIVE PERCENT: 0.5 %
EOSINOPHILS ABSOLUTE: 0.1 K/UL (ref 0–0.6)
EOSINOPHILS RELATIVE PERCENT: 2.4 %
GLUCOSE BLD-MCNC: 100 MG/DL (ref 70–99)
GLUCOSE BLD-MCNC: 133 MG/DL (ref 70–99)
GLUCOSE BLD-MCNC: 170 MG/DL (ref 70–99)
GLUCOSE BLD-MCNC: 193 MG/DL (ref 70–99)
GLUCOSE BLD-MCNC: 64 MG/DL (ref 70–99)
GLUCOSE BLD-MCNC: 69 MG/DL (ref 70–99)
GLUCOSE BLD-MCNC: 74 MG/DL (ref 70–99)
GLUCOSE BLD-MCNC: 86 MG/DL (ref 70–99)
HCT VFR BLD CALC: 33.9 % (ref 36–48)
HEMOGLOBIN: 11.1 G/DL (ref 12–16)
LYMPHOCYTES ABSOLUTE: 0.6 K/UL (ref 1–5.1)
LYMPHOCYTES RELATIVE PERCENT: 10.8 %
MCH RBC QN AUTO: 28.4 PG (ref 26–34)
MCHC RBC AUTO-ENTMCNC: 32.8 G/DL (ref 31–36)
MCV RBC AUTO: 86.7 FL (ref 80–100)
MONOCYTES ABSOLUTE: 0.9 K/UL (ref 0–1.3)
MONOCYTES RELATIVE PERCENT: 15.7 %
NEUTROPHILS ABSOLUTE: 4.1 K/UL (ref 1.7–7.7)
NEUTROPHILS RELATIVE PERCENT: 70.6 %
PDW BLD-RTO: 15.4 % (ref 12.4–15.4)
PERFORMED ON: ABNORMAL
PERFORMED ON: NORMAL
PERFORMED ON: NORMAL
PLATELET # BLD: 152 K/UL (ref 135–450)
PMV BLD AUTO: 9.3 FL (ref 5–10.5)
RBC # BLD: 3.91 M/UL (ref 4–5.2)
WBC # BLD: 5.8 K/UL (ref 4–11)

## 2022-10-21 PROCEDURE — 36415 COLL VENOUS BLD VENIPUNCTURE: CPT

## 2022-10-21 PROCEDURE — 6370000000 HC RX 637 (ALT 250 FOR IP): Performed by: HOSPITALIST

## 2022-10-21 PROCEDURE — 70553 MRI BRAIN STEM W/O & W/DYE: CPT

## 2022-10-21 PROCEDURE — 92610 EVALUATE SWALLOWING FUNCTION: CPT

## 2022-10-21 PROCEDURE — 2580000003 HC RX 258: Performed by: STUDENT IN AN ORGANIZED HEALTH CARE EDUCATION/TRAINING PROGRAM

## 2022-10-21 PROCEDURE — 97530 THERAPEUTIC ACTIVITIES: CPT

## 2022-10-21 PROCEDURE — 97162 PT EVAL MOD COMPLEX 30 MIN: CPT

## 2022-10-21 PROCEDURE — 85025 COMPLETE CBC W/AUTO DIFF WBC: CPT

## 2022-10-21 PROCEDURE — 6360000002 HC RX W HCPCS: Performed by: INTERNAL MEDICINE

## 2022-10-21 PROCEDURE — 94760 N-INVAS EAR/PLS OXIMETRY 1: CPT

## 2022-10-21 PROCEDURE — A9577 INJ MULTIHANCE: HCPCS | Performed by: STUDENT IN AN ORGANIZED HEALTH CARE EDUCATION/TRAINING PROGRAM

## 2022-10-21 PROCEDURE — 2060000000 HC ICU INTERMEDIATE R&B

## 2022-10-21 PROCEDURE — 2580000003 HC RX 258: Performed by: INTERNAL MEDICINE

## 2022-10-21 PROCEDURE — 6360000004 HC RX CONTRAST MEDICATION: Performed by: STUDENT IN AN ORGANIZED HEALTH CARE EDUCATION/TRAINING PROGRAM

## 2022-10-21 RX ORDER — FUROSEMIDE 20 MG/1
20 TABLET ORAL DAILY
Status: DISCONTINUED | OUTPATIENT
Start: 2022-10-21 | End: 2022-10-22

## 2022-10-21 RX ORDER — LEVETIRACETAM 500 MG/1
250 TABLET ORAL 2 TIMES DAILY
Status: DISCONTINUED | OUTPATIENT
Start: 2022-10-21 | End: 2022-10-24

## 2022-10-21 RX ADMIN — SODIUM CHLORIDE, PRESERVATIVE FREE 10 ML: 5 INJECTION INTRAVENOUS at 09:52

## 2022-10-21 RX ADMIN — LEVETIRACETAM 250 MG: 500 TABLET, FILM COATED ORAL at 20:29

## 2022-10-21 RX ADMIN — DEXTROSE MONOHYDRATE 125 ML: 100 INJECTION, SOLUTION INTRAVENOUS at 09:51

## 2022-10-21 RX ADMIN — FUROSEMIDE 20 MG: 20 TABLET ORAL at 18:24

## 2022-10-21 RX ADMIN — SODIUM CHLORIDE 100 ML: 9 INJECTION, SOLUTION INTRAVENOUS at 16:08

## 2022-10-21 RX ADMIN — ENOXAPARIN SODIUM 40 MG: 100 INJECTION SUBCUTANEOUS at 10:37

## 2022-10-21 RX ADMIN — LEVETIRACETAM 250 MG: 500 TABLET, FILM COATED ORAL at 13:47

## 2022-10-21 RX ADMIN — GADOBENATE DIMEGLUMINE 17 ML: 529 INJECTION, SOLUTION INTRAVENOUS at 15:22

## 2022-10-21 RX ADMIN — Medication 3 MG: at 20:29

## 2022-10-21 NOTE — PLAN OF CARE
Problem: Discharge Planning  Goal: Discharge to home or other facility with appropriate resources  Outcome: Progressing     Problem: Pain  Goal: Verbalizes/displays adequate comfort level or baseline comfort level  Outcome: Progressing     Problem: Skin/Tissue Integrity  Goal: Absence of new skin breakdown  Description: 1. Monitor for areas of redness and/or skin breakdown  2. Assess vascular access sites hourly  3. Every 4-6 hours minimum:  Change oxygen saturation probe site  4. Every 4-6 hours:  If on nasal continuous positive airway pressure, respiratory therapy assess nares and determine need for appliance change or resting period.   Outcome: Progressing     Problem: Safety - Adult  Goal: Free from fall injury  Outcome: Progressing     Problem: ABCDS Injury Assessment  Goal: Absence of physical injury  Outcome: Progressing     Problem: Respiratory - Adult  Goal: Achieves optimal ventilation and oxygenation  Outcome: Progressing     Problem: Metabolic/Fluid and Electrolytes - Adult  Goal: Electrolytes maintained within normal limits  Outcome: Progressing

## 2022-10-21 NOTE — PLAN OF CARE
Problem: Discharge Planning  Goal: Discharge to home or other facility with appropriate resources  Outcome: Progressing  Flowsheets (Taken 10/20/2022 2043)  Discharge to home or other facility with appropriate resources: Identify barriers to discharge with patient and caregiver     Problem: Pain  Goal: Verbalizes/displays adequate comfort level or baseline comfort level  10/20/2022 2057 by Landne Reid RN  Outcome: Progressing       Problem: Skin/Tissue Integrity  Goal: Absence of new skin breakdown  Description: 1. Monitor for areas of redness and/or skin breakdown  2. Assess vascular access sites hourly  3. Every 4-6 hours minimum:  Change oxygen saturation probe site  4. Every 4-6 hours:  If on nasal continuous positive airway pressure, respiratory therapy assess nares and determine need for appliance change or resting period.   10/20/2022 2057 by Landen Reid RN  Outcome: Progressing       Problem: Safety - Adult  Goal: Free from fall injury  10/20/2022 2057 by Landen Reid RN  Outcome: Progressing       Problem: ABCDS Injury Assessment  Goal: Absence of physical injury  Outcome: Progressing     Problem: Respiratory - Adult  Goal: Achieves optimal ventilation and oxygenation  Outcome: Progressing  Flowsheets (Taken 10/20/2022 2043)  Achieves optimal ventilation and oxygenation: Assess for changes in respiratory status     Problem: Metabolic/Fluid and Electrolytes - Adult  Goal: Electrolytes maintained within normal limits  Outcome: Progressing  Flowsheets (Taken 10/20/2022 2043)  Electrolytes maintained within normal limits: Monitor labs and assess patient for signs and symptoms of electrolyte imbalances

## 2022-10-21 NOTE — PROGRESS NOTES
During evening med pass son is at bedside, requested not to give seroquel, tylenol and to get prn ativan d/c, they also requested to get iv fluids d/c'd due to pts chf and history, they requested pt to have lasix as she took at home, all relayed to md, please see new orders.  Pt noted with loose stools, md aware

## 2022-10-21 NOTE — PROGRESS NOTES
Physical Therapy  Facility/Department: [address] PROGRESSIVE CARE  Physical Therapy Initial Assessment    Name: Brittny Canales  : 1931  MRN: [id number]  Date of Service: 10/21/2022    Discharge Recommendations:  Patient would benefit from continued therapy after discharge (3-5x/wk)   PT Equipment Recommendations  Equipment Needed: No  Other: defer to next level of care    Brittny Canales scored a 8/24 on the AM-PAC short mobility form. Current research shows that an AM-PAC score of 17 or less is typically not associated with a discharge to the patient's home setting. Based on the patient's AM-PAC score and their current functional mobility deficits, it is recommended that the patient have 3-5 sessions per week of Physical Therapy at d/c to increase the patient's independence. Please see assessment section for further patient specific details. If patient discharges prior to next session this note will serve as a discharge summary. Please see below for the latest assessment towards goals. Patient Diagnosis(es): The primary encounter diagnosis was Altered mental status, unspecified altered mental status type. A diagnosis of Hypothermia, initial encounter was also pertinent to this visit. 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 Past Surgical History:  has no past surgical history on file. Assessment   Body Structures, Functions, Activity Limitations Requiring Skilled Therapeutic Intervention: Decreased functional mobility ; Decreased safe awareness;Decreased cognition;Decreased balance  Assessment: Pt is a 81 y/o female with hx of dementia who presented to the ED with AMS. Possible UTI. Pt lives at home with her son and his partner and according to prior therapy notes, pt ambulates with rollator at baseline.   Pt unable to verify PLOF or home set-up. Disoriented x4 this date and agitated. Not following commands and also limited by being extremely [place] She was unable to come to full standing position in [place] with maxAx2. She is unsafe to return home at this time. Recommend continued skilled inpatient PT at a low-moderate intensity upon d/c to decrease burden of care. Will continue to follow. Treatment Diagnosis: functional mobility below  baseline  Therapy Prognosis: Fair;Guarded  Decision Making: Medium Complexity  History: Per Nader Forte MD \"59 y.o. female who presented to [facility] [place] with altered mental status. Patient was brought in by her son after she has had increasing confusion at home. Patient has some baseline dementia but over the last several days she has been much more disoriented. She has been yelling and hard to control. This is not typical for her. Family reports that this is usually the result of urinary tract infection. \"  Exam: functional mobility, ROM  Clinical Presentation: evolving  Barriers to Learning: cognition, agitation, [place]  Requires PT Follow-Up: Yes  Activity Tolerance  Activity Tolerance: Treatment limited secondary to agitation;Treatment limited secondary to decreased cognition  Activity Tolerance Comments: pt not following commands     Plan   Physcial Therapy Plan  General Plan: 3-5 times per week  Current Treatment Recommendations: Balance training, Functional mobility training, Strengthening, ROM, Transfer training, Neuromuscular re-education, Gait training, Endurance training, Safety education & training, Patient/Caregiver education & training, Home exercise program, Equipment evaluation, education, & procurement, Cognitive reorientation  Safety Devices  Type of Devices: Gait belt, Nurse notified (pt left on stretcher for MRI at end of session)  Restraints  Restraints Initially in Place: No     Restrictions  Restrictions/Precautions  Restrictions/Precautions: Fall Risk, Contact Precautions  Position Activity Restriction  Other position/activity restrictions: dementia     Subjective   General  Chart Reviewed: Yes  Patient assessed for rehabilitation services?: Yes  Additional Pertinent Hx: Per Steve Burdick MD \"87 y.o. female who presented to [facility] [place] with altered mental status. Patient was brought in by her son after she has had increasing confusion at home. Patient has some baseline dementia but over the last several days she has been much more disoriented. She has been yelling and hard to control. This is not typical for her. Family reports that this is usually the result of urinary tract infection. \"  Response To Previous Treatment: Not applicable  Family / Caregiver Present: No  Referring Practitioner: Alan Raygoza MD  Referral Date : 10/21/22  Diagnosis: Acute toxic metabolic encephalopathy  Follows Commands: Impaired  Subjective  Subjective: Pt confused and agitated. In bed upon arrival.  Very [place].          Social/Functional History  Social/Functional History  Lives With: Family (Son Abigail Hawthorne) and his partner Mago Miller))  Type of Home: House  Home Layout: One level  Home Access: Stairs to enter with rails  Entrance Stairs - Number of Steps: 4-5  Bathroom Shower/Tub: Walk-in shower  Bathroom Toilet: Standard  Bathroom Equipment: Grab bars in shower, Shower chair  Home Equipment: Walker, 4 wheeled, [address] [facility] [address]  ADL Assistance:  (Son assists with bathing/dressing/toileting - patient feeds self)  Homemaking Responsibilities: No (son completes IADLs)  Ambulation Assistance: Needs assistance (with rollator)  Transfer Assistance: Needs assistance  Active : No  Additional Comments: above info from previous therapy note- pt has dementia so unable to verify  Vision/Hearing  Vision  Vision: Within Functional Limits  Hearing  Hearing: Exceptions to [facility]  Hearing Exceptions: Hard of hearing/hearing concerns    Cognition   Orientation  Overall Orientation Status: Impaired  Orientation Level: Disoriented X4     Objective AROM RLE (degrees)  RLE AROM: WFL  AROM LLE (degrees)  LLE AROM : WFL  Strength RLE  Comment: functionally weak- unable to formally assess due to cognition  Strength LLE  Comment: functionally weak- unable to formally assess due to cognition           Bed mobility  Rolling to Left: Maximum assistance  Rolling to Right: Maximum assistance  Supine to Sit: Maximum assistance  Sit to Supine: Maximum assistance;2 Person assistance  Scooting: Dependent/Total;2 Person assistance  Bed Mobility Comments: pt rolled back and forth multiple times in bed for pericare and changing of brief, don and bed sheets after being incontinent of BM    Transfers  Sit to Stand: Maximum Assistance;2 Person Assistance (unable to stand to RW; able to come to partial stand with maxAx2 to [place])  Stand to Sit: Maximum Assistance;2 Person Assistance  Bed to Chair: Unable to assess (would require use of maxi-move this date but returned to bed due to incontinence and then left for testing at end of session)  Comment: Attempted to stand to RW with maxAx1, but pt unable to complete and not following commands for hand placement or technique    Ambulation  Comments: pt unable to stand to RW so unable to attempt ambulation     Balance  Posture: Fair  Sitting - Static: Fair;-  Sitting - Dynamic: Poor  Standing - Static: Poor       Other activities: pt incontinent of BM thru brief onto don and sheets; total assist for pericare rolling back and forth in bed and total assist for changing gown, brief, bed linens and purewick; PCA assisted PT; at end of session transport arrived to take pt to MRI and PT assisted with sliding pt over onto stretcher dependently    *Session took extra time due to pt's agitation and not following commands and also because of BM incontinence.     AM-PAC Score  AM-PAC Inpatient Mobility Raw Score : 8 (10/21/22 1441)  AM-PAC Inpatient T-Scale Score : 28.52 (10/21/22 1441)  Mobility Inpatient CMS 0-100% Score: 86.62 (10/21/22 1441)  Mobility Inpatient CMS G-Code Modifier : CM (10/21/22 1441)             Goals  Short Term Goals  Time Frame for Short Term Goals: upon d/c  Short Term Goal 1: bed mobility minAx2  Short Term Goal 2: transfers minAx2  Short Term Goal 3: initiate gait as able  Patient Goals   Patient Goals : unable to state due to cognition       Education  Patient Education  Education Given To: Patient  Education Provided: Role of Therapy;Plan of Care;Orientation;Transfer Training  Education Method: Demonstration;Verbal  Barriers to Learning: Cognition; Hearing (agitation)  Education Outcome: Unable to demonstrate understanding      Therapy Time   Individual Concurrent Group Co-treatment   Time In 1332         Time Out 1448         Minutes 76         Timed Code Treatment Minutes: 61 Minutes       Electronically signed by Patricia Wayne, PT [id number] on 10/21/2022 at 2:49 PM

## 2022-10-21 NOTE — PROGRESS NOTES
Hospitalist Progress Note    Patient:  Tiffani Johnson  Unit/Bed:W0Q-9843/5102-01   YOB: 1931       MRN: [id number] Acct: [de-identified]  PCP: Tejinder Duffy MD    Date of Admission: 10/18/2022  --------------------------    Chief Complaint:     Confusion    Hospital Course:     Tiffani Johnson is a 80 y.o. female hospitalized on 10/18/2022 increased confusion, patient has history of dementia. Assessment/plan:     Acute toxic metabolic encephalopathy, superimposing dementia    Mental status improving slowly  Discussed with neurology, recommending low-dose Keppra in the light of the repeated seizure-l episode. Monitoring. Appreciate neurology input, continue Keppra       Seizure-like activity  Neurology team following  Awaiting EEG  ? Keppra         ? UTI, history of MDRO  Received's cefepime course. History of atrial fibrillation  Anticoagulated with Xarelto      Hypothermic, multifactorial  Resolved      Code Status: DNR-CCA         DVT prophylaxis: Lovenox     Disposition: Patient will be discharged home with family who provide 24-hour supervision patient seen and examined            Discussed with RN      ----------------      Subjective:     Pa patient seen and examined  More awake now. Respond to questions by saying getting her answers. Diet: ADULT DIET; Dysphagia - Pureed; Moderately Thick (Honey)    OBJECTIVE     Exam:  BP (!) 146/93   Pulse 83   Temp 97.5 °F (36.4 °C) (Oral)   Resp 22   Ht 5' 3\" (1.6 m)   Wt 179 lb 10.8 oz (81.5 kg)   SpO2 96%   BMI 31.83 kg/m²      Provement of agitated noted otherwise unchanged exam finding unchanged as documented below  Gen: Confused and agitated  Head: Normocephalic. Atraumatic. Eyes: Conjunctivae/corneas clear. ENT: Oral mucosa moist  Neck: No JVD. No obvious thyromegaly. CVS: Nml S1S2,  b murmur     Pulmomary: [address] air entry. Gastrointestinal: Soft, no grimace. Musculoskeletal: No edema.  Warm  Neuro: Alert but confused, moving all extremities  Psychiatry: Agitated, delirious        Medications:  Reviewed    Infusion Medications    dextrose      sodium chloride      sodium chloride 100 mL/hr at 10/21/22 0331     Scheduled Medications    levETIRAcetam  250 mg Oral BID    acetaminophen  1,000 mg Oral Q8H    melatonin  3 mg Oral Nightly    QUEtiapine  12.5 mg Oral Q12H    sodium chloride flush  5-40 mL IntraVENous 2 times per day    enoxaparin  40 mg SubCUTAneous Daily    cefepime  2,000 mg IntraVENous Q24H     PRN Meds: glucose, dextrose bolus **OR** dextrose bolus, glucagon (rDNA), dextrose, albuterol sulfate HFA, sodium chloride flush, sodium chloride, ondansetron **OR** ondansetron, acetaminophen **OR** acetaminophen, polyethylene glycol, LORazepam      Intake/Output Summary (Last 24 hours) at 10/21/2022 1345  Last data filed at 10/21/2022 0331  Gross per 24 hour   Intake 1654.16 ml   Output --   Net 1654.16 ml             Labs:   Recent Labs     10/19/22  0426 10/20/22  0409 10/21/22  0413   WBC 5.5 5.3 5.8   HGB 11.1* 10.7* 11.1*   HCT 34.4* 33.0* 33.9*    158 152     Recent Labs     10/19/22  0426      K 4.8      CO2 23   BUN 20   CREATININE 1.2   CALCIUM 10.1     No results for input(s): AST, ALT, BILIDIR, BILITOT, ALKPHOS in the last 72 hours. No results for input(s): INR in the last 72 hours. Recent Labs     10/18/22  2040   CKTOTAL 152       Urinalysis:      Lab Results   Component Value Date/Time    NITRU Negative 10/18/2022 11:28 AM    WBCUA 7 10/18/2022 11:28 AM    BACTERIA 1+ 10/18/2022 11:28 AM    RBCUA 5 10/18/2022 11:28 AM    BLOODU TRACE 10/18/2022 11:28 AM    SPECGRAV 1.009 10/18/2022 11:28 AM    GLUCOSEU Negative 10/18/2022 11:28 AM       Radiology:  CT ABDOMEN PELVIS W IV CONTRAST Additional Contrast? None   Final Result   1. No acute abdominal or pelvic findings. 2.  Small to moderate bilateral pleural effusions with associated bibasilar   atelectasis. 3.  Moderate hiatal hernia. XR CHEST PORTABLE   Final Result   No acute findings. Mild cardiomegaly         CT HEAD WO CONTRAST   Preliminary Result   No evidence of acute intracranial abnormality on a study limited as described   above.          MRI brain with and without contrast    (Results Pending)               Electronically signed by Jane Bowie MD on 10/21/2022 at 1:45 PM

## 2022-10-21 NOTE — PROGRESS NOTES
Facility/Department: [address] PROGRESSIVE CARE  Initial Assessment  DYSPHAGIA BEDSIDE SWALLOW EVALUATION     Patient: Karishma Marin   : 1931   MRN: [id number]      Evaluation Date: 10/21/2022   Admitting Diagnosis:   UTI (urinary tract infection) [N39.0]  Hypothermia, initial encounter [T68. XXXA]  Altered mental status, unspecified altered mental status type [R41.82]    Pain: Unable to state                                                       CHART REVIEW:  10/18/2022 admitted with c/o AMS; concern for UTI noted  NPO since admission d/t mental status  10/21/2022 CSE ordered    IMAGING:  CXR: 10/18/2022  Impression   No acute findings. Mild cardiomegaly     CT HEAD: 10/18/2022  Impression   No evidence of acute intracranial abnormality on a study limited as described   above. BRAIN MRI: ordered    Current Diet Level (prior to evaluation): NPO  NPO   Respiratory Status:   [x]Room Air   []O2 via nasal cannula   []Other:    Most recent Modified Barium Swallow Study: 2022  Moderate oral and mild pharyngeal phase dysphagia characterized by prolonged mastication of soft solid, reduced lingual manipulation with all, reduced bolus formation of textured solid, reduced bolus control, reduced AP transit with episodic lingual pumping, delayed swallow initiation and decreased laryngeal elevation. Premature loss to valleculae with all  Oral residue with soft solid > minced/moist solid  NO penetration or aspiration; trace episodic vallecular residue  Suspect potential for solid texture upgrade once dentures are present and pt's generalized weakness improves  Diet Recommendations:  Solid consistency: Dysphagia Minced and Moist (Dysphagia II)   Liquid consistency:  Thin   Medication administration: PO (one at a time in puree or with liquid)   Compensatory Swallowing Strategies: Upright as possible for all oral intake,Remain upright for 30-45 minutes after meals,Small bites/sips,Eat/Feed slowly    Reason for referral: SLP evaluation orders received due to altered mental status    History/Prior Level of Function:   Living Status: lives with family  Baseline diet: patient unable to report  Prior Dysphagia History: most recently followed by ST 2/2022 with discharge rec for minced/thin    Dysphagia Impressions/Diagnosis: Oropharyngeal Dysphagia   Accepted and tolerated evaluation at bedside. Patient requires max encouragement for participation with tendency to be easily agitated but easily redirectable. Patient alert, cooperative with encouragement; does not follow dx; verbally responsive but confused responses; [place]. Moderate severe oral stage dysphagia characterized by poor mastication and reduced lingual strength/coordination. Unable to masticate soft solids; attempted to place dentures, but does not tolerate denture placement. Food particle residue with minced solids is not cleared by patient. Prolonged bolus formation and movement with puree and liquids. Suspect premature bolus loss to the pharynx with all. Moderate severe pharyngeal stage dysphagia characterized by delayed swallow and decreased laryngeal elevation. Cough with thin; throat clear with nectar. Patient with habitual grunt with and without PO that does not appear to be a reliable indicator for penetration/aspiration with puree/honey trials. Recommend consideration for puree diet texture with moderately/honey thick liquids. Await MD decision. ST to continue to follow.       Recommended Diet and Intervention 10/21/2022:  Diet Solids Recommendation:  Dysphagia I Puree  Liquid Consistency Recommendation:  Moderately (honey) thick liquids  Recommended form of Meds: Meds crushed as able in puree      Compensatory Swallowing Strategies:  Upright as possible with all PO intake , No straws , Small bites/sips , Eat/feed slowly, Remain upright 30-45 min , Total Feed     SHORT TERM DYSPHAGIA GOALS/PLAN OF CARE: Speech therapy for dysphagia tx 3-5 times per week during acute care stay. Goals  Pt will functionally tolerate recommended diet with no overt clinical s/s of aspiration   Pt will demonstrate understanding of aspiration risk and precautions via education/demonstration with occasional prompting  Pt will advance to least restrictive diet as indicated     Dysphagia Therapeutic Intervention:  Diet Tolerance Monitoring , Patient/Family Education , Therapeutic Trials with SLP     Dysphagia Prognosis: [] good []fair  [x]guarded d/t cognitive status []poor    Discharge Recommendations: Recommend ongoing SLP for dysphagia therapy upon discharge from hospital       TEST DATA  Vision: AXEL - appears adequate for assessment needs  Hearing: [place]    Consistencies Presented: Thin liquid;   Mildly / nectar thick liquid ;    Moderately / honey thick liquid  Puree food  Minced and moist food     Cognitive/behavioral: max encouragement for participation with tendency to be easily agitated but easily redirectable; alert, cooperative with encouragement; does not follow dx; verbally responsive but confused responses    Patient Positioning: Upright in bed     Dentition:  []Adequate  []Dentures   []Missing Many Teeth  [x]Edentulous  [x]Other: attempted denture placement - unable to tolerate even with adhesive    Baseline Vocal Quality:  [x]Normal  []Dysphonic   []Aphonic   []Hoarse  []Wet  []Weak  []Other:    Volitional Cough: AXEL    Volitional Swallow:  AXEL    Oral Mechanism Exam: does not participate in formal exam - reduced labial and lingual strength and coordination; reduced mandibular strength; AXEL velum    Oral Phase: []WFL []Mild   [x] Moderate  [x]Severe  []To be assessed   [x]Impaired/Prolonged Mastication: soft, minced   []Spillage Left:   []Spillage Right:  []Pocketing Left:   []Pocketing Right:   [x]Decreased Anterior to Posterior Transit:   [x]Suspected Premature Bolus Loss:   [x]Lingual/Palatal Residue: minced - unable to clear   []Other:     Pharyngeal Phase: []WFL []Mild   [x] Moderate  [x]Severe  []To be assessed   [x]Delayed Swallow:   [x]Suspected Pharyngeal Pooling:   [x]Decreased Laryngeal Elevation:   []Absent Swallow:  []Wet Vocal Quality:   [x]Throat Clearing-Immediate: nectar   []Throat Clearing-Delayed:   [x]Cough-Immediate: thin   []Cough-Delayed:  []Change in Vital Signs:  []Suspected Delayed Pharyngeal Clearing:  []Other:       Eating Assistance:  []Independent  []Setup or clean-up assistance   [] Supervision or touching assistance   [] Partial or moderate assistance   [] Substantial or maximal assistance  [x] Dependent       EDUCATION:   Provided education regarding role of SLP, results of assessment, recommendations and general speech pathology plan of care. [] Pt verbalized understanding and agreement   [] Pt requires ongoing learning   [x] No evidence of comprehension     If patient discharges prior to next visit, this note will serve as discharge. Timed Code Minutes: 0  Total Treatment Minutes: 40    Electronically signed by:    Lalo Pierre.  [facility], [address] [place] [address], [id number]  Speech-Language Pathologist  Portable phone: [phone]  10/21/22 11:03 AM

## 2022-10-21 NOTE — CARE COORDINATION
[facility]    If patient discharges over the weekend, please fax order and CAR, and all docs to [facility] at [phone] upon discharge.     Thank you,    Tara Elliott RN, BSN CTN  [facility] [phone]

## 2022-10-21 NOTE — PROGRESS NOTES
Patient resting in bed, alert and disoriented x 4. Pure wick intact. IV fluids infusing per order. Patient calm this shift with no signs of agitation noted. Blood glucose stable. Tele sitter at bedside for safety concerns. Patient safe with call light in reach and bed alarm on.

## 2022-10-21 NOTE — PROGRESS NOTES
Neurology    I attempted to see the patient multiple times today. Looks like she is currently at MRI. Chart was reviewed. She was evaluated for possible seizure in February. Seen again this admission for possible seizures. Discussed w/ hospitalist.  UA not convincing for infection. She is at risk for seizures given her R hemispheric encephalomalacia and neurodegenerative disease. I think it would be reasonable to start on a low dose of LEV (250 mg BID) for now. We will follow the results of the testing. Call w/ any urgent questions or concerns. Thank you. Megan Birmingham NP  [address] [place] [address] [address] Neurology  ------------------------------------------------------------  Addendum [id number]  Patient was not physically seen by me. MRI brain W/WO  Impression   Motion challenge evaluation. No acute stroke, midline shift or mass effect. Stable involutional changes of ventricular prominence. Microvascular disease with evidence of encephalomalacia right posterior MCA   distribution. No changes to plan based on this. Please refer to above and neurology note 10- for impression/recommendations.     Raulito Chowdhury MD  Neurology

## 2022-10-22 LAB
GLUCOSE BLD-MCNC: 87 MG/DL (ref 70–99)
GLUCOSE BLD-MCNC: 89 MG/DL (ref 70–99)
GLUCOSE BLD-MCNC: 92 MG/DL (ref 70–99)
PERFORMED ON: NORMAL

## 2022-10-22 PROCEDURE — 2580000003 HC RX 258: Performed by: INTERNAL MEDICINE

## 2022-10-22 PROCEDURE — 6370000000 HC RX 637 (ALT 250 FOR IP): Performed by: HOSPITALIST

## 2022-10-22 PROCEDURE — 94640 AIRWAY INHALATION TREATMENT: CPT

## 2022-10-22 PROCEDURE — 94760 N-INVAS EAR/PLS OXIMETRY 1: CPT

## 2022-10-22 PROCEDURE — 2060000000 HC ICU INTERMEDIATE R&B

## 2022-10-22 PROCEDURE — 6360000002 HC RX W HCPCS: Performed by: INTERNAL MEDICINE

## 2022-10-22 RX ORDER — METOPROLOL SUCCINATE 25 MG/1
25 TABLET, EXTENDED RELEASE ORAL DAILY
Status: DISCONTINUED | OUTPATIENT
Start: 2022-10-22 | End: 2022-10-22

## 2022-10-22 RX ORDER — FUROSEMIDE 20 MG/1
20 TABLET ORAL EVERY OTHER DAY
Status: DISCONTINUED | OUTPATIENT
Start: 2022-10-23 | End: 2022-10-26 | Stop reason: HOSPADM

## 2022-10-22 RX ADMIN — SODIUM CHLORIDE, PRESERVATIVE FREE 10 ML: 5 INJECTION INTRAVENOUS at 20:55

## 2022-10-22 RX ADMIN — ENOXAPARIN SODIUM 40 MG: 100 INJECTION SUBCUTANEOUS at 08:49

## 2022-10-22 RX ADMIN — Medication 3 MG: at 20:55

## 2022-10-22 RX ADMIN — LEVETIRACETAM 250 MG: 500 TABLET, FILM COATED ORAL at 08:49

## 2022-10-22 RX ADMIN — METOPROLOL TARTRATE 12.5 MG: 25 TABLET, FILM COATED ORAL at 20:55

## 2022-10-22 RX ADMIN — SODIUM CHLORIDE, PRESERVATIVE FREE 10 ML: 5 INJECTION INTRAVENOUS at 08:53

## 2022-10-22 RX ADMIN — FUROSEMIDE 20 MG: 20 TABLET ORAL at 08:49

## 2022-10-22 RX ADMIN — LEVETIRACETAM 250 MG: 500 TABLET, FILM COATED ORAL at 20:55

## 2022-10-22 RX ADMIN — MOMETASONE FUROATE AND FORMOTEROL FUMARATE DIHYDRATE 2 PUFF: 200; 5 AEROSOL RESPIRATORY (INHALATION) at 19:41

## 2022-10-22 ASSESSMENT — PAIN SCALES - WONG BAKER: WONGBAKER_NUMERICALRESPONSE: 0

## 2022-10-22 NOTE — PROGRESS NOTES
Hospitalist Progress Note    Patient:  Chrissie Ayala  Unit/Bed:C4S-1233/5102-01   YOB: 1931       MRN: [id number] Acct: [de-identified]  PCP: Mayte العراقي MD    Date of Admission: 10/18/2022  --------------------------    Chief Complaint:     Confusion    Hospital Course:     Chrissie Ayala is a 80 y.o. female hospitalized on 10/18/2022 increased confusion, patient has history of dementia. Assessment/plan:     Acute toxic metabolic encephalopathy, superimposing dementia    Mental status returned to baseline. .           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 ?  UTI, history of MDRO  Received's cefepime course. History of atrial fibrillation  Anticoagulated with Xarelto      Hypothermic, multifactorial  Resolved      Code Status: DNR-CCA         DVT prophylaxis: Lovenox     Disposition: Hopefully home in 1 to 2 days            Discussed with RN      ----------------      Subjective:     Patient more cooperative today, no complaint. Diet: ADULT DIET; Dysphagia - Pureed; Moderately Thick (Honey)  ADULT ORAL NUTRITION SUPPLEMENT; Breakfast, Lunch; Fortified Pudding Oral Supplement    OBJECTIVE     Exam:  BP (!) 123/58 Comment: RN recheck  Pulse 67   Temp 97.5 °F (36.4 °C) (Axillary)   Resp 24   Ht 5' 3\" (1.6 m)   Wt 181 lb 7 oz (82.3 kg)   SpO2 95%   BMI 32.14 kg/m²         Gen: Agitation improved  Head: Normocephalic. Atraumatic. Eyes: Conjunctivae/ clear, reduced vision   ENT: Oral mucosa moist  Neck: No JVD. No obvious thyromegaly. CVS: Nml S1S2,   murmur     Pulmomary: [address] air entry. Gastrointestinal: Soft, no grimace. Musculoskeletal: Slight edema  Neuro: Alert   moving all extremities  Psychiatry: Less agitated more cooperative today.         Medications:  Reviewed    Infusion Medications    dextrose      sodium chloride       Scheduled Medications levETIRAcetam  250 mg Oral BID    furosemide  20 mg Oral Daily    melatonin  3 mg Oral Nightly    sodium chloride flush  5-40 mL IntraVENous 2 times per day    enoxaparin  40 mg SubCUTAneous Daily     PRN Meds: glucose, dextrose bolus **OR** dextrose bolus, glucagon (rDNA), dextrose, albuterol sulfate HFA, sodium chloride flush, sodium chloride, ondansetron **OR** ondansetron, acetaminophen **OR** acetaminophen, polyethylene glycol      Intake/Output Summary (Last 24 hours) at 10/22/2022 1402  Last data filed at 10/22/2022 0420  Gross per 24 hour   Intake 25 ml   Output 1850 ml   Net -1825 ml             Labs:   Recent Labs     10/20/22  0409 10/21/22  0413   WBC 5.3 5.8   HGB 10.7* 11.1*   HCT 33.0* 33.9*    152     No results for input(s): NA, K, CL, CO2, BUN, CREATININE, CALCIUM, PHOS in the last 72 hours. Invalid input(s): MAGNES    No results for input(s): AST, ALT, BILIDIR, BILITOT, ALKPHOS in the last 72 hours. No results for input(s): INR in the last 72 hours. No results for input(s): Marvie Forget in the last 72 hours. Urinalysis:      Lab Results   Component Value Date/Time    NITRU Negative 10/18/2022 11:28 AM    WBCUA 7 10/18/2022 11:28 AM    BACTERIA 1+ 10/18/2022 11:28 AM    RBCUA 5 10/18/2022 11:28 AM    BLOODU TRACE 10/18/2022 11:28 AM    SPECGRAV 1.009 10/18/2022 11:28 AM    GLUCOSEU Negative 10/18/2022 11:28 AM       Radiology:  MRI brain with and without contrast   Final Result   Motion challenge evaluation. No acute stroke, midline shift or mass effect. Stable involutional changes of ventricular prominence. Microvascular disease with evidence of encephalomalacia right posterior MCA   distribution. RECOMMENDATIONS:   Unavailable         CT ABDOMEN PELVIS W IV CONTRAST Additional Contrast? None   Final Result   1. No acute abdominal or pelvic findings. 2.  Small to moderate bilateral pleural effusions with associated bibasilar   atelectasis.       3. Moderate hiatal hernia. XR CHEST PORTABLE   Final Result   No acute findings. Mild cardiomegaly         CT HEAD WO CONTRAST   Preliminary Result   No evidence of acute intracranial abnormality on a study limited as described   above.                      Electronically signed by Britt Mckeon MD on 10/22/2022 at 2:02 PM

## 2022-10-22 NOTE — PROGRESS NOTES
Comprehensive Nutrition Assessment    Type and Reason for Visit:  Positive Nutrition Screen    Nutrition Recommendations/Plan:   Continue Dysphagia Pureed with moderately thick liquids. Trial Ensure pudding BID. Malnutrition Assessment:  Malnutrition Status: At risk for malnutrition (Comment) (10/22/22 8533)    Context:  Acute Illness     Findings of the 6 clinical characteristics of malnutrition:  Energy Intake:  Mild decrease in energy intake (Comment)  Weight Loss:  No significant weight loss     Body Fat Loss:  No significant body fat loss     Muscle Mass Loss:  No significant muscle mass loss    Fluid Accumulation:  No significant fluid accumulation     Strength:  Not Performed    Nutrition Assessment:    +nutrition screen. Pt with PMH of dementia and CHF, presented with AMS, possible UTI. SLP saw pt yesterday and upgraded diet to Pureed, moderately thick liquids after being NPO. Noted first meal intakes was >50%. Per EMR, pt with 10# wt fluctuation over the last 6 months. Could be due to some fluid shifts. Will add appropriate ONS at this time. Nutrition Related Findings:    No BM noted- diet was just advanced yesterday; Labs reviewed Wound Type: None       Current Nutrition Intake & Therapies:    Average Meal Intake: 51-75%  Average Supplements Intake: None Ordered  ADULT DIET; Dysphagia - Pureed; Moderately Thick (Honey)    Anthropometric Measures:  Height: 5' 3\" (160 cm)  Ideal Body Weight (IBW): 115 lbs (52 kg)       Current Body Weight: 181 lb (82.1 kg), 157.4 % IBW. Weight Source: Bed Scale  Current BMI (kg/m2): 32.1                          BMI Categories: Obese Class 1 (BMI 30.0-34. 9)    Estimated Daily Nutrient Needs:        Energy (kcal/day): 9128-7309 kcal (15-18 kcal/kg 82 kg CBW)     Protein (g/day): 63-73 gm (1.2-1.4 gm/kg IBW)     Fluid (ml/day): 1 mL/kcal or per provider    Nutrition Diagnosis:   Inadequate oral intake related to cognitive or neurological impairment as evidenced by intake 0-25%    Nutrition Interventions:   Food and/or Nutrient Delivery: Continue Current Diet, Start Oral Nutrition Supplement  Nutrition Education/Counseling: No recommendation at this time  Coordination of Nutrition Care: Continue to monitor while inpatient       Goals:     Goals: PO intake 50% or greater       Nutrition Monitoring and Evaluation:      Food/Nutrient Intake Outcomes: Food and Nutrient Intake, Supplement Intake  Physical Signs/Symptoms Outcomes: Biochemical Data, Weight, Skin, Chewing or Swallowing, Nutrition Focused Physical Findings    Discharge Planning:     Too soon to determine     Ashleigh Jack, [address] [place] [address], [facility] [address] [place] [address]  Contact: [phone]

## 2022-10-22 NOTE — PLAN OF CARE
Problem: Pain  Goal: Verbalizes/displays adequate comfort level or baseline comfort level  10/22/2022 1211 by Kali Cabrera RN  Outcome: Progressing   Pain/discomfort being managed with PRN analgesics per MD orders. Pt able to express presence and absence of pain and rate pain appropriately using numerical scale. Problem: Skin/Tissue Integrity  Goal: Absence of new skin breakdown  Description: 1. Monitor for areas of redness and/or skin breakdown  2. Assess vascular access sites hourly  3. Every 4-6 hours minimum:  Change oxygen saturation probe site  4. Every 4-6 hours:  If on nasal continuous positive airway pressure, respiratory therapy assess nares and determine need for appliance change or resting period. 10/22/2022 1211 by Kali Cabrera RN  Outcome: Progressing  Patient's skin has been assessed per unit protocol and the patient is being repositioned or encouraged to turn every two hours to prevent skin breakdown and promote healing. Problem: Safety - Adult  Goal: Free from fall injury  10/22/2022 1211 by Kali Cabrera RN  Outcome: Progressing   Fall risk assessment completed per unit protocol. Patient's bed is in the lowest position, call light is within reach and the patient's room is free of clutter. The patient has been instructed to call for assistance before getting out of bed or the chair.      Problem: Nutrition Deficit:  Goal: Optimize nutritional status  10/22/2022 1211 by Kali Cabrera RN  Outcome: Progressing   Patient's appetite is low this morning and not willing to take more than three bites of pudding with medications this AM.

## 2022-10-22 NOTE — PROGRESS NOTES
Neurology PROGRESS  Note      History of Present Illness:  Ventura Kilgore is a 80 y.o. female who IS BEING SEEN IN FOLLOW UP FOR ALTERED MENTAL STATUS AND POSSIBLE SEIZURE ACTIVITY. She is also being treated for UTI. NO recurrence of seizure activity      Medications Prior to Admission: melatonin 3 MG TABS tablet, Take 3 mg by mouth nightly as needed for Sleep  metoprolol succinate (TOPROL XL) 25 MG extended release tablet, Take 1 tablet by mouth daily  furosemide (LASIX) 20 MG tablet, Take 1 tablet by mouth every other day  Cranberry 250 MG CHEW, Take 2 tablets by mouth 2 times daily (with meals)  rivaroxaban (XARELTO) 15 MG TABS tablet, Take 1 tablet by mouth daily (with breakfast)  vitamin D 25 MCG (1000 UT) CAPS, Take 1,000 Units by mouth daily  albuterol sulfate HFA (PROVENTIL;VENTOLIN;PROAIR) 108 (90 Base) MCG/ACT inhaler, Inhale 2 puffs into the lungs every 6 hours as needed for Wheezing  mometasone-formoterol (DULERA) 200-5 MCG/ACT inhaler, Inhale 2 puffs into the lungs 2 times daily  Multiple Vitamins-Minerals (MULTIVITAMIN WITH MINERALS) tablet, Take 1 tablet by mouth daily  calcium carbonate (OSCAL) 500 MG TABS tablet, Take 500 mg by mouth daily  Allergies   Allergen Reactions    Chlorpromazine Other (See Comments)    Hydrochlorothiazide Other (See Comments)     Gout         Exam:  Blood pressure (!) 123/58, pulse 67, temperature 97.5 °F (36.4 °C), temperature source Axillary, resp. rate 24, height 5' 3\" (1.6 m), weight 181 lb 7 oz (82.3 kg), SpO2 96 %. Constitutional   Vital signs: BP, HR, and RR reviewed   General sleeping but wakes up to noxious stimuli  Psychiatric: not cooperative with examination, loudly--told me to leave her alone.   Neurologic  Mental status:   orientation to person but did not want to be bothered as I woke her from sleep       Cranial nerves:   CN2: Visual Fields --blinks to threat ,   CN 3,4,6: extraocular muscles tracks --looks around the room,  CN7:face symmetric , Strength: appears to have  Good strength in all 4 extremities --not following commands to test but moves them strongly to stimulation  Sensory: light touch intact in all 4 extremities. Tone: normal in all 4 extremities  Gait: not tested for safety    Labs:   CBC with Differential:    Lab Results   Component Value Date/Time    WBC 5.8 10/21/2022 04:13 AM    RBC 3.91 10/21/2022 04:13 AM    HGB 11.1 10/21/2022 04:13 AM    HCT 33.9 10/21/2022 04:13 AM     10/21/2022 04:13 AM    MCV 86.7 10/21/2022 04:13 AM    MCH 28.4 10/21/2022 04:13 AM    MCHC 32.8 10/21/2022 04:13 AM    RDW 15.4 10/21/2022 04:13 AM    LYMPHOPCT 10.8 10/21/2022 04:13 AM    MONOPCT 15.7 10/21/2022 04:13 AM    BASOPCT 0.5 10/21/2022 04:13 AM    MONOSABS 0.9 10/21/2022 04:13 AM    LYMPHSABS 0.6 10/21/2022 04:13 AM    EOSABS 0.1 10/21/2022 04:13 AM    BASOSABS 0.0 10/21/2022 04:13 AM     CMP:    Lab Results   Component Value Date/Time     10/19/2022 04:26 AM    K 4.8 10/19/2022 04:26 AM     10/19/2022 04:26 AM    CO2 23 10/19/2022 04:26 AM    BUN 20 10/19/2022 04:26 AM    CREATININE 1.2 10/19/2022 04:26 AM    GFRAA >60 04/22/2022 05:00 PM    AGRATIO 1.1 10/18/2022 11:28 AM    LABGLOM 43 10/19/2022 04:26 AM    GLUCOSE 86 10/19/2022 04:26 AM    PROT 6.5 10/18/2022 11:28 AM    LABALBU 3.4 10/18/2022 11:28 AM    CALCIUM 10.1 10/19/2022 04:26 AM    BILITOT 1.1 10/18/2022 11:28 AM    ALKPHOS 155 10/18/2022 11:28 AM    AST 28 10/18/2022 11:28 AM    ALT 17 10/18/2022 11:28 AM         Studies---MRI brain with and without contrast--10/21/2022--Impression  Motion challenge evaluation. No acute stroke, midline shift or mass effect. Stable involutional changes of ventricular prominence. Microvascular disease with evidence of encephalomalacia right posterior MCA  distribution. MRI BRAIN WO CONTRAST--2/11/2022--Impression  Chronic microvascular disease and cerebral atrophy with remote right parietal  lobe infarct.   Prominence of the ventricular system and correlation with symptomatology for  normal pressure hydrocephalus is needed. Fluid in the left mastoid air cells. CT HEAD WO CONTRAST (Preliminary)--10/18/2022--SOFT TISSUES/SKULL:  No acute abnormality of the visualized skull or soft tissues. There are stable postoperative changes related to prior left  parieto-occipital craniotomy. Impression  No evidence of acute intracranial abnormality on a study limited as described above. Impression:  Pamela Mueller is a 80 y.o. female who HAD POSSIBLE SEIZURES SPELL WHILE BEING TREATED FOR UTI. NO recurrence and currently on KEPPRA 250MG BID. MRI BRAIN without acute infarction. EEG ordered on 10/18/2022. Recommendations:  Continue Keppra  Continue meds for UTI  WILL sign off, call with questions or concerns. Thank you for allowing me to participate in the care of your patient with high medical copmlexity requiring a high level of medical decision making. If I can be of further assitance, please do not hesitate to call. A copy of this note was provided for Samantha Hernandez MD.     Electronically signed by Tammi Gongora MD on 10/22/2022 at 12:05 PM     [phone]  Evenings, weekends, and off weeks please discuss neurologist on-call.

## 2022-10-23 LAB
ALBUMIN SERPL-MCNC: 3.1 G/DL (ref 3.4–5)
ANION GAP SERPL CALCULATED.3IONS-SCNC: 12 MMOL/L (ref 3–16)
BUN BLDV-MCNC: 11 MG/DL (ref 7–20)
CALCIUM SERPL-MCNC: 9.7 MG/DL (ref 8.3–10.6)
CHLORIDE BLD-SCNC: 103 MMOL/L (ref 99–110)
CO2: 21 MMOL/L (ref 21–32)
CREAT SERPL-MCNC: 0.8 MG/DL (ref 0.6–1.2)
GFR SERPL CREATININE-BSD FRML MDRD: >60 ML/MIN/{1.73_M2}
GLUCOSE BLD-MCNC: 106 MG/DL (ref 70–99)
GLUCOSE BLD-MCNC: 107 MG/DL (ref 70–99)
GLUCOSE BLD-MCNC: 114 MG/DL (ref 70–99)
GLUCOSE BLD-MCNC: 159 MG/DL (ref 70–99)
HCT VFR BLD CALC: 38.2 % (ref 36–48)
HEMOGLOBIN: 12.4 G/DL (ref 12–16)
MCH RBC QN AUTO: 28 PG (ref 26–34)
MCHC RBC AUTO-ENTMCNC: 32.4 G/DL (ref 31–36)
MCV RBC AUTO: 86.4 FL (ref 80–100)
PDW BLD-RTO: 14.8 % (ref 12.4–15.4)
PERFORMED ON: ABNORMAL
PHOSPHORUS: 2.4 MG/DL (ref 2.5–4.9)
PLATELET # BLD: 156 K/UL (ref 135–450)
PMV BLD AUTO: 9.5 FL (ref 5–10.5)
POTASSIUM SERPL-SCNC: 3.6 MMOL/L (ref 3.5–5.1)
RBC # BLD: 4.42 M/UL (ref 4–5.2)
SODIUM BLD-SCNC: 136 MMOL/L (ref 136–145)
WBC # BLD: 6.6 K/UL (ref 4–11)

## 2022-10-23 PROCEDURE — 6370000000 HC RX 637 (ALT 250 FOR IP): Performed by: HOSPITALIST

## 2022-10-23 PROCEDURE — 80069 RENAL FUNCTION PANEL: CPT

## 2022-10-23 PROCEDURE — 94760 N-INVAS EAR/PLS OXIMETRY 1: CPT

## 2022-10-23 PROCEDURE — 85027 COMPLETE CBC AUTOMATED: CPT

## 2022-10-23 PROCEDURE — 36415 COLL VENOUS BLD VENIPUNCTURE: CPT

## 2022-10-23 PROCEDURE — 2580000003 HC RX 258: Performed by: INTERNAL MEDICINE

## 2022-10-23 PROCEDURE — 94640 AIRWAY INHALATION TREATMENT: CPT

## 2022-10-23 PROCEDURE — 2060000000 HC ICU INTERMEDIATE R&B

## 2022-10-23 RX ADMIN — SODIUM CHLORIDE, PRESERVATIVE FREE 10 ML: 5 INJECTION INTRAVENOUS at 08:42

## 2022-10-23 RX ADMIN — SODIUM CHLORIDE, PRESERVATIVE FREE 10 ML: 5 INJECTION INTRAVENOUS at 20:56

## 2022-10-23 RX ADMIN — LEVETIRACETAM 250 MG: 500 TABLET, FILM COATED ORAL at 08:39

## 2022-10-23 RX ADMIN — METOPROLOL TARTRATE 12.5 MG: 25 TABLET, FILM COATED ORAL at 08:39

## 2022-10-23 RX ADMIN — METOPROLOL TARTRATE 12.5 MG: 25 TABLET, FILM COATED ORAL at 20:54

## 2022-10-23 RX ADMIN — Medication 3 MG: at 20:55

## 2022-10-23 RX ADMIN — RIVAROXABAN 15 MG: 15 TABLET, FILM COATED ORAL at 08:39

## 2022-10-23 RX ADMIN — LEVETIRACETAM 250 MG: 500 TABLET, FILM COATED ORAL at 20:55

## 2022-10-23 RX ADMIN — FUROSEMIDE 20 MG: 20 TABLET ORAL at 08:40

## 2022-10-23 RX ADMIN — MOMETASONE FUROATE AND FORMOTEROL FUMARATE DIHYDRATE 2 PUFF: 200; 5 AEROSOL RESPIRATORY (INHALATION) at 09:11

## 2022-10-23 ASSESSMENT — PAIN SCALES - WONG BAKER
WONGBAKER_NUMERICALRESPONSE: 0

## 2022-10-23 NOTE — PROGRESS NOTES
Son at bedside and concerned about pt`s drowsiness. Son voiced that pt was alert and feeding self on Friday. Dr Gwen Pierre notified and said ok to hold Keppra and ask Neuro for an alternate.  Electronically signed by Nimco Gonzales RN on 10/23/2022 at 5:56 PM

## 2022-10-23 NOTE — PROGRESS NOTES
Hospitalist Progress Note    Patient:  Tiffani Johnson  Unit/Bed:G6W-0674/5102-01   YOB: 1931       MRN: [id number] Acct: [de-identified]  PCP: Tejinder Duffy MD    Date of Admission: 10/18/2022  --------------------------    Chief Complaint:     Confusion    Hospital Course:     Tiffani Johnson is a 80 y.o. female hospitalized on 10/18/2022 increased confusion, patient has history of dementia. Assessment/plan:     Acute toxic metabolic encephalopathy, superimposing dementia    Mental status returned to waxing and waning but overall improving. .           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 ?  UTI, history of MDRO  Completed cefepime course seems to tolerate Keppra so far      History of atrial fibrillation  Anticoagulated with Xarelto      Hypothermic, multifactorial  Resolved      Code Status: DNR-CCA         DVT prophylaxis: Lovenox     Disposition: Hopefully home tomorrow with home health care y            Discussed with RN      ----------------      Subjective:    patient seen and examined  Not very cooperative today  Nursing staff concerned about the patient mobility  Last PT and OT indicate that the patient needs max assistance      Diet: ADULT DIET; Dysphagia - Pureed; Moderately Thick (Honey)  ADULT ORAL NUTRITION SUPPLEMENT; Breakfast, Lunch; Fortified Pudding Oral Supplement    OBJECTIVE     Exam:  BP (!) 105/51   Pulse 79   Temp (!) 96 °F (35.6 °C) (Axillary)   Resp 19   Ht 5' 3\" (1.6 m)   Wt 181 lb 7 oz (82.3 kg)   SpO2 94%   BMI 32.14 kg/m²      Overall unchanged exam finding as documented below  Gen: A mental status waxing and waning  Head: Normocephalic. Atraumatic. Eyes: Conjunctivae/ clear, reduced vision   ENT: Oral mucosa moist  Neck: No JVD. No obvious thyromegaly. CVS: Nml S1S2,   murmur     Pulmomary: [address] air entry.   Gastrointestinal: Soft, no grimace. Musculoskeletal: Slight edema  Neuro: Alert   moving all extremities  Psychiatry: Less agitated more cooperative today. Medications:  Reviewed    Infusion Medications    dextrose      sodium chloride       Scheduled Medications    furosemide  20 mg Oral Every Other Day    mometasone-formoterol  2 puff Inhalation BID    rivaroxaban  15 mg Oral Daily with breakfast    metoprolol tartrate  12.5 mg Oral BID    levETIRAcetam  250 mg Oral BID    melatonin  3 mg Oral Nightly    sodium chloride flush  5-40 mL IntraVENous 2 times per day     PRN Meds: glucose, dextrose bolus **OR** dextrose bolus, glucagon (rDNA), dextrose, albuterol sulfate HFA, sodium chloride flush, sodium chloride, ondansetron **OR** ondansetron, acetaminophen **OR** acetaminophen, polyethylene glycol      Intake/Output Summary (Last 24 hours) at 10/23/2022 1520  Last data filed at 10/23/2022 1421  Gross per 24 hour   Intake 120 ml   Output --   Net 120 ml               Labs:   Recent Labs     10/21/22  0413 10/23/22  0426   WBC 5.8 6.6   HGB 11.1* 12.4   HCT 33.9* 38.2    156       Recent Labs     10/23/22  0426      K 3.6      CO2 21   BUN 11   CREATININE 0.8   CALCIUM 9.7   PHOS 2.4*       No results for input(s): AST, ALT, BILIDIR, BILITOT, ALKPHOS in the last 72 hours. No results for input(s): INR in the last 72 hours. No results for input(s): Pamla Clines in the last 72 hours. Urinalysis:      Lab Results   Component Value Date/Time    NITRU Negative 10/18/2022 11:28 AM    WBCUA 7 10/18/2022 11:28 AM    BACTERIA 1+ 10/18/2022 11:28 AM    RBCUA 5 10/18/2022 11:28 AM    BLOODU TRACE 10/18/2022 11:28 AM    SPECGRAV 1.009 10/18/2022 11:28 AM    GLUCOSEU Negative 10/18/2022 11:28 AM       Radiology:  MRI brain with and without contrast   Final Result   Motion challenge evaluation. No acute stroke, midline shift or mass effect. Stable involutional changes of ventricular prominence. Microvascular disease with evidence of encephalomalacia right posterior MCA   distribution. RECOMMENDATIONS:   Unavailable         CT ABDOMEN PELVIS W IV CONTRAST Additional Contrast? None   Final Result   1. No acute abdominal or pelvic findings. 2.  Small to moderate bilateral pleural effusions with associated bibasilar   atelectasis. 3.  Moderate hiatal hernia. XR CHEST PORTABLE   Final Result   No acute findings. Mild cardiomegaly         CT HEAD WO CONTRAST   Preliminary Result   No evidence of acute intracranial abnormality on a study limited as described   above.                      Electronically signed by Jane Bowie MD on 10/23/2022 at 3:20 PM

## 2022-10-23 NOTE — PLAN OF CARE
Problem: Discharge Planning  Goal: Discharge to home or other facility with appropriate resources  Outcome: Progressing  Flowsheets (Taken 10/23/2022 0727)  Discharge to home or other facility with appropriate resources: Identify barriers to discharge with patient and caregiver     Problem: Pain  Goal: Verbalizes/displays adequate comfort level or baseline comfort level  Outcome: Progressing     Problem: Skin/Tissue Integrity  Goal: Absence of new skin breakdown  Description: 1. Monitor for areas of redness and/or skin breakdown  2. Assess vascular access sites hourly  3. Every 4-6 hours minimum:  Change oxygen saturation probe site  4. Every 4-6 hours:  If on nasal continuous positive airway pressure, respiratory therapy assess nares and determine need for appliance change or resting period.   Outcome: Progressing     Problem: Safety - Adult  Goal: Free from fall injury  Outcome: Progressing     Problem: ABCDS Injury Assessment  Goal: Absence of physical injury  Outcome: Progressing     Problem: Respiratory - Adult  Goal: Achieves optimal ventilation and oxygenation  Outcome: Progressing  Flowsheets (Taken 10/23/2022 0727)  Achieves optimal ventilation and oxygenation: Assess for changes in respiratory status     Problem: Metabolic/Fluid and Electrolytes - Adult  Goal: Electrolytes maintained within normal limits  Outcome: Progressing  Flowsheets (Taken 10/23/2022 0727)  Electrolytes maintained within normal limits: Monitor labs and assess patient for signs and symptoms of electrolyte imbalances     Problem: Nutrition Deficit:  Goal: Optimize nutritional status  Outcome: Progressing

## 2022-10-24 LAB
GLUCOSE BLD-MCNC: 106 MG/DL (ref 70–99)
GLUCOSE BLD-MCNC: 82 MG/DL (ref 70–99)
GLUCOSE BLD-MCNC: 87 MG/DL (ref 70–99)
GLUCOSE BLD-MCNC: 88 MG/DL (ref 70–99)
GLUCOSE BLD-MCNC: 98 MG/DL (ref 70–99)
PERFORMED ON: ABNORMAL
PERFORMED ON: NORMAL

## 2022-10-24 PROCEDURE — 2580000003 HC RX 258: Performed by: HOSPITALIST

## 2022-10-24 PROCEDURE — 94760 N-INVAS EAR/PLS OXIMETRY 1: CPT

## 2022-10-24 PROCEDURE — 97166 OT EVAL MOD COMPLEX 45 MIN: CPT

## 2022-10-24 PROCEDURE — 95819 EEG AWAKE AND ASLEEP: CPT

## 2022-10-24 PROCEDURE — 97530 THERAPEUTIC ACTIVITIES: CPT | Performed by: PHYSICAL THERAPIST

## 2022-10-24 PROCEDURE — 2060000000 HC ICU INTERMEDIATE R&B

## 2022-10-24 PROCEDURE — 2580000003 HC RX 258: Performed by: INTERNAL MEDICINE

## 2022-10-24 PROCEDURE — 94640 AIRWAY INHALATION TREATMENT: CPT

## 2022-10-24 RX ORDER — DEXTROSE AND SODIUM CHLORIDE 5; .9 G/100ML; G/100ML
INJECTION, SOLUTION INTRAVENOUS CONTINUOUS
Status: DISCONTINUED | OUTPATIENT
Start: 2022-10-24 | End: 2022-10-25

## 2022-10-24 RX ADMIN — SODIUM CHLORIDE, PRESERVATIVE FREE 10 ML: 5 INJECTION INTRAVENOUS at 11:37

## 2022-10-24 RX ADMIN — MOMETASONE FUROATE AND FORMOTEROL FUMARATE DIHYDRATE 2 PUFF: 200; 5 AEROSOL RESPIRATORY (INHALATION) at 09:08

## 2022-10-24 RX ADMIN — MOMETASONE FUROATE AND FORMOTEROL FUMARATE DIHYDRATE 2 PUFF: 200; 5 AEROSOL RESPIRATORY (INHALATION) at 19:22

## 2022-10-24 RX ADMIN — DEXTROSE AND SODIUM CHLORIDE: 5; 900 INJECTION, SOLUTION INTRAVENOUS at 16:02

## 2022-10-24 ASSESSMENT — PAIN SCALES - WONG BAKER
WONGBAKER_NUMERICALRESPONSE: 0

## 2022-10-24 NOTE — PROCEDURES
[address] [place] [address]                          ELECTROENCEPHALOGRAM REPORT    PATIENT NAME: Nella Horner                     :        1931  MED REC NO:   [id number]                          ROOM:       5102  ACCOUNT NO:   [de-identified]                           ADMIT DATE: 10/18/2022  PROVIDER:     Vernell Munroe DO    DATE OF EEG:  10/24/2022    REFERRING PROVIDER:  Marcellus Zaragoza DO.    REASON FOR STUDY:  New-onset seizure. BRIEF HISTORY AND NEUROLOGIC FINDINGS:  The patient was a 60-year-old  female being evaluated for reported new-onset seizure. MEDICATIONS:  The patient's centrally acting medications listed include  Keppra and melatonin. EEG FINDINGS:  This is a 20-channel digital EEG performed utilizing  bipolar and referential montages. Wakefulness, drowsiness and sleep  were obtained during the recording. During maximal wakefulness, there  was a moderate voltage, relatively symmetric, disorganized though  reactive 6-7 Hz posterior background rhythm. The anterior background  consists of low to moderate amplitude mixed frequencies. Drowsiness is  manifested by attenuation of the waking background rhythms. Sleep was  manifested by poorly formed K-complexes. Occasional sharply contoured theta was noted in the right centrotemporal   region. At times, these appeared to be consistent with focal  epileptiform discharges. Significant myogenic and movement artifact was  also noted at various times during the recording which often obscured  some of the underlying background rhythms. Photic stimulation was performed at various flash frequencies and did  not evoke any significant posterior driving response. Hyperventilation  exercise was not performed due to the patient's age and clinical  history. A 1-channel EKG rhythm strip was reviewed and showed no obvious cardiac  dysrhythmias.     EEG DIAGNOSIS:  This EEG is abnormal due to the presence of focal sharp  waves in the right centrotemporal region as well as mild background  slowing and disorganization. The focal sharp waves, though not entirely specific, do suggest an area  of focal cortical irritability in the involved region. The background  slowing and disorganization is nonspecific and consistent with at least  a mild encephalopathy. This may be seen in multiple settings including  toxic, metabolic, or degenerative conditions as well as with medication  effect. No definite electrographic seizures were recorded during the  study. Clinical correlation is advised.         Conchis Knox DO    D: 10/24/2022 15:08:45       T: 10/24/2022 15:11:09     [id number]  Job#: [id number]     Doc#: [id number]    CC:

## 2022-10-24 NOTE — CONSULTS
PALLIATIVE MEDICINE CONSULTATION     Patient name:Shima Serrano   WXX:[id number]    WQQ:[birth year]  Room/Bed:U7X-7408/5102-01   LOS: 6 days         Date of consult:10/24/2022    Consult Information  Palliative Medicine Consult performed by: TASHI Harding - CNP  CNP    Consults - [facility]    ASSESSMENT/RECOMMENDATIONS     80 y.o. female with altered mental status and possible seizure activity with a history of dementia. Symptom Management:  Altered mental status - Secondary to UTI, dementia likely contributing. Was showing improvement, Keppra now on hold. Discussed the dementia disease process with son, Mardmitry Gonzalez. Will continue antibiotics. Debility - 24/7 care provided by son and significant other. Discussed home support with Palliative care referral, son feels as though insurance did not cover in the past.   Goals of Care - Discussed with anna Wyattcarolyn Gonzalez. Does not want CPR, however is okay with patient being intubated \"for a minute so that I can make a decision on what to do\". Educated on DNR/CC vs DNR/CCA. Son reports they have the appropriate form in the home. Patient/Family Goals of Care :    Discussed with anna, Wyattcarolyn Gonzalez. Does not want CPR, however is okay with patient being intubated \"for a minute so that I can make a decision on what to do\". Educated on DNR/CC vs DNR/CCA. Son reports they have the appropriate form in the home. Disposition/Discharge Plan:   Home with 24/7 support     Advance Directives:  Surrogate Decision Maker: anna Bundy. Code status:  DNR-CCA    Case discussed with: anna Gonzalez and attending. Thank you for allowing us to participate in the care of this patient. HISTORY     CC: Altered mental status  HPI: The patient is a 80 y.o. female with history of vascular dementia, CHF, HTN, atrial fibrillation, and asthma. Presented to the ED with altered mental status, found to have UTI. Son also noted possible seizure activity lasting approx 1 minute. Neurology started Keppra, patient has been more lethargic since. Medication now on hold. Son is hopefully the patient will return to her baseline which is more alert and able to feed herself. Palliative Medicine SymptomScreening/ROS:    Review of Systems    Patient unable to complete full ROS due to current cognitive status. Information that is obtained from nursing and chart. Palliative Performance Scale:     [] 60%  Amb reduced; Sig dz. Can't do hobbies/housework; Intake normal or reduced, Occasional assist; LOC full/confusion   [] 50%  Mainly sit/lie; Extensive disease. Mainly assist, Intake normal or reduced; Occasional assist; LOC full/confusion   [x] 40%  Mainly in bed; Extensive disease; Mainly assist; Intake normal or reduced; Occasional assist; LOC full/confusion   [] 30%  Bed bound, Extensive disease; Total care; Intake reduced; LOC full/confusion   [] 20%  Bed bound; Extensive disease; Total care; Intake minimal; Drowsy/coma   [] 10%  Bed bound; Extensive disease; Total care; Mouth care only; Drowsy/coma   []  0%   Death       Home med list and hospital medications reviewed in chart as of 10/24/2022     EXAM     Vitals:    10/24/22 1129   BP: (!) 142/88   Pulse: 90   Resp: 15   Temp: 98.7 °F (37.1 °C)   SpO2: 93%       Physical Exam  Constitutional:       General: She is sleeping. Appearance: Normal appearance. Cardiovascular:      Rate and Rhythm: Normal rate. Pulmonary:      Effort: Pulmonary effort is normal.   Abdominal:      Palpations: Abdomen is soft. Musculoskeletal:      Right lower leg: No edema. Left lower leg: No edema. Neurological:      Mental Status: She is lethargic.               Current labs in the epic chart reviewed as of 10/24/2022   Review of previous notes, admits, labs, radiology and testing relevant to this consult done in this chart today 10/24/2022      Total time: 55 minutes  >50% of time spent counseling patient at bedside or POA/family member if applicable , reviewing information and discussing care, coordinating with care team  Signed By: Electronically signed by TASHI Layne CNP on 10/24/2022 at 2:29 PM  Palliative Medicine   [phone]    October 24, 2022

## 2022-10-24 NOTE — PROGRESS NOTES
Occupational Therapy  Facility/Department: [address] [facility]  Occupational Therapy Initial Assessment    Name: Lurdes Mcclure  : 1931  MRN: [id number]  Date of Service: 10/24/2022    Discharge Recommendations:  Patient would benefit from continued therapy after discharge, 3-5 sessions per week  OT Equipment Recommendations  Other: defer to [address] [place] [address] scored a 6/ on the AM-PAC ADL Inpatient form. Current research shows that an AM-PAC score of 17 or less is typically not associated with a discharge to the patient's home setting. Based on the patient's AM-PAC score and their current ADL deficits, it is recommended that the patient have 3-5 sessions per week of Occupational Therapy at d/c to increase the patient's independence. Please see assessment section for further patient specific details. If patient discharges prior to next session this note will serve as a discharge summary. Please see below for the latest assessment towards goals. Patient Diagnosis(es): The primary encounter diagnosis was Altered mental status, unspecified altered mental status type. A diagnosis of Hypothermia, initial encounter was also pertinent to this visit. 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 Past Surgical History:  has no past surgical history on file. Assessment   Performance deficits / Impairments: Decreased functional mobility ; Decreased strength;Decreased endurance;Decreased ADL status; Decreased safe awareness;Decreased high-level IADLs;Decreased balance;Decreased cognition;Decreased ROM  Assessment: 79 y/o female admitted 10/18/2022 with AMS. Per chart, PTA pt lives at home with family, requires assist with some ADLs, and can ambulate with walker. Today, pt disoriented x 4 and does not follow commands.  Pt required dep x 2 for bed mobility and dep assist to maintain brief static sitting balance in attempts to awake pt up. Anticipate pt will require max/dep assist for all ADLs. Pt will benefit from skilled therapy prior to returning home. Prognosis: Fair  Decision Making: Medium Complexity  REQUIRES OT FOLLOW-UP: Yes  Activity Tolerance  Activity Tolerance: Treatment limited secondary to decreased cognition        Plan   Occupational Therapy Plan  Times Per Week: 3-5  Current Treatment Recommendations: Strengthening, ROM, Balance training, Functional mobility training, Endurance training, Cognitive reorientation, Gait training, Self-Care / ADL     Restrictions  Restrictions/Precautions  Restrictions/Precautions: Fall Risk, Contact Precautions  Position Activity Restriction  Other position/activity restrictions: dementia    Subjective   General  Chart Reviewed: Yes  Patient assessed for rehabilitation services?: Yes  Additional Pertinent Hx: Per H&P: \"80 y.o. female who presented to [facility] with altered mental status. Patient was brought in by her son after she has had increasing confusion at home. Patient has some baseline dementia but over the last several days she has been much more disoriented. She has been yelling and hard to control. This is not typical for her. Family reports that this is usually the result of urinary tract infection. \"  Family / Caregiver Present: No  Referring Practitioner: Cata Mo MD  Diagnosis: AMS, possible UTI  Subjective  Subjective: Pt seen bedside, lethargic, and does not follow commands  General Comment  Comments: Per RN ok for therapy     Social/Functional History  Social/Functional History  Lives With: Family (Son Javier Espinosa) and his partner Britton Donnelly))  Type of Home: House  Home Layout: One level  Home Access: Stairs to enter with rails  Entrance Stairs - Number of Steps: 4-5  Bathroom Shower/Tub: Walk-in shower  Bathroom Toilet: Standard  Bathroom Equipment: Grab bars in shower, Shower chair  Home Equipment: Walker, 4 wheeled, Marla Michael  ADL Assistance:  (Son assists with bathing/dressing/toileting - patient feeds self)  Homemaking Responsibilities: No (son completes IADLs)  Ambulation Assistance: Needs assistance (with rollator)  Transfer Assistance: Needs assistance  Active : No  Additional Comments: above info from previous therapy note- pt has dementia so unable to verify       Objective      Safety Devices  Type of Devices: Bed alarm in place; Left in bed;Nurse notified;Call light within reach; All bandar prominences offloaded           ADL  Additional Comments: Anticipate pt will be dep for ADLs based on cognition observed     Activity Tolerance  Activity Tolerance: Patient limited by fatigue;Treatment limited secondary to decreased cognition  Activity Tolerance Comments: pt very lethargic this date    Bed mobility  Supine to Sit: Dependent/Total;2 Person assistance  Sit to Supine: 2 Person assistance;Dependent/Total    Pt sat EOB briefly (less than 30 sec) with dep A in attempts to engage/awake patient. Pt returned to supine and positioned for comfort. RN aware    Transfers  Transfer Comments: defer 2/2 cognition    Vision  Vision: Within Functional Limits  Hearing  Hearing: Exceptions to [facility]  Hearing Exceptions: Hard of hearing/hearing concerns    Cognition  Cognition Comment: pt very lethargic this date; does not follow commands  Orientation  Overall Orientation Status: Impaired  Orientation Level: Disoriented X4                  Education Given To: Patient;Caregiver  Education Provided: Role of Therapy;Plan of Care;Transfer Training  Education Method: Demonstration;Verbal  Barriers to Learning: Cognition  Education Outcome: Unable to verbalize; Unable to demonstrate understanding    LUE AROM (degrees)  LUE General AROM: unable to assess 2/2 cognition and pt resisting any UE ROM  RUE AROM (degrees)  RUE General AROM: unable to assess 2/2 cognition and pt resisting any UE ROM AM-PAC Score  AM-PAC Inpatient Daily Activity Raw Score: 6 (10/24/22 1146)  AM-PAC Inpatient ADL T-Scale Score : 17.07 (10/24/22 1146)  ADL Inpatient CMS 0-100% Score: 100 (10/24/22 1146)  ADL Inpatient CMS G-Code Modifier : CN (10/24/22 1146)    Goals  Short Term Goals  Time Frame for Short Term Goals: Prior to DC: Short Term Goal 1: Pt will complete bed mobility with mod A in prep for ADL transfers  Short Term Goal 2: Pt will tolerate sitting EOB ~ 3 min with min A  Short Term Goal 3: Pt will complete sit <> stand transfer with STEDY and mod A x 2  Short Term Goal 4: Pt will complete grooming tasks with set up  Patient Goals   Patient goals : no goal stated       Therapy Time   Individual Concurrent Group Co-treatment   Time In 1125         Time Out 1145         Minutes 20         Timed Code Treatment Minutes: 20 Minutes     This note to serve as OT d/c summary if pt is d/c-ed prior to next therapy session.     Arvin Ames, OTR/L

## 2022-10-24 NOTE — PROGRESS NOTES
Speech Language Pathology    Dysphagia treatment attempted. Patient lethargic unable to be adequate aroused for safe PO at this time. RN reports poor level of alertness this date. ST to re-attempt at a later date unless otherwise notified. Thank you. Kanchan Thomas, Jazzy Nicolas, #[id number]  Speech-Language Pathologist  Portable phone: [phone]

## 2022-10-24 NOTE — PLAN OF CARE
Problem: Pain  Goal: Verbalizes/displays adequate comfort level or baseline comfort level  10/24/2022 0008 by Bry Wells RN  Outcome: Not Progressing  10/23/2022 1631 by Eddie Vasquez RN  Outcome: Progressing     Problem: Skin/Tissue Integrity  Goal: Absence of new skin breakdown  Description: 1. Monitor for areas of redness and/or skin breakdown  2. Assess vascular access sites hourly  3. Every 4-6 hours minimum:  Change oxygen saturation probe site  4. Every 4-6 hours:  If on nasal continuous positive airway pressure, respiratory therapy assess nares and determine need for appliance change or resting period. 10/24/2022 0008 by Bry Wells RN  Outcome: Not Progressing  10/23/2022 1631 by Eddie Vasquez RN  Outcome: Progressing     Problem: Safety - Adult  Goal: Free from fall injury  10/24/2022 0008 by Bry Wells RN  Outcome: Not Progressing  10/23/2022 1631 by Eddie Vasquez RN  Outcome: Progressing     Problem: ABCDS Injury Assessment  Goal: Absence of physical injury  10/24/2022 0008 by Bry Wells RN  Outcome: Not Progressing  10/23/2022 1631 by Eddie Vasquez RN  Outcome: Progressing     Problem: Discharge Planning  Goal: Discharge to home or other facility with appropriate resources  10/24/2022 0008 by Bry Wells RN  Outcome: Not Progressing  10/23/2022 1631 by Eddie Vasquez RN  Outcome: Progressing  Flowsheets (Taken 10/23/2022 6478)  Discharge to home or other facility with appropriate resources: Identify barriers to discharge with patient and caregiver     Problem: Pain  Goal: Verbalizes/displays adequate comfort level or baseline comfort level  10/24/2022 0008 by Bry Wells RN  Outcome: Not Progressing  10/23/2022 1631 by Eddie Vasquez RN  Outcome: Progressing     Problem: Skin/Tissue Integrity  Goal: Absence of new skin breakdown  Description: 1. Monitor for areas of redness and/or skin breakdown  2. Assess vascular access sites hourly  3.   Every 4-6 hours minimum: Change oxygen saturation probe site  4. Every 4-6 hours:  If on nasal continuous positive airway pressure, respiratory therapy assess nares and determine need for appliance change or resting period.   10/24/2022 0008 by Michael Mcfarland RN  Outcome: Not Progressing  10/23/2022 1631 by Mattie Shanks RN  Outcome: Progressing     Problem: Safety - Adult  Goal: Free from fall injury  10/24/2022 0008 by Michael Mcfarland RN  Outcome: Not Progressing  10/23/2022 1631 by Mattie Shanks RN  Outcome: Progressing     Problem: ABCDS Injury Assessment  Goal: Absence of physical injury  10/24/2022 0008 by Michael Mcfarland RN  Outcome: Not Progressing  10/23/2022 1631 by Mattie Shanks RN  Outcome: Progressing     Problem: Respiratory - Adult  Goal: Achieves optimal ventilation and oxygenation  10/24/2022 0008 by Michael Mcfarland RN  Outcome: Not Progressing  10/23/2022 1631 by Mattie Shanks RN  Outcome: Progressing  Flowsheets (Taken 10/23/2022 0727)  Achieves optimal ventilation and oxygenation: Assess for changes in respiratory status     Problem: Metabolic/Fluid and Electrolytes - Adult  Goal: Electrolytes maintained within normal limits  10/24/2022 0008 by Michael Mcfarland RN  Outcome: Not Progressing  10/23/2022 1631 by Mattie Shanks RN  Outcome: Progressing  Flowsheets (Taken 10/23/2022 8736)  Electrolytes maintained within normal limits: Monitor labs and assess patient for signs and symptoms of electrolyte imbalances     Problem: Nutrition Deficit:  Goal: Optimize nutritional status  10/24/2022 0008 by Michael Mcfarland RN  Outcome: Not Progressing  10/23/2022 1631 by Mattie Shanks RN  Outcome: Progressing

## 2022-10-24 NOTE — ACP (ADVANCE CARE PLANNING)
Advance Care Planning     Advance Care Planning Activator (Inpatient)  Conversation Note      Date of ACP Conversation: 10/24/2022     Conversation Conducted with:  Healthcare Decision Maker: Next of Kin by law (only applies in absence of above) (name) anna Lynn Activator: Lary David, [address] Maker:     Current Designated Health Care Decision Maker:     Primary Decision MakerCmaddy Alek  Grace - [phone]    Care Preferences    Ventilation: \"If you were in your present state of health and suddenly became very ill and were unable to breathe on your own, what would your preference be about the use of a ventilator (breathing machine) if it were available to you? \"      Would the patient desire the use of ventilator (breathing machine)?: no    \"If your health worsens and it becomes clear that your chance of recovery is unlikely, what would your preference be about the use of a ventilator (breathing machine) if it were available to you? \"     Would the patient desire the use of ventilator (breathing machine)?: No      Resuscitation  \"CPR works best to restart the heart when there is a sudden event, like a heart attack, in someone who is otherwise healthy. Unfortunately, CPR does not typically restart the heart for people who have serious health conditions or who are very sick. \"    \"In the event your heart stopped as a result of an underlying serious health condition, would you want attempts to be made to restart your heart (answer \"yes\" for attempt to resuscitate) or would you prefer a natural death (answer \"no\" for do not attempt to resuscitate)? \" no       [] Yes   [x] No   Educated Patient / Aristeo Chang regarding differences between Advance Directives and portable DNR orders.     Length of ACP Conversation in minutes:      Conversation Outcomes:  [x] ACP discussion completed  [] Existing advance directive reviewed with patient; no changes to patient's previously recorded wishes  [] New Advance Directive completed  [] Portable Do Not Rescitate prepared for Provider review and signature  [] POLST/POST/MOLST/MOST prepared for Provider review and signature  Electronically signed by Sven Prescott RN Case Management 10/20/2022

## 2022-10-24 NOTE — PROGRESS NOTES
Hospitalist Progress Note    Patient:  Griselda Vicente  Unit/Bed:B2C-0792/5102-01   YOB: 1931       MRN: [id number] Acct: [de-identified]  PCP: Lin Dove MD    Date of Admission: 10/18/2022  --------------------------    Chief Complaint:     Confusion    Hospital Course:     Griselda Vicente is a 80 y.o. female hospitalized on 10/18/2022 increased confusion, patient has history of dementia. Patient evaluated by neurology service in consultation as she was noted to have seizure-like activity in the ED. MRI of the brain without acute finding, she was treated with small dose of Keppra. Her hospitalization prolonged by waxing and waning delirium. Keppra discontinued. Awaiting improvement of mental status. Assessment/plan:     Acute toxic metabolic encephalopathy, superimposing dementia    Unfortunately mental status continues to wax and wane.  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 ?  UTI, history of MDRO  Completed cefepime course seems to tolerate Keppra so far      History of atrial fibrillation  Anticoagulated with Xarelto      Hypothermic, multifactorial  Resolved        Code Status: DNR-CCA         DVT prophylaxis: Lovenox     Disposition: Provement of mental status is the main barrier for discharge discussed with the patient's son    ----------------      Subjective:   Patient seen and examined  Somnolent today. Diet: ADULT DIET; Dysphagia - Pureed; Moderately Thick (Honey)  ADULT ORAL NUTRITION SUPPLEMENT; Breakfast, Lunch;  Fortified Pudding Oral Supplement    OBJECTIVE     Exam:  BP (!) 142/88   Pulse 90   Temp 98.7 °F (37.1 °C) (Axillary)   Resp 15   Ht 5' 3\" (1.6 m)   Wt 175 lb 0.7 oz (79.4 kg)   SpO2 93%   BMI 31.01 kg/m² 11:28 AM       Radiology:  MRI brain with and without contrast   Final Result   Motion challenge evaluation. No acute stroke, midline shift or mass effect. Stable involutional changes of ventricular prominence. Microvascular disease with evidence of encephalomalacia right posterior MCA   distribution. RECOMMENDATIONS:   Unavailable         CT ABDOMEN PELVIS W IV CONTRAST Additional Contrast? None   Final Result   1. No acute abdominal or pelvic findings. 2.  Small to moderate bilateral pleural effusions with associated bibasilar   atelectasis. 3.  Moderate hiatal hernia. XR CHEST PORTABLE   Final Result   No acute findings. Mild cardiomegaly         CT HEAD WO CONTRAST   Final Result   No evidence of acute intracranial abnormality on a study limited as described   above.                      Electronically signed by Reinaldo Cuevas MD on 10/24/2022 at 3:35 PM

## 2022-10-24 NOTE — PLAN OF CARE
Problem: Pain  Goal: Verbalizes/displays adequate comfort level or baseline comfort level  10/24/2022 1316 by Guadalupe Burger RN  Outcome: Progressing     Problem: Skin/Tissue Integrity  Goal: Absence of new skin breakdown  Description: 1. Monitor for areas of redness and/or skin breakdown  2. Assess vascular access sites hourly  3. Every 4-6 hours minimum:  Change oxygen saturation probe site  4. Every 4-6 hours:  If on nasal continuous positive airway pressure, respiratory therapy assess nares and determine need for appliance change or resting period. 10/24/2022 1316 by Guadalupe Burger RN  Outcome: Progressing     Problem: Safety - Adult  Goal: Free from fall injury  10/24/2022 1316 by Guadalupe Burger RN  Outcome: Progressing     Problem: Discharge Planning  Goal: Discharge to home or other facility with appropriate resources  10/24/2022 0008 by Chelsea Lewis RN  Outcome: Not Progressing     Problem: Pain  Goal: Verbalizes/displays adequate comfort level or baseline comfort level  10/24/2022 1316 by Guadalupe Burger RN  Outcome: Progressing  10/24/2022 0008 by Chelsea Lewis RN  Outcome: Not Progressing     Problem: Skin/Tissue Integrity  Goal: Absence of new skin breakdown  Description: 1. Monitor for areas of redness and/or skin breakdown  2. Assess vascular access sites hourly  3. Every 4-6 hours minimum:  Change oxygen saturation probe site  4. Every 4-6 hours:  If on nasal continuous positive airway pressure, respiratory therapy assess nares and determine need for appliance change or resting period.   10/24/2022 1316 by Guadalupe Burger RN  Outcome: Progressing  10/24/2022 0008 by Chelsea Lewis RN  Outcome: Not Progressing     Problem: Safety - Adult  Goal: Free from fall injury  10/24/2022 1316 by Guadalupe Burger RN  Outcome: Progressing  10/24/2022 0008 by Chelsea Lewis RN  Outcome: Not Progressing     Problem: ABCDS Injury Assessment  Goal: Absence of physical injury  10/24/2022 0008 by Chelsea Lewis RN  Outcome: Not

## 2022-10-24 NOTE — PROGRESS NOTES
Physical Therapy  Facility/Department: [address] PROGRESSIVE CARE  Physical Therapy Treatment Note    Name: Daniel Montoya  : 1931  MRN: [id number]  Date of Service: 10/24/2022    Discharge Recommendations:  Patient would benefit from continued therapy after discharge (3-5x/wk)   PT Equipment Recommendations  Other: defer to next level of care    Daniel Montoya scored a 6/24 on the AM-PAC short mobility form. Current research shows that an AM-PAC score of 17 or less is typically not associated with a discharge to the patient's home setting. Based on the patient's AM-PAC score and their current functional mobility deficits, it is recommended that the patient have 3-5 sessions per week of Physical Therapy at d/c to increase the patient's independence. Please see assessment section for further patient specific details. If patient discharges prior to next session this note will serve as a discharge summary. Please see below for the latest assessment towards goals. Patient Diagnosis(es): The primary encounter diagnosis was Altered mental status, unspecified altered mental status type. A diagnosis of Hypothermia, initial encounter was also pertinent to this visit. 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 Past Surgical History:  has no past surgical history on file. Assessment   Body Structures, Functions, Activity Limitations Requiring Skilled Therapeutic Intervention: Decreased functional mobility ; Decreased safe awareness;Decreased cognition;Decreased balance  Assessment: Pt is a 79 y/o female with hx of dementia who presented to the ED with AMS. Possible UTI. Pt lives at home with her son and his partner and according to prior therapy notes, pt ambulates with rollator at baseline.  Pt very lethargic this date; dep for all mobility tasks; attempted sitting EOB in hopes of increasing pt's arrousal however pt did not wake with movement and therefore returned pt to bed and positioned for comfort; pt is unsafe to return home at this time but per chart notes son is adamant that pt will return home at discharge; recommend continued therapy 3-5x/wk to address deficits  Treatment Diagnosis: functional mobility below  baseline  Therapy Prognosis: Guarded  Decision Making: Medium Complexity  History: Per Rachel Dumont MD \"53 y.o. female who presented [facility] [place] with altered mental status. Patient was brought in by her son after she has had increasing confusion at home. Patient has some baseline dementia but over the last several days she has been much more disoriented. She has been yelling and hard to control. This is not typical for her. Family reports that this is usually the result of urinary tract infection. \"  Barriers to Learning: cognition, intermittent agitation, [place]; lethargy  Requires PT Follow-Up: Yes  Activity Tolerance  Activity Tolerance: Patient limited by fatigue;Treatment limited secondary to decreased cognition  Activity Tolerance Comments: pt very lethargic this date     Plan   Physcial Therapy Plan  General Plan: 3-5 times per week  Current Treatment Recommendations: Balance training, Functional mobility training, Strengthening, ROM, Transfer training, Neuromuscular re-education, Gait training, Endurance training, Safety education & training, Patient/Caregiver education & training, Home exercise program, Equipment evaluation, education, & procurement, Cognitive reorientation  Safety Devices  Type of Devices: Bed alarm in place, Left in bed, Nurse notified, Call light within reach, All bandar prominences offloaded  Restraints  Restraints Initially in Place: No     Restrictions  Restrictions/Precautions  Restrictions/Precautions: Fall Risk, Contact Precautions  Position Activity Restriction  Other position/activity restrictions: dementia Subjective   General  Chart Reviewed: Yes  Patient assessed for rehabilitation services?: Yes  Additional Pertinent Hx: Per Janis Salas MD \"38 y.o. female who presented to [facility] [place] with altered mental status. Patient was brought in by her son after she has had increasing confusion at home. Patient has some baseline dementia but over the last several days she has been much more disoriented. She has been yelling and hard to control. This is not typical for her. Family reports that this is usually the result of urinary tract infection. \"  Response To Previous Treatment: Patient unable to report, no changes reported from family or staff  Family / Caregiver Present: No  Referring Practitioner: Debbi Goodwin MD  Referral Date : 10/21/22  Diagnosis: Acute toxic metabolic encephalopathy  Follows Commands: Impaired  Subjective  Subjective: pt very lethargic this date         Social/Functional History  Social/Functional History  Lives With: Family (Son Luis Fernando Camejo) and his partner Mahesh Irby))  Type of Home: House  Home Layout: One level  Home Access: Stairs to enter with rails  Entrance Stairs - Number of Steps: 4-5  Bathroom Shower/Tub: Walk-in shower  Bathroom Toilet: Standard  Bathroom Equipment: Grab bars in shower, Shower chair  Home Equipment: Eletha Bouquet, 4 wheeled, [employer]  ADL Assistance:  (Son assists with bathing/dressing/toileting - patient feeds self)  Homemaking Responsibilities: No (son completes IADLs)  Ambulation Assistance: Needs assistance (with rollator)  Transfer Assistance: Needs assistance  Active : No  Additional Comments: above info from previous therapy note- pt has dementia so unable to verify  Vision/Hearing  Vision  Vision: Within Functional Limits  Hearing  H[facility]: [facility]  Hearing Exceptions: Hard of hearing/hearing concerns    Cognition   Orientation  Overall Orientation Status: Impaired  Orientation Level: Disoriented X4  Cognition  Cognition Comment: pt very lethargic this date Objective            Bed mobility  Supine to Sit: Dependent/Total;2 Person assistance  Sit to Supine: 2 Person assistance;Dependent/Total  Scooting: Dependent/Total;2 Person assistance  Bed Mobility Comments: sat at EOB to try and arrouse pt but she was very lethargic and did  not wake with movement           Balance  Comments: dep for sitting EOB (lethargic)           OutComes Score                                                  AM-PAC Score  AM-PAC Inpatient Mobility Raw Score : 6 (10/24/22 1147)  AM-PAC Inpatient T-Scale Score : 23.55 (10/24/22 1147)  Mobility Inpatient CMS 0-100% Score: 100 (10/24/22 1147)  Mobility Inpatient CMS G-Code Modifier : CN (10/24/22 1147)          Tinneti Score       Goals  Short Term Goals  Time Frame for Short Term Goals: upon d/c  Short Term Goal 1: bed mobility minAx2  Short Term Goal 2: transfers minAx2  Short Term Goal 3: initiate gait as able  Patient Goals   Patient Goals : unable to state due to cognition       Education         Therapy Time   Individual Concurrent Group Co-treatment   Time In 1125         Time Out 1         Minutes 23                 EUSEBIO DUKE PT   Electronically signed by EUSEBIO DUKE PT on 10/24/2022 at 11:48 AM

## 2022-10-25 LAB
ALBUMIN SERPL-MCNC: 3.2 G/DL (ref 3.4–5)
ANION GAP SERPL CALCULATED.3IONS-SCNC: 11 MMOL/L (ref 3–16)
BUN BLDV-MCNC: 16 MG/DL (ref 7–20)
CALCIUM SERPL-MCNC: 9.9 MG/DL (ref 8.3–10.6)
CHLORIDE BLD-SCNC: 110 MMOL/L (ref 99–110)
CO2: 23 MMOL/L (ref 21–32)
CREAT SERPL-MCNC: 0.9 MG/DL (ref 0.6–1.2)
GFR SERPL CREATININE-BSD FRML MDRD: >60 ML/MIN/{1.73_M2}
GLUCOSE BLD-MCNC: 101 MG/DL (ref 70–99)
GLUCOSE BLD-MCNC: 102 MG/DL (ref 70–99)
GLUCOSE BLD-MCNC: 105 MG/DL (ref 70–99)
GLUCOSE BLD-MCNC: 107 MG/DL (ref 70–99)
GLUCOSE BLD-MCNC: 115 MG/DL (ref 70–99)
GLUCOSE BLD-MCNC: 123 MG/DL (ref 70–99)
HCT VFR BLD CALC: 35.9 % (ref 36–48)
HEMOGLOBIN: 11.8 G/DL (ref 12–16)
MCH RBC QN AUTO: 27.9 PG (ref 26–34)
MCHC RBC AUTO-ENTMCNC: 32.8 G/DL (ref 31–36)
MCV RBC AUTO: 85.2 FL (ref 80–100)
PDW BLD-RTO: 15.2 % (ref 12.4–15.4)
PERFORMED ON: ABNORMAL
PHOSPHORUS: 2.6 MG/DL (ref 2.5–4.9)
PLATELET # BLD: 191 K/UL (ref 135–450)
PMV BLD AUTO: 10 FL (ref 5–10.5)
POTASSIUM SERPL-SCNC: 3.8 MMOL/L (ref 3.5–5.1)
RBC # BLD: 4.21 M/UL (ref 4–5.2)
SODIUM BLD-SCNC: 144 MMOL/L (ref 136–145)
WBC # BLD: 6 K/UL (ref 4–11)

## 2022-10-25 PROCEDURE — 85027 COMPLETE CBC AUTOMATED: CPT

## 2022-10-25 PROCEDURE — 94640 AIRWAY INHALATION TREATMENT: CPT

## 2022-10-25 PROCEDURE — 94760 N-INVAS EAR/PLS OXIMETRY 1: CPT

## 2022-10-25 PROCEDURE — 2580000003 HC RX 258: Performed by: INTERNAL MEDICINE

## 2022-10-25 PROCEDURE — 80069 RENAL FUNCTION PANEL: CPT

## 2022-10-25 PROCEDURE — 6370000000 HC RX 637 (ALT 250 FOR IP): Performed by: HOSPITALIST

## 2022-10-25 PROCEDURE — 2580000003 HC RX 258: Performed by: HOSPITALIST

## 2022-10-25 PROCEDURE — 51798 US URINE CAPACITY MEASURE: CPT

## 2022-10-25 PROCEDURE — 2060000000 HC ICU INTERMEDIATE R&B

## 2022-10-25 PROCEDURE — 97530 THERAPEUTIC ACTIVITIES: CPT | Performed by: PHYSICAL THERAPIST

## 2022-10-25 PROCEDURE — 36415 COLL VENOUS BLD VENIPUNCTURE: CPT

## 2022-10-25 PROCEDURE — 92526 ORAL FUNCTION THERAPY: CPT

## 2022-10-25 RX ADMIN — DEXTROSE AND SODIUM CHLORIDE 900 ML: 5; 900 INJECTION, SOLUTION INTRAVENOUS at 13:55

## 2022-10-25 RX ADMIN — Medication 3 MG: at 21:11

## 2022-10-25 RX ADMIN — RIVAROXABAN 15 MG: 15 TABLET, FILM COATED ORAL at 09:52

## 2022-10-25 RX ADMIN — SODIUM CHLORIDE, PRESERVATIVE FREE 10 ML: 5 INJECTION INTRAVENOUS at 09:56

## 2022-10-25 RX ADMIN — MOMETASONE FUROATE AND FORMOTEROL FUMARATE DIHYDRATE 2 PUFF: 200; 5 AEROSOL RESPIRATORY (INHALATION) at 08:19

## 2022-10-25 RX ADMIN — MOMETASONE FUROATE AND FORMOTEROL FUMARATE DIHYDRATE 2 PUFF: 200; 5 AEROSOL RESPIRATORY (INHALATION) at 21:33

## 2022-10-25 RX ADMIN — SODIUM CHLORIDE, PRESERVATIVE FREE 10 ML: 5 INJECTION INTRAVENOUS at 21:19

## 2022-10-25 RX ADMIN — METOPROLOL TARTRATE 12.5 MG: 25 TABLET, FILM COATED ORAL at 21:11

## 2022-10-25 ASSESSMENT — PAIN SCALES - WONG BAKER: WONGBAKER_NUMERICALRESPONSE: 0

## 2022-10-25 NOTE — CARE COORDINATION
Spoke to son Meliton Stewart, discussed therapy recs for skilled nursing facility (SNF). Son is going to come up later today and eval his mother himself to see if they can take her home. SNF is last resort as his mother has been to facility before and didn't do well mentally. Son agrees to [facility] home care and back up SNF if needed. SNF list given to son, await facility choices. The Plan for Transition of Care is related to the following treatment goals: strengthening    The patient's son was provided with a choice of provider and agrees   with the discharge plan. [x] Yes [] No    Freedom of choice list was provided with basic dialogue that supports the patient's individualized plan of care/goals, treatment preferences and shares the quality data associated with the providers.  [x] Yes [] No    Electronically signed by Liza Garcia RN Case Management [phone] on 10/25/2022 at 1:29 PM

## 2022-10-25 NOTE — PROGRESS NOTES
Patient transferred to specialty bed. Pt is now resting comfortably in bed.  Electronically signed by Sofia Naylor RN on 10/25/2022 at 6:54 AM

## 2022-10-25 NOTE — PLAN OF CARE
Problem: Discharge Planning  Goal: Discharge to home or other facility with appropriate resources  Outcome: Progressing  Flowsheets (Taken 10/24/2022 2206 by Bigg Hoffman RN)  Discharge to home or other facility with appropriate resources: Identify barriers to discharge with patient and caregiver     Problem: Pain  Goal: Verbalizes/displays adequate comfort level or baseline comfort level  Outcome: Progressing     Problem: Skin/Tissue Integrity  Goal: Absence of new skin breakdown  Description: 1. Monitor for areas of redness and/or skin breakdown  2. Assess vascular access sites hourly  3. Every 4-6 hours minimum:  Change oxygen saturation probe site  4. Every 4-6 hours:  If on nasal continuous positive airway pressure, respiratory therapy assess nares and determine need for appliance change or resting period.   Outcome: Progressing     Problem: Safety - Adult  Goal: Free from fall injury  Outcome: Progressing     Problem: ABCDS Injury Assessment  Goal: Absence of physical injury  Outcome: Progressing     Problem: Respiratory - Adult  Goal: Achieves optimal ventilation and oxygenation  Outcome: Progressing  Flowsheets (Taken 10/24/2022 2206 by Bigg Hoffman RN)  Achieves optimal ventilation and oxygenation:   Assess for changes in respiratory status   Assess for changes in mentation and behavior   Position to facilitate oxygenation and minimize respiratory effort     Problem: Metabolic/Fluid and Electrolytes - Adult  Goal: Electrolytes maintained within normal limits  Outcome: Progressing  Flowsheets (Taken 10/24/2022 2206 by Bigg Hoffman RN)  Electrolytes maintained within normal limits: Monitor labs and assess patient for signs and symptoms of electrolyte imbalances     Problem: Nutrition Deficit:  Goal: Optimize nutritional status  Outcome: Progressing

## 2022-10-25 NOTE — PROGRESS NOTES
Pt in room, awake, alert, responding to some of this nurses questions appropriately. PT assisted with breakfast by this nurse.  Pt showed no difficulty, called speech, speech came to work with pt and reported pt drinking 240 mls of thickened fluids and ate 100% of her breakfast this am. Son called and reported to son pts progress this am.    Electronically signed by Fadi Alvarado RN on 10/25/2022 at 10:46 AM

## 2022-10-25 NOTE — PROGRESS NOTES
Pt remains NPO due to mentation/lethargy.  Electronically signed by Sofia Naylor RN on 10/24/2022 at 9:40 PM

## 2022-10-25 NOTE — PROGRESS NOTES
[facility]   Speech Therapy  Daily Dysphagia Treatment Note    Lurdes Mcclure  AGE: 80 y.o. GENDER: female  : 1931  [id number]  EPISODE DATE:  10/18/2022  Patient Active Problem List   Diagnosis    Acute on chronic combined systolic and diastolic CHF, NYHA class 3 (HCC)    Dementia in Alzheimer's disease with delirium ([address]    Acute pyelonephritis    Essential hypertension    Current use of long term anticoagulation    Mild malnutrition ([address]    Fall at home, initial encounter    Metabolic encephalopathy    Acute on chronic congestive heart failure (HCC)    Septic shock (HCC)    Bilateral pleural effusion    Chronic atrial fibrillation (HCC)    Acute encephalopathy    Ataxia    Pleural effusion    CHF (congestive heart failure), NYHA class I, acute on chronic, combined ([address]    AMS (altered mental status)    Cardiac arrest ([address]    Seizure ([address]    CHF (congestive heart failure) ([address]    Atrial fibrillation ([address]    Cardiopulmonary arrest with successful resuscitation ([address]    Acute respiratory failure with hypoxia and hypercapnia ([address]    Acute cystitis without hematuria    RHIANNA (acute kidney injury) ([address]    UTI (urinary tract infection)     Allergies   Allergen Reactions    Chlorpromazine Other (See Comments)    Hydrochlorothiazide Other (See Comments)     Gout     Treatment Diagnosis: Dysphagia     Chart review:   10/18/2022 admitted with c/o AMS; concern for UTI noted  NPO since admission d/t mental status  10/21/2022 CSE ordered    IMAGING:  CXR: 10/18/2022  Impression   No acute findings. Mild cardiomegaly      CT HEAD: 10/18/2022  Impression   No evidence of acute intracranial abnormality on a study limited as described   above. MRI brain 10/18/22  Impression   Motion challenge evaluation. No acute stroke, midline shift or mass effect. Stable involutional changes of ventricular prominence.        Microvascular disease with evidence of encephalomalacia right posterior MCA distribution. RECOMMENDATIONS:   Unavailable         Most recent Modified Barium Swallow Study: 2/18/2022  Moderate oral and mild pharyngeal phase dysphagia characterized by prolonged mastication of soft solid, reduced lingual manipulation with all, reduced bolus formation of textured solid, reduced bolus control, reduced AP transit with episodic lingual pumping, delayed swallow initiation and decreased laryngeal elevation. Premature loss to valleculae with all  Oral residue with soft solid > minced/moist solid  NO penetration or aspiration; trace episodic vallecular residue  Suspect potential for solid texture upgrade once dentures are present and pt's generalized weakness improves  Diet Recommendations:  Solid consistency: Dysphagia Minced and Moist (Dysphagia II)   Liquid consistency: Thin   Medication administration: PO (one at a time in puree or with liquid)   Compensatory Swallowing Strategies: Upright as possible for all oral intake,Remain upright for 30-45 minutes after meals,Small bites/sips,Eat/Feed slowly    Subjective:   Current Diet Level: Dysphagia I Puree ;  Moderately (honey) thick liquids  RN reports patient has been NPO due to decreased alertness, RN note from 10/24 PM indicates patient \"remains NPO due to mentation/lethargy\"; diet order still indicates puree/moderately thick liquids    Comments regarding tolerating Current Diet: RN reports patient more alert and responsive this AM and currently eating breakfast with no difficulty    Objective:   Pain: Unable to state   -moaning at end of session after PO intake            Vision: []WFL []Impaired: AXEL-eyes closed duration of session   Hearing: []WFL []Impaired: AXEL    Cognitive/behavioral/communication:   Oriented to [] self [] place [] date [] situation  [x]alert []lethargic  []cooperative []self-limiting   [x]confused   []distractible []agitated []impulsive  []verbally responsive []nonverbal [x]limited verbal responses-only verbal response was \"leave that alone\" when therapist attempted to examine oral cavity  []follows one step commands [x]does not follow dx []follows complex commands  []aphasic []dysarthric  [] other:     Respiratory Status: [x]Room Air []O2 via nasal cannula []Other:  Dentition: []Adequate [x]Dentures in place []Missing Many Teeth []Edentulous []Other:  Vocal Quality: []Normal []Dysphonic  []Aphonic  []Hoarse []Wet []Weak []Other: AXEL  Volitional Cough: []Strong []Weak []Wet []Absent []Congested [x]Other: AXEL  Volitional Swallow:   []Absent  []Delayed []Adequate []Required use of drink     Oral Mechanism Exam:  []WFL []Mild   [] Moderate  []Severe  [x]To be assessed  Impaired:   []Left side      []Right side    []Labial ROM/Coordination    []Labial Symmetry   []Lingual ROM/Coordination   []Lingual Symmetry  []Gag  []Other:     Patient Positioning: Upright in bed     PO Trials: 100% of pureed breakfast  Thin Liquids: suspect at risk for premature loss of bolus,delayed swallow initiation, decreased laryngeal elevation,  immediate cough   Nectar thick liquids suspect at risk for premature loss of bolus,delayed swallow initiation, decreased laryngeal elevation, immediate weak cough 2 with 4 oz of liquids  Honey Thick liquids: suspect at risk for premature loss of bolus,delayed swallow initiation, decreased laryngeal elevation, no overt s/s of aspiration or penetration   Puree slow prolonged formation of bolus, decreased lingual coordination, adequate clearance, suspect at risk for premature loss of bolus,delayed swallow initiation, decreased laryngeal elevation, no overt s/s of aspiration or penetration   Minced and moist: poor mastication and formation of bolus despite having dentures, reduced lingual coordination, mod oral residue cleared with liquid wash, suspect at risk for premature loss of bolus,delayed swallow initiation, decreased laryngeal elevation, no overt s/s of aspiration or penetration     Dysphagia Tx:   Direct Dysphagia tx: main focus on PO tolerance, continues to demonstrate s/s of aspiration or penetration with thin and mildly thick liquids. Difficulty masticating textured solids despite having dentures in oral cavity  Dysphagia ex: unable to follow dx for ex  Training in compensatory strategies: educated on current diet level   Pt response to ex/training: no evidence of learning     Goals:   Pt will functionally tolerate recommended diet with no overt clinical s/s of aspiration ongoing  Pt will demonstrate understanding of aspiration risk and precautions via education/demonstration with occasional prompting ongoing  Pt will advance to least restrictive diet as indicated ongoing    Assessment:   Impressions:   Accepted and tolerated treatment at bedside, patient more awake this date per nursing  Patient alert, cooperative, eyes closed duration of session; does not follow dx; only verbal output was \"leave it alone\" when therapist attempted to examine oral cavity, [place]. Moderate severe oral stage dysphagia characterized by poor mastication and reduced lingual strength/coordination. Increased difficulty masticating textured solids despite having dentures in oral cavity, residue cleared with liquid wash. Prolonged bolus formation and movement with puree and liquids. Suspect premature bolus loss to the pharynx with all. Moderate severe pharyngeal stage dysphagia characterized by delayed swallow and decreased laryngeal elevation. Cough with thin; cough x2 with nectar. Recommend cont  puree diet texture with moderately/honey thick liquids. Feed only when alert. Consider downgrade to NPO if s/s of aspiration or penetration on current diet arises  ST to continue to follow.     Recommended Diet and Intervention 10/25/2022:  Diet Solids Recommendation:  Dysphagia I Puree  Liquid Consistency Recommendation:  Moderately (honey) thick liquids  Recommended form of Meds: Meds crushed as able in puree      Compensatory Swallowing Strategies: Alternate solids/liquids , Upright as possible with all PO intake , Small bites/sips , Eat/feed slowly, Remain upright 30-45 min     EDUCATION:   Provided education regarding role of SLP, results of assessment, recommendations and general speech pathology plan of care. [] Pt verbalized understanding and agreement   [] Pt requires ongoing learning   [x] No evidence of comprehension     Dysphagia Prognosis: [] good []fair [x]poor d/t mental status []guarded       Plan:     Continue Dysphagia Therapy: YES    Interventions: Diet Tolerance Monitoring , Patient/Family Education , Therapeutic Trials with SLP   Duration/Frequency of therapy while on unit: Speech therapy for dysphagia tx 3-5 times per week during acute care stay. Discharge Instructions:   Recommend ongoing SLP for dysphagia therapy upon discharge from hospital     This note serves as a D/C Summary in the event that this patient is discharged prior to the next therapy session.     Coded treatment time: 0  Total treatment time: [address], [place], [address] [place] [address]  Speech-Language Pathologist  On 10/25/22 at 10:11 AM

## 2022-10-25 NOTE — PROGRESS NOTES
Attempted to remove dentures and perform oral care was unsuccessful.  Electronically signed by Adonay Potts RN on 10/24/2022 at 10:11 PM

## 2022-10-25 NOTE — PROGRESS NOTES
Patient is unable to answer mentation questions, but responds to touch. This RN attempted to remove dentures from patients mouth, but patient screamed, \"what are doing?,\" and then attempted to bite RN. Dentures remain in place.  Electronically signed by Grupo Shaw RN on 10/24/2022 at 8:22 PM

## 2022-10-25 NOTE — PROGRESS NOTES
Hospitalist Progress Note      PCP: Liza Bradford MD    Date of Admission: 10/18/2022      Hospital Course: Patient admitted with report of increased confusion, she has a history of dementia. Reported waxing and waning delirium. Monitoring and management ongoing. Subjective: Patient seen and examined. Sitting up in chair resting. No acute events overnight. Medications:  Reviewed    Infusion Medications    dextrose 5 % and 0.9 % NaCl 900 mL (10/25/22 1355)    dextrose      sodium chloride       Scheduled Medications    [Held by provider] furosemide  20 mg Oral Every Other Day    mometasone-formoterol  2 puff Inhalation BID    rivaroxaban  15 mg Oral Daily with breakfast    metoprolol tartrate  12.5 mg Oral BID    melatonin  3 mg Oral Nightly    sodium chloride flush  5-40 mL IntraVENous 2 times per day     PRN Meds: glucose, dextrose bolus **OR** dextrose bolus, glucagon (rDNA), dextrose, albuterol sulfate HFA, sodium chloride flush, sodium chloride, ondansetron **OR** ondansetron, acetaminophen **OR** acetaminophen, polyethylene glycol      Intake/Output Summary (Last 24 hours) at 10/25/2022 1540  Last data filed at 10/25/2022 1044  Gross per 24 hour   Intake 240 ml   Output --   Net 240 ml       Physical Exam Performed:    /85   Pulse 76   Temp 97.9 °F (36.6 °C) (Axillary)   Resp 25   Ht 5' 3\" (1.6 m)   Wt 174 lb 9.7 oz (79.2 kg)   SpO2 96%   BMI 30.93 kg/m²     General appearance: No apparent distress, appears stated age and cooperative. History because membranes moist mucous membranes  Respiratory:  Normal respiratory effort. Clear to auscultation  Cardiovascular: Regular rate and rhythm with normal S1/S2  Abdomen: Soft, non-tender, non-distended with normal bowel sounds. Musculoskeletal: No edema bilaterally.          Labs:   Recent Labs     10/23/22  0426 10/25/22  0421   WBC 6.6 6.0   HGB 12.4 11.8*   HCT 38.2 35.9*    191     Recent Labs     10/23/22  0426 10/25/22  0421    144   K 3.6 3.8    110   CO2 21 23   BUN 11 16   CREATININE 0.8 0.9   CALCIUM 9.7 9.9   PHOS 2.4* 2.6     No results for input(s): AST, ALT, BILIDIR, BILITOT, ALKPHOS in the last 72 hours. No results for input(s): INR in the last 72 hours. No results for input(s): Pepe Chinmay in the last 72 hours. Urinalysis:      Lab Results   Component Value Date/Time    NITRU Negative 10/18/2022 11:28 AM    WBCUA 7 10/18/2022 11:28 AM    BACTERIA 1+ 10/18/2022 11:28 AM    RBCUA 5 10/18/2022 11:28 AM    BLOODU TRACE 10/18/2022 11:28 AM    SPECGRAV 1.009 10/18/2022 11:28 AM    GLUCOSEU Negative 10/18/2022 11:28 AM       Radiology:  MRI brain with and without contrast   Final Result   Motion challenge evaluation. No acute stroke, midline shift or mass effect. Stable involutional changes of ventricular prominence. Microvascular disease with evidence of encephalomalacia right posterior MCA   distribution. RECOMMENDATIONS:   Unavailable         CT ABDOMEN PELVIS W IV CONTRAST Additional Contrast? None   Final Result   1. No acute abdominal or pelvic findings. 2.  Small to moderate bilateral pleural effusions with associated bibasilar   atelectasis. 3.  Moderate hiatal hernia. XR CHEST PORTABLE   Final Result   No acute findings. Mild cardiomegaly         CT HEAD WO CONTRAST   Final Result   No evidence of acute intracranial abnormality on a study limited as described   above.                  Assessment/Plan:    Active Hospital Problems    Diagnosis     UTI (urinary tract infection) [N39.0]      Priority: Medium    Acute encephalopathy [G93.40]     Essential hypertension [I10]     Dementia in Alzheimer's disease with delirium ([facility] [address] [G30.9, F02.82]      Continue supportive care  Patient completed cefepime course for suspected UTI  Paroxysmal atrial fibrillation: New metoprolol, Xarelto for anticoagulation  Encourage p.o. food and fluid intake  Reorient as needed  Mobilize    DVT Prophylaxis: Xarelto  Diet: ADULT DIET; Dysphagia - Pureed; Moderately Thick (Honey)  ADULT ORAL NUTRITION SUPPLEMENT; Breakfast, Lunch;  Fortified Pudding Oral Supplement  Code Status: DNR-CCA  PT/OT     Dispo -possible discharge to ECF in 1 to 2 days pending progress        Viviane Mike MD

## 2022-10-26 VITALS
HEIGHT: 63 IN | HEART RATE: 79 BPM | SYSTOLIC BLOOD PRESSURE: 135 MMHG | DIASTOLIC BLOOD PRESSURE: 50 MMHG | WEIGHT: 173.06 LBS | BODY MASS INDEX: 30.66 KG/M2 | TEMPERATURE: 97 F | OXYGEN SATURATION: 98 % | RESPIRATION RATE: 25 BRPM

## 2022-10-26 LAB
GLUCOSE BLD-MCNC: 82 MG/DL (ref 70–99)
GLUCOSE BLD-MCNC: 98 MG/DL (ref 70–99)
GLUCOSE BLD-MCNC: 98 MG/DL (ref 70–99)
PERFORMED ON: NORMAL
SARS-COV-2, NAAT: NOT DETECTED

## 2022-10-26 PROCEDURE — 97530 THERAPEUTIC ACTIVITIES: CPT

## 2022-10-26 PROCEDURE — 94640 AIRWAY INHALATION TREATMENT: CPT

## 2022-10-26 PROCEDURE — 94760 N-INVAS EAR/PLS OXIMETRY 1: CPT

## 2022-10-26 PROCEDURE — 2580000003 HC RX 258: Performed by: INTERNAL MEDICINE

## 2022-10-26 PROCEDURE — 87635 SARS-COV-2 COVID-19 AMP PRB: CPT

## 2022-10-26 PROCEDURE — 6370000000 HC RX 637 (ALT 250 FOR IP): Performed by: HOSPITALIST

## 2022-10-26 PROCEDURE — 97530 THERAPEUTIC ACTIVITIES: CPT | Performed by: PHYSICAL THERAPIST

## 2022-10-26 RX ORDER — POLYETHYLENE GLYCOL 3350 17 G/17G
17 POWDER, FOR SOLUTION ORAL DAILY PRN
Qty: 527 G | Refills: 0 | Status: SHIPPED | OUTPATIENT
Start: 2022-10-26 | End: 2022-11-25

## 2022-10-26 RX ORDER — DIVALPROEX SODIUM 250 MG/1
250 TABLET, DELAYED RELEASE ORAL EVERY 12 HOURS SCHEDULED
Status: DISCONTINUED | OUTPATIENT
Start: 2022-10-26 | End: 2022-10-26 | Stop reason: HOSPADM

## 2022-10-26 RX ORDER — DIVALPROEX SODIUM 250 MG/1
250 TABLET, DELAYED RELEASE ORAL EVERY 12 HOURS SCHEDULED
Qty: 90 TABLET | Refills: 0
Start: 2022-10-26

## 2022-10-26 RX ADMIN — METOPROLOL TARTRATE 12.5 MG: 25 TABLET, FILM COATED ORAL at 09:13

## 2022-10-26 RX ADMIN — RIVAROXABAN 15 MG: 15 TABLET, FILM COATED ORAL at 09:13

## 2022-10-26 RX ADMIN — SODIUM CHLORIDE, PRESERVATIVE FREE 10 ML: 5 INJECTION INTRAVENOUS at 09:14

## 2022-10-26 RX ADMIN — MOMETASONE FUROATE AND FORMOTEROL FUMARATE DIHYDRATE 2 PUFF: 200; 5 AEROSOL RESPIRATORY (INHALATION) at 08:36

## 2022-10-26 NOTE — PROGRESS NOTES
Pt in room, alert and oriented x3 at this time, carrying on full conversations with staff, eating lunch, son is aware, pt is back to her baseline as far as mentation is concered, call light within reach

## 2022-10-26 NOTE — CARE COORDINATION
Spoke to son León Rand, tells me his mother will need rehab before returning home. He would like referrals to skilled nursing facilities in the following order: 1. [facility], 2. [facility], 3. [address], 3. [facility]. Referrals made.   Electronically signed by Cheryl Bergeron RN Case Management [phone] on 10/26/2022 at 9:21 AM

## 2022-10-26 NOTE — CARE COORDINATION
CASE MANAGEMENT DISCHARGE SUMMARY:  Spoke to son Kala Barrientos, agrees to [facility]. Son to tour facility this afternoon but okay with 4:30pm transport time to [facility]. Spoke to Nancy at [facility], facility can accept, AVS to be faxed upon completion, covid test ordered (needed w/in 5 days of dc). RN aware of dc plan.     DISCHARGE DATE: 10/26/2022    DISCHARGED TO:    Name: [facility]  Address:  [place] [address] [place], [address] [place], [place] [address]in [place]              REPORT NUMBER: [phone]               FAX NUMBER: [phone]    TRANSPORTATION: [place]              TIME: 4:30pm     INSURANCE PRECERT OBTAINED: n/a    ARELI/YVETTE COMPLETED: yes    Electronically signed by Hortencia Kee RN Case Management [phone] on 10/26/2022 at 12:22 PM

## 2022-10-26 NOTE — PROGRESS NOTES
Called [facility] and gave report to Darrell. Reiterated to Darrell to check with patient's son, Nicolas Subramanian, first before giving patient [place]. All questions answered.

## 2022-10-26 NOTE — PROGRESS NOTES
Patient discharged to [address]. Peripheral IV and heart monitor removed, and dressed patient in new gown. Patient left via stretcher with [place] transport.

## 2022-10-26 NOTE — DISCHARGE INSTR - COC
Continuity of Care Form    Patient Name: Enoch Grubbs   :  1931  MRN:  [id number]    Admit date:  10/18/2022  Discharge date:  ***    Code Status Order: DNR-CCA   Advance Directives:     Admitting Physician:  No admitting provider for patient encounter. PCP: Silverio Callahan MD    Discharging Nurse: [facility] Unit/Room#: [id number]/8731-38  Discharging Unit Phone Number: ***    Emergency Contact:   Extended Emergency Contact Information  Primary Emergency Contact: Felix Daly Phone: [phone]  Mobile Phone: [phone]  Relation: Child   needed? No  Secondary Emergency Contact: [address] Phone: [phone]  Relation: Other    Past Surgical History:  History reviewed. No pertinent surgical history. Immunization History:   Immunization History   Administered Date(s) Administered    COVID-19, MODERNA BLUE border, Primary or Immunocompromised, (age 12y+), IM, 100 mcg/0.5mL 2021, 2021       Active Problems:  Patient Active Problem List   Diagnosis Code    Acute on chronic combined systolic and diastolic CHF, NYHA class 3 ([facility]) I50.43    Dementia in Alzheimer's disease with delirium ([address] G30.9, F02.82    Acute pyelonephritis N10    Essential hypertension I10    Current use of long term anticoagulation Z79.01    Mild malnutrition ([address] E44.1    Fall at home, initial encounter [address] [place], N61.052    Metabolic encephalopathy M07.71    Acute on chronic congestive heart failure (HCC) I50.9    Septic shock ([facility]) A41.9, R65.21    Bilateral pleural effusion J90    Chronic atrial fibrillation ([facility]) I48.20    Acute encephalopathy G93.40    Ataxia R27.0    Pleural effusion J90    CHF (congestive heart failure), NYHA class I, acute on chronic, combined ([facility]) I50.43    AMS (altered mental status) R41.82    Cardiac arrest ([facility]) I46.9    Seizure ([facility]) R56.9    CHF (congestive heart failure) ([facility]) I50.9    Atrial fibrillation ([facility] [address] I48.91    Cardiopulmonary arrest with successful resuscitation ([facility] [address] I46.9    Acute respiratory failure with hypoxia and hypercapnia (HCC) J96.01, J96.02    Acute cystitis without hematuria N30.00    RHIANNA (acute kidney injury) ([facility] [address] N17.9    UTI (urinary tract infection) N39.0       Isolation/Infection:   Isolation            No Isolation          Patient Infection Status       Infection Onset Added Last Indicated Last Indicated By Review Planned Expiration Resolved Resolved By    None active    Resolved    COVID-19 (Rule Out) 10/18/22 10/18/22 10/18/22 COVID-19, Rapid (Ordered)   10/18/22 Rule-Out Test Resulted    COVID-19 (Rule Out) 02/09/22 02/09/22 02/09/22 COVID-19, Rapid (Ordered)   02/09/22 Rule-Out Test Resulted    C-diff Rule Out 11/21/21 11/21/21 11/21/21 Clostridium Difficile Toxin/Antigen (Ordered)   11/23/21 Juan Conde RN    MDRO (multi-drug resistant organism) 11/04/21 11/06/21 11/04/21 Culture, Urine   10/25/22 Yong Christianson RN    Ok to discontinue isolation and remove MDRO per provider    COVID-19 (Rule Out) 11/29/20 11/29/20 11/29/20 COVID-19 (Ordered)   11/30/20 Rule-Out Test Resulted    COVID-19 (Rule Out) 06/04/20 06/04/20 06/04/20 COVID-19 (Ordered)   06/05/20 Rule-Out Test Resulted            Nurse Assessment:  Last Vital Signs: /62   Pulse 76   Temp 97.2 °F (36.2 °C) (Axillary)   Resp 18   Ht 5' 3\" (1.6 m)   Wt 173 lb 1 oz (78.5 kg)   SpO2 96%   BMI 30.66 kg/m²     Last documented pain score (0-10 scale):    Last Weight:   Wt Readings from Last 1 Encounters:   10/26/22 173 lb 1 oz (78.5 kg)     Mental Status:  disoriented and alert    IV Access:  - None    Nursing Mobility/ADLs:  Walking   Dependent  Transfer  Dependent  Bathing  Dependent  Dressing  Dependent  Toileting  Dependent  Feeding  Dependent  Med Admin  Dependent  Med Delivery   none and crushed    Wound Care Documentation and Therapy:  Wound 02/15/21 Sacrum Stage 1 - open area (Active)   Number of days: 618        Elimination:  Continence:    Bowel: No  Bladder: No  Urinary Catheter: None   Colostomy/Ileostomy/Ileal Conduit: No       Date of Last BM: 10/26/22      Intake/Output Summary (Last 24 hours) at 10/26/2022 1041  Last data filed at 10/26/2022 0407  Gross per 24 hour   Intake 950 ml   Output 100 ml   Net 850 ml     I/O last 3 completed shifts: In: 12 [P.O.:950]  Out: 100 [Urine:100]    Safety Concerns:     History of Falls (last 30 days), At Risk for Falls, and History of Seizures, aspiration risk      Impairments/Disabilities:      Hearing    Nutrition Therapy:  Current Nutrition Therapy:   - pureed diet honey thick liquids    Routes of Feeding: Oral  Liquids: Honey Thick Liquids  Daily Fluid Restriction: no  Last Modified Barium Swallow with Video (Video Swallowing Test): not done    Treatments at the Time of Hospital Discharge:   Respiratory Treatments: ***  Oxygen Therapy:  is not on home oxygen therapy.   Ventilator:    - No ventilator support    Rehab Therapies: Physical Therapy, Occupational Therapy, and Speech/Language Therapy  Weight Bearing Status/Restrictions: No weight bearing restrictions  Other Medical Equipment (for information only, NOT a DME order):  hospital bed  Other Treatments: ***    Patient's personal belongings (please select all that are sent with patient):  Dentures upper and lower    RN SIGNATURE:  Electronically signed by Mahogany Portillo RN on 10/26/22 at 12:46 PM EDT    CASE MANAGEMENT/SOCIAL WORK SECTION    Inpatient Status Date: 10/18/2022    Readmission Risk Assessment Score:  Readmission Risk              Risk of Unplanned Readmission:  19           Discharging to Facility/ Agency   Name: [facility]  Address:  [address], [address]   Phone:  [phone]  Fax:  [phone]    Dialysis Facility (if applicable)   Name:  Address:  Dialysis Schedule:  Phone:  Fax:    / signature: Electronically signed by Gerri Liang RN on 10/26/22 at 1:14 PM EDT    PHYSICIAN SECTION    Prognosis: Good    Condition at Discharge: Stable    Rehab Potential (if transferring to Rehab): Good    Recommended Labs or Other Treatments After Discharge: PT/OT    Physician Certification: I certify the above information and transfer of Ale Miranda  is necessary for the continuing treatment of the diagnosis listed and that she requires [place] for greater 30 days.      Update Admission H&P: No change in H&P    PHYSICIAN SIGNATURE:  Electronically signed by Дмитрий Gallagher MD on 10/26/22 at 10:41 AM EDT

## 2022-10-26 NOTE — PROGRESS NOTES
Progress Note    Updates  Sitting up in bed, alert. Remains confused. No significant events overnight.       Active Ambulatory Problems     Diagnosis Date Noted    Acute on chronic combined systolic and diastolic CHF, NYHA class 3 (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 [address] 06/05/2020    Acute pyelonephritis 06/05/2020    Essential hypertension 06/05/2020    Current use of long term anticoagulation 06/05/2020    Mild malnutrition ([address] 06/09/2020    Fall at home, initial encounter 17/86/3302    Metabolic encephalopathy 70/66/7963    Acute on chronic congestive heart failure (HCC)     Septic shock ([address] 02/10/2021    Bilateral pleural effusion     Chronic atrial fibrillation (HCC)     Acute encephalopathy 03/29/2021    Ataxia 04/13/2021    Pleural effusion 04/14/2021    CHF (congestive heart failure), NYHA class I, acute on chronic, combined ([facility] [address] 11/14/2021    AMS (altered mental status) 11/14/2021    Cardiac arrest ([address] 02/09/2022    Seizure ([address] 02/09/2022    CHF (congestive heart failure) ([address] 02/09/2022    Atrial fibrillation ([address]     Cardiopulmonary arrest with successful resuscitation ([address]     Acute respiratory failure with hypoxia and hypercapnia ([address]     Acute cystitis without hematuria     RHIANNA (acute kidney injury) ([address]      Past Medical History:   Diagnosis Date    Alzheimer's dementia ([facility] [address]     Benign essential HTN     Gout     Multiple drug resistant organism (MDRO) culture positive 11/04/2021     Current Facility-Administered Medications:     [Held by provider] furosemide (LASIX) tablet 20 mg, 20 mg, Oral, Every Other Day, Brandin Dejesus MD, 20 mg at 10/23/22 0840    mometasone-formoterol (DULERA) 200-5 MCG/ACT inhaler 2 puff, 2 puff, Inhalation, BID, Brandin Dejesus MD, 2 puff at 10/26/22 0836    rivaroxaban (XARELTO) tablet 15 mg, 15 mg, Oral, Daily with breakfast, Brandin Dejesus MD, 15 mg at 10/25/22 0952    metoprolol tartrate (LOPRESSOR) tablet 12.5 mg, 12.5 mg, Oral, BID, Alicia Nuno MD, 12.5 mg at 10/25/22 2111    glucose chewable tablet 16 g, 4 tablet, Oral, PRN, Saurabh Fisher,     dextrose bolus 10% 125 mL, 125 mL, IntraVENous, PRN, Stopped at 10/21/22 0959 **OR** dextrose bolus 10% 250 mL, 250 mL, IntraVENous, PRN, Saurabh DANDRE Fisher, DO    glucagon (rDNA) injection 1 mg, 1 mg, SubCUTAneous, PRN, Saurabh DANDRE Fisher, DO    dextrose 10 % infusion, , IntraVENous, Continuous PRN, Saurabh DANDRE Fisher, DO    melatonin tablet 3 mg, 3 mg, Oral, Nightly, Alicia Nuno MD, 3 mg at 10/25/22 2111    albuterol sulfate HFA (PROVENTIL;VENTOLIN;PROAIR) 108 (90 Base) MCG/ACT inhaler 2 puff, 2 puff, Inhalation, Q6H PRN, Jazmin Lambert MD    sodium chloride flush 0.9 % injection 5-40 mL, 5-40 mL, IntraVENous, 2 times per day, Jazmin Lambert MD, 10 mL at 10/25/22 2119    sodium chloride flush 0.9 % injection 5-40 mL, 5-40 mL, IntraVENous, PRN, Jazmin Lambert MD    0.9 % sodium chloride infusion, , IntraVENous, PRN, Jazmin Lambert MD    ondansetron (ZOFRAN-ODT) disintegrating tablet 4 mg, 4 mg, Oral, Q8H PRN **OR** ondansetron (ZOFRAN) injection 4 mg, 4 mg, IntraVENous, Q6H PRN, Jazmin Lambert MD    acetaminophen (TYLENOL) tablet 650 mg, 650 mg, Oral, Q6H PRN **OR** acetaminophen (TYLENOL) suppository 650 mg, 650 mg, Rectal, Q6H PRN, Jazmin Lambert MD    polyethylene glycol (GLYCOLAX) packet 17 g, 17 g, Oral, Daily PRN, Jazmin Lambert MD    Exam  Blood pressure 120/62, pulse 76, temperature 97.2 °F (36.2 °C), temperature source Axillary, resp. rate 18, height 5' 3\" (1.6 m), weight 173 lb 1 oz (78.5 kg), SpO2 96 %. Constitutional    Vital signs: BP, HR, and RR reviewed   General alert, no distress  Eyes: unable to visualize the fundi  Cardiovascular: no peripheral edema. Psychiatric: cooperative with examination, no psychotic behavior noted. Neurologic  Mental status:   orientation to person, unsure of place/time.     Attention intact as able to attend well to the exam     Language fluent in conversation   Comprehension intact; follows simple commands  Cranial nerves:   CN2: appears to blink to threat bilaterally. CN 3,4,6: she is capable of tracking and crossing midline. Pupils are equal, round, reactive bilaterally. CN7: face symmetric without dysarthria  CN8: hearing grossly intact  CN12: tongue midline with protrusion  Strength: antigravity in all 4 limbs w/out any evidence of focal paresis. Sensory: light touch intact in all 4 extremities. Cerebellar/coordination: no gross ataxia via observation. Formal examination is challenging. Tone: normal in all 4 extremities  Gait: deferred at this time for safety. ROS  Constitutional- No weight loss or fevers  Eyes- No diplopia. No photophobia. Ears/nose/throat- No dysphagia. No Dysarthria  Cardiovascular- No palpitations. No chest pain  Respiratory- No dyspnea. No Cough  Gastrointestinal- No Abdominal pain. No Vomiting. Genitourinary- No incontinence. No urinary retention  Musculoskeletal- No myalgia. No arthralgia  Skin- No rash. No easy bruising. Psychiatric- No depression. No anxiety  Endocrine- No diabetes. No thyroid issues. Hematologic- No bleeding difficulty. No fatigue  Neurologic- No weakness. No Headache. Labs  Glucose 82  Na 144  K 3.8  BUN 16  Cr 0.9    Ammonia 27  TSH 2.31    WBC 6.0K  Hg 11.8  Platelets 586    UA small LE, 7 WBC, 1+ bacteria  Flu negative  COVID negative    Studies  EEG 10/24/22  Focal sharp waves in the right centrotemporal region as well as mild background slowing and disorganization. MRI brain w/wo 10/21/22  Impression   Motion challenge evaluation. No acute stroke, midline shift or mass effect. Stable involutional changes of ventricular prominence. Microvascular disease with evidence of encephalomalacia right posterior MCA   distribution. Impression/Recommendations  Possible seizure in the setting of ?UTI. No further.   We have seen her previously for seizures in the setting of infection. With her hx of dementia and previous R hemispheric encephalomalacia, she does have an increased risk for seizures in general.  Also has some focal sharp waves on EEG. Low dose Keppra was started a few days ago though family apparently thought this was affecting her mental status. Discussed starting VPA w/ hospitalist rather than Keppra though this was never initiated. Would suggest 250 mg VPA BID. We will sign off. Please call back w/ any additional questions or concerns. Thank you.      Andrew White NP  [address] [place] [address] [address] Neurology    A copy of this note was provided for Dr Garrett Helm MD

## 2022-10-26 NOTE — PLAN OF CARE
Problem: Discharge Planning  Goal: Discharge to home or other facility with appropriate resources  Outcome: Progressing  Flowsheets  Taken 10/26/2022 0131  Discharge to home or other facility with appropriate resources:   Identify barriers to discharge with patient and caregiver   Arrange for needed discharge resources and transportation as appropriate   Identify discharge learning needs (meds, wound care, etc)   Refer to discharge planning if patient needs post-hospital services based on physician order or complex needs related to functional status, cognitive ability or social support system  Taken 10/25/2022 2000  Discharge to home or other facility with appropriate resources: Identify barriers to discharge with patient and caregiver     Problem: Pain  Goal: Verbalizes/displays adequate comfort level or baseline comfort level  Outcome: Progressing  Flowsheets (Taken 10/26/2022 0131)  Verbalizes/displays adequate comfort level or baseline comfort level:   Encourage patient to monitor pain and request assistance   Assess pain using appropriate pain scale   Administer analgesics based on type and severity of pain and evaluate response   Implement non-pharmacological measures as appropriate and evaluate response   Consider cultural and social influences on pain and pain management   Notify Licensed Independent Practitioner if interventions unsuccessful or patient reports new pain     Problem: Skin/Tissue Integrity  Goal: Absence of new skin breakdown  Description: 1. Monitor for areas of redness and/or skin breakdown  2. Assess vascular access sites hourly  3. Every 4-6 hours minimum:  Change oxygen saturation probe site  4. Every 4-6 hours:  If on nasal continuous positive airway pressure, respiratory therapy assess nares and determine need for appliance change or resting period.   Outcome: Progressing     Problem: Safety - Adult  Goal: Free from fall injury  Outcome: Progressing  Flowsheets (Taken 10/26/2022 0131)  Free From Fall Injury: Instruct family/caregiver on patient safety     Problem: ABCDS Injury Assessment  Goal: Absence of physical injury  Outcome: Progressing  Flowsheets (Taken 10/26/2022 0131)  Absence of Physical Injury: Implement safety measures based on patient assessment     Problem: Respiratory - Adult  Goal: Achieves optimal ventilation and oxygenation  Outcome: Progressing  Flowsheets (Taken 10/26/2022 0131)  Achieves optimal ventilation and oxygenation:   Assess for changes in respiratory status   Assess for changes in mentation and behavior   Respiratory therapy support as indicated     Problem: Metabolic/Fluid and Electrolytes - Adult  Goal: Electrolytes maintained within normal limits  Outcome: Progressing  Flowsheets  Taken 10/26/2022 0131  Electrolytes maintained within normal limits:   Monitor labs and assess patient for signs and symptoms of electrolyte imbalances   Administer electrolyte replacement as ordered   Monitor response to electrolyte replacements, including repeat lab results as appropriate  Taken 10/25/2022 2000  Electrolytes maintained within normal limits: Monitor labs and assess patient for signs and symptoms of electrolyte imbalances     Problem: Nutrition Deficit:  Goal: Optimize nutritional status  Outcome: Progressing  Flowsheets (Taken 10/26/2022 0131)  Nutrient intake appropriate for improving, restoring, or maintaining nutritional needs:   Assess nutritional status and recommend course of action   Monitor oral intake, labs, and treatment plans   Recommend appropriate diets, oral nutritional supplements, and vitamin/mineral supplements

## 2022-10-26 NOTE — DISCHARGE SUMMARY
Hospital Medicine Discharge Summary    Patient ID: Debbie Robert      Patient's PCP: Nicolas Martin MD    Admit Date: 10/18/2022     Discharge Date:   10/16/2022    Admitting Physician: No admitting provider for patient encounter. Discharge Physician: Elizabeth Keller MD     Discharge Diagnoses: Active Hospital Problems    Diagnosis Date Noted    UTI (urinary tract infection) [N39.0] 10/18/2022     Priority: Medium    Acute encephalopathy [G93.40] 03/29/2021    Essential hypertension [I10] 06/05/2020    Dementia in Alzheimer's disease with delirium ([facility] [address] [G30.9, F02.82] 06/05/2020       The patient was seen and examined on day of discharge and this discharge summary is in conjunction with any daily progress note from day of discharge. Hospital Course: Patient admitted 8 days ago. Admitted with altered mental status described as increasing confusion at home. Patient has baseline dementia. Admission found to have been managed for:    Acute toxic metabolic encephalopathy superimposed on dementia: Resolved at discharge. Thought to be due to possible underlying UTI. Suspected UTI: Completed course of antibiotics IV this admission  Possible seizure in setting of suspected urinary tract infection. Seen in house by neurology. Initially started on a low-dose of Keppra but this was discontinued on account of patient's confusion. Patient back to her baseline mental status. Given her history of seizure activity, recommendations from neurology are to initiate Depakote to 250 mg twice daily. Patient medically stable at discharge    Exam:     /62   Pulse 76   Temp 97.2 °F (36.2 °C) (Axillary)   Resp 18   Ht 5' 3\" (1.6 m)   Wt 173 lb 1 oz (78.5 kg)   SpO2 96%   BMI 30.66 kg/m²     General appearance: Alert, no apparent distress, appears stated age and cooperative. HEENT: Pupils equal, round, and reactive to light. Conjunctivae/corneas clear.   Respiratory:  Normal respiratory effort. Clear to auscultation  Cardiovascular: Regular rate and rhythm with normal S1/S2   Abdomen: Soft, non-tender  Musculoskelatal: No edema bilaterally. Consults:     IP CONSULT TO NEUROLOGY  IP CONSULT TO PALLIATIVE CARE    Significant Diagnostic Studies:       PCP/SNF to follow up:     Disposition:  ECF     Discharge Instructions/Follow-up:  PCP    Code Status:  DNR-CCA     Activity: activity as tolerated    Diet: regular diet    Labs:  For convenience and continuity at follow-up the following most recent labs are provided:      CBC:    Lab Results   Component Value Date/Time    WBC 6.0 10/25/2022 04:21 AM    HGB 11.8 10/25/2022 04:21 AM    HCT 35.9 10/25/2022 04:21 AM     10/25/2022 04:21 AM       Renal:    Lab Results   Component Value Date/Time     10/25/2022 04:21 AM    K 3.8 10/25/2022 04:21 AM    K 4.8 10/19/2022 04:26 AM     10/25/2022 04:21 AM    CO2 23 10/25/2022 04:21 AM    BUN 16 10/25/2022 04:21 AM    CREATININE 0.9 10/25/2022 04:21 AM    CALCIUM 9.9 10/25/2022 04:21 AM    PHOS 2.6 10/25/2022 04:21 AM       Discharge Medications:     Current Discharge Medication List             Details   melatonin 3 MG TABS tablet Take 3 mg by mouth nightly as needed for Sleep      metoprolol succinate (TOPROL XL) 25 MG extended release tablet Take 1 tablet by mouth daily  Qty: 30 tablet, Refills: 1    Comments: Please ensure further refills be directed to pt's PCP      furosemide (LASIX) 20 MG tablet Take 1 tablet by mouth every other day  Qty: 30 tablet, Refills: 3      Cranberry 250 MG CHEW Take 2 tablets by mouth 2 times daily (with meals)  Qty: 120 tablet, Refills: 0      rivaroxaban (XARELTO) 15 MG TABS tablet Take 1 tablet by mouth daily (with breakfast)  Qty: 90 tablet, Refills: 1      vitamin D 25 MCG (1000 UT) CAPS Take 1,000 Units by mouth daily      albuterol sulfate HFA (PROVENTIL;VENTOLIN;PROAIR) 108 (90 Base) MCG/ACT inhaler Inhale 2 puffs into the lungs every 6 hours as needed for Wheezing      mometasone-formoterol (DULERA) 200-5 MCG/ACT inhaler Inhale 2 puffs into the lungs 2 times daily      Multiple Vitamins-Minerals (MULTIVITAMIN WITH MINERALS) tablet Take 1 tablet by mouth daily      calcium carbonate (OSCAL) 500 MG TABS tablet Take 500 mg by mouth daily             Time Spent on discharge is more than  40 mins  in the examination, evaluation, counseling and review of medications and discharge plan. Signed:    Jon Hammer MD   10/26/2022      Thank you Ap Taylor MD for the opportunity to be involved in this patient's care. If you have any questions or concerns please feel free to contact me at [phone].

## 2022-10-26 NOTE — PROGRESS NOTES
Occupational Therapy  Facility/Department: [facility] PROGRESSIVE CARE  Occupational Therapy Daily Treatment Note    Name: Janneth Juárez  : 1931  MRN: [id number]  Date of Service: 10/26/2022    Discharge Recommendations:  Patient would benefit from continued therapy after discharge, 3-5 sessions per week  OT Equipment Recommendations  Other: defer to [address] [place] [address] scored a 10/24 on the AM-PAC ADL Inpatient form. Current research shows that an AM-PAC score of 17 or less is typically not associated with a discharge to the patient's home setting. Based on the patient's AM-PAC score and their current ADL deficits, it is recommended that the patient have 3-5 sessions per week of Occupational Therapy at d/c to increase the patient's independence. Please see assessment section for further patient specific details. If patient discharges prior to next session this note will serve as a discharge summary. Please see below for the latest assessment towards goals. Patient Diagnosis(es): The primary encounter diagnosis was Altered mental status, unspecified altered mental status type. A diagnosis of Hypothermia, initial encounter was also pertinent to this visit. 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 Past Surgical History:  has no past surgical history on file. Assessment   Performance deficits / Impairments: Decreased functional mobility ; Decreased strength;Decreased endurance;Decreased ADL status; Decreased safe awareness;Decreased high-level IADLs;Decreased balance;Decreased cognition;Decreased ROM  Assessment: 79 y/o female admitted 10/18/2022 with AMS. Per chart, PTA pt lives at home with family, requires assist with some ADLs, and can ambulate with walker. Today, pt very [place] and oriented to self only.  Pt required max A x 2 for bed mobility and mod A x2 to stand to stedy to transfer to chair. Pt with posterior lean and unsafe to attempt ambulation this date. Anticipate pt will require up to max A for ADLs. Pt will benefit from skilled therapy prior to returning home. Prognosis: Fair  REQUIRES OT FOLLOW-UP: Yes  Activity Tolerance  Activity Tolerance: Treatment limited secondary to decreased cognition        Plan   Occupational Therapy Plan  Times Per Week: 3-5  Current Treatment Recommendations: Strengthening, ROM, Balance training, Functional mobility training, Endurance training, Cognitive reorientation, Gait training, Self-Care / ADL     Restrictions  Restrictions/Precautions  Restrictions/Precautions: Fall Risk (contact precautions from 2021)  Position Activity Restriction  Other position/activity restrictions: dementia    Subjective   General  Chart Reviewed: Yes  Patient assessed for rehabilitation services?: Yes  Additional Pertinent Hx: Per H&P: \"80 y.o. female who presented to [facility] [place] with altered mental status. Patient was brought in by her son after she has had increasing confusion at home. Patient has some baseline dementia but over the last several days she has been much more disoriented. She has been yelling and hard to control. This is not typical for her. Family reports that this is usually the result of urinary tract infection. \"  Family / Caregiver Present: No  Referring Practitioner: Cata Mo MD  Diagnosis: AMS, possible UTI  Subjective  Subjective: Pt seen bedside, awake and eating lunch. Pt very [place].  Pt with flat affect and appears to have fear of falling with activity  General Comment  Comments: Per RN ok for therapy     Social/Functional History  Social/Functional History  Lives With: Family (Son Javier Espinosa) and his partner Britton Donnelly))  Type of Home: House  Home Layout: One level  Home Access: Stairs to enter with rails  Entrance Stairs - Number of Steps: 4-5  Bathroom Shower/Tub: Walk-in shower  Bathroom Toilet: Standard  Bathroom Equipment: Grab bars in shower, Shower chair  Home Equipment: Walker, 4 wheeled, Clemon Gambles  ADL Assistance:  (Son assists with bathing/dressing/toileting - patient feeds self)  Homemaking Responsibilities: No (son completes IADLs)  Ambulation Assistance: Needs assistance (with rollator)  Transfer Assistance: Needs assistance  Active : No  Additional Comments: above info from previous therapy note- pt has dementia so unable to verify       Objective   Safety Devices  Type of Devices: Nurse notified;Call light within reach; All bandar prominences offloaded;Gait belt; Chair alarm in place; Left in chair        AROM: Generally decreased, functional  Strength: Generally decreased, functional  Coordination: Within functional limits    ADL  Feeding: Setup (feeds self after set up)  Toileting: Dependent/Total (incontinent of urine- total A to cleanse pt and radha clean brief)     Activity Tolerance  Activity Tolerance: Patient limited by fatigue;Treatment limited secondary to decreased cognition    Bed mobility  Supine to Sit: Moderate assistance;Maximum assistance;2 Person assistance (HOB elevated)  Sit to Supine:  (pt in chair at end of session)    Transfers  Sit to stand: Moderate assistance;2 Person assistance  Stand to sit: 2 Person assistance; Moderate assistance  Transfer Comments: Pt stood 2x from EOB > stedy with mod A x 2. Pt requires assist to place UEs on crossbar of stedy. Pt has difficult time standing erect enough to bring stedy seat around pt. Unsafe to attempt ambulation at this time 2/2 standing balance/tolerance    Vision  Vision: Within Functional Limits  Hearing  Hearing: Exceptions to [facility]  Hearing Exceptions: Hard of hearing/hearing concerns    Cognition  Overall Cognitive Status: Exceptions  Arousal/Alertness:  (very [place])  Following Commands: Inconsistently follows commands; Follows one step commands with increased time; Follows one step commands with repetition  Attention Span: Difficulty dividing attention  Memory: Decreased short term memory;Decreased long term memory  Safety Judgement: Decreased awareness of need for safety;Decreased awareness of need for assistance  Insights: Decreased awareness of deficits  Initiation: Requires cues for some  Sequencing: Requires cues for some  Orientation  Overall Orientation Status: Impaired  Orientation Level: Oriented to person;Disoriented to place; Disoriented to time;Disoriented to situation       Education Given To: Patient;Caregiver  Education Provided: Role of Therapy;Plan of Care;Transfer Training  Education Method: Demonstration;Verbal  Barriers to Learning: Cognition  Education Outcome: Continued education needed       AM-PAC Score  AM-PAC Inpatient Daily Activity Raw Score: 10 (10/26/22 1414)  AM-PAC Inpatient ADL T-Scale Score : 27.31 (10/26/22 1414)  ADL Inpatient CMS 0-100% Score: 74.7 (10/26/22 1414)  ADL Inpatient CMS G-Code Modifier : CL (10/26/22 1414)    Goals  Short Term Goals  Time Frame for Short Term Goals: Prior to DC: goals ongoing  Short Term Goal 1: Pt will complete bed mobility with mod A in prep for ADL transfers  Short Term Goal 2: Pt will tolerate sitting EOB ~ 3 min with min A  Short Term Goal 3: Pt will complete sit <> stand transfer with STEDY and mod A x 2  Short Term Goal 4: Pt will complete grooming tasks with set up  Patient Goals   Patient goals : no goal stated       Therapy Time   Individual Concurrent Group Co-treatment   Time In 1345         Time Out 1415         Minutes 30         Timed Code Treatment Minutes: 30 Minutes     This note to serve as OT d/c summary if pt is d/c-ed prior to next therapy session.     John Siegel, OTR/L

## 2022-10-26 NOTE — PROGRESS NOTES
Physical Therapy  Facility/Department: [address] PROGRESSIVE CARE  Physical Therapy Treatment Note    Name: Tiffani Johnson  : 1931  MRN: [id number]  Date of Service: 10/26/2022    Discharge Recommendations:  Patient would benefit from continued therapy after discharge (3-5x/wk)   PT Equipment Recommendations  Equipment Needed: No  Other: defer to next level of care    Tiffani Johnson scored a 6/24 on the AM-PAC short mobility form. Current research shows that an AM-PAC score of 17 or less is typically not associated with a discharge to the patient's home setting. Based on the patient's AM-PAC score and their current functional mobility deficits, it is recommended that the patient have 3-5 sessions per week of Physical Therapy at d/c to increase the patient's independence. Please see assessment section for further patient specific details. If patient discharges prior to next session this note will serve as a discharge summary. Please see below for the latest assessment towards goals. Patient Diagnosis(es): The primary encounter diagnosis was Altered mental status, unspecified altered mental status type. A diagnosis of Hypothermia, initial encounter was also pertinent to this visit. 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 Past Surgical History:  has no past surgical history on file. Assessment   Body Structures, Functions, Activity Limitations Requiring Skilled Therapeutic Intervention: Decreased functional mobility ; Decreased safe awareness;Decreased cognition;Decreased balance  Assessment: Pt is a 79 y/o female with hx of dementia who presented to the ED with AMS. Possible UTI. Pt lives at home with her son and his partner and according to prior therapy notes, pt ambulates with rollator at baseline.  Pt more awake this date but continues to not be able to follow commands for mobility tasks and needs mod A of 2 for bed mob and transfers with diamond davis lift; Pt's son now in agreement for pt to go to SNF; recommend continued therapy 3-5x/wk at discharge to address deficits to allow pt to regain her premorbid status  Treatment Diagnosis: functional mobility below  baseline  Therapy Prognosis: Guarded  Decision Making: Medium Complexity  History: Per Jennifer Miller MD \"59 y.o. female who presented [facility] [place] with altered mental status. Patient was brought in by her son after she has had increasing confusion at home. Patient has some baseline dementia but over the last several days she has been much more disoriented. She has been yelling and hard to control. This is not typical for her. Family reports that this is usually the result of urinary tract infection. \"  Barriers to Learning: cognition, intermittent agitation, [place]  Requires PT Follow-Up: Yes  Activity Tolerance  Activity Tolerance: Patient limited by fatigue;Treatment limited secondary to decreased cognition     Plan   Physcial Therapy Plan  General Plan: 3-5 times per week  Current Treatment Recommendations: Balance training, Functional mobility training, Strengthening, ROM, Transfer training, Neuromuscular re-education, Gait training, Endurance training, Safety education & training, Patient/Caregiver education & training, Home exercise program, Equipment evaluation, education, & procurement, Cognitive reorientation  Safety Devices  Type of Devices: Nurse notified, Call light within reach, All bandar prominences offloaded, Gait belt, Chair alarm in place, Left in chair  Restraints  Restraints Initially in Place: No     Restrictions  Restrictions/Precautions  Restrictions/Precautions: Fall Risk (contact precautions from 2021)  Position Activity Restriction  Other position/activity restrictions: dementia     Subjective   General  Chart Reviewed: Yes  Patient assessed for rehabilitation services?: Yes  Additional Pertinent Hx: Per Marissa Hernandez MD \"23 y.o. female who presented to [facility] [place] with altered mental status. Patient was brought in by her son after she has had increasing confusion at home. Patient has some baseline dementia but over the last several days she has been much more disoriented. She has been yelling and hard to control. This is not typical for her. Family reports that this is usually the result of urinary tract infection. \"  Response To Previous Treatment: Patient with no complaints from previous session. Family / Caregiver Present: No  Referring Practitioner: Robles Higgins MD  Referral Date : 10/21/22  Diagnosis: Acute toxic metabolic encephalopathy  Subjective  Subjective: ptmore awake today; sitting in bed feeding herself upon arrival         Social/Functional History  Social/Functional History  Lives With: Family (Son Fernando Waddell) and his partner Regis Dubin))  Type of Home: House  Home Layout: One level  Home Access: Stairs to enter with rails  Entrance Stairs - Number of Steps: 4-5  Bathroom Shower/Tub: Walk-in shower  Bathroom Toilet: Standard  Bathroom Equipment: Grab bars in shower, Shower chair  Home Equipment: Walker, 4 wheeled, Ernesto Tolar  ADL Assistance:  (Son assists with bathing/dressing/toileting - patient feeds self)  Homemaking Responsibilities: No (son completes IADLs)  Ambulation Assistance: Needs assistance (with rollator)  Transfer Assistance: Needs assistance  Active : No  Additional Comments: above info from previous therapy note- pt has dementia so unable to verify  Vision/Hearing  Vision  Vision: Within Functional Limits  Hearing  Hearing: Exceptions to [facility]  Hearing Exceptions: Hard of hearing/hearing concerns    Cognition   Orientation  Overall Orientation Status: Impaired  Orientation Level: Oriented to person;Disoriented to place; Disoriented to time;Disoriented to situation  Cognition  Overall Cognitive Status: Exceptions  Arousal/Alertness:  (very [place])  Following Commands: Inconsistently follows commands; Follows one step commands with increased time; Follows one step commands with repetition  Attention Span: Difficulty dividing attention  Memory: Decreased short term memory;Decreased long term memory  Safety Judgement: Decreased awareness of need for safety;Decreased awareness of need for assistance  Insights: Decreased awareness of deficits  Initiation: Requires cues for some  Sequencing: Requires cues for some     Objective         Bed mobility  Supine to Sit: Moderate assistance;Maximum assistance;2 Person assistance (HOB elevated)  Sit to Supine:  (pt in chair at end of session)  Transfers  Sit to Stand:  Moderate Assistance;2 Person Assistance (from EOB to stedy)  Stand to Sit: Moderate Assistance;2 Person Assistance (from stedy to chair)  Comment: pt's don pad and adult brief saturated with urine as purewick leaking; changed adult brief and removed purewick; pt settled in chair for comfort with lunch tray in front and LEs elevated; pt feeding herself upon leaving room        Balance  Comments: varried sitting balance from max A to CGA with pt leaning back posteriorly; mod A of 2 for briefly standing on stedy           OutComes Score                                                  AM-PAC Score  AM-PAC Inpatient Mobility Raw Score : 6 (10/24/22 1147)  AM-PAC Inpatient T-Scale Score : 23.55 (10/24/22 1147)  Mobility Inpatient CMS 0-100% Score: 100 (10/24/22 1147)  Mobility Inpatient CMS G-Code Modifier : CN (10/24/22 1147)          [place] Score       Goals  Short Term Goals  Time Frame for Short Term Goals: upon d/c  Short Term Goal 1: bed mobility minAx2  Short Term Goal 2: transfers minAx2  Short Term Goal 3: initiate gait as able  Patient Goals   Patient Goals : unable to state due to cognition       Education  Patient Education  Education Given To: Patient (pt does not appear to understand education attempts)      Therapy Time   Individual Concurrent Group Co-treatment   Time In 1345         Time Out 26         Minutes 30                 EUSEBIO DUKE, PT   Electronically signed by EUSEBIO DUKE PT on 10/26/2022 at 2:21 PM

## 2022-11-10 NOTE — PROGRESS NOTES
Physician Progress Note      Mari Song  CSN #:                  [id number]  :                       [id number]  ADMIT DATE:       10/18/2022 10:08 AM  DISCH DATE:        10/26/2022 4:58 PM  RESPONDING  PROVIDER #:        Heather Nance MD          QUERY TEXT:    Pt admitted with UTI. Pt noted to have encephalopathy, hypotension,   hypothermia  . If possible, please document in the progress notes and   discharge summary if you are evaluating and /or treating any of the following: The medical record reflects the following:  Risk Factors: uti, metabolic encephalopathy, hx mdro  Clinical Indicators: On admission temp 94.2r,  hr-95, b/p  93/58  Presents   with  more combative and agitated and actually hallucinating this weekend. lactic acid-4.9   u/a sm leuks, wbc 7, 1+bact    no cx performed. Treatment: ns bolus, ivf, iv maxipime x3 days, no abx on discharge    Thank you, Kelsey Ellis RN CDS CRCR CCDS  [email or web address]  Options provided:  -- Sepsis, due to UTI present on admission  -- UTI,  without Sepsis  -- Other - I will add my own diagnosis  -- Disagree - Not applicable / Not valid  -- Disagree - Clinically unable to determine / Unknown  -- Refer to Clinical Documentation Reviewer    PROVIDER RESPONSE TEXT:    This patient has UTI, without Sepsis. Query created by:  Makenzie Ellis on 11/3/2022 2:24 PM      Electronically signed by:  Heather Nance MD 11/10/2022 9:40 AM

## 2022-11-17 PROBLEM — N39.0 UTI (URINARY TRACT INFECTION): Status: RESOLVED | Noted: 2022-10-18 | Resolved: 2022-11-17

## 2022-12-15 ENCOUNTER — APPOINTMENT (OUTPATIENT)
Dept: CT IMAGING | Age: 87
DRG: 291 | End: 2022-12-15
Payer: MEDICARE

## 2022-12-15 ENCOUNTER — HOSPITAL ENCOUNTER (INPATIENT)
Age: 87
LOS: 4 days | Discharge: HOME HEALTH | DRG: 291 | End: 2022-12-19
Attending: EMERGENCY MEDICINE | Admitting: STUDENT IN AN ORGANIZED HEALTH CARE EDUCATION/TRAINING PROGRAM
Payer: MEDICARE

## 2022-12-15 ENCOUNTER — APPOINTMENT (OUTPATIENT)
Dept: GENERAL RADIOLOGY | Age: 87
DRG: 291 | End: 2022-12-15
Payer: MEDICARE

## 2022-12-15 DIAGNOSIS — E86.0 DEHYDRATION: ICD-10-CM

## 2022-12-15 DIAGNOSIS — R41.82 ALTERED MENTAL STATUS, UNSPECIFIED ALTERED MENTAL STATUS TYPE: Primary | ICD-10-CM

## 2022-12-15 PROBLEM — J18.9 PNEUMONIA DUE TO SUSPECTED GRAM-NEGATIVE BACTERIA: Status: ACTIVE | Noted: 2022-12-15

## 2022-12-15 LAB
A/G RATIO: 1.3 (ref 1.1–2.2)
ALBUMIN SERPL-MCNC: 3.5 G/DL (ref 3.4–5)
ALP BLD-CCNC: 208 U/L (ref 40–129)
ALT SERPL-CCNC: 32 U/L (ref 10–40)
ANION GAP SERPL CALCULATED.3IONS-SCNC: 10 MMOL/L (ref 3–16)
AST SERPL-CCNC: 38 U/L (ref 15–37)
BACTERIA: ABNORMAL /HPF
BASE EXCESS VENOUS: -2.4 MMOL/L
BASOPHILS ABSOLUTE: 0 K/UL (ref 0–0.2)
BASOPHILS RELATIVE PERCENT: 0.8 %
BILIRUB SERPL-MCNC: 0.8 MG/DL (ref 0–1)
BILIRUBIN URINE: NEGATIVE
BLOOD, URINE: NEGATIVE
BUN BLDV-MCNC: 30 MG/DL (ref 7–20)
CALCIUM SERPL-MCNC: 10.2 MG/DL (ref 8.3–10.6)
CARBOXYHEMOGLOBIN: 1.2 %
CHLORIDE BLD-SCNC: 108 MMOL/L (ref 99–110)
CLARITY: CLEAR
CO2: 22 MMOL/L (ref 21–32)
COLOR: ABNORMAL
CREAT SERPL-MCNC: 1.1 MG/DL (ref 0.6–1.2)
EOSINOPHILS ABSOLUTE: 0 K/UL (ref 0–0.6)
EOSINOPHILS RELATIVE PERCENT: 0.6 %
EPITHELIAL CELLS, UA: 0 /HPF (ref 0–5)
GFR SERPL CREATININE-BSD FRML MDRD: 47 ML/MIN/{1.73_M2}
GLUCOSE BLD-MCNC: 93 MG/DL (ref 70–99)
GLUCOSE URINE: NEGATIVE MG/DL
HCO3 VENOUS: 24 MMOL/L (ref 23–29)
HCT VFR BLD CALC: 34.9 % (ref 36–48)
HEMOGLOBIN: 11.1 G/DL (ref 12–16)
HYALINE CASTS: 2 /LPF (ref 0–8)
KETONES, URINE: NEGATIVE MG/DL
LACTIC ACID: 1.5 MMOL/L (ref 0.4–2)
LEUKOCYTE ESTERASE, URINE: NEGATIVE
LYMPHOCYTES ABSOLUTE: 0.8 K/UL (ref 1–5.1)
LYMPHOCYTES RELATIVE PERCENT: 14.7 %
MAGNESIUM: 1.8 MG/DL (ref 1.8–2.4)
MCH RBC QN AUTO: 27.6 PG (ref 26–34)
MCHC RBC AUTO-ENTMCNC: 31.7 G/DL (ref 31–36)
MCV RBC AUTO: 87 FL (ref 80–100)
METHEMOGLOBIN VENOUS: 0.8 %
MICROSCOPIC EXAMINATION: YES
MONOCYTES ABSOLUTE: 0.7 K/UL (ref 0–1.3)
MONOCYTES RELATIVE PERCENT: 12 %
NEUTROPHILS ABSOLUTE: 4 K/UL (ref 1.7–7.7)
NEUTROPHILS RELATIVE PERCENT: 71.9 %
NITRITE, URINE: NEGATIVE
O2 SAT, VEN: 86 %
O2 THERAPY: ABNORMAL
PCO2, VEN: 46 MMHG (ref 40–50)
PDW BLD-RTO: 16.5 % (ref 12.4–15.4)
PH UA: 7 (ref 5–8)
PH VENOUS: 7.32 (ref 7.35–7.45)
PLATELET # BLD: 134 K/UL (ref 135–450)
PMV BLD AUTO: 11.1 FL (ref 5–10.5)
PO2, VEN: 56 MMHG
POTASSIUM SERPL-SCNC: 4.8 MMOL/L (ref 3.5–5.1)
PRO-BNP: 4053 PG/ML (ref 0–449)
PROCALCITONIN: 0.06 NG/ML (ref 0–0.15)
PROTEIN UA: ABNORMAL MG/DL
RAPID INFLUENZA  B AGN: NEGATIVE
RAPID INFLUENZA A AGN: NEGATIVE
RBC # BLD: 4.01 M/UL (ref 4–5.2)
RBC UA: 0 /HPF (ref 0–4)
SARS-COV-2, NAAT: NOT DETECTED
SODIUM BLD-SCNC: 140 MMOL/L (ref 136–145)
SPECIFIC GRAVITY UA: 1.02 (ref 1–1.03)
TCO2 CALC VENOUS: 25 MMOL/L
TOTAL PROTEIN: 6.1 G/DL (ref 6.4–8.2)
URINE REFLEX TO CULTURE: ABNORMAL
URINE TYPE: ABNORMAL
UROBILINOGEN, URINE: 1 E.U./DL
WBC # BLD: 5.6 K/UL (ref 4–11)
WBC UA: 6 /HPF (ref 0–5)

## 2022-12-15 PROCEDURE — 6360000002 HC RX W HCPCS: Performed by: STUDENT IN AN ORGANIZED HEALTH CARE EDUCATION/TRAINING PROGRAM

## 2022-12-15 PROCEDURE — 81001 URINALYSIS AUTO W/SCOPE: CPT

## 2022-12-15 PROCEDURE — 71045 X-RAY EXAM CHEST 1 VIEW: CPT

## 2022-12-15 PROCEDURE — 87635 SARS-COV-2 COVID-19 AMP PRB: CPT

## 2022-12-15 PROCEDURE — 84145 PROCALCITONIN (PCT): CPT

## 2022-12-15 PROCEDURE — 80053 COMPREHEN METABOLIC PANEL: CPT

## 2022-12-15 PROCEDURE — P9612 CATHETERIZE FOR URINE SPEC: HCPCS

## 2022-12-15 PROCEDURE — 87804 INFLUENZA ASSAY W/OPTIC: CPT

## 2022-12-15 PROCEDURE — 2580000003 HC RX 258: Performed by: EMERGENCY MEDICINE

## 2022-12-15 PROCEDURE — 83880 ASSAY OF NATRIURETIC PEPTIDE: CPT

## 2022-12-15 PROCEDURE — 82803 BLOOD GASES ANY COMBINATION: CPT

## 2022-12-15 PROCEDURE — 83605 ASSAY OF LACTIC ACID: CPT

## 2022-12-15 PROCEDURE — 71250 CT THORAX DX C-: CPT

## 2022-12-15 PROCEDURE — 1200000000 HC SEMI PRIVATE

## 2022-12-15 PROCEDURE — 85025 COMPLETE CBC W/AUTO DIFF WBC: CPT

## 2022-12-15 PROCEDURE — 2580000003 HC RX 258: Performed by: STUDENT IN AN ORGANIZED HEALTH CARE EDUCATION/TRAINING PROGRAM

## 2022-12-15 PROCEDURE — 70450 CT HEAD/BRAIN W/O DYE: CPT

## 2022-12-15 PROCEDURE — 83735 ASSAY OF MAGNESIUM: CPT

## 2022-12-15 PROCEDURE — 99285 EMERGENCY DEPT VISIT HI MDM: CPT

## 2022-12-15 PROCEDURE — 93005 ELECTROCARDIOGRAM TRACING: CPT | Performed by: EMERGENCY MEDICINE

## 2022-12-15 RX ORDER — ALBUTEROL SULFATE 90 UG/1
2 AEROSOL, METERED RESPIRATORY (INHALATION) EVERY 6 HOURS PRN
Status: DISCONTINUED | OUTPATIENT
Start: 2022-12-15 | End: 2022-12-19 | Stop reason: HOSPADM

## 2022-12-15 RX ORDER — SODIUM CHLORIDE 0.9 % (FLUSH) 0.9 %
5-40 SYRINGE (ML) INJECTION EVERY 12 HOURS SCHEDULED
Status: DISCONTINUED | OUTPATIENT
Start: 2022-12-15 | End: 2022-12-19 | Stop reason: HOSPADM

## 2022-12-15 RX ORDER — ENOXAPARIN SODIUM 100 MG/ML
40 INJECTION SUBCUTANEOUS DAILY
Status: CANCELLED | OUTPATIENT
Start: 2022-12-15

## 2022-12-15 RX ORDER — METOPROLOL SUCCINATE 25 MG/1
25 TABLET, EXTENDED RELEASE ORAL DAILY
Status: DISCONTINUED | OUTPATIENT
Start: 2022-12-16 | End: 2022-12-19 | Stop reason: HOSPADM

## 2022-12-15 RX ORDER — SODIUM CHLORIDE 9 MG/ML
INJECTION, SOLUTION INTRAVENOUS PRN
Status: DISCONTINUED | OUTPATIENT
Start: 2022-12-15 | End: 2022-12-19 | Stop reason: HOSPADM

## 2022-12-15 RX ORDER — ACETAMINOPHEN 325 MG/1
650 TABLET ORAL EVERY 6 HOURS PRN
Status: DISCONTINUED | OUTPATIENT
Start: 2022-12-15 | End: 2022-12-19 | Stop reason: HOSPADM

## 2022-12-15 RX ORDER — ACETAMINOPHEN 650 MG/1
650 SUPPOSITORY RECTAL EVERY 6 HOURS PRN
Status: DISCONTINUED | OUTPATIENT
Start: 2022-12-15 | End: 2022-12-19 | Stop reason: HOSPADM

## 2022-12-15 RX ORDER — ONDANSETRON 2 MG/ML
4 INJECTION INTRAMUSCULAR; INTRAVENOUS EVERY 6 HOURS PRN
Status: DISCONTINUED | OUTPATIENT
Start: 2022-12-15 | End: 2022-12-19 | Stop reason: HOSPADM

## 2022-12-15 RX ORDER — 0.9 % SODIUM CHLORIDE 0.9 %
1000 INTRAVENOUS SOLUTION INTRAVENOUS ONCE
Status: COMPLETED | OUTPATIENT
Start: 2022-12-15 | End: 2022-12-15

## 2022-12-15 RX ORDER — METOPROLOL SUCCINATE 25 MG/1
25 TABLET, EXTENDED RELEASE ORAL DAILY
COMMUNITY

## 2022-12-15 RX ORDER — FUROSEMIDE 20 MG/1
20 TABLET ORAL DAILY
Status: DISCONTINUED | OUTPATIENT
Start: 2022-12-16 | End: 2022-12-16

## 2022-12-15 RX ORDER — SODIUM CHLORIDE 0.9 % (FLUSH) 0.9 %
5-40 SYRINGE (ML) INJECTION PRN
Status: DISCONTINUED | OUTPATIENT
Start: 2022-12-15 | End: 2022-12-19 | Stop reason: HOSPADM

## 2022-12-15 RX ADMIN — SODIUM CHLORIDE 1000 ML: 9 INJECTION, SOLUTION INTRAVENOUS at 18:45

## 2022-12-15 RX ADMIN — CEFEPIME HYDROCHLORIDE 2000 MG: 2 INJECTION, POWDER, FOR SOLUTION INTRAVENOUS at 20:41

## 2022-12-15 RX ADMIN — SODIUM CHLORIDE 1000 ML: 9 INJECTION, SOLUTION INTRAVENOUS at 19:00

## 2022-12-15 ASSESSMENT — PAIN SCALES - GENERAL: PAINLEVEL_OUTOF10: 0

## 2022-12-15 NOTE — ED PROVIDER NOTES
[facility]  eMERGENCY dEPARTMENTeNCOUnter      Pt Name: Leah Lafleur  MRN: [id number]  [place] 5/9/1931  Date ofevaluation: 12/15/2022  Provider: Lakeshia Francois MD    CHIEF COMPLAINT       Chief Complaint   Patient presents with    Altered Mental Status     Per EMS family states pt is altered            HISTORY OF PRESENT ILLNESS   (Location/Symptom, Timing/Onset,Context/Setting, Quality, Duration, Modifying Factors, Severity)  Note limiting factors. Leah Lafleur is a 80 y.o. female who  has a past medical history of Alzheimer's dementia ([facility] [address] Atrial fibrillation ([facility] [address] Benign essential HTN, Gout, and Multiple drug resistant organism (MDRO) culture positive. presents to the emergency department with EMS for evaluation of altered mental status. EMS states that patient became altered around 1 PM this afternoon. States that the patient has history of urinary tract infections that cause altered mental status similar to today. EMS denies any family stating that there was no fall or any trauma. Patient does arrive somnolent but arouses to verbal stimulus. Patient cannot provide much history or self with history of Alzheimer's as well as altered mental status at this time. Patient is moving all extremities. No increased work of breathing. No family at bedside on arrival to provide history. The history is provided by the EMS personnel and medical records. NursingNotes were reviewed. Pt was seen during the [place] 19 pandemic. Appropriate PPE worn by ME during patient encounters. Patient was cared for during a time with constrained hospital bed capacity with nationwide stress on resources and staffing. REVIEW OF SYSTEMS    (2-9 systems for level 4, 10 or more for level 5)     Review of Systems   Unable to perform ROS: Mental status change   Psychiatric/Behavioral:  Positive for confusion and decreased concentration.       Except as noted above the remainder of the review of systems was reviewed and negative. PAST MEDICAL HISTORY     Past Medical History:   Diagnosis Date    Alzheimer's dementia [facility])     Atrial fibrillation ([address]     Benign essential HTN     Gout     Multiple drug resistant organism (MDRO) culture positive 11/04/2021    Escherichia coli. Urine         SURGICALHISTORY     No past surgical history on file. CURRENT MEDICATIONS       Previous Medications    ALBUTEROL SULFATE HFA (PROVENTIL;VENTOLIN;PROAIR) 108 (90 BASE) MCG/ACT INHALER    Inhale 2 puffs into the lungs every 6 hours as needed for Wheezing    CALCIUM CARBONATE (OSCAL) 500 MG TABS TABLET    Take 500 mg by mouth daily    CRANBERRY 250 MG CHEW    Take 2 tablets by mouth 2 times daily (with meals)    DIVALPROEX (DEPAKOTE) 250 MG DR TABLET    Take 1 tablet by mouth every 12 hours    FUROSEMIDE (LASIX) 20 MG TABLET    Take 1 tablet by mouth every other day    MELATONIN 3 MG TABS TABLET    Take 3 mg by mouth nightly as needed for Sleep    METOPROLOL TARTRATE (LOPRESSOR) 25 MG TABLET    Take 0.5 tablets by mouth 2 times daily    MOMETASONE-FORMOTEROL (DULERA) 200-5 MCG/ACT INHALER    Inhale 2 puffs into the lungs 2 times daily    MULTIPLE VITAMINS-MINERALS (MULTIVITAMIN WITH MINERALS) TABLET    Take 1 tablet by mouth daily    RIVAROXABAN (XARELTO) 15 MG TABS TABLET    Take 1 tablet by mouth daily (with breakfast)    VITAMIN D 25 MCG (1000 UT) CAPS    Take 1,000 Units by mouth daily            Chlorpromazine and Hydrochlorothiazide    FAMILY HISTORY     No family history on file.        SOCIAL HISTORY       Social History     Socioeconomic History    Marital status: Single   Tobacco Use    Smoking status: Never    Smokeless tobacco: Never   Vaping Use    Vaping Use: Never used   Substance and Sexual Activity    Alcohol use: Never    Drug use: Never    Sexual activity: Not Currently       SCREENINGS             PHYSICAL EXAM    (up to 7 for level 4, 8 or more for level 5)     ED Triage Vitals BP Temp Temp src Pulse Resp SpO2 Height Weight   -- -- -- -- -- -- -- --       Physical Exam  Vitals and nursing note reviewed. Constitutional:       Appearance: She is well-developed. She is obese. She is ill-appearing (chronically). She is not diaphoretic. HENT:      Head: Normocephalic and atraumatic. Mouth/Throat:      Mouth: Mucous membranes are moist.      Pharynx: Oropharynx is clear. Eyes:      Extraocular Movements: Extraocular movements intact. Conjunctiva/sclera: Conjunctivae normal.      Pupils: Pupils are equal, round, and reactive to light. Cardiovascular:      Rate and Rhythm: Normal rate. Rhythm irregularly irregular. Pulses: Normal pulses. Heart sounds: Normal heart sounds. Pulmonary:      Effort: Pulmonary effort is normal. No respiratory distress. Breath sounds: Normal breath sounds. No wheezing or rales. Abdominal:      General: Bowel sounds are normal. There is no distension. Palpations: Abdomen is soft. Tenderness: There is no abdominal tenderness. There is no guarding or rebound. Musculoskeletal:         General: No tenderness. Normal range of motion. Cervical back: Normal range of motion and neck supple. Right lower le+ Pitting Edema present. Left lower le+ Pitting Edema present. Skin:     General: Skin is warm and dry. Capillary Refill: Capillary refill takes less than 2 seconds. Findings: No bruising or erythema. Neurological:      Mental Status: She is lethargic, disoriented and confused. GCS: GCS eye subscore is 4. GCS verbal subscore is 4. GCS motor subscore is 6. Cranial Nerves: Cranial nerves 2-12 are intact. No cranial nerve deficit. Deep Tendon Reflexes: Reflexes are normal and symmetric.       Comments: Normal  strength bilateral hands, withdraws from pain in bilateral lower extremities    Difficult to get to follow commands but also very hard of hearing, she is able to follow commands if you are very close to her while yelling       RESULTS     EKG: All EKG's are interpreted by the Emergency Department Physician who either signs or Co-signsthis chart in the absence of a cardiologist.    A. fib with slow response at a rate of 57, R axis XV, QRS 94, QTc 434, no ST elevations, nonspecific ST changes, T wave inversion lead III and aVF, no ST depressions, no acute change compared EKG from 10/18/2022    RADIOLOGY:   Non-plain filmimages such as CT, Ultrasound and MRI are read by the radiologist. Plain radiographic images are visualized and preliminarily interpreted by the emergency physician with the below findings:      Interpretation per the Radiologist below, if available at the time ofthis note:    XR CHEST PORTABLE   Final Result   Mild cardiomegaly and mild central pulmonary congestion which is more   prominent. Hazy bibasilar opacities and small bibasilar pleural effusions which is more   prominent.                ED BEDSIDE ULTRASOUND:   Performed by ED Physician - none    LABS:  Labs Reviewed   CBC WITH AUTO DIFFERENTIAL - Abnormal; Notable for the following components:       Result Value    Hemoglobin 11.1 (*)     Hematocrit 34.9 (*)     RDW 16.5 (*)     Platelets 613 (*)     MPV 11.1 (*)     Lymphocytes Absolute 0.8 (*)     All other components within normal limits   COMPREHENSIVE METABOLIC PANEL - Abnormal; Notable for the following components:    BUN 30 (*)     Est, Glom Filt Rate 47 (*)     Total Protein 6.1 (*)     Alkaline Phosphatase 208 (*)     AST 38 (*)     All other components within normal limits   URINALYSIS WITH REFLEX TO CULTURE - Abnormal; Notable for the following components:    Color, UA DARK YELLOW (*)     Protein, UA TRACE (*)     All other components within normal limits   BLOOD GAS, VENOUS - Abnormal; Notable for the following components:    pH, Alb[place] 7.322 (*)     All other components within normal limits   MICROSCOPIC URINALYSIS - Abnormal; Notable for the following components:    WBC, UA 6 (*)     All other components within normal limits   COVID-19, RAPID   RAPID INFLUENZA A/B ANTIGENS   LACTIC ACID   MAGNESIUM   BRAIN NATRIURETIC PEPTIDE   POCT GLUCOSE       All other labs were within normal range or not returned as of this dictation. EMERGENCY DEPARTMENT COURSE and DIFFERENTIAL DIAGNOSIS/MDM:   Vitals:    Vitals:    12/15/22 1730 12/15/22 1755 12/15/22 1810 12/15/22 1825   BP: 105/64 (!) 118/52 (!) 118/36 107/82   Pulse: 63 58 58 54   Resp: 15 15 16 16   Temp: 98.9 °F (37.2 °C)      SpO2: 95% 97% 93% 97%   Weight: 174 lb 9.7 oz (79.2 kg)            MDM  Number of Diagnoses or Management Options  Altered mental status, unspecified altered mental status type  Dehydration  Diagnosis management comments: Ingestion, infectious, trauma, seizure, AMS, electrolytes, encephalopathy, insulin, opiates, uremia, toxins, tumor, thyrotoxicosis, psychiatric, sepsis, stroke, N/V, seizure, headache, elderly age, alcohol / drugs / toxidromes, signs of trauma    Patient seen and evaluated. History physical above. Nontoxic and afebrile. Patient is somnolent but does arouse to verbal stimulus. Very hard of hearing. Moving all extremities and does follow commands after repeated requests. No facial droop. Will start with altered mental status work-up including urinalysis, routine labs, lactic acid, COVID, flu and chest x-ray. VBG. Likely admission for altered mental status. No signs of trauma. Will discuss with family as well when they arrived.        Amount and/or Complexity of Data Reviewed  Clinical lab tests: ordered and reviewed  Tests in the radiology section of CPT®: ordered and reviewed  Tests in the medicine section of CPT®: ordered and reviewed  Obtain history from someone other than the patient: yes  Review and summarize past medical records: yes  Discuss the patient with other providers: yes  Independent visualization of images, tracings, or specimens: yes    Risk of Complications, Morbidity, and/or Mortality  Presenting problems: moderate  Diagnostic procedures: moderate  Management options: moderate          REASSESSMENT     ED Course as of 12/15/22 1918   Thu Dec 15, 2022   1914 Patient's pH on VBG 7.32 with PCO2 of 46 and PO2 of 56. Bicarb 24. Patient's creatinine 1.1 BUN of 30. Sodium 140, potassium 4.8. CO2 of 12 and anion gap of 10. Urinalysis negative for infection. COVID and flu negative. Lactic acid 1.5. WBC 5.6, hemoglobin 11.1. Chest x-ray mild pulmonary vascular congestion. No other acute findings. Patient son at bedside now. Updated on results. He states that the patient is altered from her baseline. He states that she normally eats and drinks with some assistance and can ambulate around the house. States that she has been more confused over the past few days and he thinks she may be dehydrated because she is not eating much. No falls or trauma per patient's son. Recommended admission for altered mental status and dehydration. Son agreeable. Consult placed to hospitalist for admission. [DS]      ED Course User Index  [DS] Salvador Ames MD       Is this patient to be included in the SEP-1 Core Measure due to severe sepsis or septic shock? No   Exclusion criteria - the patient is NOT to be included for SEP-1 Core Measure due to:  2+ SIRS criteria are not met      CRITICAL CARE TIME   Total Critical Care time was 32 minutes, excluding separatelyreportable procedures. There was a high probability ofclinically significant/life threatening deterioration in the patient's condition which required my urgent intervention. AMS       CONSULTS:  IP CONSULT TO HOSPITALIST    PROCEDURES:  Unless otherwise noted below, none     Procedures    I am the Primary Clinician of Record     FINAL IMPRESSION      1. Altered mental status, unspecified altered mental status type    2.  Dehydration          DISPOSITION/PLAN   DISPOSITION Decision To Admit 12/15/2022 07:14:11 PM      PATIENT REFERREDTO:  No follow-up provider specified.     DISCHARGEMEDICATIONS:  New Prescriptions    No medications on file          (Please note that portions of this note were completed with a voice recognition program.  Efforts were made to edit the dictations but occasionally words are mis-transcribed.)    Silvana Villarreal MD (electronically signed)  Attending Emergency Physician          Silvana Villarreal MD  12/15/22 [id number]

## 2022-12-16 LAB
ALBUMIN SERPL-MCNC: 3.8 G/DL (ref 3.4–5)
ANION GAP SERPL CALCULATED.3IONS-SCNC: 10 MMOL/L (ref 3–16)
BASOPHILS ABSOLUTE: 0 K/UL (ref 0–0.2)
BASOPHILS RELATIVE PERCENT: 0.7 %
BUN BLDV-MCNC: 28 MG/DL (ref 7–20)
CALCIUM SERPL-MCNC: 10.1 MG/DL (ref 8.3–10.6)
CHLORIDE BLD-SCNC: 107 MMOL/L (ref 99–110)
CO2: 22 MMOL/L (ref 21–32)
CREAT SERPL-MCNC: 1 MG/DL (ref 0.6–1.2)
EKG DIAGNOSIS: NORMAL
EKG Q-T INTERVAL: 446 MS
EKG QRS DURATION: 94 MS
EKG QTC CALCULATION (BAZETT): 434 MS
EKG R AXIS: 15 DEGREES
EKG T AXIS: -22 DEGREES
EKG VENTRICULAR RATE: 57 BPM
EOSINOPHILS ABSOLUTE: 0.1 K/UL (ref 0–0.6)
EOSINOPHILS RELATIVE PERCENT: 1.5 %
GFR SERPL CREATININE-BSD FRML MDRD: 53 ML/MIN/{1.73_M2}
GLUCOSE BLD-MCNC: 98 MG/DL (ref 70–99)
HCT VFR BLD CALC: 34 % (ref 36–48)
HEMOGLOBIN: 10.8 G/DL (ref 12–16)
LYMPHOCYTES ABSOLUTE: 0.8 K/UL (ref 1–5.1)
LYMPHOCYTES RELATIVE PERCENT: 17.3 %
MAGNESIUM: 1.7 MG/DL (ref 1.8–2.4)
MCH RBC QN AUTO: 27.7 PG (ref 26–34)
MCHC RBC AUTO-ENTMCNC: 31.8 G/DL (ref 31–36)
MCV RBC AUTO: 87 FL (ref 80–100)
MONOCYTES ABSOLUTE: 0.4 K/UL (ref 0–1.3)
MONOCYTES RELATIVE PERCENT: 9 %
NEUTROPHILS ABSOLUTE: 3.4 K/UL (ref 1.7–7.7)
NEUTROPHILS RELATIVE PERCENT: 71.5 %
PDW BLD-RTO: 16 % (ref 12.4–15.4)
PHOSPHORUS: 3.1 MG/DL (ref 2.5–4.9)
PLATELET # BLD: 121 K/UL (ref 135–450)
PMV BLD AUTO: 11.5 FL (ref 5–10.5)
POTASSIUM SERPL-SCNC: 4.9 MMOL/L (ref 3.5–5.1)
RBC # BLD: 3.91 M/UL (ref 4–5.2)
SODIUM BLD-SCNC: 139 MMOL/L (ref 136–145)
VANCOMYCIN RANDOM: 14.1 UG/ML
WBC # BLD: 4.7 K/UL (ref 4–11)

## 2022-12-16 PROCEDURE — 2580000003 HC RX 258: Performed by: STUDENT IN AN ORGANIZED HEALTH CARE EDUCATION/TRAINING PROGRAM

## 2022-12-16 PROCEDURE — 6370000000 HC RX 637 (ALT 250 FOR IP): Performed by: STUDENT IN AN ORGANIZED HEALTH CARE EDUCATION/TRAINING PROGRAM

## 2022-12-16 PROCEDURE — 80202 ASSAY OF VANCOMYCIN: CPT

## 2022-12-16 PROCEDURE — 6360000002 HC RX W HCPCS: Performed by: STUDENT IN AN ORGANIZED HEALTH CARE EDUCATION/TRAINING PROGRAM

## 2022-12-16 PROCEDURE — 94760 N-INVAS EAR/PLS OXIMETRY 1: CPT

## 2022-12-16 PROCEDURE — 1200000000 HC SEMI PRIVATE

## 2022-12-16 PROCEDURE — 6360000002 HC RX W HCPCS: Performed by: INTERNAL MEDICINE

## 2022-12-16 PROCEDURE — 83735 ASSAY OF MAGNESIUM: CPT

## 2022-12-16 PROCEDURE — 85025 COMPLETE CBC W/AUTO DIFF WBC: CPT

## 2022-12-16 PROCEDURE — 2500000003 HC RX 250 WO HCPCS: Performed by: STUDENT IN AN ORGANIZED HEALTH CARE EDUCATION/TRAINING PROGRAM

## 2022-12-16 PROCEDURE — 80069 RENAL FUNCTION PANEL: CPT

## 2022-12-16 PROCEDURE — 36415 COLL VENOUS BLD VENIPUNCTURE: CPT

## 2022-12-16 RX ORDER — M-VIT,TX,IRON,MINS/CALC/FOLIC 27MG-0.4MG
1 TABLET ORAL DAILY
Status: DISCONTINUED | OUTPATIENT
Start: 2022-12-16 | End: 2022-12-19 | Stop reason: HOSPADM

## 2022-12-16 RX ORDER — MAGNESIUM SULFATE IN WATER 40 MG/ML
2000 INJECTION, SOLUTION INTRAVENOUS ONCE
Status: COMPLETED | OUTPATIENT
Start: 2022-12-16 | End: 2022-12-16

## 2022-12-16 RX ORDER — CALCIUM CARBONATE 500(1250)
500 TABLET ORAL DAILY
Status: DISCONTINUED | OUTPATIENT
Start: 2022-12-16 | End: 2022-12-19 | Stop reason: HOSPADM

## 2022-12-16 RX ORDER — METRONIDAZOLE 500 MG/100ML
500 INJECTION, SOLUTION INTRAVENOUS EVERY 8 HOURS
Status: DISCONTINUED | OUTPATIENT
Start: 2022-12-16 | End: 2022-12-16

## 2022-12-16 RX ORDER — LANOLIN ALCOHOL/MO/W.PET/CERES
3 CREAM (GRAM) TOPICAL NIGHTLY PRN
Status: DISCONTINUED | OUTPATIENT
Start: 2022-12-16 | End: 2022-12-19 | Stop reason: HOSPADM

## 2022-12-16 RX ORDER — FUROSEMIDE 10 MG/ML
40 INJECTION INTRAMUSCULAR; INTRAVENOUS 2 TIMES DAILY
Status: DISCONTINUED | OUTPATIENT
Start: 2022-12-16 | End: 2022-12-19 | Stop reason: HOSPADM

## 2022-12-16 RX ORDER — VITAMIN B COMPLEX
1000 TABLET ORAL DAILY
Status: DISCONTINUED | OUTPATIENT
Start: 2022-12-16 | End: 2022-12-19 | Stop reason: HOSPADM

## 2022-12-16 RX ORDER — MAGNESIUM SULFATE 1 G/100ML
1000 INJECTION INTRAVENOUS PRN
Status: DISCONTINUED | OUTPATIENT
Start: 2022-12-16 | End: 2022-12-19 | Stop reason: HOSPADM

## 2022-12-16 RX ORDER — POTASSIUM CHLORIDE 7.45 MG/ML
10 INJECTION INTRAVENOUS PRN
Status: DISCONTINUED | OUTPATIENT
Start: 2022-12-16 | End: 2022-12-19 | Stop reason: HOSPADM

## 2022-12-16 RX ORDER — HEPARIN SODIUM 5000 [USP'U]/ML
5000 INJECTION, SOLUTION INTRAVENOUS; SUBCUTANEOUS 2 TIMES DAILY
Status: DISCONTINUED | OUTPATIENT
Start: 2022-12-16 | End: 2022-12-17

## 2022-12-16 RX ORDER — POTASSIUM CHLORIDE 20 MEQ/1
40 TABLET, EXTENDED RELEASE ORAL PRN
Status: DISCONTINUED | OUTPATIENT
Start: 2022-12-16 | End: 2022-12-19 | Stop reason: HOSPADM

## 2022-12-16 RX ADMIN — SODIUM CHLORIDE, PRESERVATIVE FREE 10 ML: 5 INJECTION INTRAVENOUS at 09:10

## 2022-12-16 RX ADMIN — MAGNESIUM SULFATE HEPTAHYDRATE 2000 MG: 40 INJECTION, SOLUTION INTRAVENOUS at 09:12

## 2022-12-16 RX ADMIN — VANCOMYCIN HYDROCHLORIDE 1000 MG: 1 INJECTION, POWDER, LYOPHILIZED, FOR SOLUTION INTRAVENOUS at 00:42

## 2022-12-16 RX ADMIN — HEPARIN SODIUM 5000 UNITS: 5000 INJECTION INTRAVENOUS; SUBCUTANEOUS at 20:44

## 2022-12-16 RX ADMIN — MOMETASONE FUROATE AND FORMOTEROL FUMARATE DIHYDRATE 2 PUFF: 200; 5 AEROSOL RESPIRATORY (INHALATION) at 20:35

## 2022-12-16 RX ADMIN — METRONIDAZOLE 500 MG: 500 INJECTION, SOLUTION INTRAVENOUS at 06:11

## 2022-12-16 RX ADMIN — SODIUM CHLORIDE, PRESERVATIVE FREE 10 ML: 5 INJECTION INTRAVENOUS at 00:42

## 2022-12-16 RX ADMIN — SODIUM CHLORIDE, PRESERVATIVE FREE 10 ML: 5 INJECTION INTRAVENOUS at 20:44

## 2022-12-16 RX ADMIN — FUROSEMIDE 40 MG: 10 INJECTION, SOLUTION INTRAMUSCULAR; INTRAVENOUS at 17:00

## 2022-12-16 RX ADMIN — CEFEPIME 2000 MG: 2 INJECTION, POWDER, FOR SOLUTION INTRAVENOUS at 08:27

## 2022-12-16 RX ADMIN — FUROSEMIDE 40 MG: 10 INJECTION, SOLUTION INTRAMUSCULAR; INTRAVENOUS at 09:08

## 2022-12-16 ASSESSMENT — PAIN SCALES - WONG BAKER
WONGBAKER_NUMERICALRESPONSE: 0

## 2022-12-16 ASSESSMENT — PAIN SCALES - GENERAL
PAINLEVEL_OUTOF10: 0
PAINLEVEL_OUTOF10: 0

## 2022-12-16 NOTE — H&P
Hospital Medicine History & Physical      PCP: Caron Jean MD    Date of Admission: 12/15/2022    Date of Service: Pt seen/examined on 12/15/2022 admitted to inpatient    Chief Complaint: Confusion, altered mental status,      History Of Present Illness: The patient is a 80 y.o. female with past medical history as below who presents to [facility] with concern per the family that sent to her via EMS that she was more altered and confused since earlier today in the afternoon. Apparently they thought potentially patient may have a new urinary tract infection. Apparently did not have any fall or trauma. Family is now at bedside for further evaluation or discussion. Patient did not have any noted hypoxia or shortness of breath according to report. Currently unable to get further information from patient as she is somewhat lethargic but still seems to be more sleeping. Unable to obtain any further review of systems at this time and unable to reach family at home. Past Medical History:        Diagnosis Date    Alzheimer's dementia [facility])     Atrial fibrillation ([address]     Benign essential HTN     Gout     Multiple drug resistant organism (MDRO) culture positive 11/04/2021    Escherichia coli. Urine       Past Surgical History:    No past surgical history on file. Medications Prior to Admission:    Prior to Admission medications    Medication Sig Start Date End Date Taking?  Authorizing Provider   Cranberry 250 MG CHEW Take 1 tablet by mouth daily   Yes Historical Provider, MD   metoprolol succinate (TOPROL XL) 25 MG extended release tablet Take 25 mg by mouth daily   Yes Historical Provider, MD   melatonin 3 MG TABS tablet Take 3 mg by mouth nightly as needed for Sleep 1/12/22   Historical Provider, MD   furosemide (LASIX) 20 MG tablet Take 1 tablet by mouth every other day 4/4/21   Celia Alex,    rivaroxaban (XARELTO) 15 MG TABS tablet Take 1 tablet by mouth daily (with breakfast) 2/16/21   Antoine Gibbons MD   vitamin D 25 MCG (1000 UT) CAPS Take 1,000 Units by mouth daily    Historical Provider, MD   albuterol sulfate HFA (PROVENTIL;VENTOLIN;PROAIR) 108 (90 Base) MCG/ACT inhaler Inhale 2 puffs into the lungs every 6 hours as needed for Wheezing    Historical Provider, MD   mometasone-formoterol (DULERA) 200-5 MCG/ACT inhaler Inhale 2 puffs into the lungs 2 times daily    Historical Provider, MD   Multiple Vitamins-Minerals (MULTIVITAMIN WITH MINERALS) tablet Take 1 tablet by mouth daily    Historical Provider, MD   calcium carbonate (OSCAL) 500 MG TABS tablet Take 500 mg by mouth daily    Historical Provider, MD       Allergies:  Chlorpromazine and Hydrochlorothiazide    Social History:  The patient currently lives home    TOBACCO:   reports that she has never smoked. She has never used smokeless tobacco.  ETOH:   reports no history of alcohol use. Family History:  Reviewed in detail and negative for DM, Early CAD, Cancer, CVA. Positive as follows:    No family history on file. REVIEW OF SYSTEMS:    and as noted in the HPI. All other systems reviewed and negative. PHYSICAL EXAM:    BP (!) 174/83 Comment: RN notified  Pulse 65   Temp 97.6 °F (36.4 °C) (Axillary)   Resp 16   Ht 5' 3\" (1.6 m)   Wt 188 lb 7.9 oz (85.5 kg)   SpO2 96%   BMI 33.39 kg/m²     General appearance: Lethargic, not on nasal cannula oxygen, dry appearing, somewhat arousable but still very tired appearing  HEENT Normal cephalic, atraumatic without obvious deformity. Pupils equal, round, and reactive to light. Extra ocular muscles intact.   Dry mucous membranes, anicteric sclera,   Neck: Supple, no JVD  Lungs: Diminished breath sounds bilaterally with bibasilar crackles to both lung fields, more diminished to the posterior areas  Heart: Regular rate and rhythm, no murmurs detected  Abdomen: Soft, nondistended, active bowel sounds  Extremities: 1+ bilateral lower extremity symmetric pitting edema  Skin: No rashes  Neurologic: Unable to fully evaluate  Mental status: Able to fully evaluate  Capillary Refill: Acceptable  < 3 seconds  Peripheral Pulses: +3 Easily felt, not easily obliterated with pressure      CT chest without contrast:  Moderate to large right and moderate left pleural effusions. Adjacent   airspace disease is likely passive atelectasis, but a component of pneumonia   or aspiration remains a differential consideration. There is ground-glass opacity within the aerated lungs, which may reflect   microatelectasis versus pulmonary edema. Infectious or inflammatory   pneumonitis remains a less likely consideration. Moderate-sized hiatal hernia. Subacute to chronic appearing compression fracture involving the superior   endplate of L1.         CT head without contrast: No acute process    CBC   Recent Labs     12/15/22  1738 12/16/22  0407   WBC 5.6 4.7   HGB 11.1* 10.8*   HCT 34.9* 34.0*   * 121*      RENAL  Recent Labs     12/15/22  1738 12/16/22  0407    139   K 4.8 4.9    107   CO2 22 22   PHOS  --  3.1   BUN 30* 28*   CREATININE 1.1 1.0     LFT'S  Recent Labs     12/15/22  1738   AST 38*   ALT 32   BILITOT 0.8   ALKPHOS 208*     COAG  No results for input(s): INR in the last 72 hours. CARDIAC ENZYMES  No results for input(s): CKTOTAL, CKMB, CKMBINDEX, TROPONINI in the last 72 hours.     U/A:    Lab Results   Component Value Date/Time    COLORU DARK YELLOW 12/15/2022 05:38 PM    WBCUA 6 12/15/2022 05:38 PM    RBCUA 0 12/15/2022 05:38 PM    MUCUS 1+ 04/28/2022 02:40 PM    BACTERIA None Seen 12/15/2022 05:38 PM    CLARITYU Clear 12/15/2022 05:38 PM    SPECGRAV 1.020 12/15/2022 05:38 PM    LEUKOCYTESUR Negative 12/15/2022 05:38 PM    BLOODU Negative 12/15/2022 05:38 PM    GLUCOSEU Negative 12/15/2022 05:38 PM       ABG    Lab Results Component Value Date/Time    JQD5LQO 24.7 02/13/2022 08:05 AM    BEART -0.2 02/13/2022 08:05 AM    X3ZDJKCH 96.5 02/13/2022 08:05 AM    PHART 7.394 02/13/2022 08:05 AM    OOE8SLC 40.4 02/13/2022 08:05 AM    PO2ART 86.6 02/13/2022 08:05 AM    IRE2PHQ 25.9 02/13/2022 08:05 AM           Active Hospital Problems    Diagnosis Date Noted    Pneumonia due to suspected gram-negative bacteria [J18.9] 12/15/2022     Priority: Medium    CHF (congestive heart failure) ([facility] [address] [I50.9] 02/09/2022    Atrial fibrillation ([facility] [address] [I48.91]     AMS (altered mental status) [R41.82] 11/14/2021    Dementia in Alzheimer's disease with delirium ([facility] [address] [G30.9, F02.82] 06/05/2020         PHYSICIANS CERTIFICATION:    I certify that Nerissa Valerio is expected to be hospitalized for greater than than 2 midnights based on the following assessment and plan:      ASSESSMENT/PLAN:  Altered mental status  Pneumonia  CHF  A. Fib  Dementia      Plan:  Patient is still somewhat lethargic but does respond somewhat. Family member went home. Suspicious for pneumonia at this time but has significant pleural effusions of concern as well so limiting IV fluid hydration  No further IV fluid resuscitation at this time  Start patient on vancomycin and cefepime and will add Flagyl as well for concern for aspiration ammonia and hospital-acquired pneumonia since recently admitted  Restart patient's home medications  Repeat labs daily  Per patient's previous documentation, she is labeled DNR CCA although it seems as though son would be agreeable to intubation if necessary per previous palliative discussions    DVT Prophylaxis: Heparin subcutaneous  Diet: Diet NPO  Code Status: DNR-CCA  PT/OT Eval Status: Uncertain    Dispo -pending clinical course       Saurabh Almonte DO    Thank you Glao Zaragoza MD for the opportunity to be involved in this patient's care. If you have any questions or concerns please feel free to contact me at [phone].

## 2022-12-16 NOTE — CARE COORDINATION
Advance Care Planning     Advance Care Planning Activator (Inpatient)  Conversation Note      Date of ACP Conversation: 12/16/2022     Conversation Conducted with: Patient with [address] Decision Maker: Next of Kin by law (only applies in absence of above) (name) son Ana Paula Cortez. ACP Activator: ZECHARIAH Man    Health Care Decision Maker:     Current Designated Health Care Decision Maker:     Primary Decision MakeAntolin Fernandes [facility] - [phone]    Care Preferences    Ventilation: \"If you were in your present state of health and suddenly became very ill and were unable to breathe on your own, what would your preference be about the use of a ventilator (breathing machine) if it were available to you? \"      Would the patient desire the use of ventilator (breathing machine)?: No    \"If your health worsens and it becomes clear that your chance of recovery is unlikely, what would your preference be about the use of a ventilator (breathing machine) if it were available to you? \"     Would the patient desire the use of ventilator (breathing machine)?: No      Resuscitation  \"CPR works best to restart the heart when there is a sudden event, like a heart attack, in someone who is otherwise healthy. Unfortunately, CPR does not typically restart the heart for people who have serious health conditions or who are very sick. \"    \"In the event your heart stopped as a result of an underlying serious health condition, would you want attempts to be made to restart your heart (answer \"yes\" for attempt to resuscitate) or would you prefer a natural death (answer \"no\" for do not attempt to resuscitate)? \" No       [] Yes   [] No   Educated Patient / Esha Montesinos regarding differences between Advance Directives and portable DNR orders.     Length of ACP Conversation in minutes:  2    Conversation Outcomes:  [x] ACP discussion completed  [] Existing advance directive reviewed with patient; no changes to patient's previously recorded wishes  [] New Advance Directive completed  [] Portable Do Not Rescitate prepared for Provider review and signature  [] POLST/POST/MOLST/MOST prepared for Provider review and signature      Follow-up plan:    [] Schedule follow-up conversation to continue planning  [] Referred individual to Provider for additional questions/concerns   [] Advised patient/agent/surrogate to review completed ACP document and update if needed with changes in condition, patient preferences or care setting    [x] This note routed to one or more involved healthcare providers Bang Helm MD primary care physician. Respectfully submitted,    SEBASTIAN NolanS  [facility]   [phone]    Electronically signed by ZECHARIAH Denny on 12/16/2022 at 1:13 PM

## 2022-12-16 NOTE — PROGRESS NOTES
Admitted patient to room 21  from ED with dx:AMS. Arrived to unit via stretcher with all belongings. No family present at this time. Vitals:    12/15/22 2115   BP: 126/69   Pulse: 63   Resp: 16   Temp:    SpO2: 97%       Isolation:  No active isolations     GEN: Patient is alert to self, speech garbled. R  eye blind with cataracts, Left eye impaired. Wears Glasses. Very [place], no Hearing Aides, Has Dentures upper and lowers, son states that patient does not like to take them out. Pain: Denies pain. IV:    IV site clean dry and intact without redness or pain. CV: HRRRR. On Telemetry: SR. BLE edema +1. Palpable pulses. Lungs: Respirations easy, unlabored without cough. On Room Air, denies SOB. Lungs clear. Encouraged C&DB. GI/: No complaints of nausea/vomiting/diarrhea. Abdomen soft, non tender, with bowel sounds. Continent of bowel and bladder. Last BM 12/15/2022. Hooked up to [facility] [place]. Skin: Warm and dry. No evidence of skin breakdown. Mobility: Per son, patient is normally Up as tolerated with assist/walker. Safety: Oriented to room, call light, tv, phone and dietary services. Bed in lowest position and locked. Exit alarms in place. Non slip socks on. ID bracelet on and correct per patient verbally reporting name and date of birth. Plan of care and safety measures reviewed with the patient. Call light and needed items in reach. Disposition: Return home with Son when stable.

## 2022-12-16 NOTE — CONSULTS
Clinical Pharmacy Note  Vancomycin Consult    Elyssa Pelaez is a 80 y.o. female ordered Vancomycin for HAP; consult received from Dr. Shanta Delatorre to manage therapy. Also receiving cefepime. Allergies:  Chlorpromazine and Hydrochlorothiazide     Temp max:  Temp (24hrs), Av.9 °F (37.2 °C), Min:98.9 °F (37.2 °C), Max:98.9 °F (37.2 °C)      Recent Labs     12/15/22  1738   WBC 5.6       Recent Labs     12/15/22  1738   BUN 30*   CREATININE 1.1       No intake or output data in the 24 hours ending 12/15/22 2033    Culture Results:  pending    Ht Readings from Last 1 Encounters:   12/15/22 5' 3\" (1.6 m)        Wt Readings from Last 1 Encounters:   12/15/22 174 lb 9.7 oz (79.2 kg)         Estimated Creatinine Clearance: 33 mL/min (based on SCr of 1.1 mg/dL). Assessment/Plan:  Day # 1 of vancomycin. Vancomycin 1000 mg IV x 1 ordered. Given patient's age and renal function, will dose based on levels. Check level tomorrow AM.  Goal trough 15-20    Thank you for the consult.       [id number] [facility] Mejias  12/15/2022 8:36 PM

## 2022-12-16 NOTE — PROGRESS NOTES
Hospital Medicine Progress Note     Date:  12/16/2022    PCP: Gosia Ureña MD (Tel: [phone])    Date of Admission: 12/15/2022    Chief complaint:   Chief Complaint   Patient presents with    Altered Mental Status     Per EMS family states pt is altered        Brief admission history: 79-year-old female with history of Alzheimer's dementia, paroxysmal atrial fibrillation, essential hypertension, gout, normocytic anemia, thrombocytopenia, who was brought to the emergency room for evaluation of increasing confusion, as reported by family members. History provided by patient is extremely limited due to underlying confusion and history of Alzheimer's dementia. According to review of electronic medical records, patient has history of recurrent urinary tract infection. There was no documented fever in the emergency room. Chest x-ray revealed mild cardiomegaly with mild central pulmonary congestion, hazy bibasilar opacities and small bibasilar pleural effusion which is more prominent. CT-chest revealed moderate to large right and moderate left pleural effusions, adjacent airspace disease likely passive atelectasis (by a complaint of pneumonia or aspiration remains in the differential consideration), groundglass opacity within the aerated lungs concerning for microatelectasis versus pulmonary edema, moderate sized hiatal hernia, subacute to chronic appearing compression fractures involving the superior endplate of L1. Rapid COVID-19 and influenza screen negative. Urinalysis was not consistent with infection. Procalcitonin level is normal.    Assessment/plan:  Acute metabolic encephalopathy (in the setting of underlying dementia). So far, no evidence of infectious process. May be secondary to underlying bilateral pleural effusion. Monitor mental status closely. Bilateral pleural effusion. Moderate to large, right, moderate left. Start IV Lasix 40 mg twice daily with hold parameters.   Check echocardiogram.  Consult pulmonology to assist with evaluation for possible thoracentesis. Findings on CT with possible atelectasis or infiltrate. Procalcitonin level is normal.  No leukocytosis. Suspicion for bacterial pneumonia is low. Discontinued antibiotics. History of atrial fibrillation. On beta-blocker with hold parameters. Currently holding Xarelto pending evaluation for possible thoracentesis. Other comorbidities: istory of Alzheimer's dementia, paroxysmal atrial fibrillation, essential hypertension, gout, normocytic anemia, thrombocytopenia. Diet: Diet NPO    Code status: DNR-CCA   ----------  Subjective  Confused. Objective  Physical exam:  Vitals: BP (!) 174/83 Comment: RN notified  Pulse 65   Temp 97.6 °F (36.4 °C) (Axillary)   Resp 16   Ht 5' 3\" (1.6 m)   Wt 188 lb 7.9 oz (85.5 kg)   SpO2 96%   BMI 33.39 kg/m²   Gen/overall appearance: Not in acute distress. Alert. Underlying dementia. Head: Normocephalic, atraumatic  Eyes: EOMI, good acuity  ENT: Oral mucosa moist  Neck: No JVD, thyromegaly  CVS: Nml S1S2, no MRG, RRR  Pulm: Diminished in lung bases. +crackles. Gastrointestinal: Soft, NT/ND, +BS  Musculoskeletal: No edema. Warm  Neuro: No focal deficit. Moves extremity spontaneously. Psychiatry: Appropriate affect. Not agitated. Skin: Warm, dry with normal turgor. No rash  Capillary refill: Brisk,< 3 seconds   Peripheral Pulses: +2 palpable, equal bilaterally      24HR INTAKE/OUTPUT:  No intake or output data in the 24 hours ending 12/16/22 0834  No intake/output data recorded. No intake/output data recorded.   Meds:    magnesium sulfate  2,000 mg IntraVENous Once    furosemide  40 mg IntraVENous BID    Vitamin D  1,000 Units Oral Daily    heparin (porcine)  5,000 Units SubCUTAneous BID    calcium elemental  500 mg Oral Daily    therapeutic multivitamin-minerals  1 tablet Oral Daily    metoprolol succinate  25 mg Oral Daily    mometasone-formoterol  2 puff Inhalation BID [Held by provider] rivaroxaban  15 mg Oral Daily with breakfast    sodium chloride flush  5-40 mL IntraVENous 2 times per day     Infusions:    sodium chloride       PRN Meds: potassium chloride **OR** potassium alternative oral replacement **OR** potassium chloride, magnesium sulfate, sodium phosphate IVPB **OR** sodium phosphate IVPB, melatonin, albuterol sulfate HFA, sodium chloride flush, sodium chloride, acetaminophen **OR** acetaminophen, ondansetron    Labs/imaging:  CBC:   Recent Labs     12/15/22  1738 12/16/22  0407   WBC 5.6 4.7   HGB 11.1* 10.8*   * 121*     BMP:    Recent Labs     12/15/22  1738 12/16/22  0407    139   K 4.8 4.9    107   CO2 22 22   BUN 30* 28*   CREATININE 1.1 1.0   GLUCOSE 93 98     Hepatic:   Recent Labs     12/15/22  1738   AST 38*   ALT 32   BILITOT 0.8   ALKPHOS 208*       Nela Mendiola MD  -------------------------------  Rounding hospitalist

## 2022-12-16 NOTE — PROGRESS NOTES
4 Eyes Skin Assessment     NAME:  Erika Dixon  YOB: 1931  MEDICAL RECORD NUMBER:  [id number]    The patient is being assessed for  Admission    I agree that One RN have performed a thorough Head to Toe Skin Assessment on the patient. ALL assessment sites listed below have been assessed. Areas assessed by both nurses:    Head, Face, Ears and Shoulders, Back, Chest        Does the Patient have a Wound?  No noted wound(s)       Eddie Prevention initiated by RN: NA   Wound Care Orders initiated by RN: NA    Pressure Injury (Stage 3,4, Unstageable, DTI, NWPT, and Complex wounds) if present place referral order by RN under : NA    New and Established Ostomies, if present place, referral order under : NA      Nurse 1 eSignature: Electronically signed by Leo Dsouza RN on 12/16/22 at 12:29 AM EST    **SHARE this note so that the co-signing nurse is able to place an eSignature**    Nurse 2 eSignature: Electronically signed by Nicole Kraus RN on 12/16/22 at 12:30 AM EST

## 2022-12-16 NOTE — CARE COORDINATION
YFN faxed a referral to the [place] [facility]'s fast track program.     Respectfully submitted,    ZECHARIAH Pizarro  [facility]   [phone]    Electronically signed by ZECHARIAH Urbina on 12/16/2022 at 1:23 PM

## 2022-12-16 NOTE — CONSULTS
PALLIATIVE MEDICINE CONSULTATION     Patient name:Shima Suarez   GBQ:[id number]    :1931  Room/Bed:U0P-3424/3123-01   LOS: 1 day         Date of consult:2022    Consult Information  Palliative Medicine Consult performed by: TASHI Cm CNP     Inpatient consult to Palliative Care  Consult performed by: TASHI Cm CNP  Consult ordered by: Shaka Uriostegui MD  Reason for consult: Goals of care, code status. ASSESSMENT/RECOMMENDATIONS     80 y.o. female with shortness of breath and altered mental status. Symptom Management:  Shortness of breath -secondary to left side pleural effusion. Per CT scan in October, small to moderate, now moderate to large volume. Pulmonary consulted, question thoracentesis. Discussed the risk versus benefit with patient's son, would like to discuss with pulmonary. Also discussed potential congestive heart failure contributing, echocardiogram ordered. On diuretics. Altered mental status -underlying dementia, however likely acute process contributing. Again educated son on disease process. Patient is nonverbal this morning, appears to be picking at things on her blanket. Unsure if she would be able to tolerate various procedures. We will continue to have discussions with the son regarding care. Goals of Care -discussed patient's plan of care with her son, Debra Serrano. We discussed acute process in regards to further need for goals of care conversations. Per Debra Serrano, his mother stated when she was more coherent, \"I do not want to die, I want to live\". He feels as though he needs to continue management of her medical care up to the point of cardiac or respiratory arrest.  Continues to want temporary intubation so he can \"make decisions \"on how to proceed. We discussed the role of dementia when it comes to aggressive management such as thoracentesis or additional procedures.   Patient is likely unaware nor able to comprehend what is happening. Will await pulmonary recommendations. CODE STATUS will remain DNR arrest.    Patient/Family Goals of Care :    discussed patient's plan of care with her son, Cedrick Joy. We discussed acute process in regards to further need for goals of care conversations. Per Cedrick Joy, his mother stated when she was more coherent, \"I do not want to die, I want to live\". He feels as though he needs to continue management of her medical care up to the point of cardiac or respiratory arrest.  Continues to want temporary intubation so he can \"make decisions \"on how to proceed. We discussed the role of dementia when it comes to aggressive management such as thoracentesis or additional procedures. Patient is likely unaware nor able to comprehend what is happening. Will await pulmonary recommendations. CODE STATUS will remain DNR arrest.    Disposition/Discharge Plan:   Pending    Advance Directives:  Surrogate Decision Maker: Precious Xavier, son. Code status:  DNR-CCA    Thank you for allowing us to participate in the care of this patient. HISTORY     CC: Confusion, altered mental status. HPI: The patient is a 80 y.o. female with a PMH of dementia, atrial fibrillation, CHF, and HTN. Presented to the ED via squad due to altered mental status, son reports being confused at home. Lethargic on arrival.  Imaging revealed significant pleural effusions as well as possible pneumonia, possible aspiration. Per the patient's son, his mother was at the doctor on 12/12/2022 for evaluation of cataract surgery. Later that afternoon seemed to be feeling unwell. Symptoms progressively got worse. Attempted to have home health evaluate on 12/14, however they were unavailable. On 12/15, nurse was in the home, did not report any acute issues. Given the patient's overall status, son decided to bring her to the ED.     Palliative Medicine SymptomScreening/ROS:    Review of Systems   Unable to perform ROS: Dementia Patient unable to complete full ROS due to current cognitive status. Information that is obtained from nursing and chart. Palliative Performance Scale:     [] 60%  Amb reduced; Sig dz. Can't do hobbies/housework; Intake normal or reduced, Occasional assist; LOC full/confusion   [] 50%  Mainly sit/lie; Extensive disease. Mainly assist, Intake normal or reduced; Occasional assist; LOC full/confusion   [] 40%  Mainly in bed; Extensive disease; Mainly assist; Intake normal or reduced; Occasional assist; LOC full/confusion   [x] 30%  Bed bound, Extensive disease; Total care; Intake reduced; LOC full/confusion   [] 20%  Bed bound; Extensive disease; Total care; Intake minimal; Drowsy/coma   [] 10%  Bed bound; Extensive disease; Total care; Mouth care only; Drowsy/coma   []  0%   Death       Home med list and hospital medications reviewed in chart as of 12/16/2022     EXAM     Vitals:    12/16/22 0822   BP:    Pulse: 65   Resp: 16   Temp:    SpO2: 96%       Physical Exam  Cardiovascular:      Rate and Rhythm: Normal rate. Pulses: Normal pulses. Pulmonary:      Effort: Pulmonary effort is normal.   Abdominal:      Palpations: Abdomen is soft. Neurological:      Mental Status: She is alert. Psychiatric:         Attention and Perception: She is inattentive. She perceives visual hallucinations. Speech: She is noncommunicative.           Current labs in the epic chart reviewed as of 12/16/2022   Review of previous notes, admits, labs, radiology and testing relevant to this consult done in this chart today 12/16/2022      Total time: 70 minutes  >50% of time spent counseling patient at bedside or POA/family member if applicable , reviewing information and discussing care, coordinating with care team  Signed By: Electronically signed by TASHI Iraheta CNP on 12/16/2022 at 8:57 AM  Palliative Medicine   [phone]    December 16, 2022

## 2022-12-16 NOTE — PROGRESS NOTES
Medication Reconciliation     List of medications patient is currently taking is complete. Source of information:   1. Conversation with patient's family at bedside  2. EPIC records        Notes regarding home medications:  1. Per family patient received all of her AM home medications today PTA. 2. Patient is anticoagulated on Xarelto 15 mg daily for Afib. Received last dose 12/15. Denies any other OTC/herbal medications.     Ermelinda Andujar, Pharmacy Intern

## 2022-12-16 NOTE — PROGRESS NOTES
ED SBAR report provider to OSWALD, RN. Patient to be transported to Room 3123 via stretcher by transport tech. Patient transported with bedside cardiac monitor and with IV medications infusing. IV site clean, dry, and intact. MEWS score and pain assessed as 0/10 and documented. Updated patient and family on plan of care.

## 2022-12-16 NOTE — CONSULTS
REASON FOR CONSULTATION/CC: Pleural effusion      Consult at request of Divina Dale MD     PCP: Jim Foss MD  Established Pulmonologist:  None    Chief Complaint   Patient presents with    Altered Mental Status     Per EMS family states pt is altered        HISTORY OF PRESENT ILLNESS: Carrie Farnsworth is a 80y.o. year old female with a history of Alzheimer's dementia, severe hearing loss, blindness who presents with acutely altered mental status. During my visit patient is confused, lethargic, very hard of hearing unable to provide HPI this obtained from bedside nursing report and EMR. On arrival patient also complaining of shortness of breath though not hypoxemic she does have large bilateral pleural effusions. She is a previous echo with chronic systolic and diastolic heart failure. She has been started empiric diuretics. Pulmonology consulted for evaluation for possible thoracentesis. Past Medical History:   Diagnosis Date    Alzheimer's dementia [facility])     Atrial fibrillation ([facility] [address]     Benign essential HTN     Gout     Multiple drug resistant organism (MDRO) culture positive 11/04/2021    Escherichia coli. Urine         No past surgical history on file. Family Hx  family history is not on file. Unable to obtain due to altered mental status  Social Hx   reports that she has never smoked.  She has never used smokeless tobacco.    Scheduled Meds:   magnesium sulfate  2,000 mg IntraVENous Once    furosemide  40 mg IntraVENous BID    Vitamin D  1,000 Units Oral Daily    heparin (porcine)  5,000 Units SubCUTAneous BID    calcium elemental  500 mg Oral Daily    therapeutic multivitamin-minerals  1 tablet Oral Daily    metoprolol succinate  25 mg Oral Daily    mometasone-formoterol  2 puff Inhalation BID    [Held by provider] rivaroxaban  15 mg Oral Daily with breakfast    sodium chloride flush  5-40 mL IntraVENous 2 times per day       Continuous Infusions:   sodium chloride         PRN Meds:  potassium chloride **OR** potassium alternative oral replacement **OR** potassium chloride, magnesium sulfate, sodium phosphate IVPB **OR** sodium phosphate IVPB, melatonin, albuterol sulfate HFA, sodium chloride flush, sodium chloride, acetaminophen **OR** acetaminophen, ondansetron    ALLERGIES:  Patient is allergic to chlorpromazine and hydrochlorothiazide. REVIEW OF SYSTEMS:  Unable to obtain due to altered mental status    Objective:   PHYSICAL EXAM:  Blood pressure (!) 154/76, pulse 99, temperature 98 °F (36.7 °C), temperature source Axillary, resp. rate 18, height 5' 3\" (1.6 m), weight 188 lb 7.9 oz (85.5 kg), SpO2 94 %.'  Gen:  No acute distress. Eyes: PERRL. Anicteric sclera. No conjunctival injection. ENT: No discharge. Posterior oropharynx clear. External appearance of ears and nose normal.  Neck: Trachea midline. No mass   Resp: + Bilateral midlung crackles. No wheezes. No rhonchi. CV: Regular rate. Regular rhythm. No murmur or rub. No edema. GI: Soft, Non-tender. Non-distended. +BS  Skin: Warm, dry, w/o erythema. Lymph: No cervical or supraclavicular LAD. M/S: No cyanosis. No clubbing. Neuro:  + [place], moves all extremities  Psych: Lethargic, confused      Data Reviewed:   LABS:  CBC:   Recent Labs     12/15/22  1738 12/16/22  0407   WBC 5.6 4.7   HGB 11.1* 10.8*   HCT 34.9* 34.0*   MCV 87.0 87.0   * 121*     BMP:   Recent Labs     12/15/22  1738 12/16/22  0407    139   K 4.8 4.9    107   CO2 22 22   PHOS  --  3.1   BUN 30* 28*   CREATININE 1.1 1.0     LIVER PROFILE:   Recent Labs     12/15/22  1738   AST 38*   ALT 32   BILITOT 0.8   ALKPHOS 208*     PT/INR: No results for input(s): PROTIME, INR in the last 72 hours. APTT: No results for input(s): APTT in the last 72 hours.   UA:  Recent Labs     12/15/22  1738   COLORU DARK YELLOW*   PHUR 7.0   WBCUA 6*   RBCUA 0   BACTERIA None Seen   CLARITYU Clear   SPECGRAV 1.020   LEUKOCYTESUR Negative   UROBILINOGEN 1.0 BILIRUBINUR Negative   BLOODU Negative   GLUCOSEU Negative     No results for input(s): PHART, CXK3NXT, PO2ART in the last 72 hours. Radiology Review:  Pertinent images / reports were reviewed as a part of this visit. Chest CT images reviewed personally by me, interpretation as follows:  -Large bilateral pleural effusions. Associated compressive atelectasis. ECHO  Conclusions      Summary   Ejection fraction is visually estimated to be   50%. Moderate to severe mitral regurgitation. The left atrium is severely dilated. Moderate aortic regurgitation. Severly dilated right ventricle. Right ventricular systolic function is mildly reduced . Severe tricuspid regurgitation. The right atrium is severely dilated. Signature      Assessment/Plan:     Pleural effusion, bilateral  -In no respiratory distress, tolerating room air  -Highly suspicious for fluid overload given bilateral layering nature  -Given history of chronic CHF agree with empiric diuresis  -At this time will defer thoracentesis given stable respiratory status and very poor mental status and I think she would NOT cooperate with procedure or tolerate very well. -Repeat echocardiogram pending    Acute on chronic systolic and diastolic congestive heart failure  -Previous echo with mildly depressed LVEF as well as diastolic dysfunction  -Agree with repeat echo  -Agree with diuresis    Altered mental status  -Given advanced age, acute mental status change and history of MDRO UTI would consider further infectious work-up and possible empiric antibiotic coverage. Urinalysis does not look especially concerning now, will defer further work-up to primary team.      This note was transcribed using [id number] [employer]. Please disregard any translational errors.     Thank you for the consult    [address]  Pulmonary, Sleep and Critical Care Medicine

## 2022-12-16 NOTE — DISCHARGE INSTR - COC
Continuity of Care Form    Patient Name: Aguilar Schwab   :  1931  MRN:  [id number]    Admit date:  12/15/2022  Discharge date:  22    Code Status Order: DNR-CCA   Advance Directives:     Admitting Physician:  Abbi Cotter DO  PCP: Abraham Cedeño MD    Discharging Nurse: RAFAEL BOJORQUEZ Unit/Room#: Z2J-1156/3123-01  Discharging Unit Phone Number: [phone]    Emergency Contact:   Extended Emergency Contact Information  Primary Emergency Contact: [address] Phone: [phone]  Mobile Phone: [phone]  Relation: Child   needed? No  Secondary Emergency Contact: [address] Phone: [phone]  Relation: Other    Past Surgical History:  No past surgical history on file. Immunization History:   Immunization History   Administered Date(s) Administered    COVID-19, MODERNA BLUE border, Primary or Immunocompromised, (age 12y+), IM, 100 mcg/0.5mL 2021, 2021       Active Problems:  Patient Active Problem List   Diagnosis Code    Acute on chronic combined systolic and diastolic CHF, NYHA class 3 ([facility]) I50.43    Dementia in Alzheimer's disease with delirium ([address] G30.9, F02.82    Acute pyelonephritis N10    Essential hypertension I10    Current use of long term anticoagulation Z79.01    Mild malnutrition ([address] E44.1    Fall at home, initial encounter [facility] [address], G44.168    Metabolic encephalopathy I10.54    Acute on chronic congestive heart failure (HCC) I50.9    Septic shock ([facility]) A41.9, R65.21    Bilateral pleural effusion J90    Chronic atrial fibrillation ([facility]) I48.20    Acute encephalopathy G93.40    Ataxia R27.0    Pleural effusion J90    CHF (congestive heart failure), NYHA class I, acute on chronic, combined ([facility]) I50.43    AMS (altered mental status) R41.82    Cardiac arrest ([facility]) I46.9    Seizure ([facility]) R56.9    CHF (congestive heart failure) ([facility]) I50.9    Atrial fibrillation ([facility] I48.91    Cardiopulmonary arrest with successful resuscitation [facility]) I46.9    Acute respiratory failure with hypoxia and hypercapnia (HCC) J96.01, J96.02    Acute cystitis without hematuria N30.00    RHIANNA (acute kidney injury) ([address] N17.9    Pneumonia due to suspected gram-negative bacteria J18.9       Isolation/Infection:   Isolation            No Isolation          Patient Infection Status       Infection Onset Added Last Indicated Last Indicated By Review Planned Expiration Resolved Resolved By    None active    Resolved    COVID-19 (Rule Out) 12/15/22 12/15/22 12/15/22 COVID-19, Rapid (Ordered)   12/15/22 Rule-Out Test Resulted    COVID-19 (Rule Out) 10/18/22 10/18/22 10/18/22 COVID-19, Rapid (Ordered)   10/18/22 Rule-Out Test Resulted    COVID-19 (Rule Out) 02/09/22 02/09/22 02/09/22 COVID-19, Rapid (Ordered)   02/09/22 Rule-Out Test Resulted    C-diff Rule Out 11/21/21 11/21/21 11/21/21 Clostridium Difficile Toxin/Antigen (Ordered)   11/23/21 Yokasta Weems RN    MDRO (multi-drug resistant organism) 11/04/21 11/06/21 11/04/21 Culture, Urine   10/25/22 Josh Lafleur RN    Ok to discontinue isolation and remove MDRO per provider    COVID-19 (Rule Out) 11/29/20 11/29/20 11/29/20 COVID-19 (Ordered)   11/30/20 Rule-Out Test Resulted    COVID-19 (Rule Out) 06/04/20 06/04/20 06/04/20 COVID-19 (Ordered)   06/05/20 Rule-Out Test Resulted            Nurse Assessment:  Last Vital Signs: BP (!) 113/56   Pulse 68   Temp 97.5 °F (36.4 °C) (Axillary)   Resp 16   Ht 5' 3\" (1.6 m)   Wt 188 lb 7.9 oz (85.5 kg)   SpO2 94%   BMI 33.39 kg/m²     Last documented pain score (0-10 scale): Pain Level: 0  Last Weight:   Wt Readings from Last 1 Encounters:   12/16/22 188 lb 7.9 oz (85.5 kg)     Mental Status:  disoriented    IV Access:  - None    Nursing Mobility/ADLs:  Walking   Dependent  Transfer  Dependent  Bathing  Dependent  Dressing  Dependent  Toileting  Dependent  Feeding  Assisted  Med Admin  Assisted  Med Delivery   none    Wound Care Documentation and Therapy:  Wound 02/15/21 Sacrum Stage 1 - open area (Active)   Number of days: 669        Elimination:  Continence: Bowel: No  Bladder: No  Urinary Catheter: None   Colostomy/Ileostomy/Ileal Conduit: No       Date of Last BM: 12/17/22    Intake/Output Summary (Last 24 hours) at 12/16/2022 1425  Last data filed at 12/16/2022 1147  Gross per 24 hour   Intake 0 ml   Output 1400 ml   Net -1400 ml     No intake/output data recorded. Safety Concerns: At Risk for Falls    Impairments/Disabilities:      Vision and Hearing    Nutrition Therapy:  Current Nutrition Therapy:   - Oral Diet:  General and Low Sodium (2gm)    Routes of Feeding: Oral  Liquids: Thin Liquids  Daily Fluid Restriction: yes - amount 2L  Last Modified Barium Swallow with Video (Video Swallowing Test): not done    Treatments at the Time of Hospital Discharge:   Respiratory Treatments: ***  Oxygen Therapy:  is not on home oxygen therapy.   Ventilator:    - No ventilator support    Rehab Therapies: Physical Therapy, Occupational Therapy, and HHA  Weight Bearing Status/Restrictions: No weight bearing restrictions  Other Medical Equipment (for information only, NOT a DME order):  {EQUIPMENT:[id number]}  Other Treatments: HOME HEALTH CARE: LEVEL 3 [address] to establish plan of care for patient over 60 day period     Initial home evaluation visit to occur within 24-48 hours for:  medication management  VS and clinical assessment  S&S chronic disease exacerbation education + when to contact MD / NP  care coordination  Medication Reconciliation during 1st  visit    PT/OT  Evaluations in home within 24-48 hours of discharge to include DME and home safety   Frontload therapy 5 days, then 3x a week   OT to evaluate if patient has [address] needs for personal care      evaluation within 24-48 hours to evaluate resources & insurance for potential AL, [place], LTC, and Medicaid options      Palliative Care referral within 5 days of hospital discharge  PCP Visit scheduled within 3 - 7 days of hospital discharge      Telehealth-Homecare Vitals(If patient is agreeable and meets guidelines)      Heart Failure Instructions for Daily Management  Patient was treated for acute on chronic combined systolic and diastolic heart failure. she  will require the following:    Please weigh daily on the same scale and approximately the same time of day. Report weight gain of 3 pounds/day or 5 pounds/week to : romaine DON and Frankey Olea, [phone]. Please use hospital discharge weight as baseline reference. Please monitor for signs and symptoms of and report to MD:  Worsening Heart Failure: sudden weight gain, shortness of breath, lower extremity or general edema/swelling, abdominal bloating/swelling, inability to lie flat, intolerance to usual activity, or cough (especially at night). Report these finding even if no increase in weight. Dehydration:  having difficulty or a decrease in urination, dizziness, worsening fatigue, or new onset/worsening of generalized weakness. Please continue a LOW SODIUM diet and LIMIT fluid intake to 48 - 64 ounces ( 1.5 - 2 liters) per day. Call Frankey Olea, [phone] and romaine DON and/or Patsy Carpenter @ [phone] with any questions or concerns. Please continue heart failure education to patient and family/support system. See After Visit Summary for hospital follow up appointment details. Consider spiritual care referral for support and/or completion of advance directives [phone]. Consider: having the [facility] MD complete required 7 day follow up, [place] [address] telehealth program if patient agreeable and able to participate, and palliative care consult for ongoing goals of care, end of life, and/or chronic disease management discussions.        Patient's personal belongings (please select all that are sent with patient):  Glasses, Dentures upper and lower    RN SIGNATURE:  Electronically signed by Joseph Spicer RN on 12/19/22 at 10:39 AM EST    CASE MANAGEMENT/SOCIAL WORK SECTION    Inpatient Status Date: ***    Readmission Risk Assessment Score:  Readmission Risk              Risk of Unplanned Readmission:  24         Discharging to Facility/ Agency   Name:  [facility]    Address: [address] [place] [address], [address] [place] [address]., [place] [address]  Phone: [phone]  Fax: [phone]     / signature: Electronically signed by Jamse Phoenix on 12/19/22 at 1:23 PM EST    PHYSICIAN SECTION    Prognosis: Fair    Condition at Discharge: Stable    Rehab Potential (if transferring to Rehab): Fair    Recommended Labs or Other Treatments After Discharge: NA    Physician Certification: I certify the above information and transfer of Meron Schaefer  is necessary for the continuing treatment of the diagnosis listed and that she requires Home Care for less 30 days.      Update Admission H&P: No change in H&P    PHYSICIAN SIGNATURE:  Electronically signed by Kimani Pemberton MD on 12/19/22 at 4:47 AM EST

## 2022-12-16 NOTE — CARE COORDINATION
INITIAL CASE MANAGEMENT ASSESSMENT     Reviewed chart, met with patient to assess possible discharge needs. Explained Case Management role/services. SW interviewed patient's son Cedrick Joy via telephone today. Living Situation: Patient resides in a ranch home with her son Cedrick Joy, his partner and no pets. There are five steps with a railing leading up to the front door. Prior to medical admission patient had some trouble getting in and out of the property. ADLs: Prior to medical admission son reports that she received assistance with cooking, cleaning, bathing and laundry from her family. Son stated that he would like a referral to [facility] prior to discharge. DME: Prior to medical admission son reports that she had rolator and grab bars installed in her restroom. He is unsure about discharge equipment needs. PT/OT Recs: Not Ordered. We will request a therapy consult for safety prior to discharge. Active Services: Prior to medical admission patient was active with [address] [employer] [facility] [employer]. She will need resumption of care orders when she is ready for discharge. Transportation: Prior to medical admission patient was transported to and from medical appointments and errands by family. They will likely transport her home at discharge. .      Medications:  Patient receives medications from [facility] [place] (as an outpatient) or [facility] (for inpatient needs). At this time there are no issues with access or affordability. PCP:  Raúl Celeste [phone] (phone) and [phone] (fax number). HD/PD: N/A     PLAN/COMMENTS:   1) pulmonology and IVAB recommendations. 2) Therapies and resumption of care orders at discharge. 3) Will refer to [facility] today for personal care needs. SW provided contact information for patient or family to call with any questions. SW will follow and assist as needed. Respectfully submitted,     Payton UGARTE  [address] [facility] [address]   [phone]    Electronically signed by ZECHARIAH William on 12/16/2022 at 1:15 PM

## 2022-12-16 NOTE — CARE COORDINATION
[facility]  Patient is active with [facility] for nurse, PT and teleheath. Will follow for discharge to home with orders to resume care.       Jericho Young RN, BSN CTN  [facility] [phone]

## 2022-12-17 PROBLEM — J18.9 PNEUMONIA DUE TO SUSPECTED GRAM-NEGATIVE BACTERIA: Status: RESOLVED | Noted: 2022-12-15 | Resolved: 2022-12-17

## 2022-12-17 LAB
ALBUMIN SERPL-MCNC: 3.7 G/DL (ref 3.4–5)
ANION GAP SERPL CALCULATED.3IONS-SCNC: 15 MMOL/L (ref 3–16)
BASOPHILS ABSOLUTE: 0 K/UL (ref 0–0.2)
BASOPHILS RELATIVE PERCENT: 0.6 %
BUN BLDV-MCNC: 28 MG/DL (ref 7–20)
CALCIUM SERPL-MCNC: 10.1 MG/DL (ref 8.3–10.6)
CHLORIDE BLD-SCNC: 101 MMOL/L (ref 99–110)
CO2: 26 MMOL/L (ref 21–32)
CREAT SERPL-MCNC: 1.5 MG/DL (ref 0.6–1.2)
EOSINOPHILS ABSOLUTE: 0.1 K/UL (ref 0–0.6)
EOSINOPHILS RELATIVE PERCENT: 1 %
GFR SERPL CREATININE-BSD FRML MDRD: 33 ML/MIN/{1.73_M2}
GLUCOSE BLD-MCNC: 166 MG/DL (ref 70–99)
GLUCOSE BLD-MCNC: 78 MG/DL (ref 70–99)
HCT VFR BLD CALC: 38.2 % (ref 36–48)
HEMOGLOBIN: 12 G/DL (ref 12–16)
LYMPHOCYTES ABSOLUTE: 0.5 K/UL (ref 1–5.1)
LYMPHOCYTES RELATIVE PERCENT: 8.9 %
MAGNESIUM: 2 MG/DL (ref 1.8–2.4)
MCH RBC QN AUTO: 27.3 PG (ref 26–34)
MCHC RBC AUTO-ENTMCNC: 31.4 G/DL (ref 31–36)
MCV RBC AUTO: 87.1 FL (ref 80–100)
MONOCYTES ABSOLUTE: 0.7 K/UL (ref 0–1.3)
MONOCYTES RELATIVE PERCENT: 11.3 %
NEUTROPHILS ABSOLUTE: 4.7 K/UL (ref 1.7–7.7)
NEUTROPHILS RELATIVE PERCENT: 78.2 %
PDW BLD-RTO: 16.2 % (ref 12.4–15.4)
PERFORMED ON: ABNORMAL
PHOSPHORUS: 3.9 MG/DL (ref 2.5–4.9)
PLATELET # BLD: 160 K/UL (ref 135–450)
PMV BLD AUTO: 10.9 FL (ref 5–10.5)
POTASSIUM SERPL-SCNC: 4 MMOL/L (ref 3.5–5.1)
RBC # BLD: 4.39 M/UL (ref 4–5.2)
SODIUM BLD-SCNC: 142 MMOL/L (ref 136–145)
WBC # BLD: 6 K/UL (ref 4–11)

## 2022-12-17 PROCEDURE — 94761 N-INVAS EAR/PLS OXIMETRY MLT: CPT

## 2022-12-17 PROCEDURE — 97162 PT EVAL MOD COMPLEX 30 MIN: CPT

## 2022-12-17 PROCEDURE — 97530 THERAPEUTIC ACTIVITIES: CPT

## 2022-12-17 PROCEDURE — 6370000000 HC RX 637 (ALT 250 FOR IP): Performed by: STUDENT IN AN ORGANIZED HEALTH CARE EDUCATION/TRAINING PROGRAM

## 2022-12-17 PROCEDURE — 85025 COMPLETE CBC W/AUTO DIFF WBC: CPT

## 2022-12-17 PROCEDURE — 80069 RENAL FUNCTION PANEL: CPT

## 2022-12-17 PROCEDURE — 6360000002 HC RX W HCPCS: Performed by: INTERNAL MEDICINE

## 2022-12-17 PROCEDURE — 94640 AIRWAY INHALATION TREATMENT: CPT

## 2022-12-17 PROCEDURE — 83735 ASSAY OF MAGNESIUM: CPT

## 2022-12-17 PROCEDURE — 36415 COLL VENOUS BLD VENIPUNCTURE: CPT

## 2022-12-17 PROCEDURE — 2580000003 HC RX 258: Performed by: STUDENT IN AN ORGANIZED HEALTH CARE EDUCATION/TRAINING PROGRAM

## 2022-12-17 PROCEDURE — 97166 OT EVAL MOD COMPLEX 45 MIN: CPT

## 2022-12-17 PROCEDURE — 6370000000 HC RX 637 (ALT 250 FOR IP): Performed by: INTERNAL MEDICINE

## 2022-12-17 PROCEDURE — 97535 SELF CARE MNGMENT TRAINING: CPT

## 2022-12-17 PROCEDURE — 1200000000 HC SEMI PRIVATE

## 2022-12-17 RX ADMIN — MOMETASONE FUROATE AND FORMOTEROL FUMARATE DIHYDRATE 2 PUFF: 200; 5 AEROSOL RESPIRATORY (INHALATION) at 19:59

## 2022-12-17 RX ADMIN — RIVAROXABAN 15 MG: 15 TABLET, FILM COATED ORAL at 10:42

## 2022-12-17 RX ADMIN — SODIUM CHLORIDE, PRESERVATIVE FREE 10 ML: 5 INJECTION INTRAVENOUS at 22:18

## 2022-12-17 RX ADMIN — SODIUM CHLORIDE, PRESERVATIVE FREE 10 ML: 5 INJECTION INTRAVENOUS at 10:44

## 2022-12-17 RX ADMIN — METOPROLOL SUCCINATE 25 MG: 25 TABLET, EXTENDED RELEASE ORAL at 10:43

## 2022-12-17 RX ADMIN — FUROSEMIDE 40 MG: 10 INJECTION, SOLUTION INTRAMUSCULAR; INTRAVENOUS at 10:43

## 2022-12-17 RX ADMIN — Medication 500 MG: at 10:42

## 2022-12-17 ASSESSMENT — PAIN SCALES - WONG BAKER
WONGBAKER_NUMERICALRESPONSE: 0

## 2022-12-17 NOTE — PROGRESS NOTES
Hospital Medicine Progress Note     Date:  12/17/2022    PCP: Ivone Yin MD (Tel: [phone])    Date of Admission: 12/15/2022    Chief complaint:   Chief Complaint   Patient presents with    Altered Mental Status     Per EMS family states pt is altered        Brief admission history: 68-year-old female with history of Alzheimer's dementia, paroxysmal atrial fibrillation, essential hypertension, gout, normocytic anemia, thrombocytopenia, who was brought to the emergency room for evaluation of increasing confusion, as reported by family members. History provided by patient is extremely limited due to underlying confusion and history of Alzheimer's dementia. According to review of electronic medical records, patient has history of recurrent urinary tract infection. There was no documented fever in the emergency room. Chest x-ray revealed mild cardiomegaly with mild central pulmonary congestion, hazy bibasilar opacities and small bibasilar pleural effusion which is more prominent. CT-chest revealed moderate to large right and moderate left pleural effusions, adjacent airspace disease likely passive atelectasis (by a complaint of pneumonia or aspiration remains in the differential consideration), groundglass opacity within the aerated lungs concerning for microatelectasis versus pulmonary edema, moderate sized hiatal hernia, subacute to chronic appearing compression fractures involving the superior endplate of L1. Rapid COVID-19 and influenza screen negative. Urinalysis was not consistent with infection. Procalcitonin level is normal.    Assessment/plan:  Acute metabolic encephalopathy (in the setting of underlying dementia). So far, no evidence of infectious process. May be secondary to underlying bilateral pleural effusion. Monitor mental status closely. Bilateral pleural effusion. Moderate to large, right, moderate left. Continue IV Lasix 40 mg twice daily with hold parameters. Agree with pulmonology that patient will not stay still enough to allow safe thoracentesis. Acute on chronic combined systolic and diastolic heart failure. ECHO with reduced RV function, moderate to severe mitral regurgitation, severe tricuspid regurgitation and severely dilated right atrium. Continue diuresis as ordered. Unfortunately, with the degree of valvular disease, advanced age, dementia, prognosis is extremely poor. Reviewed CT-chest report. Patient does not have pneumonia. History of atrial fibrillation. On beta-blocker, xarelto. Other comorbidities: history of Alzheimer's dementia, paroxysmal atrial fibrillation, essential hypertension, gout, normocytic anemia, thrombocytopenia. Disposition. Due to degree of confusion, in conjunction with underlying dementia, patient has not been taking medicines. I do believe aggressive care is futile. She will be better served going on hospice. Will discuss with son. Diet: ADULT DIET; Regular; Low Sodium (2 gm); 2000 ml    Code status: DNR-CCA   ----------  Subjective  Confused. Objective  Physical exam:  Vitals: BP (!) 165/76   Pulse 87   Temp 98.3 °F (36.8 °C) (Axillary)   Resp 16   Ht 5' 3\" (1.6 m)   Wt 188 lb 7.9 oz (85.5 kg)   SpO2 93%   BMI 33.39 kg/m²   Gen/overall appearance: Not in acute distress. Alert. Underlying dementia. Head: Normocephalic, atraumatic  Eyes: EOMI, good acuity  ENT: Oral mucosa moist  Neck: No JVD, thyromegaly  CVS: Nml S1S2, no MRG, RRR  Pulm: Diminished in lung bases. +crackles. Gastrointestinal: Soft, NT/ND, +BS  Musculoskeletal: No edema. Warm  Neuro: No focal deficit. Moves extremity spontaneously. Psychiatry: Appropriate affect. Not agitated. Skin: Warm, dry with normal turgor.  No rash  Capillary refill: Brisk,< 3 seconds   Peripheral Pulses: +2 palpable, equal bilaterally      24HR INTAKE/OUTPUT:    Intake/Output Summary (Last 24 hours) at 12/17/2022 0513  Last data filed at 12/16/2022 1851  Gross per 24 hour Intake 0 ml   Output 4200 ml   Net -4200 ml     I/O last 3 completed shifts: In: 0   Out: 4200 [Urine:4200]  No intake/output data recorded.   Meds:    furosemide  40 mg IntraVENous BID    Vitamin D  1,000 Units Oral Daily    calcium elemental  500 mg Oral Daily    therapeutic multivitamin-minerals  1 tablet Oral Daily    metoprolol succinate  25 mg Oral Daily    mometasone-formoterol  2 puff Inhalation BID    rivaroxaban  15 mg Oral Daily with breakfast    sodium chloride flush  5-40 mL IntraVENous 2 times per day     Infusions:    sodium chloride       PRN Meds: potassium chloride **OR** potassium alternative oral replacement **OR** potassium chloride, magnesium sulfate, sodium phosphate IVPB **OR** sodium phosphate IVPB, melatonin, albuterol sulfate HFA, sodium chloride flush, sodium chloride, acetaminophen **OR** acetaminophen, ondansetron    Labs/imaging:  CBC:   Recent Labs     12/15/22  1738 12/16/22  0407   WBC 5.6 4.7   HGB 11.1* 10.8*   * 121*       BMP:    Recent Labs     12/15/22  1738 12/16/22  0407    139   K 4.8 4.9    107   CO2 22 22   BUN 30* 28*   CREATININE 1.1 1.0   GLUCOSE 93 98       Hepatic:   Recent Labs     12/15/22  1738   AST 38*   ALT 32   BILITOT 0.8   ALKPHOS 208*         Edison Rodriguez MD  -------------------------------  Rounding hospitalist

## 2022-12-17 NOTE — PLAN OF CARE
Problem: Discharge Planning  Goal: Discharge to home or other facility with appropriate resources  12/17/2022 1830 by Rosalva Shelton RN  Flowsheets (Taken 12/17/2022 1830)  Discharge to home or other facility with appropriate resources:   Identify barriers to discharge with patient and caregiver   Arrange for needed discharge resources and transportation as appropriate     Problem: Pain  Goal: Verbalizes/displays adequate comfort level or baseline comfort level  12/17/2022 1830 by Rosalva Shelton RN  Flowsheets (Taken 12/17/2022 1830)  Verbalizes/displays adequate comfort level or baseline comfort level: Assess pain using appropriate pain scale     Problem: Skin/Tissue Integrity  Goal: Absence of new skin breakdown  Description: 1. Monitor for areas of redness and/or skin breakdown  2. Assess vascular access sites hourly  3. Every 4-6 hours minimum:  Change oxygen saturation probe site  4. Every 4-6 hours:  If on nasal continuous positive airway pressure, respiratory therapy assess nares and determine need for appliance change or resting period.   Outcome: Progressing     Problem: Safety - Adult  Goal: Free from fall injury  12/17/2022 1830 by DANIA Phillipsheets (Taken 12/17/2022 1830)  Free From Fall Injury: Instruct family/caregiver on patient safety     Problem: ABCDS Injury Assessment  Goal: Absence of physical injury  12/17/2022 1830 by DANIA Phillips (Taken 12/17/2022 1830)  Absence of Physical Injury: Implement safety measures based on patient assessment     Problem: Respiratory - Adult  Goal: Achieves optimal ventilation and oxygenation  12/17/2022 1830 by DANIA Phillipsheets (Taken 12/17/2022 1830)  Achieves optimal ventilation and oxygenation:   Assess for changes in mentation and behavior   Assess for changes in respiratory status     Problem: Cardiovascular - Adult  Goal: Maintains optimal cardiac output and hemodynamic stability  12/17/2022 1830 by Brenna Sanderson RN  Flowsheets (Taken 12/17/2022 1830)  Maintains optimal cardiac output and hemodynamic stability:   Monitor blood pressure and heart rate   Monitor urine output and notify Licensed Independent Practitioner for values outside of normal range     Problem: Cardiovascular - Adult  Goal: Absence of cardiac dysrhythmias or at baseline  12/17/2022 1830 by Brenna Sanderson RN  Flowsheets (Taken 12/17/2022 1830)  Absence of cardiac dysrhythmias or at baseline: Monitor cardiac rate and rhythm     Problem: Metabolic/Fluid and Electrolytes - Adult  Goal: Electrolytes maintained within normal limits  12/17/2022 1830 by Brenna Sanderson RN  Flowsheets (Taken 12/17/2022 1830)  Electrolytes maintained within normal limits: Monitor labs and assess patient for signs and symptoms of electrolyte imbalances

## 2022-12-17 NOTE — PLAN OF CARE
Problem: Discharge Planning  Goal: Discharge to home or other facility with appropriate resources  12/16/2022 2353 by Corina Santamaria RN  Outcome: Progressing  Flowsheets (Taken 12/16/2022 2008)  Discharge to home or other facility with appropriate resources: Identify barriers to discharge with patient and caregiver  12/16/2022 1124 by Arminda Yan RN  Outcome: Progressing     Problem: Pain  Goal: Verbalizes/displays adequate comfort level or baseline comfort level  12/16/2022 2353 by Corina Santamaria RN  Outcome: Progressing  12/16/2022 1124 by Arminda Yan RN  Outcome: Progressing     Problem: Skin/Tissue Integrity  Goal: Absence of new skin breakdown  Description: 1. Monitor for areas of redness and/or skin breakdown  2. Assess vascular access sites hourly  3. Every 4-6 hours minimum:  Change oxygen saturation probe site  4. Every 4-6 hours:  If on nasal continuous positive airway pressure, respiratory therapy assess nares and determine need for appliance change or resting period.   12/16/2022 2353 by Corina Santamaria RN  Outcome: Progressing  12/16/2022 1124 by Arminda Yan RN  Outcome: Progressing     Problem: Safety - Adult  Goal: Free from fall injury  12/16/2022 2353 by Corina Santamaria RN  Outcome: Progressing  Flowsheets (Taken 12/16/2022 2008)  Free From Fall Injury: Instruct family/caregiver on patient safety  12/16/2022 1124 by Arminda Yan RN  Outcome: Progressing

## 2022-12-17 NOTE — PROGRESS NOTES
Physical Therapy  Facility/Department: [address] ORTHOPEDICS  Physical Therapy Initial Assessment    Name: Erika Dixon  : 1931  MRN: [id number]  Date of Service: 2022    Assessment / Discharge Recommendations:  -able to mobilize from bed to recliner via [address] stedy   -at first glance anticipate need for continued PT OT and nursing care in a skilled setting unless improves sufficiently for her son to take care    Erika Dixon scored a 8/ on the AM-PAC short mobility form. Current research shows that an AM-PAC score of 17 or less is typically not associated with a discharge to the patient's home setting. Based on the patient's AM-PAC score and their current functional mobility deficits, it is recommended that the patient have 3-5 sessions per week of Physical Therapy at d/c to increase the patient's independence. Patient Diagnosis(es): The primary encounter diagnosis was Altered mental status, unspecified altered mental status type. A diagnosis of Dehydration was also pertinent to this visit. 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 Past Surgical History:  has no past surgical history on file. Body Structures, Functions, Activity Limitations Requiring Skilled Therapeutic Intervention: Decreased functional mobility ; Decreased ADL status; Decreased cognition;Decreased balance  Therapy Prognosis: Fair  Decision Making: Medium Complexity  Requires PT Follow-Up: Yes  Activity Tolerance  Activity Tolerance: Patient tolerated treatment well;Treatment limited secondary to decreased cognition     Plan   Physcial Therapy Plan  General Plan: 3-5 times per week  Current Treatment Recommendations: Functional mobility training, Transfer training, Positioning, Therapeutic activities, Patient/Caregiver education & training  Safety Devices  Type of Devices: All fall risk precautions in place, Call light within reach, Chair alarm in place, Left in chair, Nurse notified Demetrio Pelaez)     Restrictions  Restrictions/Precautions  Restrictions/Precautions: Fall Risk     Subjective   General  Chart Reviewed: Yes  Additional Pertinent Hx: here due to increased confusion- AMS in context of [place], [place]     -found to have UTI  Response To Previous Treatment: Not applicable  Family / Caregiver Present: No  Follows Commands: Impaired  Subjective  Subjective: arrived to room in tandem of OT to patient sleeping in bed - able to awaken with loud voice and tactile cues - she is not able to state name,   - asking for a Coke  -able to be directed to engage for PT OT assessments  Social/Functional History  Social/Functional History  Lives With: Son (Son Aimee Salazar) and his partner Tim Sal)  Type of Home: House  Home Layout: One level  Home Access: Stairs to enter with rails  Entrance Stairs - Number of Steps: 4-5  Bathroom Shower/Tub: Walk-in shower  Bathroom Toilet: Standard  Bathroom Equipment: Grab bars in shower, Shower chair  Home Equipment: Laruth Commander, 4 wheeled, Mendel Gabriele  ADL Assistance: Needs assistance (Son assists with bathing/dressing/toileting - patient feeds self)  Homemaking Responsibilities: No (son performs)  Ambulation Assistance: Needs assistance (with rollator)  Transfer Assistance: Needs assistance  Active : No  Additional Comments: above info from previous therapy note-and social work note varifies this.   Vision/Hearing  Vision  Vision: Impaired  Vision Exceptions: Wears glasses at all times  Hearing  Hearing: Exceptions to [facility]  Hearing Exceptions: Hard of hearing/hearing concerns    Cognition   Orientation  Overall Orientation Status: Impaired     Objective   Gross Assessment  Tone: Normal  Sensation:  (not assessed formally)   Strength Other  Other: not able to assess formally at this session but appears grossly functional as observed mobilizing from bed in [facility] ryan  Bed mobility  Supine to Sit: Maximum assistance;2 Person assistance  Sit to Supine: Unable to assess  Scooting: Maximal assistance  Transfers  Sit to Stand: Moderate Assistance;Maximum Assistance;2 Person Assistance (cues and in diamond davis)  Stand to Sit: Maximum Assistance;2 Person Assistance; Moderate Assistance  Ambulation  More Ambulation?: No  Stairs/Curb  Stairs?: No  Balance  Comments: able to sit midline with max>>mod>>min assist to maintain     - not able to gain a full upright static stance in steduy    AM-PAC Score  AM-PAC Inpatient Mobility Raw Score : 8 (12/17/22 1025)  AM-PAC Inpatient T-Scale Score : 28.52 (12/17/22 1025)  Mobility Inpatient CMS 0-100% Score: 86.62 (12/17/22 1025)  Mobility Inpatient CMS G-Code Modifier : CM (12/17/22 1025)    Goals  Short Term Goals  Time Frame for Short Term Goals: 2-3 days  Short Term Goal 1: bed mobility at min assist  Short Term Goal 2: transfers at min/mod assist  Short Term Goal 3: ambulation at min/mod assist rolling walker for room distances  Patient Goals   Patient Goals : not able to formulate       Education  Patient Education  Education Given To: Patient  Education Outcome: Unable to verbalize      Therapy Time   Individual Concurrent Group Co-treatment   Time In 0930         Time Out 1025         Minutes 2050 Ramy Chowdary PT

## 2022-12-17 NOTE — PROGRESS NOTES
Patient is alert & oriented to self only, pt gets up with a stedy assist of 2, 2/4 bed rails up, bed in lowest position, fall precautions in place, call light within reach. Pt is extremely hard of hearing and also cannot see very well, because of this, it is difficult for her to follow commands.       Electronically signed by Ken Lehman RN on 12/17/2022 at 11:12 AM

## 2022-12-17 NOTE — PROGRESS NOTES
Pt is increasingly agitated as the night goes on. Pt refuses lasix at this time and yells at me to \"Quit it! Get away! \"     Electronically signed by Bayron Aguayo RN on 12/17/2022 at 5:05 PM

## 2022-12-17 NOTE — PROGRESS NOTES
Patient remains confused and oriented to self only. Pt has been non-compliant this shift at times with vitals and medications. Standard safety precautions in place.    Electronically signed by Ken Lehman RN on 12/17/2022 at 6:34 PM

## 2022-12-17 NOTE — PROGRESS NOTES
Occupational Therapy  [facility]  Occupational Therapy Initial Assessment    Name: Bryan Baird  : 1931  MRN: [id number]  Date of Service: 2022    Discharge Recommendations:  3-5 sessions per week, Continue to assess pending progress, Patient would benefit from continued therapy after discharge    Bryan Baird scored a 9/ on the AM-PAC ADL Inpatient form. Current research shows that an AM-PAC score of 17 or less is typically not associated with a discharge to the patient's home setting. Based on the patient's AM-PAC score and their current ADL deficits, it is recommended that the patient have 3-5 sessions per week of Occupational Therapy at d/c to increase the patient's independence. Please see assessment section for further patient specific details. If patient discharges prior to next session this note will serve as a discharge summary. Please see below for the latest assessment towards goals. Patient Diagnosis(es): The primary encounter diagnosis was Altered mental status, unspecified altered mental status type. A diagnosis of Dehydration was also pertinent to this visit. 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 Past Surgical History:  has no past surgical history on file. Treatment Diagnosis: Imp ADLs, functional tranfers and mobility, balance and safety awareness and cognition due to AMS. Assessment   Performance deficits / Impairments: Decreased functional mobility ; Decreased ADL status; Decreased cognition;Decreased safe awareness;Decreased balance    Assessment: 80-year-old female with history of Alzheimer's dementia, admitted on  12-15-22 with Acute metabolic encephalopathy,Bilateral pleural effusion and possible atelectasis or infiltrate.   She lives with her son and his partner in a house per chart and required assistance for all functional mobility and transfers with a rollator and with ADLs prior to admit. She is currently below baseline, needing to use a STEDY for transfers. She also needs max assist for self-feeding at this time. Will benefit from skilled OT services while on acute and possible OT 3-5x/week if son is unable to manage her transfers specifically at the time of D/C    Treatment Diagnosis: Imp ADLs, functional tranfers and mobility, balance and safety awareness and cognition due to AMS. Prognosis: Guarded  Decision Making: Medium Complexity  REQUIRES OT FOLLOW-UP: Yes  Activity Tolerance  Activity Tolerance: Treatment limited secondary to decreased cognition        Plan   Occupational Therapy Plan  Times Per Week: 3-5  Times Per Day: Once a day  Current Treatment Recommendations: Balance training, Functional mobility training, Patient/Caregiver education & training, Self-Care / ADL     Restrictions  Restrictions/Precautions  Restrictions/Precautions: Fall Risk    Subjective   General  Chart Reviewed: Yes, Orders, Progress Notes, History and Physical    Additional Pertinent Hx: 71-year-old female with history of Alzheimer's dementia, paroxysmal atrial fibrillation, essential hypertension, gout, normocytic anemia, thrombocytopenia, who was brought to the emergency room for evaluation of increasing confusion, as reported by family members. History provided by patient is extremely limited due to underlying confusion and history of Alzheimer's dementia. According to review of electronic medical records, patient has history of recurrent urinary tract infection. There was no documented fever in the emergency room. Family / Caregiver Present: No  Diagnosis: Acute metabolic encephalopathy,Bilateral pleural effusion and possible atelectasis or infiltrate  Subjective  Subjective: \"I want a coke and a 5 way\".  Pt states,\"No, I'm not having any problem\"  General Comment  Comments: Pt  seen for OT/PT co-eval. She has poor initiation throughout due to alzheimers and dementia but will complete activities starts. OT stays to assist with self- feeding. Social/Functional History  Social/Functional History  Lives With: Son (Son Flakita Moss) and his partner Monse Tuttle)  Type of Home: House  Home Layout: One level  Home Access: Stairs to enter with rails  Entrance Stairs - Number of Steps: 4-5  Bathroom Shower/Tub: Walk-in shower  Bathroom Toilet: Standard  Bathroom Equipment: Grab bars in shower, Shower chair  Home Equipment: Sepideh Bella, 4 wheeled, Sharon Ashkan  ADL Assistance: Needs assistance (Son assists with bathing/dressing/toileting - patient feeds self)  Homemaking Responsibilities: No (son performs)  Ambulation Assistance: Needs assistance (with rollator)  Transfer Assistance: Needs assistance  Active : No  Additional Comments: above info from previous therapy note-and social work note varifies this. Objective      Observation/Palpation  Observation: Pt benefits from pillows positioned under elbows in recliner to help feed herself  Safety Devices  Type of Devices: All fall risk precautions in place;Call light within reach; Chair alarm in place; Left in chair;Nurse notified Joslyn Bell)        AROM:  (Unable to test but appears close to typical)  Strength:  (Unable to test but appears close to typical when resisting or pulling away for mobility tasks)  ADL  Feeding: Maximum assistance  Feeding Skilled Clinical Factors: Will state, \"I'm hungry and thirsty\" expressing basic needs. She does not appear to see the food on the tray and requires OT to place the food on the utensil and then place the utensil in her hand. Once its in her hand, she can  bring it to her mouth with only CGA. Once cup is placed in her hand and touched to her lips (\"What's this? \") she can drink  taking multiple sips in a row until she finished the cup.   She has mild cough after OT gives her her first swallow but then appears to clear it. RN and MD notified when they entered the room to see her. .  Additional Comments: Anticipate Max assit to dependent for bathing and dressing based on cognition, ROM and balance- pt required assist for all ADLs PTA. Activity Tolerance  Activity Tolerance: Patient tolerated treatment well;Treatment limited secondary to decreased cognition  Bed mobility  Supine to Sit: Maximum assistance;2 Person assistance  Sit to Supine: Unable to assess (In recliner at end of session)  Scooting: Maximal assistance  Transfers  Sit to stand: Moderate assistance;2 Person assistance;Maximum assistance (cues and in diamond davis)  Stand to sit: Moderate assistance;2 Person assistance;Maximum assistance  Vision  Vision: Impaired  Vision Exceptions: Wears glasses at all times  Hearing  Hearing: Exceptions to [facility]  Hearing Exceptions: Hard of hearing/hearing concerns  Cognition  Overall Cognitive Status: Exceptions  Arousal/Alertness: Delayed responses to stimuli (very [place])  Following Commands: Follows one step commands with increased time; Follows one step commands with repetition; Inconsistently follows commands (very [place])  Memory:  (dementia)  Safety Judgement: Decreased awareness of need for safety  Initiation: Requires cues for all  Sequencing: Requires cues for all  Cognition Comment: Pt with h/o Alz dementia. Is able to follow through functional activity of feeding self after utensil and cup use initiated. Can also be limited by poor vision and [place]. Orientation  Overall Orientation Status: Impaired (Able to state first and last name when asked \"What's your name? \")      Education Given To: Patient  Education Provided: Role of Therapy;Transfer Training  Education Method: Demonstration  Barriers to Learning: Hearing;Vision;Cognition  Education Outcome: Unable to demonstrate understanding;Continued education needed     AM-PAC Score        AM-PAC Inpatient Daily Activity Raw Score: 9 (12/17/22 1100)  AM-PAC Inpatient ADL T-Scale Score : 25.33 (12/17/22 1100)  ADL Inpatient CMS 0-100% Score: 79.59 (12/17/22 1100)  ADL Inpatient CMS G-Code Modifier : CL (12/17/22 1100)         Goals  Short Term Goals  Time Frame for Short Term Goals: Date of D/C  Short Term Goal 1: Mod assist for feeding and grooming tasks  Short Term Goal 2: Mod assist x1 bed mobility  Short Term Goal 3: Mod assist x 1 functional transfers to bed, chair and toilet with rolling walker/rollator  Short Term Goal 4: Mod assist x 1 functional mobility  with rolling walker/rollator  Long Term Goals  Time Frame for Long Term Goals : TBD  Patient Goals   Patient goals : Pt states \"I want coke and a 5-way\". (RN is trying to get her a coke)       Therapy Time   Individual Concurrent Group Co-treatment   Time In 0945         Time Out 1055         Minutes 70         Timed Code Treatment Minutes: 55 Minutes  Please transfer OT care to primary OT.   OT license: [id number]    Marcos Caraballo OT  Electronically signed by Marcos Caraballo OT on 12/17/2022 at 1:09 PM

## 2022-12-17 NOTE — PLAN OF CARE
Problem: Discharge Planning  Goal: Discharge to home or other facility with appropriate resources  12/17/2022 1307 by Blanca Todd RN  Outcome: Progressing  Flowsheets  Taken 12/17/2022 1307  Discharge to home or other facility with appropriate resources:   Identify barriers to discharge with patient and caregiver   Identify discharge learning needs (meds, wound care, etc)  Taken 12/17/2022 1118  Discharge to home or other facility with appropriate resources: Identify barriers to discharge with patient and caregiver     Problem: Pain  Goal: Verbalizes/displays adequate comfort level or baseline comfort level  12/17/2022 1307 by Blanca Todd RN  Outcome: Progressing  Flowsheets (Taken 12/17/2022 1307)  Verbalizes/displays adequate comfort level or baseline comfort level: Encourage patient to monitor pain and request assistance     Problem: Skin/Tissue Integrity  Goal: Absence of new skin breakdown  Description: 1. Monitor for areas of redness and/or skin breakdown  2. Assess vascular access sites hourly  3. Every 4-6 hours minimum:  Change oxygen saturation probe site  4. Every 4-6 hours:  If on nasal continuous positive airway pressure, respiratory therapy assess nares and determine need for appliance change or resting period.   12/17/2022 1307 by Blanca Todd RN  Outcome: Progressing     Problem: Safety - Adult  Goal: Free from fall injury  12/17/2022 1307 by Blanca Todd RN  Outcome: Progressing  Flowsheets  Taken 12/17/2022 1307  Free From Fall Injury: Instruct family/caregiver on patient safety  Taken 12/17/2022 1305  Free From Fall Injury: Instruct family/caregiver on patient safety     Problem: ABCDS Injury Assessment  Goal: Absence of physical injury  12/17/2022 1307 by Blanca Todd RN  Outcome: Progressing  Flowsheets  Taken 12/17/2022 1307  Absence of Physical Injury: Implement safety measures based on patient assessment  Taken 12/17/2022 1305  Absence of Physical Injury: Implement safety measures based on patient assessment     Problem: Respiratory - Adult  Goal: Achieves optimal ventilation and oxygenation  12/17/2022 1307 by Aftab Beck RN  Outcome: Progressing  Flowsheets  Taken 12/17/2022 1307  Achieves optimal ventilation and oxygenation:   Assess for changes in respiratory status   Position to facilitate oxygenation and minimize respiratory effort  Taken 12/17/2022 1118  Achieves optimal ventilation and oxygenation: Assess and instruct to report shortness of breath or any respiratory difficulty     Problem: Cardiovascular - Adult  Goal: Maintains optimal cardiac output and hemodynamic stability  12/17/2022 1307 by Aftab Beck RN  Outcome: Progressing  Flowsheets  Taken 12/17/2022 1307  Maintains optimal cardiac output and hemodynamic stability:   Monitor blood pressure and heart rate   Monitor urine output and notify Licensed Independent Practitioner for values outside of normal range  Taken 12/17/2022 1118  Maintains optimal cardiac output and hemodynamic stability: Monitor blood pressure and heart rate     Problem: Cardiovascular - Adult  Goal: Absence of cardiac dysrhythmias or at baseline  12/17/2022 1307 by Aftab Beck RN  Outcome: Progressing  Flowsheets  Taken 12/17/2022 1307  Absence of cardiac dysrhythmias or at baseline: Monitor cardiac rate and rhythm  Taken 12/17/2022 1118  Absence of cardiac dysrhythmias or at baseline: Monitor cardiac rate and rhythm     Problem: Metabolic/Fluid and Electrolytes - Adult  Goal: Electrolytes maintained within normal limits  12/17/2022 1307 by Aftab Beck RN  Outcome: Progressing  Flowsheets  Taken 12/17/2022 1307  Electrolytes maintained within normal limits: Monitor labs and assess patient for signs and symptoms of electrolyte imbalances  Taken 12/17/2022 1118  Electrolytes maintained within normal limits: Monitor labs and assess patient for signs and symptoms of electrolyte imbalances

## 2022-12-18 LAB
ALBUMIN SERPL-MCNC: 3.7 G/DL (ref 3.4–5)
ANION GAP SERPL CALCULATED.3IONS-SCNC: 14 MMOL/L (ref 3–16)
BASOPHILS ABSOLUTE: 0 K/UL (ref 0–0.2)
BASOPHILS RELATIVE PERCENT: 0.7 %
BUN BLDV-MCNC: 28 MG/DL (ref 7–20)
CALCIUM SERPL-MCNC: 10.1 MG/DL (ref 8.3–10.6)
CHLORIDE BLD-SCNC: 102 MMOL/L (ref 99–110)
CO2: 23 MMOL/L (ref 21–32)
CREAT SERPL-MCNC: 1.2 MG/DL (ref 0.6–1.2)
EOSINOPHILS ABSOLUTE: 0.1 K/UL (ref 0–0.6)
EOSINOPHILS RELATIVE PERCENT: 1.1 %
GFR SERPL CREATININE-BSD FRML MDRD: 43 ML/MIN/{1.73_M2}
GLUCOSE BLD-MCNC: 78 MG/DL (ref 70–99)
GLUCOSE BLD-MCNC: 88 MG/DL (ref 70–99)
HCT VFR BLD CALC: 39.1 % (ref 36–48)
HEMOGLOBIN: 12.3 G/DL (ref 12–16)
LYMPHOCYTES ABSOLUTE: 0.5 K/UL (ref 1–5.1)
LYMPHOCYTES RELATIVE PERCENT: 8.2 %
MAGNESIUM: 1.8 MG/DL (ref 1.8–2.4)
MCH RBC QN AUTO: 27.6 PG (ref 26–34)
MCHC RBC AUTO-ENTMCNC: 31.6 G/DL (ref 31–36)
MCV RBC AUTO: 87.2 FL (ref 80–100)
MONOCYTES ABSOLUTE: 1 K/UL (ref 0–1.3)
MONOCYTES RELATIVE PERCENT: 16.3 %
NEUTROPHILS ABSOLUTE: 4.5 K/UL (ref 1.7–7.7)
NEUTROPHILS RELATIVE PERCENT: 73.7 %
PDW BLD-RTO: 16 % (ref 12.4–15.4)
PERFORMED ON: NORMAL
PHOSPHORUS: 3 MG/DL (ref 2.5–4.9)
PLATELET # BLD: 156 K/UL (ref 135–450)
PMV BLD AUTO: 10.2 FL (ref 5–10.5)
POTASSIUM SERPL-SCNC: 4.2 MMOL/L (ref 3.5–5.1)
RBC # BLD: 4.48 M/UL (ref 4–5.2)
SODIUM BLD-SCNC: 139 MMOL/L (ref 136–145)
WBC # BLD: 6.1 K/UL (ref 4–11)

## 2022-12-18 PROCEDURE — 80069 RENAL FUNCTION PANEL: CPT

## 2022-12-18 PROCEDURE — 94760 N-INVAS EAR/PLS OXIMETRY 1: CPT

## 2022-12-18 PROCEDURE — 85025 COMPLETE CBC W/AUTO DIFF WBC: CPT

## 2022-12-18 PROCEDURE — 1200000000 HC SEMI PRIVATE

## 2022-12-18 PROCEDURE — 83735 ASSAY OF MAGNESIUM: CPT

## 2022-12-18 PROCEDURE — 6370000000 HC RX 637 (ALT 250 FOR IP): Performed by: STUDENT IN AN ORGANIZED HEALTH CARE EDUCATION/TRAINING PROGRAM

## 2022-12-18 PROCEDURE — 6370000000 HC RX 637 (ALT 250 FOR IP): Performed by: INTERNAL MEDICINE

## 2022-12-18 PROCEDURE — 36415 COLL VENOUS BLD VENIPUNCTURE: CPT

## 2022-12-18 RX ADMIN — Medication 1000 UNITS: at 09:45

## 2022-12-18 RX ADMIN — Medication 1 TABLET: at 09:45

## 2022-12-18 RX ADMIN — Medication 500 MG: at 09:44

## 2022-12-18 RX ADMIN — METOPROLOL SUCCINATE 25 MG: 25 TABLET, EXTENDED RELEASE ORAL at 09:45

## 2022-12-18 RX ADMIN — RIVAROXABAN 15 MG: 15 TABLET, FILM COATED ORAL at 09:45

## 2022-12-18 NOTE — PROGRESS NOTES
Hospital Medicine Progress Note     Date:  12/18/2022    PCP: Ashely Patiño MD (Tel: [phone])    Date of Admission: 12/15/2022    Chief complaint:   Chief Complaint   Patient presents with    Altered Mental Status     Per EMS family states pt is altered        Brief admission history: 60-year-old female with history of Alzheimer's dementia, paroxysmal atrial fibrillation, essential hypertension, gout, normocytic anemia, thrombocytopenia, who was brought to the emergency room for evaluation of increasing confusion, as reported by family members. History provided by patient is extremely limited due to underlying confusion and history of Alzheimer's dementia. According to review of electronic medical records, patient has history of recurrent urinary tract infection. There was no documented fever in the emergency room. Chest x-ray revealed mild cardiomegaly with mild central pulmonary congestion, hazy bibasilar opacities and small bibasilar pleural effusion which is more prominent. CT-chest revealed moderate to large right and moderate left pleural effusions, adjacent airspace disease likely passive atelectasis (by a complaint of pneumonia or aspiration remains in the differential consideration), groundglass opacity within the aerated lungs concerning for microatelectasis versus pulmonary edema, moderate sized hiatal hernia, subacute to chronic appearing compression fractures involving the superior endplate of L1. Rapid COVID-19 and influenza screen negative. Urinalysis was not consistent with infection. Procalcitonin level is normal.    Assessment/plan:  Acute metabolic encephalopathy (in the setting of underlying dementia). So far, no evidence of infectious process. May be secondary to underlying bilateral pleural effusion. Monitor mental status closely. Bilateral pleural effusion. Moderate to large, right, moderate left.  Agree with pulmonology that patient will not stay still enough to allow safe thoracentesis. She has been on IV lasix, holding due to worsening renal function. Acute on chronic combined systolic and diastolic heart failure. ECHO with reduced RV function, moderate to severe mitral regurgitation, severe tricuspid regurgitation and severely dilated right atrium. Unfortunately, with the degree of valvular disease, advanced age, dementia, prognosis is extremely poor. Acute kidney injury on CKD III. Secondary to diuretics therapy, now on hold. Monitor renal function off diuretics. Reviewed CT-chest report. Patient does not have pneumonia. History of atrial fibrillation. On beta-blocker, xarelto. Other comorbidities: history of Alzheimer's dementia, paroxysmal atrial fibrillation, essential hypertension, gout, normocytic anemia, thrombocytopenia. Disposition. Due to degree of confusion, in conjunction with underlying dementia, patient has not been taking medicines. I do believe aggressive care is futile. She will be better served going on hospice. Discussed with son, Mr. Zeyad Spencer around 12:20 PM on 12/18/2022. Diet: ADULT DIET; Regular; Low Sodium (2 gm); 2000 ml    Code status: DNR-CCA   ----------  Subjective  Confused. Objective  Physical exam:  Vitals: BP (!) 91/48   Pulse 78   Temp 97.6 °F (36.4 °C) (Oral)   Resp 16   Ht 5' 3\" (1.6 m)   Wt 189 lb 6 oz (85.9 kg)   SpO2 94%   BMI 33.55 kg/m²   Gen/overall appearance: Not in acute distress. Alert. Underlying dementia. Head: Normocephalic, atraumatic  Eyes: EOMI, good acuity  ENT: Oral mucosa moist  Neck: No JVD, thyromegaly  CVS: Nml S1S2, no MRG, RRR  Pulm: Diminished in lung bases. +crackles. Gastrointestinal: Soft, NT/ND, +BS  Musculoskeletal: No edema. Warm  Neuro: No focal deficit. Moves extremity spontaneously. Psychiatry: Appropriate affect. Not agitated. Skin: Warm, dry with normal turgor.  No rash  Capillary refill: Brisk,< 3 seconds   Peripheral Pulses: +2 palpable, equal bilaterally      24HR INTAKE/OUTPUT:    Intake/Output Summary (Last 24 hours) at 12/18/2022 0535  Last data filed at 12/17/2022 2218  Gross per 24 hour   Intake 250 ml   Output --   Net 250 ml       I/O last 3 completed shifts: In: 0   Out: 4200 [Urine:4200]  I/O this shift:  In: 250 [P.O.:240;  I.V.:10]  Out: -   Meds:    [Held by provider] furosemide  40 mg IntraVENous BID    Vitamin D  1,000 Units Oral Daily    calcium elemental  500 mg Oral Daily    therapeutic multivitamin-minerals  1 tablet Oral Daily    metoprolol succinate  25 mg Oral Daily    mometasone-formoterol  2 puff Inhalation BID    rivaroxaban  15 mg Oral Daily with breakfast    sodium chloride flush  5-40 mL IntraVENous 2 times per day     Infusions:    sodium chloride       PRN Meds: potassium chloride **OR** potassium alternative oral replacement **OR** potassium chloride, magnesium sulfate, sodium phosphate IVPB **OR** sodium phosphate IVPB, melatonin, albuterol sulfate HFA, sodium chloride flush, sodium chloride, acetaminophen **OR** acetaminophen, ondansetron    Labs/imaging:  CBC:   Recent Labs     12/15/22  1738 12/16/22  0407 12/17/22  0614   WBC 5.6 4.7 6.0   HGB 11.1* 10.8* 12.0   * 121* 160       BMP:    Recent Labs     12/15/22  1738 12/16/22  0407 12/17/22  0614    139 142   K 4.8 4.9 4.0    107 101   CO2 22 22 26   BUN 30* 28* 28*   CREATININE 1.1 1.0 1.5*   GLUCOSE 93 98 78       Hepatic:   Recent Labs     12/15/22  1738   AST 38*   ALT 32   BILITOT 0.8   ALKPHOS 208*         Allan Haji MD  -------------------------------  Rounding hospitalist

## 2022-12-18 NOTE — PLAN OF CARE
Problem: Discharge Planning  Goal: Discharge to home or other facility with appropriate resources  12/18/2022 0102 by Maria Del Carmen Nash RN  Outcome: Progressing  12/17/2022 1830 by Miguel Kaur RN  Flowsheets (Taken 12/17/2022 1830)  Discharge to home or other facility with appropriate resources:   Identify barriers to discharge with patient and caregiver   Arrange for needed discharge resources and transportation as appropriate  12/17/2022 1307 by Miguel Kaur RN  Outcome: Progressing  Flowsheets  Taken 12/17/2022 1307  Discharge to home or other facility with appropriate resources:   Identify barriers to discharge with patient and caregiver   Identify discharge learning needs (meds, wound care, etc)  Taken 12/17/2022 1118  Discharge to home or other facility with appropriate resources: Identify barriers to discharge with patient and caregiver     Problem: Pain  Goal: Verbalizes/displays adequate comfort level or baseline comfort level  12/18/2022 0102 by Maria Del Carmen Nash RN  Outcome: Progressing  12/17/2022 1830 by Miguel Kaur RN  Flowsheets (Taken 12/17/2022 1830)  Verbalizes/displays adequate comfort level or baseline comfort level: Assess pain using appropriate pain scale  12/17/2022 1307 by Miguel Kaur RN  Outcome: Progressing  Flowsheets (Taken 12/17/2022 1307)  Verbalizes/displays adequate comfort level or baseline comfort level: Encourage patient to monitor pain and request assistance     Problem: Skin/Tissue Integrity  Goal: Absence of new skin breakdown  Description: 1. Monitor for areas of redness and/or skin breakdown  2. Assess vascular access sites hourly  3. Every 4-6 hours minimum:  Change oxygen saturation probe site  4. Every 4-6 hours:  If on nasal continuous positive airway pressure, respiratory therapy assess nares and determine need for appliance change or resting period.   12/18/2022 0102 by Maria Del Carmen Nash RN  Outcome: Progressing  12/17/2022 1307 by Tc Robin Shruti Rankin RN  Outcome: Progressing     Problem: Safety - Adult  Goal: Free from fall injury  12/18/2022 0102 by Emiliano Brady RN  Outcome: Progressing  12/17/2022 1830 by Lucretia Delgado RN  Flowsheets (Taken 12/17/2022 1830)  Free From Fall Injury: Instruct family/caregiver on patient safety  12/17/2022 1307 by Lucretia Delgado RN  Outcome: Progressing  Flowsheets  Taken 12/17/2022 1307  Free From Fall Injury: Instruct family/caregiver on patient safety  Taken 12/17/2022 1305  Free From Fall Injury: Instruct family/caregiver on patient safety

## 2022-12-18 NOTE — PLAN OF CARE
Problem: Discharge Planning  Goal: Discharge to home or other facility with appropriate resources  12/18/2022 1445 by Bayron Aguayo RN  Outcome: Progressing  Flowsheets  Taken 12/18/2022 1445  Discharge to home or other facility with appropriate resources:   Identify barriers to discharge with patient and caregiver   Arrange for needed discharge resources and transportation as appropriate  Taken 12/18/2022 1000  Discharge to home or other facility with appropriate resources: Identify barriers to discharge with patient and caregiver     Problem: Pain  Goal: Verbalizes/displays adequate comfort level or baseline comfort level  12/18/2022 1445 by Bayron Aguayo RN  Outcome: Progressing  Flowsheets (Taken 12/18/2022 1445)  Verbalizes/displays adequate comfort level or baseline comfort level:   Encourage patient to monitor pain and request assistance   Assess pain using appropriate pain scale     Problem: Safety - Adult  Goal: Free from fall injury  12/18/2022 1445 by Bayron Aguayo RN  Outcome: Progressing  Flowsheets (Taken 12/18/2022 1445)  Free From Fall Injury: Instruct family/caregiver on patient safety     Problem: Skin/Tissue Integrity  Goal: Absence of new skin breakdown  Description: 1. Monitor for areas of redness and/or skin breakdown  2. Assess vascular access sites hourly  3. Every 4-6 hours minimum:  Change oxygen saturation probe site  4. Every 4-6 hours:  If on nasal continuous positive airway pressure, respiratory therapy assess nares and determine need for appliance change or resting period.   12/18/2022 1445 by Bayron Aguayo RN  Outcome: Progressing     Problem: ABCDS Injury Assessment  Goal: Absence of physical injury  12/18/2022 1445 by Bayron Aguayo RN  Outcome: Progressing  Flowsheets (Taken 12/18/2022 1445)  Absence of Physical Injury: Implement safety measures based on patient assessment     Problem: Respiratory - Adult  Goal: Achieves optimal ventilation and oxygenation  12/18/2022 1445 by Segundo Valadez RN  Outcome: Progressing  Flowsheets  Taken 12/18/2022 1445  Achieves optimal ventilation and oxygenation:   Assess for changes in respiratory status   Assess for changes in mentation and behavior  Taken 12/18/2022 1000  Achieves optimal ventilation and oxygenation: Assess for changes in respiratory status     Problem: Cardiovascular - Adult  Goal: Maintains optimal cardiac output and hemodynamic stability  12/18/2022 1445 by Segundo Valadez RN  Outcome: Progressing  Flowsheets  Taken 12/18/2022 1445  Maintains optimal cardiac output and hemodynamic stability:   Monitor blood pressure and heart rate   Monitor urine output and notify Licensed Independent Practitioner for values outside of normal range  Taken 12/18/2022 1000  Maintains optimal cardiac output and hemodynamic stability: Monitor blood pressure and heart rate     Problem: Cardiovascular - Adult  Goal: Absence of cardiac dysrhythmias or at baseline  12/18/2022 1445 by Segundo Valadez RN  Outcome: Progressing  Flowsheets  Taken 12/18/2022 1445  Absence of cardiac dysrhythmias or at baseline: Monitor cardiac rate and rhythm  Taken 12/18/2022 1000  Absence of cardiac dysrhythmias or at baseline: Monitor cardiac rate and rhythm     Problem: Metabolic/Fluid and Electrolytes - Adult  Goal: Electrolytes maintained within normal limits  12/18/2022 1445 by Segundo Valadez RN  Outcome: Progressing  Flowsheets  Taken 12/18/2022 1445  Electrolytes maintained within normal limits:   Monitor labs and assess patient for signs and symptoms of electrolyte imbalances   Administer electrolyte replacement as ordered  Taken 12/18/2022 1000  Electrolytes maintained within normal limits: Monitor labs and assess patient for signs and symptoms of electrolyte imbalances

## 2022-12-18 NOTE — PROGRESS NOTES
Patient is alert & oriented to self only. Pt is very hard of hearing and this makes it difficult for patient to follow commands. Morning medications given crushed in applesauce. Pt requires extra time and lots of cues. Pt is a maxi move assist, 2/4 bed rails up, bed in lowest position, fall precautions in place, call light within reach. Pt has no IV access, due to multiple times of ripping out previous IV's MD said ok to leave IV out.      Electronically signed by Vita Olivera RN on 12/18/2022 at 10:06 AM

## 2022-12-19 VITALS
SYSTOLIC BLOOD PRESSURE: 121 MMHG | DIASTOLIC BLOOD PRESSURE: 64 MMHG | WEIGHT: 184.53 LBS | OXYGEN SATURATION: 93 % | BODY MASS INDEX: 32.7 KG/M2 | HEIGHT: 63 IN | TEMPERATURE: 97.8 F | HEART RATE: 85 BPM | RESPIRATION RATE: 17 BRPM

## 2022-12-19 PROCEDURE — 97530 THERAPEUTIC ACTIVITIES: CPT

## 2022-12-19 PROCEDURE — 94760 N-INVAS EAR/PLS OXIMETRY 1: CPT

## 2022-12-19 RX ORDER — FUROSEMIDE 40 MG/1
40 TABLET ORAL DAILY
Status: DISCONTINUED | OUTPATIENT
Start: 2022-12-19 | End: 2022-12-19 | Stop reason: HOSPADM

## 2022-12-19 RX ORDER — FUROSEMIDE 40 MG/1
40 TABLET ORAL DAILY
Qty: 30 TABLET | Refills: 1 | Status: SHIPPED | OUTPATIENT
Start: 2022-12-19

## 2022-12-19 ASSESSMENT — PAIN SCALES - WONG BAKER: WONGBAKER_NUMERICALRESPONSE: 0

## 2022-12-19 NOTE — PROGRESS NOTES
[facility]  Call to patient's son Hellen Ga who states that the patient lives with him. Hellen Ga is not at the hospital currently. Talked about a time today to meet and discuss hospice/discharge. Hellen Ga reports he said he needs to talk with his partner who is reportedly also involved in the care giving of the patient to set a time for them to both attend the meeting. States he will call us back when he knows their availability. Will update .      [address]  [phone] (work cell)  [phone] (main and referrals)

## 2022-12-19 NOTE — PROGRESS NOTES
Occupational Therapy  Facility/Department: [facility]  ORTHOPEDICS  Occupational Daily Treatment Note      Name: Lina Castillo  : 1931  MRN: [id number]  Date of Service: 2022    Discharge Recommendations:  [address] with OT, [address] without OT          Patient Diagnosis(es): The primary encounter diagnosis was Altered mental status, unspecified altered mental status type. A diagnosis of Dehydration was also pertinent to this visit. 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 Past Surgical History:  has no past surgical history on file. Treatment Diagnosis: Imp ADLs, functional tranfers and mobility, balance and safety awareness and cognition due to AMS. Assessment   Performance deficits / Impairments: Decreased functional mobility ; Decreased ADL status; Decreased cognition;Decreased safe awareness;Decreased balance  Assessment: Discussed with OTR am pac score has decreased to 6. Patient with very limited participation this date. Does not open eyes this date. Max A of 2 for supine<>sit, Roling to the left with Max A of 2. Sat briefly on edge of bed with Max A. This date patient unable to keep eyes open, participate in therapy session and follow simple commands. At this time would recommend LTC with or without OT will cont with POC. REQUIRES OT FOLLOW-UP: Yes  Activity Tolerance  Activity Tolerance: Treatment limited secondary to decreased cognition        Plan   Occupational Therapy Plan  Times Per Week: 2-3  Times Per Day:  Once a day  Current Treatment Recommendations: Balance training, Functional mobility training, Patient/Caregiver education & training, Self-Care / ADL     Restrictions  Restrictions/Precautions  Restrictions/Precautions: Fall Risk  Position Activity Restriction  Other position/activity restrictions: very [place], dementia, almost blind    Subjective   General  Chart Reviewed: Yes  Patient assessed for rehabilitation services?: Yes  Additional Pertinent Hx: 70-year-old female with history of Alzheimer's dementia, paroxysmal atrial fibrillation, essential hypertension, gout, normocytic anemia, thrombocytopenia, who was brought to the emergency room for evaluation of increasing confusion, as reported by family members. History provided by patient is extremely limited due to underlying confusion and history of Alzheimer's dementia. According to review of electronic medical records, patient has history of recurrent urinary tract infection. There was no documented fever in the emergency room. Response to previous treatment: Patient unable to report, no changes reported from family or staff  Family / Caregiver Present: No  Diagnosis: Acute metabolic encephalopathy,Bilateral pleural effusion and possible atelectasis or infiltrate  Subjective  Subjective: Patient supine in bed upon arrival to room with PT. Patient soundly sleeping and does not wake to name  General Comment  Comments: RN ok for therapy     Social/Functional History  Social/Functional History  Lives With: Son (Son Luis Fernando Camejo) and his partner Mahesh Irby)  Type of Home: House  Home Layout: One level  Home Access: Stairs to enter with rails  Entrance Stairs - Number of Steps: 4-5  Bathroom Shower/Tub: Walk-in shower  Bathroom Toilet: Standard  Bathroom Equipment: Grab bars in shower, Shower chair  Home Equipment: Eleerick Goel, 4 wheeled, Cam Cantu  ADL Assistance: Needs assistance (Son assists with bathing/dressing/toileting - patient feeds self)  Homemaking Responsibilities: No (son performs)  Ambulation Assistance: Needs assistance (with rollator)  Transfer Assistance: Needs assistance  Active : No  Additional Comments: above info from previous therapy note-and social work note varifies this. Objective     Safety Devices  Type of Devices:  All fall risk precautions in place;Call light within reach;Nurse notified; Bed alarm in place; Left in bed  Restraints  Restraints Initially in Place: No  Balance  Sitting: With support (Max A of 2 sitting edge of bed, does not open eyes, limited participation)  Standing:  (unsafe to attempt)              Activity Tolerance  Activity Tolerance: Patient limited by fatigue;Treatment limited secondary to decreased cognition  Activity Tolerance Comments: lethargic, not following commands  Bed mobility  Rolling to Left: Dependent/Total  Supine to Sit: Dependent/Total;2 Person assistance  Sit to Supine: Dependent/Total;2 Person assistance  Transfers  Transfer Comments: unsafe to attempt this date     Cognition  Overall Cognitive Status: Exceptions  Arousal/Alertness: Inconsistent responses to stimuli  Following Commands: Does not follow commands  Attention Span: Unable to maintain attention  Initiation: Requires cues for all  Sequencing: Requires cues for all  Cognition Comment: Pt with h/o Alz dementia.  Does not open eyes this date, follow directions and unable to participate in session  Orientation  Overall Orientation Status: Impaired  Orientation Level:  (unable to answer questions)                  Education Given To: Patient  Education Provided: Orientation            [facility]        [facility] Daily Activity Raw Score: 6 (12/19/22 0935)  AM-[facility] Inpatient ADL T-Scale Score : 17.07 (12/19/22 0935)  ADL Inpatient CMS 0-100% Score: 100 (12/19/22 0935)  ADL Inpatient CMS G-Code Modifier : CN (12/19/22 0935)      Goals  Short Term Goals  Time Frame for Short Term Goals: Date of D/C: all goals ongoing  Short Term Goal 1: Mod assist for feeding and grooming tasks  Short Term Goal 2: Mod assist x1 bed mobility  Short Term Goal 3: Mod assist x 1 functional transfers to bed, chair and toilet with rolling walker/rollator  Short Term Goal 4: Mod assist x 1 functional mobility  with rolling walker/rollator  Long Term Goals  Time Frame for Long Term

## 2022-12-19 NOTE — PROGRESS NOTES
Hospital Medicine Progress Note     Date:  12/19/2022    PCP: Nicolas Martin MD (Tel: [phone])    Date of Admission: 12/15/2022    Chief complaint:   Chief Complaint   Patient presents with    Altered Mental Status     Per EMS family states pt is altered        Brief admission history: 79-year-old female with history of Alzheimer's dementia, paroxysmal atrial fibrillation, essential hypertension, gout, normocytic anemia, thrombocytopenia, who was brought to the emergency room for evaluation of increasing confusion, as reported by family members. History provided by patient is extremely limited due to underlying confusion and history of Alzheimer's dementia. According to review of electronic medical records, patient has history of recurrent urinary tract infection. There was no documented fever in the emergency room. Chest x-ray revealed mild cardiomegaly with mild central pulmonary congestion, hazy bibasilar opacities and small bibasilar pleural effusion which is more prominent. CT-chest revealed moderate to large right and moderate left pleural effusions, adjacent airspace disease likely passive atelectasis (by a complaint of pneumonia or aspiration remains in the differential consideration), groundglass opacity within the aerated lungs concerning for microatelectasis versus pulmonary edema, moderate sized hiatal hernia, subacute to chronic appearing compression fractures involving the superior endplate of L1. Rapid COVID-19 and influenza screen negative. Urinalysis was not consistent with infection. Procalcitonin level is normal.    Assessment/plan:  Acute metabolic encephalopathy (in the setting of underlying dementia). So far, no evidence of infectious process. May be secondary to underlying bilateral pleural effusion. Monitor mental status closely. Bilateral pleural effusion. Moderate to large, right, moderate left.  Agree with pulmonology that patient will not stay still enough to allow safe thoracentesis. She has been on IV lasix, holding due to worsening renal function. Acute on chronic combined systolic and diastolic heart failure. ECHO with reduced RV function, moderate to severe mitral regurgitation, severe tricuspid regurgitation and severely dilated right atrium. Unfortunately, with the degree of valvular disease, advanced age, dementia, prognosis is extremely poor. Acute kidney injury on CKD III, improved. Secondary to diuretics therapy. Creatinine has improved and patient has been restarted on oral lasix, to continue daily. History of atrial fibrillation. On beta-blocker, xarelto. Other comorbidities: history of Alzheimer's dementia, paroxysmal atrial fibrillation, essential hypertension, gout, normocytic anemia, thrombocytopenia. Disposition. Patient is stable for discharge home with son. I discussed with 2 sons over the phone on 12/18/2022. Diet: ADULT DIET; Regular; Low Sodium (2 gm); 2000 ml    Code status: DNR-CCA   ----------  Subjective  Grossly confused. Objective  Physical exam:  Vitals: /81   Pulse 93   Temp 97.8 °F (36.6 °C) (Axillary)   Resp 16   Ht 5' 3\" (1.6 m)   Wt 186 lb 8.2 oz (84.6 kg)   SpO2 91%   BMI 33.04 kg/m²   Gen/overall appearance: Not in acute distress. Alert. Underlying dementia. Head: Normocephalic, atraumatic  Eyes: Extraocular muscles intact. ENT: Oral mucosa moist  Neck: No JVD, thyromegaly  CVS: Nml S1S2, no MRG, RRR  Pulm: Diminished in lung bases. +crackles. Gastrointestinal: Soft, NT/ND, +BS  Musculoskeletal: No edema. Warm  Neuro: No focal deficit. Moves extremity spontaneously. Psychiatry: Appropriate affect. Not agitated. Skin: Warm, dry with normal turgor.  No rash  Capillary refill: Brisk,< 3 seconds   Peripheral Pulses: +2 palpable, equal bilaterally      24HR INTAKE/OUTPUT:    Intake/Output Summary (Last 24 hours) at 12/19/2022 0443  Last data filed at 12/18/2022 2121  Gross per 24 hour   Intake 490 ml Output --   Net 490 ml       I/O last 3 completed shifts: In: 36 [P.O.:720; I.V.:10]  Out: -   I/O this shift:  In: 10 [I.V.:10]  Out: -   Meds:    furosemide  40 mg Oral Daily    [Held by provider] furosemide  40 mg IntraVENous BID    Vitamin D  1,000 Units Oral Daily    calcium elemental  500 mg Oral Daily    therapeutic multivitamin-minerals  1 tablet Oral Daily    metoprolol succinate  25 mg Oral Daily    mometasone-formoterol  2 puff Inhalation BID    rivaroxaban  15 mg Oral Daily with breakfast    sodium chloride flush  5-40 mL IntraVENous 2 times per day     Infusions:    sodium chloride       PRN Meds: potassium chloride **OR** potassium alternative oral replacement **OR** potassium chloride, magnesium sulfate, sodium phosphate IVPB **OR** sodium phosphate IVPB, melatonin, albuterol sulfate HFA, sodium chloride flush, sodium chloride, acetaminophen **OR** acetaminophen, ondansetron    Labs/imaging:  CBC:   Recent Labs     12/17/22  0614 12/18/22  1236   WBC 6.0 6.1   HGB 12.0 12.3    156       BMP:    Recent Labs     12/17/22  0614 12/18/22  1236    139   K 4.0 4.2    102   CO2 26 23   BUN 28* 28*   CREATININE 1.5* 1.2   GLUCOSE 78 78       Hepatic:   No results for input(s): AST, ALT, ALB, BILITOT, ALKPHOS in the last 72 hours.       Alayna Hale MD  -------------------------------  Rounding hospitalist

## 2022-12-19 NOTE — NURSE NAVIGATOR
Discharge order noted. Pt will be returning home with [facility] and will pursue HOC this week.   She has appropriate discharge instructions on her AVS as well as the [address]

## 2022-12-19 NOTE — PROGRESS NOTES
CLINICAL PHARMACY NOTE: MEDS TO BEDS    Refused rx for Furosemide. Called Pt's son, he said she has plenty of Furosemide at home.

## 2022-12-19 NOTE — CARE COORDINATION
Aware of hospice order and son's request for [facility]ospice of [address]. Spoke with [address] [place] [address] regarding referral and patient being ready for dc today. Electronically signed by SOPHY Ortiz, ALMAS, Case Management on 12/19/2022 at 11:06 AM  [place] [id number]    1:14 PM  Spoke with son regarding discharge today. He reported that he is very ill and unable to  the patient but he would work with his partner Armando Fort to pick her up later today. Explained that patient is requiring the assistance of two for transfers and offered to arrange a stretcher transport home. He reported that patient was able to walk prior to admission and is probably just being stubborn. He reported Marquis Romero is good with patient and will be able to get her to walk. Discussed the hospice evaluation. He reported that he is too ill to meet with hospice at this time but may be better by the end of the week. He plans to resume [address] Informed Jeancarlos Weiner at [address] [place] [address] regarding above. Informed [facility] liaison of plan/discharge. Spoke with son again regarding patient discharge home and transport. Son believes the patient is just being stubborn and will cooperate with Marquis Romero to get in a car but he did agree to a 7pm ambulance transport home if needed. Set up transport for 7pm.   Rn aware.      Electronically signed by SOPHY Ortiz LISW, Case Management on 12/19/2022 at 2:32 PM  [facility] [id number]

## 2022-12-19 NOTE — PLAN OF CARE
Problem: Discharge Planning  Goal: Discharge to home or other facility with appropriate resources  12/19/2022 0752 by Jnaell Metz RN  Outcome: Progressing  Flowsheets (Taken 12/19/2022 5551)  Discharge to home or other facility with appropriate resources:   Identify discharge learning needs (meds, wound care, etc)   Identify barriers to discharge with patient and caregiver   Arrange for needed discharge resources and transportation as appropriate     Problem: Pain  Goal: Verbalizes/displays adequate comfort level or baseline comfort level  12/19/2022 0752 by Janell Metz RN  Outcome: Progressing  Flowsheets (Taken 12/19/2022 0752)  Verbalizes/displays adequate comfort level or baseline comfort level:   Encourage patient to monitor pain and request assistance   Administer analgesics based on type and severity of pain and evaluate response   Assess pain using appropriate pain scale     Problem: Skin/Tissue Integrity  Goal: Absence of new skin breakdown  Description: 1. Monitor for areas of redness and/or skin breakdown  2. Assess vascular access sites hourly  3. Every 4-6 hours minimum:  Change oxygen saturation probe site  4. Every 4-6 hours:  If on nasal continuous positive airway pressure, respiratory therapy assess nares and determine need for appliance change or resting period.   12/19/2022 0752 by Janell Metz RN  Outcome: Progressing     Problem: Safety - Adult  Goal: Free from fall injury  12/19/2022 0752 by Janell Metz RN  Outcome: Progressing  Flowsheets (Taken 12/19/2022 0752)  Free From Fall Injury: Diamonddelfinarupinder Branden family/caregiver on patient safety     Problem: Metabolic/Fluid and Electrolytes - Adult  Goal: Electrolytes maintained within normal limits  12/19/2022 0752 by Janell Metz RN  Outcome: Progressing

## 2022-12-19 NOTE — CARE COORDINATION
Received call from johanna plangino that patient will discharge today with homecare. Patient currently active with [facility]. Patient to be seen by 12/21/22. Referral sent to [facility] via direct link. Electronically signed by Gurjit Gaspar LPN on 48/87/25 at 5:95 PM EST

## 2022-12-19 NOTE — PROGRESS NOTES
Pt alert and oriented to self only. Pt resting in bed. No IV access. Pt refusing AM medications. No tele, room air. Pt does not appear to be in any pain. Discharge order noted. Call light within reach. Fall precautions in place.  Electronically signed by Reinier Salcedo RN on 12/19/2022 at 9:48 AM

## 2022-12-19 NOTE — CONSULTS
Addended by: Denise Chung on: 7/7/2021 01:47 PM     Modules accepted: Orders Patient is active on consult list, see progress note.

## 2022-12-19 NOTE — DISCHARGE SUMMARY
Hospital Discharge Summary    Patient's PCP: Jazz Ca MD  Admit Date: 12/15/2022   Discharge Date: 12/19/2022    Admitting Physician: Dr. Cornel Figueroa DO  Discharge Physician: Dr. Juan Goode MD     Consults:   IP CONSULT TO HOSPITALIST  IP CONSULT TO PALLIATIVE CARE  IP CONSULT TO PULMONOLOGY  IP CONSULT TO PALLIATIVE CARE    Brief HPI: Patient admitted with AMS    Brief hospital course: 61-year-old female with history of Alzheimer's dementia, paroxysmal atrial fibrillation, essential hypertension, gout, normocytic anemia, thrombocytopenia, who was brought to the emergency room for evaluation of increasing confusion, as reported by family members. History provided by patient is extremely limited due to underlying confusion and history of Alzheimer's dementia. According to review of electronic medical records, patient has history of recurrent urinary tract infection. There was no documented fever in the emergency room. Chest x-ray revealed mild cardiomegaly with mild central pulmonary congestion, hazy bibasilar opacities and small bibasilar pleural effusion which is more prominent. CT-chest revealed moderate to large right and moderate left pleural effusions, adjacent airspace disease likely passive atelectasis (by a complaint of pneumonia or aspiration remains in the differential consideration), groundglass opacity within the aerated lungs concerning for microatelectasis versus pulmonary edema, moderate sized hiatal hernia, subacute to chronic appearing compression fractures involving the superior endplate of L1. Rapid COVID-19 and influenza screen negative. Urinalysis was not consistent with infection. Procalcitonin level is normal.    Assessment/plan:  Acute metabolic encephalopathy (in the setting of underlying dementia). So far, no evidence of infectious process. May be secondary to underlying bilateral pleural effusion. Monitor mental status closely.   Bilateral pleural effusion. Moderate to large, right, moderate left. Agree with pulmonology that patient will not stay still enough to allow safe thoracentesis. She has been on IV lasix, holding due to worsening renal function. Acute on chronic combined systolic and diastolic heart failure. ECHO with reduced RV function, moderate to severe mitral regurgitation, severe tricuspid regurgitation and severely dilated right atrium. Unfortunately, with the degree of valvular disease, advanced age, dementia, prognosis is extremely poor. Acute kidney injury on CKD III, improved. Secondary to diuretics therapy. Creatinine has improved and patient has been restarted on oral lasix, to continue daily. History of atrial fibrillation. On beta-blocker, xarelto. Other comorbidities: history of Alzheimer's dementia, paroxysmal atrial fibrillation, essential hypertension, gout, normocytic anemia, thrombocytopenia. Disposition. Patient is stable for discharge home with son. I discussed with 2 sons over the phone on 12/18/2022. Invasive procedures:  None. Discharge Diagnoses:   See above. Physical Exam: /81   Pulse 93   Temp 97.8 °F (36.6 °C) (Axillary)   Resp 16   Ht 5' 3\" (1.6 m)   Wt 186 lb 8.2 oz (84.6 kg)   SpO2 91%   BMI 33.04 kg/m²   Gen/overall appearance: Not in acute distress. Alert. Confused. Underlying dementia. Head: Normocephalic, atraumatic  Eyes: Extraocular muscles intact. ENT: Oral mucosa moist  Neck: No JVD, thyromegaly  CVS: Nml S1S2, no MRG, RRR  Pulm: Diminished in lung bases. +crackles. Gastrointestinal: Soft, NT/ND, +BS  Musculoskeletal: No edema. Warm  Neuro: No focal deficit. Moves extremity spontaneously. Psychiatry: Appropriate affect. Not agitated. Skin: Warm, dry with normal turgor.  No rash  Capillary refill: Brisk,< 3 seconds   Peripheral Pulses: +2 palpable, equal bilaterally     Significant diagnostic studies that may require follow up:  CT HEAD WO CONTRAST    Result Date: 12/15/2022  EXAMINATION: CT OF THE HEAD WITHOUT CONTRAST  12/15/2022 4:51 pm TECHNIQUE: CT of the head was performed without the administration of intravenous contrast. Automated exposure control, iterative reconstruction, and/or weight based adjustment of the mA/kV was utilized to reduce the radiation dose to as low as reasonably achievable. COMPARISON: 10/18/2022 HISTORY: ORDERING SYSTEM PROVIDED HISTORY: AMS TECHNOLOGIST PROVIDED HISTORY: Reason for exam:->AMS Has a \"code stroke\" or \"stroke alert\" been called? ->No Decision Support Exception - unselect if not a suspected or confirmed emergency medical condition->Emergency Medical Condition (MA) Reason for Exam: ams FINDINGS: BRAIN/VENTRICLES: Encephalomalacia within the right temporal, occipital, and parietal region is again detected. No acute loss of the gray-white matter differentiation is identified to suggest acute or subacute infarct. No masses or hemorrhages are seen within the brain parenchyma. No midline shift. Moderate to severe periventricular low-attenuation is detected, most compatible with chronic small vessel ischemic disease. Intracranial vasculature is unremarkable in appearance. ORBITS: The visualized portion of the orbits demonstrate no acute abnormality. SINUSES: The visualized paranasal sinuses and mastoid air cells demonstrate no acute abnormality. SOFT TISSUES/SKULL:  Remote right parietal craniotomy noted. No acute calvarial abnormality. No acute intracranial abnormality. CT CHEST WO CONTRAST    Result Date: 12/15/2022  EXAMINATION: CT OF THE CHEST WITHOUT CONTRAST, 12/15/2022 4:51 pm TECHNIQUE: CT of the chest was performed without the administration of intravenous contrast. Multiplanar reformatted images are provided for review. Automated exposure control, iterative reconstruction, and/or weight based adjustment of the mA/kV was utilized to reduce the radiation dose to as low as reasonably achievable.  COMPARISON: Chest radiograph performed same day Chest CT, 11/28/2020 HISTORY: ORDERING SYSTEM PROVIDED HISTORY: SOB, fatigue, AMS TECHNOLOGIST PROVIDED HISTORY: Reason for Exam:  SOB, fatigue, AMS Decision Support Exception - unselect if not a suspected or confirmed emergency medical condition->Emergency Medical Condition (MA) Reason for Exam: SOB, fatigue, AMS FINDINGS: Mediastinum: No pathologic enlarged mediastinal or hilar lymph nodes. The esophagus is unremarkable. Cardiac chambers are enlarged, similar when compared to the previous exam. No pericardial effusion. Ascending thoracic aorta is at the upper limits of normal in caliber. Limited imaging of the pulmonary arteries unremarkable. Lungs/Pleura: Moderate to large right and moderate left pleural effusions are seen. Adjacent airspace disease is noted. Ground-glass opacity noted within the upper lungs. No pneumothorax identified. Diffuse bronchial wall thickening is found. No filling defects within the airways are seen. Upper Abdomen: Small to moderate-sized hiatal hernia. Limited noncontrast imaging of the upper abdomen is otherwise unremarkable. Soft Tissues/Bones: Subacute to chronic appearing compression fracture involving the superior endplate of L1 is noted, without dorsal retropulsion, with loss of approximately 20% of the vertebral body height. No paravertebral edema identified. No acute or suspicious bony abnormalities are detected otherwise. Moderate to large right and moderate left pleural effusions. Adjacent airspace disease is likely passive atelectasis, but a component of pneumonia or aspiration remains a differential consideration. There is ground-glass opacity within the aerated lungs, which may reflect microatelectasis versus pulmonary edema. Infectious or inflammatory pneumonitis remains a less likely consideration. Moderate-sized hiatal hernia. Subacute to chronic appearing compression fracture involving the superior endplate of L1.      XR CHEST PORTABLE    Result Date: 12/15/2022  EXAMINATION: ONE XRAY VIEW OF THE CHEST 12/15/2022 5:28 pm COMPARISON: 10/18/2022 HISTORY: ORDERING SYSTEM PROVIDED HISTORY: Altered Mental Status TECHNOLOGIST PROVIDED HISTORY: Reason for exam:->Altered Mental Status Reason for Exam: Altered Mental Status FINDINGS: Heart is mildly enlarged but unchanged. The pulmonary vessels are less prominent. There are hazy bibasilar opacities with mild blunting of the costophrenic sulci which is increased     Mild cardiomegaly and mild central pulmonary congestion which is more prominent. Hazy bibasilar opacities and small bibasilar pleural effusions which is more prominent. Treatments: As above. Discharge Medications:     Medication List        CHANGE how you take these medications      furosemide 40 MG tablet  Commonly known as: LASIX  Take 1 tablet by mouth daily  What changed:   medication strength  how much to take  when to take this            CONTINUE taking these medications      albuterol sulfate  (90 Base) MCG/ACT inhaler  Commonly known as: PROVENTIL;VENTOLIN;PROAIR     calcium carbonate 500 MG Tabs tablet  Commonly known as: OSCAL     Cranberry 250 MG Chew     melatonin 3 MG Tabs tablet     metoprolol succinate 25 MG extended release tablet  Commonly known as: TOPROL XL     mometasone-formoterol 200-5 MCG/ACT inhaler  Commonly known as: DULERA     multivitamin with minerals tablet     rivaroxaban 15 MG Tabs tablet  Commonly known as: Xarelto  Take 1 tablet by mouth daily (with breakfast)     vitamin D 25 MCG (1000 UT) Caps               Where to Get Your Medications        These medications were sent to [address] [place] [address], [address] [facility] [phone] [facility]      Phone: [phone]   furosemide 40 MG tablet         Activity: activity as tolerated  Diet: ADULT DIET; Regular;  Low Sodium (2 gm); 2000 ml Disposition: home  Discharged Condition: Stable  Follow Up:   [address]  [address]  [address] [place] [address]  [phone]    Follow up on 12/21/2022  RN to see by 12/20/22 for hospital follow up. Suzan Sosa MD  [address] [facility] [address] [place]  [phone]    Schedule an appointment as soon as possible for a visit in 1 week(s)        Code status:  DNR-CCA     Total time spent on discharge, finalizing medications, referrals and arranging outpatient follow up was more than 30 minutes      Thank you Dr. Suzan Sosa MD for the opportunity to be involved in this patients care.

## 2022-12-19 NOTE — PLAN OF CARE
Problem: Discharge Planning  Goal: Discharge to home or other facility with appropriate resources  12/19/2022 0413 by Juju Granados RN  Outcome: Progressing  Flowsheets (Taken 12/18/2022 2000)  Discharge to home or other facility with appropriate resources: Identify barriers to discharge with patient and caregiver  12/18/2022 1445 by Anaya Brush RN  Outcome: Progressing  Flowsheets  Taken 12/18/2022 1445  Discharge to home or other facility with appropriate resources:   Identify barriers to discharge with patient and caregiver   Arrange for needed discharge resources and transportation as appropriate  Taken 12/18/2022 1000  Discharge to home or other facility with appropriate resources: Identify barriers to discharge with patient and caregiver     Problem: Pain  Goal: Verbalizes/displays adequate comfort level or baseline comfort level  12/19/2022 0413 by Juju Granados RN  Outcome: Progressing  12/18/2022 1445 by Anaya Brush RN  Outcome: Progressing  Flowsheets (Taken 12/18/2022 1445)  Verbalizes/displays adequate comfort level or baseline comfort level:   Encourage patient to monitor pain and request assistance   Assess pain using appropriate pain scale     Problem: Skin/Tissue Integrity  Goal: Absence of new skin breakdown  Description: 1. Monitor for areas of redness and/or skin breakdown  2. Assess vascular access sites hourly  3. Every 4-6 hours minimum:  Change oxygen saturation probe site  4. Every 4-6 hours:  If on nasal continuous positive airway pressure, respiratory therapy assess nares and determine need for appliance change or resting period.   12/19/2022 0413 by Juju Granados RN  Outcome: Progressing  12/18/2022 1445 by Anaya Brush RN  Outcome: Progressing     Problem: Safety - Adult  Goal: Free from fall injury  12/19/2022 0413 by Juju Granados RN  Outcome: Progressing  Flowsheets  Taken 12/18/2022 2200 by Juju Granados RN  Free From Fall Injury: Instruct family/caregiver on patient safety  Taken 12/18/2022 2000 by Albert Yung RN  Free From Fall Injury: Instruct family/caregiver on patient safety  Taken 12/18/2022 1446 by Ken Lehman RN  Free From Fall Injury: Instruct family/caregiver on patient safety  12/18/2022 1445 by Ken Lehman RN  Outcome: Progressing  Flowsheets (Taken 12/18/2022 1445)  Free From Fall Injury: Instruct family/caregiver on patient safety

## 2022-12-19 NOTE — PROGRESS NOTES
Physical Therapy  Facility/Department: [facility] 3W ORTHOPEDICS  Physical Therapy treatment session- Yordy Lara 89 with OT    Name: Elyssa Pelaez  : 1931  MRN: [id number]  Date of Service: 2022    Discharge Recommendations:  [address] with PT, Long Term Care without PT   PT Equipment Recommendations  Equipment Needed: No  Other: will continue to assess      Patient Diagnosis(es): The primary encounter diagnosis was Altered mental status, unspecified altered mental status type. A diagnosis of Dehydration was also pertinent to this visit. 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 Past Surgical History:  has no past surgical history on file. Assessment   Body Structures, Functions, Activity Limitations Requiring Skilled Therapeutic Intervention: Decreased functional mobility ; Decreased ADL status; Decreased cognition;Decreased balance  Assessment: Today, pt not following commands and not able to participate. Sleeping upon arrival and only briefly awoken when therapists moved pt to seated position EOB dependently. Pt with eyes closed EOB and dependent for sitting balance. Based on today's session, anticipate that pt will need LTC upon d/c. Will continue to follow and assess. Treatment Diagnosis: functional mobility below baseline  Therapy Prognosis: Fair  Requires PT Follow-Up: Yes  Activity Tolerance  Activity Tolerance: Patient limited by fatigue;Treatment limited secondary to decreased cognition  Activity Tolerance Comments: lethargic, not following commands     Plan   Physcial Therapy Plan  General Plan: 2-3 times per week  Current Treatment Recommendations: Functional mobility training, Transfer training, Positioning, Therapeutic activities, Patient/Caregiver education & training  Safety Devices  Type of Devices:  All fall risk precautions in place, would require use of maxi-move    Ambulation  Comments: unable to assess     Balance  Comments: pt sat EOB for approx 30 sec, dependent for sitting balance; startled and initially opened her eyes sitting EOB but then closed eyes and did not follow any commands; returned to supine and positioned for comfort in bed           AM-PAC Score  AM-PAC Inpatient Mobility Raw Score : 6 (12/19/22 0939)  AM-PAC Inpatient T-Scale Score : 23.55 (12/19/22 0939)  Mobility Inpatient CMS 0-100% Score: 100 (12/19/22 0939)  Mobility Inpatient CMS G-Code Modifier : CN (12/19/22 0939)             Goals  Short Term Goals  Time Frame for Short Term Goals: 2-3 days (goals ongoing as of 12/19)  Short Term Goal 1: bed mobility at min assist  Short Term Goal 2: transfers at min/mod assist  Short Term Goal 3: ambulation at min/mod assist rolling walker for room distances  Patient Goals   Patient Goals : not able to formulate       Education  Patient Education  Education Given To: Patient  Education Provided: Role of Therapy  Education Outcome: Unable to demonstrate understanding      Therapy Time   Individual Concurrent Group Co-treatment   Time In 0910         Time Out 0940         Minutes 30         Timed Code Treatment Minutes: 30 Minutes       Electronically signed by Rosetta Kincaid PT [id number] on 12/19/2022 at 9:45 AM

## 2022-12-19 NOTE — CARE COORDINATION
DISCHARGE SUMMARY     DATE OF DISCHARGE: 12/19/2022    DISCHARGE DESTINATION: home with son      [address]:              Discharging to Facility/ Agency   Name:  [facility] care    Address: [address] [facility] [facility] [place] [address]  Phone: [phone]  Fax: [phone]     TRANSPORTATION:     Company Name:  [address] [place] [address] up Time: 7pm    Phone Number: [phone]      COMMENTS: Spoke with son who plans to bring stef home with [facility] and then pursue [facility] later this week. Discussed transport to home patient is dependent for bed mobility/not ambulating. He is agreeable to transport at 7pm.   Rn aware.      Electronically signed by SOPHY Mitchell, ALMAS, Case Management on 12/19/2022 at 2:35 PM  [place] [id number]

## 2022-12-19 NOTE — PROGRESS NOTES
PALLIATIVE MEDICINE PROGRESS NOTE     Patient name:Shima Bee    EST:[id number] :1931  Room/Bed:K7R-6916/3123-    LOS: 4 days        ASSESSMENT/RECOMMENDATIONS     80 y.o. female with shortness of breath and altered mental status. Symptom Management:  Shortness of breath -secondary to left side pleural effusion. Per CT scan in October, small to moderate, now moderate to large volume. Unable to tolerate diuretics or thoracentesis. Will proceed with comfort care. Altered mental status - Minimal if any change, possible baseline. Patient/Family Goals of Care :    22 - discussed patient's plan of care with her son, Marianna Altman. We discussed acute process in regards to further need for goals of care conversations. Per Marianna Altman, his mother stated when she was more coherent, \"I do not want to die, I want to live\". He feels as though he needs to continue management of her medical care up to the point of cardiac or respiratory arrest.  Continues to want temporary intubation so he can \"make decisions \"on how to proceed. We discussed the role of dementia when it comes to aggressive management such as thoracentesis or additional procedures. Patient is likely unaware nor able to comprehend what is happening. Will await pulmonary recommendations. CODE STATUS will remain DNR arrest.    22 - Reports not having Medicare B, is unsure that his mother would qualify for hospice benefits. Spoke with [facility], covered under Medicare part A. Discussed goals of care and prognosis. Marianna Altman is agreeable to hospice referral, right of choice given. Mcnairmarin Hall has had family experience with [facility], would like to discuss service. Hospice referral placed. Disposition/Discharge Plan :    Home with son, hospice referral placed. Advance Directives:  Code status:  DNR-CCA  Decision Maker: Bobo Montaño, son.      Case Discussed with:  patients son, floor RN, SW.     Thank you for allowing us to participate in the care of this patient. SUBJECTIVE     Chief Complaint: Confusion    Last 24 hours:   No acute events overnight. PT/OT unable to work with patient this morning. Does not respond other than a moan when attempting to communicate. ROS:    Review of Systems     Patient unable to complete full ROS due to current cognitive status. Information that is obtained from nursing and chart          OBJECTIVE   BP (!) 116/54   Pulse 70   Temp 98.3 °F (36.8 °C) (Oral)   Resp 16   Ht 5' 3\" (1.6 m)   Wt 184 lb 8.4 oz (83.7 kg)   SpO2 92%   BMI 32.69 kg/m²   I/O last 3 completed shifts: In: 740 [P.O.:720; I.V.:20]  Out: -   No intake/output data recorded. Physical Exam  Constitutional:       General: She is sleeping. Cardiovascular:      Rate and Rhythm: Normal rate. Pulmonary:      Effort: Pulmonary effort is normal.   Abdominal:      Palpations: Abdomen is soft. Neurological:      Mental Status: She is easily aroused. Motor: Weakness present. Psychiatric:         Attention and Perception: She is inattentive. Speech: She is noncommunicative.       Total time: 35 minutes  >50% of time spent counseling patient at bedside or POA/family member if applicable , reviewing information and discussing care, coordinating with care team       Signed By: Electronically signed by TASHI Cm CNP on 12/19/2022 at 9:29 AM   Palliative Medicine   [phone]    December 19, 2022

## 2025-01-23 NOTE — PROGRESS NOTES
Patient would like a referral to an eye doctor and would like suggestions of who to see. Please call her back tomorrow. María Seth and staff are off today.   sulfate, promethazine **OR** ondansetron, magnesium hydroxide, acetaminophen **OR** acetaminophen    Lab Data:  CBC:   Recent Labs     02/13/21  0447 02/14/21  0436 02/15/21  0525   WBC 6.9 6.6 6.4   HGB 13.3 12.5 12.6   * 108* 123*     BMP:    Recent Labs     02/13/21  0447 02/14/21  0436 02/14/21  1753 02/15/21  0525    144  --  144   K 3.5 3.3* 3.9 3.9   CO2 25 27  --  25   BUN 18 25*  --  26*   CREATININE 1.1 1.0  --  0.9     Results for Elisha Tipton (MRN [id number]) as of 2/15/2021 08:54   Ref. Range 2/10/2021 18:34   Pro-BNP Latest Ref Range: 0 - 449 pg/mL 1,846 (H)     2/10/2021 CT abdomen:  No acute inflammatory abnormality is identified in the abdomen or pelvis.       Urinary bladder is contracted and not well evaluated.       Hepatic steatosis.       Small to moderate-sized hiatal hernia.       Small to moderate bilateral pleural effusions with overlying atelectasis. Pneumonia is considered less likely. ECG: Atrial fibrillation    11/30/2020:  Ejection fraction is visually estimated to be 50%. Moderate to severe mitral regurgitation. The left atrium is severely dilated. Moderate aortic regurgitation. Severly dilated right ventricle. Right ventricular systolic function is mildly reduced . Severe tricuspid regurgitation. The right atrium is severely dilated. Telemetry: Atrial fib with controlled VR    Assessment/Plan:    1. Chronic atrial fib with controlled VR  -continue Eliquis    2. UTI  -Rx per Primary team    3. Dementia (advanced)  -palliative care seeing today    4. Chronic diastolic HF  -does not appear to be significantly volume overloaded    Stable from cardiac standpoint. Will not actively follow as an inpatient. Please call if needed. Thank you for allowing us to participate in Mrs. Adorno's care.       Electronically signed by TASHI Sanchez CNP on 2/15/2021 at 8:49 AM